# Patient Record
Sex: MALE | Race: WHITE | NOT HISPANIC OR LATINO | Employment: OTHER | URBAN - METROPOLITAN AREA
[De-identification: names, ages, dates, MRNs, and addresses within clinical notes are randomized per-mention and may not be internally consistent; named-entity substitution may affect disease eponyms.]

---

## 2017-01-03 ENCOUNTER — COMMUNICATION - HEALTHEAST (OUTPATIENT)
Dept: INTERNAL MEDICINE | Facility: CLINIC | Age: 77
End: 2017-01-03

## 2017-01-11 ENCOUNTER — COMMUNICATION - HEALTHEAST (OUTPATIENT)
Dept: NURSING | Facility: CLINIC | Age: 77
End: 2017-01-11

## 2017-01-11 ENCOUNTER — OFFICE VISIT - HEALTHEAST (OUTPATIENT)
Dept: INTERNAL MEDICINE | Facility: CLINIC | Age: 77
End: 2017-01-11

## 2017-01-11 DIAGNOSIS — N18.6 ESRD (END STAGE RENAL DISEASE) (H): ICD-10-CM

## 2017-01-11 DIAGNOSIS — E11.9 DIABETES (H): ICD-10-CM

## 2017-01-11 DIAGNOSIS — I63.9 STROKE (H): ICD-10-CM

## 2017-01-11 DIAGNOSIS — I73.9 PVD (PERIPHERAL VASCULAR DISEASE) (H): ICD-10-CM

## 2017-01-11 DIAGNOSIS — Z79.899 MEDICATION MANAGEMENT: ICD-10-CM

## 2017-01-11 ASSESSMENT — MIFFLIN-ST. JEOR: SCORE: 1725.04

## 2017-01-16 ENCOUNTER — OFFICE VISIT - HEALTHEAST (OUTPATIENT)
Dept: NURSING | Facility: CLINIC | Age: 77
End: 2017-01-16

## 2017-01-16 DIAGNOSIS — E11.9 TYPE 2 DIABETES MELLITUS (H): ICD-10-CM

## 2017-01-16 DIAGNOSIS — N18.6 ESRD (END STAGE RENAL DISEASE) (H): ICD-10-CM

## 2017-01-16 DIAGNOSIS — I10 ESSENTIAL HYPERTENSION WITH GOAL BLOOD PRESSURE LESS THAN 130/80: ICD-10-CM

## 2017-01-16 DIAGNOSIS — E11.8 TYPE 2 DIABETES MELLITUS WITH COMPLICATION, WITH LONG-TERM CURRENT USE OF INSULIN (H): ICD-10-CM

## 2017-01-16 DIAGNOSIS — I63.9 CEREBROVASCULAR ACCIDENT (CVA), UNSPECIFIED MECHANISM (H): ICD-10-CM

## 2017-01-16 DIAGNOSIS — Z79.4 TYPE 2 DIABETES MELLITUS WITH COMPLICATION, WITH LONG-TERM CURRENT USE OF INSULIN (H): ICD-10-CM

## 2017-01-16 DIAGNOSIS — R53.83 FATIGUE, UNSPECIFIED TYPE: ICD-10-CM

## 2017-01-16 DIAGNOSIS — E78.5 HYPERLIPIDEMIA, UNSPECIFIED HYPERLIPIDEMIA TYPE: ICD-10-CM

## 2017-01-16 DIAGNOSIS — Z00.00 PREVENTATIVE HEALTH CARE: ICD-10-CM

## 2017-01-16 DIAGNOSIS — I10 ESSENTIAL HYPERTENSION: ICD-10-CM

## 2017-01-23 ENCOUNTER — RECORDS - HEALTHEAST (OUTPATIENT)
Dept: ADMINISTRATIVE | Facility: OTHER | Age: 77
End: 2017-01-23

## 2017-01-27 ENCOUNTER — COMMUNICATION - HEALTHEAST (OUTPATIENT)
Dept: NURSING | Facility: CLINIC | Age: 77
End: 2017-01-27

## 2017-01-27 ENCOUNTER — HOSPITAL ENCOUNTER (OUTPATIENT)
Dept: INTERVENTIONAL RADIOLOGY/VASCULAR | Facility: HOSPITAL | Age: 77
Discharge: HOME | End: 2017-01-27
Attending: INTERNAL MEDICINE

## 2017-01-27 DIAGNOSIS — N19 RENAL FAILURE: ICD-10-CM

## 2017-01-27 DIAGNOSIS — I73.9 PVD (PERIPHERAL VASCULAR DISEASE) (H): ICD-10-CM

## 2017-01-27 DIAGNOSIS — I63.9 CEREBROVASCULAR ACCIDENT (CVA), UNSPECIFIED MECHANISM (H): ICD-10-CM

## 2017-01-27 DIAGNOSIS — R79.1 PROLONGED BLEEDING TIME: ICD-10-CM

## 2017-01-27 ASSESSMENT — MIFFLIN-ST. JEOR: SCORE: 1666.07

## 2017-01-30 ENCOUNTER — COMMUNICATION - HEALTHEAST (OUTPATIENT)
Dept: INTERVENTIONAL RADIOLOGY/VASCULAR | Facility: HOSPITAL | Age: 77
End: 2017-01-30

## 2017-01-30 ENCOUNTER — COMMUNICATION - HEALTHEAST (OUTPATIENT)
Dept: NURSING | Facility: CLINIC | Age: 77
End: 2017-01-30

## 2017-01-30 ENCOUNTER — AMBULATORY - HEALTHEAST (OUTPATIENT)
Dept: INTERNAL MEDICINE | Facility: CLINIC | Age: 77
End: 2017-01-30

## 2017-01-30 DIAGNOSIS — M79.643 HAND PAIN: ICD-10-CM

## 2017-02-03 ENCOUNTER — COMMUNICATION - HEALTHEAST (OUTPATIENT)
Dept: NURSING | Facility: CLINIC | Age: 77
End: 2017-02-03

## 2017-02-03 ENCOUNTER — AMBULATORY - HEALTHEAST (OUTPATIENT)
Dept: LAB | Facility: CLINIC | Age: 77
End: 2017-02-03

## 2017-02-03 DIAGNOSIS — I63.9 STROKE (H): ICD-10-CM

## 2017-02-03 DIAGNOSIS — I73.9 PVD (PERIPHERAL VASCULAR DISEASE) (H): ICD-10-CM

## 2017-02-03 DIAGNOSIS — I63.9 CEREBROVASCULAR ACCIDENT (CVA), UNSPECIFIED MECHANISM (H): ICD-10-CM

## 2017-02-07 ENCOUNTER — RECORDS - HEALTHEAST (OUTPATIENT)
Dept: ADMINISTRATIVE | Facility: OTHER | Age: 77
End: 2017-02-07

## 2017-02-10 ENCOUNTER — AMBULATORY - HEALTHEAST (OUTPATIENT)
Dept: LAB | Facility: CLINIC | Age: 77
End: 2017-02-10

## 2017-02-10 ENCOUNTER — COMMUNICATION - HEALTHEAST (OUTPATIENT)
Dept: NURSING | Facility: CLINIC | Age: 77
End: 2017-02-10

## 2017-02-10 DIAGNOSIS — I63.9 CEREBROVASCULAR ACCIDENT (CVA), UNSPECIFIED MECHANISM (H): ICD-10-CM

## 2017-02-10 DIAGNOSIS — I73.9 PVD (PERIPHERAL VASCULAR DISEASE) (H): ICD-10-CM

## 2017-02-10 DIAGNOSIS — I63.9 STROKE (H): ICD-10-CM

## 2017-02-14 ENCOUNTER — COMMUNICATION - HEALTHEAST (OUTPATIENT)
Dept: NURSING | Facility: CLINIC | Age: 77
End: 2017-02-14

## 2017-02-14 ENCOUNTER — AMBULATORY - HEALTHEAST (OUTPATIENT)
Dept: LAB | Facility: CLINIC | Age: 77
End: 2017-02-14

## 2017-02-14 DIAGNOSIS — I73.9 PVD (PERIPHERAL VASCULAR DISEASE) (H): ICD-10-CM

## 2017-02-14 DIAGNOSIS — I63.9 STROKE (H): ICD-10-CM

## 2017-02-14 DIAGNOSIS — I63.9 CEREBROVASCULAR ACCIDENT (CVA), UNSPECIFIED MECHANISM (H): ICD-10-CM

## 2017-02-28 ENCOUNTER — COMMUNICATION - HEALTHEAST (OUTPATIENT)
Dept: INTERNAL MEDICINE | Facility: CLINIC | Age: 77
End: 2017-02-28

## 2017-03-06 ENCOUNTER — AMBULATORY - HEALTHEAST (OUTPATIENT)
Dept: LAB | Facility: CLINIC | Age: 77
End: 2017-03-06

## 2017-03-06 ENCOUNTER — COMMUNICATION - HEALTHEAST (OUTPATIENT)
Dept: NURSING | Facility: CLINIC | Age: 77
End: 2017-03-06

## 2017-03-06 DIAGNOSIS — I63.9 CEREBROVASCULAR ACCIDENT (CVA), UNSPECIFIED MECHANISM (H): ICD-10-CM

## 2017-03-06 DIAGNOSIS — I63.9 STROKE (H): ICD-10-CM

## 2017-03-06 DIAGNOSIS — I73.9 PVD (PERIPHERAL VASCULAR DISEASE) (H): ICD-10-CM

## 2017-03-13 ENCOUNTER — COMMUNICATION - HEALTHEAST (OUTPATIENT)
Dept: SCHEDULING | Facility: CLINIC | Age: 77
End: 2017-03-13

## 2017-03-13 ENCOUNTER — OFFICE VISIT - HEALTHEAST (OUTPATIENT)
Dept: INTERNAL MEDICINE | Facility: CLINIC | Age: 77
End: 2017-03-13

## 2017-03-13 ENCOUNTER — COMMUNICATION - HEALTHEAST (OUTPATIENT)
Dept: INTERNAL MEDICINE | Facility: CLINIC | Age: 77
End: 2017-03-13

## 2017-03-13 DIAGNOSIS — M79.602 LEFT ARM PAIN: ICD-10-CM

## 2017-03-13 ASSESSMENT — MIFFLIN-ST. JEOR: SCORE: 1797.62

## 2017-03-16 LAB
ATRIAL RATE - MUSE: 66 BPM
DIASTOLIC BLOOD PRESSURE - MUSE: NORMAL MMHG
INTERPRETATION ECG - MUSE: NORMAL
P AXIS - MUSE: NORMAL DEGREES
PR INTERVAL - MUSE: NORMAL MS
QRS DURATION - MUSE: 98 MS
QT - MUSE: 430 MS
QTC - MUSE: 520 MS
R AXIS - MUSE: -26 DEGREES
SYSTOLIC BLOOD PRESSURE - MUSE: NORMAL MMHG
T AXIS - MUSE: 47 DEGREES
VENTRICULAR RATE- MUSE: 88 BPM

## 2017-03-29 ENCOUNTER — COMMUNICATION - HEALTHEAST (OUTPATIENT)
Dept: INTERNAL MEDICINE | Facility: CLINIC | Age: 77
End: 2017-03-29

## 2017-03-29 ENCOUNTER — COMMUNICATION - HEALTHEAST (OUTPATIENT)
Dept: NURSING | Facility: CLINIC | Age: 77
End: 2017-03-29

## 2017-03-29 ENCOUNTER — AMBULATORY - HEALTHEAST (OUTPATIENT)
Dept: LAB | Facility: CLINIC | Age: 77
End: 2017-03-29

## 2017-03-29 DIAGNOSIS — I73.9 PVD (PERIPHERAL VASCULAR DISEASE) (H): ICD-10-CM

## 2017-03-29 DIAGNOSIS — I63.9 STROKE (H): ICD-10-CM

## 2017-03-29 DIAGNOSIS — I63.9 CEREBROVASCULAR ACCIDENT (CVA), UNSPECIFIED MECHANISM (H): ICD-10-CM

## 2017-04-05 ENCOUNTER — COMMUNICATION - HEALTHEAST (OUTPATIENT)
Dept: NURSING | Facility: CLINIC | Age: 77
End: 2017-04-05

## 2017-04-05 DIAGNOSIS — I82.409 DVT (DEEP VENOUS THROMBOSIS) (H): ICD-10-CM

## 2017-04-05 DIAGNOSIS — I63.9 STROKE (H): ICD-10-CM

## 2017-04-19 ENCOUNTER — COMMUNICATION - HEALTHEAST (OUTPATIENT)
Dept: NURSING | Facility: CLINIC | Age: 77
End: 2017-04-19

## 2017-04-19 ENCOUNTER — AMBULATORY - HEALTHEAST (OUTPATIENT)
Dept: LAB | Facility: CLINIC | Age: 77
End: 2017-04-19

## 2017-04-19 DIAGNOSIS — I63.9 STROKE (H): ICD-10-CM

## 2017-04-19 DIAGNOSIS — I82.409 DVT (DEEP VENOUS THROMBOSIS) (H): ICD-10-CM

## 2017-04-19 DIAGNOSIS — I63.9 CEREBROVASCULAR ACCIDENT (CVA), UNSPECIFIED MECHANISM (H): ICD-10-CM

## 2017-04-19 DIAGNOSIS — I73.9 PVD (PERIPHERAL VASCULAR DISEASE) (H): ICD-10-CM

## 2017-05-10 ENCOUNTER — AMBULATORY - HEALTHEAST (OUTPATIENT)
Dept: LAB | Facility: CLINIC | Age: 77
End: 2017-05-10

## 2017-05-10 ENCOUNTER — COMMUNICATION - HEALTHEAST (OUTPATIENT)
Dept: NURSING | Facility: CLINIC | Age: 77
End: 2017-05-10

## 2017-05-10 DIAGNOSIS — I82.409 DVT (DEEP VENOUS THROMBOSIS) (H): ICD-10-CM

## 2017-05-10 DIAGNOSIS — I73.9 PVD (PERIPHERAL VASCULAR DISEASE) (H): ICD-10-CM

## 2017-05-10 DIAGNOSIS — I63.9 STROKE (H): ICD-10-CM

## 2017-05-10 DIAGNOSIS — I63.9 CEREBROVASCULAR ACCIDENT (CVA), UNSPECIFIED MECHANISM (H): ICD-10-CM

## 2017-05-31 ENCOUNTER — AMBULATORY - HEALTHEAST (OUTPATIENT)
Dept: LAB | Facility: CLINIC | Age: 77
End: 2017-05-31

## 2017-05-31 ENCOUNTER — COMMUNICATION - HEALTHEAST (OUTPATIENT)
Dept: NURSING | Facility: CLINIC | Age: 77
End: 2017-05-31

## 2017-05-31 DIAGNOSIS — I82.409 DVT (DEEP VENOUS THROMBOSIS) (H): ICD-10-CM

## 2017-05-31 DIAGNOSIS — I73.9 PVD (PERIPHERAL VASCULAR DISEASE) (H): ICD-10-CM

## 2017-05-31 DIAGNOSIS — I63.9 STROKE (H): ICD-10-CM

## 2017-05-31 DIAGNOSIS — I63.9 CEREBROVASCULAR ACCIDENT (CVA), UNSPECIFIED MECHANISM (H): ICD-10-CM

## 2017-06-12 ENCOUNTER — OFFICE VISIT - HEALTHEAST (OUTPATIENT)
Dept: INTERNAL MEDICINE | Facility: CLINIC | Age: 77
End: 2017-06-12

## 2017-06-12 DIAGNOSIS — I10 ESSENTIAL HYPERTENSION WITH GOAL BLOOD PRESSURE LESS THAN 130/85: ICD-10-CM

## 2017-06-12 DIAGNOSIS — R53.83 FATIGUE, UNSPECIFIED TYPE: ICD-10-CM

## 2017-06-12 DIAGNOSIS — E11.8 TYPE 2 DIABETES MELLITUS WITH COMPLICATION, WITH LONG-TERM CURRENT USE OF INSULIN (H): ICD-10-CM

## 2017-06-12 DIAGNOSIS — Z79.4 TYPE 2 DIABETES MELLITUS WITH COMPLICATION, WITH LONG-TERM CURRENT USE OF INSULIN (H): ICD-10-CM

## 2017-06-12 DIAGNOSIS — N18.6 ESRD (END STAGE RENAL DISEASE) (H): ICD-10-CM

## 2017-06-12 LAB — HBA1C MFR BLD: 6.5 % (ref 3.5–6)

## 2017-06-12 ASSESSMENT — MIFFLIN-ST. JEOR: SCORE: 1779.47

## 2017-06-14 ENCOUNTER — COMMUNICATION - HEALTHEAST (OUTPATIENT)
Dept: INTERNAL MEDICINE | Facility: CLINIC | Age: 77
End: 2017-06-14

## 2017-06-28 ENCOUNTER — AMBULATORY - HEALTHEAST (OUTPATIENT)
Dept: LAB | Facility: CLINIC | Age: 77
End: 2017-06-28

## 2017-06-28 ENCOUNTER — COMMUNICATION - HEALTHEAST (OUTPATIENT)
Dept: NURSING | Facility: CLINIC | Age: 77
End: 2017-06-28

## 2017-06-28 DIAGNOSIS — I63.9 STROKE (H): ICD-10-CM

## 2017-06-28 DIAGNOSIS — I63.9 CEREBROVASCULAR ACCIDENT (CVA), UNSPECIFIED MECHANISM (H): ICD-10-CM

## 2017-06-28 DIAGNOSIS — I82.409 DVT (DEEP VENOUS THROMBOSIS) (H): ICD-10-CM

## 2017-06-28 DIAGNOSIS — I73.9 PVD (PERIPHERAL VASCULAR DISEASE) (H): ICD-10-CM

## 2017-07-12 ENCOUNTER — COMMUNICATION - HEALTHEAST (OUTPATIENT)
Dept: INTERNAL MEDICINE | Facility: CLINIC | Age: 77
End: 2017-07-12

## 2017-07-19 ENCOUNTER — COMMUNICATION - HEALTHEAST (OUTPATIENT)
Dept: NURSING | Facility: CLINIC | Age: 77
End: 2017-07-19

## 2017-07-19 ENCOUNTER — AMBULATORY - HEALTHEAST (OUTPATIENT)
Dept: LAB | Facility: CLINIC | Age: 77
End: 2017-07-19

## 2017-07-19 DIAGNOSIS — I63.9 STROKE (H): ICD-10-CM

## 2017-07-19 DIAGNOSIS — I82.409 DVT (DEEP VENOUS THROMBOSIS) (H): ICD-10-CM

## 2017-07-19 DIAGNOSIS — I63.9 CEREBROVASCULAR ACCIDENT (CVA), UNSPECIFIED MECHANISM (H): ICD-10-CM

## 2017-07-19 DIAGNOSIS — I73.9 PVD (PERIPHERAL VASCULAR DISEASE) (H): ICD-10-CM

## 2017-08-09 ENCOUNTER — COMMUNICATION - HEALTHEAST (OUTPATIENT)
Dept: NURSING | Facility: CLINIC | Age: 77
End: 2017-08-09

## 2017-08-09 ENCOUNTER — AMBULATORY - HEALTHEAST (OUTPATIENT)
Dept: LAB | Facility: CLINIC | Age: 77
End: 2017-08-09

## 2017-08-09 DIAGNOSIS — I73.9 PVD (PERIPHERAL VASCULAR DISEASE) (H): ICD-10-CM

## 2017-08-09 DIAGNOSIS — I63.9 STROKE (H): ICD-10-CM

## 2017-08-09 DIAGNOSIS — I82.409 DVT (DEEP VENOUS THROMBOSIS) (H): ICD-10-CM

## 2017-08-09 DIAGNOSIS — I63.9 CEREBROVASCULAR ACCIDENT (CVA), UNSPECIFIED MECHANISM (H): ICD-10-CM

## 2017-08-17 ENCOUNTER — OFFICE VISIT - HEALTHEAST (OUTPATIENT)
Dept: INTERNAL MEDICINE | Facility: CLINIC | Age: 77
End: 2017-08-17

## 2017-08-17 DIAGNOSIS — I25.10 CAD (CORONARY ARTERY DISEASE): ICD-10-CM

## 2017-08-17 DIAGNOSIS — E78.5 HYPERLIPIDEMIA: ICD-10-CM

## 2017-08-17 DIAGNOSIS — N18.6 ESRD (END STAGE RENAL DISEASE) (H): ICD-10-CM

## 2017-08-17 DIAGNOSIS — E11.8 TYPE 2 DIABETES MELLITUS WITH COMPLICATION, WITH LONG-TERM CURRENT USE OF INSULIN (H): ICD-10-CM

## 2017-08-17 DIAGNOSIS — Z79.4 TYPE 2 DIABETES MELLITUS WITH COMPLICATION, WITH LONG-TERM CURRENT USE OF INSULIN (H): ICD-10-CM

## 2017-08-17 ASSESSMENT — MIFFLIN-ST. JEOR: SCORE: 1747.72

## 2017-08-30 ENCOUNTER — AMBULATORY - HEALTHEAST (OUTPATIENT)
Dept: LAB | Facility: CLINIC | Age: 77
End: 2017-08-30

## 2017-08-30 ENCOUNTER — COMMUNICATION - HEALTHEAST (OUTPATIENT)
Dept: NURSING | Facility: CLINIC | Age: 77
End: 2017-08-30

## 2017-08-30 DIAGNOSIS — I63.9 CEREBROVASCULAR ACCIDENT (CVA), UNSPECIFIED MECHANISM (H): ICD-10-CM

## 2017-08-30 DIAGNOSIS — I63.9 STROKE (H): ICD-10-CM

## 2017-08-30 DIAGNOSIS — I82.409 DVT (DEEP VENOUS THROMBOSIS) (H): ICD-10-CM

## 2017-08-30 DIAGNOSIS — I73.9 PVD (PERIPHERAL VASCULAR DISEASE) (H): ICD-10-CM

## 2017-09-05 ENCOUNTER — COMMUNICATION - HEALTHEAST (OUTPATIENT)
Dept: INTERNAL MEDICINE | Facility: CLINIC | Age: 77
End: 2017-09-05

## 2017-09-05 DIAGNOSIS — I10 ESSENTIAL HYPERTENSION: ICD-10-CM

## 2017-09-06 ENCOUNTER — AMBULATORY - HEALTHEAST (OUTPATIENT)
Dept: INTERNAL MEDICINE | Facility: CLINIC | Age: 77
End: 2017-09-06

## 2017-09-16 ENCOUNTER — COMMUNICATION - HEALTHEAST (OUTPATIENT)
Dept: INTERNAL MEDICINE | Facility: CLINIC | Age: 77
End: 2017-09-16

## 2017-09-16 DIAGNOSIS — Z79.4 TYPE 2 DIABETES MELLITUS WITH COMPLICATION, WITH LONG-TERM CURRENT USE OF INSULIN (H): ICD-10-CM

## 2017-09-16 DIAGNOSIS — E11.8 TYPE 2 DIABETES MELLITUS WITH COMPLICATION, WITH LONG-TERM CURRENT USE OF INSULIN (H): ICD-10-CM

## 2017-09-21 ENCOUNTER — RECORDS - HEALTHEAST (OUTPATIENT)
Dept: ADMINISTRATIVE | Facility: OTHER | Age: 77
End: 2017-09-21

## 2017-09-22 ENCOUNTER — COMMUNICATION - HEALTHEAST (OUTPATIENT)
Dept: NURSING | Facility: CLINIC | Age: 77
End: 2017-09-22

## 2017-09-22 ENCOUNTER — AMBULATORY - HEALTHEAST (OUTPATIENT)
Dept: NURSING | Facility: CLINIC | Age: 77
End: 2017-09-22

## 2017-09-22 ENCOUNTER — AMBULATORY - HEALTHEAST (OUTPATIENT)
Dept: LAB | Facility: CLINIC | Age: 77
End: 2017-09-22

## 2017-09-22 DIAGNOSIS — I82.409 DVT (DEEP VENOUS THROMBOSIS) (H): ICD-10-CM

## 2017-09-22 DIAGNOSIS — I73.9 PVD (PERIPHERAL VASCULAR DISEASE) (H): ICD-10-CM

## 2017-09-22 DIAGNOSIS — I63.9 STROKE (H): ICD-10-CM

## 2017-09-22 DIAGNOSIS — Z23 NEED FOR VACCINATION: ICD-10-CM

## 2017-09-22 DIAGNOSIS — I63.9 CEREBROVASCULAR ACCIDENT (CVA), UNSPECIFIED MECHANISM (H): ICD-10-CM

## 2017-09-25 ENCOUNTER — COMMUNICATION - HEALTHEAST (OUTPATIENT)
Dept: INTERNAL MEDICINE | Facility: CLINIC | Age: 77
End: 2017-09-25

## 2017-09-25 DIAGNOSIS — E11.9 DIABETES (H): ICD-10-CM

## 2017-10-13 ENCOUNTER — AMBULATORY - HEALTHEAST (OUTPATIENT)
Dept: LAB | Facility: CLINIC | Age: 77
End: 2017-10-13

## 2017-10-13 ENCOUNTER — COMMUNICATION - HEALTHEAST (OUTPATIENT)
Dept: NURSING | Facility: CLINIC | Age: 77
End: 2017-10-13

## 2017-10-13 DIAGNOSIS — I82.409 DVT (DEEP VENOUS THROMBOSIS) (H): ICD-10-CM

## 2017-10-13 DIAGNOSIS — I63.9 STROKE (H): ICD-10-CM

## 2017-10-13 DIAGNOSIS — I73.9 PVD (PERIPHERAL VASCULAR DISEASE) (H): ICD-10-CM

## 2017-10-13 DIAGNOSIS — I63.9 CEREBROVASCULAR ACCIDENT (CVA), UNSPECIFIED MECHANISM (H): ICD-10-CM

## 2017-10-20 ENCOUNTER — OFFICE VISIT - HEALTHEAST (OUTPATIENT)
Dept: FAMILY MEDICINE | Facility: CLINIC | Age: 77
End: 2017-10-20

## 2017-10-20 DIAGNOSIS — S00.81XA FACIAL ABRASION: ICD-10-CM

## 2017-10-23 ENCOUNTER — OFFICE VISIT - HEALTHEAST (OUTPATIENT)
Dept: INTERNAL MEDICINE | Facility: CLINIC | Age: 77
End: 2017-10-23

## 2017-10-23 DIAGNOSIS — T07.XXXA MULTIPLE ABRASIONS: ICD-10-CM

## 2017-10-23 ASSESSMENT — MIFFLIN-ST. JEOR: SCORE: 1779.47

## 2017-11-03 ENCOUNTER — AMBULATORY - HEALTHEAST (OUTPATIENT)
Dept: LAB | Facility: CLINIC | Age: 77
End: 2017-11-03

## 2017-11-03 ENCOUNTER — COMMUNICATION - HEALTHEAST (OUTPATIENT)
Dept: NURSING | Facility: CLINIC | Age: 77
End: 2017-11-03

## 2017-11-03 DIAGNOSIS — I63.9 STROKE (H): ICD-10-CM

## 2017-11-03 DIAGNOSIS — I63.9 CEREBROVASCULAR ACCIDENT (CVA), UNSPECIFIED MECHANISM (H): ICD-10-CM

## 2017-11-03 DIAGNOSIS — I82.409 DVT (DEEP VENOUS THROMBOSIS) (H): ICD-10-CM

## 2017-11-03 DIAGNOSIS — I73.9 PVD (PERIPHERAL VASCULAR DISEASE) (H): ICD-10-CM

## 2017-11-15 ENCOUNTER — COMMUNICATION - HEALTHEAST (OUTPATIENT)
Dept: NURSING | Facility: CLINIC | Age: 77
End: 2017-11-15

## 2017-11-15 DIAGNOSIS — I63.9 CEREBROVASCULAR ACCIDENT (CVA), UNSPECIFIED MECHANISM (H): ICD-10-CM

## 2017-11-15 DIAGNOSIS — I73.9 PVD (PERIPHERAL VASCULAR DISEASE) (H): ICD-10-CM

## 2017-11-17 ENCOUNTER — AMBULATORY - HEALTHEAST (OUTPATIENT)
Dept: LAB | Facility: CLINIC | Age: 77
End: 2017-11-17

## 2017-11-17 ENCOUNTER — COMMUNICATION - HEALTHEAST (OUTPATIENT)
Dept: NURSING | Facility: CLINIC | Age: 77
End: 2017-11-17

## 2017-11-17 DIAGNOSIS — I73.9 PVD (PERIPHERAL VASCULAR DISEASE) (H): ICD-10-CM

## 2017-11-17 DIAGNOSIS — I82.409 DVT (DEEP VENOUS THROMBOSIS) (H): ICD-10-CM

## 2017-11-17 DIAGNOSIS — I63.9 CEREBROVASCULAR ACCIDENT (CVA), UNSPECIFIED MECHANISM (H): ICD-10-CM

## 2017-11-17 DIAGNOSIS — I63.9 STROKE (H): ICD-10-CM

## 2017-12-08 ENCOUNTER — COMMUNICATION - HEALTHEAST (OUTPATIENT)
Dept: NURSING | Facility: CLINIC | Age: 77
End: 2017-12-08

## 2017-12-08 ENCOUNTER — AMBULATORY - HEALTHEAST (OUTPATIENT)
Dept: LAB | Facility: CLINIC | Age: 77
End: 2017-12-08

## 2017-12-08 DIAGNOSIS — I73.9 PVD (PERIPHERAL VASCULAR DISEASE) (H): ICD-10-CM

## 2017-12-08 DIAGNOSIS — I63.9 CEREBROVASCULAR ACCIDENT (CVA), UNSPECIFIED MECHANISM (H): ICD-10-CM

## 2017-12-08 DIAGNOSIS — I63.9 STROKE (H): ICD-10-CM

## 2017-12-08 DIAGNOSIS — I82.409 DVT (DEEP VENOUS THROMBOSIS) (H): ICD-10-CM

## 2017-12-29 ENCOUNTER — COMMUNICATION - HEALTHEAST (OUTPATIENT)
Dept: NURSING | Facility: CLINIC | Age: 77
End: 2017-12-29

## 2017-12-29 ENCOUNTER — AMBULATORY - HEALTHEAST (OUTPATIENT)
Dept: LAB | Facility: CLINIC | Age: 77
End: 2017-12-29

## 2017-12-29 DIAGNOSIS — I73.9 PVD (PERIPHERAL VASCULAR DISEASE) (H): ICD-10-CM

## 2017-12-29 DIAGNOSIS — I82.409 DVT (DEEP VENOUS THROMBOSIS) (H): ICD-10-CM

## 2017-12-29 DIAGNOSIS — I63.9 CEREBROVASCULAR ACCIDENT (CVA), UNSPECIFIED MECHANISM (H): ICD-10-CM

## 2017-12-29 DIAGNOSIS — I63.9 STROKE (H): ICD-10-CM

## 2018-01-05 ENCOUNTER — RECORDS - HEALTHEAST (OUTPATIENT)
Dept: ADMINISTRATIVE | Facility: OTHER | Age: 78
End: 2018-01-05

## 2018-01-15 ENCOUNTER — COMMUNICATION - HEALTHEAST (OUTPATIENT)
Dept: INTERNAL MEDICINE | Facility: CLINIC | Age: 78
End: 2018-01-15

## 2018-01-15 ENCOUNTER — AMBULATORY - HEALTHEAST (OUTPATIENT)
Dept: LAB | Facility: CLINIC | Age: 78
End: 2018-01-15

## 2018-01-15 DIAGNOSIS — I63.9 CEREBROVASCULAR ACCIDENT (CVA), UNSPECIFIED MECHANISM (H): ICD-10-CM

## 2018-01-15 DIAGNOSIS — I63.9 STROKE (H): ICD-10-CM

## 2018-01-15 DIAGNOSIS — I73.9 PVD (PERIPHERAL VASCULAR DISEASE) (H): ICD-10-CM

## 2018-01-15 DIAGNOSIS — I82.409 DVT (DEEP VENOUS THROMBOSIS) (H): ICD-10-CM

## 2018-01-15 LAB — INR PPP: 3.7 (ref 0.9–1.1)

## 2018-01-19 ENCOUNTER — HOSPITAL ENCOUNTER (OUTPATIENT)
Dept: ULTRASOUND IMAGING | Facility: HOSPITAL | Age: 78
Discharge: HOME | End: 2018-01-19
Attending: PSYCHIATRY & NEUROLOGY

## 2018-01-19 DIAGNOSIS — I63.9 ISCHEMIC STROKE (H): ICD-10-CM

## 2018-01-19 DIAGNOSIS — I73.9 PERIPHERAL VASCULAR DISEASE (H): ICD-10-CM

## 2018-01-19 DIAGNOSIS — I65.29 CAROTID STENOSIS: ICD-10-CM

## 2018-01-26 ENCOUNTER — COMMUNICATION - HEALTHEAST (OUTPATIENT)
Dept: NURSING | Facility: CLINIC | Age: 78
End: 2018-01-26

## 2018-01-26 ENCOUNTER — AMBULATORY - HEALTHEAST (OUTPATIENT)
Dept: LAB | Facility: CLINIC | Age: 78
End: 2018-01-26

## 2018-01-26 DIAGNOSIS — I73.9 PVD (PERIPHERAL VASCULAR DISEASE) (H): ICD-10-CM

## 2018-01-26 DIAGNOSIS — I63.9 STROKE (H): ICD-10-CM

## 2018-01-26 DIAGNOSIS — I63.9 CEREBROVASCULAR ACCIDENT (CVA), UNSPECIFIED MECHANISM (H): ICD-10-CM

## 2018-01-26 DIAGNOSIS — I82.409 DVT (DEEP VENOUS THROMBOSIS) (H): ICD-10-CM

## 2018-01-26 LAB — INR PPP: 2.7 (ref 0.9–1.1)

## 2018-02-07 ENCOUNTER — AMBULATORY - HEALTHEAST (OUTPATIENT)
Dept: LAB | Facility: CLINIC | Age: 78
End: 2018-02-07

## 2018-02-07 ENCOUNTER — COMMUNICATION - HEALTHEAST (OUTPATIENT)
Dept: NURSING | Facility: CLINIC | Age: 78
End: 2018-02-07

## 2018-02-07 DIAGNOSIS — I82.409 DVT (DEEP VENOUS THROMBOSIS) (H): ICD-10-CM

## 2018-02-07 DIAGNOSIS — I63.9 CEREBROVASCULAR ACCIDENT (CVA), UNSPECIFIED MECHANISM (H): ICD-10-CM

## 2018-02-07 DIAGNOSIS — I73.9 PVD (PERIPHERAL VASCULAR DISEASE) (H): ICD-10-CM

## 2018-02-07 DIAGNOSIS — I63.9 STROKE (H): ICD-10-CM

## 2018-02-07 LAB — INR PPP: 3.5 (ref 0.9–1.1)

## 2018-02-16 ENCOUNTER — COMMUNICATION - HEALTHEAST (OUTPATIENT)
Dept: NURSING | Facility: CLINIC | Age: 78
End: 2018-02-16

## 2018-02-16 ENCOUNTER — AMBULATORY - HEALTHEAST (OUTPATIENT)
Dept: LAB | Facility: CLINIC | Age: 78
End: 2018-02-16

## 2018-02-16 DIAGNOSIS — I82.409 DVT (DEEP VENOUS THROMBOSIS) (H): ICD-10-CM

## 2018-02-16 DIAGNOSIS — I63.9 STROKE (H): ICD-10-CM

## 2018-02-16 DIAGNOSIS — I63.9 CEREBROVASCULAR ACCIDENT (CVA), UNSPECIFIED MECHANISM (H): ICD-10-CM

## 2018-02-16 DIAGNOSIS — I73.9 PVD (PERIPHERAL VASCULAR DISEASE) (H): ICD-10-CM

## 2018-02-16 LAB — INR PPP: 3.5 (ref 0.9–1.1)

## 2018-02-23 ENCOUNTER — RECORDS - HEALTHEAST (OUTPATIENT)
Dept: ADMINISTRATIVE | Facility: OTHER | Age: 78
End: 2018-02-23

## 2018-02-26 ENCOUNTER — OFFICE VISIT - HEALTHEAST (OUTPATIENT)
Dept: INTERNAL MEDICINE | Facility: CLINIC | Age: 78
End: 2018-02-26

## 2018-02-26 DIAGNOSIS — E11.8 TYPE 2 DIABETES MELLITUS WITH COMPLICATION, WITH LONG-TERM CURRENT USE OF INSULIN (H): ICD-10-CM

## 2018-02-26 DIAGNOSIS — N19 RENAL FAILURE: ICD-10-CM

## 2018-02-26 DIAGNOSIS — G47.30 SLEEP APNEA: ICD-10-CM

## 2018-02-26 DIAGNOSIS — E78.5 HYPERLIPIDEMIA, UNSPECIFIED HYPERLIPIDEMIA TYPE: ICD-10-CM

## 2018-02-26 DIAGNOSIS — Z79.4 TYPE 2 DIABETES MELLITUS WITH COMPLICATION, WITH LONG-TERM CURRENT USE OF INSULIN (H): ICD-10-CM

## 2018-02-26 DIAGNOSIS — E11.9 DIABETES (H): ICD-10-CM

## 2018-02-26 LAB
CHOLEST SERPL-MCNC: 104 MG/DL
FASTING STATUS PATIENT QL REPORTED: YES
HBA1C MFR BLD: 6.2 % (ref 3.5–6)
HDLC SERPL-MCNC: 42 MG/DL
LDLC SERPL CALC-MCNC: 48 MG/DL
TRIGL SERPL-MCNC: 71 MG/DL

## 2018-02-26 ASSESSMENT — MIFFLIN-ST. JEOR: SCORE: 1779.47

## 2018-02-28 ENCOUNTER — COMMUNICATION - HEALTHEAST (OUTPATIENT)
Dept: NURSING | Facility: CLINIC | Age: 78
End: 2018-02-28

## 2018-02-28 ENCOUNTER — COMMUNICATION - HEALTHEAST (OUTPATIENT)
Dept: INTERNAL MEDICINE | Facility: CLINIC | Age: 78
End: 2018-02-28

## 2018-02-28 ENCOUNTER — AMBULATORY - HEALTHEAST (OUTPATIENT)
Dept: LAB | Facility: CLINIC | Age: 78
End: 2018-02-28

## 2018-02-28 DIAGNOSIS — I63.9 STROKE (H): ICD-10-CM

## 2018-02-28 DIAGNOSIS — I67.89 ACUTE, BUT ILL-DEFINED, CEREBROVASCULAR DISEASE: ICD-10-CM

## 2018-02-28 DIAGNOSIS — I73.9 PVD (PERIPHERAL VASCULAR DISEASE) (H): ICD-10-CM

## 2018-02-28 DIAGNOSIS — I82.409 DVT (DEEP VENOUS THROMBOSIS) (H): ICD-10-CM

## 2018-02-28 DIAGNOSIS — I63.9 CEREBROVASCULAR ACCIDENT (CVA), UNSPECIFIED MECHANISM (H): ICD-10-CM

## 2018-02-28 LAB — INR PPP: 2.8 (ref 0.9–1.1)

## 2018-03-12 ENCOUNTER — COMMUNICATION - HEALTHEAST (OUTPATIENT)
Dept: NURSING | Facility: CLINIC | Age: 78
End: 2018-03-12

## 2018-03-12 ENCOUNTER — AMBULATORY - HEALTHEAST (OUTPATIENT)
Dept: LAB | Facility: CLINIC | Age: 78
End: 2018-03-12

## 2018-03-12 DIAGNOSIS — I73.9 PVD (PERIPHERAL VASCULAR DISEASE) (H): ICD-10-CM

## 2018-03-12 DIAGNOSIS — I63.9 CEREBROVASCULAR ACCIDENT (CVA), UNSPECIFIED MECHANISM (H): ICD-10-CM

## 2018-03-12 DIAGNOSIS — I67.89 ACUTE, BUT ILL-DEFINED, CEREBROVASCULAR DISEASE: ICD-10-CM

## 2018-03-12 LAB — INR PPP: 2.4 (ref 0.9–1.1)

## 2018-03-27 ENCOUNTER — RECORDS - HEALTHEAST (OUTPATIENT)
Dept: ADMINISTRATIVE | Facility: OTHER | Age: 78
End: 2018-03-27

## 2018-04-02 ENCOUNTER — COMMUNICATION - HEALTHEAST (OUTPATIENT)
Dept: ANTICOAGULATION | Facility: CLINIC | Age: 78
End: 2018-04-02

## 2018-04-02 ENCOUNTER — AMBULATORY - HEALTHEAST (OUTPATIENT)
Dept: LAB | Facility: CLINIC | Age: 78
End: 2018-04-02

## 2018-04-02 DIAGNOSIS — I73.9 PVD (PERIPHERAL VASCULAR DISEASE) (H): ICD-10-CM

## 2018-04-02 DIAGNOSIS — I67.89 ACUTE, BUT ILL-DEFINED, CEREBROVASCULAR DISEASE: ICD-10-CM

## 2018-04-02 DIAGNOSIS — I63.9 CEREBROVASCULAR ACCIDENT (CVA), UNSPECIFIED MECHANISM (H): ICD-10-CM

## 2018-04-02 LAB — INR PPP: 2.5 (ref 0.9–1.1)

## 2018-04-09 ENCOUNTER — OFFICE VISIT - HEALTHEAST (OUTPATIENT)
Dept: SLEEP MEDICINE | Facility: CLINIC | Age: 78
End: 2018-04-09

## 2018-04-09 ENCOUNTER — AMBULATORY - HEALTHEAST (OUTPATIENT)
Dept: SLEEP MEDICINE | Facility: CLINIC | Age: 78
End: 2018-04-09

## 2018-04-09 DIAGNOSIS — G47.8 SLEEP DYSFUNCTION WITH SLEEP STAGE DISTURBANCE: ICD-10-CM

## 2018-04-09 DIAGNOSIS — G47.33 OSA ON CPAP: ICD-10-CM

## 2018-04-09 ASSESSMENT — MIFFLIN-ST. JEOR: SCORE: 1793.99

## 2018-04-16 ENCOUNTER — COMMUNICATION - HEALTHEAST (OUTPATIENT)
Dept: ANTICOAGULATION | Facility: CLINIC | Age: 78
End: 2018-04-16

## 2018-04-16 ENCOUNTER — AMBULATORY - HEALTHEAST (OUTPATIENT)
Dept: LAB | Facility: CLINIC | Age: 78
End: 2018-04-16

## 2018-04-16 DIAGNOSIS — I63.9 CEREBROVASCULAR ACCIDENT (CVA), UNSPECIFIED MECHANISM (H): ICD-10-CM

## 2018-04-16 DIAGNOSIS — I73.9 PVD (PERIPHERAL VASCULAR DISEASE) (H): ICD-10-CM

## 2018-04-16 DIAGNOSIS — I67.89 ACUTE, BUT ILL-DEFINED, CEREBROVASCULAR DISEASE: ICD-10-CM

## 2018-04-16 LAB — INR PPP: 2.4 (ref 0.9–1.1)

## 2018-04-23 ENCOUNTER — AMBULATORY - HEALTHEAST (OUTPATIENT)
Dept: SLEEP MEDICINE | Facility: CLINIC | Age: 78
End: 2018-04-23

## 2018-05-07 ENCOUNTER — COMMUNICATION - HEALTHEAST (OUTPATIENT)
Dept: ANTICOAGULATION | Facility: CLINIC | Age: 78
End: 2018-05-07

## 2018-05-07 ENCOUNTER — AMBULATORY - HEALTHEAST (OUTPATIENT)
Dept: LAB | Facility: CLINIC | Age: 78
End: 2018-05-07

## 2018-05-07 DIAGNOSIS — I63.9 CEREBROVASCULAR ACCIDENT (CVA), UNSPECIFIED MECHANISM (H): ICD-10-CM

## 2018-05-07 DIAGNOSIS — I67.89 ACUTE, BUT ILL-DEFINED, CEREBROVASCULAR DISEASE: ICD-10-CM

## 2018-05-07 DIAGNOSIS — I73.9 PVD (PERIPHERAL VASCULAR DISEASE) (H): ICD-10-CM

## 2018-05-07 LAB — INR PPP: 2.9 (ref 0.9–1.1)

## 2018-06-04 ENCOUNTER — COMMUNICATION - HEALTHEAST (OUTPATIENT)
Dept: ANTICOAGULATION | Facility: CLINIC | Age: 78
End: 2018-06-04

## 2018-06-04 ENCOUNTER — AMBULATORY - HEALTHEAST (OUTPATIENT)
Dept: LAB | Facility: CLINIC | Age: 78
End: 2018-06-04

## 2018-06-04 DIAGNOSIS — I67.89 ACUTE, BUT ILL-DEFINED, CEREBROVASCULAR DISEASE: ICD-10-CM

## 2018-06-04 DIAGNOSIS — I63.9 CEREBROVASCULAR ACCIDENT (CVA), UNSPECIFIED MECHANISM (H): ICD-10-CM

## 2018-06-04 DIAGNOSIS — I73.9 PVD (PERIPHERAL VASCULAR DISEASE) (H): ICD-10-CM

## 2018-06-04 LAB — INR PPP: 1.9 (ref 0.9–1.1)

## 2018-06-08 ENCOUNTER — COMMUNICATION - HEALTHEAST (OUTPATIENT)
Dept: SLEEP MEDICINE | Facility: CLINIC | Age: 78
End: 2018-06-08

## 2018-06-11 ENCOUNTER — OFFICE VISIT - HEALTHEAST (OUTPATIENT)
Dept: SLEEP MEDICINE | Facility: CLINIC | Age: 78
End: 2018-06-11

## 2018-06-11 DIAGNOSIS — G47.33 OSA ON CPAP: ICD-10-CM

## 2018-06-11 DIAGNOSIS — R03.0 ELEVATED BLOOD PRESSURE READING: ICD-10-CM

## 2018-06-11 DIAGNOSIS — G47.10 HYPERSOMNIA: ICD-10-CM

## 2018-06-11 ASSESSMENT — MIFFLIN-ST. JEOR: SCORE: 1779.93

## 2018-06-18 ENCOUNTER — COMMUNICATION - HEALTHEAST (OUTPATIENT)
Dept: ANTICOAGULATION | Facility: CLINIC | Age: 78
End: 2018-06-18

## 2018-06-18 ENCOUNTER — AMBULATORY - HEALTHEAST (OUTPATIENT)
Dept: LAB | Facility: CLINIC | Age: 78
End: 2018-06-18

## 2018-06-18 DIAGNOSIS — I73.9 PVD (PERIPHERAL VASCULAR DISEASE) (H): ICD-10-CM

## 2018-06-18 DIAGNOSIS — I67.89 ACUTE, BUT ILL-DEFINED, CEREBROVASCULAR DISEASE: ICD-10-CM

## 2018-06-18 DIAGNOSIS — I63.9 CEREBROVASCULAR ACCIDENT (CVA), UNSPECIFIED MECHANISM (H): ICD-10-CM

## 2018-06-18 LAB — INR PPP: 2 (ref 0.9–1.1)

## 2018-07-02 ENCOUNTER — COMMUNICATION - HEALTHEAST (OUTPATIENT)
Dept: ANTICOAGULATION | Facility: CLINIC | Age: 78
End: 2018-07-02

## 2018-07-02 ENCOUNTER — AMBULATORY - HEALTHEAST (OUTPATIENT)
Dept: LAB | Facility: CLINIC | Age: 78
End: 2018-07-02

## 2018-07-02 DIAGNOSIS — I73.9 PVD (PERIPHERAL VASCULAR DISEASE) (H): ICD-10-CM

## 2018-07-02 DIAGNOSIS — I63.9 CEREBROVASCULAR ACCIDENT (CVA), UNSPECIFIED MECHANISM (H): ICD-10-CM

## 2018-07-02 DIAGNOSIS — I67.89 ACUTE, BUT ILL-DEFINED, CEREBROVASCULAR DISEASE: ICD-10-CM

## 2018-07-02 LAB — INR PPP: 2.4 (ref 0.9–1.1)

## 2018-07-16 ENCOUNTER — OFFICE VISIT - HEALTHEAST (OUTPATIENT)
Dept: INTERNAL MEDICINE | Facility: CLINIC | Age: 78
End: 2018-07-16

## 2018-07-16 ENCOUNTER — COMMUNICATION - HEALTHEAST (OUTPATIENT)
Dept: INTERNAL MEDICINE | Facility: CLINIC | Age: 78
End: 2018-07-16

## 2018-07-16 ENCOUNTER — COMMUNICATION - HEALTHEAST (OUTPATIENT)
Dept: ANTICOAGULATION | Facility: CLINIC | Age: 78
End: 2018-07-16

## 2018-07-16 DIAGNOSIS — I63.9 CEREBROVASCULAR ACCIDENT (CVA), UNSPECIFIED MECHANISM (H): ICD-10-CM

## 2018-07-16 DIAGNOSIS — E11.8 TYPE 2 DIABETES MELLITUS WITH COMPLICATION, WITH LONG-TERM CURRENT USE OF INSULIN (H): ICD-10-CM

## 2018-07-16 DIAGNOSIS — I73.9 PVD (PERIPHERAL VASCULAR DISEASE) (H): ICD-10-CM

## 2018-07-16 DIAGNOSIS — Z79.4 TYPE 2 DIABETES MELLITUS WITH COMPLICATION, WITH LONG-TERM CURRENT USE OF INSULIN (H): ICD-10-CM

## 2018-07-16 DIAGNOSIS — I67.89 ACUTE, BUT ILL-DEFINED, CEREBROVASCULAR DISEASE: ICD-10-CM

## 2018-07-16 DIAGNOSIS — N18.6 ESRD (END STAGE RENAL DISEASE) (H): ICD-10-CM

## 2018-07-16 DIAGNOSIS — I10 ESSENTIAL HYPERTENSION WITH GOAL BLOOD PRESSURE LESS THAN 130/85: ICD-10-CM

## 2018-07-16 LAB
HBA1C MFR BLD: 6 % (ref 3.5–6)
INR PPP: 2.5 (ref 0.9–1.1)

## 2018-07-16 ASSESSMENT — MIFFLIN-ST. JEOR: SCORE: 1797.62

## 2018-07-25 ENCOUNTER — COMMUNICATION - HEALTHEAST (OUTPATIENT)
Dept: INTERNAL MEDICINE | Facility: CLINIC | Age: 78
End: 2018-07-25

## 2018-07-25 DIAGNOSIS — I10 ESSENTIAL HYPERTENSION: ICD-10-CM

## 2018-08-06 ENCOUNTER — AMBULATORY - HEALTHEAST (OUTPATIENT)
Dept: LAB | Facility: CLINIC | Age: 78
End: 2018-08-06

## 2018-08-06 ENCOUNTER — COMMUNICATION - HEALTHEAST (OUTPATIENT)
Dept: ANTICOAGULATION | Facility: CLINIC | Age: 78
End: 2018-08-06

## 2018-08-06 DIAGNOSIS — I63.9 CEREBROVASCULAR ACCIDENT (CVA), UNSPECIFIED MECHANISM (H): ICD-10-CM

## 2018-08-06 DIAGNOSIS — I67.89 ACUTE, BUT ILL-DEFINED, CEREBROVASCULAR DISEASE: ICD-10-CM

## 2018-08-06 DIAGNOSIS — I73.9 PVD (PERIPHERAL VASCULAR DISEASE) (H): ICD-10-CM

## 2018-08-06 LAB — INR PPP: 2.7 (ref 0.9–1.1)

## 2018-08-22 ENCOUNTER — COMMUNICATION - HEALTHEAST (OUTPATIENT)
Dept: INTERNAL MEDICINE | Facility: CLINIC | Age: 78
End: 2018-08-22

## 2018-08-22 DIAGNOSIS — E78.5 HYPERLIPIDEMIA: ICD-10-CM

## 2018-09-10 ENCOUNTER — AMBULATORY - HEALTHEAST (OUTPATIENT)
Dept: LAB | Facility: CLINIC | Age: 78
End: 2018-09-10

## 2018-09-10 ENCOUNTER — COMMUNICATION - HEALTHEAST (OUTPATIENT)
Dept: ANTICOAGULATION | Facility: CLINIC | Age: 78
End: 2018-09-10

## 2018-09-10 DIAGNOSIS — I63.9 CEREBROVASCULAR ACCIDENT (CVA), UNSPECIFIED MECHANISM (H): ICD-10-CM

## 2018-09-10 DIAGNOSIS — I73.9 PVD (PERIPHERAL VASCULAR DISEASE) (H): ICD-10-CM

## 2018-09-10 DIAGNOSIS — I67.89 ACUTE, BUT ILL-DEFINED, CEREBROVASCULAR DISEASE: ICD-10-CM

## 2018-09-10 LAB — INR PPP: 2.4 (ref 0.9–1.1)

## 2018-09-17 ENCOUNTER — OFFICE VISIT - HEALTHEAST (OUTPATIENT)
Dept: INTERNAL MEDICINE | Facility: CLINIC | Age: 78
End: 2018-09-17

## 2018-09-17 DIAGNOSIS — Z79.4 TYPE 2 DIABETES MELLITUS WITH COMPLICATION, WITH LONG-TERM CURRENT USE OF INSULIN (H): ICD-10-CM

## 2018-09-17 DIAGNOSIS — N18.6 ESRD (END STAGE RENAL DISEASE) (H): ICD-10-CM

## 2018-09-17 DIAGNOSIS — E78.5 HYPERLIPIDEMIA, UNSPECIFIED HYPERLIPIDEMIA TYPE: ICD-10-CM

## 2018-09-17 DIAGNOSIS — I10 ESSENTIAL HYPERTENSION WITH GOAL BLOOD PRESSURE LESS THAN 130/85: ICD-10-CM

## 2018-09-17 DIAGNOSIS — E11.8 TYPE 2 DIABETES MELLITUS WITH COMPLICATION, WITH LONG-TERM CURRENT USE OF INSULIN (H): ICD-10-CM

## 2018-09-17 ASSESSMENT — MIFFLIN-ST. JEOR: SCORE: 1793.08

## 2018-10-03 ENCOUNTER — AMBULATORY - HEALTHEAST (OUTPATIENT)
Dept: LAB | Facility: CLINIC | Age: 78
End: 2018-10-03

## 2018-10-03 ENCOUNTER — COMMUNICATION - HEALTHEAST (OUTPATIENT)
Dept: ANTICOAGULATION | Facility: CLINIC | Age: 78
End: 2018-10-03

## 2018-10-03 DIAGNOSIS — I63.9 CEREBROVASCULAR ACCIDENT (CVA), UNSPECIFIED MECHANISM (H): ICD-10-CM

## 2018-10-03 DIAGNOSIS — I73.9 PVD (PERIPHERAL VASCULAR DISEASE) (H): ICD-10-CM

## 2018-10-03 DIAGNOSIS — I67.89 ACUTE, BUT ILL-DEFINED, CEREBROVASCULAR DISEASE: ICD-10-CM

## 2018-10-03 LAB — INR PPP: 2.2 (ref 0.9–1.1)

## 2018-10-09 ENCOUNTER — COMMUNICATION - HEALTHEAST (OUTPATIENT)
Dept: INTERNAL MEDICINE | Facility: CLINIC | Age: 78
End: 2018-10-09

## 2018-10-09 DIAGNOSIS — I25.10 CAD (CORONARY ARTERY DISEASE): ICD-10-CM

## 2018-10-09 DIAGNOSIS — I10 ESSENTIAL HYPERTENSION WITH GOAL BLOOD PRESSURE LESS THAN 130/85: ICD-10-CM

## 2018-10-22 ENCOUNTER — COMMUNICATION - HEALTHEAST (OUTPATIENT)
Dept: INTERNAL MEDICINE | Facility: CLINIC | Age: 78
End: 2018-10-22

## 2018-10-22 DIAGNOSIS — Z79.4 TYPE 2 DIABETES MELLITUS WITH COMPLICATION, WITH LONG-TERM CURRENT USE OF INSULIN (H): ICD-10-CM

## 2018-10-22 DIAGNOSIS — E11.8 TYPE 2 DIABETES MELLITUS WITH COMPLICATION, WITH LONG-TERM CURRENT USE OF INSULIN (H): ICD-10-CM

## 2018-10-24 ENCOUNTER — COMMUNICATION - HEALTHEAST (OUTPATIENT)
Dept: INTERNAL MEDICINE | Facility: CLINIC | Age: 78
End: 2018-10-24

## 2018-10-24 DIAGNOSIS — Z79.4 TYPE 2 DIABETES MELLITUS WITH COMPLICATION, WITH LONG-TERM CURRENT USE OF INSULIN (H): ICD-10-CM

## 2018-10-24 DIAGNOSIS — E11.8 TYPE 2 DIABETES MELLITUS WITH COMPLICATION, WITH LONG-TERM CURRENT USE OF INSULIN (H): ICD-10-CM

## 2018-10-29 ENCOUNTER — COMMUNICATION - HEALTHEAST (OUTPATIENT)
Dept: ANTICOAGULATION | Facility: CLINIC | Age: 78
End: 2018-10-29

## 2018-10-29 ENCOUNTER — AMBULATORY - HEALTHEAST (OUTPATIENT)
Dept: LAB | Facility: CLINIC | Age: 78
End: 2018-10-29

## 2018-10-29 DIAGNOSIS — I73.9 PVD (PERIPHERAL VASCULAR DISEASE) (H): ICD-10-CM

## 2018-10-29 DIAGNOSIS — I63.9 CEREBROVASCULAR ACCIDENT (CVA), UNSPECIFIED MECHANISM (H): ICD-10-CM

## 2018-10-29 DIAGNOSIS — I67.89 ACUTE, BUT ILL-DEFINED, CEREBROVASCULAR DISEASE: ICD-10-CM

## 2018-10-29 LAB — INR PPP: 2 (ref 0.9–1.1)

## 2018-11-09 ENCOUNTER — COMMUNICATION - HEALTHEAST (OUTPATIENT)
Dept: ANTICOAGULATION | Facility: CLINIC | Age: 78
End: 2018-11-09

## 2018-11-09 DIAGNOSIS — I63.9 CEREBROVASCULAR ACCIDENT (CVA), UNSPECIFIED MECHANISM (H): ICD-10-CM

## 2018-11-09 DIAGNOSIS — I73.9 PVD (PERIPHERAL VASCULAR DISEASE) (H): ICD-10-CM

## 2018-11-19 ENCOUNTER — COMMUNICATION - HEALTHEAST (OUTPATIENT)
Dept: ANTICOAGULATION | Facility: CLINIC | Age: 78
End: 2018-11-19

## 2018-11-19 ENCOUNTER — AMBULATORY - HEALTHEAST (OUTPATIENT)
Dept: LAB | Facility: CLINIC | Age: 78
End: 2018-11-19

## 2018-11-19 DIAGNOSIS — I63.9 CEREBROVASCULAR ACCIDENT (CVA), UNSPECIFIED MECHANISM (H): ICD-10-CM

## 2018-11-19 DIAGNOSIS — I73.9 PVD (PERIPHERAL VASCULAR DISEASE) (H): ICD-10-CM

## 2018-11-19 DIAGNOSIS — I67.89 ACUTE, BUT ILL-DEFINED, CEREBROVASCULAR DISEASE: ICD-10-CM

## 2018-11-19 LAB — INR PPP: 2.5 (ref 0.9–1.1)

## 2018-11-30 ENCOUNTER — OFFICE VISIT - HEALTHEAST (OUTPATIENT)
Dept: INTERNAL MEDICINE | Facility: CLINIC | Age: 78
End: 2018-11-30

## 2018-11-30 DIAGNOSIS — Z79.4 TYPE 2 DIABETES MELLITUS WITH COMPLICATION, WITH LONG-TERM CURRENT USE OF INSULIN (H): ICD-10-CM

## 2018-11-30 DIAGNOSIS — E11.8 TYPE 2 DIABETES MELLITUS WITH COMPLICATION, WITH LONG-TERM CURRENT USE OF INSULIN (H): ICD-10-CM

## 2018-11-30 DIAGNOSIS — I10 ESSENTIAL HYPERTENSION WITH GOAL BLOOD PRESSURE LESS THAN 130/85: ICD-10-CM

## 2018-11-30 DIAGNOSIS — N18.6 ESRD (END STAGE RENAL DISEASE) (H): ICD-10-CM

## 2018-11-30 ASSESSMENT — MIFFLIN-ST. JEOR: SCORE: 1786.27

## 2018-12-10 ENCOUNTER — AMBULATORY - HEALTHEAST (OUTPATIENT)
Dept: LAB | Facility: CLINIC | Age: 78
End: 2018-12-10

## 2018-12-10 ENCOUNTER — COMMUNICATION - HEALTHEAST (OUTPATIENT)
Dept: OTHER | Facility: CLINIC | Age: 78
End: 2018-12-10

## 2018-12-10 ENCOUNTER — COMMUNICATION - HEALTHEAST (OUTPATIENT)
Dept: ANTICOAGULATION | Facility: CLINIC | Age: 78
End: 2018-12-10

## 2018-12-10 DIAGNOSIS — I63.9 CEREBROVASCULAR ACCIDENT (CVA), UNSPECIFIED MECHANISM (H): ICD-10-CM

## 2018-12-10 DIAGNOSIS — I73.9 PVD (PERIPHERAL VASCULAR DISEASE) (H): ICD-10-CM

## 2018-12-10 DIAGNOSIS — I67.89 ACUTE, BUT ILL-DEFINED, CEREBROVASCULAR DISEASE: ICD-10-CM

## 2018-12-10 LAB — INR PPP: 1.8 (ref 0.9–1.1)

## 2018-12-11 ENCOUNTER — COMMUNICATION - HEALTHEAST (OUTPATIENT)
Dept: INTERNAL MEDICINE | Facility: CLINIC | Age: 78
End: 2018-12-11

## 2018-12-18 ENCOUNTER — AMBULATORY - HEALTHEAST (OUTPATIENT)
Dept: OTHER | Facility: CLINIC | Age: 78
End: 2018-12-18

## 2018-12-19 ENCOUNTER — COMMUNICATION - HEALTHEAST (OUTPATIENT)
Dept: INTERNAL MEDICINE | Facility: CLINIC | Age: 78
End: 2018-12-19

## 2018-12-19 DIAGNOSIS — E11.9 DIABETES (H): ICD-10-CM

## 2018-12-24 ENCOUNTER — COMMUNICATION - HEALTHEAST (OUTPATIENT)
Dept: INTERNAL MEDICINE | Facility: CLINIC | Age: 78
End: 2018-12-24

## 2018-12-24 DIAGNOSIS — E11.9 DIABETES (H): ICD-10-CM

## 2018-12-26 ENCOUNTER — COMMUNICATION - HEALTHEAST (OUTPATIENT)
Dept: ANTICOAGULATION | Facility: CLINIC | Age: 78
End: 2018-12-26

## 2018-12-26 ENCOUNTER — AMBULATORY - HEALTHEAST (OUTPATIENT)
Dept: LAB | Facility: CLINIC | Age: 78
End: 2018-12-26

## 2018-12-26 DIAGNOSIS — I73.9 PVD (PERIPHERAL VASCULAR DISEASE) (H): ICD-10-CM

## 2018-12-26 DIAGNOSIS — I67.89 ACUTE, BUT ILL-DEFINED, CEREBROVASCULAR DISEASE: ICD-10-CM

## 2018-12-26 DIAGNOSIS — I63.9 CEREBROVASCULAR ACCIDENT (CVA), UNSPECIFIED MECHANISM (H): ICD-10-CM

## 2018-12-26 LAB — INR PPP: 1.8 (ref 0.9–1.1)

## 2018-12-27 ENCOUNTER — RECORDS - HEALTHEAST (OUTPATIENT)
Dept: ADMINISTRATIVE | Facility: OTHER | Age: 78
End: 2018-12-27

## 2018-12-27 ENCOUNTER — COMMUNICATION - HEALTHEAST (OUTPATIENT)
Dept: INTERNAL MEDICINE | Facility: CLINIC | Age: 78
End: 2018-12-27

## 2018-12-27 DIAGNOSIS — I73.9 PERIPHERAL VASCULAR DISEASE, UNSPECIFIED (H): ICD-10-CM

## 2019-01-09 ENCOUNTER — AMBULATORY - HEALTHEAST (OUTPATIENT)
Dept: LAB | Facility: CLINIC | Age: 79
End: 2019-01-09

## 2019-01-09 ENCOUNTER — COMMUNICATION - HEALTHEAST (OUTPATIENT)
Dept: ANTICOAGULATION | Facility: CLINIC | Age: 79
End: 2019-01-09

## 2019-01-09 DIAGNOSIS — I63.9 CEREBROVASCULAR ACCIDENT (CVA), UNSPECIFIED MECHANISM (H): ICD-10-CM

## 2019-01-09 DIAGNOSIS — I73.9 PVD (PERIPHERAL VASCULAR DISEASE) (H): ICD-10-CM

## 2019-01-09 DIAGNOSIS — I67.89 ACUTE, BUT ILL-DEFINED, CEREBROVASCULAR DISEASE: ICD-10-CM

## 2019-01-09 LAB — INR PPP: 2.1 (ref 0.9–1.1)

## 2019-01-14 ENCOUNTER — OFFICE VISIT - HEALTHEAST (OUTPATIENT)
Dept: INTERNAL MEDICINE | Facility: CLINIC | Age: 79
End: 2019-01-14

## 2019-01-14 DIAGNOSIS — I10 ESSENTIAL HYPERTENSION WITH GOAL BLOOD PRESSURE LESS THAN 130/85: ICD-10-CM

## 2019-01-14 DIAGNOSIS — E11.8 TYPE 2 DIABETES MELLITUS WITH COMPLICATION, WITH LONG-TERM CURRENT USE OF INSULIN (H): ICD-10-CM

## 2019-01-14 DIAGNOSIS — Z79.4 TYPE 2 DIABETES MELLITUS WITH COMPLICATION, WITH LONG-TERM CURRENT USE OF INSULIN (H): ICD-10-CM

## 2019-01-14 DIAGNOSIS — N18.6 ESRD (END STAGE RENAL DISEASE) (H): ICD-10-CM

## 2019-01-14 ASSESSMENT — MIFFLIN-ST. JEOR: SCORE: 1799.88

## 2019-01-30 ENCOUNTER — COMMUNICATION - HEALTHEAST (OUTPATIENT)
Dept: ANTICOAGULATION | Facility: CLINIC | Age: 79
End: 2019-01-30

## 2019-01-30 ENCOUNTER — AMBULATORY - HEALTHEAST (OUTPATIENT)
Dept: LAB | Facility: CLINIC | Age: 79
End: 2019-01-30

## 2019-01-30 DIAGNOSIS — I67.89 ACUTE, BUT ILL-DEFINED, CEREBROVASCULAR DISEASE: ICD-10-CM

## 2019-01-30 DIAGNOSIS — I73.9 PVD (PERIPHERAL VASCULAR DISEASE) (H): ICD-10-CM

## 2019-01-30 DIAGNOSIS — I63.9 CEREBROVASCULAR ACCIDENT (CVA), UNSPECIFIED MECHANISM (H): ICD-10-CM

## 2019-01-30 LAB — INR PPP: 2.4 (ref 0.9–1.1)

## 2019-01-31 ENCOUNTER — COMMUNICATION - HEALTHEAST (OUTPATIENT)
Dept: ANTICOAGULATION | Facility: CLINIC | Age: 79
End: 2019-01-31

## 2019-01-31 ENCOUNTER — COMMUNICATION - HEALTHEAST (OUTPATIENT)
Dept: SCHEDULING | Facility: CLINIC | Age: 79
End: 2019-01-31

## 2019-02-01 ENCOUNTER — RECORDS - HEALTHEAST (OUTPATIENT)
Dept: ADMINISTRATIVE | Facility: OTHER | Age: 79
End: 2019-02-01

## 2019-02-05 ENCOUNTER — COMMUNICATION - HEALTHEAST (OUTPATIENT)
Dept: ANTICOAGULATION | Facility: CLINIC | Age: 79
End: 2019-02-05

## 2019-02-05 DIAGNOSIS — I73.9 PERIPHERAL VASCULAR DISEASE, UNSPECIFIED (H): ICD-10-CM

## 2019-02-05 DIAGNOSIS — I67.89 ACUTE, BUT ILL-DEFINED, CEREBROVASCULAR DISEASE: ICD-10-CM

## 2019-02-06 ENCOUNTER — OFFICE VISIT - HEALTHEAST (OUTPATIENT)
Dept: INTERNAL MEDICINE | Facility: CLINIC | Age: 79
End: 2019-02-06

## 2019-02-06 ENCOUNTER — OFFICE VISIT - HEALTHEAST (OUTPATIENT)
Dept: SLEEP MEDICINE | Facility: CLINIC | Age: 79
End: 2019-02-06

## 2019-02-06 DIAGNOSIS — G47.33 OBSTRUCTIVE SLEEP APNEA: ICD-10-CM

## 2019-02-06 DIAGNOSIS — G47.8 SLEEP DYSFUNCTION WITH SLEEP STAGE DISTURBANCE: ICD-10-CM

## 2019-02-06 DIAGNOSIS — E11.8 TYPE 2 DIABETES MELLITUS WITH COMPLICATION, WITH LONG-TERM CURRENT USE OF INSULIN (H): ICD-10-CM

## 2019-02-06 DIAGNOSIS — N18.6 ESRD (END STAGE RENAL DISEASE) (H): ICD-10-CM

## 2019-02-06 DIAGNOSIS — Z79.4 TYPE 2 DIABETES MELLITUS WITH COMPLICATION, WITH LONG-TERM CURRENT USE OF INSULIN (H): ICD-10-CM

## 2019-02-06 DIAGNOSIS — I10 ESSENTIAL HYPERTENSION WITH GOAL BLOOD PRESSURE LESS THAN 130/85: ICD-10-CM

## 2019-02-06 ASSESSMENT — MIFFLIN-ST. JEOR
SCORE: 1770.4
SCORE: 1770.4

## 2019-02-15 ENCOUNTER — AMBULATORY - HEALTHEAST (OUTPATIENT)
Dept: LAB | Facility: CLINIC | Age: 79
End: 2019-02-15

## 2019-02-15 ENCOUNTER — COMMUNICATION - HEALTHEAST (OUTPATIENT)
Dept: ANTICOAGULATION | Facility: CLINIC | Age: 79
End: 2019-02-15

## 2019-02-15 DIAGNOSIS — I63.9 CEREBROVASCULAR ACCIDENT (CVA), UNSPECIFIED MECHANISM (H): ICD-10-CM

## 2019-02-15 DIAGNOSIS — I73.9 PVD (PERIPHERAL VASCULAR DISEASE) (H): ICD-10-CM

## 2019-02-15 DIAGNOSIS — I67.89 ACUTE, BUT ILL-DEFINED, CEREBROVASCULAR DISEASE: ICD-10-CM

## 2019-02-15 DIAGNOSIS — I73.9 PERIPHERAL VASCULAR DISEASE, UNSPECIFIED (H): ICD-10-CM

## 2019-02-15 LAB — INR PPP: 2.6 (ref 0.9–1.1)

## 2019-03-08 ENCOUNTER — AMBULATORY - HEALTHEAST (OUTPATIENT)
Dept: LAB | Facility: CLINIC | Age: 79
End: 2019-03-08

## 2019-03-08 ENCOUNTER — COMMUNICATION - HEALTHEAST (OUTPATIENT)
Dept: ANTICOAGULATION | Facility: CLINIC | Age: 79
End: 2019-03-08

## 2019-03-08 DIAGNOSIS — I63.9 CEREBROVASCULAR ACCIDENT (CVA), UNSPECIFIED MECHANISM (H): ICD-10-CM

## 2019-03-08 DIAGNOSIS — I67.89 ACUTE, BUT ILL-DEFINED, CEREBROVASCULAR DISEASE: ICD-10-CM

## 2019-03-08 DIAGNOSIS — I73.9 PVD (PERIPHERAL VASCULAR DISEASE) (H): ICD-10-CM

## 2019-03-08 DIAGNOSIS — I73.9 PERIPHERAL VASCULAR DISEASE, UNSPECIFIED (H): ICD-10-CM

## 2019-03-08 LAB — INR PPP: 3 (ref 0.9–1.1)

## 2019-03-22 ENCOUNTER — COMMUNICATION - HEALTHEAST (OUTPATIENT)
Dept: ANTICOAGULATION | Facility: CLINIC | Age: 79
End: 2019-03-22

## 2019-03-22 ENCOUNTER — AMBULATORY - HEALTHEAST (OUTPATIENT)
Dept: LAB | Facility: CLINIC | Age: 79
End: 2019-03-22

## 2019-03-22 DIAGNOSIS — I73.9 PVD (PERIPHERAL VASCULAR DISEASE) (H): ICD-10-CM

## 2019-03-22 DIAGNOSIS — I63.9 CEREBROVASCULAR ACCIDENT (CVA), UNSPECIFIED MECHANISM (H): ICD-10-CM

## 2019-03-22 DIAGNOSIS — I67.89 ACUTE, BUT ILL-DEFINED, CEREBROVASCULAR DISEASE: ICD-10-CM

## 2019-03-22 DIAGNOSIS — I73.9 PERIPHERAL VASCULAR DISEASE, UNSPECIFIED (H): ICD-10-CM

## 2019-03-22 LAB — INR PPP: 3.3 (ref 0.9–1.1)

## 2019-04-05 ENCOUNTER — COMMUNICATION - HEALTHEAST (OUTPATIENT)
Dept: ANTICOAGULATION | Facility: CLINIC | Age: 79
End: 2019-04-05

## 2019-04-05 ENCOUNTER — OFFICE VISIT - HEALTHEAST (OUTPATIENT)
Dept: INTERNAL MEDICINE | Facility: CLINIC | Age: 79
End: 2019-04-05

## 2019-04-05 DIAGNOSIS — J30.9 ALLERGIC RHINITIS, UNSPECIFIED SEASONALITY, UNSPECIFIED TRIGGER: ICD-10-CM

## 2019-04-05 DIAGNOSIS — M25.511 ACUTE PAIN OF RIGHT SHOULDER: ICD-10-CM

## 2019-04-05 DIAGNOSIS — I67.89 ACUTE, BUT ILL-DEFINED, CEREBROVASCULAR DISEASE: ICD-10-CM

## 2019-04-05 DIAGNOSIS — I63.9 STROKE (H): ICD-10-CM

## 2019-04-05 DIAGNOSIS — I73.9 PVD (PERIPHERAL VASCULAR DISEASE) (H): ICD-10-CM

## 2019-04-05 DIAGNOSIS — I73.9 PERIPHERAL VASCULAR DISEASE, UNSPECIFIED (H): ICD-10-CM

## 2019-04-05 LAB — INR PPP: 2.2 (ref 0.9–1.1)

## 2019-04-05 ASSESSMENT — MIFFLIN-ST. JEOR: SCORE: 1770.4

## 2019-04-19 ENCOUNTER — COMMUNICATION - HEALTHEAST (OUTPATIENT)
Dept: ANTICOAGULATION | Facility: CLINIC | Age: 79
End: 2019-04-19

## 2019-04-19 ENCOUNTER — AMBULATORY - HEALTHEAST (OUTPATIENT)
Dept: LAB | Facility: CLINIC | Age: 79
End: 2019-04-19

## 2019-04-19 DIAGNOSIS — I63.9 STROKE (H): ICD-10-CM

## 2019-04-19 DIAGNOSIS — I73.9 PERIPHERAL VASCULAR DISEASE, UNSPECIFIED (H): ICD-10-CM

## 2019-04-19 DIAGNOSIS — I73.9 PVD (PERIPHERAL VASCULAR DISEASE) (H): ICD-10-CM

## 2019-04-19 DIAGNOSIS — I67.89 ACUTE, BUT ILL-DEFINED, CEREBROVASCULAR DISEASE: ICD-10-CM

## 2019-04-19 LAB — INR PPP: 2.2 (ref 0.9–1.1)

## 2019-05-10 ENCOUNTER — COMMUNICATION - HEALTHEAST (OUTPATIENT)
Dept: ANTICOAGULATION | Facility: CLINIC | Age: 79
End: 2019-05-10

## 2019-05-10 ENCOUNTER — AMBULATORY - HEALTHEAST (OUTPATIENT)
Dept: LAB | Facility: CLINIC | Age: 79
End: 2019-05-10

## 2019-05-10 DIAGNOSIS — I73.9 PERIPHERAL VASCULAR DISEASE, UNSPECIFIED (H): ICD-10-CM

## 2019-05-10 DIAGNOSIS — I73.9 PVD (PERIPHERAL VASCULAR DISEASE) (H): ICD-10-CM

## 2019-05-10 DIAGNOSIS — I63.9 STROKE (H): ICD-10-CM

## 2019-05-10 DIAGNOSIS — I67.89 ACUTE, BUT ILL-DEFINED, CEREBROVASCULAR DISEASE: ICD-10-CM

## 2019-05-10 LAB — INR PPP: 2.1 (ref 0.9–1.1)

## 2019-05-15 ENCOUNTER — COMMUNICATION - HEALTHEAST (OUTPATIENT)
Dept: SLEEP MEDICINE | Facility: CLINIC | Age: 79
End: 2019-05-15

## 2019-05-31 ENCOUNTER — AMBULATORY - HEALTHEAST (OUTPATIENT)
Dept: LAB | Facility: CLINIC | Age: 79
End: 2019-05-31

## 2019-05-31 ENCOUNTER — COMMUNICATION - HEALTHEAST (OUTPATIENT)
Dept: ANTICOAGULATION | Facility: CLINIC | Age: 79
End: 2019-05-31

## 2019-05-31 DIAGNOSIS — I63.9 STROKE (H): ICD-10-CM

## 2019-05-31 DIAGNOSIS — I73.9 PERIPHERAL VASCULAR DISEASE, UNSPECIFIED (H): ICD-10-CM

## 2019-05-31 DIAGNOSIS — I67.89 ACUTE, BUT ILL-DEFINED, CEREBROVASCULAR DISEASE: ICD-10-CM

## 2019-05-31 DIAGNOSIS — I73.9 PVD (PERIPHERAL VASCULAR DISEASE) (H): ICD-10-CM

## 2019-05-31 LAB — INR PPP: 2.7 (ref 0.9–1.1)

## 2019-06-21 ENCOUNTER — COMMUNICATION - HEALTHEAST (OUTPATIENT)
Dept: ANTICOAGULATION | Facility: CLINIC | Age: 79
End: 2019-06-21

## 2019-06-21 ENCOUNTER — AMBULATORY - HEALTHEAST (OUTPATIENT)
Dept: LAB | Facility: CLINIC | Age: 79
End: 2019-06-21

## 2019-06-21 DIAGNOSIS — I67.89 ACUTE, BUT ILL-DEFINED, CEREBROVASCULAR DISEASE: ICD-10-CM

## 2019-06-21 DIAGNOSIS — I73.9 PVD (PERIPHERAL VASCULAR DISEASE) (H): ICD-10-CM

## 2019-06-21 DIAGNOSIS — I73.9 PERIPHERAL VASCULAR DISEASE, UNSPECIFIED (H): ICD-10-CM

## 2019-06-21 DIAGNOSIS — I63.9 STROKE (H): ICD-10-CM

## 2019-06-21 LAB — INR PPP: 2.2 (ref 0.9–1.1)

## 2019-07-12 ENCOUNTER — COMMUNICATION - HEALTHEAST (OUTPATIENT)
Dept: ANTICOAGULATION | Facility: CLINIC | Age: 79
End: 2019-07-12

## 2019-07-12 ENCOUNTER — AMBULATORY - HEALTHEAST (OUTPATIENT)
Dept: LAB | Facility: CLINIC | Age: 79
End: 2019-07-12

## 2019-07-12 DIAGNOSIS — I73.9 PERIPHERAL VASCULAR DISEASE, UNSPECIFIED (H): ICD-10-CM

## 2019-07-12 DIAGNOSIS — I73.9 PVD (PERIPHERAL VASCULAR DISEASE) (H): ICD-10-CM

## 2019-07-12 DIAGNOSIS — I63.9 STROKE (H): ICD-10-CM

## 2019-07-12 DIAGNOSIS — I67.89 ACUTE, BUT ILL-DEFINED, CEREBROVASCULAR DISEASE: ICD-10-CM

## 2019-07-12 LAB — INR PPP: 2.2 (ref 0.9–1.1)

## 2019-08-09 ENCOUNTER — AMBULATORY - HEALTHEAST (OUTPATIENT)
Dept: LAB | Facility: CLINIC | Age: 79
End: 2019-08-09

## 2019-08-09 ENCOUNTER — COMMUNICATION - HEALTHEAST (OUTPATIENT)
Dept: ANTICOAGULATION | Facility: CLINIC | Age: 79
End: 2019-08-09

## 2019-08-09 DIAGNOSIS — I67.89 ACUTE, BUT ILL-DEFINED, CEREBROVASCULAR DISEASE: ICD-10-CM

## 2019-08-09 DIAGNOSIS — I73.9 PERIPHERAL VASCULAR DISEASE, UNSPECIFIED (H): ICD-10-CM

## 2019-08-09 DIAGNOSIS — I63.9 STROKE (H): ICD-10-CM

## 2019-08-09 DIAGNOSIS — I73.9 PVD (PERIPHERAL VASCULAR DISEASE) (H): ICD-10-CM

## 2019-08-09 LAB — INR PPP: 2.1 (ref 0.9–1.1)

## 2019-08-20 ENCOUNTER — COMMUNICATION - HEALTHEAST (OUTPATIENT)
Dept: INTERNAL MEDICINE | Facility: CLINIC | Age: 79
End: 2019-08-20

## 2019-08-20 DIAGNOSIS — E78.5 HYPERLIPIDEMIA: ICD-10-CM

## 2019-08-30 ENCOUNTER — AMBULATORY - HEALTHEAST (OUTPATIENT)
Dept: LAB | Facility: CLINIC | Age: 79
End: 2019-08-30

## 2019-08-30 ENCOUNTER — COMMUNICATION - HEALTHEAST (OUTPATIENT)
Dept: ANTICOAGULATION | Facility: CLINIC | Age: 79
End: 2019-08-30

## 2019-08-30 DIAGNOSIS — I67.89 ACUTE, BUT ILL-DEFINED, CEREBROVASCULAR DISEASE: ICD-10-CM

## 2019-08-30 DIAGNOSIS — I63.9 STROKE (H): ICD-10-CM

## 2019-08-30 DIAGNOSIS — I73.9 PERIPHERAL VASCULAR DISEASE, UNSPECIFIED (H): ICD-10-CM

## 2019-08-30 DIAGNOSIS — I73.9 PVD (PERIPHERAL VASCULAR DISEASE) (H): ICD-10-CM

## 2019-08-30 LAB — INR PPP: 3.1 (ref 0.9–1.1)

## 2019-09-13 ENCOUNTER — COMMUNICATION - HEALTHEAST (OUTPATIENT)
Dept: ANTICOAGULATION | Facility: CLINIC | Age: 79
End: 2019-09-13

## 2019-09-13 ENCOUNTER — AMBULATORY - HEALTHEAST (OUTPATIENT)
Dept: LAB | Facility: CLINIC | Age: 79
End: 2019-09-13

## 2019-09-13 DIAGNOSIS — I73.9 PERIPHERAL VASCULAR DISEASE, UNSPECIFIED (H): ICD-10-CM

## 2019-09-13 DIAGNOSIS — I73.9 PVD (PERIPHERAL VASCULAR DISEASE) (H): ICD-10-CM

## 2019-09-13 DIAGNOSIS — I67.89 ACUTE, BUT ILL-DEFINED, CEREBROVASCULAR DISEASE: ICD-10-CM

## 2019-09-13 DIAGNOSIS — I63.9 STROKE (H): ICD-10-CM

## 2019-09-13 LAB — INR PPP: 2.6 (ref 0.9–1.1)

## 2019-09-18 ENCOUNTER — OFFICE VISIT - HEALTHEAST (OUTPATIENT)
Dept: INTERNAL MEDICINE | Facility: CLINIC | Age: 79
End: 2019-09-18

## 2019-09-18 ENCOUNTER — COMMUNICATION - HEALTHEAST (OUTPATIENT)
Dept: ANTICOAGULATION | Facility: CLINIC | Age: 79
End: 2019-09-18

## 2019-09-18 ENCOUNTER — COMMUNICATION - HEALTHEAST (OUTPATIENT)
Dept: INTERNAL MEDICINE | Facility: CLINIC | Age: 79
End: 2019-09-18

## 2019-09-18 DIAGNOSIS — I10 ESSENTIAL HYPERTENSION WITH GOAL BLOOD PRESSURE LESS THAN 130/85: ICD-10-CM

## 2019-09-18 DIAGNOSIS — E11.8 TYPE 2 DIABETES MELLITUS WITH COMPLICATION, WITH LONG-TERM CURRENT USE OF INSULIN (H): ICD-10-CM

## 2019-09-18 DIAGNOSIS — R53.83 FATIGUE, UNSPECIFIED TYPE: ICD-10-CM

## 2019-09-18 DIAGNOSIS — Z79.4 TYPE 2 DIABETES MELLITUS WITH COMPLICATION, WITH LONG-TERM CURRENT USE OF INSULIN (H): ICD-10-CM

## 2019-09-18 DIAGNOSIS — R35.0 URINARY FREQUENCY: ICD-10-CM

## 2019-09-18 DIAGNOSIS — I73.9 PVD (PERIPHERAL VASCULAR DISEASE) (H): ICD-10-CM

## 2019-09-18 DIAGNOSIS — I63.9 STROKE (H): ICD-10-CM

## 2019-09-18 DIAGNOSIS — Z23 FLU VACCINE NEED: ICD-10-CM

## 2019-09-20 ENCOUNTER — AMBULATORY - HEALTHEAST (OUTPATIENT)
Dept: INTERNAL MEDICINE | Facility: CLINIC | Age: 79
End: 2019-09-20

## 2019-09-20 ENCOUNTER — COMMUNICATION - HEALTHEAST (OUTPATIENT)
Dept: SCHEDULING | Facility: CLINIC | Age: 79
End: 2019-09-20

## 2019-09-20 DIAGNOSIS — N30.01 ACUTE CYSTITIS WITH HEMATURIA: ICD-10-CM

## 2019-09-21 ENCOUNTER — AMBULATORY - HEALTHEAST (OUTPATIENT)
Dept: LAB | Facility: HOSPITAL | Age: 79
End: 2019-09-21

## 2019-09-21 DIAGNOSIS — N30.01 ACUTE CYSTITIS WITH HEMATURIA: ICD-10-CM

## 2019-09-21 LAB
ALBUMIN UR-MCNC: ABNORMAL MG/DL
AMORPH CRY #/AREA URNS HPF: ABNORMAL /[HPF]
APPEARANCE UR: ABNORMAL
BACTERIA #/AREA URNS HPF: ABNORMAL HPF
BILIRUB UR QL STRIP: NEGATIVE
COLOR UR AUTO: YELLOW
GLUCOSE UR STRIP-MCNC: NEGATIVE MG/DL
HGB UR QL STRIP: ABNORMAL
KETONES UR STRIP-MCNC: NEGATIVE MG/DL
LEUKOCYTE ESTERASE UR QL STRIP: ABNORMAL
MUCOUS THREADS #/AREA URNS LPF: ABNORMAL LPF
NITRATE UR QL: NEGATIVE
PH UR STRIP: 7 [PH] (ref 4.5–8)
RBC #/AREA URNS AUTO: >100 HPF
SP GR UR STRIP: 1.01 (ref 1–1.03)
SQUAMOUS #/AREA URNS AUTO: ABNORMAL LPF
UROBILINOGEN UR STRIP-ACNC: ABNORMAL
WBC #/AREA URNS AUTO: ABNORMAL HPF
WBC CLUMPS #/AREA URNS HPF: PRESENT /[HPF]

## 2019-09-22 LAB — BACTERIA SPEC CULT: NO GROWTH

## 2019-09-23 ENCOUNTER — AMBULATORY - HEALTHEAST (OUTPATIENT)
Dept: LAB | Facility: CLINIC | Age: 79
End: 2019-09-23

## 2019-09-23 ENCOUNTER — COMMUNICATION - HEALTHEAST (OUTPATIENT)
Dept: ANTICOAGULATION | Facility: CLINIC | Age: 79
End: 2019-09-23

## 2019-09-23 DIAGNOSIS — I73.9 PERIPHERAL VASCULAR DISEASE, UNSPECIFIED (H): ICD-10-CM

## 2019-09-23 DIAGNOSIS — I73.9 PVD (PERIPHERAL VASCULAR DISEASE) (H): ICD-10-CM

## 2019-09-23 DIAGNOSIS — I67.89 ACUTE, BUT ILL-DEFINED, CEREBROVASCULAR DISEASE: ICD-10-CM

## 2019-09-23 DIAGNOSIS — I63.9 STROKE (H): ICD-10-CM

## 2019-09-23 LAB — INR PPP: 4.5 (ref 0.9–1.1)

## 2019-09-24 ENCOUNTER — COMMUNICATION - HEALTHEAST (OUTPATIENT)
Dept: INTERNAL MEDICINE | Facility: CLINIC | Age: 79
End: 2019-09-24

## 2019-09-24 DIAGNOSIS — I73.9 PVD (PERIPHERAL VASCULAR DISEASE) (H): ICD-10-CM

## 2019-09-24 DIAGNOSIS — I63.9 STROKE (H): ICD-10-CM

## 2019-09-28 ENCOUNTER — COMMUNICATION - HEALTHEAST (OUTPATIENT)
Dept: SCHEDULING | Facility: CLINIC | Age: 79
End: 2019-09-28

## 2019-09-30 ENCOUNTER — COMMUNICATION - HEALTHEAST (OUTPATIENT)
Dept: ANTICOAGULATION | Facility: CLINIC | Age: 79
End: 2019-09-30

## 2019-09-30 ENCOUNTER — AMBULATORY - HEALTHEAST (OUTPATIENT)
Dept: OTHER | Facility: CLINIC | Age: 79
End: 2019-09-30

## 2019-10-02 ENCOUNTER — RECORDS - HEALTHEAST (OUTPATIENT)
Dept: ADMINISTRATIVE | Facility: OTHER | Age: 79
End: 2019-10-02

## 2019-10-02 ENCOUNTER — AMBULATORY - HEALTHEAST (OUTPATIENT)
Dept: OTHER | Facility: CLINIC | Age: 79
End: 2019-10-02

## 2019-10-03 ENCOUNTER — COMMUNICATION - HEALTHEAST (OUTPATIENT)
Dept: ANTICOAGULATION | Facility: CLINIC | Age: 79
End: 2019-10-03

## 2019-10-03 ENCOUNTER — COMMUNICATION - HEALTHEAST (OUTPATIENT)
Dept: CARE COORDINATION | Facility: CLINIC | Age: 79
End: 2019-10-03

## 2019-10-03 ENCOUNTER — OFFICE VISIT - HEALTHEAST (OUTPATIENT)
Dept: GERIATRICS | Facility: CLINIC | Age: 79
End: 2019-10-03

## 2019-10-03 DIAGNOSIS — N18.6 ESRD (END STAGE RENAL DISEASE) (H): ICD-10-CM

## 2019-10-03 DIAGNOSIS — I10 ESSENTIAL HYPERTENSION WITH GOAL BLOOD PRESSURE LESS THAN 130/85: ICD-10-CM

## 2019-10-03 DIAGNOSIS — R53.1 GENERALIZED WEAKNESS: ICD-10-CM

## 2019-10-03 DIAGNOSIS — I73.9 PVD (PERIPHERAL VASCULAR DISEASE) (H): ICD-10-CM

## 2019-10-03 DIAGNOSIS — I63.9 STROKE (H): ICD-10-CM

## 2019-10-04 ENCOUNTER — RECORDS - HEALTHEAST (OUTPATIENT)
Dept: LAB | Facility: CLINIC | Age: 79
End: 2019-10-04

## 2019-10-07 ENCOUNTER — OFFICE VISIT - HEALTHEAST (OUTPATIENT)
Dept: GERIATRICS | Facility: CLINIC | Age: 79
End: 2019-10-07

## 2019-10-07 DIAGNOSIS — E11.22 TYPE 2 DIABETES MELLITUS WITH DIABETIC CHRONIC KIDNEY DISEASE, UNSPECIFIED CKD STAGE, UNSPECIFIED WHETHER LONG TERM INSULIN USE (H): ICD-10-CM

## 2019-10-07 DIAGNOSIS — J16.0 COMMUNITY ACQUIRED PNEUMONIA DUE TO CHLAMYDIA SPECIES: ICD-10-CM

## 2019-10-07 DIAGNOSIS — N18.6 ESRD (END STAGE RENAL DISEASE) (H): ICD-10-CM

## 2019-10-07 DIAGNOSIS — I10 ESSENTIAL HYPERTENSION WITH GOAL BLOOD PRESSURE LESS THAN 130/85: ICD-10-CM

## 2019-10-07 LAB
GAMMA INTERFERON BACKGROUND BLD IA-ACNC: 0.03 IU/ML
M TB IFN-G BLD-IMP: NEGATIVE
MITOGEN IGNF BCKGRD COR BLD-ACNC: 0 IU/ML
MITOGEN IGNF BCKGRD COR BLD-ACNC: 0.02 IU/ML
QTF INTERPRETATION: NORMAL
QTF MITOGEN - NIL: 0.93 IU/ML

## 2019-10-08 ENCOUNTER — OFFICE VISIT - HEALTHEAST (OUTPATIENT)
Dept: GERIATRICS | Facility: CLINIC | Age: 79
End: 2019-10-08

## 2019-10-08 DIAGNOSIS — J16.0 COMMUNITY ACQUIRED PNEUMONIA DUE TO CHLAMYDIA SPECIES: ICD-10-CM

## 2019-10-08 DIAGNOSIS — R53.1 GENERALIZED WEAKNESS: ICD-10-CM

## 2019-10-08 DIAGNOSIS — N18.6 ESRD (END STAGE RENAL DISEASE) (H): ICD-10-CM

## 2019-10-10 ENCOUNTER — COMMUNICATION - HEALTHEAST (OUTPATIENT)
Dept: GERIATRICS | Facility: CLINIC | Age: 79
End: 2019-10-10

## 2019-10-10 ENCOUNTER — AMBULATORY - HEALTHEAST (OUTPATIENT)
Dept: GERIATRICS | Facility: CLINIC | Age: 79
End: 2019-10-10

## 2019-10-14 ENCOUNTER — OFFICE VISIT - HEALTHEAST (OUTPATIENT)
Dept: INTERNAL MEDICINE | Facility: CLINIC | Age: 79
End: 2019-10-14

## 2019-10-14 ENCOUNTER — COMMUNICATION - HEALTHEAST (OUTPATIENT)
Dept: ANTICOAGULATION | Facility: CLINIC | Age: 79
End: 2019-10-14

## 2019-10-14 DIAGNOSIS — I63.9 STROKE (H): ICD-10-CM

## 2019-10-14 DIAGNOSIS — I67.89 ACUTE, BUT ILL-DEFINED, CEREBROVASCULAR DISEASE: ICD-10-CM

## 2019-10-14 DIAGNOSIS — I73.9 PVD (PERIPHERAL VASCULAR DISEASE) (H): ICD-10-CM

## 2019-10-14 DIAGNOSIS — I73.9 PERIPHERAL VASCULAR DISEASE, UNSPECIFIED (H): ICD-10-CM

## 2019-10-14 DIAGNOSIS — J18.9 PNEUMONIA DUE TO INFECTIOUS ORGANISM, UNSPECIFIED LATERALITY, UNSPECIFIED PART OF LUNG: ICD-10-CM

## 2019-10-14 LAB — INR PPP: 2.1 (ref 0.9–1.1)

## 2019-10-18 ENCOUNTER — COMMUNICATION - HEALTHEAST (OUTPATIENT)
Dept: SCHEDULING | Facility: CLINIC | Age: 79
End: 2019-10-18

## 2019-10-18 ENCOUNTER — OFFICE VISIT - HEALTHEAST (OUTPATIENT)
Dept: INTERNAL MEDICINE | Facility: CLINIC | Age: 79
End: 2019-10-18

## 2019-10-18 DIAGNOSIS — I10 ESSENTIAL HYPERTENSION WITH GOAL BLOOD PRESSURE LESS THAN 130/85: ICD-10-CM

## 2019-10-18 DIAGNOSIS — N18.6 ESRD (END STAGE RENAL DISEASE) (H): ICD-10-CM

## 2019-10-18 DIAGNOSIS — R19.7 DIARRHEA, UNSPECIFIED TYPE: ICD-10-CM

## 2019-10-21 ENCOUNTER — COMMUNICATION - HEALTHEAST (OUTPATIENT)
Dept: ANTICOAGULATION | Facility: CLINIC | Age: 79
End: 2019-10-21

## 2019-10-21 ENCOUNTER — AMBULATORY - HEALTHEAST (OUTPATIENT)
Dept: LAB | Facility: CLINIC | Age: 79
End: 2019-10-21

## 2019-10-21 DIAGNOSIS — I73.9 PERIPHERAL VASCULAR DISEASE, UNSPECIFIED (H): ICD-10-CM

## 2019-10-21 DIAGNOSIS — I63.9 STROKE (H): ICD-10-CM

## 2019-10-21 DIAGNOSIS — I73.9 PVD (PERIPHERAL VASCULAR DISEASE) (H): ICD-10-CM

## 2019-10-21 DIAGNOSIS — I67.89 ACUTE, BUT ILL-DEFINED, CEREBROVASCULAR DISEASE: ICD-10-CM

## 2019-10-21 LAB — INR PPP: 2.7 (ref 0.9–1.1)

## 2019-10-24 ENCOUNTER — COMMUNICATION - HEALTHEAST (OUTPATIENT)
Dept: INTERNAL MEDICINE | Facility: CLINIC | Age: 79
End: 2019-10-24

## 2019-10-24 DIAGNOSIS — I10 ESSENTIAL HYPERTENSION WITH GOAL BLOOD PRESSURE LESS THAN 130/85: ICD-10-CM

## 2019-10-24 DIAGNOSIS — I25.10 CAD (CORONARY ARTERY DISEASE): ICD-10-CM

## 2019-11-04 ENCOUNTER — AMBULATORY - HEALTHEAST (OUTPATIENT)
Dept: LAB | Facility: CLINIC | Age: 79
End: 2019-11-04

## 2019-11-04 ENCOUNTER — COMMUNICATION - HEALTHEAST (OUTPATIENT)
Dept: ANTICOAGULATION | Facility: CLINIC | Age: 79
End: 2019-11-04

## 2019-11-04 DIAGNOSIS — I67.89 ACUTE, BUT ILL-DEFINED, CEREBROVASCULAR DISEASE: ICD-10-CM

## 2019-11-04 DIAGNOSIS — I73.9 PVD (PERIPHERAL VASCULAR DISEASE) (H): ICD-10-CM

## 2019-11-04 DIAGNOSIS — I63.9 STROKE (H): ICD-10-CM

## 2019-11-04 DIAGNOSIS — I73.9 PERIPHERAL VASCULAR DISEASE, UNSPECIFIED (H): ICD-10-CM

## 2019-11-04 LAB — INR PPP: 2.4 (ref 0.9–1.1)

## 2019-11-15 ENCOUNTER — COMMUNICATION - HEALTHEAST (OUTPATIENT)
Dept: INTERNAL MEDICINE | Facility: CLINIC | Age: 79
End: 2019-11-15

## 2019-11-15 ENCOUNTER — OFFICE VISIT - HEALTHEAST (OUTPATIENT)
Dept: INTERNAL MEDICINE | Facility: CLINIC | Age: 79
End: 2019-11-15

## 2019-11-15 DIAGNOSIS — E11.22 TYPE 2 DIABETES MELLITUS WITH DIABETIC CHRONIC KIDNEY DISEASE, UNSPECIFIED CKD STAGE, UNSPECIFIED WHETHER LONG TERM INSULIN USE (H): ICD-10-CM

## 2019-11-15 DIAGNOSIS — Z01.818 PREOPERATIVE EXAMINATION: ICD-10-CM

## 2019-11-15 LAB — HBA1C MFR BLD: 6 % (ref 3.5–6)

## 2019-11-19 ENCOUNTER — COMMUNICATION - HEALTHEAST (OUTPATIENT)
Dept: SCHEDULING | Facility: CLINIC | Age: 79
End: 2019-11-19

## 2019-11-25 ENCOUNTER — AMBULATORY - HEALTHEAST (OUTPATIENT)
Dept: LAB | Facility: CLINIC | Age: 79
End: 2019-11-25

## 2019-11-25 ENCOUNTER — COMMUNICATION - HEALTHEAST (OUTPATIENT)
Dept: ANTICOAGULATION | Facility: CLINIC | Age: 79
End: 2019-11-25

## 2019-11-25 DIAGNOSIS — I67.89 ACUTE, BUT ILL-DEFINED, CEREBROVASCULAR DISEASE: ICD-10-CM

## 2019-11-25 DIAGNOSIS — I73.9 PERIPHERAL VASCULAR DISEASE, UNSPECIFIED (H): ICD-10-CM

## 2019-11-25 DIAGNOSIS — I63.9 STROKE (H): ICD-10-CM

## 2019-11-25 DIAGNOSIS — I73.9 PVD (PERIPHERAL VASCULAR DISEASE) (H): ICD-10-CM

## 2019-11-25 LAB — INR PPP: 1.6 (ref 0.9–1.1)

## 2019-12-02 ENCOUNTER — COMMUNICATION - HEALTHEAST (OUTPATIENT)
Dept: INTERNAL MEDICINE | Facility: CLINIC | Age: 79
End: 2019-12-02

## 2019-12-02 ENCOUNTER — COMMUNICATION - HEALTHEAST (OUTPATIENT)
Dept: ANTICOAGULATION | Facility: CLINIC | Age: 79
End: 2019-12-02

## 2019-12-02 DIAGNOSIS — I73.9 PVD (PERIPHERAL VASCULAR DISEASE) (H): ICD-10-CM

## 2019-12-02 DIAGNOSIS — I63.9 STROKE (H): ICD-10-CM

## 2019-12-03 ENCOUNTER — COMMUNICATION - HEALTHEAST (OUTPATIENT)
Dept: INTERNAL MEDICINE | Facility: CLINIC | Age: 79
End: 2019-12-03

## 2019-12-03 DIAGNOSIS — Z79.4 TYPE 2 DIABETES MELLITUS WITH COMPLICATION, WITH LONG-TERM CURRENT USE OF INSULIN (H): ICD-10-CM

## 2019-12-03 DIAGNOSIS — E11.8 TYPE 2 DIABETES MELLITUS WITH COMPLICATION, WITH LONG-TERM CURRENT USE OF INSULIN (H): ICD-10-CM

## 2019-12-09 ENCOUNTER — COMMUNICATION - HEALTHEAST (OUTPATIENT)
Dept: ANTICOAGULATION | Facility: CLINIC | Age: 79
End: 2019-12-09

## 2019-12-09 ENCOUNTER — AMBULATORY - HEALTHEAST (OUTPATIENT)
Dept: LAB | Facility: CLINIC | Age: 79
End: 2019-12-09

## 2019-12-09 DIAGNOSIS — I73.9 PERIPHERAL VASCULAR DISEASE, UNSPECIFIED (H): ICD-10-CM

## 2019-12-09 DIAGNOSIS — I63.9 STROKE (H): ICD-10-CM

## 2019-12-09 DIAGNOSIS — I73.9 PVD (PERIPHERAL VASCULAR DISEASE) (H): ICD-10-CM

## 2019-12-09 DIAGNOSIS — I67.89 ACUTE, BUT ILL-DEFINED, CEREBROVASCULAR DISEASE: ICD-10-CM

## 2019-12-09 LAB — INR PPP: 2.8 (ref 0.9–1.1)

## 2019-12-27 ENCOUNTER — AMBULATORY - HEALTHEAST (OUTPATIENT)
Dept: LAB | Facility: CLINIC | Age: 79
End: 2019-12-27

## 2019-12-27 ENCOUNTER — COMMUNICATION - HEALTHEAST (OUTPATIENT)
Dept: ANTICOAGULATION | Facility: CLINIC | Age: 79
End: 2019-12-27

## 2019-12-27 ENCOUNTER — COMMUNICATION - HEALTHEAST (OUTPATIENT)
Dept: INTERNAL MEDICINE | Facility: CLINIC | Age: 79
End: 2019-12-27

## 2019-12-27 DIAGNOSIS — I73.9 PERIPHERAL VASCULAR DISEASE, UNSPECIFIED (H): ICD-10-CM

## 2019-12-27 DIAGNOSIS — I10 ESSENTIAL HYPERTENSION: ICD-10-CM

## 2019-12-27 DIAGNOSIS — I67.89 ACUTE, BUT ILL-DEFINED, CEREBROVASCULAR DISEASE: ICD-10-CM

## 2019-12-27 DIAGNOSIS — I73.9 PVD (PERIPHERAL VASCULAR DISEASE) (H): ICD-10-CM

## 2019-12-27 DIAGNOSIS — I63.9 STROKE (H): ICD-10-CM

## 2019-12-27 LAB — INR PPP: 2.4 (ref 0.9–1.1)

## 2020-01-01 ENCOUNTER — AMBULATORY - HEALTHEAST (OUTPATIENT)
Dept: NURSING | Facility: CLINIC | Age: 80
End: 2020-01-01

## 2020-01-01 ENCOUNTER — COMMUNICATION - HEALTHEAST (OUTPATIENT)
Dept: INTERNAL MEDICINE | Facility: CLINIC | Age: 80
End: 2020-01-01

## 2020-01-01 ENCOUNTER — COMMUNICATION - HEALTHEAST (OUTPATIENT)
Dept: ANTICOAGULATION | Facility: CLINIC | Age: 80
End: 2020-01-01

## 2020-01-01 ENCOUNTER — COMMUNICATION - HEALTHEAST (OUTPATIENT)
Dept: SCHEDULING | Facility: CLINIC | Age: 80
End: 2020-01-01

## 2020-01-01 ENCOUNTER — OFFICE VISIT - HEALTHEAST (OUTPATIENT)
Dept: INTERNAL MEDICINE | Facility: CLINIC | Age: 80
End: 2020-01-01

## 2020-01-01 ENCOUNTER — AMBULATORY - HEALTHEAST (OUTPATIENT)
Dept: ANTICOAGULATION | Facility: CLINIC | Age: 80
End: 2020-01-01

## 2020-01-01 ENCOUNTER — AMBULATORY - HEALTHEAST (OUTPATIENT)
Dept: LAB | Facility: CLINIC | Age: 80
End: 2020-01-01

## 2020-01-01 DIAGNOSIS — N18.6 ESRD (END STAGE RENAL DISEASE) (H): ICD-10-CM

## 2020-01-01 DIAGNOSIS — E11.22 TYPE 2 DIABETES MELLITUS WITH DIABETIC CHRONIC KIDNEY DISEASE, UNSPECIFIED CKD STAGE, UNSPECIFIED WHETHER LONG TERM INSULIN USE (H): ICD-10-CM

## 2020-01-01 DIAGNOSIS — I48.0 PAROXYSMAL ATRIAL FIBRILLATION (H): ICD-10-CM

## 2020-01-01 DIAGNOSIS — I73.9 PVD (PERIPHERAL VASCULAR DISEASE) (H): ICD-10-CM

## 2020-01-01 DIAGNOSIS — Z23 NEED FOR PROPHYLACTIC VACCINATION AND INOCULATION AGAINST INFLUENZA: ICD-10-CM

## 2020-01-01 DIAGNOSIS — I10 ESSENTIAL HYPERTENSION: ICD-10-CM

## 2020-01-01 DIAGNOSIS — I63.9 STROKE (H): ICD-10-CM

## 2020-01-01 DIAGNOSIS — R53.83 FATIGUE, UNSPECIFIED TYPE: ICD-10-CM

## 2020-01-01 DIAGNOSIS — I16.0 HYPERTENSIVE URGENCY: ICD-10-CM

## 2020-01-01 DIAGNOSIS — I10 ESSENTIAL HYPERTENSION WITH GOAL BLOOD PRESSURE LESS THAN 130/85: ICD-10-CM

## 2020-01-01 DIAGNOSIS — Z00.00 WELLNESS EXAMINATION: ICD-10-CM

## 2020-01-01 DIAGNOSIS — E78.5 HYPERLIPIDEMIA, UNSPECIFIED HYPERLIPIDEMIA TYPE: ICD-10-CM

## 2020-01-01 DIAGNOSIS — I67.89 ACUTE, BUT ILL-DEFINED, CEREBROVASCULAR DISEASE: ICD-10-CM

## 2020-01-01 LAB
ERYTHROCYTE [DISTWIDTH] IN BLOOD BY AUTOMATED COUNT: 11.6 % (ref 11–14.5)
HCT VFR BLD AUTO: 34.5 % (ref 40–54)
HGB BLD-MCNC: 11.8 G/DL (ref 14–18)
INR PPP: 2.4 (ref 0.9–1.1)
INR PPP: 2.8 (ref 0.9–1.1)
MCH RBC QN AUTO: 34.5 PG (ref 27–34)
MCHC RBC AUTO-ENTMCNC: 34.1 G/DL (ref 32–36)
MCV RBC AUTO: 101 FL (ref 80–100)
PLATELET # BLD AUTO: 237 THOU/UL (ref 140–440)
PMV BLD AUTO: 8.2 FL (ref 7–10)
RBC # BLD AUTO: 3.42 MILL/UL (ref 4.4–6.2)
WBC: 5.2 THOU/UL (ref 4–11)

## 2020-01-01 ASSESSMENT — PATIENT HEALTH QUESTIONNAIRE - PHQ9
SUM OF ALL RESPONSES TO PHQ QUESTIONS 1-9: 0
SUM OF ALL RESPONSES TO PHQ QUESTIONS 1-9: 1

## 2020-01-01 ASSESSMENT — MIFFLIN-ST. JEOR
SCORE: 1734.12
SCORE: 1777.2
SCORE: 1790.81

## 2020-01-07 ENCOUNTER — RECORDS - HEALTHEAST (OUTPATIENT)
Dept: ADMINISTRATIVE | Facility: OTHER | Age: 80
End: 2020-01-07

## 2020-01-07 ENCOUNTER — COMMUNICATION - HEALTHEAST (OUTPATIENT)
Dept: INTERNAL MEDICINE | Facility: CLINIC | Age: 80
End: 2020-01-07

## 2020-01-07 DIAGNOSIS — R60.9 EDEMA, UNSPECIFIED TYPE: ICD-10-CM

## 2020-01-07 DIAGNOSIS — I63.9 STROKE (H): ICD-10-CM

## 2020-01-07 DIAGNOSIS — Z79.01 LONG TERM (CURRENT) USE OF ANTICOAGULANTS: ICD-10-CM

## 2020-01-07 DIAGNOSIS — I73.9 PVD (PERIPHERAL VASCULAR DISEASE) (H): ICD-10-CM

## 2020-01-14 ENCOUNTER — COMMUNICATION - HEALTHEAST (OUTPATIENT)
Dept: ANTICOAGULATION | Facility: CLINIC | Age: 80
End: 2020-01-14

## 2020-01-14 DIAGNOSIS — I63.9 STROKE (H): ICD-10-CM

## 2020-01-14 DIAGNOSIS — I73.9 PVD (PERIPHERAL VASCULAR DISEASE) (H): ICD-10-CM

## 2020-01-17 ENCOUNTER — AMBULATORY - HEALTHEAST (OUTPATIENT)
Dept: OTHER | Facility: CLINIC | Age: 80
End: 2020-01-17

## 2020-01-23 ENCOUNTER — RECORDS - HEALTHEAST (OUTPATIENT)
Dept: ADMINISTRATIVE | Facility: OTHER | Age: 80
End: 2020-01-23

## 2020-01-24 ENCOUNTER — AMBULATORY - HEALTHEAST (OUTPATIENT)
Dept: LAB | Facility: CLINIC | Age: 80
End: 2020-01-24

## 2020-01-24 ENCOUNTER — COMMUNICATION - HEALTHEAST (OUTPATIENT)
Dept: ANTICOAGULATION | Facility: CLINIC | Age: 80
End: 2020-01-24

## 2020-01-24 DIAGNOSIS — I73.9 PVD (PERIPHERAL VASCULAR DISEASE) (H): ICD-10-CM

## 2020-01-24 DIAGNOSIS — I63.9 STROKE (H): ICD-10-CM

## 2020-01-24 LAB — INR PPP: 2.2 (ref 0.9–1.1)

## 2020-02-04 ENCOUNTER — COMMUNICATION - HEALTHEAST (OUTPATIENT)
Dept: INTERNAL MEDICINE | Facility: CLINIC | Age: 80
End: 2020-02-04

## 2020-02-04 DIAGNOSIS — I10 ESSENTIAL HYPERTENSION WITH GOAL BLOOD PRESSURE LESS THAN 130/85: ICD-10-CM

## 2020-02-04 DIAGNOSIS — I25.10 CAD (CORONARY ARTERY DISEASE): ICD-10-CM

## 2020-02-07 ENCOUNTER — AMBULATORY - HEALTHEAST (OUTPATIENT)
Dept: OTHER | Facility: CLINIC | Age: 80
End: 2020-02-07

## 2020-02-21 ENCOUNTER — AMBULATORY - HEALTHEAST (OUTPATIENT)
Dept: LAB | Facility: CLINIC | Age: 80
End: 2020-02-21

## 2020-02-21 ENCOUNTER — COMMUNICATION - HEALTHEAST (OUTPATIENT)
Dept: ANTICOAGULATION | Facility: CLINIC | Age: 80
End: 2020-02-21

## 2020-02-21 DIAGNOSIS — I73.9 PVD (PERIPHERAL VASCULAR DISEASE) (H): ICD-10-CM

## 2020-02-21 DIAGNOSIS — I63.9 STROKE (H): ICD-10-CM

## 2020-02-21 LAB — INR PPP: 3.2 (ref 0.9–1.1)

## 2020-03-05 ENCOUNTER — RECORDS - HEALTHEAST (OUTPATIENT)
Dept: ADMINISTRATIVE | Facility: OTHER | Age: 80
End: 2020-03-05

## 2020-03-06 ENCOUNTER — COMMUNICATION - HEALTHEAST (OUTPATIENT)
Dept: ANTICOAGULATION | Facility: CLINIC | Age: 80
End: 2020-03-06

## 2020-03-06 ENCOUNTER — AMBULATORY - HEALTHEAST (OUTPATIENT)
Dept: LAB | Facility: CLINIC | Age: 80
End: 2020-03-06

## 2020-03-06 DIAGNOSIS — I63.9 STROKE (H): ICD-10-CM

## 2020-03-06 DIAGNOSIS — I73.9 PVD (PERIPHERAL VASCULAR DISEASE) (H): ICD-10-CM

## 2020-03-06 LAB — INR PPP: 2 (ref 0.9–1.1)

## 2020-03-13 ENCOUNTER — OFFICE VISIT - HEALTHEAST (OUTPATIENT)
Dept: VASCULAR SURGERY | Facility: CLINIC | Age: 80
End: 2020-03-13

## 2020-03-13 DIAGNOSIS — E83.19 HEMOSIDEROSIS: ICD-10-CM

## 2020-03-13 DIAGNOSIS — I83.892 VENOUS STASIS ULCER OF LEFT LOWER LEG WITH EDEMA OF LEFT LOWER LEG (H): ICD-10-CM

## 2020-03-13 DIAGNOSIS — R60.0 VENOUS STASIS ULCER OF LEFT LOWER LEG WITH EDEMA OF LEFT LOWER LEG (H): ICD-10-CM

## 2020-03-13 DIAGNOSIS — L97.929 VENOUS STASIS ULCER OF LEFT LOWER LEG WITH EDEMA OF LEFT LOWER LEG (H): ICD-10-CM

## 2020-03-13 DIAGNOSIS — I73.9 PAD (PERIPHERAL ARTERY DISEASE) (H): ICD-10-CM

## 2020-03-13 DIAGNOSIS — I87.303 VENOUS HYPERTENSION OF BOTH LOWER EXTREMITIES: ICD-10-CM

## 2020-03-13 DIAGNOSIS — I83.029 VENOUS STASIS ULCER OF LEFT LOWER LEG WITH EDEMA OF LEFT LOWER LEG (H): ICD-10-CM

## 2020-03-13 DIAGNOSIS — I87.2 VENOUS INSUFFICIENCY OF BOTH LOWER EXTREMITIES: ICD-10-CM

## 2020-03-15 LAB
BACTERIA SPEC CULT: ABNORMAL
GRAM STAIN RESULT: ABNORMAL
GRAM STAIN RESULT: ABNORMAL

## 2020-03-16 LAB — BACTERIA SPEC CULT: NORMAL

## 2020-03-20 ENCOUNTER — COMMUNICATION - HEALTHEAST (OUTPATIENT)
Dept: ANTICOAGULATION | Facility: CLINIC | Age: 80
End: 2020-03-20

## 2020-03-20 ENCOUNTER — AMBULATORY - HEALTHEAST (OUTPATIENT)
Dept: LAB | Facility: CLINIC | Age: 80
End: 2020-03-20

## 2020-03-20 DIAGNOSIS — I73.9 PVD (PERIPHERAL VASCULAR DISEASE) (H): ICD-10-CM

## 2020-03-20 DIAGNOSIS — I63.9 STROKE (H): ICD-10-CM

## 2020-03-20 LAB — INR PPP: 2.2 (ref 0.9–1.1)

## 2020-03-23 ENCOUNTER — COMMUNICATION - HEALTHEAST (OUTPATIENT)
Dept: INTERNAL MEDICINE | Facility: CLINIC | Age: 80
End: 2020-03-23

## 2020-03-23 DIAGNOSIS — E11.9 DIABETES (H): ICD-10-CM

## 2020-04-06 ENCOUNTER — OFFICE VISIT - HEALTHEAST (OUTPATIENT)
Dept: VASCULAR SURGERY | Facility: CLINIC | Age: 80
End: 2020-04-06

## 2020-04-06 ENCOUNTER — COMMUNICATION - HEALTHEAST (OUTPATIENT)
Dept: VASCULAR SURGERY | Facility: CLINIC | Age: 80
End: 2020-04-06

## 2020-04-06 DIAGNOSIS — I83.029 VENOUS STASIS ULCER OF LEFT LOWER LEG WITH EDEMA OF LEFT LOWER LEG (H): ICD-10-CM

## 2020-04-06 DIAGNOSIS — R60.0 VENOUS STASIS ULCER OF LEFT LOWER LEG WITH EDEMA OF LEFT LOWER LEG (H): ICD-10-CM

## 2020-04-06 DIAGNOSIS — I87.2 VENOUS INSUFFICIENCY OF BOTH LOWER EXTREMITIES: ICD-10-CM

## 2020-04-06 DIAGNOSIS — E83.19 HEMOSIDEROSIS: ICD-10-CM

## 2020-04-06 DIAGNOSIS — I73.9 PAD (PERIPHERAL ARTERY DISEASE) (H): ICD-10-CM

## 2020-04-06 DIAGNOSIS — I83.892 VENOUS STASIS ULCER OF LEFT LOWER LEG WITH EDEMA OF LEFT LOWER LEG (H): ICD-10-CM

## 2020-04-06 DIAGNOSIS — L97.929 VENOUS STASIS ULCER OF LEFT LOWER LEG WITH EDEMA OF LEFT LOWER LEG (H): ICD-10-CM

## 2020-04-06 DIAGNOSIS — I87.303 VENOUS HYPERTENSION OF BOTH LOWER EXTREMITIES: ICD-10-CM

## 2020-04-08 ENCOUNTER — OFFICE VISIT - HEALTHEAST (OUTPATIENT)
Dept: INTERNAL MEDICINE | Facility: CLINIC | Age: 80
End: 2020-04-08

## 2020-04-08 ENCOUNTER — COMMUNICATION - HEALTHEAST (OUTPATIENT)
Dept: SCHEDULING | Facility: CLINIC | Age: 80
End: 2020-04-08

## 2020-04-08 DIAGNOSIS — L08.9 INFECTED FINGER: ICD-10-CM

## 2020-04-13 ENCOUNTER — COMMUNICATION - HEALTHEAST (OUTPATIENT)
Dept: VASCULAR SURGERY | Facility: CLINIC | Age: 80
End: 2020-04-13

## 2020-04-17 ENCOUNTER — AMBULATORY - HEALTHEAST (OUTPATIENT)
Dept: LAB | Facility: CLINIC | Age: 80
End: 2020-04-17

## 2020-04-17 ENCOUNTER — COMMUNICATION - HEALTHEAST (OUTPATIENT)
Dept: ANTICOAGULATION | Facility: CLINIC | Age: 80
End: 2020-04-17

## 2020-04-17 DIAGNOSIS — I63.9 STROKE (H): ICD-10-CM

## 2020-04-17 DIAGNOSIS — I73.9 PVD (PERIPHERAL VASCULAR DISEASE) (H): ICD-10-CM

## 2020-04-17 LAB — INR PPP: 2.3 (ref 0.9–1.1)

## 2020-05-05 ENCOUNTER — COMMUNICATION - HEALTHEAST (OUTPATIENT)
Dept: INTERNAL MEDICINE | Facility: CLINIC | Age: 80
End: 2020-05-05

## 2020-05-05 DIAGNOSIS — I10 ESSENTIAL HYPERTENSION WITH GOAL BLOOD PRESSURE LESS THAN 130/85: ICD-10-CM

## 2020-05-12 ENCOUNTER — COMMUNICATION - HEALTHEAST (OUTPATIENT)
Dept: INTERNAL MEDICINE | Facility: CLINIC | Age: 80
End: 2020-05-12

## 2020-05-13 ENCOUNTER — AMBULATORY - HEALTHEAST (OUTPATIENT)
Dept: INTERNAL MEDICINE | Facility: CLINIC | Age: 80
End: 2020-05-13

## 2020-05-13 DIAGNOSIS — E11.9 TYPE 2 DIABETES MELLITUS (H): ICD-10-CM

## 2020-05-15 ENCOUNTER — COMMUNICATION - HEALTHEAST (OUTPATIENT)
Dept: ANTICOAGULATION | Facility: CLINIC | Age: 80
End: 2020-05-15

## 2020-05-15 ENCOUNTER — AMBULATORY - HEALTHEAST (OUTPATIENT)
Dept: LAB | Facility: CLINIC | Age: 80
End: 2020-05-15

## 2020-05-15 ENCOUNTER — COMMUNICATION - HEALTHEAST (OUTPATIENT)
Dept: INTERNAL MEDICINE | Facility: CLINIC | Age: 80
End: 2020-05-15

## 2020-05-15 DIAGNOSIS — I63.9 STROKE (H): ICD-10-CM

## 2020-05-15 DIAGNOSIS — I73.9 PVD (PERIPHERAL VASCULAR DISEASE) (H): ICD-10-CM

## 2020-05-15 DIAGNOSIS — E11.9 TYPE 2 DIABETES MELLITUS (H): ICD-10-CM

## 2020-05-15 DIAGNOSIS — E11.9 TYPE 2 DIABETES MELLITUS WITHOUT COMPLICATION, WITH LONG-TERM CURRENT USE OF INSULIN (H): ICD-10-CM

## 2020-05-15 DIAGNOSIS — Z79.4 TYPE 2 DIABETES MELLITUS WITHOUT COMPLICATION, WITH LONG-TERM CURRENT USE OF INSULIN (H): ICD-10-CM

## 2020-05-15 LAB
HBA1C MFR BLD: 5.8 % (ref 3.5–6)
INR PPP: 2.1 (ref 0.9–1.1)

## 2020-06-02 ENCOUNTER — COMMUNICATION - HEALTHEAST (OUTPATIENT)
Dept: VASCULAR SURGERY | Facility: CLINIC | Age: 80
End: 2020-06-02

## 2020-06-12 ENCOUNTER — COMMUNICATION - HEALTHEAST (OUTPATIENT)
Dept: ANTICOAGULATION | Facility: CLINIC | Age: 80
End: 2020-06-12

## 2020-06-12 ENCOUNTER — AMBULATORY - HEALTHEAST (OUTPATIENT)
Dept: LAB | Facility: CLINIC | Age: 80
End: 2020-06-12

## 2020-06-12 DIAGNOSIS — I73.9 PVD (PERIPHERAL VASCULAR DISEASE) (H): ICD-10-CM

## 2020-06-12 DIAGNOSIS — I63.9 STROKE (H): ICD-10-CM

## 2020-06-12 LAB — INR PPP: 2.5 (ref 0.9–1.1)

## 2020-06-16 ENCOUNTER — COMMUNICATION - HEALTHEAST (OUTPATIENT)
Dept: HEALTH INFORMATION MANAGEMENT | Facility: CLINIC | Age: 80
End: 2020-06-16

## 2020-06-30 ENCOUNTER — AMBULATORY - HEALTHEAST (OUTPATIENT)
Dept: ANTICOAGULATION | Facility: CLINIC | Age: 80
End: 2020-06-30

## 2020-07-01 ENCOUNTER — OFFICE VISIT - HEALTHEAST (OUTPATIENT)
Dept: INTERNAL MEDICINE | Facility: CLINIC | Age: 80
End: 2020-07-01

## 2020-07-01 DIAGNOSIS — D63.1 ANEMIA OF CHRONIC RENAL FAILURE, STAGE 4 (SEVERE) (H): ICD-10-CM

## 2020-07-01 DIAGNOSIS — N18.4 ANEMIA OF CHRONIC RENAL FAILURE, STAGE 4 (SEVERE) (H): ICD-10-CM

## 2020-07-01 DIAGNOSIS — I10 ESSENTIAL HYPERTENSION WITH GOAL BLOOD PRESSURE LESS THAN 130/85: ICD-10-CM

## 2020-07-02 ENCOUNTER — COMMUNICATION - HEALTHEAST (OUTPATIENT)
Dept: VASCULAR SURGERY | Facility: CLINIC | Age: 80
End: 2020-07-02

## 2020-07-02 ENCOUNTER — COMMUNICATION - HEALTHEAST (OUTPATIENT)
Dept: INTERNAL MEDICINE | Facility: CLINIC | Age: 80
End: 2020-07-02

## 2020-07-02 ENCOUNTER — AMBULATORY - HEALTHEAST (OUTPATIENT)
Dept: INTERNAL MEDICINE | Facility: CLINIC | Age: 80
End: 2020-07-02

## 2020-07-02 DIAGNOSIS — E11.9 TYPE 2 DIABETES MELLITUS WITHOUT COMPLICATION, WITH LONG-TERM CURRENT USE OF INSULIN (H): ICD-10-CM

## 2020-07-02 DIAGNOSIS — Z79.4 TYPE 2 DIABETES MELLITUS WITHOUT COMPLICATION, WITH LONG-TERM CURRENT USE OF INSULIN (H): ICD-10-CM

## 2020-07-10 ENCOUNTER — AMBULATORY - HEALTHEAST (OUTPATIENT)
Dept: LAB | Facility: CLINIC | Age: 80
End: 2020-07-10

## 2020-07-10 ENCOUNTER — COMMUNICATION - HEALTHEAST (OUTPATIENT)
Dept: ANTICOAGULATION | Facility: CLINIC | Age: 80
End: 2020-07-10

## 2020-07-10 DIAGNOSIS — I73.9 PVD (PERIPHERAL VASCULAR DISEASE) (H): ICD-10-CM

## 2020-07-10 DIAGNOSIS — I63.9 STROKE (H): ICD-10-CM

## 2020-07-10 LAB — INR PPP: 2.2 (ref 0.9–1.1)

## 2020-07-14 ENCOUNTER — OFFICE VISIT - HEALTHEAST (OUTPATIENT)
Dept: VASCULAR SURGERY | Facility: CLINIC | Age: 80
End: 2020-07-14

## 2020-07-14 DIAGNOSIS — T82.858S STENOSIS OF OTHER VASCULAR PROSTHETIC DEVICES, IMPLANTS AND GRAFTS, SEQUELA: ICD-10-CM

## 2020-07-14 DIAGNOSIS — I87.303 VENOUS HYPERTENSION OF BOTH LOWER EXTREMITIES: ICD-10-CM

## 2020-07-14 DIAGNOSIS — Z20.822 COVID-19 RULED OUT: ICD-10-CM

## 2020-07-20 ENCOUNTER — COMMUNICATION - HEALTHEAST (OUTPATIENT)
Dept: INTERNAL MEDICINE | Facility: CLINIC | Age: 80
End: 2020-07-20

## 2020-07-20 ENCOUNTER — RECORDS - HEALTHEAST (OUTPATIENT)
Dept: VASCULAR ULTRASOUND | Facility: CLINIC | Age: 80
End: 2020-07-20

## 2020-07-20 ENCOUNTER — OFFICE VISIT - HEALTHEAST (OUTPATIENT)
Dept: INTERNAL MEDICINE | Facility: CLINIC | Age: 80
End: 2020-07-20

## 2020-07-20 DIAGNOSIS — I10 ESSENTIAL HYPERTENSION WITH GOAL BLOOD PRESSURE LESS THAN 130/85: ICD-10-CM

## 2020-07-20 DIAGNOSIS — E83.19 OTHER DISORDERS OF IRON METABOLISM: ICD-10-CM

## 2020-07-20 DIAGNOSIS — I87.303 CHRONIC VENOUS HYPERTENSION (IDIOPATHIC) WITHOUT COMPLICATIONS OF BILATERAL LOWER EXTREMITY: ICD-10-CM

## 2020-07-20 DIAGNOSIS — R60.9 EDEMA, UNSPECIFIED: ICD-10-CM

## 2020-07-20 DIAGNOSIS — I73.9 PERIPHERAL VASCULAR DISEASE, UNSPECIFIED (H): ICD-10-CM

## 2020-07-20 DIAGNOSIS — I83.892 VARICOSE VEINS OF LEFT LOWER EXTREMITY WITH OTHER COMPLICATIONS: ICD-10-CM

## 2020-07-20 DIAGNOSIS — I83.029 VARICOSE VEINS OF LEFT LOWER EXTREMITY WITH ULCER OF UNSPECIFIED SITE (CODE) (H): ICD-10-CM

## 2020-07-20 DIAGNOSIS — Z79.4 TYPE 2 DIABETES MELLITUS WITHOUT COMPLICATION, WITH LONG-TERM CURRENT USE OF INSULIN (H): ICD-10-CM

## 2020-07-20 DIAGNOSIS — L97.929 NON-PRESSURE CHRONIC ULCER OF UNSPECIFIED PART OF LEFT LOWER LEG WITH UNSPECIFIED SEVERITY (H): ICD-10-CM

## 2020-07-20 DIAGNOSIS — N18.6 ESRD (END STAGE RENAL DISEASE) (H): ICD-10-CM

## 2020-07-20 DIAGNOSIS — E11.22 TYPE 2 DIABETES MELLITUS WITH DIABETIC CHRONIC KIDNEY DISEASE, UNSPECIFIED CKD STAGE, UNSPECIFIED WHETHER LONG TERM INSULIN USE (H): ICD-10-CM

## 2020-07-20 DIAGNOSIS — T82.858S STENOSIS OF OTHER VASCULAR PROSTHETIC DEVICES, IMPLANTS AND GRAFTS, SEQUELA: ICD-10-CM

## 2020-07-20 DIAGNOSIS — I87.2 VENOUS INSUFFICIENCY (CHRONIC) (PERIPHERAL): ICD-10-CM

## 2020-07-20 DIAGNOSIS — E11.9 TYPE 2 DIABETES MELLITUS WITHOUT COMPLICATION, WITH LONG-TERM CURRENT USE OF INSULIN (H): ICD-10-CM

## 2020-07-20 LAB
CHOLEST SERPL-MCNC: 97 MG/DL
CREAT UR-MCNC: 102.8 MG/DL
FASTING STATUS PATIENT QL REPORTED: YES
HDLC SERPL-MCNC: 40 MG/DL
LDLC SERPL CALC-MCNC: 45 MG/DL
MICROALBUMIN UR-MCNC: 23.09 MG/DL (ref 0–1.99)
MICROALBUMIN/CREAT UR: 224.6 MG/G
TRIGL SERPL-MCNC: 58 MG/DL

## 2020-07-21 ENCOUNTER — COMMUNICATION - HEALTHEAST (OUTPATIENT)
Dept: VASCULAR SURGERY | Facility: CLINIC | Age: 80
End: 2020-07-21

## 2020-07-21 ENCOUNTER — OFFICE VISIT - HEALTHEAST (OUTPATIENT)
Dept: VASCULAR SURGERY | Facility: CLINIC | Age: 80
End: 2020-07-21

## 2020-07-21 DIAGNOSIS — N18.6 ESRD (END STAGE RENAL DISEASE) (H): ICD-10-CM

## 2020-07-27 ENCOUNTER — OFFICE VISIT - HEALTHEAST (OUTPATIENT)
Dept: INTERNAL MEDICINE | Facility: CLINIC | Age: 80
End: 2020-07-27

## 2020-07-27 DIAGNOSIS — I10 ESSENTIAL HYPERTENSION WITH GOAL BLOOD PRESSURE LESS THAN 130/85: ICD-10-CM

## 2020-08-04 ENCOUNTER — COMMUNICATION - HEALTHEAST (OUTPATIENT)
Dept: INTERNAL MEDICINE | Facility: CLINIC | Age: 80
End: 2020-08-04

## 2020-08-04 DIAGNOSIS — I25.10 CAD (CORONARY ARTERY DISEASE): ICD-10-CM

## 2020-08-05 ENCOUNTER — OFFICE VISIT - HEALTHEAST (OUTPATIENT)
Dept: INTERNAL MEDICINE | Facility: CLINIC | Age: 80
End: 2020-08-05

## 2020-08-05 DIAGNOSIS — I10 ESSENTIAL HYPERTENSION WITH GOAL BLOOD PRESSURE LESS THAN 130/85: ICD-10-CM

## 2020-08-05 DIAGNOSIS — N18.6 ESRD (END STAGE RENAL DISEASE) (H): ICD-10-CM

## 2020-08-07 ENCOUNTER — COMMUNICATION - HEALTHEAST (OUTPATIENT)
Dept: ANTICOAGULATION | Facility: CLINIC | Age: 80
End: 2020-08-07

## 2020-08-07 ENCOUNTER — AMBULATORY - HEALTHEAST (OUTPATIENT)
Dept: LAB | Facility: CLINIC | Age: 80
End: 2020-08-07

## 2020-08-07 DIAGNOSIS — I63.9 STROKE (H): ICD-10-CM

## 2020-08-07 DIAGNOSIS — I73.9 PVD (PERIPHERAL VASCULAR DISEASE) (H): ICD-10-CM

## 2020-08-07 LAB — INR PPP: 2.1 (ref 0.9–1.1)

## 2020-08-12 ENCOUNTER — COMMUNICATION - HEALTHEAST (OUTPATIENT)
Dept: INTERNAL MEDICINE | Facility: CLINIC | Age: 80
End: 2020-08-12

## 2020-08-12 DIAGNOSIS — Z79.01 LONG TERM (CURRENT) USE OF ANTICOAGULANTS: ICD-10-CM

## 2020-08-12 DIAGNOSIS — I73.9 PVD (PERIPHERAL VASCULAR DISEASE) (H): ICD-10-CM

## 2020-08-12 DIAGNOSIS — I63.9 STROKE (H): ICD-10-CM

## 2021-01-01 ENCOUNTER — AMBULATORY - HEALTHEAST (OUTPATIENT)
Dept: CARDIAC REHAB | Facility: HOSPITAL | Age: 81
End: 2021-01-01

## 2021-01-01 ENCOUNTER — OFFICE VISIT - HEALTHEAST (OUTPATIENT)
Dept: GERIATRICS | Facility: CLINIC | Age: 81
End: 2021-01-01

## 2021-01-01 ENCOUNTER — RECORDS - HEALTHEAST (OUTPATIENT)
Dept: ADMINISTRATIVE | Facility: OTHER | Age: 81
End: 2021-01-01

## 2021-01-01 ENCOUNTER — COMMUNICATION - HEALTHEAST (OUTPATIENT)
Dept: SCHEDULING | Facility: CLINIC | Age: 81
End: 2021-01-01

## 2021-01-01 ENCOUNTER — COMMUNICATION - HEALTHEAST (OUTPATIENT)
Dept: ANTICOAGULATION | Facility: CLINIC | Age: 81
End: 2021-01-01

## 2021-01-01 ENCOUNTER — APPOINTMENT (OUTPATIENT)
Dept: OCCUPATIONAL THERAPY | Facility: CLINIC | Age: 81
DRG: 329 | End: 2021-01-01
Attending: SURGERY
Payer: MEDICARE

## 2021-01-01 ENCOUNTER — PREP FOR PROCEDURE (OUTPATIENT)
Dept: VASCULAR SURGERY | Facility: CLINIC | Age: 81
End: 2021-01-01

## 2021-01-01 ENCOUNTER — RECORDS - HEALTHEAST (OUTPATIENT)
Dept: ADMINISTRATIVE | Facility: CLINIC | Age: 81
End: 2021-01-01

## 2021-01-01 ENCOUNTER — AMBULATORY - HEALTHEAST (OUTPATIENT)
Dept: LAB | Facility: CLINIC | Age: 81
End: 2021-01-01

## 2021-01-01 ENCOUNTER — AMBULATORY - HEALTHEAST (OUTPATIENT)
Dept: CARDIOLOGY | Facility: CLINIC | Age: 81
End: 2021-01-01

## 2021-01-01 ENCOUNTER — AMBULATORY - HEALTHEAST (OUTPATIENT)
Dept: VASCULAR SURGERY | Facility: CLINIC | Age: 81
End: 2021-01-01

## 2021-01-01 ENCOUNTER — AMBULATORY - HEALTHEAST (OUTPATIENT)
Dept: ANTICOAGULATION | Facility: CLINIC | Age: 81
End: 2021-01-01

## 2021-01-01 ENCOUNTER — HOSPITAL ENCOUNTER (OUTPATIENT)
Dept: CARDIOLOGY | Facility: CLINIC | Age: 81
Discharge: HOME | End: 2021-02-19
Attending: INTERNAL MEDICINE

## 2021-01-01 ENCOUNTER — HOME CARE/HOSPICE - HEALTHEAST (OUTPATIENT)
Dept: HOME HEALTH SERVICES | Facility: HOME HEALTH | Age: 81
End: 2021-01-01

## 2021-01-01 ENCOUNTER — SURGERY - HEALTHEAST (OUTPATIENT)
Dept: CARDIOLOGY | Facility: HOSPITAL | Age: 81
End: 2021-01-01

## 2021-01-01 ENCOUNTER — HEALTH MAINTENANCE LETTER (OUTPATIENT)
Age: 81
End: 2021-01-01

## 2021-01-01 ENCOUNTER — TELEPHONE (OUTPATIENT)
Dept: VASCULAR SURGERY | Facility: CLINIC | Age: 81
End: 2021-01-01

## 2021-01-01 ENCOUNTER — HOSPITAL ENCOUNTER (OUTPATIENT)
Dept: INTERVENTIONAL RADIOLOGY/VASCULAR | Facility: HOSPITAL | Age: 81
Discharge: HOME | End: 2021-07-09
Attending: SURGERY
Payer: MEDICARE

## 2021-01-01 ENCOUNTER — APPOINTMENT (OUTPATIENT)
Dept: PHYSICAL THERAPY | Facility: CLINIC | Age: 81
DRG: 329 | End: 2021-01-01
Attending: SURGERY
Payer: MEDICARE

## 2021-01-01 ENCOUNTER — APPOINTMENT (OUTPATIENT)
Dept: CT IMAGING | Facility: CLINIC | Age: 81
DRG: 329 | End: 2021-01-01
Attending: SURGERY
Payer: MEDICARE

## 2021-01-01 ENCOUNTER — PATIENT OUTREACH (OUTPATIENT)
Dept: CARE COORDINATION | Facility: CLINIC | Age: 81
End: 2021-01-01

## 2021-01-01 ENCOUNTER — RECORDS - HEALTHEAST (OUTPATIENT)
Dept: LAB | Facility: CLINIC | Age: 81
End: 2021-01-01

## 2021-01-01 ENCOUNTER — APPOINTMENT (OUTPATIENT)
Dept: GENERAL RADIOLOGY | Facility: CLINIC | Age: 81
DRG: 329 | End: 2021-01-01
Attending: SURGERY
Payer: MEDICARE

## 2021-01-01 ENCOUNTER — OFFICE VISIT - HEALTHEAST (OUTPATIENT)
Dept: INTERNAL MEDICINE | Facility: CLINIC | Age: 81
End: 2021-01-01

## 2021-01-01 ENCOUNTER — HOSPITAL ENCOUNTER (OUTPATIENT)
Dept: NUCLEAR MEDICINE | Facility: HOSPITAL | Age: 81
End: 2021-07-16
Attending: SURGERY
Payer: COMMERCIAL

## 2021-01-01 ENCOUNTER — COMMUNICATION - HEALTHEAST (OUTPATIENT)
Dept: VASCULAR SURGERY | Facility: CLINIC | Age: 81
End: 2021-01-01

## 2021-01-01 ENCOUNTER — HOSPITAL ENCOUNTER (INPATIENT)
Facility: HOSPITAL | Age: 81
Setting detail: SURGERY ADMIT
End: 2021-01-01
Attending: SURGERY | Admitting: SURGERY
Payer: MEDICARE

## 2021-01-01 ENCOUNTER — COMMUNICATION - HEALTHEAST (OUTPATIENT)
Dept: HOME HEALTH SERVICES | Facility: HOME HEALTH | Age: 81
End: 2021-01-01

## 2021-01-01 ENCOUNTER — COMMUNICATION - HEALTHEAST (OUTPATIENT)
Dept: CARE COORDINATION | Facility: CLINIC | Age: 81
End: 2021-01-01

## 2021-01-01 ENCOUNTER — OFFICE VISIT (OUTPATIENT)
Dept: CARDIOLOGY | Facility: CLINIC | Age: 81
End: 2021-01-01
Payer: MEDICARE

## 2021-01-01 ENCOUNTER — TRANSITIONAL CARE UNIT VISIT (OUTPATIENT)
Dept: GERIATRICS | Facility: CLINIC | Age: 81
End: 2021-01-01
Payer: MEDICARE

## 2021-01-01 ENCOUNTER — APPOINTMENT (OUTPATIENT)
Dept: CARDIOLOGY | Facility: CLINIC | Age: 81
DRG: 329 | End: 2021-01-01
Attending: SURGERY
Payer: MEDICARE

## 2021-01-01 ENCOUNTER — SURGERY - HEALTHEAST (OUTPATIENT)
Dept: CARDIOLOGY | Facility: CLINIC | Age: 81
End: 2021-01-01

## 2021-01-01 ENCOUNTER — ANESTHESIA EVENT (OUTPATIENT)
Dept: SURGERY | Facility: CLINIC | Age: 81
DRG: 329 | End: 2021-01-01
Payer: MEDICARE

## 2021-01-01 ENCOUNTER — OFFICE VISIT (OUTPATIENT)
Dept: SURGERY | Facility: CLINIC | Age: 81
End: 2021-01-01
Payer: MEDICARE

## 2021-01-01 ENCOUNTER — COMMUNICATION - HEALTHEAST (OUTPATIENT)
Dept: PHYSICAL THERAPY | Age: 81
End: 2021-01-01

## 2021-01-01 ENCOUNTER — LAB REQUISITION (OUTPATIENT)
Dept: LAB | Facility: CLINIC | Age: 81
End: 2021-01-01
Payer: COMMERCIAL

## 2021-01-01 ENCOUNTER — COMMUNICATION - HEALTHEAST (OUTPATIENT)
Dept: CARDIOLOGY | Facility: CLINIC | Age: 81
End: 2021-01-01

## 2021-01-01 ENCOUNTER — TELEPHONE (OUTPATIENT)
Dept: GERIATRICS | Facility: CLINIC | Age: 81
End: 2021-01-01

## 2021-01-01 ENCOUNTER — TRANSFERRED RECORDS (OUTPATIENT)
Dept: HEALTH INFORMATION MANAGEMENT | Facility: CLINIC | Age: 81
End: 2021-01-01

## 2021-01-01 ENCOUNTER — COMMUNICATION - HEALTHEAST (OUTPATIENT)
Dept: NURSING | Facility: CLINIC | Age: 81
End: 2021-01-01

## 2021-01-01 ENCOUNTER — COMMUNICATION - HEALTHEAST (OUTPATIENT)
Dept: INTERNAL MEDICINE | Facility: CLINIC | Age: 81
End: 2021-01-01

## 2021-01-01 ENCOUNTER — OFFICE VISIT (OUTPATIENT)
Dept: VASCULAR SURGERY | Facility: CLINIC | Age: 81
End: 2021-01-01
Attending: PODIATRIST
Payer: MEDICARE

## 2021-01-01 ENCOUNTER — COMMUNICATION - HEALTHEAST (OUTPATIENT)
Dept: GERIATRICS | Facility: CLINIC | Age: 81
End: 2021-01-01

## 2021-01-01 ENCOUNTER — COMMUNICATION - HEALTHEAST (OUTPATIENT)
Dept: VASCULAR SURGERY | Facility: CLINIC | Age: 81
End: 2021-01-01
Payer: MEDICARE

## 2021-01-01 ENCOUNTER — TELEPHONE (OUTPATIENT)
Dept: SLEEP MEDICINE | Facility: CLINIC | Age: 81
End: 2021-01-01

## 2021-01-01 ENCOUNTER — AMBULATORY - HEALTHEAST (OUTPATIENT)
Dept: CARDIOLOGY | Facility: HOSPITAL | Age: 81
End: 2021-01-01

## 2021-01-01 ENCOUNTER — DOCUMENTATION ONLY (OUTPATIENT)
Dept: VASCULAR SURGERY | Facility: CLINIC | Age: 81
End: 2021-01-01

## 2021-01-01 ENCOUNTER — HOSPITAL ENCOUNTER (OUTPATIENT)
Dept: INTERVENTIONAL RADIOLOGY/VASCULAR | Facility: HOSPITAL | Age: 81
End: 2021-07-28
Attending: PODIATRIST
Payer: MEDICARE

## 2021-01-01 ENCOUNTER — ANESTHESIA (OUTPATIENT)
Dept: SURGERY | Facility: CLINIC | Age: 81
DRG: 329 | End: 2021-01-01
Payer: MEDICARE

## 2021-01-01 ENCOUNTER — AMBULATORY - HEALTHEAST (OUTPATIENT)
Dept: OTHER | Facility: CLINIC | Age: 81
End: 2021-01-01

## 2021-01-01 ENCOUNTER — SURGERY - HEALTHEAST (OUTPATIENT)
Dept: CARDIOLOGY | Facility: HOSPITAL | Age: 81
End: 2021-01-01
Payer: MEDICARE

## 2021-01-01 ENCOUNTER — LAB REQUISITION (OUTPATIENT)
Dept: LAB | Facility: CLINIC | Age: 81
End: 2021-01-01
Payer: MEDICARE

## 2021-01-01 ENCOUNTER — DOCUMENTATION ONLY (OUTPATIENT)
Dept: OTHER | Facility: CLINIC | Age: 81
End: 2021-01-01

## 2021-01-01 ENCOUNTER — ANCILLARY PROCEDURE (OUTPATIENT)
Dept: VASCULAR ULTRASOUND | Facility: CLINIC | Age: 81
End: 2021-01-01
Attending: SURGERY
Payer: COMMERCIAL

## 2021-01-01 ENCOUNTER — OFFICE VISIT (OUTPATIENT)
Dept: VASCULAR SURGERY | Facility: CLINIC | Age: 81
End: 2021-01-01
Payer: COMMERCIAL

## 2021-01-01 ENCOUNTER — HOSPITAL ENCOUNTER (OUTPATIENT)
Dept: INTERVENTIONAL RADIOLOGY/VASCULAR | Facility: HOSPITAL | Age: 81
Discharge: HOME | End: 2021-07-14
Attending: RADIOLOGY | Admitting: RADIOLOGY
Payer: MEDICARE

## 2021-01-01 ENCOUNTER — APPOINTMENT (OUTPATIENT)
Dept: ULTRASOUND IMAGING | Facility: CLINIC | Age: 81
DRG: 329 | End: 2021-01-01
Attending: SURGERY
Payer: MEDICARE

## 2021-01-01 ENCOUNTER — AMBULATORY - HEALTHEAST (OUTPATIENT)
Dept: CARE COORDINATION | Facility: CLINIC | Age: 81
End: 2021-01-01

## 2021-01-01 ENCOUNTER — OFFICE VISIT - HEALTHEAST (OUTPATIENT)
Dept: PHARMACY | Facility: CLINIC | Age: 81
End: 2021-01-01

## 2021-01-01 ENCOUNTER — TELEPHONE (OUTPATIENT)
Dept: SURGERY | Facility: CLINIC | Age: 81
End: 2021-01-01

## 2021-01-01 ENCOUNTER — DOCUMENTATION ONLY (OUTPATIENT)
Dept: INTERNAL MEDICINE | Facility: CLINIC | Age: 81
End: 2021-01-01

## 2021-01-01 ENCOUNTER — OFFICE VISIT - HEALTHEAST (OUTPATIENT)
Dept: CARDIOLOGY | Facility: CLINIC | Age: 81
End: 2021-01-01

## 2021-01-01 ENCOUNTER — HOSPITAL ENCOUNTER (OUTPATIENT)
Dept: CARDIOLOGY | Facility: HOSPITAL | Age: 81
End: 2021-07-16
Attending: SURGERY
Payer: COMMERCIAL

## 2021-01-01 ENCOUNTER — HOSPITAL ENCOUNTER (INPATIENT)
Facility: CLINIC | Age: 81
LOS: 27 days | Discharge: NURSING, REHAB OR LONG-TERM CARE | DRG: 329 | End: 2021-04-26
Attending: SURGERY | Admitting: SURGERY
Payer: MEDICARE

## 2021-01-01 ENCOUNTER — MEDICAL CORRESPONDENCE (OUTPATIENT)
Dept: HEALTH INFORMATION MANAGEMENT | Facility: CLINIC | Age: 81
End: 2021-01-01

## 2021-01-01 ENCOUNTER — RECORDS - HEALTHEAST (OUTPATIENT)
Dept: INTERVENTIONAL RADIOLOGY/VASCULAR | Facility: HOSPITAL | Age: 81
End: 2021-01-01

## 2021-01-01 VITALS
DIASTOLIC BLOOD PRESSURE: 66 MMHG | HEART RATE: 74 BPM | HEIGHT: 73 IN | WEIGHT: 191.4 LBS | RESPIRATION RATE: 18 BRPM | SYSTOLIC BLOOD PRESSURE: 126 MMHG | OXYGEN SATURATION: 97 % | BODY MASS INDEX: 25.37 KG/M2 | TEMPERATURE: 97.8 F

## 2021-01-01 VITALS — WEIGHT: 223 LBS | BODY MASS INDEX: 29.55 KG/M2 | HEIGHT: 73 IN

## 2021-01-01 VITALS
SYSTOLIC BLOOD PRESSURE: 143 MMHG | TEMPERATURE: 96.5 F | HEIGHT: 73 IN | DIASTOLIC BLOOD PRESSURE: 76 MMHG | OXYGEN SATURATION: 92 % | WEIGHT: 189.2 LBS | BODY MASS INDEX: 25.08 KG/M2 | HEART RATE: 61 BPM | RESPIRATION RATE: 20 BRPM

## 2021-01-01 VITALS
OXYGEN SATURATION: 92 % | WEIGHT: 189.2 LBS | SYSTOLIC BLOOD PRESSURE: 154 MMHG | BODY MASS INDEX: 25.08 KG/M2 | HEART RATE: 66 BPM | DIASTOLIC BLOOD PRESSURE: 69 MMHG | HEIGHT: 73 IN | TEMPERATURE: 96.3 F | RESPIRATION RATE: 20 BRPM

## 2021-01-01 VITALS — WEIGHT: 226 LBS | HEIGHT: 74 IN | BODY MASS INDEX: 29 KG/M2

## 2021-01-01 VITALS
OXYGEN SATURATION: 94 % | TEMPERATURE: 97.8 F | DIASTOLIC BLOOD PRESSURE: 77 MMHG | RESPIRATION RATE: 18 BRPM | HEIGHT: 73 IN | BODY MASS INDEX: 23.96 KG/M2 | HEART RATE: 77 BPM | SYSTOLIC BLOOD PRESSURE: 148 MMHG | WEIGHT: 180.8 LBS

## 2021-01-01 VITALS
DIASTOLIC BLOOD PRESSURE: 60 MMHG | OXYGEN SATURATION: 98 % | WEIGHT: 220 LBS | SYSTOLIC BLOOD PRESSURE: 110 MMHG | HEART RATE: 61 BPM | BODY MASS INDEX: 29.03 KG/M2

## 2021-01-01 VITALS
OXYGEN SATURATION: 100 % | DIASTOLIC BLOOD PRESSURE: 69 MMHG | TEMPERATURE: 97.4 F | HEART RATE: 63 BPM | BODY MASS INDEX: 28.89 KG/M2 | SYSTOLIC BLOOD PRESSURE: 134 MMHG | WEIGHT: 219 LBS

## 2021-01-01 VITALS
WEIGHT: 195 LBS | OXYGEN SATURATION: 98 % | TEMPERATURE: 97.4 F | DIASTOLIC BLOOD PRESSURE: 65 MMHG | BODY MASS INDEX: 25.84 KG/M2 | RESPIRATION RATE: 18 BRPM | HEART RATE: 62 BPM | SYSTOLIC BLOOD PRESSURE: 130 MMHG | HEIGHT: 73 IN

## 2021-01-01 VITALS
OXYGEN SATURATION: 94 % | DIASTOLIC BLOOD PRESSURE: 64 MMHG | HEART RATE: 86 BPM | WEIGHT: 222 LBS | SYSTOLIC BLOOD PRESSURE: 132 MMHG | BODY MASS INDEX: 29.29 KG/M2

## 2021-01-01 VITALS — WEIGHT: 228 LBS | HEIGHT: 73 IN | BODY MASS INDEX: 30.22 KG/M2

## 2021-01-01 VITALS — BODY MASS INDEX: 29.83 KG/M2 | WEIGHT: 225.1 LBS | HEIGHT: 73 IN

## 2021-01-01 VITALS — HEIGHT: 74 IN | BODY MASS INDEX: 26.31 KG/M2 | WEIGHT: 205 LBS

## 2021-01-01 VITALS
SYSTOLIC BLOOD PRESSURE: 148 MMHG | HEART RATE: 77 BPM | OXYGEN SATURATION: 94 % | HEIGHT: 73 IN | RESPIRATION RATE: 18 BRPM | WEIGHT: 180.8 LBS | DIASTOLIC BLOOD PRESSURE: 77 MMHG | TEMPERATURE: 98.2 F | BODY MASS INDEX: 23.96 KG/M2

## 2021-01-01 VITALS
SYSTOLIC BLOOD PRESSURE: 90 MMHG | HEIGHT: 73 IN | BODY MASS INDEX: 25.05 KG/M2 | DIASTOLIC BLOOD PRESSURE: 54 MMHG | RESPIRATION RATE: 16 BRPM | WEIGHT: 189 LBS | HEART RATE: 76 BPM

## 2021-01-01 VITALS
WEIGHT: 189.8 LBS | OXYGEN SATURATION: 96 % | DIASTOLIC BLOOD PRESSURE: 69 MMHG | TEMPERATURE: 97.6 F | RESPIRATION RATE: 18 BRPM | HEIGHT: 73 IN | BODY MASS INDEX: 25.15 KG/M2 | SYSTOLIC BLOOD PRESSURE: 148 MMHG | HEART RATE: 66 BPM

## 2021-01-01 VITALS
SYSTOLIC BLOOD PRESSURE: 111 MMHG | WEIGHT: 180.8 LBS | OXYGEN SATURATION: 96 % | HEIGHT: 73 IN | HEART RATE: 88 BPM | RESPIRATION RATE: 20 BRPM | DIASTOLIC BLOOD PRESSURE: 70 MMHG | TEMPERATURE: 96.6 F | BODY MASS INDEX: 23.96 KG/M2

## 2021-01-01 VITALS
OXYGEN SATURATION: 100 % | DIASTOLIC BLOOD PRESSURE: 76 MMHG | SYSTOLIC BLOOD PRESSURE: 157 MMHG | HEART RATE: 69 BPM | RESPIRATION RATE: 16 BRPM

## 2021-01-01 VITALS — BODY MASS INDEX: 29.55 KG/M2 | WEIGHT: 223 LBS | HEIGHT: 73 IN

## 2021-01-01 VITALS
HEART RATE: 60 BPM | WEIGHT: 223 LBS | SYSTOLIC BLOOD PRESSURE: 142 MMHG | DIASTOLIC BLOOD PRESSURE: 68 MMHG | OXYGEN SATURATION: 96 % | BODY MASS INDEX: 29.42 KG/M2

## 2021-01-01 VITALS — WEIGHT: 176.5 LBS | BODY MASS INDEX: 23.29 KG/M2 | BODY MASS INDEX: 23.29 KG/M2 | HEIGHT: 73 IN

## 2021-01-01 VITALS
OXYGEN SATURATION: 97 % | SYSTOLIC BLOOD PRESSURE: 119 MMHG | TEMPERATURE: 97.8 F | DIASTOLIC BLOOD PRESSURE: 66 MMHG | HEART RATE: 69 BPM | RESPIRATION RATE: 18 BRPM

## 2021-01-01 VITALS
TEMPERATURE: 98.6 F | BODY MASS INDEX: 24.04 KG/M2 | DIASTOLIC BLOOD PRESSURE: 88 MMHG | RESPIRATION RATE: 18 BRPM | SYSTOLIC BLOOD PRESSURE: 143 MMHG | WEIGHT: 181.4 LBS | OXYGEN SATURATION: 98 % | HEART RATE: 80 BPM | HEIGHT: 73 IN

## 2021-01-01 VITALS — HEIGHT: 73 IN | BODY MASS INDEX: 26.04 KG/M2 | WEIGHT: 197.4 LBS | BODY MASS INDEX: 25.91 KG/M2

## 2021-01-01 VITALS — BODY MASS INDEX: 30.8 KG/M2 | WEIGHT: 220 LBS | HEIGHT: 71 IN

## 2021-01-01 VITALS — HEIGHT: 74 IN | BODY MASS INDEX: 28.62 KG/M2 | WEIGHT: 223 LBS

## 2021-01-01 VITALS
DIASTOLIC BLOOD PRESSURE: 72 MMHG | TEMPERATURE: 98.2 F | SYSTOLIC BLOOD PRESSURE: 137 MMHG | RESPIRATION RATE: 18 BRPM | OXYGEN SATURATION: 100 % | HEART RATE: 66 BPM

## 2021-01-01 VITALS
TEMPERATURE: 97.7 F | SYSTOLIC BLOOD PRESSURE: 108 MMHG | WEIGHT: 192 LBS | DIASTOLIC BLOOD PRESSURE: 56 MMHG | BODY MASS INDEX: 25.33 KG/M2 | HEART RATE: 72 BPM

## 2021-01-01 VITALS
BODY MASS INDEX: 29.16 KG/M2 | SYSTOLIC BLOOD PRESSURE: 112 MMHG | WEIGHT: 221 LBS | OXYGEN SATURATION: 96 % | DIASTOLIC BLOOD PRESSURE: 60 MMHG | HEART RATE: 72 BPM

## 2021-01-01 VITALS — HEIGHT: 73 IN | BODY MASS INDEX: 28.89 KG/M2 | WEIGHT: 218 LBS

## 2021-01-01 VITALS
HEART RATE: 71 BPM | RESPIRATION RATE: 16 BRPM | OXYGEN SATURATION: 99 % | HEIGHT: 74 IN | SYSTOLIC BLOOD PRESSURE: 114 MMHG | TEMPERATURE: 97.7 F | WEIGHT: 187 LBS | DIASTOLIC BLOOD PRESSURE: 59 MMHG | BODY MASS INDEX: 24 KG/M2

## 2021-01-01 VITALS
RESPIRATION RATE: 12 BRPM | DIASTOLIC BLOOD PRESSURE: 57 MMHG | TEMPERATURE: 96.8 F | WEIGHT: 221.3 LBS | HEART RATE: 66 BPM | BODY MASS INDEX: 29.97 KG/M2 | SYSTOLIC BLOOD PRESSURE: 175 MMHG | HEIGHT: 72 IN | OXYGEN SATURATION: 98 %

## 2021-01-01 VITALS — HEART RATE: 80 BPM | SYSTOLIC BLOOD PRESSURE: 140 MMHG | DIASTOLIC BLOOD PRESSURE: 62 MMHG

## 2021-01-01 VITALS — HEIGHT: 73 IN | BODY MASS INDEX: 29.55 KG/M2 | WEIGHT: 223 LBS

## 2021-01-01 VITALS — WEIGHT: 200 LBS | HEIGHT: 73 IN | BODY MASS INDEX: 26.51 KG/M2

## 2021-01-01 VITALS — WEIGHT: 229 LBS | HEIGHT: 73 IN | BODY MASS INDEX: 30.35 KG/M2

## 2021-01-01 VITALS — BODY MASS INDEX: 29.82 KG/M2 | WEIGHT: 225 LBS | HEIGHT: 73 IN

## 2021-01-01 VITALS — HEIGHT: 73 IN | BODY MASS INDEX: 30.24 KG/M2 | WEIGHT: 228.2 LBS

## 2021-01-01 VITALS — BODY MASS INDEX: 25.28 KG/M2 | WEIGHT: 197 LBS | HEIGHT: 74 IN

## 2021-01-01 VITALS — HEIGHT: 73 IN | WEIGHT: 225 LBS | BODY MASS INDEX: 29.82 KG/M2

## 2021-01-01 DIAGNOSIS — C82.30 FOLLICULAR LYMPHOMA GRADE IIIA, UNSPECIFIED SITE (H): ICD-10-CM

## 2021-01-01 DIAGNOSIS — U07.1 COVID-19: ICD-10-CM

## 2021-01-01 DIAGNOSIS — I21.4 NSTEMI (NON-ST ELEVATED MYOCARDIAL INFARCTION) (H): ICD-10-CM

## 2021-01-01 DIAGNOSIS — I25.10 CORONARY ARTERY DISEASE INVOLVING NATIVE CORONARY ARTERY OF NATIVE HEART WITHOUT ANGINA PECTORIS: ICD-10-CM

## 2021-01-01 DIAGNOSIS — F43.21 ADJUSTMENT DISORDER WITH DEPRESSED MOOD: ICD-10-CM

## 2021-01-01 DIAGNOSIS — I73.9 PVD (PERIPHERAL VASCULAR DISEASE) (H): ICD-10-CM

## 2021-01-01 DIAGNOSIS — I25.10 CAD (CORONARY ARTERY DISEASE): ICD-10-CM

## 2021-01-01 DIAGNOSIS — Z99.2 ESRD (END STAGE RENAL DISEASE) ON DIALYSIS (H): ICD-10-CM

## 2021-01-01 DIAGNOSIS — Z11.59 ENCOUNTER FOR SCREENING FOR OTHER VIRAL DISEASES: ICD-10-CM

## 2021-01-01 DIAGNOSIS — E11.22 TYPE 2 DIABETES MELLITUS WITH DIABETIC CHRONIC KIDNEY DISEASE, UNSPECIFIED CKD STAGE, UNSPECIFIED WHETHER LONG TERM INSULIN USE (H): ICD-10-CM

## 2021-01-01 DIAGNOSIS — K55.9 COLONIC ISCHEMIA (H): ICD-10-CM

## 2021-01-01 DIAGNOSIS — I50.21 ACUTE SYSTOLIC HEART FAILURE (H): ICD-10-CM

## 2021-01-01 DIAGNOSIS — E07.0 HYPERSECRETION OF CALCITONIN: ICD-10-CM

## 2021-01-01 DIAGNOSIS — I63.9 STROKE (H): ICD-10-CM

## 2021-01-01 DIAGNOSIS — N18.6 ESRD (END STAGE RENAL DISEASE) ON DIALYSIS (H): ICD-10-CM

## 2021-01-01 DIAGNOSIS — I25.5 ISCHEMIC CARDIOMYOPATHY: ICD-10-CM

## 2021-01-01 DIAGNOSIS — L89.623 PRESSURE INJURY OF LEFT HEEL, STAGE 3 (H): ICD-10-CM

## 2021-01-01 DIAGNOSIS — I48.0 PAROXYSMAL ATRIAL FIBRILLATION (H): ICD-10-CM

## 2021-01-01 DIAGNOSIS — K81.9 CHOLECYSTITIS: ICD-10-CM

## 2021-01-01 DIAGNOSIS — I73.9 PAD (PERIPHERAL ARTERY DISEASE) (H): ICD-10-CM

## 2021-01-01 DIAGNOSIS — E78.5 HYPERLIPIDEMIA, UNSPECIFIED HYPERLIPIDEMIA TYPE: ICD-10-CM

## 2021-01-01 DIAGNOSIS — J18.9 COMMUNITY ACQUIRED PNEUMONIA OF LEFT LOWER LOBE OF LUNG: ICD-10-CM

## 2021-01-01 DIAGNOSIS — G47.33 OBSTRUCTIVE SLEEP APNEA (ADULT) (PEDIATRIC): Primary | ICD-10-CM

## 2021-01-01 DIAGNOSIS — J41.0 SIMPLE CHRONIC BRONCHITIS (H): ICD-10-CM

## 2021-01-01 DIAGNOSIS — I73.9 PAD (PERIPHERAL ARTERY DISEASE) (H): Primary | ICD-10-CM

## 2021-01-01 DIAGNOSIS — Z93.2 STATUS POST ILEOSTOMY (H): ICD-10-CM

## 2021-01-01 DIAGNOSIS — Z79.2 PROPHYLACTIC ANTIBIOTIC: Primary | ICD-10-CM

## 2021-01-01 DIAGNOSIS — E11.621 TYPE 2 DIABETES MELLITUS WITH FOOT ULCER, WITH LONG-TERM CURRENT USE OF INSULIN (H): Primary | ICD-10-CM

## 2021-01-01 DIAGNOSIS — Z79.4 TYPE 2 DIABETES MELLITUS WITH FOOT ULCER, WITH LONG-TERM CURRENT USE OF INSULIN (H): ICD-10-CM

## 2021-01-01 DIAGNOSIS — I73.9 PVD (PERIPHERAL VASCULAR DISEASE) (H): Primary | ICD-10-CM

## 2021-01-01 DIAGNOSIS — Z79.01 LONG TERM (CURRENT) USE OF ANTICOAGULANTS: ICD-10-CM

## 2021-01-01 DIAGNOSIS — I10 ESSENTIAL HYPERTENSION: ICD-10-CM

## 2021-01-01 DIAGNOSIS — N18.5 CKD (CHRONIC KIDNEY DISEASE) STAGE 5, GFR LESS THAN 15 ML/MIN (H): ICD-10-CM

## 2021-01-01 DIAGNOSIS — K83.1 BILIARY OBSTRUCTION (H): Primary | ICD-10-CM

## 2021-01-01 DIAGNOSIS — K80.51 CALCULUS OF BILE DUCT WITHOUT CHOLECYSTITIS WITH OBSTRUCTION: Primary | ICD-10-CM

## 2021-01-01 DIAGNOSIS — I50.9 CONGESTIVE HEART FAILURE, UNSPECIFIED HF CHRONICITY, UNSPECIFIED HEART FAILURE TYPE (H): ICD-10-CM

## 2021-01-01 DIAGNOSIS — Z86.79 HISTORY OF CORONARY ARTERY DISEASE: ICD-10-CM

## 2021-01-01 DIAGNOSIS — Z95.5 STENTED CORONARY ARTERY: ICD-10-CM

## 2021-01-01 DIAGNOSIS — R74.8 ELEVATED LIVER ENZYMES: ICD-10-CM

## 2021-01-01 DIAGNOSIS — L97.509 TYPE 2 DIABETES MELLITUS WITH FOOT ULCER, WITH LONG-TERM CURRENT USE OF INSULIN (H): Primary | ICD-10-CM

## 2021-01-01 DIAGNOSIS — N18.5 CKD (CHRONIC KIDNEY DISEASE) STAGE 5, GFR LESS THAN 15 ML/MIN (H): Primary | ICD-10-CM

## 2021-01-01 DIAGNOSIS — E11.9 DIABETES MELLITUS, TYPE 2 (H): ICD-10-CM

## 2021-01-01 DIAGNOSIS — E11.621 TYPE 2 DIABETES MELLITUS WITH FOOT ULCER, WITH LONG-TERM CURRENT USE OF INSULIN (H): ICD-10-CM

## 2021-01-01 DIAGNOSIS — Z86.73 HISTORY OF CVA (CEREBROVASCULAR ACCIDENT): ICD-10-CM

## 2021-01-01 DIAGNOSIS — Z79.01 LONG TERM CURRENT USE OF ANTICOAGULANT THERAPY: ICD-10-CM

## 2021-01-01 DIAGNOSIS — Z48.89 AFTERCARE FOLLOWING SURGERY: ICD-10-CM

## 2021-01-01 DIAGNOSIS — Z11.59 ENCOUNTER FOR SCREENING FOR OTHER VIRAL DISEASES: Primary | ICD-10-CM

## 2021-01-01 DIAGNOSIS — L97.509 TYPE 2 DIABETES MELLITUS WITH FOOT ULCER, WITH LONG-TERM CURRENT USE OF INSULIN (H): ICD-10-CM

## 2021-01-01 DIAGNOSIS — K21.9 GASTROESOPHAGEAL REFLUX DISEASE WITHOUT ESOPHAGITIS: ICD-10-CM

## 2021-01-01 DIAGNOSIS — N18.6 END STAGE RENAL FAILURE ON DIALYSIS (H): ICD-10-CM

## 2021-01-01 DIAGNOSIS — I25.118 CORONARY ARTERY DISEASE INVOLVING NATIVE CORONARY ARTERY OF NATIVE HEART WITH OTHER FORM OF ANGINA PECTORIS (H): ICD-10-CM

## 2021-01-01 DIAGNOSIS — I77.0 A-V FISTULA (H): ICD-10-CM

## 2021-01-01 DIAGNOSIS — R05.9 COUGH: ICD-10-CM

## 2021-01-01 DIAGNOSIS — J41.0 SIMPLE CHRONIC BRONCHITIS (H): Primary | ICD-10-CM

## 2021-01-01 DIAGNOSIS — R79.89 OTHER SPECIFIED ABNORMAL FINDINGS OF BLOOD CHEMISTRY: ICD-10-CM

## 2021-01-01 DIAGNOSIS — Z01.810 PRE-OPERATIVE CARDIOVASCULAR EXAMINATION: Primary | ICD-10-CM

## 2021-01-01 DIAGNOSIS — E78.5 DYSLIPIDEMIA, GOAL LDL BELOW 70: ICD-10-CM

## 2021-01-01 DIAGNOSIS — M86.69 OTHER CHRONIC OSTEOMYELITIS, MULTIPLE SITES (H): ICD-10-CM

## 2021-01-01 DIAGNOSIS — R53.83 FATIGUE, UNSPECIFIED TYPE: ICD-10-CM

## 2021-01-01 DIAGNOSIS — R53.81 PHYSICAL DECONDITIONING: ICD-10-CM

## 2021-01-01 DIAGNOSIS — R68.89 EXCESSIVE ORAL SECRETIONS: ICD-10-CM

## 2021-01-01 DIAGNOSIS — Z93.2 ILEOSTOMY STATUS (H): ICD-10-CM

## 2021-01-01 DIAGNOSIS — Z93.2 STATUS POST ILEOSTOMY (H): Primary | ICD-10-CM

## 2021-01-01 DIAGNOSIS — Z99.2 END STAGE RENAL FAILURE ON DIALYSIS (H): ICD-10-CM

## 2021-01-01 DIAGNOSIS — Z01.810 PRE-OPERATIVE CARDIOVASCULAR EXAMINATION: ICD-10-CM

## 2021-01-01 DIAGNOSIS — K80.51 CALCULUS OF BILE DUCT WITHOUT CHOLECYSTITIS WITH OBSTRUCTION: ICD-10-CM

## 2021-01-01 DIAGNOSIS — Z79.4 TYPE 2 DIABETES MELLITUS WITH FOOT ULCER, WITH LONG-TERM CURRENT USE OF INSULIN (H): Primary | ICD-10-CM

## 2021-01-01 DIAGNOSIS — M86.9 OSTEOMYELITIS OF ANKLE AND FOOT (H): Primary | ICD-10-CM

## 2021-01-01 DIAGNOSIS — E11.69 TYPE 2 DIABETES MELLITUS WITH OTHER SPECIFIED COMPLICATION, WITH LONG-TERM CURRENT USE OF INSULIN (H): ICD-10-CM

## 2021-01-01 DIAGNOSIS — J18.9 COMMUNITY ACQUIRED PNEUMONIA OF LEFT LOWER LOBE OF LUNG: Primary | ICD-10-CM

## 2021-01-01 DIAGNOSIS — E11.69 TYPE 2 DIABETES MELLITUS WITH OTHER SPECIFIED COMPLICATION, WITHOUT LONG-TERM CURRENT USE OF INSULIN (H): ICD-10-CM

## 2021-01-01 DIAGNOSIS — M86.9 OSTEOMYELITIS OF ANKLE AND FOOT (H): ICD-10-CM

## 2021-01-01 DIAGNOSIS — I10 ESSENTIAL HYPERTENSION WITH GOAL BLOOD PRESSURE LESS THAN 130/85: ICD-10-CM

## 2021-01-01 DIAGNOSIS — R11.0 NAUSEA: ICD-10-CM

## 2021-01-01 DIAGNOSIS — Z79.4 TYPE 2 DIABETES MELLITUS WITH OTHER SPECIFIED COMPLICATION, WITH LONG-TERM CURRENT USE OF INSULIN (H): ICD-10-CM

## 2021-01-01 DIAGNOSIS — I44.7 LBBB (LEFT BUNDLE BRANCH BLOCK): ICD-10-CM

## 2021-01-01 DIAGNOSIS — N18.6 ESRD (END STAGE RENAL DISEASE) (H): ICD-10-CM

## 2021-01-01 DIAGNOSIS — I73.9 PERIPHERAL ARTERIAL DISEASE (H): ICD-10-CM

## 2021-01-01 DIAGNOSIS — Z98.890 POST-OPERATIVE STATE: Primary | ICD-10-CM

## 2021-01-01 LAB
ABO + RH BLD: ABNORMAL
ALBUMIN SERPL-MCNC: 2 G/DL (ref 3.4–5)
ALBUMIN SERPL-MCNC: 2.1 G/DL (ref 3.4–5)
ALBUMIN SERPL-MCNC: 2.2 G/DL (ref 3.4–5)
ALBUMIN SERPL-MCNC: 2.4 G/DL (ref 3.4–5)
ALBUMIN SERPL-MCNC: 2.6 G/DL (ref 3.4–5)
ALBUMIN SERPL-MCNC: 2.7 G/DL (ref 3.4–5)
ALBUMIN SERPL-MCNC: 2.8 G/DL (ref 3.4–5)
ALBUMIN SERPL-MCNC: 2.8 G/DL (ref 3.4–5)
ALBUMIN SERPL-MCNC: 2.9 G/DL (ref 3.4–5)
ALBUMIN SERPL-MCNC: 3 G/DL (ref 3.4–5)
ALBUMIN SERPL-MCNC: 3.1 G/DL (ref 3.4–5)
ALBUMIN SERPL-MCNC: 3.5 G/DL (ref 3.5–5)
ALBUMIN SERPL-MCNC: NORMAL G/DL (ref 3.4–5)
ALP SERPL-CCNC: 37 U/L (ref 40–150)
ALP SERPL-CCNC: 41 U/L (ref 40–150)
ALP SERPL-CCNC: 42 U/L (ref 40–150)
ALP SERPL-CCNC: 43 U/L (ref 40–150)
ALP SERPL-CCNC: 45 U/L (ref 40–150)
ALP SERPL-CCNC: 46 U/L (ref 45–120)
ALP SERPL-CCNC: 56 U/L (ref 40–150)
ALP SERPL-CCNC: 56 U/L (ref 40–150)
ALP SERPL-CCNC: NORMAL U/L (ref 40–150)
ALT SERPL W P-5'-P-CCNC: 10 U/L (ref 0–70)
ALT SERPL W P-5'-P-CCNC: 11 U/L (ref 0–70)
ALT SERPL W P-5'-P-CCNC: 11 U/L (ref 0–70)
ALT SERPL W P-5'-P-CCNC: 14 U/L (ref 0–70)
ALT SERPL W P-5'-P-CCNC: 14 U/L (ref 0–70)
ALT SERPL W P-5'-P-CCNC: 18 U/L (ref 0–45)
ALT SERPL W P-5'-P-CCNC: 24 U/L (ref 0–70)
ALT SERPL W P-5'-P-CCNC: 58 U/L (ref 0–70)
ALT SERPL W P-5'-P-CCNC: 74 U/L (ref 0–45)
ALT SERPL W P-5'-P-CCNC: NORMAL U/L (ref 0–70)
ANION GAP SERPL CALCULATED.3IONS-SCNC: 10 MMOL/L (ref 3–14)
ANION GAP SERPL CALCULATED.3IONS-SCNC: 11 MMOL/L (ref 3–14)
ANION GAP SERPL CALCULATED.3IONS-SCNC: 11 MMOL/L (ref 5–18)
ANION GAP SERPL CALCULATED.3IONS-SCNC: 12 MMOL/L (ref 3–14)
ANION GAP SERPL CALCULATED.3IONS-SCNC: 13 MMOL/L (ref 3–14)
ANION GAP SERPL CALCULATED.3IONS-SCNC: 8 MMOL/L (ref 3–14)
ANION GAP SERPL CALCULATED.3IONS-SCNC: 9 MMOL/L (ref 3–14)
ANION GAP SERPL CALCULATED.3IONS-SCNC: NORMAL MMOL/L (ref 6–17)
APTT PPP: 44 SEC (ref 22–37)
AST SERPL W P-5'-P-CCNC: 10 U/L (ref 0–45)
AST SERPL W P-5'-P-CCNC: 13 U/L (ref 0–45)
AST SERPL W P-5'-P-CCNC: 14 U/L (ref 0–40)
AST SERPL W P-5'-P-CCNC: 15 U/L (ref 0–45)
AST SERPL W P-5'-P-CCNC: 15 U/L (ref 0–45)
AST SERPL W P-5'-P-CCNC: 18 U/L (ref 0–45)
AST SERPL W P-5'-P-CCNC: 20 U/L (ref 0–45)
AST SERPL W P-5'-P-CCNC: 30 U/L (ref 0–45)
AST SERPL W P-5'-P-CCNC: 57 U/L (ref 0–40)
AST SERPL W P-5'-P-CCNC: NORMAL U/L (ref 0–45)
BASOPHILS # BLD AUTO: 0 10E3/UL (ref 0–0.2)
BASOPHILS # BLD AUTO: 0 10E3/UL (ref 0–0.2)
BASOPHILS NFR BLD AUTO: 1 %
BASOPHILS NFR BLD AUTO: 1 %
BILIRUB DIRECT SERPL-MCNC: 0.2 MG/DL (ref 0–0.2)
BILIRUB SERPL-MCNC: 0.4 MG/DL (ref 0.2–1.3)
BILIRUB SERPL-MCNC: 0.4 MG/DL (ref 0.2–1.3)
BILIRUB SERPL-MCNC: 0.5 MG/DL (ref 0.2–1.3)
BILIRUB SERPL-MCNC: 0.5 MG/DL (ref 0.2–1.3)
BILIRUB SERPL-MCNC: 0.6 MG/DL (ref 0.2–1.3)
BILIRUB SERPL-MCNC: 0.6 MG/DL (ref 0–1)
BILIRUB SERPL-MCNC: 0.7 MG/DL (ref 0.2–1.3)
BILIRUB SERPL-MCNC: 0.8 MG/DL (ref 0.2–1.3)
BILIRUB SERPL-MCNC: NORMAL MG/DL (ref 0.2–1.3)
BLD GP AB INVEST PLASRBC-IMP: ABNORMAL
BLD GP AB SCN SERPL QL: ABNORMAL
BLD GP AB SCN SERPL QL: ABNORMAL
BLD PROD TYP BPU: ABNORMAL
BLD PROD TYP BPU: ABNORMAL
BLD PROD TYP BPU: NORMAL
BLD UNIT ID BPU: 0
BLOOD BANK CMNT PATIENT-IMP: ABNORMAL
BLOOD PRODUCT CODE: NORMAL
BPU ID: NORMAL
BSA FOR ECHO PROCEDURE: 2.2 M2
BUN SERPL-MCNC: 102 MG/DL (ref 7–30)
BUN SERPL-MCNC: 107 MG/DL (ref 7–30)
BUN SERPL-MCNC: 119 MG/DL (ref 7–30)
BUN SERPL-MCNC: 124 MG/DL (ref 7–30)
BUN SERPL-MCNC: 132 MG/DL (ref 7–30)
BUN SERPL-MCNC: 18 MG/DL (ref 8–28)
BUN SERPL-MCNC: 27 MG/DL (ref 7–30)
BUN SERPL-MCNC: 28 MG/DL (ref 7–30)
BUN SERPL-MCNC: 29 MG/DL (ref 7–30)
BUN SERPL-MCNC: 30 MG/DL (ref 7–30)
BUN SERPL-MCNC: 30 MG/DL (ref 7–30)
BUN SERPL-MCNC: 38 MG/DL (ref 7–30)
BUN SERPL-MCNC: 43 MG/DL (ref 7–30)
BUN SERPL-MCNC: 50 MG/DL (ref 7–30)
BUN SERPL-MCNC: 50 MG/DL (ref 7–30)
BUN SERPL-MCNC: 52 MG/DL (ref 7–30)
BUN SERPL-MCNC: 56 MG/DL (ref 7–30)
BUN SERPL-MCNC: 61 MG/DL (ref 7–30)
BUN SERPL-MCNC: 65 MG/DL (ref 7–30)
BUN SERPL-MCNC: 69 MG/DL (ref 7–30)
BUN SERPL-MCNC: 70 MG/DL (ref 7–30)
BUN SERPL-MCNC: 73 MG/DL (ref 7–30)
BUN SERPL-MCNC: 76 MG/DL (ref 7–30)
BUN SERPL-MCNC: 82 MG/DL (ref 7–30)
BUN SERPL-MCNC: 86 MG/DL (ref 7–30)
BUN SERPL-MCNC: 88 MG/DL (ref 7–30)
BUN SERPL-MCNC: 89 MG/DL (ref 7–30)
BUN SERPL-MCNC: 93 MG/DL (ref 7–30)
BUN SERPL-MCNC: 99 MG/DL (ref 7–30)
BUN SERPL-MCNC: NORMAL MG/DL (ref 7–30)
CA-I BLD-MCNC: 4.7 MG/DL (ref 4.4–5.2)
CA-I BLD-MCNC: 5.5 MG/DL (ref 4.4–5.2)
CALCIT SERPL-MCNC: 4 PG/ML
CALCIUM SERPL-MCNC: 10 MG/DL (ref 8.5–10.1)
CALCIUM SERPL-MCNC: 10 MG/DL (ref 8.5–10.1)
CALCIUM SERPL-MCNC: 10.1 MG/DL (ref 8.5–10.1)
CALCIUM SERPL-MCNC: 10.2 MG/DL (ref 8.5–10.1)
CALCIUM SERPL-MCNC: 7.5 MG/DL (ref 8.5–10.1)
CALCIUM SERPL-MCNC: 8.6 MG/DL (ref 8.5–10.1)
CALCIUM SERPL-MCNC: 8.7 MG/DL (ref 8.5–10.1)
CALCIUM SERPL-MCNC: 8.8 MG/DL (ref 8.5–10.1)
CALCIUM SERPL-MCNC: 8.8 MG/DL (ref 8.5–10.1)
CALCIUM SERPL-MCNC: 8.9 MG/DL (ref 8.5–10.1)
CALCIUM SERPL-MCNC: 8.9 MG/DL (ref 8.5–10.1)
CALCIUM SERPL-MCNC: 9 MG/DL (ref 8.5–10.1)
CALCIUM SERPL-MCNC: 9.1 MG/DL (ref 8.5–10.1)
CALCIUM SERPL-MCNC: 9.2 MG/DL (ref 8.5–10.1)
CALCIUM SERPL-MCNC: 9.3 MG/DL (ref 8.5–10.1)
CALCIUM SERPL-MCNC: 9.4 MG/DL (ref 8.5–10.1)
CALCIUM SERPL-MCNC: 9.4 MG/DL (ref 8.5–10.1)
CALCIUM SERPL-MCNC: 9.5 MG/DL (ref 8.5–10.1)
CALCIUM SERPL-MCNC: 9.5 MG/DL (ref 8.5–10.1)
CALCIUM SERPL-MCNC: 9.5 MG/DL (ref 8.5–10.5)
CALCIUM SERPL-MCNC: 9.6 MG/DL (ref 8.5–10.1)
CALCIUM SERPL-MCNC: 9.6 MG/DL (ref 8.5–10.1)
CALCIUM SERPL-MCNC: 9.7 MG/DL (ref 8.5–10.1)
CALCIUM SERPL-MCNC: 9.7 MG/DL (ref 8.5–10.1)
CALCIUM SERPL-MCNC: 9.8 MG/DL (ref 8.5–10.1)
CALCIUM SERPL-MCNC: NORMAL MG/DL (ref 8.5–10.1)
CHLORIDE BLD-SCNC: 102 MMOL/L (ref 98–107)
CHLORIDE SERPL-SCNC: 100 MMOL/L (ref 94–109)
CHLORIDE SERPL-SCNC: 101 MMOL/L (ref 94–109)
CHLORIDE SERPL-SCNC: 101 MMOL/L (ref 94–109)
CHLORIDE SERPL-SCNC: 102 MMOL/L (ref 94–109)
CHLORIDE SERPL-SCNC: 103 MMOL/L (ref 94–109)
CHLORIDE SERPL-SCNC: 109 MMOL/L (ref 94–109)
CHLORIDE SERPL-SCNC: 95 MMOL/L (ref 94–109)
CHLORIDE SERPL-SCNC: 96 MMOL/L (ref 94–109)
CHLORIDE SERPL-SCNC: 97 MMOL/L (ref 94–109)
CHLORIDE SERPL-SCNC: 98 MMOL/L (ref 94–109)
CHLORIDE SERPL-SCNC: 99 MMOL/L (ref 94–109)
CHLORIDE SERPL-SCNC: NORMAL MMOL/L (ref 94–109)
CO2 SERPL-SCNC: 20 MMOL/L (ref 20–32)
CO2 SERPL-SCNC: 21 MMOL/L (ref 20–32)
CO2 SERPL-SCNC: 21 MMOL/L (ref 20–32)
CO2 SERPL-SCNC: 22 MMOL/L (ref 20–32)
CO2 SERPL-SCNC: 22 MMOL/L (ref 20–32)
CO2 SERPL-SCNC: 23 MMOL/L (ref 20–32)
CO2 SERPL-SCNC: 24 MMOL/L (ref 20–32)
CO2 SERPL-SCNC: 25 MMOL/L (ref 20–32)
CO2 SERPL-SCNC: 26 MMOL/L (ref 20–32)
CO2 SERPL-SCNC: 27 MMOL/L (ref 20–32)
CO2 SERPL-SCNC: 28 MMOL/L (ref 22–31)
CO2 SERPL-SCNC: NORMAL MMOL/L (ref 20–32)
COPATH REPORT: NORMAL
CREAT SERPL-MCNC: 2.96 MG/DL (ref 0.66–1.25)
CREAT SERPL-MCNC: 3.46 MG/DL (ref 0.7–1.3)
CREAT SERPL-MCNC: 3.99 MG/DL (ref 0.66–1.25)
CREAT SERPL-MCNC: 4 MG/DL (ref 0.66–1.25)
CREAT SERPL-MCNC: 4.18 MG/DL (ref 0.66–1.25)
CREAT SERPL-MCNC: 4.21 MG/DL (ref 0.66–1.25)
CREAT SERPL-MCNC: 4.28 MG/DL (ref 0.66–1.25)
CREAT SERPL-MCNC: 4.29 MG/DL (ref 0.66–1.25)
CREAT SERPL-MCNC: 4.44 MG/DL (ref 0.66–1.25)
CREAT SERPL-MCNC: 4.45 MG/DL (ref 0.7–1.3)
CREAT SERPL-MCNC: 4.46 MG/DL (ref 0.66–1.25)
CREAT SERPL-MCNC: 4.49 MG/DL (ref 0.66–1.25)
CREAT SERPL-MCNC: 4.85 MG/DL (ref 0.66–1.25)
CREAT SERPL-MCNC: 4.89 MG/DL (ref 0.66–1.25)
CREAT SERPL-MCNC: 5.02 MG/DL (ref 0.66–1.25)
CREAT SERPL-MCNC: 5.06 MG/DL (ref 0.66–1.25)
CREAT SERPL-MCNC: 5.4 MG/DL (ref 0.66–1.25)
CREAT SERPL-MCNC: 5.45 MG/DL (ref 0.66–1.25)
CREAT SERPL-MCNC: 5.59 MG/DL (ref 0.66–1.25)
CREAT SERPL-MCNC: 5.82 MG/DL (ref 0.66–1.25)
CREAT SERPL-MCNC: 5.94 MG/DL (ref 0.66–1.25)
CREAT SERPL-MCNC: 6.09 MG/DL (ref 0.66–1.25)
CREAT SERPL-MCNC: 6.35 MG/DL (ref 0.66–1.25)
CREAT SERPL-MCNC: 6.36 MG/DL (ref 0.66–1.25)
CREAT SERPL-MCNC: 6.38 MG/DL (ref 0.66–1.25)
CREAT SERPL-MCNC: 6.48 MG/DL (ref 0.66–1.25)
CREAT SERPL-MCNC: 6.49 MG/DL (ref 0.66–1.25)
CREAT SERPL-MCNC: 6.59 MG/DL (ref 0.66–1.25)
CREAT SERPL-MCNC: 7.02 MG/DL (ref 0.66–1.25)
CREAT SERPL-MCNC: 7.59 MG/DL (ref 0.66–1.25)
CREAT SERPL-MCNC: NORMAL MG/DL (ref 0.66–1.25)
CV BLOOD PRESSURE: NORMAL MMHG
CV ECHO HEIGHT: 74 IN
CV ECHO WEIGHT: 205 LBS
CV STRESS CURRENT BP HE: NORMAL
CV STRESS CURRENT HR HE: 61
CV STRESS CURRENT HR HE: 72
CV STRESS CURRENT HR HE: 77
CV STRESS CURRENT HR HE: 78
CV STRESS CURRENT HR HE: 81
CV STRESS CURRENT HR HE: 81
CV STRESS CURRENT HR HE: 82
CV STRESS CURRENT HR HE: 84
CV STRESS CURRENT HR HE: 85
CV STRESS CURRENT HR HE: 86
CV STRESS CURRENT HR HE: 88
CV STRESS CURRENT HR HE: 88
CV STRESS CURRENT HR HE: 93
CV STRESS CURRENT HR HE: 96
CV STRESS DEVIATION TIME HE: NORMAL
CV STRESS ECHO PERCENT HR HE: NORMAL
CV STRESS EXERCISE STAGE HE: NORMAL
CV STRESS FINAL RESTING BP HE: NORMAL
CV STRESS FINAL RESTING HR HE: 85
CV STRESS MAX HR HE: 108
CV STRESS MAX TREADMILL GRADE HE: 0
CV STRESS MAX TREADMILL SPEED HE: 0
CV STRESS PEAK DIA BP HE: NORMAL
CV STRESS PEAK SYS BP HE: NORMAL
CV STRESS PHASE HE: NORMAL
CV STRESS PROTOCOL HE: NORMAL
CV STRESS RESTING PT POSITION HE: NORMAL
CV STRESS ST DEVIATION AMOUNT HE: NORMAL
CV STRESS ST DEVIATION ELEVATION HE: NORMAL
CV STRESS ST EVELATION AMOUNT HE: NORMAL
CV STRESS TEST TYPE HE: NORMAL
CV STRESS TOTAL STAGE TIME MIN 1 HE: NORMAL
D DIMER PPP FEU-MCNC: 2.02 UG/ML FEU (ref 0–0.5)
D DIMER PPP FEU-MCNC: 2.46 UG/ML FEU (ref 0–0.5)
EJECTION FRACTION: 44 % (ref 55–75)
EOSINOPHIL # BLD AUTO: 0.2 10E3/UL (ref 0–0.7)
EOSINOPHIL # BLD AUTO: 0.3 10E3/UL (ref 0–0.7)
EOSINOPHIL NFR BLD AUTO: 2 %
EOSINOPHIL NFR BLD AUTO: 4 %
ERYTHROCYTE [DISTWIDTH] IN BLOOD BY AUTOMATED COUNT: 10.9 % (ref 11–14.5)
ERYTHROCYTE [DISTWIDTH] IN BLOOD BY AUTOMATED COUNT: 15 % (ref 10–15)
ERYTHROCYTE [DISTWIDTH] IN BLOOD BY AUTOMATED COUNT: 15.4 % (ref 10–15)
ERYTHROCYTE [DISTWIDTH] IN BLOOD BY AUTOMATED COUNT: 15.4 % (ref 10–15)
ERYTHROCYTE [DISTWIDTH] IN BLOOD BY AUTOMATED COUNT: 15.4 % (ref 11–14.5)
ERYTHROCYTE [DISTWIDTH] IN BLOOD BY AUTOMATED COUNT: 15.6 % (ref 10–15)
ERYTHROCYTE [DISTWIDTH] IN BLOOD BY AUTOMATED COUNT: 15.6 % (ref 10–15)
ERYTHROCYTE [DISTWIDTH] IN BLOOD BY AUTOMATED COUNT: 15.8 % (ref 10–15)
ERYTHROCYTE [DISTWIDTH] IN BLOOD BY AUTOMATED COUNT: 15.9 % (ref 11–14.5)
ERYTHROCYTE [DISTWIDTH] IN BLOOD BY AUTOMATED COUNT: 16 % (ref 10–15)
ERYTHROCYTE [DISTWIDTH] IN BLOOD BY AUTOMATED COUNT: 16 % (ref 10–15)
ERYTHROCYTE [DISTWIDTH] IN BLOOD BY AUTOMATED COUNT: 16.4 % (ref 10–15)
ERYTHROCYTE [DISTWIDTH] IN BLOOD BY AUTOMATED COUNT: 16.4 % (ref 10–15)
GAMMA INTERFERON BACKGROUND BLD IA-ACNC: 0.06 IU/ML
GFR SERPL CREATININE-BSD FRML MDRD: 10 ML/MIN/{1.73_M2}
GFR SERPL CREATININE-BSD FRML MDRD: 11 ML/MIN/{1.73_M2}
GFR SERPL CREATININE-BSD FRML MDRD: 12 ML/MIN/{1.73_M2}
GFR SERPL CREATININE-BSD FRML MDRD: 13 ML/MIN/1.73M2
GFR SERPL CREATININE-BSD FRML MDRD: 13 ML/MIN/{1.73_M2}
GFR SERPL CREATININE-BSD FRML MDRD: 13 ML/MIN/{1.73_M2}
GFR SERPL CREATININE-BSD FRML MDRD: 17 ML/MIN/1.73M2
GFR SERPL CREATININE-BSD FRML MDRD: 19 ML/MIN/{1.73_M2}
GFR SERPL CREATININE-BSD FRML MDRD: 6 ML/MIN/{1.73_M2}
GFR SERPL CREATININE-BSD FRML MDRD: 7 ML/MIN/{1.73_M2}
GFR SERPL CREATININE-BSD FRML MDRD: 8 ML/MIN/{1.73_M2}
GFR SERPL CREATININE-BSD FRML MDRD: 9 ML/MIN/{1.73_M2}
GFR SERPL CREATININE-BSD FRML MDRD: NORMAL ML/MIN/{1.73_M2}
GLUCOSE BLD-MCNC: 115 MG/DL (ref 70–125)
GLUCOSE BLDC GLUCOMTR-MCNC: 101 MG/DL (ref 70–99)
GLUCOSE BLDC GLUCOMTR-MCNC: 106 MG/DL (ref 70–99)
GLUCOSE BLDC GLUCOMTR-MCNC: 107 MG/DL (ref 70–99)
GLUCOSE BLDC GLUCOMTR-MCNC: 111 MG/DL (ref 70–99)
GLUCOSE BLDC GLUCOMTR-MCNC: 111 MG/DL (ref 70–99)
GLUCOSE BLDC GLUCOMTR-MCNC: 113 MG/DL (ref 70–99)
GLUCOSE BLDC GLUCOMTR-MCNC: 114 MG/DL (ref 70–99)
GLUCOSE BLDC GLUCOMTR-MCNC: 117 MG/DL (ref 70–99)
GLUCOSE BLDC GLUCOMTR-MCNC: 118 MG/DL (ref 70–99)
GLUCOSE BLDC GLUCOMTR-MCNC: 119 MG/DL (ref 70–99)
GLUCOSE BLDC GLUCOMTR-MCNC: 122 MG/DL (ref 70–99)
GLUCOSE BLDC GLUCOMTR-MCNC: 123 MG/DL (ref 70–99)
GLUCOSE BLDC GLUCOMTR-MCNC: 124 MG/DL (ref 70–99)
GLUCOSE BLDC GLUCOMTR-MCNC: 125 MG/DL (ref 70–99)
GLUCOSE BLDC GLUCOMTR-MCNC: 126 MG/DL (ref 70–99)
GLUCOSE BLDC GLUCOMTR-MCNC: 127 MG/DL (ref 70–99)
GLUCOSE BLDC GLUCOMTR-MCNC: 129 MG/DL (ref 70–99)
GLUCOSE BLDC GLUCOMTR-MCNC: 129 MG/DL (ref 70–99)
GLUCOSE BLDC GLUCOMTR-MCNC: 132 MG/DL (ref 70–99)
GLUCOSE BLDC GLUCOMTR-MCNC: 133 MG/DL (ref 70–99)
GLUCOSE BLDC GLUCOMTR-MCNC: 135 MG/DL (ref 70–99)
GLUCOSE BLDC GLUCOMTR-MCNC: 138 MG/DL (ref 70–99)
GLUCOSE BLDC GLUCOMTR-MCNC: 139 MG/DL (ref 70–99)
GLUCOSE BLDC GLUCOMTR-MCNC: 140 MG/DL (ref 70–99)
GLUCOSE BLDC GLUCOMTR-MCNC: 140 MG/DL (ref 70–99)
GLUCOSE BLDC GLUCOMTR-MCNC: 141 MG/DL (ref 70–99)
GLUCOSE BLDC GLUCOMTR-MCNC: 142 MG/DL (ref 70–99)
GLUCOSE BLDC GLUCOMTR-MCNC: 145 MG/DL (ref 70–99)
GLUCOSE BLDC GLUCOMTR-MCNC: 145 MG/DL (ref 70–99)
GLUCOSE BLDC GLUCOMTR-MCNC: 149 MG/DL (ref 70–99)
GLUCOSE BLDC GLUCOMTR-MCNC: 150 MG/DL (ref 70–99)
GLUCOSE BLDC GLUCOMTR-MCNC: 151 MG/DL (ref 70–99)
GLUCOSE BLDC GLUCOMTR-MCNC: 152 MG/DL (ref 70–99)
GLUCOSE BLDC GLUCOMTR-MCNC: 152 MG/DL (ref 70–99)
GLUCOSE BLDC GLUCOMTR-MCNC: 153 MG/DL (ref 70–99)
GLUCOSE BLDC GLUCOMTR-MCNC: 155 MG/DL (ref 70–99)
GLUCOSE BLDC GLUCOMTR-MCNC: 156 MG/DL (ref 70–99)
GLUCOSE BLDC GLUCOMTR-MCNC: 157 MG/DL (ref 70–99)
GLUCOSE BLDC GLUCOMTR-MCNC: 158 MG/DL (ref 70–99)
GLUCOSE BLDC GLUCOMTR-MCNC: 159 MG/DL (ref 70–99)
GLUCOSE BLDC GLUCOMTR-MCNC: 159 MG/DL (ref 70–99)
GLUCOSE BLDC GLUCOMTR-MCNC: 161 MG/DL (ref 70–99)
GLUCOSE BLDC GLUCOMTR-MCNC: 161 MG/DL (ref 70–99)
GLUCOSE BLDC GLUCOMTR-MCNC: 163 MG/DL (ref 70–99)
GLUCOSE BLDC GLUCOMTR-MCNC: 163 MG/DL (ref 70–99)
GLUCOSE BLDC GLUCOMTR-MCNC: 165 MG/DL (ref 70–99)
GLUCOSE BLDC GLUCOMTR-MCNC: 165 MG/DL (ref 70–99)
GLUCOSE BLDC GLUCOMTR-MCNC: 166 MG/DL (ref 70–99)
GLUCOSE BLDC GLUCOMTR-MCNC: 167 MG/DL (ref 70–99)
GLUCOSE BLDC GLUCOMTR-MCNC: 167 MG/DL (ref 70–99)
GLUCOSE BLDC GLUCOMTR-MCNC: 168 MG/DL (ref 70–99)
GLUCOSE BLDC GLUCOMTR-MCNC: 168 MG/DL (ref 70–99)
GLUCOSE BLDC GLUCOMTR-MCNC: 169 MG/DL (ref 70–99)
GLUCOSE BLDC GLUCOMTR-MCNC: 169 MG/DL (ref 70–99)
GLUCOSE BLDC GLUCOMTR-MCNC: 170 MG/DL (ref 70–99)
GLUCOSE BLDC GLUCOMTR-MCNC: 171 MG/DL (ref 70–99)
GLUCOSE BLDC GLUCOMTR-MCNC: 172 MG/DL (ref 70–99)
GLUCOSE BLDC GLUCOMTR-MCNC: 172 MG/DL (ref 70–99)
GLUCOSE BLDC GLUCOMTR-MCNC: 174 MG/DL (ref 70–99)
GLUCOSE BLDC GLUCOMTR-MCNC: 175 MG/DL (ref 70–99)
GLUCOSE BLDC GLUCOMTR-MCNC: 176 MG/DL (ref 70–99)
GLUCOSE BLDC GLUCOMTR-MCNC: 177 MG/DL (ref 70–99)
GLUCOSE BLDC GLUCOMTR-MCNC: 178 MG/DL (ref 70–99)
GLUCOSE BLDC GLUCOMTR-MCNC: 180 MG/DL (ref 70–99)
GLUCOSE BLDC GLUCOMTR-MCNC: 182 MG/DL (ref 70–99)
GLUCOSE BLDC GLUCOMTR-MCNC: 182 MG/DL (ref 70–99)
GLUCOSE BLDC GLUCOMTR-MCNC: 183 MG/DL (ref 70–99)
GLUCOSE BLDC GLUCOMTR-MCNC: 185 MG/DL (ref 70–99)
GLUCOSE BLDC GLUCOMTR-MCNC: 185 MG/DL (ref 70–99)
GLUCOSE BLDC GLUCOMTR-MCNC: 186 MG/DL (ref 70–99)
GLUCOSE BLDC GLUCOMTR-MCNC: 187 MG/DL (ref 70–99)
GLUCOSE BLDC GLUCOMTR-MCNC: 188 MG/DL (ref 70–99)
GLUCOSE BLDC GLUCOMTR-MCNC: 189 MG/DL (ref 70–99)
GLUCOSE BLDC GLUCOMTR-MCNC: 190 MG/DL (ref 70–99)
GLUCOSE BLDC GLUCOMTR-MCNC: 191 MG/DL (ref 70–99)
GLUCOSE BLDC GLUCOMTR-MCNC: 192 MG/DL (ref 70–99)
GLUCOSE BLDC GLUCOMTR-MCNC: 194 MG/DL (ref 70–99)
GLUCOSE BLDC GLUCOMTR-MCNC: 195 MG/DL (ref 70–99)
GLUCOSE BLDC GLUCOMTR-MCNC: 196 MG/DL (ref 70–99)
GLUCOSE BLDC GLUCOMTR-MCNC: 196 MG/DL (ref 70–99)
GLUCOSE BLDC GLUCOMTR-MCNC: 197 MG/DL (ref 70–99)
GLUCOSE BLDC GLUCOMTR-MCNC: 197 MG/DL (ref 70–99)
GLUCOSE BLDC GLUCOMTR-MCNC: 198 MG/DL (ref 70–99)
GLUCOSE BLDC GLUCOMTR-MCNC: 199 MG/DL (ref 70–99)
GLUCOSE BLDC GLUCOMTR-MCNC: 199 MG/DL (ref 70–99)
GLUCOSE BLDC GLUCOMTR-MCNC: 201 MG/DL (ref 70–99)
GLUCOSE BLDC GLUCOMTR-MCNC: 202 MG/DL (ref 70–99)
GLUCOSE BLDC GLUCOMTR-MCNC: 204 MG/DL (ref 70–99)
GLUCOSE BLDC GLUCOMTR-MCNC: 205 MG/DL (ref 70–99)
GLUCOSE BLDC GLUCOMTR-MCNC: 205 MG/DL (ref 70–99)
GLUCOSE BLDC GLUCOMTR-MCNC: 207 MG/DL (ref 70–99)
GLUCOSE BLDC GLUCOMTR-MCNC: 208 MG/DL (ref 70–99)
GLUCOSE BLDC GLUCOMTR-MCNC: 209 MG/DL (ref 70–99)
GLUCOSE BLDC GLUCOMTR-MCNC: 209 MG/DL (ref 70–99)
GLUCOSE BLDC GLUCOMTR-MCNC: 212 MG/DL (ref 70–99)
GLUCOSE BLDC GLUCOMTR-MCNC: 213 MG/DL (ref 70–99)
GLUCOSE BLDC GLUCOMTR-MCNC: 213 MG/DL (ref 70–99)
GLUCOSE BLDC GLUCOMTR-MCNC: 214 MG/DL (ref 70–99)
GLUCOSE BLDC GLUCOMTR-MCNC: 215 MG/DL (ref 70–99)
GLUCOSE BLDC GLUCOMTR-MCNC: 215 MG/DL (ref 70–99)
GLUCOSE BLDC GLUCOMTR-MCNC: 216 MG/DL (ref 70–99)
GLUCOSE BLDC GLUCOMTR-MCNC: 218 MG/DL (ref 70–99)
GLUCOSE BLDC GLUCOMTR-MCNC: 218 MG/DL (ref 70–99)
GLUCOSE BLDC GLUCOMTR-MCNC: 219 MG/DL (ref 70–99)
GLUCOSE BLDC GLUCOMTR-MCNC: 220 MG/DL (ref 70–99)
GLUCOSE BLDC GLUCOMTR-MCNC: 220 MG/DL (ref 70–99)
GLUCOSE BLDC GLUCOMTR-MCNC: 221 MG/DL (ref 70–99)
GLUCOSE BLDC GLUCOMTR-MCNC: 222 MG/DL (ref 70–99)
GLUCOSE BLDC GLUCOMTR-MCNC: 222 MG/DL (ref 70–99)
GLUCOSE BLDC GLUCOMTR-MCNC: 223 MG/DL (ref 70–99)
GLUCOSE BLDC GLUCOMTR-MCNC: 225 MG/DL (ref 70–99)
GLUCOSE BLDC GLUCOMTR-MCNC: 225 MG/DL (ref 70–99)
GLUCOSE BLDC GLUCOMTR-MCNC: 229 MG/DL (ref 70–99)
GLUCOSE BLDC GLUCOMTR-MCNC: 233 MG/DL (ref 70–99)
GLUCOSE BLDC GLUCOMTR-MCNC: 235 MG/DL (ref 70–99)
GLUCOSE BLDC GLUCOMTR-MCNC: 238 MG/DL (ref 70–99)
GLUCOSE BLDC GLUCOMTR-MCNC: 240 MG/DL (ref 70–99)
GLUCOSE BLDC GLUCOMTR-MCNC: 241 MG/DL (ref 70–99)
GLUCOSE BLDC GLUCOMTR-MCNC: 249 MG/DL (ref 70–99)
GLUCOSE BLDC GLUCOMTR-MCNC: 250 MG/DL (ref 70–99)
GLUCOSE BLDC GLUCOMTR-MCNC: 307 MG/DL (ref 70–99)
GLUCOSE BLDC GLUCOMTR-MCNC: 324 MG/DL (ref 70–99)
GLUCOSE BLDC GLUCOMTR-MCNC: 354 MG/DL (ref 70–99)
GLUCOSE BLDC GLUCOMTR-MCNC: 91 MG/DL (ref 70–99)
GLUCOSE BLDC GLUCOMTR-MCNC: 97 MG/DL (ref 70–99)
GLUCOSE BLDC GLUCOMTR-MCNC: 99 MG/DL (ref 70–99)
GLUCOSE BLDC GLUCOMTR-MCNC: 99 MG/DL (ref 70–99)
GLUCOSE SERPL-MCNC: 101 MG/DL (ref 70–99)
GLUCOSE SERPL-MCNC: 117 MG/DL (ref 70–99)
GLUCOSE SERPL-MCNC: 119 MG/DL (ref 70–99)
GLUCOSE SERPL-MCNC: 130 MG/DL (ref 70–99)
GLUCOSE SERPL-MCNC: 144 MG/DL (ref 70–99)
GLUCOSE SERPL-MCNC: 144 MG/DL (ref 70–99)
GLUCOSE SERPL-MCNC: 148 MG/DL (ref 70–99)
GLUCOSE SERPL-MCNC: 148 MG/DL (ref 70–99)
GLUCOSE SERPL-MCNC: 153 MG/DL (ref 70–99)
GLUCOSE SERPL-MCNC: 153 MG/DL (ref 70–99)
GLUCOSE SERPL-MCNC: 155 MG/DL (ref 70–99)
GLUCOSE SERPL-MCNC: 157 MG/DL (ref 70–99)
GLUCOSE SERPL-MCNC: 158 MG/DL (ref 70–99)
GLUCOSE SERPL-MCNC: 167 MG/DL (ref 70–99)
GLUCOSE SERPL-MCNC: 170 MG/DL (ref 70–99)
GLUCOSE SERPL-MCNC: 172 MG/DL (ref 70–99)
GLUCOSE SERPL-MCNC: 175 MG/DL (ref 70–99)
GLUCOSE SERPL-MCNC: 175 MG/DL (ref 70–99)
GLUCOSE SERPL-MCNC: 180 MG/DL (ref 70–99)
GLUCOSE SERPL-MCNC: 188 MG/DL (ref 70–99)
GLUCOSE SERPL-MCNC: 193 MG/DL (ref 70–99)
GLUCOSE SERPL-MCNC: 198 MG/DL (ref 70–99)
GLUCOSE SERPL-MCNC: 198 MG/DL (ref 70–99)
GLUCOSE SERPL-MCNC: 209 MG/DL (ref 70–99)
GLUCOSE SERPL-MCNC: 219 MG/DL (ref 70–99)
GLUCOSE SERPL-MCNC: 220 MG/DL (ref 70–99)
GLUCOSE SERPL-MCNC: 99 MG/DL (ref 70–99)
GLUCOSE SERPL-MCNC: 99 MG/DL (ref 70–99)
GLUCOSE SERPL-MCNC: NORMAL MG/DL (ref 70–99)
HBA1C MFR BLD: 6 %
HBV SURFACE AB SERPL IA-ACNC: 99.55 M[IU]/ML
HBV SURFACE AG SERPL QL IA: NONREACTIVE
HCT VFR BLD AUTO: 25.1 % (ref 40–53)
HCT VFR BLD AUTO: 26.1 % (ref 40–53)
HCT VFR BLD AUTO: 27.6 % (ref 40–54)
HCT VFR BLD AUTO: 28.3 % (ref 40–53)
HCT VFR BLD AUTO: 28.4 % (ref 40–53)
HCT VFR BLD AUTO: 30.1 % (ref 40–53)
HCT VFR BLD AUTO: 30.4 % (ref 40–54)
HCT VFR BLD AUTO: 31.8 % (ref 40–53)
HCT VFR BLD AUTO: 32.9 % (ref 40–53)
HCT VFR BLD AUTO: 33.5 % (ref 40–54)
HCT VFR BLD AUTO: 34.7 % (ref 40–53)
HCT VFR BLD AUTO: 35 % (ref 40–53)
HCT VFR BLD AUTO: 36.3 % (ref 40–53)
HGB BLD-MCNC: 10.2 G/DL (ref 13.3–17.7)
HGB BLD-MCNC: 10.3 G/DL (ref 13.3–17.7)
HGB BLD-MCNC: 10.5 G/DL (ref 14–18)
HGB BLD-MCNC: 10.7 G/DL (ref 14–18)
HGB BLD-MCNC: 10.8 G/DL (ref 13.3–17.7)
HGB BLD-MCNC: 10.8 G/DL (ref 13.3–17.7)
HGB BLD-MCNC: 11.1 G/DL (ref 14–18)
HGB BLD-MCNC: 11.3 G/DL (ref 13.3–17.7)
HGB BLD-MCNC: 11.7 G/DL (ref 13.3–17.7)
HGB BLD-MCNC: 12.2 G/DL (ref 13.3–17.7)
HGB BLD-MCNC: 8.2 G/DL (ref 13.3–17.7)
HGB BLD-MCNC: 8.5 G/DL (ref 13.3–17.7)
HGB BLD-MCNC: 9.2 G/DL (ref 14–18)
HGB BLD-MCNC: 9.4 G/DL (ref 13.3–17.7)
HGB BLD-MCNC: 9.6 G/DL (ref 13.3–17.7)
IMM GRANULOCYTES # BLD: 0 10E3/UL
IMM GRANULOCYTES # BLD: 0 10E3/UL
IMM GRANULOCYTES NFR BLD: 0 %
IMM GRANULOCYTES NFR BLD: 1 %
INR PPP: 1.29 (ref 0.86–1.14)
INR PPP: 1.3 (ref 0.86–1.14)
INR PPP: 1.32 (ref 0.86–1.14)
INR PPP: 1.33 (ref 0.86–1.14)
INR PPP: 1.35 (ref 0.86–1.14)
INR PPP: 1.41 (ref 0.86–1.14)
INR PPP: 1.46 (ref 0.86–1.14)
INR PPP: 1.6 (ref 0.9–1.1)
INR PPP: 1.72 (ref 0.9–1.1)
INR PPP: 1.77 (ref 0.86–1.14)
INR PPP: 1.79 (ref 0.86–1.14)
INR PPP: 1.8 (ref 0.9–1.1)
INR PPP: 1.9 (ref 0.9–1.1)
INR PPP: 2.1 (ref 0.9–1.1)
INR PPP: 2.13 (ref 0.86–1.14)
INR PPP: 2.65 (ref 0.86–1.14)
INR PPP: 2.81 (ref 0.86–1.14)
INR PPP: 2.92 (ref 0.86–1.14)
INR PPP: 3.05 (ref 0.86–1.14)
INR PPP: 3.05 (ref 0.86–1.14)
INR PPP: 3.24 (ref 0.86–1.14)
INR PPP: 3.88 (ref 0.86–1.14)
INTERPRETATION ECG - MUSE: NORMAL
INTERPRETATION ECG - MUSE: NORMAL
LABORATORY COMMENT REPORT: NORMAL
LABORATORY COMMENT REPORT: NORMAL
LACTATE BLD-SCNC: 0.5 MMOL/L (ref 0.7–2)
LACTATE BLD-SCNC: 1.5 MMOL/L (ref 0.7–2)
LACTATE BLD-SCNC: 2 MMOL/L (ref 0.7–2)
LEFT VENTRICLE DIASTOLIC VOLUME INDEX: 38.1 CM3/M2 (ref 34–74)
LEFT VENTRICLE DIASTOLIC VOLUME: 83.9 CM3 (ref 62–150)
LEFT VENTRICLE HEART RATE: 66 BPM
LEFT VENTRICLE SYSTOLIC VOLUME INDEX: 21.5 CM3/M2 (ref 11–31)
LEFT VENTRICLE SYSTOLIC VOLUME: 47.4 CM3 (ref 21–61)
LYMPHOCYTES # BLD AUTO: 0.7 10E3/UL (ref 0.8–5.3)
LYMPHOCYTES # BLD AUTO: 1 10E3/UL (ref 0.8–5.3)
LYMPHOCYTES NFR BLD AUTO: 11 %
LYMPHOCYTES NFR BLD AUTO: 14 %
M TB IFN-G BLD-IMP: NEGATIVE
MAGNESIUM SERPL-MCNC: 1.7 MG/DL (ref 1.6–2.3)
MAGNESIUM SERPL-MCNC: 1.8 MG/DL (ref 1.6–2.3)
MAGNESIUM SERPL-MCNC: 1.9 MG/DL (ref 1.6–2.3)
MAGNESIUM SERPL-MCNC: 2 MG/DL (ref 1.6–2.3)
MAGNESIUM SERPL-MCNC: 2.1 MG/DL (ref 1.6–2.3)
MAGNESIUM SERPL-MCNC: 2.8 MG/DL (ref 1.6–2.3)
MAGNESIUM SERPL-MCNC: NORMAL MG/DL (ref 1.6–2.3)
MCH RBC QN AUTO: 34.3 PG (ref 27–34)
MCH RBC QN AUTO: 34.7 PG (ref 27–34)
MCH RBC QN AUTO: 34.8 PG (ref 26.5–33)
MCH RBC QN AUTO: 34.8 PG (ref 26.5–33)
MCH RBC QN AUTO: 34.9 PG (ref 26.5–33)
MCH RBC QN AUTO: 34.9 PG (ref 26.5–33)
MCH RBC QN AUTO: 35 PG (ref 26.5–33)
MCH RBC QN AUTO: 35 PG (ref 27–34)
MCH RBC QN AUTO: 35.1 PG (ref 26.5–33)
MCH RBC QN AUTO: 35.3 PG (ref 26.5–33)
MCH RBC QN AUTO: 35.7 PG (ref 26.5–33)
MCH RBC QN AUTO: 35.9 PG (ref 26.5–33)
MCH RBC QN AUTO: 36 PG (ref 26.5–33)
MCHC RBC AUTO-ENTMCNC: 31.9 G/DL (ref 32–36)
MCHC RBC AUTO-ENTMCNC: 32.6 G/DL (ref 31.5–36.5)
MCHC RBC AUTO-ENTMCNC: 32.6 G/DL (ref 31.5–36.5)
MCHC RBC AUTO-ENTMCNC: 32.7 G/DL (ref 31.5–36.5)
MCHC RBC AUTO-ENTMCNC: 32.8 G/DL (ref 31.5–36.5)
MCHC RBC AUTO-ENTMCNC: 33.1 G/DL (ref 31.5–36.5)
MCHC RBC AUTO-ENTMCNC: 33.3 G/DL (ref 32–36)
MCHC RBC AUTO-ENTMCNC: 33.4 G/DL (ref 31.5–36.5)
MCHC RBC AUTO-ENTMCNC: 33.6 G/DL (ref 31.5–36.5)
MCHC RBC AUTO-ENTMCNC: 33.9 G/DL (ref 31.5–36.5)
MCHC RBC AUTO-ENTMCNC: 33.9 G/DL (ref 31.5–36.5)
MCHC RBC AUTO-ENTMCNC: 34 G/DL (ref 31.5–36.5)
MCHC RBC AUTO-ENTMCNC: 34.5 G/DL (ref 32–36)
MCV RBC AUTO: 104 FL (ref 78–100)
MCV RBC AUTO: 105 FL (ref 78–100)
MCV RBC AUTO: 105 FL (ref 78–100)
MCV RBC AUTO: 105 FL (ref 80–100)
MCV RBC AUTO: 106 FL (ref 78–100)
MCV RBC AUTO: 107 FL (ref 78–100)
MCV RBC AUTO: 109 FL (ref 80–100)
MCV RBC AUTO: 99 FL (ref 80–100)
MITOGEN IGNF BCKGRD COR BLD-ACNC: 0.01 IU/ML
MITOGEN IGNF BCKGRD COR BLD-ACNC: 0.02 IU/ML
MONOCYTES # BLD AUTO: 0.4 10E3/UL (ref 0–1.3)
MONOCYTES # BLD AUTO: 0.5 10E3/UL (ref 0–1.3)
MONOCYTES NFR BLD AUTO: 6 %
MONOCYTES NFR BLD AUTO: 7 %
NEUTROPHILS # BLD AUTO: 4.9 10E3/UL (ref 1.6–8.3)
NEUTROPHILS # BLD AUTO: 4.9 10E3/UL (ref 1.6–8.3)
NEUTROPHILS NFR BLD AUTO: 74 %
NEUTROPHILS NFR BLD AUTO: 79 %
NRBC # BLD AUTO: 0 10E3/UL
NRBC # BLD AUTO: 0 10E3/UL
NRBC BLD AUTO-RTO: 0 /100
NRBC BLD AUTO-RTO: 0 /100
NUC REST DIASTOLIC VOLUME INDEX: 3280 LBS
NUC REST SYSTOLIC VOLUME INDEX: 74 IN
NUC STRESS EJECTION FRACTION: 65 %
NUM BPU REQUESTED: 2
NUM BPU REQUESTED: 3
PHOSPHATE SERPL-MCNC: 1.5 MG/DL (ref 2.5–4.5)
PHOSPHATE SERPL-MCNC: 2.3 MG/DL (ref 2.5–4.5)
PHOSPHATE SERPL-MCNC: 2.3 MG/DL (ref 2.5–4.5)
PHOSPHATE SERPL-MCNC: 2.4 MG/DL (ref 2.5–4.5)
PHOSPHATE SERPL-MCNC: 2.4 MG/DL (ref 2.5–4.5)
PHOSPHATE SERPL-MCNC: 2.6 MG/DL (ref 2.5–4.5)
PHOSPHATE SERPL-MCNC: 2.8 MG/DL (ref 2.5–4.5)
PHOSPHATE SERPL-MCNC: 2.9 MG/DL (ref 2.5–4.5)
PHOSPHATE SERPL-MCNC: 3 MG/DL (ref 2.5–4.5)
PHOSPHATE SERPL-MCNC: 3.1 MG/DL (ref 2.5–4.5)
PHOSPHATE SERPL-MCNC: 3.5 MG/DL (ref 2.5–4.5)
PHOSPHATE SERPL-MCNC: 3.5 MG/DL (ref 2.5–4.5)
PHOSPHATE SERPL-MCNC: 3.6 MG/DL (ref 2.5–4.5)
PHOSPHATE SERPL-MCNC: 3.6 MG/DL (ref 2.5–4.5)
PHOSPHATE SERPL-MCNC: 3.7 MG/DL (ref 2.5–4.5)
PHOSPHATE SERPL-MCNC: 3.8 MG/DL (ref 2.5–4.5)
PHOSPHATE SERPL-MCNC: 4.2 MG/DL (ref 2.5–4.5)
PHOSPHATE SERPL-MCNC: 4.5 MG/DL (ref 2.5–4.5)
PHOSPHATE SERPL-MCNC: 4.6 MG/DL (ref 2.5–4.5)
PHOSPHATE SERPL-MCNC: 5.1 MG/DL (ref 2.5–4.5)
PHOSPHATE SERPL-MCNC: 5.5 MG/DL (ref 2.5–4.5)
PHOSPHATE SERPL-MCNC: 6.1 MG/DL (ref 2.5–4.5)
PHOSPHATE SERPL-MCNC: 6.4 MG/DL (ref 2.5–4.5)
PHOSPHATE SERPL-MCNC: 6.5 MG/DL (ref 2.5–4.5)
PHOSPHATE SERPL-MCNC: 6.9 MG/DL (ref 2.5–4.5)
PHOSPHATE SERPL-MCNC: NORMAL MG/DL (ref 2.5–4.5)
PLATELET # BLD AUTO: 151 THOU/UL (ref 140–440)
PLATELET # BLD AUTO: 157 THOU/UL (ref 140–440)
PLATELET # BLD AUTO: 163 10E9/L (ref 150–450)
PLATELET # BLD AUTO: 168 10E9/L (ref 150–450)
PLATELET # BLD AUTO: 174 10E9/L (ref 150–450)
PLATELET # BLD AUTO: 175 10E9/L (ref 150–450)
PLATELET # BLD AUTO: 183 10E9/L (ref 150–450)
PLATELET # BLD AUTO: 185 10E9/L (ref 150–450)
PLATELET # BLD AUTO: 189 10E9/L (ref 150–450)
PLATELET # BLD AUTO: 191 10E9/L (ref 150–450)
PLATELET # BLD AUTO: 198 10E9/L (ref 150–450)
PLATELET # BLD AUTO: 202 THOU/UL (ref 140–440)
PLATELET # BLD AUTO: 204 10E9/L (ref 150–450)
PLATELET # BLD AUTO: 222 10E3/UL (ref 150–450)
PLATELET # BLD AUTO: 224 THOU/UL (ref 140–440)
PLATELET # BLD AUTO: 237 10E3/UL (ref 150–450)
PMV BLD AUTO: 10.9 FL (ref 8.5–12.5)
PMV BLD AUTO: 11.1 FL (ref 8.5–12.5)
PMV BLD AUTO: 7.6 FL (ref 7–10)
POC INR - HE - HISTORICAL: 2.3 (ref 0.9–1.1)
POC INR - HE - HISTORICAL: 3.2 (ref 0.9–1.1)
POTASSIUM BLD-SCNC: 3.7 MMOL/L (ref 3.5–5)
POTASSIUM BLD-SCNC: 4.5 MMOL/L (ref 3.5–5)
POTASSIUM SERPL-SCNC: 3.4 MMOL/L (ref 3.4–5.3)
POTASSIUM SERPL-SCNC: 3.5 MMOL/L (ref 3.4–5.3)
POTASSIUM SERPL-SCNC: 3.6 MMOL/L (ref 3.4–5.3)
POTASSIUM SERPL-SCNC: 3.7 MMOL/L (ref 3.4–5.3)
POTASSIUM SERPL-SCNC: 3.8 MMOL/L (ref 3.4–5.3)
POTASSIUM SERPL-SCNC: 3.8 MMOL/L (ref 3.4–5.3)
POTASSIUM SERPL-SCNC: 3.9 MMOL/L (ref 3.4–5.3)
POTASSIUM SERPL-SCNC: 3.9 MMOL/L (ref 3.4–5.3)
POTASSIUM SERPL-SCNC: 4 MMOL/L (ref 3.4–5.3)
POTASSIUM SERPL-SCNC: 4 MMOL/L (ref 3.4–5.3)
POTASSIUM SERPL-SCNC: 4.2 MMOL/L (ref 3.4–5.3)
POTASSIUM SERPL-SCNC: 4.3 MMOL/L (ref 3.4–5.3)
POTASSIUM SERPL-SCNC: 4.3 MMOL/L (ref 3.4–5.3)
POTASSIUM SERPL-SCNC: 4.5 MMOL/L (ref 3.4–5.3)
POTASSIUM SERPL-SCNC: 4.7 MMOL/L (ref 3.4–5.3)
POTASSIUM SERPL-SCNC: 5 MMOL/L (ref 3.4–5.3)
POTASSIUM SERPL-SCNC: 5.2 MMOL/L (ref 3.4–5.3)
POTASSIUM SERPL-SCNC: 5.8 MMOL/L (ref 3.4–5.3)
POTASSIUM SERPL-SCNC: NORMAL MMOL/L (ref 3.4–5.3)
PREALB SERPL IA-MCNC: 10 MG/DL (ref 15–45)
PREALB SERPL IA-MCNC: 14 MG/DL (ref 15–45)
PREALB SERPL IA-MCNC: 14 MG/DL (ref 15–45)
PREALB SERPL IA-MCNC: 9 MG/DL (ref 15–45)
PROT SERPL-MCNC: 4.9 G/DL (ref 6.8–8.8)
PROT SERPL-MCNC: 5.3 G/DL (ref 6.8–8.8)
PROT SERPL-MCNC: 5.5 G/DL (ref 6.8–8.8)
PROT SERPL-MCNC: 5.7 G/DL (ref 6.8–8.8)
PROT SERPL-MCNC: 5.9 G/DL (ref 6.8–8.8)
PROT SERPL-MCNC: 6.4 G/DL (ref 6.8–8.8)
PROT SERPL-MCNC: 6.4 G/DL (ref 6–8)
PROT SERPL-MCNC: NORMAL G/DL (ref 6.8–8.8)
QTF INTERPRETATION: NORMAL
QTF MITOGEN - NIL: 1.11 IU/ML
RATE PRESSURE PRODUCT: NORMAL
RBC # BLD AUTO: 2.34 10E12/L (ref 4.4–5.9)
RBC # BLD AUTO: 2.44 10E12/L (ref 4.4–5.9)
RBC # BLD AUTO: 2.63 MILL/UL (ref 4.4–6.2)
RBC # BLD AUTO: 2.68 10E12/L (ref 4.4–5.9)
RBC # BLD AUTO: 2.69 10E12/L (ref 4.4–5.9)
RBC # BLD AUTO: 2.89 10E6/UL (ref 4.4–5.9)
RBC # BLD AUTO: 3 10E6/UL (ref 4.4–5.9)
RBC # BLD AUTO: 3.06 MILL/UL (ref 4.4–6.2)
RBC # BLD AUTO: 3.08 MILL/UL (ref 4.4–6.2)
RBC # BLD AUTO: 3.1 10E12/L (ref 4.4–5.9)
RBC # BLD AUTO: 3.24 10E12/L (ref 4.4–5.9)
RBC # BLD AUTO: 3.35 10E12/L (ref 4.4–5.9)
RBC # BLD AUTO: 3.4 10E12/L (ref 4.4–5.9)
SARS-COV-2 PCR COMMENT: NORMAL
SARS-COV-2 RNA RESP QL NAA+PROBE: NEGATIVE
SARS-COV-2 RNA SPEC QL NAA+PROBE: NEGATIVE
SARS-COV-2 VIRUS SPECIMEN SOURCE: NORMAL
SODIUM SERPL-SCNC: 131 MMOL/L (ref 133–144)
SODIUM SERPL-SCNC: 132 MMOL/L (ref 133–144)
SODIUM SERPL-SCNC: 133 MMOL/L (ref 133–144)
SODIUM SERPL-SCNC: 134 MMOL/L (ref 133–144)
SODIUM SERPL-SCNC: 135 MMOL/L (ref 133–144)
SODIUM SERPL-SCNC: 135 MMOL/L (ref 133–144)
SODIUM SERPL-SCNC: 136 MMOL/L (ref 133–144)
SODIUM SERPL-SCNC: 137 MMOL/L (ref 133–144)
SODIUM SERPL-SCNC: 138 MMOL/L (ref 133–144)
SODIUM SERPL-SCNC: 141 MMOL/L (ref 136–145)
SODIUM SERPL-SCNC: NORMAL MMOL/L (ref 133–144)
SPECIMEN EXP DATE BLD: ABNORMAL
SPECIMEN EXP DATE BLD: ABNORMAL
SPECIMEN SOURCE: NORMAL
SPECIMEN SOURCE: NORMAL
STRESS ECHO BASELINE DIASTOLIC HE: 64
STRESS ECHO BASELINE HR: 78
STRESS ECHO BASELINE SYSTOLIC BP: 129
STRESS ECHO CALCULATED PERCENT HR: 78 %
STRESS ECHO LAST STRESS DIASTOLIC BP: 62
STRESS ECHO LAST STRESS HR: 84
STRESS ECHO LAST STRESS SYSTOLIC BP: 122
STRESS ECHO TARGET HR: 139
TRANSFUSION STATUS PATIENT QL: NORMAL
TRIGL SERPL-MCNC: 120 MG/DL
TRIGL SERPL-MCNC: 32 MG/DL
TRIGL SERPL-MCNC: 56 MG/DL
TRIGL SERPL-MCNC: 59 MG/DL
TRIGL SERPL-MCNC: NORMAL MG/DL
TROPONIN I SERPL-MCNC: 0.09 UG/L (ref 0–0.04)
TROPONIN I SERPL-MCNC: 0.09 UG/L (ref 0–0.04)
TROPONIN I SERPL-MCNC: 0.11 UG/L (ref 0–0.04)
TROPONIN I SERPL-MCNC: 0.12 UG/L (ref 0–0.04)
TSH SERPL DL<=0.005 MIU/L-ACNC: 1.68 UIU/ML (ref 0.3–5)
WBC # BLD AUTO: 12.2 10E9/L (ref 4–11)
WBC # BLD AUTO: 16.5 10E9/L (ref 4–11)
WBC # BLD AUTO: 17.9 10E9/L (ref 4–11)
WBC # BLD AUTO: 4.1 10E9/L (ref 4–11)
WBC # BLD AUTO: 4.7 10E9/L (ref 4–11)
WBC # BLD AUTO: 6 10E9/L (ref 4–11)
WBC # BLD AUTO: 6.2 10E3/UL (ref 4–11)
WBC # BLD AUTO: 6.6 10E3/UL (ref 4–11)
WBC # BLD AUTO: 7.1 10E9/L (ref 4–11)
WBC # BLD AUTO: 8.1 10E9/L (ref 4–11)
WBC: 5.1 THOU/UL (ref 4–11)
WBC: 5.5 THOU/UL (ref 4–11)
WBC: 7.6 THOU/UL (ref 4–11)

## 2021-01-01 PROCEDURE — 99607 MTMS BY PHARM ADDL 15 MIN: CPT | Performed by: PHARMACIST

## 2021-01-01 PROCEDURE — 83735 ASSAY OF MAGNESIUM: CPT | Performed by: STUDENT IN AN ORGANIZED HEALTH CARE EDUCATION/TRAINING PROGRAM

## 2021-01-01 PROCEDURE — 999N001017 HC STATISTIC GLUCOSE BY METER IP

## 2021-01-01 PROCEDURE — 93018 CV STRESS TEST I&R ONLY: CPT | Mod: MG | Performed by: INTERNAL MEDICINE

## 2021-01-01 PROCEDURE — P9041 ALBUMIN (HUMAN),5%, 50ML: HCPCS | Performed by: SURGERY

## 2021-01-01 PROCEDURE — 250N000011 HC RX IP 250 OP 636: Performed by: SURGERY

## 2021-01-01 PROCEDURE — 74018 RADEX ABDOMEN 1 VIEW: CPT

## 2021-01-01 PROCEDURE — 250N000013 HC RX MED GY IP 250 OP 250 PS 637: Performed by: INTERNAL MEDICINE

## 2021-01-01 PROCEDURE — 99309 SBSQ NF CARE MODERATE MDM 30: CPT | Performed by: NURSE PRACTITIONER

## 2021-01-01 PROCEDURE — 250N000013 HC RX MED GY IP 250 OP 250 PS 637: Performed by: STUDENT IN AN ORGANIZED HEALTH CARE EDUCATION/TRAINING PROGRAM

## 2021-01-01 PROCEDURE — 76770 US EXAM ABDO BACK WALL COMP: CPT | Mod: 26 | Performed by: RADIOLOGY

## 2021-01-01 PROCEDURE — 80069 RENAL FUNCTION PANEL: CPT | Performed by: INTERNAL MEDICINE

## 2021-01-01 PROCEDURE — 250N000025 HC SEVOFLURANE, PER MIN: Performed by: SURGERY

## 2021-01-01 PROCEDURE — 99233 SBSQ HOSP IP/OBS HIGH 50: CPT | Performed by: PHYSICIAN ASSISTANT

## 2021-01-01 PROCEDURE — 250N000013 HC RX MED GY IP 250 OP 250 PS 637

## 2021-01-01 PROCEDURE — 80053 COMPREHEN METABOLIC PANEL: CPT | Performed by: SURGERY

## 2021-01-01 PROCEDURE — 84484 ASSAY OF TROPONIN QUANT: CPT

## 2021-01-01 PROCEDURE — 258N000001 HC RX 258: Performed by: STUDENT IN AN ORGANIZED HEALTH CARE EDUCATION/TRAINING PROGRAM

## 2021-01-01 PROCEDURE — 250N000013 HC RX MED GY IP 250 OP 250 PS 637: Performed by: SURGERY

## 2021-01-01 PROCEDURE — 999N000199 HC STATISTIC WOC PT EDUCATION, 31-45 MIN

## 2021-01-01 PROCEDURE — 120N000002 HC R&B MED SURG/OB UMMC

## 2021-01-01 PROCEDURE — 250N000011 HC RX IP 250 OP 636: Performed by: STUDENT IN AN ORGANIZED HEALTH CARE EDUCATION/TRAINING PROGRAM

## 2021-01-01 PROCEDURE — 85027 COMPLETE CBC AUTOMATED: CPT | Performed by: SURGERY

## 2021-01-01 PROCEDURE — 93010 ELECTROCARDIOGRAM REPORT: CPT | Performed by: INTERNAL MEDICINE

## 2021-01-01 PROCEDURE — 97161 PT EVAL LOW COMPLEX 20 MIN: CPT | Mod: GP

## 2021-01-01 PROCEDURE — 258N000003 HC RX IP 258 OP 636

## 2021-01-01 PROCEDURE — 258N000003 HC RX IP 258 OP 636: Performed by: SURGERY

## 2021-01-01 PROCEDURE — 258N000003 HC RX IP 258 OP 636: Performed by: STUDENT IN AN ORGANIZED HEALTH CARE EDUCATION/TRAINING PROGRAM

## 2021-01-01 PROCEDURE — 250N000009 HC RX 250: Performed by: SURGERY

## 2021-01-01 PROCEDURE — 83735 ASSAY OF MAGNESIUM: CPT | Performed by: SURGERY

## 2021-01-01 PROCEDURE — 36415 COLL VENOUS BLD VENIPUNCTURE: CPT | Performed by: INTERNAL MEDICINE

## 2021-01-01 PROCEDURE — 83605 ASSAY OF LACTIC ACID: CPT | Performed by: SURGERY

## 2021-01-01 PROCEDURE — 36592 COLLECT BLOOD FROM PICC: CPT | Performed by: INTERNAL MEDICINE

## 2021-01-01 PROCEDURE — 99222 1ST HOSP IP/OBS MODERATE 55: CPT | Mod: GC | Performed by: INTERNAL MEDICINE

## 2021-01-01 PROCEDURE — 74177 CT ABD & PELVIS W/CONTRAST: CPT | Mod: 26 | Performed by: RADIOLOGY

## 2021-01-01 PROCEDURE — 84100 ASSAY OF PHOSPHORUS: CPT | Performed by: SURGERY

## 2021-01-01 PROCEDURE — 99310 SBSQ NF CARE HIGH MDM 45: CPT | Performed by: NURSE PRACTITIONER

## 2021-01-01 PROCEDURE — 84478 ASSAY OF TRIGLYCERIDES: CPT | Performed by: STUDENT IN AN ORGANIZED HEALTH CARE EDUCATION/TRAINING PROGRAM

## 2021-01-01 PROCEDURE — 0D1B0Z4 BYPASS ILEUM TO CUTANEOUS, OPEN APPROACH: ICD-10-PCS | Performed by: SURGERY

## 2021-01-01 PROCEDURE — 272N000458 ZZ HC KIT, 5 FR DL BIOFLO OPEN ENDED PICC

## 2021-01-01 PROCEDURE — C9113 INJ PANTOPRAZOLE SODIUM, VIA: HCPCS | Performed by: STUDENT IN AN ORGANIZED HEALTH CARE EDUCATION/TRAINING PROGRAM

## 2021-01-01 PROCEDURE — 93970 EXTREMITY STUDY: CPT

## 2021-01-01 PROCEDURE — 87340 HEPATITIS B SURFACE AG IA: CPT | Performed by: INTERNAL MEDICINE

## 2021-01-01 PROCEDURE — 97530 THERAPEUTIC ACTIVITIES: CPT | Mod: GO

## 2021-01-01 PROCEDURE — 90937 HEMODIALYSIS REPEATED EVAL: CPT

## 2021-01-01 PROCEDURE — 88307 TISSUE EXAM BY PATHOLOGIST: CPT | Mod: 26 | Performed by: PATHOLOGY

## 2021-01-01 PROCEDURE — 370N000017 HC ANESTHESIA TECHNICAL FEE, PER MIN: Performed by: SURGERY

## 2021-01-01 PROCEDURE — 36415 COLL VENOUS BLD VENIPUNCTURE: CPT | Performed by: STUDENT IN AN ORGANIZED HEALTH CARE EDUCATION/TRAINING PROGRAM

## 2021-01-01 PROCEDURE — 71045 X-RAY EXAM CHEST 1 VIEW: CPT | Mod: 26 | Performed by: RADIOLOGY

## 2021-01-01 PROCEDURE — 255N000002 HC RX 255 OP 636: Performed by: RADIOLOGY

## 2021-01-01 PROCEDURE — U0005 INFEC AGEN DETEC AMPLI PROBE: HCPCS | Performed by: STUDENT IN AN ORGANIZED HEALTH CARE EDUCATION/TRAINING PROGRAM

## 2021-01-01 PROCEDURE — 97110 THERAPEUTIC EXERCISES: CPT | Mod: GP

## 2021-01-01 PROCEDURE — 74019 RADEX ABDOMEN 2 VIEWS: CPT | Mod: 26 | Performed by: RADIOLOGY

## 2021-01-01 PROCEDURE — 999N000198 HC STATISTIC WOC PT EDUCATION, 16-30 MIN

## 2021-01-01 PROCEDURE — 0DTF0ZZ RESECTION OF RIGHT LARGE INTESTINE, OPEN APPROACH: ICD-10-PCS | Performed by: SURGERY

## 2021-01-01 PROCEDURE — 80069 RENAL FUNCTION PANEL: CPT | Performed by: SURGERY

## 2021-01-01 PROCEDURE — 85610 PROTHROMBIN TIME: CPT | Performed by: SURGERY

## 2021-01-01 PROCEDURE — 85025 COMPLETE CBC W/AUTO DIFF WBC: CPT | Mod: ORL

## 2021-01-01 PROCEDURE — 250N000011 HC RX IP 250 OP 636: Performed by: NURSE ANESTHETIST, CERTIFIED REGISTERED

## 2021-01-01 PROCEDURE — 74018 RADEX ABDOMEN 1 VIEW: CPT | Mod: 26 | Performed by: RADIOLOGY

## 2021-01-01 PROCEDURE — 999N000044 HC STATISTIC CVC DRESSING CHANGE

## 2021-01-01 PROCEDURE — 250N000011 HC RX IP 250 OP 636: Performed by: NURSE PRACTITIONER

## 2021-01-01 PROCEDURE — 250N000011 HC RX IP 250 OP 636: Performed by: INTERNAL MEDICINE

## 2021-01-01 PROCEDURE — 85610 PROTHROMBIN TIME: CPT | Performed by: STUDENT IN AN ORGANIZED HEALTH CARE EDUCATION/TRAINING PROGRAM

## 2021-01-01 PROCEDURE — G1004 CDSM NDSC: HCPCS

## 2021-01-01 PROCEDURE — 250N000009 HC RX 250: Performed by: STUDENT IN AN ORGANIZED HEALTH CARE EDUCATION/TRAINING PROGRAM

## 2021-01-01 PROCEDURE — 258N000003 HC RX IP 258 OP 636: Performed by: INTERNAL MEDICINE

## 2021-01-01 PROCEDURE — 84100 ASSAY OF PHOSPHORUS: CPT | Performed by: STUDENT IN AN ORGANIZED HEALTH CARE EDUCATION/TRAINING PROGRAM

## 2021-01-01 PROCEDURE — 86905 BLOOD TYPING RBC ANTIGENS: CPT | Performed by: SURGERY

## 2021-01-01 PROCEDURE — G1004 CDSM NDSC: HCPCS | Performed by: RADIOLOGY

## 2021-01-01 PROCEDURE — 85018 HEMOGLOBIN: CPT | Performed by: STUDENT IN AN ORGANIZED HEALTH CARE EDUCATION/TRAINING PROGRAM

## 2021-01-01 PROCEDURE — 258N000003 HC RX IP 258 OP 636: Performed by: NURSE ANESTHETIST, CERTIFIED REGISTERED

## 2021-01-01 PROCEDURE — P9047 ALBUMIN (HUMAN), 25%, 50ML: HCPCS | Performed by: SURGERY

## 2021-01-01 PROCEDURE — 272N000001 HC OR GENERAL SUPPLY STERILE: Performed by: SURGERY

## 2021-01-01 PROCEDURE — 99232 SBSQ HOSP IP/OBS MODERATE 35: CPT | Performed by: INTERNAL MEDICINE

## 2021-01-01 PROCEDURE — 47531 INJECTION FOR CHOLANGIOGRAM: CPT

## 2021-01-01 PROCEDURE — 83735 ASSAY OF MAGNESIUM: CPT | Performed by: INTERNAL MEDICINE

## 2021-01-01 PROCEDURE — 80069 RENAL FUNCTION PANEL: CPT | Performed by: STUDENT IN AN ORGANIZED HEALTH CARE EDUCATION/TRAINING PROGRAM

## 2021-01-01 PROCEDURE — 710N000010 HC RECOVERY PHASE 1, LEVEL 2, PER MIN: Performed by: SURGERY

## 2021-01-01 PROCEDURE — 99292 CRITICAL CARE ADDL 30 MIN: CPT | Mod: 24 | Performed by: SURGERY

## 2021-01-01 PROCEDURE — 85610 PROTHROMBIN TIME: CPT

## 2021-01-01 PROCEDURE — 74283 THER NMA RDCTJ INTUS/OBSTRCJ: CPT | Mod: 26 | Performed by: RADIOLOGY

## 2021-01-01 PROCEDURE — 360N000076 HC SURGERY LEVEL 3, PER MIN: Performed by: SURGERY

## 2021-01-01 PROCEDURE — 97535 SELF CARE MNGMENT TRAINING: CPT | Mod: GO | Performed by: OCCUPATIONAL THERAPIST

## 2021-01-01 PROCEDURE — G0463 HOSPITAL OUTPT CLINIC VISIT: HCPCS

## 2021-01-01 PROCEDURE — 999N000127 HC STATISTIC PERIPHERAL IV START W US GUIDANCE

## 2021-01-01 PROCEDURE — 99233 SBSQ HOSP IP/OBS HIGH 50: CPT | Mod: GC | Performed by: INTERNAL MEDICINE

## 2021-01-01 PROCEDURE — 86900 BLOOD TYPING SEROLOGIC ABO: CPT | Performed by: SURGERY

## 2021-01-01 PROCEDURE — 36415 COLL VENOUS BLD VENIPUNCTURE: CPT | Mod: ORL

## 2021-01-01 PROCEDURE — 84478 ASSAY OF TRIGLYCERIDES: CPT | Performed by: SURGERY

## 2021-01-01 PROCEDURE — 82248 BILIRUBIN DIRECT: CPT | Performed by: INTERNAL MEDICINE

## 2021-01-01 PROCEDURE — 97530 THERAPEUTIC ACTIVITIES: CPT | Mod: GP | Performed by: REHABILITATION PRACTITIONER

## 2021-01-01 PROCEDURE — 80048 BASIC METABOLIC PNL TOTAL CA: CPT | Performed by: SURGERY

## 2021-01-01 PROCEDURE — 250N000012 HC RX MED GY IP 250 OP 636 PS 637: Performed by: SURGERY

## 2021-01-01 PROCEDURE — 85049 AUTOMATED PLATELET COUNT: CPT | Performed by: STUDENT IN AN ORGANIZED HEALTH CARE EDUCATION/TRAINING PROGRAM

## 2021-01-01 PROCEDURE — P9604 ONE-WAY ALLOW PRORATED TRIP: HCPCS | Mod: ORL

## 2021-01-01 PROCEDURE — 36592 COLLECT BLOOD FROM PICC: CPT | Performed by: SURGERY

## 2021-01-01 PROCEDURE — 82330 ASSAY OF CALCIUM: CPT | Performed by: SURGERY

## 2021-01-01 PROCEDURE — 74019 RADEX ABDOMEN 2 VIEWS: CPT

## 2021-01-01 PROCEDURE — 82247 BILIRUBIN TOTAL: CPT | Performed by: INTERNAL MEDICINE

## 2021-01-01 PROCEDURE — 83605 ASSAY OF LACTIC ACID: CPT | Performed by: STUDENT IN AN ORGANIZED HEALTH CARE EDUCATION/TRAINING PROGRAM

## 2021-01-01 PROCEDURE — 78452 HT MUSCLE IMAGE SPECT MULT: CPT | Mod: 26 | Performed by: INTERNAL MEDICINE

## 2021-01-01 PROCEDURE — 255N000002 HC RX 255 OP 636: Performed by: INTERNAL MEDICINE

## 2021-01-01 PROCEDURE — 90935 HEMODIALYSIS ONE EVALUATION: CPT | Performed by: INTERNAL MEDICINE

## 2021-01-01 PROCEDURE — 36415 COLL VENOUS BLD VENIPUNCTURE: CPT | Performed by: SURGERY

## 2021-01-01 PROCEDURE — 80053 COMPREHEN METABOLIC PANEL: CPT | Performed by: STUDENT IN AN ORGANIZED HEALTH CARE EDUCATION/TRAINING PROGRAM

## 2021-01-01 PROCEDURE — 84484 ASSAY OF TROPONIN QUANT: CPT | Performed by: INTERNAL MEDICINE

## 2021-01-01 PROCEDURE — 250N000011 HC RX IP 250 OP 636: Performed by: RADIOLOGY

## 2021-01-01 PROCEDURE — A9500 TC99M SESTAMIBI: HCPCS | Performed by: SURGERY

## 2021-01-01 PROCEDURE — 84460 ALANINE AMINO (ALT) (SGPT): CPT | Performed by: INTERNAL MEDICINE

## 2021-01-01 PROCEDURE — 99291 CRITICAL CARE FIRST HOUR: CPT | Mod: 24 | Performed by: SURGERY

## 2021-01-01 PROCEDURE — 87635 SARS-COV-2 COVID-19 AMP PRB: CPT

## 2021-01-01 PROCEDURE — 343N000001 HC RX 343: Performed by: SURGERY

## 2021-01-01 PROCEDURE — 97530 THERAPEUTIC ACTIVITIES: CPT | Mod: GO | Performed by: OCCUPATIONAL THERAPIST

## 2021-01-01 PROCEDURE — 999N000208 ECHOCARDIOGRAM COMPLETE

## 2021-01-01 PROCEDURE — 99605 MTMS BY PHARM NP 15 MIN: CPT | Performed by: PHARMACIST

## 2021-01-01 PROCEDURE — 36592 COLLECT BLOOD FROM PICC: CPT | Performed by: STUDENT IN AN ORGANIZED HEALTH CARE EDUCATION/TRAINING PROGRAM

## 2021-01-01 PROCEDURE — 85379 FIBRIN DEGRADATION QUANT: CPT | Mod: ORL

## 2021-01-01 PROCEDURE — 97110 THERAPEUTIC EXERCISES: CPT | Mod: GP | Performed by: REHABILITATION PRACTITIONER

## 2021-01-01 PROCEDURE — 84450 TRANSFERASE (AST) (SGOT): CPT | Performed by: INTERNAL MEDICINE

## 2021-01-01 PROCEDURE — 84134 ASSAY OF PREALBUMIN: CPT | Performed by: SURGERY

## 2021-01-01 PROCEDURE — 85610 PROTHROMBIN TIME: CPT | Performed by: NURSE PRACTITIONER

## 2021-01-01 PROCEDURE — 82308 ASSAY OF CALCITONIN: CPT | Mod: ORL

## 2021-01-01 PROCEDURE — 5A1D70Z PERFORMANCE OF URINARY FILTRATION, INTERMITTENT, LESS THAN 6 HOURS PER DAY: ICD-10-PCS | Performed by: SURGERY

## 2021-01-01 PROCEDURE — 97165 OT EVAL LOW COMPLEX 30 MIN: CPT | Mod: GO | Performed by: OCCUPATIONAL THERAPIST

## 2021-01-01 PROCEDURE — 999N000065 XR CHEST PORT 1 VW

## 2021-01-01 PROCEDURE — 250N000012 HC RX MED GY IP 250 OP 636 PS 637: Performed by: STUDENT IN AN ORGANIZED HEALTH CARE EDUCATION/TRAINING PROGRAM

## 2021-01-01 PROCEDURE — 272N000473 HC KIT, VPS RHYTHM STYLET

## 2021-01-01 PROCEDURE — 999N000128 HC STATISTIC PERIPHERAL IV START W/O US GUIDANCE

## 2021-01-01 PROCEDURE — 999N000065 XR ABDOMEN PORT 1 VW

## 2021-01-01 PROCEDURE — 97535 SELF CARE MNGMENT TRAINING: CPT | Mod: GO

## 2021-01-01 PROCEDURE — 80069 RENAL FUNCTION PANEL: CPT

## 2021-01-01 PROCEDURE — 99232 SBSQ HOSP IP/OBS MODERATE 35: CPT | Mod: GC | Performed by: INTERNAL MEDICINE

## 2021-01-01 PROCEDURE — 97530 THERAPEUTIC ACTIVITIES: CPT | Mod: GP

## 2021-01-01 PROCEDURE — 85610 PROTHROMBIN TIME: CPT | Performed by: INTERNAL MEDICINE

## 2021-01-01 PROCEDURE — 634N000001 HC RX 634: Performed by: SURGERY

## 2021-01-01 PROCEDURE — 200N000002 HC R&B ICU UMMC

## 2021-01-01 PROCEDURE — 93005 ELECTROCARDIOGRAM TRACING: CPT

## 2021-01-01 PROCEDURE — 250N000009 HC RX 250: Performed by: NURSE ANESTHETIST, CERTIFIED REGISTERED

## 2021-01-01 PROCEDURE — U0003 INFECTIOUS AGENT DETECTION BY NUCLEIC ACID (DNA OR RNA); SEVERE ACUTE RESPIRATORY SYNDROME CORONAVIRUS 2 (SARS-COV-2) (CORONAVIRUS DISEASE [COVID-19]), AMPLIFIED PROBE TECHNIQUE, MAKING USE OF HIGH THROUGHPUT TECHNOLOGIES AS DESCRIBED BY CMS-2020-01-R: HCPCS | Performed by: STUDENT IN AN ORGANIZED HEALTH CARE EDUCATION/TRAINING PROGRAM

## 2021-01-01 PROCEDURE — 99215 OFFICE O/P EST HI 40 MIN: CPT | Performed by: GENERAL ACUTE CARE HOSPITAL

## 2021-01-01 PROCEDURE — 36415 COLL VENOUS BLD VENIPUNCTURE: CPT

## 2021-01-01 PROCEDURE — 250N000011 HC RX IP 250 OP 636

## 2021-01-01 PROCEDURE — 86706 HEP B SURFACE ANTIBODY: CPT | Performed by: INTERNAL MEDICINE

## 2021-01-01 PROCEDURE — 36592 COLLECT BLOOD FROM PICC: CPT

## 2021-01-01 PROCEDURE — 360N000077 HC SURGERY LEVEL 4, PER MIN: Performed by: SURGERY

## 2021-01-01 PROCEDURE — 36569 INSJ PICC 5 YR+ W/O IMAGING: CPT

## 2021-01-01 PROCEDURE — 84075 ASSAY ALKALINE PHOSPHATASE: CPT | Performed by: INTERNAL MEDICINE

## 2021-01-01 PROCEDURE — 86922 COMPATIBILITY TEST ANTIGLOB: CPT | Performed by: SURGERY

## 2021-01-01 PROCEDURE — 99232 SBSQ HOSP IP/OBS MODERATE 35: CPT | Mod: 24 | Performed by: STUDENT IN AN ORGANIZED HEALTH CARE EDUCATION/TRAINING PROGRAM

## 2021-01-01 PROCEDURE — 80048 BASIC METABOLIC PNL TOTAL CA: CPT | Performed by: STUDENT IN AN ORGANIZED HEALTH CARE EDUCATION/TRAINING PROGRAM

## 2021-01-01 PROCEDURE — 99231 SBSQ HOSP IP/OBS SF/LOW 25: CPT | Mod: 24 | Performed by: SURGERY

## 2021-01-01 PROCEDURE — 85027 COMPLETE CBC AUTOMATED: CPT | Performed by: STUDENT IN AN ORGANIZED HEALTH CARE EDUCATION/TRAINING PROGRAM

## 2021-01-01 PROCEDURE — 85730 THROMBOPLASTIN TIME PARTIAL: CPT | Performed by: SURGERY

## 2021-01-01 PROCEDURE — 250N000011 HC RX IP 250 OP 636: Performed by: ANESTHESIOLOGY

## 2021-01-01 PROCEDURE — 93970 EXTREMITY STUDY: CPT | Mod: 26 | Performed by: SURGERY

## 2021-01-01 PROCEDURE — 82248 BILIRUBIN DIRECT: CPT | Performed by: SURGERY

## 2021-01-01 PROCEDURE — 88307 TISSUE EXAM BY PATHOLOGIST: CPT | Mod: TC | Performed by: SURGERY

## 2021-01-01 PROCEDURE — G1004 CDSM NDSC: HCPCS | Performed by: INTERNAL MEDICINE

## 2021-01-01 PROCEDURE — 93017 CV STRESS TEST TRACING ONLY: CPT

## 2021-01-01 PROCEDURE — 76770 US EXAM ABDO BACK WALL COMP: CPT

## 2021-01-01 PROCEDURE — 82330 ASSAY OF CALCIUM: CPT | Performed by: PHYSICIAN ASSISTANT

## 2021-01-01 PROCEDURE — 86870 RBC ANTIBODY IDENTIFICATION: CPT | Performed by: SURGERY

## 2021-01-01 PROCEDURE — 93306 TTE W/DOPPLER COMPLETE: CPT | Mod: 26 | Performed by: INTERNAL MEDICINE

## 2021-01-01 PROCEDURE — 255N000002 HC RX 255 OP 636: Performed by: PODIATRIST

## 2021-01-01 PROCEDURE — 11042 DBRDMT SUBQ TIS 1ST 20SQCM/<: CPT | Performed by: PODIATRIST

## 2021-01-01 PROCEDURE — 86850 RBC ANTIBODY SCREEN: CPT | Performed by: SURGERY

## 2021-01-01 PROCEDURE — P9041 ALBUMIN (HUMAN),5%, 50ML: HCPCS | Performed by: STUDENT IN AN ORGANIZED HEALTH CARE EDUCATION/TRAINING PROGRAM

## 2021-01-01 PROCEDURE — 86850 RBC ANTIBODY SCREEN: CPT | Performed by: NURSE ANESTHETIST, CERTIFIED REGISTERED

## 2021-01-01 PROCEDURE — 86922 COMPATIBILITY TEST ANTIGLOB: CPT | Performed by: NURSE ANESTHETIST, CERTIFIED REGISTERED

## 2021-01-01 PROCEDURE — 86902 BLOOD TYPE ANTIGEN DONOR EA: CPT | Performed by: SURGERY

## 2021-01-01 PROCEDURE — 86901 BLOOD TYPING SEROLOGIC RH(D): CPT | Performed by: NURSE ANESTHETIST, CERTIFIED REGISTERED

## 2021-01-01 PROCEDURE — 99213 OFFICE O/P EST LOW 20 MIN: CPT | Performed by: SURGERY

## 2021-01-01 PROCEDURE — 74177 CT ABD & PELVIS W/CONTRAST: CPT | Mod: ME

## 2021-01-01 PROCEDURE — 84134 ASSAY OF PREALBUMIN: CPT | Performed by: STUDENT IN AN ORGANIZED HEALTH CARE EDUCATION/TRAINING PROGRAM

## 2021-01-01 PROCEDURE — 99024 POSTOP FOLLOW-UP VISIT: CPT | Performed by: SURGERY

## 2021-01-01 PROCEDURE — 86900 BLOOD TYPING SEROLOGIC ABO: CPT | Performed by: NURSE ANESTHETIST, CERTIFIED REGISTERED

## 2021-01-01 PROCEDURE — 74283 THER NMA RDCTJ INTUS/OBSTRCJ: CPT

## 2021-01-01 PROCEDURE — 84478 ASSAY OF TRIGLYCERIDES: CPT | Performed by: INTERNAL MEDICINE

## 2021-01-01 PROCEDURE — 999N000141 HC STATISTIC PRE-PROCEDURE NURSING ASSESSMENT: Performed by: SURGERY

## 2021-01-01 PROCEDURE — 84484 ASSAY OF TROPONIN QUANT: CPT | Performed by: STUDENT IN AN ORGANIZED HEALTH CARE EDUCATION/TRAINING PROGRAM

## 2021-01-01 PROCEDURE — 80076 HEPATIC FUNCTION PANEL: CPT | Performed by: STUDENT IN AN ORGANIZED HEALTH CARE EDUCATION/TRAINING PROGRAM

## 2021-01-01 PROCEDURE — 85027 COMPLETE CBC AUTOMATED: CPT

## 2021-01-01 PROCEDURE — 97116 GAIT TRAINING THERAPY: CPT | Mod: GP

## 2021-01-01 PROCEDURE — 86901 BLOOD TYPING SEROLOGIC RH(D): CPT | Performed by: SURGERY

## 2021-01-01 RX ORDER — ASPIRIN 81 MG/1
81 TABLET ORAL DAILY
COMMUNITY
End: 2021-01-01

## 2021-01-01 RX ORDER — ISOSORBIDE MONONITRATE 30 MG/1
30 TABLET, EXTENDED RELEASE ORAL DAILY
Status: DISCONTINUED | OUTPATIENT
Start: 2021-01-01 | End: 2021-01-01

## 2021-01-01 RX ORDER — DOXERCALCIFEROL 4 UG/2ML
3 INJECTION INTRAVENOUS
Status: COMPLETED | OUTPATIENT
Start: 2021-01-01 | End: 2021-01-01

## 2021-01-01 RX ORDER — HEPARIN SODIUM 5000 [USP'U]/.5ML
5000 INJECTION, SOLUTION INTRAVENOUS; SUBCUTANEOUS EVERY 8 HOURS
Status: DISCONTINUED | OUTPATIENT
Start: 2021-01-01 | End: 2021-01-01

## 2021-01-01 RX ORDER — DEXTROSE MONOHYDRATE 25 G/50ML
25-50 INJECTION, SOLUTION INTRAVENOUS
Status: DISCONTINUED | OUTPATIENT
Start: 2021-01-01 | End: 2021-01-01

## 2021-01-01 RX ORDER — CEFAZOLIN SODIUM 2 G/100ML
INJECTION, SOLUTION INTRAVENOUS PRN
Status: DISCONTINUED | OUTPATIENT
Start: 2021-01-01 | End: 2021-01-01

## 2021-01-01 RX ORDER — WARFARIN SODIUM 3 MG/1
3 TABLET ORAL DAILY
Status: ON HOLD | COMMUNITY
End: 2021-01-01

## 2021-01-01 RX ORDER — WARFARIN SODIUM 5 MG/1
5 TABLET ORAL
Status: COMPLETED | OUTPATIENT
Start: 2021-01-01 | End: 2021-01-01

## 2021-01-01 RX ORDER — KETAMINE HYDROCHLORIDE 10 MG/ML
INJECTION INTRAMUSCULAR; INTRAVENOUS PRN
Status: DISCONTINUED | OUTPATIENT
Start: 2021-01-01 | End: 2021-01-01

## 2021-01-01 RX ORDER — ONDANSETRON 4 MG/1
4 TABLET, ORALLY DISINTEGRATING ORAL EVERY 6 HOURS PRN
Status: DISCONTINUED | OUTPATIENT
Start: 2021-01-01 | End: 2021-01-01 | Stop reason: HOSPADM

## 2021-01-01 RX ORDER — DEXAMETHASONE SODIUM PHOSPHATE 4 MG/ML
INJECTION, SOLUTION INTRA-ARTICULAR; INTRALESIONAL; INTRAMUSCULAR; INTRAVENOUS; SOFT TISSUE PRN
Status: DISCONTINUED | OUTPATIENT
Start: 2021-01-01 | End: 2021-01-01

## 2021-01-01 RX ORDER — HEPARIN SODIUM,PORCINE 10 UNIT/ML
5-10 VIAL (ML) INTRAVENOUS
Status: DISCONTINUED | OUTPATIENT
Start: 2021-01-01 | End: 2021-01-01 | Stop reason: HOSPADM

## 2021-01-01 RX ORDER — NALOXONE HYDROCHLORIDE 0.4 MG/ML
0.4 INJECTION, SOLUTION INTRAMUSCULAR; INTRAVENOUS; SUBCUTANEOUS
Status: DISCONTINUED | OUTPATIENT
Start: 2021-01-01 | End: 2021-01-01 | Stop reason: HOSPADM

## 2021-01-01 RX ORDER — METOPROLOL TARTRATE 25 MG/1
25 TABLET, FILM COATED ORAL
Status: DISCONTINUED | OUTPATIENT
Start: 2021-01-01 | End: 2021-01-01 | Stop reason: HOSPADM

## 2021-01-01 RX ORDER — FENTANYL CITRATE 50 UG/ML
INJECTION, SOLUTION INTRAMUSCULAR; INTRAVENOUS PRN
Status: DISCONTINUED | OUTPATIENT
Start: 2021-01-01 | End: 2021-01-01

## 2021-01-01 RX ORDER — NICOTINE POLACRILEX 4 MG
15-30 LOZENGE BUCCAL
Status: DISCONTINUED | OUTPATIENT
Start: 2021-01-01 | End: 2021-01-01

## 2021-01-01 RX ORDER — FENTANYL CITRATE 50 UG/ML
25-50 INJECTION, SOLUTION INTRAMUSCULAR; INTRAVENOUS EVERY 5 MIN PRN
Status: DISCONTINUED | OUTPATIENT
Start: 2021-01-01 | End: 2021-01-01 | Stop reason: HOSPADM

## 2021-01-01 RX ORDER — LISINOPRIL 2.5 MG/1
2.5 TABLET ORAL
Status: DISCONTINUED | OUTPATIENT
Start: 2021-01-01 | End: 2021-01-01 | Stop reason: HOSPADM

## 2021-01-01 RX ORDER — ISOSORBIDE MONONITRATE 30 MG/1
30 TABLET, EXTENDED RELEASE ORAL
Status: DISCONTINUED | OUTPATIENT
Start: 2021-01-01 | End: 2021-01-01

## 2021-01-01 RX ORDER — AMINOPHYLLINE 25 MG/ML
50 INJECTION, SOLUTION INTRAVENOUS
Status: DISCONTINUED | OUTPATIENT
Start: 2021-01-01 | End: 2021-01-01 | Stop reason: HOSPADM

## 2021-01-01 RX ORDER — SODIUM CHLORIDE 9 MG/ML
INJECTION, SOLUTION INTRAVENOUS CONTINUOUS
Status: DISCONTINUED | OUTPATIENT
Start: 2021-01-01 | End: 2021-01-01

## 2021-01-01 RX ORDER — ALBUMIN (HUMAN) 12.5 G/50ML
50 SOLUTION INTRAVENOUS
Status: DISCONTINUED | OUTPATIENT
Start: 2021-01-01 | End: 2021-01-01

## 2021-01-01 RX ORDER — ONDANSETRON 2 MG/ML
4 INJECTION INTRAMUSCULAR; INTRAVENOUS EVERY 6 HOURS PRN
Status: DISCONTINUED | OUTPATIENT
Start: 2021-01-01 | End: 2021-01-01

## 2021-01-01 RX ORDER — OXYCODONE HYDROCHLORIDE 5 MG/1
5 TABLET ORAL EVERY 4 HOURS PRN
Status: DISCONTINUED | OUTPATIENT
Start: 2021-01-01 | End: 2021-01-01

## 2021-01-01 RX ORDER — ACETAMINOPHEN 325 MG/1
650 TABLET ORAL EVERY 4 HOURS PRN
Status: DISCONTINUED | OUTPATIENT
Start: 2021-01-01 | End: 2021-01-01 | Stop reason: HOSPADM

## 2021-01-01 RX ORDER — ONDANSETRON 2 MG/ML
4 INJECTION INTRAMUSCULAR; INTRAVENOUS EVERY 6 HOURS PRN
Status: DISCONTINUED | OUTPATIENT
Start: 2021-01-01 | End: 2021-01-01 | Stop reason: HOSPADM

## 2021-01-01 RX ORDER — ISOSORBIDE DINITRATE 5 MG/1
5 TABLET ORAL 3 TIMES DAILY
Status: DISCONTINUED | OUTPATIENT
Start: 2021-01-01 | End: 2021-01-01 | Stop reason: HOSPADM

## 2021-01-01 RX ORDER — GRANISETRON HYDROCHLORIDE 1 MG/ML
10 INJECTION INTRAVENOUS ONCE
Status: COMPLETED | OUTPATIENT
Start: 2021-01-01 | End: 2021-01-01

## 2021-01-01 RX ORDER — GUAIFENESIN 600 MG/1
600 TABLET, EXTENDED RELEASE ORAL 2 TIMES DAILY
Status: DISCONTINUED | OUTPATIENT
Start: 2021-01-01 | End: 2021-01-01

## 2021-01-01 RX ORDER — ACETAMINOPHEN 325 MG/1
975 TABLET ORAL ONCE
Status: COMPLETED | OUTPATIENT
Start: 2021-01-01 | End: 2021-01-01

## 2021-01-01 RX ORDER — MEPERIDINE HYDROCHLORIDE 25 MG/ML
12.5 INJECTION INTRAMUSCULAR; INTRAVENOUS; SUBCUTANEOUS
Status: DISCONTINUED | OUTPATIENT
Start: 2021-01-01 | End: 2021-01-01 | Stop reason: HOSPADM

## 2021-01-01 RX ORDER — OXYCODONE HYDROCHLORIDE 5 MG/1
5-10 TABLET ORAL EVERY 4 HOURS PRN
Status: DISCONTINUED | OUTPATIENT
Start: 2021-01-01 | End: 2021-01-01 | Stop reason: HOSPADM

## 2021-01-01 RX ORDER — SODIUM CHLORIDE, SODIUM LACTATE, POTASSIUM CHLORIDE, CALCIUM CHLORIDE 600; 310; 30; 20 MG/100ML; MG/100ML; MG/100ML; MG/100ML
INJECTION, SOLUTION INTRAVENOUS CONTINUOUS PRN
Status: DISCONTINUED | OUTPATIENT
Start: 2021-01-01 | End: 2021-01-01

## 2021-01-01 RX ORDER — WARFARIN SODIUM 5 MG/1
5 TABLET ORAL
Status: DISCONTINUED | OUTPATIENT
Start: 2021-01-01 | End: 2021-01-01 | Stop reason: HOSPADM

## 2021-01-01 RX ORDER — SIMETHICONE 80 MG
80 TABLET,CHEWABLE ORAL EVERY 6 HOURS PRN
Status: DISCONTINUED | OUTPATIENT
Start: 2021-01-01 | End: 2021-01-01 | Stop reason: HOSPADM

## 2021-01-01 RX ORDER — ONDANSETRON 4 MG/1
4 TABLET, ORALLY DISINTEGRATING ORAL EVERY 30 MIN PRN
Status: DISCONTINUED | OUTPATIENT
Start: 2021-01-01 | End: 2021-01-01 | Stop reason: HOSPADM

## 2021-01-01 RX ORDER — METHOCARBAMOL 500 MG/1
500 TABLET, FILM COATED ORAL 4 TIMES DAILY
Status: DISCONTINUED | OUTPATIENT
Start: 2021-01-01 | End: 2021-01-01

## 2021-01-01 RX ORDER — CLOPIDOGREL BISULFATE 75 MG/1
75 TABLET ORAL DAILY
COMMUNITY
End: 2021-01-01

## 2021-01-01 RX ORDER — ALBUMIN, HUMAN INJ 5% 5 %
250 SOLUTION INTRAVENOUS
Status: COMPLETED | OUTPATIENT
Start: 2021-01-01 | End: 2021-01-01

## 2021-01-01 RX ORDER — NITROGLYCERIN 0.4 MG/1
0.4 TABLET SUBLINGUAL EVERY 5 MIN PRN
COMMUNITY
End: 2021-01-01

## 2021-01-01 RX ORDER — NALOXONE HYDROCHLORIDE 0.4 MG/ML
0.2 INJECTION, SOLUTION INTRAMUSCULAR; INTRAVENOUS; SUBCUTANEOUS
Status: DISCONTINUED | OUTPATIENT
Start: 2021-01-01 | End: 2021-01-01 | Stop reason: HOSPADM

## 2021-01-01 RX ORDER — LISINOPRIL 5 MG/1
5 TABLET ORAL DAILY
Status: ON HOLD | COMMUNITY
End: 2021-01-01

## 2021-01-01 RX ORDER — ONDANSETRON 2 MG/ML
4 INJECTION INTRAMUSCULAR; INTRAVENOUS EVERY 30 MIN PRN
Status: DISCONTINUED | OUTPATIENT
Start: 2021-01-01 | End: 2021-01-01 | Stop reason: HOSPADM

## 2021-01-01 RX ORDER — LIDOCAINE 40 MG/G
CREAM TOPICAL
Status: DISCONTINUED | OUTPATIENT
Start: 2021-01-01 | End: 2021-01-01 | Stop reason: HOSPADM

## 2021-01-01 RX ORDER — SODIUM CHLORIDE, SODIUM LACTATE, POTASSIUM CHLORIDE, CALCIUM CHLORIDE 600; 310; 30; 20 MG/100ML; MG/100ML; MG/100ML; MG/100ML
INJECTION, SOLUTION INTRAVENOUS CONTINUOUS
Status: DISCONTINUED | OUTPATIENT
Start: 2021-01-01 | End: 2021-01-01 | Stop reason: HOSPADM

## 2021-01-01 RX ORDER — OXYCODONE HYDROCHLORIDE 5 MG/1
5 TABLET ORAL EVERY 6 HOURS PRN
Qty: 10 TABLET | Refills: 0 | Status: SHIPPED | OUTPATIENT
Start: 2021-01-01 | End: 2021-01-01

## 2021-01-01 RX ORDER — ATORVASTATIN CALCIUM 80 MG/1
80 TABLET, FILM COATED ORAL EVERY EVENING
COMMUNITY
End: 2021-01-01

## 2021-01-01 RX ORDER — FLUTICASONE PROPIONATE 50 MCG
2 SPRAY, SUSPENSION (ML) NASAL DAILY
COMMUNITY
End: 2021-01-01

## 2021-01-01 RX ORDER — EPHEDRINE SULFATE 50 MG/ML
INJECTION, SOLUTION INTRAMUSCULAR; INTRAVENOUS; SUBCUTANEOUS PRN
Status: DISCONTINUED | OUTPATIENT
Start: 2021-01-01 | End: 2021-01-01

## 2021-01-01 RX ORDER — KETOROLAC TROMETHAMINE 15 MG/ML
15 INJECTION, SOLUTION INTRAMUSCULAR; INTRAVENOUS ONCE
Status: CANCELLED | OUTPATIENT
Start: 2021-01-01 | End: 2021-01-01

## 2021-01-01 RX ORDER — WARFARIN SODIUM 5 MG/1
5 TABLET ORAL
Status: DISCONTINUED | OUTPATIENT
Start: 2021-01-01 | End: 2021-01-01

## 2021-01-01 RX ORDER — METOPROLOL TARTRATE 25 MG/1
25 TABLET, FILM COATED ORAL 2 TIMES DAILY
DISCHARGE
Start: 2021-01-01 | End: 2021-01-01

## 2021-01-01 RX ORDER — GUAIFENESIN 600 MG/1
600 TABLET, EXTENDED RELEASE ORAL EVERY 12 HOURS PRN
Status: DISCONTINUED | OUTPATIENT
Start: 2021-01-01 | End: 2021-01-01 | Stop reason: HOSPADM

## 2021-01-01 RX ORDER — LIDOCAINE HYDROCHLORIDE 20 MG/ML
INJECTION, SOLUTION INFILTRATION; PERINEURAL PRN
Status: DISCONTINUED | OUTPATIENT
Start: 2021-01-01 | End: 2021-01-01

## 2021-01-01 RX ORDER — HEPARIN SODIUM 5000 [USP'U]/.5ML
5000 INJECTION, SOLUTION INTRAVENOUS; SUBCUTANEOUS
Status: DISCONTINUED | OUTPATIENT
Start: 2021-01-01 | End: 2021-01-01

## 2021-01-01 RX ORDER — LIDOCAINE 40 MG/G
CREAM TOPICAL
Status: ACTIVE | OUTPATIENT
Start: 2021-01-01 | End: 2021-01-01

## 2021-01-01 RX ORDER — CEFAZOLIN SODIUM 2 G/100ML
2 INJECTION, SOLUTION INTRAVENOUS
Status: COMPLETED | OUTPATIENT
Start: 2021-01-01 | End: 2021-01-01

## 2021-01-01 RX ORDER — NALOXONE HYDROCHLORIDE 0.4 MG/ML
0.4 INJECTION, SOLUTION INTRAMUSCULAR; INTRAVENOUS; SUBCUTANEOUS
Status: DISCONTINUED | OUTPATIENT
Start: 2021-01-01 | End: 2021-01-01 | Stop reason: CLARIF

## 2021-01-01 RX ORDER — LISINOPRIL 2.5 MG/1
5 TABLET ORAL DAILY
Status: DISCONTINUED | OUTPATIENT
Start: 2021-01-01 | End: 2021-01-01

## 2021-01-01 RX ORDER — METOPROLOL TARTRATE 25 MG/1
25 TABLET, FILM COATED ORAL 2 TIMES DAILY
Status: DISCONTINUED | OUTPATIENT
Start: 2021-01-01 | End: 2021-01-01

## 2021-01-01 RX ORDER — ISOSORBIDE DINITRATE 10 MG/1
10 TABLET ORAL 3 TIMES DAILY
Status: DISCONTINUED | OUTPATIENT
Start: 2021-01-01 | End: 2021-01-01

## 2021-01-01 RX ORDER — ATORVASTATIN CALCIUM 40 MG/1
80 TABLET, FILM COATED ORAL EVERY EVENING
Status: DISCONTINUED | OUTPATIENT
Start: 2021-01-01 | End: 2021-01-01 | Stop reason: HOSPADM

## 2021-01-01 RX ORDER — SODIUM CHLORIDE, SODIUM LACTATE, POTASSIUM CHLORIDE, CALCIUM CHLORIDE 600; 310; 30; 20 MG/100ML; MG/100ML; MG/100ML; MG/100ML
INJECTION, SOLUTION INTRAVENOUS CONTINUOUS
Status: DISCONTINUED | OUTPATIENT
Start: 2021-01-01 | End: 2021-01-01

## 2021-01-01 RX ORDER — PROPOFOL 10 MG/ML
INJECTION, EMULSION INTRAVENOUS PRN
Status: DISCONTINUED | OUTPATIENT
Start: 2021-01-01 | End: 2021-01-01

## 2021-01-01 RX ORDER — ASPIRIN 81 MG/1
81 TABLET ORAL DAILY
Status: DISCONTINUED | OUTPATIENT
Start: 2021-01-01 | End: 2021-01-01 | Stop reason: HOSPADM

## 2021-01-01 RX ORDER — PANTOPRAZOLE SODIUM 40 MG/1
40 TABLET, DELAYED RELEASE ORAL
Status: DISCONTINUED | OUTPATIENT
Start: 2021-01-01 | End: 2021-01-01 | Stop reason: HOSPADM

## 2021-01-01 RX ORDER — REGADENOSON 0.08 MG/ML
0.4 INJECTION, SOLUTION INTRAVENOUS ONCE
Status: COMPLETED | OUTPATIENT
Start: 2021-01-01 | End: 2021-01-01

## 2021-01-01 RX ORDER — HEPARIN SODIUM,PORCINE 10 UNIT/ML
2-5 VIAL (ML) INTRAVENOUS
Status: ACTIVE | OUTPATIENT
Start: 2021-01-01 | End: 2021-01-01

## 2021-01-01 RX ORDER — PROCHLORPERAZINE 25 MG
12.5 SUPPOSITORY, RECTAL RECTAL EVERY 12 HOURS PRN
Status: DISCONTINUED | OUTPATIENT
Start: 2021-01-01 | End: 2021-01-01 | Stop reason: HOSPADM

## 2021-01-01 RX ORDER — HEPARIN SODIUM,PORCINE 10 UNIT/ML
5-10 VIAL (ML) INTRAVENOUS EVERY 24 HOURS
Status: DISCONTINUED | OUTPATIENT
Start: 2021-01-01 | End: 2021-01-01 | Stop reason: HOSPADM

## 2021-01-01 RX ORDER — METOPROLOL SUCCINATE 50 MG/1
50 TABLET, EXTENDED RELEASE ORAL EVERY EVENING
Status: DISCONTINUED | OUTPATIENT
Start: 2021-01-01 | End: 2021-01-01

## 2021-01-01 RX ORDER — CEFTRIAXONE 1 G/1
1 INJECTION, POWDER, FOR SOLUTION INTRAMUSCULAR; INTRAVENOUS
Status: DISCONTINUED | OUTPATIENT
Start: 2021-01-01 | End: 2021-01-01 | Stop reason: HOSPADM

## 2021-01-01 RX ORDER — ATROPINE SULFATE 10 MG/ML
SOLUTION/ DROPS OPHTHALMIC
Qty: 15 ML | Refills: 1 | Status: SHIPPED | OUTPATIENT
Start: 2021-01-01

## 2021-01-01 RX ORDER — MAGNESIUM SULFATE HEPTAHYDRATE 40 MG/ML
2 INJECTION, SOLUTION INTRAVENOUS ONCE
Status: DISCONTINUED | OUTPATIENT
Start: 2021-01-01 | End: 2021-01-01 | Stop reason: DRUGHIGH

## 2021-01-01 RX ORDER — WARFARIN SODIUM 3 MG/1
3 TABLET ORAL
Status: COMPLETED | OUTPATIENT
Start: 2021-01-01 | End: 2021-01-01

## 2021-01-01 RX ORDER — PIPERACILLIN SODIUM, TAZOBACTAM SODIUM 2; .25 G/10ML; G/10ML
2.25 INJECTION, POWDER, LYOPHILIZED, FOR SOLUTION INTRAVENOUS EVERY 6 HOURS
Status: COMPLETED | OUTPATIENT
Start: 2021-01-01 | End: 2021-01-01

## 2021-01-01 RX ORDER — PROCHLORPERAZINE MALEATE 5 MG
5 TABLET ORAL EVERY 6 HOURS PRN
Status: DISCONTINUED | OUTPATIENT
Start: 2021-01-01 | End: 2021-01-01 | Stop reason: HOSPADM

## 2021-01-01 RX ORDER — ALBUMIN, HUMAN INJ 5% 5 %
SOLUTION INTRAVENOUS
Status: DISCONTINUED
Start: 2021-01-01 | End: 2021-01-01 | Stop reason: HOSPADM

## 2021-01-01 RX ORDER — NALOXONE HYDROCHLORIDE 0.4 MG/ML
0.2 INJECTION, SOLUTION INTRAMUSCULAR; INTRAVENOUS; SUBCUTANEOUS
Status: DISCONTINUED | OUTPATIENT
Start: 2021-01-01 | End: 2021-01-01 | Stop reason: CLARIF

## 2021-01-01 RX ORDER — FLUMAZENIL 0.1 MG/ML
0.2 INJECTION, SOLUTION INTRAVENOUS
Status: DISCONTINUED | OUTPATIENT
Start: 2021-01-01 | End: 2021-01-01 | Stop reason: HOSPADM

## 2021-01-01 RX ORDER — CLINDAMYCIN PHOSPHATE 900 MG/50ML
900 INJECTION, SOLUTION INTRAVENOUS
Status: CANCELLED | OUTPATIENT
Start: 2021-01-01

## 2021-01-01 RX ORDER — IOPAMIDOL 755 MG/ML
126 INJECTION, SOLUTION INTRAVASCULAR ONCE
Status: COMPLETED | OUTPATIENT
Start: 2021-01-01 | End: 2021-01-01

## 2021-01-01 RX ORDER — DEXTROSE MONOHYDRATE 100 MG/ML
INJECTION, SOLUTION INTRAVENOUS CONTINUOUS PRN
Status: DISCONTINUED | OUTPATIENT
Start: 2021-01-01 | End: 2021-01-01 | Stop reason: HOSPADM

## 2021-01-01 RX ORDER — METOPROLOL SUCCINATE 25 MG/1
25 TABLET, EXTENDED RELEASE ORAL EVERY EVENING
Status: DISCONTINUED | OUTPATIENT
Start: 2021-01-01 | End: 2021-01-01

## 2021-01-01 RX ORDER — CLOPIDOGREL BISULFATE 75 MG/1
75 TABLET ORAL DAILY
Status: DISCONTINUED | OUTPATIENT
Start: 2021-01-01 | End: 2021-01-01 | Stop reason: HOSPADM

## 2021-01-01 RX ORDER — ONDANSETRON 2 MG/ML
INJECTION INTRAMUSCULAR; INTRAVENOUS PRN
Status: DISCONTINUED | OUTPATIENT
Start: 2021-01-01 | End: 2021-01-01

## 2021-01-01 RX ORDER — NITROGLYCERIN 0.4 MG/1
0.4 TABLET SUBLINGUAL EVERY 5 MIN PRN
Status: DISCONTINUED | OUTPATIENT
Start: 2021-01-01 | End: 2021-01-01 | Stop reason: HOSPADM

## 2021-01-01 RX ORDER — PANTOPRAZOLE SODIUM 40 MG/1
40 TABLET, DELAYED RELEASE ORAL
DISCHARGE
Start: 2021-01-01 | End: 2021-01-01

## 2021-01-01 RX ORDER — CLINDAMYCIN PHOSPHATE 900 MG/50ML
900 INJECTION, SOLUTION INTRAVENOUS SEE ADMIN INSTRUCTIONS
Status: CANCELLED | OUTPATIENT
Start: 2021-01-01

## 2021-01-01 RX ORDER — ACETAMINOPHEN 500 MG
500 TABLET ORAL EVERY 4 HOURS PRN
COMMUNITY
End: 2021-01-01

## 2021-01-01 RX ORDER — CEFAZOLIN SODIUM 2 G/100ML
2 INJECTION, SOLUTION INTRAVENOUS SEE ADMIN INSTRUCTIONS
Status: DISCONTINUED | OUTPATIENT
Start: 2021-01-01 | End: 2021-01-01

## 2021-01-01 RX ORDER — ALBUMIN (HUMAN) 12.5 G/50ML
50 SOLUTION INTRAVENOUS
Status: COMPLETED | OUTPATIENT
Start: 2021-01-01 | End: 2021-01-01

## 2021-01-01 RX ORDER — MORPHINE SULFATE 100 MG/5ML
SOLUTION ORAL
Qty: 30 ML | Refills: 0 | Status: SHIPPED | OUTPATIENT
Start: 2021-01-01

## 2021-01-01 RX ORDER — ISOSORBIDE DINITRATE 5 MG/1
5 TABLET ORAL 3 TIMES DAILY
DISCHARGE
Start: 2021-01-01 | End: 2021-01-01

## 2021-01-01 RX ORDER — METOPROLOL SUCCINATE 25 MG/1
25 TABLET, EXTENDED RELEASE ORAL EVERY EVENING
Status: ON HOLD | COMMUNITY
End: 2021-01-01

## 2021-01-01 RX ORDER — FENTANYL CITRATE 50 UG/ML
25-50 INJECTION, SOLUTION INTRAMUSCULAR; INTRAVENOUS
Status: DISCONTINUED | OUTPATIENT
Start: 2021-01-01 | End: 2021-01-01 | Stop reason: HOSPADM

## 2021-01-01 RX ORDER — MAGNESIUM SULFATE HEPTAHYDRATE 40 MG/ML
2 INJECTION, SOLUTION INTRAVENOUS ONCE
Status: COMPLETED | OUTPATIENT
Start: 2021-01-01 | End: 2021-01-01

## 2021-01-01 RX ORDER — HYDROMORPHONE HYDROCHLORIDE 1 MG/ML
.3-.5 INJECTION, SOLUTION INTRAMUSCULAR; INTRAVENOUS; SUBCUTANEOUS EVERY 5 MIN PRN
Status: DISCONTINUED | OUTPATIENT
Start: 2021-01-01 | End: 2021-01-01 | Stop reason: HOSPADM

## 2021-01-01 RX ORDER — CALCIUM CARBONATE 500 MG/1
1 TABLET, CHEWABLE ORAL 2 TIMES DAILY
COMMUNITY
End: 2021-01-01

## 2021-01-01 RX ORDER — FENTANYL CITRATE 50 UG/ML
25-50 INJECTION, SOLUTION INTRAMUSCULAR; INTRAVENOUS EVERY 5 MIN PRN
Status: DISCONTINUED | OUTPATIENT
Start: 2021-01-01 | End: 2021-01-01 | Stop reason: CLARIF

## 2021-01-01 RX ORDER — WARFARIN SODIUM 5 MG/1
2.5-5 TABLET ORAL DAILY
Refills: 0 | DISCHARGE
Start: 2021-01-01 | End: 2021-01-01

## 2021-01-01 RX ORDER — DEXTROSE MONOHYDRATE 25 G/50ML
25-50 INJECTION, SOLUTION INTRAVENOUS
Status: DISCONTINUED | OUTPATIENT
Start: 2021-01-01 | End: 2021-01-01 | Stop reason: HOSPADM

## 2021-01-01 RX ORDER — ALBUMIN, HUMAN INJ 5% 5 %
25 SOLUTION INTRAVENOUS ONCE
Status: COMPLETED | OUTPATIENT
Start: 2021-01-01 | End: 2021-01-01

## 2021-01-01 RX ORDER — ALBUMIN (HUMAN) 12.5 G/50ML
SOLUTION INTRAVENOUS
Status: DISCONTINUED
Start: 2021-01-01 | End: 2021-01-01 | Stop reason: HOSPADM

## 2021-01-01 RX ORDER — NICOTINE POLACRILEX 4 MG
15-30 LOZENGE BUCCAL
Status: DISCONTINUED | OUTPATIENT
Start: 2021-01-01 | End: 2021-01-01 | Stop reason: HOSPADM

## 2021-01-01 RX ORDER — LORAZEPAM 2 MG/ML
0.5 CONCENTRATE ORAL EVERY 4 HOURS PRN
Qty: 30 ML | Refills: 0 | Status: SHIPPED | OUTPATIENT
Start: 2021-01-01

## 2021-01-01 RX ORDER — HYDROMORPHONE HCL IN WATER/PF 6 MG/30 ML
0.2 PATIENT CONTROLLED ANALGESIA SYRINGE INTRAVENOUS
Status: DISCONTINUED | OUTPATIENT
Start: 2021-01-01 | End: 2021-01-01 | Stop reason: HOSPADM

## 2021-01-01 RX ORDER — HEPARIN SODIUM 5000 [USP'U]/.5ML
5000 INJECTION, SOLUTION INTRAVENOUS; SUBCUTANEOUS EVERY 8 HOURS
Status: DISCONTINUED | OUTPATIENT
Start: 2021-01-01 | End: 2021-01-01 | Stop reason: HOSPADM

## 2021-01-01 RX ORDER — HYDROMORPHONE HYDROCHLORIDE 1 MG/ML
.3-.5 INJECTION, SOLUTION INTRAMUSCULAR; INTRAVENOUS; SUBCUTANEOUS EVERY 10 MIN PRN
Status: DISCONTINUED | OUTPATIENT
Start: 2021-01-01 | End: 2021-01-01 | Stop reason: HOSPADM

## 2021-01-01 RX ORDER — OXYCODONE HYDROCHLORIDE 5 MG/1
5 TABLET ORAL EVERY 6 HOURS PRN
Qty: 30 TABLET | Refills: 0 | Status: SHIPPED | OUTPATIENT
Start: 2021-01-01 | End: 2021-01-01

## 2021-01-01 RX ORDER — LISINOPRIL 2.5 MG/1
2.5 TABLET ORAL DAILY
Status: DISCONTINUED | OUTPATIENT
Start: 2021-01-01 | End: 2021-01-01

## 2021-01-01 RX ADMIN — Medication: at 19:50

## 2021-01-01 RX ADMIN — ACETAMINOPHEN 650 MG: 325 TABLET, FILM COATED ORAL at 15:58

## 2021-01-01 RX ADMIN — HYDROMORPHONE HYDROCHLORIDE 0.2 MG: 0.2 INJECTION, SOLUTION INTRAMUSCULAR; INTRAVENOUS; SUBCUTANEOUS at 07:36

## 2021-01-01 RX ADMIN — OXYCODONE HYDROCHLORIDE 5 MG: 5 TABLET ORAL at 20:05

## 2021-01-01 RX ADMIN — ACETAMINOPHEN 650 MG: 325 TABLET, FILM COATED ORAL at 00:34

## 2021-01-01 RX ADMIN — HEPARIN SODIUM 5000 UNITS: 5000 INJECTION, SOLUTION INTRAVENOUS; SUBCUTANEOUS at 07:43

## 2021-01-01 RX ADMIN — METOPROLOL TARTRATE 25 MG: 25 TABLET, FILM COATED ORAL at 20:29

## 2021-01-01 RX ADMIN — Medication 5 ML: at 06:45

## 2021-01-01 RX ADMIN — PIPERACILLIN SODIUM AND TAZOBACTAM SODIUM 2.25 G: 2; .25 INJECTION, POWDER, LYOPHILIZED, FOR SOLUTION INTRAVENOUS at 06:00

## 2021-01-01 RX ADMIN — INSULIN ASPART 2 UNITS: 100 INJECTION, SOLUTION INTRAVENOUS; SUBCUTANEOUS at 00:01

## 2021-01-01 RX ADMIN — ATORVASTATIN CALCIUM 80 MG: 40 TABLET, FILM COATED ORAL at 19:28

## 2021-01-01 RX ADMIN — ACETAMINOPHEN 650 MG: 325 TABLET, FILM COATED ORAL at 18:53

## 2021-01-01 RX ADMIN — I.V. FAT EMULSION 250 ML: 20 EMULSION INTRAVENOUS at 20:02

## 2021-01-01 RX ADMIN — METOPROLOL TARTRATE 25 MG: 25 TABLET, FILM COATED ORAL at 19:42

## 2021-01-01 RX ADMIN — ISOSORBIDE MONONITRATE 30 MG: 30 TABLET, EXTENDED RELEASE ORAL at 08:03

## 2021-01-01 RX ADMIN — ISOSORBIDE DINITRATE 5 MG: 5 TABLET ORAL at 20:29

## 2021-01-01 RX ADMIN — ASPIRIN 81 MG: 81 TABLET, COATED ORAL at 07:57

## 2021-01-01 RX ADMIN — ROCURONIUM BROMIDE 20 MG: 10 INJECTION INTRAVENOUS at 09:56

## 2021-01-01 RX ADMIN — ASPIRIN 81 MG: 81 TABLET, COATED ORAL at 07:36

## 2021-01-01 RX ADMIN — ASPIRIN 81 MG: 81 TABLET, COATED ORAL at 08:45

## 2021-01-01 RX ADMIN — OXYCODONE HYDROCHLORIDE 5 MG: 5 TABLET ORAL at 12:22

## 2021-01-01 RX ADMIN — ASPIRIN 81 MG: 81 TABLET, COATED ORAL at 08:13

## 2021-01-01 RX ADMIN — DEXTROSE AND SODIUM CHLORIDE: 5; 450 INJECTION, SOLUTION INTRAVENOUS at 02:34

## 2021-01-01 RX ADMIN — INSULIN ASPART 4 UNITS: 100 INJECTION, SOLUTION INTRAVENOUS; SUBCUTANEOUS at 04:03

## 2021-01-01 RX ADMIN — ACETAMINOPHEN 650 MG: 325 TABLET, FILM COATED ORAL at 23:55

## 2021-01-01 RX ADMIN — OXYCODONE HYDROCHLORIDE 5 MG: 5 TABLET ORAL at 20:03

## 2021-01-01 RX ADMIN — ATORVASTATIN CALCIUM 80 MG: 40 TABLET, FILM COATED ORAL at 19:59

## 2021-01-01 RX ADMIN — DOXERCALCIFEROL 3 MCG: 4 INJECTION INTRAVENOUS at 12:22

## 2021-01-01 RX ADMIN — INSULIN ASPART 3 UNITS: 100 INJECTION, SOLUTION INTRAVENOUS; SUBCUTANEOUS at 07:46

## 2021-01-01 RX ADMIN — PANTOPRAZOLE SODIUM 40 MG: 40 TABLET, DELAYED RELEASE ORAL at 08:03

## 2021-01-01 RX ADMIN — INSULIN ASPART 2 UNITS: 100 INJECTION, SOLUTION INTRAVENOUS; SUBCUTANEOUS at 16:24

## 2021-01-01 RX ADMIN — HEPARIN SODIUM 5000 UNITS: 5000 INJECTION, SOLUTION INTRAVENOUS; SUBCUTANEOUS at 00:39

## 2021-01-01 RX ADMIN — OXYCODONE HYDROCHLORIDE 5 MG: 5 TABLET ORAL at 12:02

## 2021-01-01 RX ADMIN — CLOPIDOGREL BISULFATE 75 MG: 75 TABLET ORAL at 07:55

## 2021-01-01 RX ADMIN — INSULIN ASPART 1 UNITS: 100 INJECTION, SOLUTION INTRAVENOUS; SUBCUTANEOUS at 15:51

## 2021-01-01 RX ADMIN — METOPROLOL SUCCINATE 25 MG: 25 TABLET, EXTENDED RELEASE ORAL at 20:03

## 2021-01-01 RX ADMIN — INSULIN ASPART 2 UNITS: 100 INJECTION, SOLUTION INTRAVENOUS; SUBCUTANEOUS at 23:28

## 2021-01-01 RX ADMIN — Medication 2 G: at 01:35

## 2021-01-01 RX ADMIN — FENTANYL CITRATE 25 MCG: 50 INJECTION, SOLUTION INTRAMUSCULAR; INTRAVENOUS at 12:06

## 2021-01-01 RX ADMIN — ASPIRIN 81 MG: 81 TABLET, COATED ORAL at 09:43

## 2021-01-01 RX ADMIN — ACETAMINOPHEN 650 MG: 325 TABLET, FILM COATED ORAL at 04:18

## 2021-01-01 RX ADMIN — SODIUM CHLORIDE 325 ML: 9 INJECTION, SOLUTION INTRAVENOUS at 15:07

## 2021-01-01 RX ADMIN — CLOPIDOGREL BISULFATE 75 MG: 75 TABLET ORAL at 09:28

## 2021-01-01 RX ADMIN — ASPIRIN 81 MG: 81 TABLET, COATED ORAL at 07:47

## 2021-01-01 RX ADMIN — PIPERACILLIN SODIUM AND TAZOBACTAM SODIUM 2.25 G: 2; .25 INJECTION, POWDER, LYOPHILIZED, FOR SOLUTION INTRAVENOUS at 22:40

## 2021-01-01 RX ADMIN — OXYCODONE HYDROCHLORIDE 5 MG: 5 TABLET ORAL at 04:16

## 2021-01-01 RX ADMIN — METHOCARBAMOL 500 MG: 500 TABLET, FILM COATED ORAL at 22:12

## 2021-01-01 RX ADMIN — SODIUM CHLORIDE 300 ML: 9 INJECTION, SOLUTION INTRAVENOUS at 11:37

## 2021-01-01 RX ADMIN — ATORVASTATIN CALCIUM 80 MG: 40 TABLET, FILM COATED ORAL at 20:01

## 2021-01-01 RX ADMIN — CLOPIDOGREL BISULFATE 75 MG: 75 TABLET ORAL at 10:38

## 2021-01-01 RX ADMIN — I.V. FAT EMULSION 250 ML: 20 EMULSION INTRAVENOUS at 20:47

## 2021-01-01 RX ADMIN — HEPARIN SODIUM 5000 UNITS: 5000 INJECTION, SOLUTION INTRAVENOUS; SUBCUTANEOUS at 00:38

## 2021-01-01 RX ADMIN — HEPARIN SODIUM 5000 UNITS: 5000 INJECTION, SOLUTION INTRAVENOUS; SUBCUTANEOUS at 08:08

## 2021-01-01 RX ADMIN — PHENYLEPHRINE HYDROCHLORIDE 100 MCG: 10 INJECTION INTRAVENOUS at 10:03

## 2021-01-01 RX ADMIN — PANTOPRAZOLE SODIUM 40 MG: 40 TABLET, DELAYED RELEASE ORAL at 07:57

## 2021-01-01 RX ADMIN — ATORVASTATIN CALCIUM 80 MG: 40 TABLET, FILM COATED ORAL at 20:54

## 2021-01-01 RX ADMIN — CLOPIDOGREL BISULFATE 75 MG: 75 TABLET ORAL at 07:44

## 2021-01-01 RX ADMIN — OXYCODONE HYDROCHLORIDE 10 MG: 5 TABLET ORAL at 07:06

## 2021-01-01 RX ADMIN — ASPIRIN 81 MG: 81 TABLET, COATED ORAL at 07:46

## 2021-01-01 RX ADMIN — ISOSORBIDE DINITRATE 5 MG: 5 TABLET ORAL at 19:39

## 2021-01-01 RX ADMIN — ATORVASTATIN CALCIUM 80 MG: 40 TABLET, FILM COATED ORAL at 20:29

## 2021-01-01 RX ADMIN — HEPARIN SODIUM 5000 UNITS: 5000 INJECTION, SOLUTION INTRAVENOUS; SUBCUTANEOUS at 07:57

## 2021-01-01 RX ADMIN — ALBUMIN HUMAN 50 ML: 0.25 SOLUTION INTRAVENOUS at 09:28

## 2021-01-01 RX ADMIN — ISOSORBIDE DINITRATE 10 MG: 10 TABLET ORAL at 13:46

## 2021-01-01 RX ADMIN — INSULIN ASPART 7 UNITS: 100 INJECTION, SOLUTION INTRAVENOUS; SUBCUTANEOUS at 02:12

## 2021-01-01 RX ADMIN — Medication 5 MG: at 09:17

## 2021-01-01 RX ADMIN — HYDROMORPHONE HYDROCHLORIDE 0.2 MG: 0.2 INJECTION, SOLUTION INTRAMUSCULAR; INTRAVENOUS; SUBCUTANEOUS at 15:07

## 2021-01-01 RX ADMIN — PANTOPRAZOLE SODIUM 40 MG: 40 TABLET, DELAYED RELEASE ORAL at 08:01

## 2021-01-01 RX ADMIN — Medication: at 18:59

## 2021-01-01 RX ADMIN — METOPROLOL TARTRATE 25 MG: 25 TABLET, FILM COATED ORAL at 19:56

## 2021-01-01 RX ADMIN — INSULIN ASPART 2 UNITS: 100 INJECTION, SOLUTION INTRAVENOUS; SUBCUTANEOUS at 04:22

## 2021-01-01 RX ADMIN — Medication 5 MG: at 09:26

## 2021-01-01 RX ADMIN — AMINOPHYLLINE 50 MG: 25 INJECTION, SOLUTION INTRAVENOUS at 14:05

## 2021-01-01 RX ADMIN — INSULIN ASPART 3 UNITS: 100 INJECTION, SOLUTION INTRAVENOUS; SUBCUTANEOUS at 13:43

## 2021-01-01 RX ADMIN — Medication 30.5 MILLICURIE: at 13:55

## 2021-01-01 RX ADMIN — WARFARIN SODIUM 5 MG: 5 TABLET ORAL at 17:35

## 2021-01-01 RX ADMIN — INSULIN ASPART 4 UNITS: 100 INJECTION, SOLUTION INTRAVENOUS; SUBCUTANEOUS at 18:39

## 2021-01-01 RX ADMIN — Medication: at 08:52

## 2021-01-01 RX ADMIN — PANTOPRAZOLE SODIUM 40 MG: 40 INJECTION, POWDER, FOR SOLUTION INTRAVENOUS at 07:45

## 2021-01-01 RX ADMIN — ISOSORBIDE DINITRATE 5 MG: 5 TABLET ORAL at 20:24

## 2021-01-01 RX ADMIN — Medication 5 ML: at 08:25

## 2021-01-01 RX ADMIN — INSULIN ASPART 3 UNITS: 100 INJECTION, SOLUTION INTRAVENOUS; SUBCUTANEOUS at 00:20

## 2021-01-01 RX ADMIN — HYDROMORPHONE HYDROCHLORIDE 0.2 MG: 0.2 INJECTION, SOLUTION INTRAMUSCULAR; INTRAVENOUS; SUBCUTANEOUS at 23:56

## 2021-01-01 RX ADMIN — PANTOPRAZOLE SODIUM 40 MG: 40 INJECTION, POWDER, FOR SOLUTION INTRAVENOUS at 07:48

## 2021-01-01 RX ADMIN — OXYCODONE HYDROCHLORIDE 5 MG: 5 TABLET ORAL at 18:53

## 2021-01-01 RX ADMIN — HYDROMORPHONE HYDROCHLORIDE 0.2 MG: 0.2 INJECTION, SOLUTION INTRAMUSCULAR; INTRAVENOUS; SUBCUTANEOUS at 15:43

## 2021-01-01 RX ADMIN — ISOSORBIDE DINITRATE 5 MG: 5 TABLET ORAL at 13:21

## 2021-01-01 RX ADMIN — OXYCODONE HYDROCHLORIDE 10 MG: 5 TABLET ORAL at 08:00

## 2021-01-01 RX ADMIN — HEPARIN SODIUM 5000 UNITS: 5000 INJECTION, SOLUTION INTRAVENOUS; SUBCUTANEOUS at 00:06

## 2021-01-01 RX ADMIN — HEPARIN SODIUM 5000 UNITS: 5000 INJECTION, SOLUTION INTRAVENOUS; SUBCUTANEOUS at 08:12

## 2021-01-01 RX ADMIN — ISOSORBIDE MONONITRATE 30 MG: 30 TABLET, EXTENDED RELEASE ORAL at 09:43

## 2021-01-01 RX ADMIN — INSULIN ASPART 4 UNITS: 100 INJECTION, SOLUTION INTRAVENOUS; SUBCUTANEOUS at 23:27

## 2021-01-01 RX ADMIN — ISOSORBIDE MONONITRATE 30 MG: 30 TABLET, EXTENDED RELEASE ORAL at 08:11

## 2021-01-01 RX ADMIN — PIPERACILLIN SODIUM AND TAZOBACTAM SODIUM 2.25 G: 2; .25 INJECTION, POWDER, LYOPHILIZED, FOR SOLUTION INTRAVENOUS at 03:37

## 2021-01-01 RX ADMIN — INSULIN ASPART 3 UNITS: 100 INJECTION, SOLUTION INTRAVENOUS; SUBCUTANEOUS at 18:58

## 2021-01-01 RX ADMIN — INSULIN ASPART 1 UNITS: 100 INJECTION, SOLUTION INTRAVENOUS; SUBCUTANEOUS at 04:04

## 2021-01-01 RX ADMIN — ALBUMIN HUMAN 50 ML: 0.25 SOLUTION INTRAVENOUS at 12:11

## 2021-01-01 RX ADMIN — Medication 5 ML: at 20:18

## 2021-01-01 RX ADMIN — INSULIN ASPART 3 UNITS: 100 INJECTION, SOLUTION INTRAVENOUS; SUBCUTANEOUS at 17:23

## 2021-01-01 RX ADMIN — OXYCODONE HYDROCHLORIDE 10 MG: 5 TABLET ORAL at 12:23

## 2021-01-01 RX ADMIN — ATORVASTATIN CALCIUM 80 MG: 40 TABLET, FILM COATED ORAL at 20:04

## 2021-01-01 RX ADMIN — HYDROMORPHONE HYDROCHLORIDE 0.2 MG: 0.2 INJECTION, SOLUTION INTRAMUSCULAR; INTRAVENOUS; SUBCUTANEOUS at 04:27

## 2021-01-01 RX ADMIN — HEPARIN SODIUM 5000 UNITS: 5000 INJECTION, SOLUTION INTRAVENOUS; SUBCUTANEOUS at 08:39

## 2021-01-01 RX ADMIN — INSULIN ASPART 1 UNITS: 100 INJECTION, SOLUTION INTRAVENOUS; SUBCUTANEOUS at 20:25

## 2021-01-01 RX ADMIN — SODIUM CHLORIDE 150 ML: 9 INJECTION, SOLUTION INTRAVENOUS at 06:59

## 2021-01-01 RX ADMIN — INSULIN ASPART 2 UNITS: 100 INJECTION, SOLUTION INTRAVENOUS; SUBCUTANEOUS at 01:31

## 2021-01-01 RX ADMIN — ACETAMINOPHEN 650 MG: 325 TABLET, FILM COATED ORAL at 00:12

## 2021-01-01 RX ADMIN — INSULIN ASPART 4 UNITS: 100 INJECTION, SOLUTION INTRAVENOUS; SUBCUTANEOUS at 11:48

## 2021-01-01 RX ADMIN — INSULIN ASPART 1 UNITS: 100 INJECTION, SOLUTION INTRAVENOUS; SUBCUTANEOUS at 19:57

## 2021-01-01 RX ADMIN — Medication: at 20:54

## 2021-01-01 RX ADMIN — IOPAMIDOL 126 ML: 755 INJECTION, SOLUTION INTRAVENOUS at 15:39

## 2021-01-01 RX ADMIN — INSULIN ASPART 2 UNITS: 100 INJECTION, SOLUTION INTRAVENOUS; SUBCUTANEOUS at 04:17

## 2021-01-01 RX ADMIN — SODIUM CHLORIDE 250 ML: 9 INJECTION, SOLUTION INTRAVENOUS at 13:44

## 2021-01-01 RX ADMIN — METOPROLOL TARTRATE 25 MG: 25 TABLET, FILM COATED ORAL at 08:01

## 2021-01-01 RX ADMIN — Medication: at 19:40

## 2021-01-01 RX ADMIN — HEPARIN SODIUM 5000 UNITS: 5000 INJECTION, SOLUTION INTRAVENOUS; SUBCUTANEOUS at 08:05

## 2021-01-01 RX ADMIN — ATORVASTATIN CALCIUM 80 MG: 40 TABLET, FILM COATED ORAL at 20:25

## 2021-01-01 RX ADMIN — Medication 5 ML: at 07:46

## 2021-01-01 RX ADMIN — INSULIN ASPART 2 UNITS: 100 INJECTION, SOLUTION INTRAVENOUS; SUBCUTANEOUS at 17:18

## 2021-01-01 RX ADMIN — HEPARIN SODIUM 5000 UNITS: 5000 INJECTION, SOLUTION INTRAVENOUS; SUBCUTANEOUS at 23:26

## 2021-01-01 RX ADMIN — ISOSORBIDE DINITRATE 5 MG: 5 TABLET ORAL at 20:02

## 2021-01-01 RX ADMIN — PIPERACILLIN SODIUM AND TAZOBACTAM SODIUM 2.25 G: 2; .25 INJECTION, POWDER, LYOPHILIZED, FOR SOLUTION INTRAVENOUS at 10:34

## 2021-01-01 RX ADMIN — METHOCARBAMOL 500 MG: 500 TABLET, FILM COATED ORAL at 18:34

## 2021-01-01 RX ADMIN — INSULIN ASPART 5 UNITS: 100 INJECTION, SOLUTION INTRAVENOUS; SUBCUTANEOUS at 23:48

## 2021-01-01 RX ADMIN — INSULIN ASPART 2 UNITS: 100 INJECTION, SOLUTION INTRAVENOUS; SUBCUTANEOUS at 19:55

## 2021-01-01 RX ADMIN — ISOSORBIDE DINITRATE 5 MG: 5 TABLET ORAL at 14:20

## 2021-01-01 RX ADMIN — ISOSORBIDE DINITRATE 5 MG: 5 TABLET ORAL at 19:56

## 2021-01-01 RX ADMIN — DOXERCALCIFEROL 3 MCG: 4 INJECTION INTRAVENOUS at 11:53

## 2021-01-01 RX ADMIN — INSULIN ASPART 1 UNITS: 100 INJECTION, SOLUTION INTRAVENOUS; SUBCUTANEOUS at 23:24

## 2021-01-01 RX ADMIN — HEPARIN SODIUM 5000 UNITS: 5000 INJECTION, SOLUTION INTRAVENOUS; SUBCUTANEOUS at 07:41

## 2021-01-01 RX ADMIN — INSULIN ASPART 2 UNITS: 100 INJECTION, SOLUTION INTRAVENOUS; SUBCUTANEOUS at 04:38

## 2021-01-01 RX ADMIN — PHENYLEPHRINE HYDROCHLORIDE 100 MCG: 10 INJECTION INTRAVENOUS at 03:14

## 2021-01-01 RX ADMIN — ISOSORBIDE MONONITRATE 30 MG: 30 TABLET, EXTENDED RELEASE ORAL at 13:07

## 2021-01-01 RX ADMIN — HEPARIN SODIUM 5000 UNITS: 5000 INJECTION, SOLUTION INTRAVENOUS; SUBCUTANEOUS at 16:03

## 2021-01-01 RX ADMIN — ASPIRIN 81 MG: 81 TABLET, COATED ORAL at 07:45

## 2021-01-01 RX ADMIN — I.V. FAT EMULSION 250 ML: 20 EMULSION INTRAVENOUS at 19:41

## 2021-01-01 RX ADMIN — ONDANSETRON 4 MG: 2 INJECTION INTRAMUSCULAR; INTRAVENOUS at 05:49

## 2021-01-01 RX ADMIN — HEPARIN SODIUM 5000 UNITS: 5000 INJECTION, SOLUTION INTRAVENOUS; SUBCUTANEOUS at 16:09

## 2021-01-01 RX ADMIN — INSULIN ASPART 3 UNITS: 100 INJECTION, SOLUTION INTRAVENOUS; SUBCUTANEOUS at 16:37

## 2021-01-01 RX ADMIN — ACETAMINOPHEN 650 MG: 325 TABLET, FILM COATED ORAL at 15:12

## 2021-01-01 RX ADMIN — HEPARIN SODIUM 5000 UNITS: 5000 INJECTION, SOLUTION INTRAVENOUS; SUBCUTANEOUS at 16:18

## 2021-01-01 RX ADMIN — INSULIN ASPART 3 UNITS: 100 INJECTION, SOLUTION INTRAVENOUS; SUBCUTANEOUS at 12:10

## 2021-01-01 RX ADMIN — ASPIRIN 81 MG: 81 TABLET, COATED ORAL at 07:48

## 2021-01-01 RX ADMIN — GUAIFENESIN 600 MG: 600 TABLET ORAL at 14:52

## 2021-01-01 RX ADMIN — Medication: at 11:37

## 2021-01-01 RX ADMIN — HEPARIN SODIUM 5000 UNITS: 5000 INJECTION, SOLUTION INTRAVENOUS; SUBCUTANEOUS at 07:47

## 2021-01-01 RX ADMIN — Medication 5 ML: at 07:13

## 2021-01-01 RX ADMIN — INSULIN ASPART 3 UNITS: 100 INJECTION, SOLUTION INTRAVENOUS; SUBCUTANEOUS at 11:49

## 2021-01-01 RX ADMIN — Medication 5 ML: at 08:07

## 2021-01-01 RX ADMIN — ACETAMINOPHEN 650 MG: 325 TABLET, FILM COATED ORAL at 20:29

## 2021-01-01 RX ADMIN — INSULIN ASPART 1 UNITS: 100 INJECTION, SOLUTION INTRAVENOUS; SUBCUTANEOUS at 07:44

## 2021-01-01 RX ADMIN — ALBUMIN HUMAN 250 ML: 0.05 INJECTION, SOLUTION INTRAVENOUS at 10:11

## 2021-01-01 RX ADMIN — HEPARIN SODIUM 5000 UNITS: 5000 INJECTION, SOLUTION INTRAVENOUS; SUBCUTANEOUS at 15:37

## 2021-01-01 RX ADMIN — HEPARIN SODIUM 5000 UNITS: 5000 INJECTION, SOLUTION INTRAVENOUS; SUBCUTANEOUS at 16:58

## 2021-01-01 RX ADMIN — PHENYLEPHRINE HYDROCHLORIDE 100 MCG: 10 INJECTION INTRAVENOUS at 02:06

## 2021-01-01 RX ADMIN — INSULIN ASPART 3 UNITS: 100 INJECTION, SOLUTION INTRAVENOUS; SUBCUTANEOUS at 06:09

## 2021-01-01 RX ADMIN — OXYCODONE HYDROCHLORIDE 10 MG: 5 TABLET ORAL at 16:14

## 2021-01-01 RX ADMIN — CLOPIDOGREL BISULFATE 75 MG: 75 TABLET ORAL at 08:01

## 2021-01-01 RX ADMIN — Medication 5 ML: at 07:36

## 2021-01-01 RX ADMIN — ALBUMIN HUMAN 50 G: 0.25 SOLUTION INTRAVENOUS at 10:14

## 2021-01-01 RX ADMIN — ONDANSETRON 4 MG: 2 INJECTION INTRAMUSCULAR; INTRAVENOUS at 08:55

## 2021-01-01 RX ADMIN — ACETAMINOPHEN 650 MG: 325 TABLET, FILM COATED ORAL at 06:40

## 2021-01-01 RX ADMIN — METOPROLOL TARTRATE 25 MG: 25 TABLET, FILM COATED ORAL at 08:03

## 2021-01-01 RX ADMIN — ASPIRIN 81 MG: 81 TABLET, COATED ORAL at 08:08

## 2021-01-01 RX ADMIN — Medication 30 MG: at 08:55

## 2021-01-01 RX ADMIN — HEPARIN SODIUM 5000 UNITS: 5000 INJECTION, SOLUTION INTRAVENOUS; SUBCUTANEOUS at 08:11

## 2021-01-01 RX ADMIN — OXYCODONE HYDROCHLORIDE 5 MG: 5 TABLET ORAL at 04:04

## 2021-01-01 RX ADMIN — EPOETIN ALFA-EPBX 4000 UNITS: 10000 INJECTION, SOLUTION INTRAVENOUS; SUBCUTANEOUS at 10:58

## 2021-01-01 RX ADMIN — ISOSORBIDE DINITRATE 5 MG: 5 TABLET ORAL at 08:47

## 2021-01-01 RX ADMIN — I.V. FAT EMULSION 250 ML: 20 EMULSION INTRAVENOUS at 19:49

## 2021-01-01 RX ADMIN — INSULIN ASPART 2 UNITS: 100 INJECTION, SOLUTION INTRAVENOUS; SUBCUTANEOUS at 12:59

## 2021-01-01 RX ADMIN — HYDROMORPHONE HYDROCHLORIDE 0.5 MG: 1 INJECTION, SOLUTION INTRAMUSCULAR; INTRAVENOUS; SUBCUTANEOUS at 04:20

## 2021-01-01 RX ADMIN — ACETAMINOPHEN 650 MG: 325 TABLET, FILM COATED ORAL at 13:46

## 2021-01-01 RX ADMIN — SODIUM CHLORIDE 250 ML: 9 INJECTION, SOLUTION INTRAVENOUS at 11:37

## 2021-01-01 RX ADMIN — INSULIN ASPART 8 UNITS: 100 INJECTION, SOLUTION INTRAVENOUS; SUBCUTANEOUS at 17:50

## 2021-01-01 RX ADMIN — Medication: at 08:25

## 2021-01-01 RX ADMIN — ATORVASTATIN CALCIUM 80 MG: 40 TABLET, FILM COATED ORAL at 19:49

## 2021-01-01 RX ADMIN — INSULIN ASPART 4 UNITS: 100 INJECTION, SOLUTION INTRAVENOUS; SUBCUTANEOUS at 16:51

## 2021-01-01 RX ADMIN — INSULIN ASPART 2 UNITS: 100 INJECTION, SOLUTION INTRAVENOUS; SUBCUTANEOUS at 16:58

## 2021-01-01 RX ADMIN — METHOCARBAMOL 500 MG: 500 TABLET, FILM COATED ORAL at 08:03

## 2021-01-01 RX ADMIN — LIDOCAINE HYDROCHLORIDE 100 MG: 20 INJECTION, SOLUTION INFILTRATION; PERINEURAL at 01:14

## 2021-01-01 RX ADMIN — I.V. FAT EMULSION 250 ML: 20 EMULSION INTRAVENOUS at 19:43

## 2021-01-01 RX ADMIN — I.V. FAT EMULSION 250 ML: 20 EMULSION INTRAVENOUS at 20:25

## 2021-01-01 RX ADMIN — SODIUM CHLORIDE 300 ML: 9 INJECTION, SOLUTION INTRAVENOUS at 09:00

## 2021-01-01 RX ADMIN — METHOCARBAMOL 500 MG: 500 TABLET, FILM COATED ORAL at 22:40

## 2021-01-01 RX ADMIN — PANTOPRAZOLE SODIUM 40 MG: 40 INJECTION, POWDER, FOR SOLUTION INTRAVENOUS at 07:40

## 2021-01-01 RX ADMIN — ALBUMIN HUMAN 25 G: 0.25 SOLUTION INTRAVENOUS at 09:32

## 2021-01-01 RX ADMIN — SODIUM CHLORIDE 250 ML: 9 INJECTION, SOLUTION INTRAVENOUS at 09:20

## 2021-01-01 RX ADMIN — PIPERACILLIN SODIUM AND TAZOBACTAM SODIUM 2.25 G: 2; .25 INJECTION, POWDER, LYOPHILIZED, FOR SOLUTION INTRAVENOUS at 05:27

## 2021-01-01 RX ADMIN — I.V. FAT EMULSION 250 ML: 20 EMULSION INTRAVENOUS at 20:54

## 2021-01-01 RX ADMIN — OXYCODONE HYDROCHLORIDE 5 MG: 5 TABLET ORAL at 23:56

## 2021-01-01 RX ADMIN — Medication: at 09:48

## 2021-01-01 RX ADMIN — INSULIN ASPART 1 UNITS: 100 INJECTION, SOLUTION INTRAVENOUS; SUBCUTANEOUS at 16:20

## 2021-01-01 RX ADMIN — Medication 5 ML: at 18:49

## 2021-01-01 RX ADMIN — HEPARIN SODIUM 5000 UNITS: 5000 INJECTION, SOLUTION INTRAVENOUS; SUBCUTANEOUS at 23:49

## 2021-01-01 RX ADMIN — LIDOCAINE HYDROCHLORIDE 100 MG: 20 INJECTION, SOLUTION INFILTRATION; PERINEURAL at 08:54

## 2021-01-01 RX ADMIN — METOPROLOL TARTRATE 25 MG: 25 TABLET, FILM COATED ORAL at 20:23

## 2021-01-01 RX ADMIN — CLOPIDOGREL BISULFATE 75 MG: 75 TABLET ORAL at 07:46

## 2021-01-01 RX ADMIN — HEPARIN SODIUM 5000 UNITS: 5000 INJECTION, SOLUTION INTRAVENOUS; SUBCUTANEOUS at 15:46

## 2021-01-01 RX ADMIN — INSULIN ASPART 2 UNITS: 100 INJECTION, SOLUTION INTRAVENOUS; SUBCUTANEOUS at 11:46

## 2021-01-01 RX ADMIN — Medication 2 G: at 09:07

## 2021-01-01 RX ADMIN — ASPIRIN 81 MG: 81 TABLET, COATED ORAL at 08:11

## 2021-01-01 RX ADMIN — HEPARIN SODIUM 5000 UNITS: 5000 INJECTION, SOLUTION INTRAVENOUS; SUBCUTANEOUS at 00:14

## 2021-01-01 RX ADMIN — OXYCODONE HYDROCHLORIDE 5 MG: 5 TABLET ORAL at 14:44

## 2021-01-01 RX ADMIN — PHENYLEPHRINE HYDROCHLORIDE 100 MCG: 10 INJECTION INTRAVENOUS at 02:52

## 2021-01-01 RX ADMIN — SUGAMMADEX 180 MG: 100 INJECTION, SOLUTION INTRAVENOUS at 03:37

## 2021-01-01 RX ADMIN — HEPARIN SODIUM 5000 UNITS: 5000 INJECTION, SOLUTION INTRAVENOUS; SUBCUTANEOUS at 23:40

## 2021-01-01 RX ADMIN — HYDROMORPHONE HYDROCHLORIDE 0.2 MG: 0.2 INJECTION, SOLUTION INTRAMUSCULAR; INTRAVENOUS; SUBCUTANEOUS at 20:04

## 2021-01-01 RX ADMIN — ASPIRIN 81 MG: 81 TABLET, COATED ORAL at 10:33

## 2021-01-01 RX ADMIN — SODIUM CHLORIDE 100 ML: 9 INJECTION, SOLUTION INTRAVENOUS at 11:03

## 2021-01-01 RX ADMIN — INSULIN ASPART 1 UNITS: 100 INJECTION, SOLUTION INTRAVENOUS; SUBCUTANEOUS at 04:09

## 2021-01-01 RX ADMIN — HYDROMORPHONE HYDROCHLORIDE 0.2 MG: 0.2 INJECTION, SOLUTION INTRAMUSCULAR; INTRAVENOUS; SUBCUTANEOUS at 23:10

## 2021-01-01 RX ADMIN — OXYCODONE HYDROCHLORIDE 5 MG: 5 TABLET ORAL at 20:46

## 2021-01-01 RX ADMIN — OXYCODONE HYDROCHLORIDE 5 MG: 5 TABLET ORAL at 08:27

## 2021-01-01 RX ADMIN — Medication: at 20:04

## 2021-01-01 RX ADMIN — METOPROLOL TARTRATE 25 MG: 25 TABLET, FILM COATED ORAL at 08:48

## 2021-01-01 RX ADMIN — INSULIN ASPART 3 UNITS: 100 INJECTION, SOLUTION INTRAVENOUS; SUBCUTANEOUS at 13:25

## 2021-01-01 RX ADMIN — CLOPIDOGREL BISULFATE 75 MG: 75 TABLET ORAL at 07:59

## 2021-01-01 RX ADMIN — DEXAMETHASONE SODIUM PHOSPHATE 10 MG: 4 INJECTION, SOLUTION INTRA-ARTICULAR; INTRALESIONAL; INTRAMUSCULAR; INTRAVENOUS; SOFT TISSUE at 08:54

## 2021-01-01 RX ADMIN — INSULIN ASPART 4 UNITS: 100 INJECTION, SOLUTION INTRAVENOUS; SUBCUTANEOUS at 04:31

## 2021-01-01 RX ADMIN — INSULIN ASPART 1 UNITS: 100 INJECTION, SOLUTION INTRAVENOUS; SUBCUTANEOUS at 20:10

## 2021-01-01 RX ADMIN — OXYCODONE HYDROCHLORIDE 5 MG: 5 TABLET ORAL at 00:12

## 2021-01-01 RX ADMIN — PANTOPRAZOLE SODIUM 40 MG: 40 INJECTION, POWDER, FOR SOLUTION INTRAVENOUS at 07:35

## 2021-01-01 RX ADMIN — INSULIN ASPART 1 UNITS: 100 INJECTION, SOLUTION INTRAVENOUS; SUBCUTANEOUS at 20:33

## 2021-01-01 RX ADMIN — SODIUM CHLORIDE 100 ML: 9 INJECTION, SOLUTION INTRAVENOUS at 10:15

## 2021-01-01 RX ADMIN — METOPROLOL SUCCINATE 50 MG: 50 TABLET, EXTENDED RELEASE ORAL at 19:40

## 2021-01-01 RX ADMIN — I.V. FAT EMULSION 250 ML: 20 EMULSION INTRAVENOUS at 19:50

## 2021-01-01 RX ADMIN — OXYCODONE HYDROCHLORIDE 10 MG: 5 TABLET ORAL at 04:31

## 2021-01-01 RX ADMIN — Medication 8.47 MILLICURIE: at 12:22

## 2021-01-01 RX ADMIN — ATORVASTATIN CALCIUM 80 MG: 40 TABLET, FILM COATED ORAL at 19:55

## 2021-01-01 RX ADMIN — INSULIN ASPART 4 UNITS: 100 INJECTION, SOLUTION INTRAVENOUS; SUBCUTANEOUS at 06:15

## 2021-01-01 RX ADMIN — ALBUMIN HUMAN 250 ML: 0.05 INJECTION, SOLUTION INTRAVENOUS at 09:20

## 2021-01-01 RX ADMIN — ISOSORBIDE MONONITRATE 30 MG: 30 TABLET, EXTENDED RELEASE ORAL at 07:53

## 2021-01-01 RX ADMIN — ISOSORBIDE DINITRATE 5 MG: 5 TABLET ORAL at 15:16

## 2021-01-01 RX ADMIN — PANTOPRAZOLE SODIUM 40 MG: 40 TABLET, DELAYED RELEASE ORAL at 08:49

## 2021-01-01 RX ADMIN — I.V. FAT EMULSION 250 ML: 20 EMULSION INTRAVENOUS at 20:24

## 2021-01-01 RX ADMIN — I.V. FAT EMULSION 250 ML: 20 EMULSION INTRAVENOUS at 20:10

## 2021-01-01 RX ADMIN — SODIUM CHLORIDE 300 ML: 9 INJECTION, SOLUTION INTRAVENOUS at 11:20

## 2021-01-01 RX ADMIN — CLOPIDOGREL BISULFATE 75 MG: 75 TABLET ORAL at 08:17

## 2021-01-01 RX ADMIN — ASPIRIN 81 MG: 81 TABLET, COATED ORAL at 07:58

## 2021-01-01 RX ADMIN — HYDROMORPHONE HYDROCHLORIDE 0.2 MG: 0.2 INJECTION, SOLUTION INTRAMUSCULAR; INTRAVENOUS; SUBCUTANEOUS at 06:25

## 2021-01-01 RX ADMIN — HYDROMORPHONE HYDROCHLORIDE 0.2 MG: 0.2 INJECTION, SOLUTION INTRAMUSCULAR; INTRAVENOUS; SUBCUTANEOUS at 14:26

## 2021-01-01 RX ADMIN — PIPERACILLIN SODIUM AND TAZOBACTAM SODIUM 2.25 G: 2; .25 INJECTION, POWDER, LYOPHILIZED, FOR SOLUTION INTRAVENOUS at 18:53

## 2021-01-01 RX ADMIN — ISOSORBIDE MONONITRATE 30 MG: 30 TABLET, EXTENDED RELEASE ORAL at 08:00

## 2021-01-01 RX ADMIN — PHENYLEPHRINE HYDROCHLORIDE 100 MCG: 10 INJECTION INTRAVENOUS at 03:02

## 2021-01-01 RX ADMIN — INSULIN ASPART 3 UNITS: 100 INJECTION, SOLUTION INTRAVENOUS; SUBCUTANEOUS at 13:11

## 2021-01-01 RX ADMIN — ACETAMINOPHEN 650 MG: 325 TABLET, FILM COATED ORAL at 03:39

## 2021-01-01 RX ADMIN — INSULIN ASPART 2 UNITS: 100 INJECTION, SOLUTION INTRAVENOUS; SUBCUTANEOUS at 09:23

## 2021-01-01 RX ADMIN — METOPROLOL SUCCINATE 50 MG: 50 TABLET, EXTENDED RELEASE ORAL at 20:04

## 2021-01-01 RX ADMIN — SODIUM CHLORIDE 225 ML: 9 INJECTION, SOLUTION INTRAVENOUS at 06:32

## 2021-01-01 RX ADMIN — OXYCODONE HYDROCHLORIDE 5 MG: 5 TABLET ORAL at 06:40

## 2021-01-01 RX ADMIN — INSULIN ASPART 1 UNITS: 100 INJECTION, SOLUTION INTRAVENOUS; SUBCUTANEOUS at 23:39

## 2021-01-01 RX ADMIN — PANTOPRAZOLE SODIUM 40 MG: 40 INJECTION, POWDER, FOR SOLUTION INTRAVENOUS at 07:54

## 2021-01-01 RX ADMIN — ACETAMINOPHEN 650 MG: 325 TABLET, FILM COATED ORAL at 20:03

## 2021-01-01 RX ADMIN — PHENYLEPHRINE HYDROCHLORIDE 100 MCG: 10 INJECTION INTRAVENOUS at 02:49

## 2021-01-01 RX ADMIN — ROCURONIUM BROMIDE 30 MG: 10 INJECTION INTRAVENOUS at 01:14

## 2021-01-01 RX ADMIN — INSULIN ASPART 4 UNITS: 100 INJECTION, SOLUTION INTRAVENOUS; SUBCUTANEOUS at 07:47

## 2021-01-01 RX ADMIN — INSULIN ASPART 1 UNITS: 100 INJECTION, SOLUTION INTRAVENOUS; SUBCUTANEOUS at 03:57

## 2021-01-01 RX ADMIN — HYDROMORPHONE HYDROCHLORIDE 0.2 MG: 0.2 INJECTION, SOLUTION INTRAMUSCULAR; INTRAVENOUS; SUBCUTANEOUS at 23:14

## 2021-01-01 RX ADMIN — ISOSORBIDE DINITRATE 5 MG: 5 TABLET ORAL at 15:45

## 2021-01-01 RX ADMIN — INSULIN ASPART 2 UNITS: 100 INJECTION, SOLUTION INTRAVENOUS; SUBCUTANEOUS at 16:52

## 2021-01-01 RX ADMIN — METOPROLOL SUCCINATE 50 MG: 50 TABLET, EXTENDED RELEASE ORAL at 20:01

## 2021-01-01 RX ADMIN — ATORVASTATIN CALCIUM 80 MG: 40 TABLET, FILM COATED ORAL at 20:39

## 2021-01-01 RX ADMIN — INSULIN ASPART 9 UNITS: 100 INJECTION, SOLUTION INTRAVENOUS; SUBCUTANEOUS at 22:24

## 2021-01-01 RX ADMIN — METHOCARBAMOL 500 MG: 500 TABLET, FILM COATED ORAL at 13:29

## 2021-01-01 RX ADMIN — OXYCODONE HYDROCHLORIDE 10 MG: 5 TABLET ORAL at 20:10

## 2021-01-01 RX ADMIN — METOPROLOL TARTRATE 25 MG: 25 TABLET, FILM COATED ORAL at 19:55

## 2021-01-01 RX ADMIN — ROCURONIUM BROMIDE 40 MG: 10 INJECTION INTRAVENOUS at 08:56

## 2021-01-01 RX ADMIN — INSULIN ASPART 2 UNITS: 100 INJECTION, SOLUTION INTRAVENOUS; SUBCUTANEOUS at 12:01

## 2021-01-01 RX ADMIN — Medication: at 19:58

## 2021-01-01 RX ADMIN — I.V. FAT EMULSION 250 ML: 20 EMULSION INTRAVENOUS at 19:39

## 2021-01-01 RX ADMIN — INSULIN ASPART 3 UNITS: 100 INJECTION, SOLUTION INTRAVENOUS; SUBCUTANEOUS at 03:41

## 2021-01-01 RX ADMIN — PANTOPRAZOLE SODIUM 40 MG: 40 INJECTION, POWDER, FOR SOLUTION INTRAVENOUS at 08:08

## 2021-01-01 RX ADMIN — INSULIN ASPART 4 UNITS: 100 INJECTION, SOLUTION INTRAVENOUS; SUBCUTANEOUS at 00:10

## 2021-01-01 RX ADMIN — Medication: at 19:38

## 2021-01-01 RX ADMIN — ALBUMIN (HUMAN) 250 ML: 12.5 SOLUTION INTRAVENOUS at 09:20

## 2021-01-01 RX ADMIN — ACETAMINOPHEN 650 MG: 325 TABLET, FILM COATED ORAL at 14:44

## 2021-01-01 RX ADMIN — PHENYLEPHRINE HYDROCHLORIDE 100 MCG: 10 INJECTION INTRAVENOUS at 02:00

## 2021-01-01 RX ADMIN — INSULIN ASPART 3 UNITS: 100 INJECTION, SOLUTION INTRAVENOUS; SUBCUTANEOUS at 13:13

## 2021-01-01 RX ADMIN — ISOSORBIDE DINITRATE 5 MG: 5 TABLET ORAL at 20:39

## 2021-01-01 RX ADMIN — EPOETIN ALFA-EPBX 4000 UNITS: 10000 INJECTION, SOLUTION INTRAVENOUS; SUBCUTANEOUS at 12:11

## 2021-01-01 RX ADMIN — ROCURONIUM BROMIDE 10 MG: 10 INJECTION INTRAVENOUS at 02:43

## 2021-01-01 RX ADMIN — INSULIN ASPART 1 UNITS: 100 INJECTION, SOLUTION INTRAVENOUS; SUBCUTANEOUS at 04:41

## 2021-01-01 RX ADMIN — SODIUM CHLORIDE 300 ML: 9 INJECTION, SOLUTION INTRAVENOUS at 06:25

## 2021-01-01 RX ADMIN — HEPARIN SODIUM 5000 UNITS: 5000 INJECTION, SOLUTION INTRAVENOUS; SUBCUTANEOUS at 23:50

## 2021-01-01 RX ADMIN — I.V. FAT EMULSION 250 ML: 20 EMULSION INTRAVENOUS at 20:14

## 2021-01-01 RX ADMIN — ALBUMIN HUMAN 25 G: 0.05 INJECTION, SOLUTION INTRAVENOUS at 20:10

## 2021-01-01 RX ADMIN — ISOSORBIDE MONONITRATE 30 MG: 30 TABLET, EXTENDED RELEASE ORAL at 07:48

## 2021-01-01 RX ADMIN — INSULIN ASPART 3 UNITS: 100 INJECTION, SOLUTION INTRAVENOUS; SUBCUTANEOUS at 04:23

## 2021-01-01 RX ADMIN — INSULIN ASPART 2 UNITS: 100 INJECTION, SOLUTION INTRAVENOUS; SUBCUTANEOUS at 00:16

## 2021-01-01 RX ADMIN — PHENYLEPHRINE HYDROCHLORIDE 200 MCG: 10 INJECTION INTRAVENOUS at 01:33

## 2021-01-01 RX ADMIN — HEPARIN SODIUM 5000 UNITS: 5000 INJECTION, SOLUTION INTRAVENOUS; SUBCUTANEOUS at 16:50

## 2021-01-01 RX ADMIN — ALBUMIN HUMAN 50 G: 0.25 SOLUTION INTRAVENOUS at 09:23

## 2021-01-01 RX ADMIN — INSULIN ASPART 2 UNITS: 100 INJECTION, SOLUTION INTRAVENOUS; SUBCUTANEOUS at 08:27

## 2021-01-01 RX ADMIN — ASPIRIN 81 MG: 81 TABLET, COATED ORAL at 09:28

## 2021-01-01 RX ADMIN — INSULIN ASPART 2 UNITS: 100 INJECTION, SOLUTION INTRAVENOUS; SUBCUTANEOUS at 16:26

## 2021-01-01 RX ADMIN — IOHEXOL 20 ML: 350 INJECTION, SOLUTION INTRAVENOUS at 13:46

## 2021-01-01 RX ADMIN — MAGNESIUM SULFATE IN WATER 2 G: 40 INJECTION, SOLUTION INTRAVENOUS at 06:52

## 2021-01-01 RX ADMIN — Medication 5 ML: at 20:34

## 2021-01-01 RX ADMIN — Medication: at 20:25

## 2021-01-01 RX ADMIN — PANTOPRAZOLE SODIUM 40 MG: 40 TABLET, DELAYED RELEASE ORAL at 07:46

## 2021-01-01 RX ADMIN — HYDROMORPHONE HYDROCHLORIDE 0.2 MG: 0.2 INJECTION, SOLUTION INTRAMUSCULAR; INTRAVENOUS; SUBCUTANEOUS at 03:36

## 2021-01-01 RX ADMIN — CLOPIDOGREL BISULFATE 75 MG: 75 TABLET ORAL at 08:13

## 2021-01-01 RX ADMIN — HEPARIN SODIUM 5000 UNITS: 5000 INJECTION, SOLUTION INTRAVENOUS; SUBCUTANEOUS at 00:51

## 2021-01-01 RX ADMIN — PIPERACILLIN SODIUM AND TAZOBACTAM SODIUM 2.25 G: 2; .25 INJECTION, POWDER, LYOPHILIZED, FOR SOLUTION INTRAVENOUS at 18:32

## 2021-01-01 RX ADMIN — I.V. FAT EMULSION 250 ML: 20 EMULSION INTRAVENOUS at 19:56

## 2021-01-01 RX ADMIN — CLOPIDOGREL BISULFATE 75 MG: 75 TABLET ORAL at 07:53

## 2021-01-01 RX ADMIN — HYDROMORPHONE HYDROCHLORIDE 0.2 MG: 0.2 INJECTION, SOLUTION INTRAMUSCULAR; INTRAVENOUS; SUBCUTANEOUS at 00:34

## 2021-01-01 RX ADMIN — ACETAMINOPHEN 650 MG: 325 TABLET, FILM COATED ORAL at 04:13

## 2021-01-01 RX ADMIN — INSULIN ASPART 1 UNITS: 100 INJECTION, SOLUTION INTRAVENOUS; SUBCUTANEOUS at 07:55

## 2021-01-01 RX ADMIN — OXYCODONE HYDROCHLORIDE 5 MG: 5 TABLET ORAL at 20:29

## 2021-01-01 RX ADMIN — HEPARIN SODIUM 5000 UNITS: 5000 INJECTION, SOLUTION INTRAVENOUS; SUBCUTANEOUS at 23:24

## 2021-01-01 RX ADMIN — Medication 5 ML: at 19:03

## 2021-01-01 RX ADMIN — ONDANSETRON 4 MG: 2 INJECTION INTRAMUSCULAR; INTRAVENOUS at 16:09

## 2021-01-01 RX ADMIN — INSULIN ASPART 1 UNITS: 100 INJECTION, SOLUTION INTRAVENOUS; SUBCUTANEOUS at 16:39

## 2021-01-01 RX ADMIN — ISOSORBIDE DINITRATE 5 MG: 5 TABLET ORAL at 08:01

## 2021-01-01 RX ADMIN — Medication: at 09:43

## 2021-01-01 RX ADMIN — HEPARIN SODIUM 5000 UNITS: 5000 INJECTION, SOLUTION INTRAVENOUS; SUBCUTANEOUS at 00:07

## 2021-01-01 RX ADMIN — INSULIN ASPART 1 UNITS: 100 INJECTION, SOLUTION INTRAVENOUS; SUBCUTANEOUS at 19:45

## 2021-01-01 RX ADMIN — FENTANYL CITRATE 50 MCG: 50 INJECTION, SOLUTION INTRAMUSCULAR; INTRAVENOUS at 10:21

## 2021-01-01 RX ADMIN — CLOPIDOGREL BISULFATE 75 MG: 75 TABLET ORAL at 07:45

## 2021-01-01 RX ADMIN — I.V. FAT EMULSION 250 ML: 20 EMULSION INTRAVENOUS at 20:23

## 2021-01-01 RX ADMIN — SODIUM CHLORIDE 300 ML: 9 INJECTION, SOLUTION INTRAVENOUS at 09:20

## 2021-01-01 RX ADMIN — INSULIN ASPART 2 UNITS: 100 INJECTION, SOLUTION INTRAVENOUS; SUBCUTANEOUS at 23:44

## 2021-01-01 RX ADMIN — HEPARIN SODIUM 5000 UNITS: 5000 INJECTION, SOLUTION INTRAVENOUS; SUBCUTANEOUS at 23:32

## 2021-01-01 RX ADMIN — Medication: at 20:02

## 2021-01-01 RX ADMIN — SODIUM CHLORIDE 450 ML: 9 INJECTION, SOLUTION INTRAVENOUS at 22:10

## 2021-01-01 RX ADMIN — SODIUM CHLORIDE, POTASSIUM CHLORIDE, SODIUM LACTATE AND CALCIUM CHLORIDE: 600; 310; 30; 20 INJECTION, SOLUTION INTRAVENOUS at 13:38

## 2021-01-01 RX ADMIN — Medication 5 ML: at 08:04

## 2021-01-01 RX ADMIN — SODIUM CHLORIDE, POTASSIUM CHLORIDE, SODIUM LACTATE AND CALCIUM CHLORIDE 500 ML: 600; 310; 30; 20 INJECTION, SOLUTION INTRAVENOUS at 10:44

## 2021-01-01 RX ADMIN — METHOCARBAMOL 500 MG: 500 TABLET, FILM COATED ORAL at 14:51

## 2021-01-01 RX ADMIN — ALBUMIN HUMAN 50 ML: 0.25 SOLUTION INTRAVENOUS at 08:26

## 2021-01-01 RX ADMIN — INSULIN ASPART 1 UNITS: 100 INJECTION, SOLUTION INTRAVENOUS; SUBCUTANEOUS at 04:20

## 2021-01-01 RX ADMIN — ACETAMINOPHEN 650 MG: 325 TABLET, FILM COATED ORAL at 12:05

## 2021-01-01 RX ADMIN — ISOSORBIDE DINITRATE 5 MG: 5 TABLET ORAL at 20:23

## 2021-01-01 RX ADMIN — CLOPIDOGREL BISULFATE 75 MG: 75 TABLET ORAL at 07:47

## 2021-01-01 RX ADMIN — Medication: at 09:21

## 2021-01-01 RX ADMIN — SODIUM CHLORIDE 325 ML: 9 INJECTION, SOLUTION INTRAVENOUS at 06:19

## 2021-01-01 RX ADMIN — DOXERCALCIFEROL 3 MCG: 4 INJECTION INTRAVENOUS at 12:19

## 2021-01-01 RX ADMIN — ATORVASTATIN CALCIUM 80 MG: 40 TABLET, FILM COATED ORAL at 20:10

## 2021-01-01 RX ADMIN — HEPARIN SODIUM 5000 UNITS: 5000 INJECTION, SOLUTION INTRAVENOUS; SUBCUTANEOUS at 07:44

## 2021-01-01 RX ADMIN — SODIUM CHLORIDE 200 ML: 9 INJECTION, SOLUTION INTRAVENOUS at 23:24

## 2021-01-01 RX ADMIN — ISOSORBIDE MONONITRATE 30 MG: 30 TABLET, EXTENDED RELEASE ORAL at 07:46

## 2021-01-01 RX ADMIN — WARFARIN SODIUM 3 MG: 3 TABLET ORAL at 18:39

## 2021-01-01 RX ADMIN — I.V. FAT EMULSION 250 ML: 20 EMULSION INTRAVENOUS at 20:42

## 2021-01-01 RX ADMIN — HYDROMORPHONE HYDROCHLORIDE 0.2 MG: 0.2 INJECTION, SOLUTION INTRAMUSCULAR; INTRAVENOUS; SUBCUTANEOUS at 10:22

## 2021-01-01 RX ADMIN — SODIUM CHLORIDE 250 ML: 9 INJECTION, SOLUTION INTRAVENOUS at 11:20

## 2021-01-01 RX ADMIN — Medication 100 MG: at 01:14

## 2021-01-01 RX ADMIN — ISOSORBIDE MONONITRATE 30 MG: 30 TABLET, EXTENDED RELEASE ORAL at 07:55

## 2021-01-01 RX ADMIN — OXYCODONE HYDROCHLORIDE 5 MG: 5 TABLET ORAL at 00:34

## 2021-01-01 RX ADMIN — INSULIN ASPART 4 UNITS: 100 INJECTION, SOLUTION INTRAVENOUS; SUBCUTANEOUS at 12:57

## 2021-01-01 RX ADMIN — ASPIRIN 81 MG: 81 TABLET, COATED ORAL at 08:01

## 2021-01-01 RX ADMIN — INSULIN ASPART 4 UNITS: 100 INJECTION, SOLUTION INTRAVENOUS; SUBCUTANEOUS at 11:34

## 2021-01-01 RX ADMIN — ACETAMINOPHEN 650 MG: 325 TABLET, FILM COATED ORAL at 15:01

## 2021-01-01 RX ADMIN — ASPIRIN 81 MG: 81 TABLET, COATED ORAL at 07:35

## 2021-01-01 RX ADMIN — I.V. FAT EMULSION 250 ML: 20 EMULSION INTRAVENOUS at 20:16

## 2021-01-01 RX ADMIN — PHENYLEPHRINE HYDROCHLORIDE 100 MCG: 10 INJECTION INTRAVENOUS at 01:34

## 2021-01-01 RX ADMIN — SODIUM CHLORIDE 300 ML: 9 INJECTION, SOLUTION INTRAVENOUS at 09:19

## 2021-01-01 RX ADMIN — ATORVASTATIN CALCIUM 80 MG: 40 TABLET, FILM COATED ORAL at 19:42

## 2021-01-01 RX ADMIN — ASPIRIN 81 MG: 81 TABLET, COATED ORAL at 07:44

## 2021-01-01 RX ADMIN — HEPARIN SODIUM 5000 UNITS: 5000 INJECTION, SOLUTION INTRAVENOUS; SUBCUTANEOUS at 17:56

## 2021-01-01 RX ADMIN — Medication: at 20:14

## 2021-01-01 RX ADMIN — INSULIN ASPART 2 UNITS: 100 INJECTION, SOLUTION INTRAVENOUS; SUBCUTANEOUS at 07:41

## 2021-01-01 RX ADMIN — OXYCODONE HYDROCHLORIDE 5 MG: 5 TABLET ORAL at 06:45

## 2021-01-01 RX ADMIN — ATORVASTATIN CALCIUM 80 MG: 40 TABLET, FILM COATED ORAL at 20:03

## 2021-01-01 RX ADMIN — REGADENOSON 0.4 MG: 0.08 INJECTION, SOLUTION INTRAVENOUS at 13:54

## 2021-01-01 RX ADMIN — ISOSORBIDE DINITRATE 5 MG: 5 TABLET ORAL at 14:21

## 2021-01-01 RX ADMIN — HEPARIN SODIUM 5000 UNITS: 5000 INJECTION, SOLUTION INTRAVENOUS; SUBCUTANEOUS at 16:52

## 2021-01-01 RX ADMIN — ACETAMINOPHEN 650 MG: 325 TABLET, FILM COATED ORAL at 04:16

## 2021-01-01 RX ADMIN — INSULIN ASPART 2 UNITS: 100 INJECTION, SOLUTION INTRAVENOUS; SUBCUTANEOUS at 20:54

## 2021-01-01 RX ADMIN — DOCUSATE SODIUM 286 ML: 50 LIQUID ORAL at 11:22

## 2021-01-01 RX ADMIN — HYDROMORPHONE HYDROCHLORIDE 0.2 MG: 0.2 INJECTION, SOLUTION INTRAMUSCULAR; INTRAVENOUS; SUBCUTANEOUS at 05:26

## 2021-01-01 RX ADMIN — INSULIN ASPART 1 UNITS: 100 INJECTION, SOLUTION INTRAVENOUS; SUBCUTANEOUS at 23:51

## 2021-01-01 RX ADMIN — OXYCODONE HYDROCHLORIDE 10 MG: 5 TABLET ORAL at 08:12

## 2021-01-01 RX ADMIN — ONDANSETRON 4 MG: 2 INJECTION INTRAMUSCULAR; INTRAVENOUS at 23:28

## 2021-01-01 RX ADMIN — PHENYLEPHRINE HYDROCHLORIDE 100 MCG: 10 INJECTION INTRAVENOUS at 02:59

## 2021-01-01 RX ADMIN — ONDANSETRON 4 MG: 2 INJECTION INTRAMUSCULAR; INTRAVENOUS at 10:16

## 2021-01-01 RX ADMIN — INSULIN ASPART 2 UNITS: 100 INJECTION, SOLUTION INTRAVENOUS; SUBCUTANEOUS at 08:14

## 2021-01-01 RX ADMIN — CLOPIDOGREL BISULFATE 75 MG: 75 TABLET ORAL at 08:11

## 2021-01-01 RX ADMIN — INSULIN ASPART 3 UNITS: 100 INJECTION, SOLUTION INTRAVENOUS; SUBCUTANEOUS at 12:08

## 2021-01-01 RX ADMIN — Medication 5 ML: at 17:53

## 2021-01-01 RX ADMIN — PROPOFOL 100 MG: 10 INJECTION, EMULSION INTRAVENOUS at 01:14

## 2021-01-01 RX ADMIN — METOPROLOL TARTRATE 25 MG: 25 TABLET, FILM COATED ORAL at 20:54

## 2021-01-01 RX ADMIN — Medication 5 ML: at 08:06

## 2021-01-01 RX ADMIN — OXYCODONE HYDROCHLORIDE 10 MG: 5 TABLET ORAL at 13:11

## 2021-01-01 RX ADMIN — PIPERACILLIN SODIUM AND TAZOBACTAM SODIUM 2.25 G: 2; .25 INJECTION, POWDER, LYOPHILIZED, FOR SOLUTION INTRAVENOUS at 12:02

## 2021-01-01 RX ADMIN — SODIUM CHLORIDE 250 ML: 9 INJECTION, SOLUTION INTRAVENOUS at 12:21

## 2021-01-01 RX ADMIN — OXYCODONE HYDROCHLORIDE 5 MG: 5 TABLET ORAL at 21:35

## 2021-01-01 RX ADMIN — ACETAMINOPHEN 650 MG: 325 TABLET, FILM COATED ORAL at 21:30

## 2021-01-01 RX ADMIN — FENTANYL CITRATE 100 MCG: 50 INJECTION, SOLUTION INTRAMUSCULAR; INTRAVENOUS at 01:14

## 2021-01-01 RX ADMIN — SODIUM CHLORIDE 125 ML: 9 INJECTION, SOLUTION INTRAVENOUS at 15:33

## 2021-01-01 RX ADMIN — ONDANSETRON 4 MG: 2 INJECTION INTRAMUSCULAR; INTRAVENOUS at 03:04

## 2021-01-01 RX ADMIN — HEPARIN SODIUM 5000 UNITS: 5000 INJECTION, SOLUTION INTRAVENOUS; SUBCUTANEOUS at 07:35

## 2021-01-01 RX ADMIN — INSULIN ASPART 3 UNITS: 100 INJECTION, SOLUTION INTRAVENOUS; SUBCUTANEOUS at 22:35

## 2021-01-01 RX ADMIN — ATORVASTATIN CALCIUM 80 MG: 40 TABLET, FILM COATED ORAL at 20:23

## 2021-01-01 RX ADMIN — INSULIN ASPART 5 UNITS: 100 INJECTION, SOLUTION INTRAVENOUS; SUBCUTANEOUS at 23:29

## 2021-01-01 RX ADMIN — ISOSORBIDE MONONITRATE 30 MG: 30 TABLET, EXTENDED RELEASE ORAL at 07:44

## 2021-01-01 RX ADMIN — INSULIN ASPART 3 UNITS: 100 INJECTION, SOLUTION INTRAVENOUS; SUBCUTANEOUS at 00:06

## 2021-01-01 RX ADMIN — INSULIN ASPART 1 UNITS: 100 INJECTION, SOLUTION INTRAVENOUS; SUBCUTANEOUS at 20:36

## 2021-01-01 RX ADMIN — PHENYLEPHRINE HYDROCHLORIDE 100 MCG: 10 INJECTION INTRAVENOUS at 02:03

## 2021-01-01 RX ADMIN — ASPIRIN 81 MG: 81 TABLET, COATED ORAL at 08:03

## 2021-01-01 RX ADMIN — ACETAMINOPHEN 650 MG: 325 TABLET, FILM COATED ORAL at 08:12

## 2021-01-01 RX ADMIN — INSULIN ASPART 2 UNITS: 100 INJECTION, SOLUTION INTRAVENOUS; SUBCUTANEOUS at 07:36

## 2021-01-01 RX ADMIN — CLOPIDOGREL BISULFATE 75 MG: 75 TABLET ORAL at 08:03

## 2021-01-01 RX ADMIN — INSULIN ASPART 3 UNITS: 100 INJECTION, SOLUTION INTRAVENOUS; SUBCUTANEOUS at 15:51

## 2021-01-01 RX ADMIN — SODIUM CHLORIDE 40 ML: 9 INJECTION, SOLUTION INTRAVENOUS at 06:37

## 2021-01-01 RX ADMIN — OXYCODONE HYDROCHLORIDE 10 MG: 5 TABLET ORAL at 21:30

## 2021-01-01 RX ADMIN — ATORVASTATIN CALCIUM 80 MG: 40 TABLET, FILM COATED ORAL at 19:39

## 2021-01-01 RX ADMIN — OXYCODONE HYDROCHLORIDE 5 MG: 5 TABLET ORAL at 15:01

## 2021-01-01 RX ADMIN — PIPERACILLIN SODIUM AND TAZOBACTAM SODIUM 2.25 G: 2; .25 INJECTION, POWDER, LYOPHILIZED, FOR SOLUTION INTRAVENOUS at 14:01

## 2021-01-01 RX ADMIN — HUMAN ALBUMIN MICROSPHERES AND PERFLUTREN 5 ML: 10; .22 INJECTION, SOLUTION INTRAVENOUS at 10:24

## 2021-01-01 RX ADMIN — INSULIN ASPART 2 UNITS: 100 INJECTION, SOLUTION INTRAVENOUS; SUBCUTANEOUS at 20:12

## 2021-01-01 RX ADMIN — INSULIN ASPART 3 UNITS: 100 INJECTION, SOLUTION INTRAVENOUS; SUBCUTANEOUS at 12:19

## 2021-01-01 RX ADMIN — INSULIN ASPART 3 UNITS: 100 INJECTION, SOLUTION INTRAVENOUS; SUBCUTANEOUS at 09:29

## 2021-01-01 RX ADMIN — HYDROMORPHONE HYDROCHLORIDE 0.2 MG: 0.2 INJECTION, SOLUTION INTRAMUSCULAR; INTRAVENOUS; SUBCUTANEOUS at 07:47

## 2021-01-01 RX ADMIN — LISINOPRIL 2.5 MG: 2.5 TABLET ORAL at 07:43

## 2021-01-01 RX ADMIN — ISOSORBIDE DINITRATE 5 MG: 5 TABLET ORAL at 07:58

## 2021-01-01 RX ADMIN — OXYCODONE HYDROCHLORIDE 10 MG: 5 TABLET ORAL at 02:17

## 2021-01-01 RX ADMIN — ATORVASTATIN CALCIUM 80 MG: 40 TABLET, FILM COATED ORAL at 19:40

## 2021-01-01 RX ADMIN — FENTANYL CITRATE 50 MCG: 50 INJECTION, SOLUTION INTRAMUSCULAR; INTRAVENOUS at 01:47

## 2021-01-01 RX ADMIN — DEXTROSE AND SODIUM CHLORIDE: 5; 450 INJECTION, SOLUTION INTRAVENOUS at 20:19

## 2021-01-01 RX ADMIN — ASPIRIN 81 MG: 81 TABLET, COATED ORAL at 07:55

## 2021-01-01 RX ADMIN — HEPARIN SODIUM 5000 UNITS: 5000 INJECTION, SOLUTION INTRAVENOUS; SUBCUTANEOUS at 07:45

## 2021-01-01 RX ADMIN — HEPARIN SODIUM 5000 UNITS: 5000 INJECTION, SOLUTION INTRAVENOUS; SUBCUTANEOUS at 00:20

## 2021-01-01 RX ADMIN — INSULIN ASPART 1 UNITS: 100 INJECTION, SOLUTION INTRAVENOUS; SUBCUTANEOUS at 04:10

## 2021-01-01 RX ADMIN — PHENYLEPHRINE HYDROCHLORIDE 200 MCG: 10 INJECTION INTRAVENOUS at 01:31

## 2021-01-01 RX ADMIN — CLOPIDOGREL BISULFATE 75 MG: 75 TABLET ORAL at 08:28

## 2021-01-01 RX ADMIN — CLOPIDOGREL BISULFATE 75 MG: 75 TABLET ORAL at 07:58

## 2021-01-01 RX ADMIN — ACETAMINOPHEN 650 MG: 325 TABLET, FILM COATED ORAL at 08:27

## 2021-01-01 RX ADMIN — ASPIRIN 81 MG: 81 TABLET, COATED ORAL at 07:59

## 2021-01-01 RX ADMIN — HEPARIN SODIUM 5000 UNITS: 5000 INJECTION, SOLUTION INTRAVENOUS; SUBCUTANEOUS at 08:03

## 2021-01-01 RX ADMIN — CLOPIDOGREL BISULFATE 75 MG: 75 TABLET ORAL at 07:48

## 2021-01-01 RX ADMIN — INSULIN ASPART 2 UNITS: 100 INJECTION, SOLUTION INTRAVENOUS; SUBCUTANEOUS at 00:38

## 2021-01-01 RX ADMIN — HEPARIN SODIUM 5000 UNITS: 5000 INJECTION, SOLUTION INTRAVENOUS; SUBCUTANEOUS at 16:39

## 2021-01-01 RX ADMIN — SODIUM CHLORIDE 250 ML: 9 INJECTION, SOLUTION INTRAVENOUS at 08:51

## 2021-01-01 RX ADMIN — Medication: at 19:57

## 2021-01-01 RX ADMIN — ROCURONIUM BROMIDE 20 MG: 10 INJECTION INTRAVENOUS at 01:41

## 2021-01-01 RX ADMIN — CLOPIDOGREL BISULFATE 75 MG: 75 TABLET ORAL at 07:36

## 2021-01-01 RX ADMIN — Medication: at 20:48

## 2021-01-01 RX ADMIN — HEPARIN SODIUM 5000 UNITS: 5000 INJECTION, SOLUTION INTRAVENOUS; SUBCUTANEOUS at 07:59

## 2021-01-01 RX ADMIN — SODIUM CHLORIDE: 9 INJECTION, SOLUTION INTRAVENOUS at 23:03

## 2021-01-01 RX ADMIN — ACETAMINOPHEN 650 MG: 325 TABLET, FILM COATED ORAL at 21:09

## 2021-01-01 RX ADMIN — Medication: at 19:43

## 2021-01-01 RX ADMIN — INSULIN ASPART 4 UNITS: 100 INJECTION, SOLUTION INTRAVENOUS; SUBCUTANEOUS at 20:01

## 2021-01-01 RX ADMIN — Medication: at 19:48

## 2021-01-01 RX ADMIN — INSULIN ASPART 2 UNITS: 100 INJECTION, SOLUTION INTRAVENOUS; SUBCUTANEOUS at 02:06

## 2021-01-01 RX ADMIN — SIMETHICONE 80 MG: 80 TABLET, CHEWABLE ORAL at 00:13

## 2021-01-01 RX ADMIN — ALBUMIN HUMAN 100 ML: 0.25 SOLUTION INTRAVENOUS at 09:43

## 2021-01-01 RX ADMIN — INSULIN ASPART 5 UNITS: 100 INJECTION, SOLUTION INTRAVENOUS; SUBCUTANEOUS at 14:23

## 2021-01-01 RX ADMIN — INSULIN ASPART 1 UNITS: 100 INJECTION, SOLUTION INTRAVENOUS; SUBCUTANEOUS at 13:09

## 2021-01-01 RX ADMIN — PHENYLEPHRINE HYDROCHLORIDE 100 MCG: 10 INJECTION INTRAVENOUS at 03:12

## 2021-01-01 RX ADMIN — PHENYLEPHRINE HYDROCHLORIDE 100 MCG: 10 INJECTION INTRAVENOUS at 01:29

## 2021-01-01 RX ADMIN — ACETAMINOPHEN 650 MG: 325 TABLET, FILM COATED ORAL at 09:49

## 2021-01-01 RX ADMIN — METOPROLOL TARTRATE 25 MG: 25 TABLET, FILM COATED ORAL at 07:58

## 2021-01-01 RX ADMIN — SODIUM CHLORIDE 175 ML: 9 INJECTION, SOLUTION INTRAVENOUS at 14:08

## 2021-01-01 RX ADMIN — INSULIN ASPART 2 UNITS: 100 INJECTION, SOLUTION INTRAVENOUS; SUBCUTANEOUS at 00:19

## 2021-01-01 RX ADMIN — NITROGLYCERIN 0.4 MG: 0.4 TABLET SUBLINGUAL at 05:12

## 2021-01-01 RX ADMIN — INSULIN ASPART 2 UNITS: 100 INJECTION, SOLUTION INTRAVENOUS; SUBCUTANEOUS at 03:50

## 2021-01-01 RX ADMIN — SODIUM CHLORIDE 350 ML: 9 INJECTION, SOLUTION INTRAVENOUS at 15:37

## 2021-01-01 RX ADMIN — Medication 5 MG: at 09:59

## 2021-01-01 RX ADMIN — METOPROLOL SUCCINATE 50 MG: 50 TABLET, EXTENDED RELEASE ORAL at 19:27

## 2021-01-01 RX ADMIN — HEPARIN SODIUM 5000 UNITS: 5000 INJECTION, SOLUTION INTRAVENOUS; SUBCUTANEOUS at 15:51

## 2021-01-01 RX ADMIN — OXYCODONE HYDROCHLORIDE 10 MG: 5 TABLET ORAL at 15:00

## 2021-01-01 RX ADMIN — PANTOPRAZOLE SODIUM 40 MG: 40 INJECTION, POWDER, FOR SOLUTION INTRAVENOUS at 07:59

## 2021-01-01 RX ADMIN — HYDROMORPHONE HYDROCHLORIDE 0.2 MG: 0.2 INJECTION, SOLUTION INTRAMUSCULAR; INTRAVENOUS; SUBCUTANEOUS at 13:01

## 2021-01-01 RX ADMIN — Medication 5 ML: at 19:57

## 2021-01-01 RX ADMIN — OXYCODONE HYDROCHLORIDE 5 MG: 5 TABLET ORAL at 22:06

## 2021-01-01 RX ADMIN — Medication 5 MG: at 09:28

## 2021-01-01 RX ADMIN — SODIUM CHLORIDE 300 ML: 9 INJECTION, SOLUTION INTRAVENOUS at 12:20

## 2021-01-01 RX ADMIN — PHENYLEPHRINE HYDROCHLORIDE 100 MCG: 10 INJECTION INTRAVENOUS at 02:57

## 2021-01-01 RX ADMIN — ONDANSETRON 4 MG: 2 INJECTION INTRAMUSCULAR; INTRAVENOUS at 08:00

## 2021-01-01 RX ADMIN — SIMETHICONE 80 MG: 80 TABLET, CHEWABLE ORAL at 08:00

## 2021-01-01 RX ADMIN — METHOCARBAMOL 500 MG: 500 TABLET, FILM COATED ORAL at 09:43

## 2021-01-01 RX ADMIN — METOPROLOL TARTRATE 25 MG: 25 TABLET, FILM COATED ORAL at 07:44

## 2021-01-01 RX ADMIN — INSULIN ASPART 3 UNITS: 100 INJECTION, SOLUTION INTRAVENOUS; SUBCUTANEOUS at 07:35

## 2021-01-01 RX ADMIN — CLOPIDOGREL BISULFATE 75 MG: 75 TABLET ORAL at 07:35

## 2021-01-01 RX ADMIN — PIPERACILLIN SODIUM AND TAZOBACTAM SODIUM 2.25 G: 2; .25 INJECTION, POWDER, LYOPHILIZED, FOR SOLUTION INTRAVENOUS at 03:33

## 2021-01-01 RX ADMIN — OXYCODONE HYDROCHLORIDE 5 MG: 5 TABLET ORAL at 09:49

## 2021-01-01 RX ADMIN — OXYCODONE HYDROCHLORIDE 5 MG: 5 TABLET ORAL at 16:09

## 2021-01-01 RX ADMIN — Medication 5 ML: at 07:21

## 2021-01-01 RX ADMIN — PHENYLEPHRINE HYDROCHLORIDE 200 MCG: 10 INJECTION INTRAVENOUS at 02:09

## 2021-01-01 RX ADMIN — Medication: at 20:41

## 2021-01-01 RX ADMIN — INSULIN ASPART 3 UNITS: 100 INJECTION, SOLUTION INTRAVENOUS; SUBCUTANEOUS at 19:49

## 2021-01-01 RX ADMIN — Medication: at 11:29

## 2021-01-01 RX ADMIN — SODIUM CHLORIDE 100 ML: 9 INJECTION, SOLUTION INTRAVENOUS at 06:21

## 2021-01-01 RX ADMIN — INSULIN ASPART 2 UNITS: 100 INJECTION, SOLUTION INTRAVENOUS; SUBCUTANEOUS at 03:59

## 2021-01-01 RX ADMIN — SODIUM CHLORIDE 250 ML: 9 INJECTION, SOLUTION INTRAVENOUS at 09:50

## 2021-01-01 RX ADMIN — Medication 5 ML: at 07:33

## 2021-01-01 RX ADMIN — PROPOFOL 60 MG: 10 INJECTION, EMULSION INTRAVENOUS at 08:55

## 2021-01-01 RX ADMIN — ONDANSETRON 4 MG: 2 INJECTION INTRAMUSCULAR; INTRAVENOUS at 10:04

## 2021-01-01 RX ADMIN — OXYCODONE HYDROCHLORIDE 5 MG: 5 TABLET ORAL at 15:58

## 2021-01-01 RX ADMIN — SODIUM CHLORIDE 225 ML: 9 INJECTION, SOLUTION INTRAVENOUS at 22:01

## 2021-01-01 RX ADMIN — ACETAMINOPHEN 650 MG: 325 TABLET, FILM COATED ORAL at 12:22

## 2021-01-01 RX ADMIN — INSULIN ASPART 2 UNITS: 100 INJECTION, SOLUTION INTRAVENOUS; SUBCUTANEOUS at 20:05

## 2021-01-01 RX ADMIN — INSULIN ASPART 3 UNITS: 100 INJECTION, SOLUTION INTRAVENOUS; SUBCUTANEOUS at 00:05

## 2021-01-01 RX ADMIN — ALBUMIN HUMAN 250 ML: 0.05 INJECTION, SOLUTION INTRAVENOUS at 08:15

## 2021-01-01 RX ADMIN — INSULIN ASPART 3 UNITS: 100 INJECTION, SOLUTION INTRAVENOUS; SUBCUTANEOUS at 04:02

## 2021-01-01 RX ADMIN — ALBUMIN HUMAN 50 G: 0.25 SOLUTION INTRAVENOUS at 10:11

## 2021-01-01 RX ADMIN — HEPARIN SODIUM 5000 UNITS: 5000 INJECTION, SOLUTION INTRAVENOUS; SUBCUTANEOUS at 00:12

## 2021-01-01 RX ADMIN — INSULIN ASPART 3 UNITS: 100 INJECTION, SOLUTION INTRAVENOUS; SUBCUTANEOUS at 00:17

## 2021-01-01 RX ADMIN — HYDROMORPHONE HYDROCHLORIDE 0.2 MG: 0.2 INJECTION, SOLUTION INTRAMUSCULAR; INTRAVENOUS; SUBCUTANEOUS at 16:21

## 2021-01-01 RX ADMIN — METOPROLOL SUCCINATE 50 MG: 50 TABLET, EXTENDED RELEASE ORAL at 20:00

## 2021-01-01 RX ADMIN — HYDROMORPHONE HYDROCHLORIDE 0.2 MG: 0.2 INJECTION, SOLUTION INTRAMUSCULAR; INTRAVENOUS; SUBCUTANEOUS at 19:43

## 2021-01-01 RX ADMIN — OXYCODONE HYDROCHLORIDE 5 MG: 5 TABLET ORAL at 21:09

## 2021-01-01 RX ADMIN — SODIUM CHLORIDE: 9 INJECTION, SOLUTION INTRAVENOUS at 03:38

## 2021-01-01 RX ADMIN — CLOPIDOGREL BISULFATE 75 MG: 75 TABLET ORAL at 08:08

## 2021-01-01 RX ADMIN — PIPERACILLIN SODIUM AND TAZOBACTAM SODIUM 2.25 G: 2; .25 INJECTION, POWDER, LYOPHILIZED, FOR SOLUTION INTRAVENOUS at 22:12

## 2021-01-01 RX ADMIN — SODIUM CHLORIDE, POTASSIUM CHLORIDE, SODIUM LACTATE AND CALCIUM CHLORIDE: 600; 310; 30; 20 INJECTION, SOLUTION INTRAVENOUS at 08:45

## 2021-01-01 RX ADMIN — ALBUMIN HUMAN 250 ML: 0.05 INJECTION, SOLUTION INTRAVENOUS at 11:20

## 2021-01-01 RX ADMIN — Medication 10 MG: at 10:01

## 2021-01-01 RX ADMIN — ATORVASTATIN CALCIUM 80 MG: 40 TABLET, FILM COATED ORAL at 20:00

## 2021-01-01 RX ADMIN — OXYCODONE HYDROCHLORIDE 5 MG: 5 TABLET ORAL at 12:00

## 2021-01-01 RX ADMIN — OXYCODONE HYDROCHLORIDE 5 MG: 5 TABLET ORAL at 23:44

## 2021-01-01 RX ADMIN — METHOCARBAMOL 500 MG: 500 TABLET, FILM COATED ORAL at 17:35

## 2021-01-01 RX ADMIN — SODIUM CHLORIDE 250 ML: 9 INJECTION, SOLUTION INTRAVENOUS at 22:44

## 2021-01-01 RX ADMIN — HEPARIN SODIUM 5000 UNITS: 5000 INJECTION, SOLUTION INTRAVENOUS; SUBCUTANEOUS at 16:31

## 2021-01-01 RX ADMIN — HEPARIN SODIUM 5000 UNITS: 5000 INJECTION, SOLUTION INTRAVENOUS; SUBCUTANEOUS at 16:08

## 2021-01-01 RX ADMIN — SODIUM CHLORIDE 300 ML: 9 INJECTION, SOLUTION INTRAVENOUS at 08:15

## 2021-01-01 RX ADMIN — INSULIN ASPART 4 UNITS: 100 INJECTION, SOLUTION INTRAVENOUS; SUBCUTANEOUS at 04:08

## 2021-01-01 RX ADMIN — METHOCARBAMOL 500 MG: 500 TABLET, FILM COATED ORAL at 15:11

## 2021-01-01 RX ADMIN — ONDANSETRON 4 MG: 2 INJECTION INTRAMUSCULAR; INTRAVENOUS at 09:57

## 2021-01-01 RX ADMIN — INSULIN ASPART 3 UNITS: 100 INJECTION, SOLUTION INTRAVENOUS; SUBCUTANEOUS at 20:46

## 2021-01-01 RX ADMIN — ISOSORBIDE MONONITRATE 30 MG: 30 TABLET, EXTENDED RELEASE ORAL at 07:59

## 2021-01-01 RX ADMIN — HYDROMORPHONE HYDROCHLORIDE 0.2 MG: 0.2 INJECTION, SOLUTION INTRAMUSCULAR; INTRAVENOUS; SUBCUTANEOUS at 20:15

## 2021-01-01 RX ADMIN — INSULIN ASPART 4 UNITS: 100 INJECTION, SOLUTION INTRAVENOUS; SUBCUTANEOUS at 16:02

## 2021-01-01 RX ADMIN — ACETAMINOPHEN 650 MG: 325 TABLET, FILM COATED ORAL at 11:59

## 2021-01-01 RX ADMIN — PANTOPRAZOLE SODIUM 40 MG: 40 INJECTION, POWDER, FOR SOLUTION INTRAVENOUS at 07:44

## 2021-01-01 RX ADMIN — ONDANSETRON 4 MG: 2 INJECTION INTRAMUSCULAR; INTRAVENOUS at 08:51

## 2021-01-01 RX ADMIN — SODIUM CHLORIDE 250 ML: 9 INJECTION, SOLUTION INTRAVENOUS at 09:48

## 2021-01-01 RX ADMIN — ISOSORBIDE DINITRATE 5 MG: 5 TABLET ORAL at 14:33

## 2021-01-01 RX ADMIN — INSULIN ASPART 1 UNITS: 100 INJECTION, SOLUTION INTRAVENOUS; SUBCUTANEOUS at 00:39

## 2021-01-01 RX ADMIN — OXYCODONE HYDROCHLORIDE 5 MG: 5 TABLET ORAL at 08:14

## 2021-01-01 RX ADMIN — PANTOPRAZOLE SODIUM 40 MG: 40 INJECTION, POWDER, FOR SOLUTION INTRAVENOUS at 08:12

## 2021-01-01 RX ADMIN — HEPARIN SODIUM 5000 UNITS: 5000 INJECTION, SOLUTION INTRAVENOUS; SUBCUTANEOUS at 07:56

## 2021-01-01 RX ADMIN — PROPOFOL 20 MG: 10 INJECTION, EMULSION INTRAVENOUS at 10:35

## 2021-01-01 RX ADMIN — TOPICAL ANESTHETIC 0.5 ML: 200 SPRAY DENTAL; PERIODONTAL at 10:22

## 2021-01-01 RX ADMIN — METHOCARBAMOL 500 MG: 500 TABLET, FILM COATED ORAL at 14:00

## 2021-01-01 RX ADMIN — I.V. FAT EMULSION 250 ML: 20 EMULSION INTRAVENOUS at 20:04

## 2021-01-01 RX ADMIN — PIPERACILLIN SODIUM AND TAZOBACTAM SODIUM 2.25 G: 2; .25 INJECTION, POWDER, LYOPHILIZED, FOR SOLUTION INTRAVENOUS at 00:17

## 2021-01-01 RX ADMIN — SIMETHICONE 80 MG: 80 TABLET, CHEWABLE ORAL at 00:23

## 2021-01-01 RX ADMIN — HEPARIN SODIUM 5000 UNITS: 5000 INJECTION, SOLUTION INTRAVENOUS; SUBCUTANEOUS at 01:31

## 2021-01-01 RX ADMIN — IOHEXOL 50 ML: 350 INJECTION, SOLUTION INTRAVENOUS at 09:07

## 2021-01-01 RX ADMIN — OXYCODONE HYDROCHLORIDE 5 MG: 5 TABLET ORAL at 02:53

## 2021-01-01 RX ADMIN — OXYCODONE HYDROCHLORIDE 5 MG: 5 TABLET ORAL at 00:23

## 2021-01-01 RX ADMIN — INSULIN ASPART 2 UNITS: 100 INJECTION, SOLUTION INTRAVENOUS; SUBCUTANEOUS at 04:02

## 2021-01-01 RX ADMIN — INSULIN ASPART 2 UNITS: 100 INJECTION, SOLUTION INTRAVENOUS; SUBCUTANEOUS at 15:37

## 2021-01-01 RX ADMIN — INSULIN ASPART 3 UNITS: 100 INJECTION, SOLUTION INTRAVENOUS; SUBCUTANEOUS at 16:39

## 2021-01-01 RX ADMIN — INSULIN ASPART 3 UNITS: 100 INJECTION, SOLUTION INTRAVENOUS; SUBCUTANEOUS at 07:42

## 2021-01-01 RX ADMIN — Medication: at 20:23

## 2021-01-01 RX ADMIN — HEPARIN SODIUM 5000 UNITS: 5000 INJECTION, SOLUTION INTRAVENOUS; SUBCUTANEOUS at 00:17

## 2021-01-01 RX ADMIN — INSULIN ASPART 1 UNITS: 100 INJECTION, SOLUTION INTRAVENOUS; SUBCUTANEOUS at 09:43

## 2021-01-01 RX ADMIN — Medication: at 09:26

## 2021-01-01 RX ADMIN — HEPARIN SODIUM 5000 UNITS: 5000 INJECTION, SOLUTION INTRAVENOUS; SUBCUTANEOUS at 23:55

## 2021-01-01 RX ADMIN — CLOPIDOGREL BISULFATE 75 MG: 75 TABLET ORAL at 09:44

## 2021-01-01 RX ADMIN — METHOCARBAMOL 500 MG: 500 TABLET, FILM COATED ORAL at 09:28

## 2021-01-01 RX ADMIN — INSULIN ASPART 2 UNITS: 100 INJECTION, SOLUTION INTRAVENOUS; SUBCUTANEOUS at 21:08

## 2021-01-01 RX ADMIN — ATORVASTATIN CALCIUM 80 MG: 40 TABLET, FILM COATED ORAL at 19:56

## 2021-01-01 RX ADMIN — WARFARIN SODIUM 3 MG: 3 TABLET ORAL at 18:11

## 2021-01-01 RX ADMIN — DOXERCALCIFEROL 3 MCG: 4 INJECTION INTRAVENOUS at 09:54

## 2021-01-01 RX ADMIN — INSULIN ASPART 2 UNITS: 100 INJECTION, SOLUTION INTRAVENOUS; SUBCUTANEOUS at 08:11

## 2021-01-01 RX ADMIN — INSULIN ASPART 2 UNITS: 100 INJECTION, SOLUTION INTRAVENOUS; SUBCUTANEOUS at 07:46

## 2021-01-01 RX ADMIN — HYDROMORPHONE HYDROCHLORIDE 0.2 MG: 0.2 INJECTION, SOLUTION INTRAMUSCULAR; INTRAVENOUS; SUBCUTANEOUS at 11:06

## 2021-01-01 RX ADMIN — PIPERACILLIN SODIUM AND TAZOBACTAM SODIUM 2.25 G: 2; .25 INJECTION, POWDER, LYOPHILIZED, FOR SOLUTION INTRAVENOUS at 00:02

## 2021-01-01 RX ADMIN — DEXTROSE AND SODIUM CHLORIDE 1000 ML: 5; 450 INJECTION, SOLUTION INTRAVENOUS at 14:20

## 2021-01-01 RX ADMIN — HEPARIN SODIUM 5000 UNITS: 5000 INJECTION, SOLUTION INTRAVENOUS; SUBCUTANEOUS at 15:45

## 2021-01-01 RX ADMIN — SODIUM CHLORIDE 50 ML: 9 INJECTION, SOLUTION INTRAVENOUS at 07:06

## 2021-01-01 RX ADMIN — HYDROMORPHONE HYDROCHLORIDE 0.2 MG: 0.2 INJECTION, SOLUTION INTRAMUSCULAR; INTRAVENOUS; SUBCUTANEOUS at 16:10

## 2021-01-01 RX ADMIN — INSULIN ASPART 2 UNITS: 100 INJECTION, SOLUTION INTRAVENOUS; SUBCUTANEOUS at 07:59

## 2021-01-01 RX ADMIN — PIPERACILLIN SODIUM AND TAZOBACTAM SODIUM 2.25 G: 2; .25 INJECTION, POWDER, LYOPHILIZED, FOR SOLUTION INTRAVENOUS at 16:23

## 2021-01-01 RX ADMIN — HYDROMORPHONE HYDROCHLORIDE 0.2 MG: 0.2 INJECTION, SOLUTION INTRAMUSCULAR; INTRAVENOUS; SUBCUTANEOUS at 10:06

## 2021-01-01 RX ADMIN — Medication 5 ML: at 08:41

## 2021-01-01 RX ADMIN — PHENYLEPHRINE HYDROCHLORIDE 0.15 MCG/KG/MIN: 10 INJECTION INTRAVENOUS at 02:11

## 2021-01-01 RX ADMIN — ASPIRIN 81 MG: 81 TABLET, COATED ORAL at 07:43

## 2021-01-01 RX ADMIN — ATORVASTATIN CALCIUM 40 MG: 40 TABLET, FILM COATED ORAL at 20:24

## 2021-01-01 RX ADMIN — SODIUM CHLORIDE 250 ML: 9 INJECTION, SOLUTION INTRAVENOUS at 21:50

## 2021-01-01 RX ADMIN — SODIUM CHLORIDE 300 ML: 9 INJECTION, SOLUTION INTRAVENOUS at 14:53

## 2021-01-01 RX ADMIN — OXYCODONE HYDROCHLORIDE 5 MG: 5 TABLET ORAL at 22:53

## 2021-01-01 RX ADMIN — SODIUM CHLORIDE 250 ML: 9 INJECTION, SOLUTION INTRAVENOUS at 11:28

## 2021-01-01 RX ADMIN — DEXTROSE AND SODIUM CHLORIDE: 5; 450 INJECTION, SOLUTION INTRAVENOUS at 16:09

## 2021-01-01 RX ADMIN — Medication: at 09:50

## 2021-01-01 RX ADMIN — ASPIRIN 81 MG: 81 TABLET, COATED ORAL at 08:27

## 2021-01-01 RX ADMIN — HYDROMORPHONE HYDROCHLORIDE 0.2 MG: 0.2 INJECTION, SOLUTION INTRAMUSCULAR; INTRAVENOUS; SUBCUTANEOUS at 06:42

## 2021-01-01 RX ADMIN — INSULIN ASPART 3 UNITS: 100 INJECTION, SOLUTION INTRAVENOUS; SUBCUTANEOUS at 16:18

## 2021-01-01 RX ADMIN — HEPARIN SODIUM 5000 UNITS: 5000 INJECTION, SOLUTION INTRAVENOUS; SUBCUTANEOUS at 23:59

## 2021-01-01 RX ADMIN — DIATRIZOATE MEGLUMINE AND DIATRIZOATE SODIUM 100 ML: 660; 100 SOLUTION ORAL; RECTAL at 13:43

## 2021-01-01 RX ADMIN — ASPIRIN 81 MG: 81 TABLET, COATED ORAL at 07:53

## 2021-01-01 RX ADMIN — Medication 5 ML: at 17:18

## 2021-01-01 RX ADMIN — ATORVASTATIN CALCIUM 80 MG: 40 TABLET, FILM COATED ORAL at 19:43

## 2021-01-01 RX ADMIN — OXYCODONE HYDROCHLORIDE 10 MG: 5 TABLET ORAL at 01:13

## 2021-01-01 RX ADMIN — INSULIN ASPART 3 UNITS: 100 INJECTION, SOLUTION INTRAVENOUS; SUBCUTANEOUS at 13:02

## 2021-01-01 RX ADMIN — CLOPIDOGREL BISULFATE 75 MG: 75 TABLET ORAL at 08:46

## 2021-01-01 RX ADMIN — INSULIN ASPART 3 UNITS: 100 INJECTION, SOLUTION INTRAVENOUS; SUBCUTANEOUS at 07:43

## 2021-01-01 RX ADMIN — INSULIN ASPART 2 UNITS: 100 INJECTION, SOLUTION INTRAVENOUS; SUBCUTANEOUS at 14:32

## 2021-01-01 RX ADMIN — HEPARIN SODIUM 5000 UNITS: 5000 INJECTION, SOLUTION INTRAVENOUS; SUBCUTANEOUS at 07:46

## 2021-01-01 RX ADMIN — ACETAMINOPHEN 650 MG: 325 TABLET, FILM COATED ORAL at 23:44

## 2021-01-01 RX ADMIN — HEPARIN SODIUM 5000 UNITS: 5000 INJECTION, SOLUTION INTRAVENOUS; SUBCUTANEOUS at 00:01

## 2021-01-01 RX ADMIN — ONDANSETRON 4 MG: 2 INJECTION INTRAMUSCULAR; INTRAVENOUS at 12:30

## 2021-01-01 RX ADMIN — ISOSORBIDE DINITRATE 5 MG: 5 TABLET ORAL at 19:55

## 2021-01-01 RX ADMIN — SODIUM CHLORIDE, POTASSIUM CHLORIDE, SODIUM LACTATE AND CALCIUM CHLORIDE: 600; 310; 30; 20 INJECTION, SOLUTION INTRAVENOUS at 01:07

## 2021-01-01 RX ADMIN — Medication 5 ML: at 08:01

## 2021-01-01 RX ADMIN — GRANISETRON HYDROCHLORIDE 900 MCG: 1 INJECTION, SOLUTION INTRAVENOUS at 12:42

## 2021-01-01 RX ADMIN — OXYCODONE HYDROCHLORIDE 5 MG: 5 TABLET ORAL at 15:12

## 2021-01-01 RX ADMIN — ISOSORBIDE DINITRATE 5 MG: 5 TABLET ORAL at 19:42

## 2021-01-01 RX ADMIN — ISOSORBIDE DINITRATE 5 MG: 5 TABLET ORAL at 08:03

## 2021-01-01 RX ADMIN — METHOCARBAMOL 500 MG: 500 TABLET, FILM COATED ORAL at 21:35

## 2021-01-01 RX ADMIN — FENTANYL CITRATE 50 MCG: 50 INJECTION, SOLUTION INTRAMUSCULAR; INTRAVENOUS at 02:43

## 2021-01-01 RX ADMIN — SODIUM CHLORIDE 300 ML: 9 INJECTION, SOLUTION INTRAVENOUS at 11:28

## 2021-01-01 RX ADMIN — METHOCARBAMOL 500 MG: 500 TABLET, FILM COATED ORAL at 18:53

## 2021-01-01 RX ADMIN — OXYCODONE HYDROCHLORIDE 10 MG: 5 TABLET ORAL at 20:57

## 2021-01-01 RX ADMIN — Medication 5 ML: at 07:15

## 2021-01-01 RX ADMIN — HEPARIN SODIUM 5000 UNITS: 5000 INJECTION, SOLUTION INTRAVENOUS; SUBCUTANEOUS at 16:28

## 2021-01-01 RX ADMIN — ISOSORBIDE DINITRATE 5 MG: 5 TABLET ORAL at 14:23

## 2021-01-01 RX ADMIN — ACETAMINOPHEN 975 MG: 325 TABLET, FILM COATED ORAL at 07:43

## 2021-01-01 RX ADMIN — ROCURONIUM BROMIDE 10 MG: 10 INJECTION INTRAVENOUS at 09:35

## 2021-01-01 RX ADMIN — INSULIN ASPART 2 UNITS: 100 INJECTION, SOLUTION INTRAVENOUS; SUBCUTANEOUS at 07:52

## 2021-01-01 RX ADMIN — FENTANYL CITRATE 50 MCG: 50 INJECTION, SOLUTION INTRAMUSCULAR; INTRAVENOUS at 01:43

## 2021-01-01 RX ADMIN — HEPARIN SODIUM 5000 UNITS: 5000 INJECTION, SOLUTION INTRAVENOUS; SUBCUTANEOUS at 16:21

## 2021-01-01 RX ADMIN — CLOPIDOGREL BISULFATE 75 MG: 75 TABLET ORAL at 07:57

## 2021-01-01 RX ADMIN — SODIUM CHLORIDE 250 ML: 9 INJECTION, SOLUTION INTRAVENOUS at 10:12

## 2021-01-01 RX ADMIN — ASPIRIN 81 MG: 81 TABLET, COATED ORAL at 08:17

## 2021-01-01 RX ADMIN — INSULIN ASPART 4 UNITS: 100 INJECTION, SOLUTION INTRAVENOUS; SUBCUTANEOUS at 11:43

## 2021-01-01 RX ADMIN — OXYCODONE HYDROCHLORIDE 5 MG: 5 TABLET ORAL at 20:39

## 2021-01-01 RX ADMIN — WARFARIN SODIUM 5 MG: 5 TABLET ORAL at 18:37

## 2021-01-01 RX ADMIN — PHENYLEPHRINE HYDROCHLORIDE 100 MCG: 10 INJECTION INTRAVENOUS at 02:55

## 2021-01-01 RX ADMIN — OXYCODONE HYDROCHLORIDE 5 MG: 5 TABLET ORAL at 04:13

## 2021-01-01 RX ADMIN — SODIUM CHLORIDE 300 ML: 9 INJECTION, SOLUTION INTRAVENOUS at 10:12

## 2021-01-01 RX ADMIN — SODIUM CHLORIDE 150 ML: 9 INJECTION, SOLUTION INTRAVENOUS at 21:53

## 2021-01-01 RX ADMIN — I.V. FAT EMULSION 250 ML: 20 EMULSION INTRAVENOUS at 19:40

## 2021-01-01 RX ADMIN — INSULIN ASPART 1 UNITS: 100 INJECTION, SOLUTION INTRAVENOUS; SUBCUTANEOUS at 16:58

## 2021-01-01 RX ADMIN — PHENYLEPHRINE HYDROCHLORIDE 200 MCG: 10 INJECTION INTRAVENOUS at 02:12

## 2021-01-01 RX ADMIN — OXYCODONE HYDROCHLORIDE 5 MG: 5 TABLET ORAL at 08:16

## 2021-01-01 RX ADMIN — HYDROMORPHONE HYDROCHLORIDE 0.2 MG: 0.2 INJECTION, SOLUTION INTRAMUSCULAR; INTRAVENOUS; SUBCUTANEOUS at 21:13

## 2021-01-01 RX ADMIN — SODIUM CHLORIDE 300 ML: 9 INJECTION, SOLUTION INTRAVENOUS at 09:50

## 2021-01-01 RX ADMIN — SUGAMMADEX 200 MG: 100 INJECTION, SOLUTION INTRAVENOUS at 10:32

## 2021-01-01 RX ADMIN — INSULIN ASPART 1 UNITS: 100 INJECTION, SOLUTION INTRAVENOUS; SUBCUTANEOUS at 03:58

## 2021-01-01 RX ADMIN — INSULIN ASPART 1 UNITS: 100 INJECTION, SOLUTION INTRAVENOUS; SUBCUTANEOUS at 22:48

## 2021-01-01 RX ADMIN — ATORVASTATIN CALCIUM 80 MG: 40 TABLET, FILM COATED ORAL at 20:47

## 2021-01-01 RX ADMIN — METOPROLOL SUCCINATE 50 MG: 50 TABLET, EXTENDED RELEASE ORAL at 20:25

## 2021-01-01 SDOH — HEALTH STABILITY: MENTAL HEALTH: HOW MANY STANDARD DRINKS CONTAINING ALCOHOL DO YOU HAVE ON A TYPICAL DAY?: NOT ASKED

## 2021-01-01 SDOH — HEALTH STABILITY: MENTAL HEALTH: HOW OFTEN DO YOU HAVE 6 OR MORE DRINKS ON ONE OCCASION?: NOT ASKED

## 2021-01-01 SDOH — HEALTH STABILITY: MENTAL HEALTH: HOW OFTEN DO YOU HAVE A DRINK CONTAINING ALCOHOL?: NOT ASKED

## 2021-01-01 ASSESSMENT — MIFFLIN-ST. JEOR
SCORE: 1691.13
SCORE: 1755
SCORE: 1734.12
SCORE: 1666.45
SCORE: 1622.98
SCORE: 1733.65
SCORE: 1680.13
SCORE: 1578.98
SCORE: 1676.5
SCORE: 1678
SCORE: 1636.6
SCORE: 1617.09
SCORE: 1733.65
SCORE: 1718.69
SCORE: 1761.78
SCORE: 1694.64
SCORE: 1755.89
SCORE: 1616.18
SCORE: 1712.75
SCORE: 1797
SCORE: 1776.76
SCORE: 1627.06
SCORE: 1666.52
SCORE: 1668.34
SCORE: 1691.02
SCORE: 1712.75
SCORE: 1751.94
SCORE: 1617.09
SCORE: 1658.37
SCORE: 1219.29
SCORE: 1729
SCORE: 1761.78
SCORE: 1706.44
SCORE: 1654.28
SCORE: 1745.91
SCORE: 1744.1
SCORE: 1734.75
SCORE: 1781.74
SCORE: 1620.71
SCORE: 1704.62
SCORE: 1688.75
SCORE: 1643.39
SCORE: 1776
SCORE: 1682.4
SCORE: 1781.74
SCORE: 1761
SCORE: 1734.75
SCORE: 1578.98
SCORE: 1691.02
SCORE: 1695.55
SCORE: 1608.01
SCORE: 1682.4
SCORE: 1676.97
SCORE: 1649.74
SCORE: 1688.75
SCORE: 1655.19
SCORE: 1619.81
SCORE: 1733.67
SCORE: 1711.89
SCORE: 1626.16
SCORE: 1740
SCORE: 1694.64
SCORE: 1798
SCORE: 1754
SCORE: 1680.13
SCORE: 1744.1
SCORE: 1578.98
SCORE: 1581.71
SCORE: 1654.29
SCORE: 1711.12
SCORE: 1765

## 2021-01-01 ASSESSMENT — ACTIVITIES OF DAILY LIVING (ADL)
ADLS_ACUITY_SCORE: 21
ADLS_ACUITY_SCORE: 19
ADLS_ACUITY_SCORE: 22
ADLS_ACUITY_SCORE: 19
ADLS_ACUITY_SCORE: 18
ADLS_ACUITY_SCORE: 21
ADLS_ACUITY_SCORE: 19
ADLS_ACUITY_SCORE: 19
ADLS_ACUITY_SCORE: 18
ADLS_ACUITY_SCORE: 20
ADLS_ACUITY_SCORE: 18
ADLS_ACUITY_SCORE: 19
ADLS_ACUITY_SCORE: 18
ADLS_ACUITY_SCORE: 18
ADLS_ACUITY_SCORE: 19
ADLS_ACUITY_SCORE: 18
ADLS_ACUITY_SCORE: 19
ADLS_ACUITY_SCORE: 18
ADLS_ACUITY_SCORE: 19
ADLS_ACUITY_SCORE: 19
ADLS_ACUITY_SCORE: 18
ADLS_ACUITY_SCORE: 19
ADLS_ACUITY_SCORE: 18
ADLS_ACUITY_SCORE: 18
ADLS_ACUITY_SCORE: 22
ADLS_ACUITY_SCORE: 18
ADLS_ACUITY_SCORE: 19
ADLS_ACUITY_SCORE: 18
ADLS_ACUITY_SCORE: 20
ADLS_ACUITY_SCORE: 21
ADLS_ACUITY_SCORE: 19
ADLS_ACUITY_SCORE: 18
ADLS_ACUITY_SCORE: 19
ADLS_ACUITY_SCORE: 19
ADLS_ACUITY_SCORE: 18
ADLS_ACUITY_SCORE: 19
ADLS_ACUITY_SCORE: 18
ADLS_ACUITY_SCORE: 19
ADLS_ACUITY_SCORE: 21
ADLS_ACUITY_SCORE: 21
ADLS_ACUITY_SCORE: 19
ADLS_ACUITY_SCORE: 18
ADLS_ACUITY_SCORE: 19
ADLS_ACUITY_SCORE: 18
ADLS_ACUITY_SCORE: 19
ADLS_ACUITY_SCORE: 18
ADLS_ACUITY_SCORE: 19
ADLS_ACUITY_SCORE: 18
ADLS_ACUITY_SCORE: 19
ADLS_ACUITY_SCORE: 19
ADLS_ACUITY_SCORE: 18
ADLS_ACUITY_SCORE: 19
ADLS_ACUITY_SCORE: 18
ADLS_ACUITY_SCORE: 18
ADLS_ACUITY_SCORE: 19
ADLS_ACUITY_SCORE: 18
ADLS_ACUITY_SCORE: 19
ADLS_ACUITY_SCORE: 18
ADLS_ACUITY_SCORE: 19
ADLS_ACUITY_SCORE: 18
ADLS_ACUITY_SCORE: 18
ADLS_ACUITY_SCORE: 19
ADLS_ACUITY_SCORE: 22
ADLS_ACUITY_SCORE: 18
ADLS_ACUITY_SCORE: 19
ADLS_ACUITY_SCORE: 21
ADLS_ACUITY_SCORE: 19
ADLS_ACUITY_SCORE: 18
ADLS_ACUITY_SCORE: 18
ADLS_ACUITY_SCORE: 19
ADLS_ACUITY_SCORE: 18
ADLS_ACUITY_SCORE: 19
ADLS_ACUITY_SCORE: 16
ADLS_ACUITY_SCORE: 19
ADLS_ACUITY_SCORE: 18
ADLS_ACUITY_SCORE: 19
ADLS_ACUITY_SCORE: 18
ADLS_ACUITY_SCORE: 19
ADLS_ACUITY_SCORE: 19
ADLS_ACUITY_SCORE: 18
ADLS_ACUITY_SCORE: 18
ADLS_ACUITY_SCORE: 19
ADLS_ACUITY_SCORE: 18
ADLS_ACUITY_SCORE: 19
ADLS_ACUITY_SCORE: 20
ADLS_ACUITY_SCORE: 20
ADLS_ACUITY_SCORE: 19
ADLS_ACUITY_SCORE: 18
ADLS_ACUITY_SCORE: 19
ADLS_ACUITY_SCORE: 19
ADLS_ACUITY_SCORE: 21
ADLS_ACUITY_SCORE: 19
ADLS_ACUITY_SCORE: 19
ADLS_ACUITY_SCORE: 21
PREVIOUS_RESPONSIBILITIES: SHOPPING;MEDICATION MANAGEMENT;FINANCES;DRIVING
ADLS_ACUITY_SCORE: 19
ADLS_ACUITY_SCORE: 21
ADLS_ACUITY_SCORE: 19
ADLS_ACUITY_SCORE: 19
ADLS_ACUITY_SCORE: 18
ADLS_ACUITY_SCORE: 18
ADLS_ACUITY_SCORE: 19
ADLS_ACUITY_SCORE: 18
ADLS_ACUITY_SCORE: 19
ADLS_ACUITY_SCORE: 18

## 2021-01-01 ASSESSMENT — PAIN SCALES - GENERAL
PAINLEVEL: NO PAIN (0)
PAINLEVEL: NO PAIN (0)

## 2021-01-01 ASSESSMENT — LIFESTYLE VARIABLES
TOBACCO_USE: 1
TOBACCO_USE: 1

## 2021-01-01 ASSESSMENT — ENCOUNTER SYMPTOMS
DYSRHYTHMIAS: 1
DYSRHYTHMIAS: 1

## 2021-01-25 NOTE — TELEPHONE ENCOUNTER
Will direct this to the DME to see if they can give the patient an extension for CPAP compliance. Thanks.

## 2021-01-26 NOTE — TELEPHONE ENCOUNTER
This patient met compliance in 2018 and currently owns his CPAP machine. I will reach out to the patient and inform him of this.

## 2021-03-30 PROBLEM — K55.9 COLONIC ISCHEMIA (H): Status: ACTIVE | Noted: 2021-01-01

## 2021-03-31 NOTE — PROGRESS NOTES
"ICU Staff:     I examined the patient at the bedside in the [facility] ICU. I managed the patient at the bedside in the [facility] ICU for their critical illness.  I reviewed the patient's medical records, progress notes, and imaging studies.  The patient is an 80 year old male admitted to the ICU for acute ischemic or necrotic bowel. The patient developed severe right lower abdominal pain during dialysis..  The CT of the abdomen demonstrates bowel ischemia or necrosis in the distribution of the right ileocolic and right colic vessels.  The patient has extensive cardiovascular disease with recent MARIA ELENA placed within the last week. History of pneumonia in Jan 2021. The patient understands the potential for \"damage control surgery\" and return to the ICU with a temporary dressing and for the likely creation of an ileostomy rather than performing a primary anastomosis in the setting of vascular insufficiency.     MEDICATIONS:  Current Facility-Administered Medications   Medication     [Auto Hold] acetaminophen (TYLENOL) tablet 650 mg    Or     [Auto Hold] acetaminophen (TYLENOL) solution 650 mg     [Auto Hold] glucose gel 15-30 g    Or     [Auto Hold] dextrose 50 % injection 25-50 mL    Or     [Auto Hold] glucagon injection 1 mg     [Auto Hold] HYDROmorphone (DILAUDID) injection 0.2 mg     [Auto Hold] insulin aspart (NovoLOG) injection (RAPID ACTING)     [Auto Hold] naloxone (NARCAN) injection 0.2 mg    Or     [Auto Hold] naloxone (NARCAN) injection 0.4 mg    Or     [Auto Hold] naloxone (NARCAN) injection 0.2 mg    Or     [Auto Hold] naloxone (NARCAN) injection 0.4 mg     [Auto Hold] oxyCODONE (ROXICODONE) tablet 5 mg     [Auto Hold] piperacillin-tazobactam (ZOSYN) 2.25 g vial to attach to  ml bag     sodium chloride 0.9% infusion     ALLERGIES:  Ciprofloxacin - Rash.     SOCIAL HISTORY:  Social History     Socioeconomic History     Marital status:      Spouse name: Not on file     Number of children: Not on " file     Years of education: Not on file     Highest education level: Not on file   Occupational History     Not on file   Social Needs     Financial resource strain: Not on file     Food insecurity     Worry: Not on file     Inability: Not on file     Transportation needs     Medical: Not on file     Non-medical: Not on file   Tobacco Use     Smoking status: Not on file   Substance and Sexual Activity     Alcohol use: Not on file     Drug use: Not on file     Sexual activity: Not on file   Lifestyle     Physical activity     Days per week: Not on file     Minutes per session: Not on file     Stress: Not on file   Relationships     Social connections     Talks on phone: Not on file     Gets together: Not on file     Attends Yazidism service: Not on file     Active member of club or organization: Not on file     Attends meetings of clubs or organizations: Not on file     Relationship status: Not on file     Intimate partner violence     Fear of current or ex partner: Not on file     Emotionally abused: Not on file     Physically abused: Not on file     Forced sexual activity: Not on file   Other Topics Concern     Not on file   Social History Narrative     Not on file      PHYSICAL EXAM:  Vital Signs: /58   Pulse 80   Temp 98.9  F (37.2  C) (Oral)   Resp 15   Ht 1.829 m (6')   Wt 90.7 kg (200 lb)   SpO2 95%   BMI 27.12 kg/m       GEN: The patient is nauseated and dry retching associated with severe right lower abdominal pain.       HEENT: Pupils 2 mm bilaterally; oral mucosa dry.      Chest: CTA bilaterally     Cor: RRR, no murmur     ABD: localized peritoneal signs in the right lower and right mid abdomen.  No masses. No hernia.       Ext: A-V fistula for dialysis in the right arm with thrill and bruit.  Multiple ecchymoses of the right and the left forearms.  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       Neuro: grossly symmetric motor and sensory neurologic exam.      A/P: The patient is an 80  year old male admitted to the ICU for necrotic bowel. The patient developed severe right lower abdominal pain during dialysis today.  The CT demonstrates bowel ischemia or necrosis in the distribution of the right ileocolic and right colic vessels.  The patient has extensive cardiovascular disease with recent MARIA ELENA placed within the last week. History of pneumonia in Jan 2021.  The patient understands the potential for damage control surgery and for creation of an ileostomy rather than primary anastomosis in the setting of vascular insufficiency. Findings consistent with bilateral lower extremity superficial thrombophlebitis.     Neuro: No acute issues.      Cardiac: CAD with mild left main stenosis, severe serial mid LAD  calcified stenosis. The patient recently underwent rotational atherectomy and successful stenting of the LAD and dominant circumflex. The patient has congestive heart failure with an EF of 25% in association with the patient's ischemic cardiomyopathy resulting in both systolic and diastolic failure.  Recent PCI was successful.  Will need to maintain antiplatelet therapy with clopidogrel despite the need for surgical intervention.  The patient also has HTN managed with oral lisinopril  and metoprolol will continue in the perioperative and postoperative periods.       Pulm: Mild pulmonary hypertension. The patient had pneumonia in Jan 2021 and has a 4 mm subpleural right upper lobe pulmonary nodule being followed.       GI: Ischemic or necrotic bowel in the distribution of the ileocolic or right colic arteries.   1 liter NS bolus and started maintenance fluid for resuscitation. Will recheck the serum lactate. Plan for emergency open exploration tonight with likely right hemicolectomy.      :  ESRD dialysis recently performed.  Will consult Nephrology.       HEME: Type and cross are ordered.  INR is 2; will likely benefit from NO additional reversal because of the patient's risks for DVT, peripheral  arterial disease, and severe CAD that include recent coronary stent placement..     ENDO: Diabetic; will follow ICU glucose monitoring and insulin administration protocols      ID: Patient with necrotic bowel.  Zosyn started preoperatively.  To OR for emergency laparotomy and bowel resection.      CODE: Full CODE     I personally examined and evaluated the patient today in the [facility] ICU. The patient remains critically ill today.  All of the ICU patient care orders and treatments were placed at my direction. I personally managed the patient's ICU supportive measures that were required as the patient's critical illness creates a continuous threat for loss of life for the patient.  Key critical care decisions were made by me today to maintain life saving effective ICU supportive care assessment of surgical risks, evaluation of CAD and preparation for emergency surgery. I spent 30 minutes providing critical care services at the bedside, and on the critical care unit, evaluating the patient, coordinating and directing care and reviewing the patient's laboratory values and the patient's radiologic imaging reports associated with the patient's critical illness.  I have evaluated all laboratory values and imaging studies from the past 24 hours.. I actively assessed this acute critically ill patient and I personally provided supportive interventions that were required because the patient has acute and persistent imminently life threatening organ system failure. The critical care provided required high complexity decision making and clinical evaluation as the patient has acute critical illness that impairs one or more vital organs. The patient has a high probability of imminent or life threatening deterioration. I personally spent 30 minutes of Critical Care Time which was exclusive of any time required to perform any bedside procedures. The Critical Care Time was required to personally provide and direct critical  care services at the bedside and on the critical care unit for this  acutely critically ill patient.      Jevon Benitez MD, PhD

## 2021-03-31 NOTE — CONSULTS
Nephrology Initial Consult  March 31, 2021      Alexei Blake MRN:[id number] YOB: 1940  Date of Admission:3/30/2021  Primary care provider: Nolberto Sanz  Requesting physician: Adrien Medina*    ASSESSMENT AND RECOMMENDATIONS:   ESRD on HD  Runs TTS at [facility] under care of Dr Noble Monae via RUE AVF for 3.5 hrs. TW 92 kg  Last run cut short by 15-20 mins per report when abdominal pain started. No indication to run today and will plan for tomorrow per schedule  - Daily renal panel    HTN/Volume - appears euvolemic.   - Agree with resuming PTA Toprol XL  - Consider resuming PTA lisinopril once BP more stable    Electrolytes - no acute issues    Acid/base - no acute issues, bicarb usually ~20    BMD - Ca and Phos normal.   - Will continue PTA hectorol 3 mcg with HD  - Continue PTA TUMS WM    Anemia - related to surgery, does not appear to have anemia chronically or require Epo    Recommendations were communicated to primary team via note    Seen and discussed with Dr. Angus Valdez MD   [phone]    REASON FOR CONSULT: ESRD    HISTORY OF PRESENT ILLNESS:  Admitting provider and nursing notes reviewed  Alexei Blake is a 80 year old with PMH CAD s/p recent PCI, HFrEF, HTN, DM2, ESRD on HD, PAD, Afib on warfarin who presented with acute onset of abdominal pain during HD. Patient was found to have ischemic changes of right colon with portal venous gas per imaging.   Pt notes pain is much better after procedure and denies SOB, chest pain, nausea, cough. Notes he does not usually get much leg swelling when volume overloaded and does not have significant leg swelling now. Thinks his TW has been recently reduced.    PAST MEDICAL HISTORY:  See above  No past medical history on file.    No past surgical history on file.     MEDICATIONS:  PTA Meds  Prior to Admission medications    Medication Sig Last Dose Taking? Auth Provider   acetaminophen (TYLENOL) 500 MG  tablet Take 500 mg by mouth every 4 hours as needed for mild pain  Yes Unknown, Entered By History   aspirin 81 MG EC tablet Take 81 mg by mouth daily 3/30/2021 at Unknown time Yes Unknown, Entered By History   atorvastatin (LIPITOR) 80 MG tablet Take 80 mg by mouth every evening 3/29/2021 Yes Unknown, Entered By History   calcium carbonate (TUMS) 500 MG chewable tablet Take 1 chew tab by mouth 2 times daily  Yes Unknown, Entered By History   clopidogrel (PLAVIX) 75 MG tablet Take 75 mg by mouth daily 3/30/2021 at Unknown time Yes Unknown, Entered By History   fluticasone (FLONASE) 50 MCG/ACT nasal spray Spray 2 sprays into both nostrils daily 3/29/2021 Yes Unknown, Entered By History   lisinopril (ZESTRIL) 5 MG tablet Take 5 mg by mouth daily 3/30/2021 at Unknown time Yes Unknown, Entered By History   metoprolol succinate ER (TOPROL-XL) 25 MG 24 hr tablet Take 25 mg by mouth every evening 3/29/2021 Yes Unknown, Entered By History   multivitamin RENAL (NEPHROCAPS/TRIPHROCAPS) 1 MG capsule Take 1 capsule by mouth daily  Yes Unknown, Entered By History   nitroGLYcerin (NITROSTAT) 0.4 MG sublingual tablet Place 0.4 mg under the tongue every 5 minutes as needed for chest pain For chest pain place 1 tablet under the tongue every 5 minutes for 3 doses. If symptoms persist 5 minutes after 1st dose call 911.  Yes Unknown, Entered By History   warfarin ANTICOAGULANT (COUMADIN) 3 MG tablet Take 3 mg by mouth daily 3/29/2021 Yes Unknown, Entered By History      Current Meds    sodium chloride 0.9%  250 mL Intravenous Once     aspirin  81 mg Oral Daily     atorvastatin  80 mg Oral QPM     clopidogrel  75 mg Oral Daily     insulin aspart  1-12 Units Subcutaneous Q4H     [Held by provider] lisinopril  5 mg Oral Daily     metoprolol succinate ER  25 mg Oral QPM     piperacillin-tazobactam  2.25 g Intravenous Q6H     Infusion Meds    sodium chloride 10 mL/hr at 03/31/21 1200       ALLERGIES:  Allergies   Allergen Reactions      Blood Transfusion Related (Informational Only) Other (See Comments)     Patient has a history of a clinically significant antibody against RBC antigens.  A delay in compatible RBCs may occur.     Blood-Group Specific Substance      Anti-K present. Expect delays in blood for transfusion.  Draw 2 lavender top tubes for type and screen orders.      Calcitriol Rash     Ciprofloxacin Rash       REVIEW OF SYSTEMS:  A comprehensive of systems was negative except as noted above.    SOCIAL HISTORY:  Social History     Socioeconomic History     Marital status:      Spouse name: Not on file     Number of children: Not on file     Years of education: Not on file     Highest education level: Not on file   Occupational History     Not on file   Social Needs     Financial resource strain: Not on file     Food insecurity     Worry: Not on file     Inability: Not on file     Transportation needs     Medical: Not on file     Non-medical: Not on file   Tobacco Use     Smoking status: Not on file   Substance and Sexual Activity     Alcohol use: Not on file     Drug use: Not on file     Sexual activity: Not on file   Lifestyle     Physical activity     Days per week: Not on file     Minutes per session: Not on file     Stress: Not on file   Relationships     Social connections     Talks on phone: Not on file     Gets together: Not on file     Attends Sikh service: Not on file     Active member of club or organization: Not on file     Attends meetings of clubs or organizations: Not on file     Relationship status: Not on file     Intimate partner violence     Fear of current or ex partner: Not on file     Emotionally abused: Not on file     Physically abused: Not on file     Forced sexual activity: Not on file   Other Topics Concern     Not on file   Social History Narrative     Not on file     FAMILY MEDICAL HISTORY:  Family History   Problem Relation Age of Onset     Diabetes Father      Hypertension Father      Heart  Disease Father        PHYSICAL EXAM:  Temp  Av.1  F (36.7  C)  Min: 96.5  F (35.8  C)  Max: 99  F (37.2  C)  Arterial Line BP  Min: 74/27  Max: 156/43  Arterial Line MAP (mmHg)  Av.4 mmHg  Min: 37 mmHg  Max: 70 mmHg      Pulse  Av.7  Min: 62  Max: 89 Resp  Avg: 15.7  Min: 9  Max: 36  SpO2  Av.1 %  Min: 81 %  Max: 100 %       /61 (BP Location: Left arm)   Pulse 62   Temp 96.5  F (35.8  C) (Oral)   Resp 12   Ht 1.829 m (6')   Wt 90.7 kg (200 lb)   SpO2 97%   BMI 27.12 kg/m     Date 21 0700 - 21 0659   Shift 5448-9579 5488-0112 0186-2157 24 Hour Total   INTAKE   I.V. 178   178   IV Piggyback 250   250   Shift Total(mL/kg) 428(4.72)   428(4.72)   OUTPUT   Stool 200   200   Shift Total(mL/kg) 200(2.2)   200(2.2)   Weight (kg) 90.72 90.72 90.72 90.72      Admit Weight: 90.7 kg (200 lb)     GENERAL APPEARANCE: no distress, awake  EYES: no scleral icterus, pupils equal  Pulmonary: lungs clear to auscultation with equal breath sounds bilaterally  CV: regular rhythm, normal rate, no rub   - Edema none  GI: soft, nondistended  MS: no evidence of inflammation in joints  SKIN: no rash on exposed surfaces  NEURO: face symmetric, AOx3, speech normal   Access: RUE AVF    LABS:   CMP  Recent Labs   Lab 21  0502 21  2235    138   POTASSIUM 5.0 4.5   CHLORIDE 109 103   CO2 20 26   ANIONGAP 8 9   * 130*   BUN 27 28   CR 4.00* 4.18*   GFRESTIMATED 13* 12*   GFRESTBLACK 15* 14*   AVNI 7.5* 9.1   MAG 1.8 2.0   PHOS 3.6 3.0   PROTTOTAL  --  6.4*   ALBUMIN  --  3.1*   BILITOTAL  --  0.6   ALKPHOS  --  56   AST  --  30   ALT  --  58     CBC  Recent Labs   Lab 21  0553 21  2235   HGB 10.8* 12.2*   WBC 16.5* 17.9*   RBC 3.10* 3.40*   HCT 32.9* 36.3*   * 107*   MCH 34.8* 35.9*   MCHC 32.8 33.6   RDW 16.0* 15.8*    175     INR  Recent Labs   Lab 21  1022 21  2235   INR 2.65* 2.13*   PTT  --  44*     ABGNo lab results found in last 7 days.    URINE STUDIES  No lab results found.  No lab results found.  PTH  No lab results found.  IRON STUDIES  No lab results found.    IMAGING:  All imaging studies reviewed by me.     Jo Valdez MD

## 2021-03-31 NOTE — H&P
GENERAL SURGERY H&P  March 30, 2021      ASSESSMENT: Alexei Blake is a 80 year old male with CAD recent stent on plavix, ESRD on HD, on warfarin presenting with ischemic ascending colon likely due to low flow state.     PLAN:    - IV zosyn, IVF  - Type and cross  - OR emergently for a right hemicolectomy. Discussed with the patient the risks including, bleeding, infection, blood clots, MI, and death. Discussed with the patient the possibility of temporary abdominal closure and possible stoma. Discussed possibility of prolonged ICU stay. Patient understands the risks and wishes to proceed to the operating room.    Patient seen, findings and plan discussed with staff, Dr. Santos.    ------------------------------------------------------------------------------------------    HISTORY PRESENTING ILLNESS: Alexei Blake is a 80 year old male with multiple co-morbidities including CAD recent stent on plavix, ESRD on HD, PAD, on warfarin for afib who presents with acute onset of abdominal pain during HD earlier today. Patient was found to have ischemic changes of right colon with portal venous gas. He reports pain over the right abdomen. No nausea. Only abdominal surgery he had was an umbilical hernia.     REVIEW OF SYSTEMS: A 10 point ROS was negative except as noted above.     PAST MEDICAL HISTORY:   No past medical history on file.    SURGICAL HISTORY:   No past surgical history on file.    SOCIAL HISTORY:   Social History     Socioeconomic History     Marital status:      Spouse name: Not on file     Number of children: Not on file     Years of education: Not on file     Highest education level: Not on file   Occupational History     Not on file   Social Needs     Financial resource strain: Not on file     Food insecurity     Worry: Not on file     Inability: Not on file     Transportation needs     Medical: Not on file     Non-medical: Not on file   Tobacco Use     Smoking status: Not on file    Substance and Sexual Activity     Alcohol use: Not on file     Drug use: Not on file     Sexual activity: Not on file   Lifestyle     Physical activity     Days per week: Not on file     Minutes per session: Not on file     Stress: Not on file   Relationships     Social connections     Talks on phone: Not on file     Gets together: Not on file     Attends Anabaptist service: Not on file     Active member of club or organization: Not on file     Attends meetings of clubs or organizations: Not on file     Relationship status: Not on file     Intimate partner violence     Fear of current or ex partner: Not on file     Emotionally abused: Not on file     Physically abused: Not on file     Forced sexual activity: Not on file   Other Topics Concern     Not on file   Social History Narrative     Not on file       FAMILY HISTORY: Non contributory     ALLERGIES:    No Known Allergies    MEDICATIONS:  No current facility-administered medications on file prior to encounter.   No current outpatient medications on file prior to encounter.      PHYSICAL EXAMINATION:  Temp:  [98  F (36.7  C)] 98  F (36.7  C)  Pulse:  [76-89] 78  Resp:  [14] 14  BP: ()/(47-65) 109/56  SpO2:  [93 %-100 %] 100 %  General: Alert, well-appearing  in no acute distress.  Respiratory: Non-labored breathing  Gastrointestinal: Abdomen soft, non-distended, ttp over right abdomen. No peritoneal signs   Skin: As noted above. No rashes or lesions appreciated.    LABS: Reviewed.   Complete Blood Count   Recent Labs   Lab 03/30/21 2235   WBC 17.9*   HGB 12.2*        Basic Metabolic Panel  Recent Labs   Lab 03/30/21 2235      POTASSIUM 4.5   CHLORIDE 103   CO2 26   BUN 28   CR 4.18*   *     Liver Function Tests  Recent Labs   Lab 03/30/21 2235   AST 30   ALT 58   ALKPHOS 56   BILITOTAL 0.6   ALBUMIN 3.1*   INR 2.13*     Pancreatic Enzymes  No lab results found in last 7 days.  Coagulation Profile  Recent Labs   Lab 03/30/21 2235    INR 2.13*   PTT 44*         IMAGING:  No results found for this or any previous visit.      CO-MORBIDITIES:   No diagnosis found.      Shira Bertrand MD  General Surgery, PGY-5  [phone]

## 2021-03-31 NOTE — PLAN OF CARE
1900 - 0700   Neuro: A&Ox 4. PERRLA intact. +4 strength. Follows commands. 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 Uses walker at baseline.   Cardiac: Afib 70-90's. MAP >65 - cuff not correlating with art line (SICU notified and 250 ml bolus). Afebrile. Generalized edema.   Respiratory: 2L NC. LS clear.   GI: Hypoactive BS. No gas. Abd soft, tender to palpation. Ileostomy placed overnight with red miles in stoma - watery green output. R NG at 65 ml to LIS.   : Anuric at baseline. HD T/Th/S.   Diet: NPO.  Skin: midline abd incision - primapore CDI. Dalton/dusky LE. Toe amputations on both feet. R AVF.   Lines: L PIV x 2. L radial art line.   Plan: Monitor vitals.   For vital signs and complete assessments, please see documentation flowsheets.

## 2021-03-31 NOTE — BRIEF OP NOTE
[facility]    Brief Operative Note    Pre-operative diagnosis: Ischemia, bowel (H) [K55.9]  Post-operative diagnosis Ascending colon ischemia    Procedure: Procedure(s):  LAPAROTOMY, RIGHT HEMICOLECTOMY, ILEOSTOMY CREATION with mucus fistula creation  Surgeon: Surgeon(s) and Role:     * Harsh Santos MD - Primary     * Shira Bertrand MD - Resident - Assisting     * Steffanie Wiley MD - Resident - Assisting  Anesthesia: General   Estimated blood loss: 50 ml  Drains: None  Specimens:   ID Type Source Tests Collected by Time Destination   A : Right Colon Tissue Colon SURGICAL PATHOLOGY EXAM Harsh Santos MD 3/31/2021  2:13 AM      Findings:   Necrosis of ascending colon with edema without perforation. .  Complications: None.  Implants: * No implants in log *

## 2021-03-31 NOTE — PROGRESS NOTES
SURGICAL ICU PROGRESS NOTE  3/30/2021    PRIMARY TEAM: General Surgery  PRIMARY PHYSICIAN: Dr. Santos     REASON FOR CRITICAL CARE ADMISSION: Close hemodynamic monitoring   ADMITTING PHYSICIAN: Dr. Benitez     ASSESSMENT: Alexei Blake is a 80 year old male with T2D, afib on plavix, CAD recent stent on plavix, ESRD on HD, on warfarin presenting with ischemic ascending colon likely due to low flow state. Returned to SICU s/p ex lap, right hemicolectomy with ileostomy and mucous fistula overnight 3/30.    PLAN:    Changes today:  - TKO fluids  - Subcutaneous heparin to start today  - Nephrology consult for HD  - Arterial line removed  - Transfer to floor     Neuro/ pain/ sedation:  # Acute pain   # Hx of stroke, residual lft leg deficits  - Tylenol, oxy and dilaudid for pain     Pulmonary care:   - Supplemental oxygen to keep saturation above 92 %  - 2 LPM this morning  - Incentive spirometer every 15- 30 minutes when awake     Cardiovascular:    # Afib on warfarin   # Hx of bilateral LE bypass   # Hx of stents x 4, most recent w/i the last week, on aspirin/plavix  - BP 93/44, HR 70s  - Hold PTA warfarin  - Plavix, aspirin, atorvastatin, metoprolol XL resumed, lisinopril held this morning     GI/Nutrition  # S/p R casey, ileostomy, mucus fistula   - NPO  - NGT to LIS, 50 out since midnight  - Ileostomy with bowel sweat, no succus yet     Renal/Fluids/ Electrolytes  # CKD on HD   - NS TKO  - UOP 0  - ICU electrolyte replacement protocol  - Nephrology consulted for HD   - K 5.0, Cr 4.0  - LA 2.0     Endocrine:    # Diabetes  - sliding scale insulin,  - 182     ID/ Antibiotics:  - Zosyn x 4 days per surgery  - WBC 16.5 (12.2)     Heme:   # Acute surgical blood loss    - Hgb 10.8 (12.2), no signs of active bleeding  - INR 2.13 pre op     MSK:   #Hx of bilateral toe amputations    - PT and OT consulted. Appreciate recs.     Prophylaxis:    - Mechanical prophylaxis for DVT.  - Subcutaneous heparin to start today      Lines/ tubes/ drains:  - PIV  - arterial line (removed)  - Ileostomy/mucous fistula  - NGT     Disposition:  - To floor    Patient seen, findings and plan discussed with surgical ICU staff.    Uriel Luke MD, PhD, PGY-2  General Surgery    - - - - - - - - - - - - - - - - - - - - - - - - - - - - - - - - - - - - - - - - - - - - - - - - - - - - - - - - - - - - - - - - - - - - - - - -   Subjective:   No acute events. Pain well controlled, alert, extubated. No nausea.    PHYSICAL EXAMINATION:  Temp:  [98  F (36.7  C)-99  F (37.2  C)] 99  F (37.2  C)  Pulse:  [69-89] 69  Resp:  [14-18] 18  BP: ()/(44-70) 93/44  MAP:  [37 mmHg-70 mmHg] 37 mmHg  Arterial Line BP: ()/(27-43) 74/27  SpO2:  [93 %-100 %] 99 %    Gen: awake, NAD  HEENT: NCAT, EOMI  CV: RRR  Resp: nonlabored breathing on 2 LPM  Abd: soft, nondistended, appropriately tender. Ileostomy and mucous fistula pink and viable. Thin dark brown output in bag without gas. Midline incision with overlying dressing, c/d/i.   Ext: warm, well perfused. Bilateral toe amputations  Neuro: alert, oriented, moving all extremities spontaneously    LABS: Reviewed.   Complete Blood Count   Recent Labs   Lab 03/31/21  0553 03/30/21 2235   WBC 16.5* 17.9*   HGB 10.8* 12.2*    175     Basic Metabolic Panel  Recent Labs   Lab 03/31/21  0502 03/30/21 2235    138   POTASSIUM 5.0 4.5   CHLORIDE 109 103   CO2 20 26   BUN 27 28   CR 4.00* 4.18*   * 130*     Liver Function Tests  Recent Labs   Lab 03/30/21 2235   AST 30   ALT 58   ALKPHOS 56   BILITOTAL 0.6   ALBUMIN 3.1*   INR 2.13*     Coagulation Profile  Recent Labs   Lab 03/30/21 2235   INR 2.13*   PTT 44*

## 2021-03-31 NOTE — PLAN OF CARE
4A PT. Cancel. Orders received, chart reviewed, eval attempted, required increased time with OT today due to requesting OT returns in PM. Pt moving floors when re-attempted. Will reschedule for tomorrow.

## 2021-03-31 NOTE — PROGRESS NOTES
Admitted from: [facility]  Reason for admission/transfer: ischemic bowel - emergent surgical consult/OR  2 RN skin assessment: completed by Ana Rosa SAUNDERS RN and Anastacio ELY RN.  Result of skin assessment and interventions/actions: Mepilex to coccyx - blanchable redness.   Height, weight, drug calc weight: Done  Patient belongings (see Flowsheet): shoes, underwear, socks, shoes.  MDRO education added to care plan: N/A  ?

## 2021-03-31 NOTE — ANESTHESIA PROCEDURE NOTES
Airway       Patient location during procedure: OR  Staff -        Resident/Fellow: Matthieu Crawford MD       Performed By: resident  Consent for Airway        Urgency: elective  Indications and Patient Condition       Indications for airway management: lesly-procedural       Induction type:RSI       Mask difficulty assessment: 0 - not attempted    Final Airway Details       Final airway type: endotracheal airway       Successful airway: ETT - single  Endotracheal Airway Details        ETT size (mm): 7.5       Cuffed: yes       Successful intubation technique: video laryngoscopy       VL Blade Size: MAC 4       Grade View of Cords: 1 (DL grade 1 view )       Adjucts: stylet       Position: Right       Measured from: lips       Secured at (cm): 24       Bite block used: None    Post intubation assessment        Placement verified by: capnometry        Number of attempts at approach: 1       Number of other approaches attempted: 0       Secured with: pink tape       Ease of procedure: easy       Dentition: Intact    Medication(s) Administered   Medication Administration Time: 3/31/2021 1:15 AM

## 2021-03-31 NOTE — PROGRESS NOTES
"   03/31/21 1138   Quick Adds   Type of Visit Initial Occupational Therapy Evaluation       Present no   Language english   Living Environment   People in home spouse   Current Living Arrangements house   Home Accessibility stairs to enter home;stairs within home   Number of Stairs, Main Entrance 2   Stair Railings, Main Entrance none   Number of Stairs, Within Home, Primary   (flight to 2nd floor & flight to basement; pt does not use)   Stair Railings, Within Home, Primary   (yes)   Transportation Anticipated car, drives self   Living Environment Comments Pt reports that he lives in a 2 story home with his wife.  Pt bedroom and primary living spaces located on main level of home.  pt wife uses bedroom in upper level.  Pt reports \"his shower\" is in the basement (walk in with shower chair); however, recently has preferred using \"his wife's shower\" on the main level of the home (also a walk in shower with shower chair).  Pt reports he primarily does the driving   Self-Care   Usual Activity Tolerance moderate   Current Activity Tolerance fair   Regular Exercise No   Equipment Currently Used at Home shower chair;walker, standard   Activity/Exercise/Self-Care Comment Pt reports using a standard walker for mobility 2/2 residual LLE deficits from previous stroke as well as amputation of toes on B feet.  pt owns rolling walker but does not typically use.  Goes to OP dialysis 3x/week. pt and his wife grocery shop together.  pt wife does the cooking, cleaning, laundry.  Pt able to complete bathing, dressing and toileting with mod I; however, pt wife assists with \"dressing his feet\" including donning of compression socks. Pt reports his wife \"has back problems\" and will need to have surgery soon so he \"needs to get back on his feet\".  Pt reports decreased activity tolerance just prior to admission stating he would get tired \"just walking to the kitchen\"   Disability/Function   Hearing Difficulty or Deaf " "no   Wear Glasses or Blind yes   Vision Management glasses   Concentrating, Remembering or Making Decisions Difficulty yes   Concentration Management impaired memory baseline   Difficulty Communicating no   Difficulty Eating/Swallowing no   Walking or Climbing Stairs Difficulty yes   Walking or Climbing Stairs ambulation difficulty, requires equipment;stair climbing difficulty, requires equipment   Mobility Management walker   Dressing/Bathing Difficulty yes   Dressing/Bathing bathing difficulty, requires equipment;dressing difficulty, assistance 1 person   Dressing/Bathing Management shower chair for bathing; wife assists with LE dressing   Toileting issues no   Doing Errands Independently Difficulty (such as shopping) yes   Errands Management wife assist   Fall history within last six months no   Change in Functional Status Since Onset of Current Illness/Injury yes   General Information   Onset of Illness/Injury or Date of Surgery 03/31/21   Referring Physician Steffanie Wiley MD   Patient/Family Therapy Goal Statement (OT) Return to home, \"get back on my feet\"   Additional Occupational Profile Info/Pertinent History of Current Problem Alexei Kaur is an 80-year-old gentleman with numerous comorbidities including coronary artery disease with a recent stent on Plavix, end stage renal disease on hemodialysis, on warfarin who presented with abdominal pain and signs symptoms consistent with ischemia of the ascending colon. S/p ex-lap, open right hemicolectomy, end ileostomy and mucus fistula   Existing Precautions/Restrictions abdominal;fall   General Observations and Info Activity: up with assist   Cognitive Status Examination   Orientation Status orientation to person, place and time   Affect/Mental Status (Cognitive) WNL   Follows Commands WFL   Safety Deficit minimal deficit   Memory Deficit minimal deficit   Cognitive Status Comments Pt acknowledges baseline memory deficits; alert, oriented and generally " "appropriate in conversation; however, at times using humor and sarcasm making it unclear if he is masking further deficits.  Will monitor   Visual Perception   Impact of Vision Impairment on Function (Vision) Pt reports wearing glasses at baseline, denies any acute visual changes   Sensory   Sensory Comments baseline neuropathy in B feet; transient n/t in R hand 2/2 dialysis fistual   Pain Assessment   Patient Currently in Pain   (yes; abdominal)   Integumentary/Edema   Integumentary/Edema Comments Pt reports wearing compression stockings at baseline   Range of Motion Comprehensive   Comment, General Range of Motion BUE WFL   Strength Comprehensive (MMT)   Comment, General Manual Muscle Testing (MMT) Assessment not formally assessed 2/2 post surgical precautions, generalized deconditioning    Bed Mobility   Bed Mobility supine-sit   Supine-Sit [place] (Bed Mobility) supervision;verbal cues   Assistive Device (Bed Mobility) bed rails   Transfers   Transfers bed-chair transfer;sit-stand transfer   Transfer Skill: Bed to Chair/Chair to Bed   Bed-Chair [place] (Transfers) contact guard   Assistive Device (Bed-Chair Transfers) standard walker   Sit-Stand Transfer   Sit-Stand [place] (Transfers) contact guard   Assistive Device (Sit-Stand Transfers) walker, standard   Balance   Balance Comments impaired; uses walker baseline   Instrumental Activities of Daily Living (IADL)   Previous Responsibilities shopping;medication management;finances;driving   IADL Comments wife able to assist with most IADLs; however, she has \"back issues\" and can be physically limited at times per pt   Clinical Impression   Criteria for Skilled Therapeutic Interventions Met (OT) yes;meets criteria   OT Diagnosis decreased activity tolerance and independence with ADLs   OT Problem List-Impairments impacting ADL problems related to;activity tolerance impaired;balance;cognition;mobility;strength;pain;post-surgical precautions "   Assessment of Occupational Performance 5 or more Performance Deficits   Identified Performance Deficits bed mobility, transfers, toileting, dressing, bathing, mobility   Planned Therapy Interventions (OT) ADL retraining;IADL retraining;bed mobility training;cognition;strengthening;transfer training   Clinical Decision Making Complexity (OT) low complexity   Therapy Frequency (OT) Daily   Predicted Duration of Therapy 4/6/21   Risk & Benefits of therapy have been explained evaluation/treatment results reviewed;care plan/treatment goals reviewed;participants voiced agreement with care plan;participants included;patient   OT Discharge Planning    OT Discharge Recommendation (DC Rec) Home with assist   OT Rationale for DC Rec Anticipate pt will progress well with skilled therapy intervention and be able to discharge to home with wife assist once medically appropriate   OT Brief overview of current status  CGA with walker and cues for bed mobility, transfers and ambulation of household distances   Total Evaluation Time (Minutes)   Total Evaluation Time (Minutes) 5

## 2021-03-31 NOTE — PROGRESS NOTES
SURGICAL ICU PROGRESS NOTE  3/30/2021    PRIMARY TEAM: General Surgery  PRIMARY PHYSICIAN: Dr. Santos     REASON FOR CRITICAL CARE ADMISSION: Close hemodynamic monitoring   ADMITTING PHYSICIAN: Dr. Benitez     ASSESSMENT: Alexei Blake is a 80 year old male with T2D, afib on plavix, CAD recent stent on plavix, ESRD on HD, on warfarin presenting with ischemic ascending colon likely due to low flow state. Returned to SICU s/p ex lap, rt hemicolectomy with ileostomy and mucous fistula overnight 3/30    PLAN:   Neuro/ pain/ sedation:  #Acute pain   #Hx of stroke, residual lft leg deficits  -Monitor neurological status. Notify the MD for any acute changes in exam.  -Tylenol, oxy and dilaudid for pain  for pain.     Pulmonary care:   -Supplemental oxygen to keep saturation above 92 %.  -Incentive spirometer every 15- 30 minutes when awake.     Cardiovascular:    #Afib on warfarin   #Hx of bilateral LE bypass   #Hx of stents x 4, most recent w/i the last week   -Monitor hemodynamic status.   -Hold PTA warfarin.  -Plavix, aspirin, atorvastatin, metoprolol, lisinopril      GI/Nutrition   -NPO  -NGT to LIS      Renal/Fluids/ Electrolytes  #CKD on HD   -NS @ 75cc/hr  -Continue to monitor intake and output.  -ICU electrolyte replacement protocol  -Nephrology consulted for HD   -BMP, mag and phos pending      Endocrine:    #Diabetes  -High ISS     ID/ Antibiotics:  -Zosyn x 24h per general surgery   -WBC 12.2, CBC pending      Heme:     -Hgb 12.2, CBC pending      MSK:   #Hx of bilateral toe amputations    -PT and OT consulted. Appreciate recs.     Prophylaxis:    -Mechanical prophylaxis for DVT.  -INR 2.13       Lines/ tubes/ drains:  -PIVs  -arterial line   -Ileostomy/mucous fistula  -NGT     Disposition:  -SICU    Patient seen, findings and plan discussed with surgical ICU staff.    Steffanie Wiley MD  General Surgery, PGY2  [phone]    - - - - - - - - - - - - - - - - - - - - - - - - - - - - - - - - - - - - - - - - - - - - -  - - - - - - - - - - - - - - - - - - - - - - - - - - -   Subjective:    Alexei Blake is a 80 year old male with multiple co-morbidities including CAD recent stent on plavix, ESRD on HD, PAD, on warfarin for afib who presents with acute onset of abdominal pain during HD earlier today. Patient was found to have ischemic changes of right colon with portal venous gas. He reports pain over the right abdomen. No nausea. Only abdominal surgery he had was an umbilical hernia.     Now s/p ex lap, rt hemicolectomy with ileostomy and mucous fistula. Extubated post op. Well controlled pain. Ostomy functioning.     PHYSICAL EXAMINATION:  Temp:  [98  F (36.7  C)] 98  F (36.7  C)  Pulse:  [76-89] 78  Resp:  [14] 14  BP: ()/(47-65) 109/56  SpO2:  [93 %-100 %] 100 %    Gen: awake, NAD  HEENT: NCAT, EOMI. NGT in place   CV: RRR  Resp: unlabored breathing, CTAB  Abd: soft, nondistended, appropriately tender. Ileostomy and mucous fistula pink and viable. Thin green output in bag. Midline incision with overlying dressing, c/d/i.   Ext: warm, appear well perfused. Bilateral toe amputations   Neuro: alert, moving all extremities spontaneously, no focal deficits noted     LABS: Reviewed.   Complete Blood Count   Recent Labs   Lab 03/30/21 2235   WBC 17.9*   HGB 12.2*        Basic Metabolic Panel  Recent Labs   Lab 03/30/21 2235      POTASSIUM 4.5   CHLORIDE 103   CO2 26   BUN 28   CR 4.18*   *     Liver Function Tests  Recent Labs   Lab 03/30/21 2235   AST 30   ALT 58   ALKPHOS 56   BILITOTAL 0.6   ALBUMIN 3.1*   INR 2.13*     Coagulation Profile  Recent Labs   Lab 03/30/21 2235   INR 2.13*   PTT 44*

## 2021-03-31 NOTE — PROGRESS NOTES
General Surgery Progress Note    Subjective: Ex-lap for LBR and end ileostomy 2/2 necrotic colon. SALVADOR after surgery. Not on pressors, o2 requirements 2L NC. NGT with minimal output, denies nausea.    Objective:   BP (S) (!) 89/47   Pulse 68   Temp 99  F (37.2  C) (Axillary)   Resp 16   Ht 1.829 m (6')   Wt 90.7 kg (200 lb)   SpO2 97%   BMI 27.12 kg/m      PE:  Gen: Pleasant male in NAD  CV: Regular rate and non-cyanotic appearing   Resp: non-labored breathing on 2L NC  Abd: Soft, mildly distended, appropriately tender, no rigidity or guarding. Ileostomy with some serosanguinous fluid in bag, healthy pink.  Ext: warm and well perfused  Incision: c/d/i      Intake/Output Summary (Last 24 hours) at 3/31/2021 0727  Last data filed at 3/31/2021 0700  Gross per 24 hour   Intake 1712.33 ml   Output 400 ml   Net 1312.33 ml       Recent labs reviewed.    Recent imaging reviewed.    A/P: Alexei Kaur is an 80-year-old gentleman with numerous comorbidities including coronary artery disease with a recent stent on Plavix, end stage renal disease on hemodialysis, on warfarin who presented with abdominal pain and signs symptoms consistent with ischemia of the ascending colon. S/p ex-lap, open right hemicolectomy, end ileostomy and mucus fistula. Doing well    - Pain control with APAP, oxy, dilauded  - Continue ASA and plavix, hold warfarin. PTA metoprolol  - Zosyn for 4 days total  - mIVF @ 125, NPO except chips/meds. Ok to remove NG  - Start SQH ppx tonight for DVT prophylaxis  - WOC consult for ostomy cares  - HD Tu/Th/Sat  - Okay to transfer to floor if continues to be hemodynamically stable       Seen and discussed with chief resident who will discuss with staff.    Jamal Mathew MD  Surgery PGY-1    Adriana Jackson MD  Chief Resident (General Surgery)  PGY 5

## 2021-03-31 NOTE — ANESTHESIA PROCEDURE NOTES
Arterial Line Procedure Note  Pre-Procedure   Staff -        Anesthesiologist:  Donte Keane MD       Resident/Fellow: Matthieu Crawford MD       Performed By: resident       Location: OR       Pre-Anesthestic Checklist: patient identified, IV checked, risks and benefits discussed, informed consent, monitors and equipment checked, pre-op evaluation and at physician/surgeon's request  Timeout:       Correct Patient: Yes        Correct Procedure: Yes        Correct Site: Yes        Correct Position: Yes   Procedure   Procedure: arterial line and new line       Laterality: left       Insertion Site: radial.  Sterile Prep        Standard elements of sterile barrier followed       Skin prep: Chloraprep  Insertion/Injection        Technique: ultrasound guided        - Artery evaluated via U/S for patency/adequacy of cortis insertion is adequate, and using realtime U/S imaging the artery was punctured, and needle was observed entering artery on U/S       Catheter Type/Size: 20 G, 1.75 in/4.5 cm quick cath (integral wire)  Narrative        Tegaderm dressing used.       Complications: None apparent,        Arterial waveform: Yes        IBP within 10% of NIBP: Yes

## 2021-03-31 NOTE — ANESTHESIA PREPROCEDURE EVALUATION
Anesthesia Pre-Procedure Evaluation    Patient: Alexei Blake   MRN: [id number] : 1940        Preoperative Diagnosis: Ischemia, bowel (H) [K55.9]   Procedure : Procedure(s):  LAPAROTOMY, POSSIBLE BOWEL RESECTION, POSSIBLE STOMA     No past medical history on file.   No past surgical history on file.   No Known Allergies   Social History     Tobacco Use     Smoking status: Not on file   Substance Use Topics     Alcohol use: Not on file      Wt Readings from Last 1 Encounters:   21 90.7 kg (200 lb)        Anesthesia Evaluation   Pt has had prior anesthetic. Type: MAC.        ROS/MED HX  ENT/Pulmonary:     (+) sleep apnea, uses CPAP, tobacco use (quit in  ), Past use,     Neurologic:     (+) CVA, with deficits, - H/o Residual mild L side weakness.     Cardiovascular: Comment: PCI of LAD on 2021, PCI of circumflex/OM  on 3/20/21    (+) Dyslipidemia hypertension-Peripheral Vascular Disease-CAD --stent-3/19/21. 1 Drug Eluting Stent. Taking blood thinners dysrhythmias, a-fib, Previous cardiac testing   Echo: Date: 21 Results:  Left ventricle ejection fraction is moderately decreased. The calculated   left ventricular ejection fraction is 44%.    Normal right ventricular size and systolic function.    The following segments are akinetic: apical septal and apex. The   following segments are hypokinetic: mid inferoseptal.    When compared to the previous study dated 2021, there has been   improvement in left ventricular ejection fraction and improvement in   function of several wall segments.  Stress Test: Date: Results:    ECG Reviewed: Date: 3/19/21 Results:  Atrial fibrillation with premature ventricular or aberrantly conducted complexes  Left axis deviation  Left bundle branch block  Cath:  Date: 3/19/21 Results:  Staged PCI for ischemic cardiomyopathy. EF improved from 25% to 44% with   PCI of the LAD in January.    Severe ostial dominant circumflex, critical proximal OM-3 and severe    distal circumflex lesions all treated with rotational atherectomy and a   Synergy MARIA ELENA with good angiographic results. IVUS used for the ostial   circumflex lesion.    METS/Exercise Tolerance:     Hematologic:       Musculoskeletal:       GI/Hepatic:       Renal/Genitourinary: Comment: Arteriovenous fistula; Right    (+) renal disease, type: CRI, Pt requires dialysis, type: Hemodialysis,     Endo:     (+) type II DM, Diabetic complications: nephropathy cardiac problems.     Psychiatric/Substance Use:     (+) psychiatric history depression     Infectious Disease:       Malignancy:       Other:            Physical Exam    Airway  airway exam normal           Respiratory Devices and Support         Dental  no notable dental history     (+) upper dentures      Cardiovascular   cardiovascular exam normal          Pulmonary   pulmonary exam normal                OUTSIDE LABS:  CBC:   Lab Results   Component Value Date    WBC 17.9 (H) 03/30/2021    HGB 12.2 (L) 03/30/2021    HCT 36.3 (L) 03/30/2021     03/30/2021     BMP:   Lab Results   Component Value Date     03/30/2021    POTASSIUM 4.5 03/30/2021    CHLORIDE 103 03/30/2021    CO2 26 03/30/2021    BUN 28 03/30/2021    CR 4.18 (H) 03/30/2021     (H) 03/30/2021     COAGS:   Lab Results   Component Value Date    PTT 44 (H) 03/30/2021    INR 2.13 (H) 03/30/2021     POC:   Lab Results   Component Value Date     (H) 03/30/2021     HEPATIC:   Lab Results   Component Value Date    ALBUMIN 3.1 (L) 03/30/2021    PROTTOTAL 6.4 (L) 03/30/2021    ALT 58 03/30/2021    AST 30 03/30/2021    ALKPHOS 56 03/30/2021    BILITOTAL 0.6 03/30/2021     OTHER:   Lab Results   Component Value Date    LACT 2.0 03/30/2021    AVNI 9.1 03/30/2021    PHOS 3.0 03/30/2021    MAG 2.0 03/30/2021       Anesthesia Plan    ASA Status:  4, emergent    NPO Status:  ELEVATED Aspiration Risk/Unknown    Anesthesia Type: General.   Induction: RSI.   Maintenance: Balanced.   Techniques  and Equipment:     - AVOID: No BP/IV in RUE     - Airway: Video-Laryngoscope     - Lines/Monitors: 2nd IV, Arterial Line, BIS     Consents    Anesthesia Plan(s) and associated risks, benefits, and realistic alternatives discussed. Questions answered and patient/representative(s) expressed understanding.     - Discussed with:  Patient      - Extended Intubation/Ventilatory Support Discussed: No.      - Patient is DNR/DNI Status: No    Use of blood products discussed: No .     Postoperative Care    Pain management: IV analgesics.   PONV prophylaxis: Ondansetron (or other 5HT-3)     Comments:                Matthieu Crawford MD

## 2021-03-31 NOTE — PHARMACY-ADMISSION MEDICATION HISTORY
Admission medication history interview status for the 3/30/2021 admission is complete. See Epic admission navigator for allergy information, pharmacy, prior to admission medications and immunization status.     Medication history interview sources:  Medication history completed at [facility] on 3/30/21 and clinic notes    Changes made to PTA medication list (reason)  Added: All  Deleted: None  Changed: None    Additional medication history information (including reliability of information, actions taken by pharmacist): None      Prior to Admission medications    Medication Sig Last Dose Taking? Auth Provider   acetaminophen (TYLENOL) 500 MG tablet Take 500 mg by mouth every 4 hours as needed for mild pain  Yes Unknown, Entered By History   aspirin 81 MG EC tablet Take 81 mg by mouth daily 3/30/2021 at Unknown time Yes Unknown, Entered By History   atorvastatin (LIPITOR) 80 MG tablet Take 80 mg by mouth every evening 3/29/2021 Yes Unknown, Entered By History   calcium carbonate (TUMS) 500 MG chewable tablet Take 1 chew tab by mouth 2 times daily  Yes Unknown, Entered By History   clopidogrel (PLAVIX) 75 MG tablet Take 75 mg by mouth daily 3/30/2021 at Unknown time Yes Unknown, Entered By History   fluticasone (FLONASE) 50 MCG/ACT nasal spray Spray 2 sprays into both nostrils daily 3/29/2021 Yes Unknown, Entered By History   lisinopril (ZESTRIL) 5 MG tablet Take 5 mg by mouth daily 3/30/2021 at Unknown time Yes Unknown, Entered By History   metoprolol succinate ER (TOPROL-XL) 25 MG 24 hr tablet Take 25 mg by mouth every evening 3/29/2021 Yes Unknown, Entered By History   multivitamin RENAL (NEPHROCAPS/TRIPHROCAPS) 1 MG capsule Take 1 capsule by mouth daily  Yes Unknown, Entered By History   nitroGLYcerin (NITROSTAT) 0.4 MG sublingual tablet Place 0.4 mg under the tongue every 5 minutes as needed for chest pain For chest pain place 1 tablet under the tongue every 5 minutes for 3 doses. If symptoms persist 5  minutes after 1st dose call 911.  Yes Unknown, Entered By History   warfarin ANTICOAGULANT (COUMADIN) 3 MG tablet Take 3 mg by mouth daily 3/29/2021 Yes Unknown, Entered By History     Medication history completed by: Mihai Woo, PharmD, MS

## 2021-03-31 NOTE — OP NOTE
PROCEDURE DATE:  3/31/2021    PREOPERATIVE DIAGNOSIS:   Ascending colon ischemia    POSTOPERATIVE DIAGNOSIS:  Same    PROCEDURE:  Open right hemicolectomy with end ileostomy and mucus fistula    ATTENDING SURGEON:  Harsh Santos MD    ASSISTANTS:  Shira Bertrand MD PGY-5  Steffanie Wiley MD PGY-2     ESTIMATED BLOOD LOSS:  50 ml     COMPLICATIONS:  None immediate      SPECIMEN:   Right colon for permanent     OPERATIVE INDICATION:  Alexei Kaur is an 80-year-old gentleman with numerous comorbidities including coronary artery disease with a recent stent on Plavix, end stage renal disease on hemodialysis, on warfarin who presented with abdominal pain and signs symptoms consistent with ischemia of the ascending colon.  After discussing risks and benefits the patient agreed to proceed to operating room.    OPERATIVE DETAILS:  Patient was brought to the operating room and placed in supine position with arms out.  Endotracheal anesthesia was induced.  Abdomen was prepped and draped in the usual sterile technique and a timeout was performed.  We started by an upper midline incision.  This was carried down to the fascia which was incised sharply.  The intraperitoneal cavity was entered safely with no intraperitoneal adhesions.  We noted the prior mesh from his umbilical hernia repair and we went through it partially.      Upon exploring the abdomen we noted frankly necrotic ascending colon with no perforation.  There is marked edema around the colon.  The transverse colon, descending colon, and sigmoid colon were pink and healthy.  Stomach looked healthy.  We ran the small intestine from the terminal ileum to ligament of Treitz and it was normal.  There was significant calcification throughout the mesentery of the small intestine and the colon. We started by making a window within the mesentery of the terminal ileum.  75 mm blue GRECIA stapler was fired across the terminal ileum.  LigaSure impact was used to take the  mesentery.  The right colon was mobilized along the white line of Toldt's making sure to protect the duodenum.  The hepatic flexure was mobilized and taken down.  We then noted the area of demarcation between necrotic bowel and healthy bowel.  We chose the area for our colon transection at healthy pink colon.  A blue 75 mm GRECIA stapler was fired across the colon.  Specimen was passed off the field.  The abdomen was copiously irrigated with warm saline.  Hemostasis was ensured.    An NG tube was placed by anesthesia and position was confirmed in the stomach by palpation.  We then turned our attention to creating the ileostomy.  A circular incision was made in the skin on the right side of the abdomen.  This was carried down to fascia.  The fascia was incised in a cruciate incision and the rectus muscle was .  Posterior rectus sheath was incised and the opening was wide enough to allow for 2 fingers to pass through.  We then brought the ileostomy through the defect making sure to keep the mesentery straight.  The site of the colon staple line was brought through the same defect.    The abdomen was then closed.  Few interrupted 2-0 Prolene sutures were used to reapproximate the mesh.  We then used oh looped PDS x2 to close the fascia.  The skin was closed with skin staples.  We then turned our attention to maturing the stoma.  The staple line on the ileostomy was resected.  3-0 Vicryl sutures were used and the 12, 3, 6, and 9 o'clock to broke the ileostomy.  The mucous fistula was sutured to the skin as well as to the ileostomy and a half centimeter opening was made in the middle of the mucous fistula.  Ileostomy was patent however it was somewhat tight.  A 14 [place] red rubber catheter was passed through easily and ostomy bag was placed.  Sterile dressings were applied.  Counts were correct x2.  Patient tolerated the procedure well. He was extubated and transferred to the PACU in good condition. Dr. Santos was  present and scrubbed for the entire procedure.      Shira Bertrand MD  General Surgery, PGY-5

## 2021-03-31 NOTE — PROVIDER NOTIFICATION
Notified: SICU x [id number]    Pt cuff and art line are not correlating. MAP on art line is 40 while cuff is reading in the 60's. Art line wave form is a good wave form.     MD stated to go off of cuff since diastolic on art line is so low and going to give low MAP. Will also order a 250 ml bolus.

## 2021-04-01 NOTE — PLAN OF CARE
Pt AVSS. Capno WNL.Abd dist, +bs, +gas. Pt c/o abd pain rated a 2-3 described as cramping. Pain managed with sched tylenol and prn oxycodone with good relief. Abd inc dressing D/I marked old drge. Ileostomy intact with small amts watery red/brown output. Red miles intact in stoma. Pt on clears, iglesia well. PIV @tko between antibiotics. Left ac piv d/c'd. Right arm fistula with good bruit and thrill. BS checks q 4hr= 113, 99. Cont to maintain pain control. Enc good pulm hygiene, enc increased activity today.

## 2021-04-01 NOTE — PLAN OF CARE
OT/7C: Cancel. Pt at dialysis in AM and upon check-in this PM, pt pleasantly refusing therapy due to sig fatigue after dialysis. Will reschedule.

## 2021-04-01 NOTE — PLAN OF CARE
PT: Cancel/hold PT this am, pt going to dialysis and OT will work with pt first to determine if pt has acute PT needs as well.

## 2021-04-01 NOTE — ANESTHESIA POSTPROCEDURE EVALUATION
Patient: Alexei Blake    Procedure(s):  LAPAROTOMY, RIGHT HEMICOLECTOMY, ILEOSTOMY CREATION    Diagnosis:Ischemia, bowel (H) [K55.9]  Diagnosis Additional Information: No value filed.    Anesthesia Type:  General    Note:  Disposition: Inpatient   Postop Pain Control: Uneventful            Sign Out: Well controlled pain   PONV: No   Neuro/Psych: Uneventful            Sign Out: Acceptable/Baseline neuro status   Airway/Respiratory: Uneventful            Sign Out: Acceptable/Baseline resp. status   CV/Hemodynamics: Uneventful            Sign Out: Acceptable CV status   Other NRE: NONE   DID A NON-ROUTINE EVENT OCCUR? No         Last vitals:  Vitals:    03/31/21 2352 04/01/21 0334 04/01/21 0628   BP: 119/53 122/55 105/48   Pulse: 69 82 74   Resp: 14 18 20   Temp: 36.1  C (97  F) 35.9  C (96.7  F) 36.7  C (98  F)   SpO2:  98% 96%       Last vitals prior to Anesthesia Care Transfer:  CRNA VITALS  3/31/2021 0316 - 3/31/2021 0416      3/31/2021             ART BP:  146/46    Ht Rate:  72          Electronically Signed By: Donte Keane MD  April 1, 2021  7:08 AM

## 2021-04-01 NOTE — PROGRESS NOTES
HEMODIALYSIS TREATMENT NOTE    Date: 4/1/2021  Time: 11:37 AM    Data:  Pre Wt:  91 kg  Desired Wt: 91 kg   Post Wt:  91 kg  Weight change: 0 kg  Ultrafiltration - Post Run Net Total Removed (mL):  0  Vascular Access Status: Fistula (RUE) / patent / 400-450 BFR  Dialyzer Rinse:  Streaked; light  Total Blood Volume Processed: 78.16 L   Total Dialysis (Treatment) Time:  3.5 hours  Dialysate Bath: K 2, Ca 2.5  Heparin: None    Lab:   Yes - Hep B Surface Antigen & Antibody (per protocol)    Interventions:  AVF cannulated with 15 gauge needles  UF goal adjusted throughout as tolerated  Hectorol administered as ordered  1 unit of insulin coverage given per ordered parameters for     Assessment:  HD for ESRD patient in for colonic resection 2/2 ischemic bowel. Alert and oriented x 4. Soft BPs when sleeping. Patient reports this is normal for him while on dialysis. Remained asymptomatic. Denied any pain. In Afib prior to and throughout.     Plan:    Regularly dialyzes TTS schedule. Next HD Saturday or per renal team.

## 2021-04-01 NOTE — PROGRESS NOTES
Resident/Fellow Attestation   I, Ana Rosa Henley, was present with the medical student who participated in the service and in the documentation of the note.  I have verified the history and personally performed the physical exam and medical decision making.  I agree with the assessment and plan of care as documented in the note.      Assessment and Plan  Patient doing well, tolerating NGT removal. Pain as expected post-op. Heparin held for supratherapeutic INR 3/31, recheck pending. Good ostomy output and passing flatus from below.     - Dialysis today  - Heparin 5,000u TID if INR wnl  - PT/OT  - Ok for sips of CLD  - Red Ney can be removed from ostomy around POD5, patient having output without difficulty    Ana Rosa Henley MD  PGY2  Date of Service (when I saw the patient): 04/01/21        General Surgery Progress Note    Subjective: NG tube removed yesterday. Tolerating clears. Had epigastric pain yesterday that improved with passing flatus, otherwise no pain on PO oxycodone and tylenol. Ambulating. Anuric at baseline, hemodialysis planned for today per nephrology.     Objective:   /48 (BP Location: Left arm)   Pulse 74   Temp 98  F (36.7  C) (Oral)   Resp 20   Ht 1.829 m (6')   Wt 90.7 kg (200 lb)   SpO2 96%   BMI 27.12 kg/m      PE:  Gen: Alert, no acute distress  CV: Regular rate and non-cyanotic appearing   Resp: non-labored breathing on 2L on NC  Abd: Soft, mildly distended, appropriately tender, no rigidity or guarding. Ileostomy with some serosanguinous fluid in bag, healthy pink.   Ext: warm and well perfused. Multiple ecchymoses on bilateral upper and lower extremities. Left matured AV fistula.  Incision: c/d/i      Intake/Output Summary (Last 24 hours) at 4/1/2021 0636  Last data filed at 4/1/2021 0002  Gross per 24 hour   Intake 975 ml   Output 290 ml   Net 685 ml       Lab Results   Component Value Date    WBC 12.2 04/01/2021     Lab Results   Component Value Date    RBC 3.24  04/01/2021     Lab Results   Component Value Date    HGB 11.3 04/01/2021     Lab Results   Component Value Date    HCT 34.7 04/01/2021     No components found for: MCT  Lab Results   Component Value Date     04/01/2021     Lab Results   Component Value Date    MCH 34.9 04/01/2021     Lab Results   Component Value Date    MCHC 32.6 04/01/2021     Lab Results   Component Value Date    RDW 16.0 04/01/2021     Lab Results   Component Value Date     BMP pending  INR pending      A/P: Alexei Kaur is an 80-year-old male with history of CAD s/p recent stent on clopidogrel, CHF with EF 25%, history of stroke with left leg deficit, atrial fibrillation on warfarin, ESRD dependent upon dialysis, and DM type II, who is POD 1 from exploratory laparotomy with right hemicolectomy, ileostomy and mucous fistula.  Doing well.      - Pain control with Oxycodone and Tylenol  - Continue ASA and plavix, hold warfarin. PTA metoprolol  - Continue Zosyn day 2 of 4  - mIVF @ 125, Advance diet as tolerated   - Start SQH ppx today for DVT prophylaxis  - [facility] consult for ostomy cares  - HD Tu/Th/Sat  - Discharge to home in 2-3 days after zosyn complete and tolerating diet.      Esvin Church, MS3

## 2021-04-01 NOTE — CONSULTS
"  WO Nurse Inpatient [facility]   WO Nurse Inpatient Adult     Initial Assessment   Assessment of new end Ileostomy Stoma complication(s) edema   Mucocutaneous junction; intact   Peristomal complication(s) none   Pouch wear time:less than 24 hours  Following today's visit:Patient / is  able to demonstrate;       1. How to empty their pouch? Yes- demonstrated on 4/1 and return demo      2. How to change their pouch?  No- demonstrated on 4/1      3. How to read and record intake and output correctly? No- demonstrated on 4/1    Psychosocial- His wife had an ostomy previously about 5 years ago.    Objective data:  Patient history according to medical record: Alexei Kaur is an 80-year-old gentleman with numerous comorbidities including coronary artery disease with a recent stent on Plavix, end stage renal disease on hemodialysis, on warfarin who presented with abdominal pain and signs symptoms consistent with ischemia of the ascending colon. S/p ex-lap, open right hemicolectomy, end ileostomy and mucus fistula.  Current Diet/Nutrition: Orders Placed This Encounter      Clear Liquid Diet     TPN no   I/O last 3 completed shifts:  In: 1135 [P.O.:267; I.V.:618; IV Piggyback:250]  Out: 290 [Stool:290]  Labs:    Recent Labs   Lab 04/01/21  0717   ALBUMIN 2.6*   HGB 11.3*   INR 3.05*   WBC 12.2*        Physical Exam:  Current pouching system:[place] and 2pc flat CTF with 2\" barrier ring   Reason for pouch change today: ostomy education  Stoma appearance: viable, healthy, normal-appearing, pink, moist, edematous and protruberant  Stoma size; 32 mm ,  red miles  Peristomal skin: intact  Stoma output :liquid   Abdominal  Assessment  soft , NG still in place? No  Surgical Site: open to air  Pain: Dull ache  Is patient still on a PCA Yes    Interventions:  Patient's chart evaluated.  Focus of today's visit: initial fitting, pouch change demonstration , verbal instruction  and pouch emptying   Participant of " "teaching session today patient   Orders: Written  Change made with ostomy management today: Yes- changed it to 2 pc flat CTF with 2\" barrier ring. Pt wants to try one piece pouch next time.  Patient/family: lethargic and observing  Supplies:Gathered and at bedside    Plan:  Learning needs: pouch change return demonstration, verbal instruction , diet and hydration , output measurement, odor/flatus management, lifestyle adjustments and discharge instructions  Preparation for discharge: No discharge preparations started,   Needs- Supplies ordered, Completed supply list, Registered for samples from , Prescriptions or note left on chart for MD to sign/complete, Prepared for discharge to home with homecare, Discussed making a WOC Nurse outpatient appointment upon discharge, Discussed when to follow up with a WOC Nurse in the future and Discussed how/ where to order supplies  Recommend home care? yes    Discussed plan of care with Patient and Nurse  Nursing to notify the Provider(s) and re-consult the WOC Nurse if new ostomy concerns or discharge planned before next planned WOC visit.    WOC Nurse will return: Daily  Face to face time: 35 minutes    Nidia Garay RN              "

## 2021-04-01 NOTE — PROGRESS NOTES
Admitted/transferred from:   2 RN full   skin assessment completed by Maria Eugenia Hernandez, RN and Salud BELLA RN.  Skin assessment finding: issues found (Multiple bruises on upper and lower extremities. Coccyx pink, has mepelex on. Bilateral feet with toes amputation and has some reddenress. Pt has shoes on, doesn't want to remove it. Risks and beneffits explained.    Interventions/actions: skin interventions (Repositioned pt every 2 hours, education on pressure ulcer prevention done.      Will continue to monitor.

## 2021-04-01 NOTE — CONSULTS
Care Management Initial Consult    General Information  Assessment completed with: Patient(Tired after Dialyis Treatment will F/U/Chart Review.),         Primary Care Provider verified and updated as needed: Yes   Readmission within the last 30 days:        Reason for Consult: discharge planning      Communication Assessment  Patient's communication style: (English)    Hearing Difficulty or Deaf: no   Wear Glasses or Blind: yes    Cognitive  Cognitive/Neuro/Behavioral: WDL  Level of Consciousness: alert  Arousal Level: opens eyes spontaneously  Orientation: oriented x 4  Mood/Behavior: calm, cooperative  Best Language: 0 - No aphasia  Speech: clear, spontaneous, logical    Living Environment:   People in home: spouse(Ms. Isabel Blake)     Current living Arrangements: house      Able to return to prior arrangements: yes  Living Arrangement Comments: (Patient also has dialysis at  [facility].)    Family/Social Support: Supportive.    Current Resources:   Patient receiving home care services:  Not prior to admission.     Community Resources: (Community Dialysis)  Equipment currently used at home: shower chair, walker, standard    Employment/Financial:  Employment Status:          Financial Concerns:         Lifestyle & Psychosocial Needs:  Following is from a chart flow sheet:        Socioeconomic History     Marital status:      Spouse name: Not on file     Number of children: Not on file     Years of education: Not on file     Highest education level: Not on file          Functional Status:  Prior to admission patient needed assistance: Outpatient Dialysis.           Additional Information:    The inpatient Care Management Team will continue to follow to assist with outpatient plans of care.  Patient tired after dialysis treatment and declined to work with the Rehabilitation Consult Team this afternoon.  The PT and OT team will consult with patient in the early am tomorrow.  This will assist  with determining home verses a short stay at a transitional care unit.  Stable POD #1 and the [facility] RN Consult Team will also follow patient this post operative period    Viv Mancilla,  KIMBERLEY.S.N., R.N., P.H.N..  Care Coordinator     Pager   [facility]      Addendum: Outpatient Dialysis Unit-    Outpatient Dialysis Unit    [facility]  SHARE:      Also known as [facility]  [address]  [address]  [place], [place] [address]    Ph:  [phone]    Fax: [phone]

## 2021-04-01 NOTE — PLAN OF CARE
POD # 1 s/p Right Hemicolectomy with Ileostomy creation. Patient transferred from  at 1530, alert, oriented x 4, pain well controlled with Oxycodone and Tylenol. Denies nausea, tolerating clear diet. PIV x 2 patent, Ileostomy appliance with gas and watery stool in bag. Last . Pt is anuric, on hemodialysis M/W/F. Encouraged to use the IS, PCDs in place, 2 RN skin assessment done. Continuous capnography.     Update: Pt has signed and held orders for Dialysis tomorrow.

## 2021-04-01 NOTE — PLAN OF CARE
Reports good pain control with po oxycodone and tylenol times two and robaxin times one.. No complaints of nausea. Good ileostomy output today. Ileostomy intact with red carrol tube.Tolerating clear liquids. Up with assist of 2 and walker. Gone to dialysis about 8812-2499. Anuric. Dialysis is T, Th, Sa. Vitals stable. Abd incision dry, intact.

## 2021-04-01 NOTE — PROGRESS NOTES
Nephrology Progress Note  04/01/2021    Assessment & Recommendations:  Alexei Blake is a 80 year old with PMH CAD s/p recent PCI, HFrEF, HTN, DM2, ESRD on HD, PAD, Afib on warfarin who presented with acute onset of abdominal pain during HD. Patient was found to have ischemic changes of right colon with portal venous gas per imaging. S/p colonic resection and ileostomy.    ESRD on HD  Runs TTS at [facility] under care of Dr Noble Monae via RUE AVF for 3.5 hrs. TW 92 kg  Last run cut short by 15-20 mins per report when abdominal pain started.  - HD today per schedule     HTN/Volume - appears euvolemic.   - Agree with resuming PTA Toprol XL  - Consider resuming PTA lisinopril once BP more stable    Electrolytes - no acute issues, expect improvement in hyperkalemia with HD     Acid/base - no acute issues, bicarb usually ~20 as outpatient    BMD - Phos elevated but could be due to post-op status. Continue to monitor daily and will consider binder if remains elevated  - Will continue PTA hectorol 3 mcg with HD  - Continue PTA TUMS WM    Anemia - related to surgery, does not appear to have anemia chronically or require Epo     Recommendations were communicated to primary team via note     Seen and discussed with Dr. Angus Valdez MD   [phone]    Interval History:  Nursing and provider notes from last 24 hours reviewed.  In the last 24 hours Alexei Blake tolerated HD well. No new complaints today, abdominal pain better.    Review of Systems:  ROS neg as above    Physical Exam:  I/O last 3 completed shifts:  In: 707 [P.O.:267; I.V.:440]  Out: 1190 [Stool:1190]  /55   Pulse 80   Temp 97  F (36.1  C)   Resp 16   Ht 1.829 m (6')   Wt 91 kg (200 lb 11.2 oz)   SpO2 95%   BMI 27.22 kg/m      GENERAL APPEARANCE: no distress, awake  EYES: no scleral icterus, pupils equal  Pulmonary: breathing non-labored  CV: regular rhythm, normal rate   - Edema none  GI: soft, nondistended  NEURO:  face symmetric, AOx3, speech normal   Access: RUE AVF    Labs:   All labs reviewed by me  Electrolytes/Renal -   Recent Labs   Lab Test 04/01/21  0717 03/31/21  0502 03/30/21  2235    137 138   POTASSIUM 5.8* 5.0 4.5   CHLORIDE 102 109 103   CO2 22 20 26   BUN 50* 27 28   CR 6.48* 4.00* 4.18*   GLC 99 153* 130*   AVNI 9.0 7.5* 9.1   MAG 2.8* 1.8 2.0   PHOS 6.1* 3.6 3.0       CBC -   Recent Labs   Lab Test 04/01/21  0717 03/31/21  0553 03/30/21  2235   WBC 12.2* 16.5* 17.9*   HGB 11.3* 10.8* 12.2*    163 175       LFTs -   Recent Labs   Lab Test 04/01/21  0717 03/30/21  2235   ALKPHOS 42 56   BILITOTAL 0.4 0.6   ALT 24 58   AST 18 30   PROTTOTAL 5.9* 6.4*   ALBUMIN 2.6* 3.1*       Iron Panel - No lab results found.      Imaging:  All imaging studies reviewed by me.     Current Medications:    sodium chloride 0.9%  250 mL Intravenous Once     aspirin  81 mg Oral Daily     atorvastatin  80 mg Oral QPM     clopidogrel  75 mg Oral Daily     insulin aspart  1-12 Units Subcutaneous Q4H     [Held by provider] lisinopril  5 mg Oral Daily     methocarbamol  500 mg Oral 4x Daily     metoprolol succinate ER  25 mg Oral QPM     piperacillin-tazobactam  2.25 g Intravenous Q6H       sodium chloride 10 mL/hr at 04/01/21 0338     Jo Valdez MD

## 2021-04-02 NOTE — H&P (VIEW-ONLY)
Cardiology Consult                                                               2021  Alexei Blake MRN: [id number]  Age: 80 year old, : 1940      Reason for consult:      Chest pain & elevated troponin.      Assessment and Recommendation:     Mr Blake is an 80 year old with past medical history of CAD s/p PCI of LAD on 2021 f/b rotational atherectomy & MARIA ELENA insertion in LCx & OM on 3/20/2021; ICMP (LVEF~ 25% recovered to ~40-45% post PCI of LAD), paroxysmal AFib on coumadin, prior CVA, ESRD on iHD, PAD s/p toe amputations. The patient presented to the surgical service on 3/20/2021 with acute onset abdominal pain during HD earlier in the day >> noted to have ascending colon ischemia on imaging >> following which he underwent an emergent right hemicolectomy & ileostomy creation. On 2021, POD #2 the patient complained of sudden onset, substernal chest pain following which a troponin was checked (0.08 >> 0.09 >> 0.11) and cardiology was consulted for further recommendations.     # Multivessel CAD s/p recent PCI of LAD, staged MARIA ELENA/rotational atherectomy of LCx/OM  # Non-dominant RCA with moderate-severe ostial and mid vessel stenoses (non-revasc)  # Ischemic cardiomyopathy (LVEF 40-45%)  # Troponin elevation   # Chest pain    Patient's chest pain suggestive of angina, relieved by SL NTG. EKG overnight revealed known Afib with LBBB, no acute ST segment/T wave changes.    - Continue DAPT- ASA & Plavix.  - Initiate Imdur 30 mg as anti-anginal therapy.  - Re-initiation of lisinopril 5 mg when hemodynamically able.   - Please increase Toprol XL 25 mg to 50 mg daily to decrease cardiac demand.   - Can stop trending troponin.     # Paroxysmal Afib:  - Rate controlled Afib noted on telemetry.  - Agree with continuation of anti-coagulation (coumadin) as deemed to be safe from a surgical/ post-operative bleeding perspective.    Follow up: primary cardiologist for uptitration of GDMT.        Patient discussed with staff attending, Dr. Noonan and the note reflects our joint plan. Cardiology will sign off. Thank you for consulting the cardiovascular services at the [facility]. Please do not hesitate to call with questions or concerns.     Louise Ojeda MD,   Cardiovascular Disease Fellow  Pager [phone]        History of Present Illness:     Mr Blake is an 80 year old with past medical history of CAD s/p PCI of LAD on 1/21/2021 f/b rotational atherectomy & MARIA ELENA insertion in LCx & OM on 3/20/2021; ICMP (LVEF~ 25% recovered to ~40-45% post PCI of LAD), paroxysmal AFib on coumadin, prior CVA, ESRD on iHD, PAD s/p toe amputations. The patient presented to the surgical service on 3/20/2021 with acute onset abdominal pain during HD earlier in the day >> noted to have ascending colon ischemia on imaging >> following which he underwent an emergent right hemicolectomy & ileostomy creation. On 4/1/2021, POD #2 the patient complained of sudden onset, substernal chest pain following which a troponin was checked (0.08 >> 0.09 >> 0.11) and cardiology was consulted for further recommendations.     The patient presented with NSTEMI in Jan 2021; CAG revealed severe mid-LAD stenosis; dominant circumflex with severe ostial lesion and distal lesion before the inferior wall branches. Severe disease in the large OM-3 as well; Non-dominant RCA with moderate-severe ostial and mid vessel stenoses. He was referred for CABG but deemed to be a poor surgical candidate following which he underwent a planned PCI of LAD on 1/21/2021 and staged PCI/rotational atherectomy of LCx/OM on 3/19/2021.      On bedside assessment today, the patient reported sudden onset, left sided substernal chest pain that occurred at rest yday. Reported radiation to the shoulder, relieved with SL NTG. Denied such episodes of chest pain in January and since his recent PCI. Denied similarity to incisional post-op abdominal  pain. Denied SOB, PND, orthopnea, pedal edema. Laying comfortably in bed at the time of evaluation.    Vitals:    03/30/21 2215 04/01/21 0900   Weight: 90.7 kg (200 lb) 91 kg (200 lb 11.2 oz)       A 13-point ROS is negative except as mentioned above     CAG Jan 2021: Coronary Angiography #1:    Acute systolic heart failure in a patient with signficant peripheral arterial disease and ESRD on dialysis. NSTEMI this admission while in severe acute heart failure.    Severe peripheral and coronary artery calcification.    Mild left main stenosis.    Severe serial mid LAD stenoses, very calcified.    Dominant circumflex with severe ostial lesion and distal lesion before the inferior wall branches. Severe disease in the large OM-3 as well.    Non-dominant RCA with moderate-severe ostial and mid vessel stenoses.    LV EDP normal.    Blood loss < 20 cc.     CAG 3/19/2021:  Staged PCI for ischemic cardiomyopathy. EF improved from 25% to 44% with PCI of the LAD in January.  Severe ostial dominant circumflex, critical proximal OM-3 and severe distal circumflex lesions all treated with rotational atherectomy and a Synergy MARIA ELENA with good angiographic results. IVUS used for the ostial   circumflex lesion.  Blood loss < 20 cc.    TTE 2/19/2021  Left ventricle ejection fraction is moderately decreased. The calculated left ventricular ejection fraction is 44%.  Normal right ventricular size and systolic function.  The following segments are akinetic: apical septal and apex. The following segments are hypokinetic: mid inferoseptal.  When compared to the previous study dated 1/18/2021, there has been improvement in left ventricular ejection fraction and improvement in function of several wall segments.    TTE 1/18/2021:   Normal left ventricular size. The estimated left ventricular ejection fraction is 25%. The wall motion abnormality is consistent with stress cardiomyopathy.  Right ventricle is not well visualized.  No hemodynamically  significant valvular heart abnormalities.  Mild pulmonary hypertension is present.  Left atrial volume is mildly increased.  Technically difficult study with suboptimal image quality.  When compared to the previous study dated 9/28/2019, there are changes noted.No significant valvular abnormalities are noted on screening Doppler exam.    EKG overnight:        Past Medical History:     Patient Active Problem List   Diagnosis     Colonic ischemia (H)         Past Surgical History:      Past Surgical History:   Procedure Laterality Date     LAPAROTOMY EXPLORATORY N/A 3/31/2021    Procedure: LAPAROTOMY, RIGHT HEMICOLECTOMY, ILEOSTOMY CREATION;  Surgeon: Harsh Santos MD;  Location:  OR         Social History:     Social History     Socioeconomic History     Marital status:      Spouse name: Not on file     Number of children: Not on file     Years of education: Not on file     Highest education level: Not on file   Occupational History     Not on file   Social Needs     Financial resource strain: Not on file     Food insecurity     Worry: Not on file     Inability: Not on file     Transportation needs     Medical: Not on file     Non-medical: Not on file   Tobacco Use     Smoking status: Not on file   Substance and Sexual Activity     Alcohol use: Not on file     Drug use: Not on file     Sexual activity: Not on file   Lifestyle     Physical activity     Days per week: Not on file     Minutes per session: Not on file     Stress: Not on file   Relationships     Social connections     Talks on phone: Not on file     Gets together: Not on file     Attends Mosque service: Not on file     Active member of club or organization: Not on file     Attends meetings of clubs or organizations: Not on file     Relationship status: Not on file     Intimate partner violence     Fear of current or ex partner: Not on file     Emotionally abused: Not on file     Physically abused: Not on file     Forced sexual  activity: Not on file   Other Topics Concern     Not on file   Social History Narrative     Not on file         Family History:     Family History   Problem Relation Age of Onset     Diabetes Father      Hypertension Father      Heart Disease Father          Allergies:     Allergies   Allergen Reactions     Blood Transfusion Related (Informational Only) Other (See Comments)     Patient has a history of a clinically significant antibody against RBC antigens.  A delay in compatible RBCs may occur.     Blood-Group Specific Substance      Anti-K present. Expect delays in blood for transfusion.  Draw 2 lavender top tubes for type and screen orders.      Calcitriol Rash     Ciprofloxacin Rash         Medications:     No current facility-administered medications on file prior to encounter.   acetaminophen (TYLENOL) 500 MG tablet, Take 500 mg by mouth every 4 hours as needed for mild pain  aspirin 81 MG EC tablet, Take 81 mg by mouth daily  atorvastatin (LIPITOR) 80 MG tablet, Take 80 mg by mouth every evening  calcium carbonate (TUMS) 500 MG chewable tablet, Take 1 chew tab by mouth 2 times daily  clopidogrel (PLAVIX) 75 MG tablet, Take 75 mg by mouth daily  fluticasone (FLONASE) 50 MCG/ACT nasal spray, Spray 2 sprays into both nostrils daily  lisinopril (ZESTRIL) 5 MG tablet, Take 5 mg by mouth daily  metoprolol succinate ER (TOPROL-XL) 25 MG 24 hr tablet, Take 25 mg by mouth every evening  multivitamin RENAL (NEPHROCAPS/TRIPHROCAPS) 1 MG capsule, Take 1 capsule by mouth daily  nitroGLYcerin (NITROSTAT) 0.4 MG sublingual tablet, Place 0.4 mg under the tongue every 5 minutes as needed for chest pain For chest pain place 1 tablet under the tongue every 5 minutes for 3 doses. If symptoms persist 5 minutes after 1st dose call 911.  warfarin ANTICOAGULANT (COUMADIN) 3 MG tablet, Take 3 mg by mouth daily            Physical Exam:     /55   Pulse 79   Temp 98.8  F (37.1  C) (Oral)   Resp (!) 39   Ht 1.829 m (6')   Wt  91 kg (200 lb 11.2 oz)   SpO2 96%   BMI 27.22 kg/m      Wt Readings from Last 4 Encounters:   04/01/21 91 kg (200 lb 11.2 oz)         Intake/Output Summary (Last 24 hours) at 4/2/2021 0644  Last data filed at 4/2/2021 0600  Gross per 24 hour   Intake 320 ml   Output 1725 ml   Net -1405 ml       Gen: AA&Ox3, no acute distress  HEENT: EOM grossly intact, mucous membranes pink, moist without plaque or exudate  PULM/THORAX: Clear to auscultation bilaterally, no rales/rhonchi/wheezes  CV:RRR, S1 and S2 appreciated, no extra heart sounds, murmurs or rub auscultated. No JVD  ABD: soft, tender, nondistended. Mild tenderness around incision.  EXT: No edema, clubbing or cyanosis. No asymmetrical edema or tenderness to palpation in calves bilaterally.  PSYCH: appropriate affect and mentation.       Data:     Labs Reviewed on Admission    Troponin   Lab Results   Component Value Date    TROPI 0.108 (H) 04/02/2021    TROPI 0.093 (H) 04/01/2021    TROPI 0.088 (H) 04/01/2021     BMP  Recent Labs   Lab 04/02/21  0328 04/01/21  0717 03/31/21  0502 03/30/21  2235    136 137 138   POTASSIUM 4.7 5.8* 5.0 4.5   CHLORIDE 99 102 109 103   AVNI 9.1 9.0 7.5* 9.1   CO2 24 22 20 26   BUN 30 50* 27 28   CR 4.49* 6.48* 4.00* 4.18*   GLC 99 99 153* 130*     CBC  Recent Labs   Lab 04/01/21  0717 03/31/21  0553 03/30/21  2235   WBC 12.2* 16.5* 17.9*   RBC 3.24* 3.10* 3.40*   HGB 11.3* 10.8* 12.2*   HCT 34.7* 32.9* 36.3*   * 106* 107*   MCH 34.9* 34.8* 35.9*   MCHC 32.6 32.8 33.6   RDW 16.0* 16.0* 15.8*    163 175     INR  Recent Labs   Lab 04/02/21  0328 04/01/21  0717 03/31/21  1022 03/30/21  2235   INR 3.05* 3.05* 2.65* 2.13*      Hepatic Panel   Lab Results   Component Value Date    AST 18 04/01/2021     Lab Results   Component Value Date    ALT 24 04/01/2021     No results found for: BILICONJ   Lab Results   Component Value Date    BILITOTAL 0.4 04/01/2021     Lab Results   Component Value Date    ALBUMIN 2.8 04/02/2021      Lab Results   Component Value Date    PROTTOTAL 5.9 04/01/2021      Lab Results   Component Value Date    ALKPHOS 42 04/01/2021

## 2021-04-02 NOTE — PROGRESS NOTES
"  WO Nurse Inpatient [facility]   WO Nurse Inpatient Adult     Initial Assessment   Assessment of new end Ileostomy Stoma complication(s) edema   Mucocutaneous junction; intact   Peristomal complication(s) none   Pouch wear time:24-48 hours  Following today's visit:Patient / is  able to demonstrate;       1. How to empty their pouch? Yes- demonstrated on 4/1 and return demo      2. How to change their pouch?  No- demonstrated on 4/1      3. How to read and record intake and output correctly? No- demonstrated on 4/1    Psychosocial- His wife had an ostomy previously about 5 years ago.    Objective data:  Patient history according to medical record: Alexei Kaur is an 80-year-old gentleman with numerous comorbidities including coronary artery disease with a recent stent on Plavix, end stage renal disease on hemodialysis, on warfarin who presented with abdominal pain and signs symptoms consistent with ischemia of the ascending colon. S/p ex-lap, open right hemicolectomy, end ileostomy and mucus fistula.  Current Diet/Nutrition: Orders Placed This Encounter      Low Fiber Diet     TPN no   I/O last 3 completed shifts:  In: 260 [P.O.:160; I.V.:100]  Out: 1670 [Emesis/NG output:20; Stool:1650]  Labs:    Recent Labs   Lab 04/02/21  0328 04/01/21  0717   ALBUMIN 2.8* 2.6*   HGB  --  11.3*   INR 3.05* 3.05*   WBC  --  12.2*        Physical Exam:  Current pouching system:[place] and 2pc flat CTF with 2\" barrier ring   Reason for pouch change today: pouch not changed today  Stoma appearance: viable, healthy, normal-appearing, pink, moist, edematous and protruberant  Stoma size; 32 mm ,  red miles  Peristomal skin: intact  Stoma output :liquid   Abdominal  Assessment  soft , NG still in place? No  Surgical Site: open to air  Pain: Dull ache  Is patient still on a PCA Yes    Interventions:  Patient's chart evaluated.  Focus of today's visit: diet and hydration , odor/flatus management and lifestyle " "adjustments   Participant of teaching session today patient   Orders: Written  Change made with ostomy management today: No-  continue 2 pc flat CTF with 2\" barrier ring. Pt wants to try one piece pouch next time.  Patient/family: lethargic and observing  Supplies:Gathered and at bedside    Plan:  Learning needs: pouch change return demonstration, verbal instruction , output measurement and discharge instructions  Preparation for discharge: No discharge preparations started,   Needs- Supplies ordered, Completed supply list, Registered for samples from , Prescriptions or note left on chart for MD to sign/complete, Prepared for discharge to home with homecare, Discussed making a WOC Nurse outpatient appointment upon discharge, Discussed when to follow up with a WOC Nurse in the future and Discussed how/ where to order supplies  Recommend home care? yes    Discussed plan of care with Patient and Nurse  Nursing to notify the Provider(s) and re-consult the WOC Nurse if new ostomy concerns or discharge planned before next planned WOC visit.    WOC Nurse will return: Daily  Face to face time: 35 minutes                  "

## 2021-04-02 NOTE — PROGRESS NOTES
Nephrology Progress Note  04/02/2021    Assessment & Recommendations:  Alexei Blake is a 80 year old with PMH CAD s/p recent PCI, HFrEF, HTN, DM2, ESRD on HD, PAD, Afib on warfarin who presented with acute onset of abdominal pain during HD. Patient was found to have ischemic changes of right colon with portal venous gas per imaging. S/p colonic resection and ileostomy.    ESRD on HD  Runs TTS at [facility] under care of Dr Noble Monae via RUE AVF for 3.5 hrs. TW 92 kg  - HD tomorrow per schedule, given h/o CAD with recent stent and chest pain will run with lower BFR of 300 in hope that this is better tolerated by cardiac hemodynamics    HTN/Volume - appears euvolemic  - Agree with resuming PTA Toprol XL  - Consider resuming PTA lisinopril once BP more stable    Electrolytes - no acute issues    Acid/base - no acute issues, bicarb usually ~20 as outpatient    BMD - Phos minimally elevated but could be due to post-op status.  - Will continue PTA hectorol 3 mcg with HD  - Continue PTA TUMS WM    Anemia - related to surgery, does not appear to have anemia chronically or require Epo    Recommendations were communicated to primary team via note     Seen and discussed with Dr. Angus Valdez MD   [phone]    Interval History :   Nursing and provider notes from last 24 hours reviewed.  In the last 24 hours Alexei Blake developed chest pain that improved with SL nitroglycerin. Denies any symptoms currently and no new concerns noted. TTE remains without obvious WMA    Review of Systems:   ROS neg as above    Physical Exam:   I/O last 3 completed shifts:  In: 260 [P.O.:160; I.V.:100]  Out: 1670 [Emesis/NG output:20; Stool:1650]   /86 (BP Location: Left arm)   Pulse 77   Temp 98  F (36.7  C) (Oral)   Resp 13   Ht 1.829 m (6')   Wt 91 kg (200 lb 11.2 oz)   SpO2 95%   BMI 27.22 kg/m       GENERAL APPEARANCE: no distress, awake  EYES: no scleral icterus, pupils  equal  Pulmonary: breathing non-labored  CV: regular rhythm, normal rate   - Edema none  GI: soft, nondistended  NEURO: face symmetric, AOx3, speech normal   Access: RUE AVF    Labs:   All labs reviewed by me  Electrolytes/Renal -   Recent Labs   Lab Test 04/02/21 0328 04/01/21 0717 03/31/21  0502 03/30/21  2235    136 137 138   POTASSIUM 4.7 5.8* 5.0 4.5   CHLORIDE 99 102 109 103   CO2 24 22 20 26   BUN 30 50* 27 28   CR 4.49* 6.48* 4.00* 4.18*   GLC 99 99 153* 130*   AVNI 9.1 9.0 7.5* 9.1   MAG  --  2.8* 1.8 2.0   PHOS 5.1* 6.1* 3.6 3.0       CBC -   Recent Labs   Lab Test 04/01/21 0717 03/31/21  0553 03/30/21  2235   WBC 12.2* 16.5* 17.9*   HGB 11.3* 10.8* 12.2*    163 175       LFTs -   Recent Labs   Lab Test 04/02/21 0328 04/01/21  0717 03/30/21  2235   ALKPHOS  --  42 56   BILITOTAL  --  0.4 0.6   ALT  --  24 58   AST  --  18 30   PROTTOTAL  --  5.9* 6.4*   ALBUMIN 2.8* 2.6* 3.1*       Iron Panel - No lab results found.      Imaging:  All imaging studies reviewed by me.     Current Medications:    aspirin  81 mg Oral Daily     atorvastatin  80 mg Oral QPM     clopidogrel  75 mg Oral Daily     insulin aspart  1-12 Units Subcutaneous Q4H     isosorbide mononitrate  30 mg Oral Daily     [Held by provider] lisinopril  5 mg Oral Daily     methocarbamol  500 mg Oral 4x Daily     metoprolol succinate ER  50 mg Oral QPM     piperacillin-tazobactam  2.25 g Intravenous Q6H       sodium chloride 10 mL/hr at 04/01/21 0338     Jo Valdez MD

## 2021-04-02 NOTE — PROGRESS NOTES
General Surgery  Brief Progress Note    Paged by nursing that patient had increased chest pain, radiating to his back. Patient had undergone dialysis this afternoon without issue. Ileostomy with good output. Normotensive, non-diaphorectic.    - Patient assessed and EKG ordered. EKG within normal limitis  - Troponin shows elevation of 0.088. Consulted cardiology, trop elevation may be expected 2/2 recent PCI and procedures.   - Plan to transfer to  for continuous tele monitoring. Trend troponins to peak.  - Supratherapuetic INR, will continue to hold heparin and give DAPT    Ana Rosa Henley MD   General Surgery PGY2

## 2021-04-02 NOTE — PLAN OF CARE
POD#2, Alert and oriented X 4, pain controlled with prn oxycodone. Assist of 2 with ADLs using walker. Had dialysis today, next Saturday. Ileotomy good output, anuric due to hemodialysis, continue on clear liquid diet. Patient encouraged to reposition in bed side to side, PCD on, IS at bedside with encouragement. AT 1600, c/o chest pain, MD notified, order EKG and Troponin. Order to transfer to  but unable to accept due to no bed available. Patient has been doing fine since then. Reported chest pain is gone. Plan is to transfer to  once bed available.

## 2021-04-02 NOTE — DISCHARGE INSTRUCTIONS
Outpatient Dialysis Unit    [facility]  SHARE:      Also known as [facility]  [address]  [address]  [place], [place] [address]    Ph:  [phone]    Fax: [phone]          [facility]     Name: Alexei Blake : 1940  Date: 2021    To order your ostomy supplies    The ostomy Supplier needs this supply list  to process your order. You will need to fax/deliver this list, along with your Insurance information. Your home care nurse can assist with this process.                 List of Ostomy Distributors      [facility]  Ph. [phone] Fax # [phone]  [facility]   Ph. [phone]  [facility]   Ph. [phone]  [facility]   Ph. [phone]  [facility]       Ph. [phone]  [facility]    Ph.-[phone]  Or Call your insurance provider for their preferred supplier    Your Medical Supplier will need your surgeon's name, phone and fax number    Clinic:                     Phone                            Fax  General Surgery:   [phone]  Verbal Order for ostomy supplies for 1 Month per:       Sumaya Saenz, RN, CWOCN     Authorizing MD: Adrien Medina MD    Change pouch: Daily for peristomal wound care, once healed can go to three times a week   Quantity of pouches: 30 pouches/month     Request the following supplies:      Mitchel    2 piece flat wafer 57mm  # [id number]     Fecal pouch 57mm- # 50670      Accessories  2  barrier ring #[id number]                           Hydrofera blue 4x4 inch #[id number] (1/4 of a dressing per bag change)                           Comfeel plus ulcer 4x4 inch #3110 -(1/2 a dressing per bag change)    Adapt powder #7906    No sting film barrier # 3345    M-9 Spray room deodorizer #7768                                     Change your pouch twice a week, more often if leaking.    If  you are cutting a hole in the wafer of your pouch, recheck stoma size and adjust pouch opening as needed every week    . Call the Ostomy Nurse at [facility]       [address] [place] [address] [place] : [phone]   Schedule a follow-up visit in 2 to 4 weeks after your surgery, sooner if having problems Bring a complete set of pouch-changing supplies to this visit    [facility] department  [address] [place], [place] [address]   number- [phone]      Problems you should Report  - The stoma turns blue or darker in color.  - Cuts or sores around the stoma.  - Red, raw or painful skin around the stoma.  - Any bulging of the skin around the stoma.  - A pouch that leaks every day.  - Problems making the right size hole in the pouch wafer.    Please call with any questions or concerns.    Pouch Change Procedure:     Gather supplies:    New pouching system (wafer, pouch, barrier ring)    Dressing supplies (hydrofera blue dressing, saline, comfeel plus dressing)    Stoma measuring guide or pattern from prior recent pouch change     Scissors    Pen    Paper towels or gauze--some dry and some wet with WATER ONLY, use of any wipes or soaps may undermine the seal on your ostomy pouch     Towel/incontinence pads to catch any drips from stoma during pouch change      1.  front of full length mirror and place towel into waistband to protect clothing. Place additional towel on the floor to protect floor from output.   2. Remove old pouch using gentle push-pull technique, moving from top to bottom.  Look at back of pouch for clues about how the seal is holding up and what may need to be adjusted.  3. Cleanse skin around stoma with water only and dry.   4. Measure stoma using measuring guide, goal is to have pouch wafer as close to stoma as possible without touching it.   5. Trace correct pattern onto back of new pouch wafer.  6. Cut hole out of pouch wafer, ensuring to cut on the  outside of the line drawn.   7. Test fit cut hole around stoma. Make adjustments if necessary.   8. Irrigate peristomal wound with saline and ensure return of fluid, dry with gauze, then cut a strip of hydrofera blue, moisten with saline and ring out excess, then pack into wound bed ensuring to pack tunnel at 2 o clock and allow dressing to stick up out of skin a little bit to help wick fluid   9. Apply barrier ring around stoma, ensuring to place between stoma and packing to keep area sealed, then apply 1/2 a comfeel dressing over hydrofera blue and barrier ring to cover wound  10. Pull off backing from pouch wafer.   11. Apply pouch around stoma.  12. Run finger on wafer around stoma to ensure seal on is achieved.   13. Hold hand over pouch for 2 minutes to help activate the adhesive.

## 2021-04-02 NOTE — PLAN OF CARE
Patient transferred from unit 4A in stable condition. Denies pain or nausea. Ileostomy with gas and stool. Blanchable redness on buttocks/coccyx - preventative mepilex was intact and replaced. Patient agreed to reposition on side to relieve pressure. Ate small amount of low fiber diet for dinner. HD fistula right arm. PIV at tko between antibiotics.   Continue with POC.

## 2021-04-02 NOTE — PROGRESS NOTES
General Surgery Progress Note    Subjective: Tolerating liquids. Ostomy functional. Had chest pain and mild troponin elevation. Pain has resolved now     Objective:   /55   Pulse 79   Temp 98.8  F (37.1  C) (Oral)   Resp (!) 39   Ht 1.829 m (6')   Wt 91 kg (200 lb 11.2 oz)   SpO2 96%   BMI 27.22 kg/m      PE:  Gen: Alert, no acute distress  CV: Regular rate and non-cyanotic appearing   Resp: non-labored breathing onRA  Abd: Soft, mildly distended, appropriately tender, no rigidity or guarding. Ileostomy with stool , healthy pink.   Ext: warm and well perfused. Multiple ecchymoses on bilateral upper and lower extremities. Left matured AV fistula.  Incision: c/d/i    A/P: Alexei Kaur is an 80-year-old male with history of CAD s/p recent stent on clopidogrel, CHF with EF 25%, history of stroke with left leg deficit, atrial fibrillation on warfarin, ESRD dependent upon dialysis, and DM type II, who is POD 2 from exploratory laparotomy with right hemicolectomy, ileostomy and mucous fistula.  Doing well.      - Echo today   - Pain control with Oxycodone and Tylenol  - Continue ASA and plavix, hold warfarin. PTA metoprolol.   - Will hold starting subcutaneous heparin today, since INR is therapeutic at this time  - Continue Zosyn day 3 of 4  - Low residue diet, discontinue IVF  - WO consult for ostomy cares  - HD Tu/Th/Sat  - Discharge to home in 2-3 days after zosyn complete and tolerating diet.      Adriana Jackson MD  Chief Resident (General Surgery)  PGY 5

## 2021-04-02 NOTE — PLAN OF CARE
OT 4AB: Cancel - pt not medically appropriate this AM due to upward trending troponin, PT planning to complete evaluation this afternoon. Will reschedule.

## 2021-04-02 NOTE — PROGRESS NOTES
Care Management Follow Up    Length of Stay (days): 3    Expected Discharge Date: 04/05/21     Anticipated Discharge Disposition:  Final transition plan to be determined.     Anticipated Discharge Services:  Plan A, home with home care.  PT and OT consults pending.       Patient/family educated on Medicare website which has current facility and service quality ratings:  yes  Education Provided on the Discharge Plan:  yes  Patient/Family in Agreement with the Plan:  yes    Referrals Placed by CM/SW:  Care Management Team will continue to follow.    Additional Information: The following tentative home care plans of care have been arranged pending insurance authorization:    [facility]     Ph:  [phone]     Fax: [phone]     Skilled home care RN for initial home safety evaluation and 1-3 times a week to assist with management and education reinforcement with new ileostomy.     Skilled home care RN to assist with nutrition and hydration evaluation, medication management, endurance evaluation and general status evaluation after hospitalization for surgery.       Skilled home care RN to reinforce with patient to record ostomy output prior to emptying and have patient bring recorded results to follow up doctor appointment.     Skilled home care RN to re evaluate patient's understanding about how to order ileostomy supplies as instructed by the inpatient WOC (Wound Ostomy Continence) Nurse Consult Team.       Outpatient Dialysis Unit     [facility]  SHARE:      Also known as [facility]  [address]  [address]  [place], [place] [address]     Ph:  [phone]     Fax: [phone]    Care Coordinator RN will continue to follow for this plan A of transtion to discharge to home with home care services and outpatient dialysis.      Viv Manclila,  B.S.N., R.N., P.H.N..  Care Coordinator     Pager   [facility],  [place]

## 2021-04-02 NOTE — PLAN OF CARE
Major Shift Events:      Patient arrived to unit as 6B overflow from 7A at 02:45 for telemetry monitoring. Upon arrival patient was in a rate controlled A-fib and describing 4/10 left anterior chest pressure radiating to his back, unrelieved by changes in position or by oxycodone. General surgery called with orders for 12-lead EKG and troponin. Cardiology and general surgery both updated with results and about patient's continued chest pain, PRN SL nitroglycerin was ordered. At 05:10 patient was still describing 4/10 chest pain and received 1 SL nitroglycerin with relief of pain to 1/10. Ileostomy with 200mL green output, stoma appears healthy and red with red miles in place.     Plan: Plan for ECHO this AM and follow-up by cardiology.     For vital signs and complete assessments, please see documentation flowsheets.

## 2021-04-02 NOTE — PROGRESS NOTES
/67 (BP Location: Left arm)   Pulse 83   Temp 97.5  F (36.4  C) (Oral)   Resp 18   Ht 1.829 m (6')   Wt 91 kg (200 lb 11.2 oz)   SpO2 96%   BMI 27.22 kg/m  RA Pt continues to complain of chest pain 4-5/ 10 .He denies any family history of ulcer disease or gallbladder disease.  He denies any  SOB but has some intermittent nausea ,but denies now.Pt has hx of Afib and in afib per EKG done earlier.Troponin 0.088 and 0.093.and waiting for bed on 4A and report called to nurse.Pt had dialysis earlier today and is anuric.Illeostomy had 150 ml out and blood glucose was 91.Has shoes on and refused to take them offA&O x4 and medicated with oxycodone and pain had no improvement.Pt transferred in bed to  with his belongings.

## 2021-04-02 NOTE — CONSULTS
Cardiology Consult                                                               2021  Alexei Blake MRN: [id number]  Age: 80 year old, : 1940      Reason for consult:      Chest pain & elevated troponin.      Assessment and Recommendation:     Mr Blake is an 80 year old with past medical history of CAD s/p PCI of LAD on 2021 f/b rotational atherectomy & MARIA ELENA insertion in LCx & OM on 3/20/2021; ICMP (LVEF~ 25% recovered to ~40-45% post PCI of LAD), paroxysmal AFib on coumadin, prior CVA, ESRD on iHD, PAD s/p toe amputations. The patient presented to the surgical service on 3/20/2021 with acute onset abdominal pain during HD earlier in the day >> noted to have ascending colon ischemia on imaging >> following which he underwent an emergent right hemicolectomy & ileostomy creation. On 2021, POD #2 the patient complained of sudden onset, substernal chest pain following which a troponin was checked (0.08 >> 0.09 >> 0.11) and cardiology was consulted for further recommendations.     # Multivessel CAD s/p recent PCI of LAD, staged MARIA ELENA/rotational atherectomy of LCx/OM  # Non-dominant RCA with moderate-severe ostial and mid vessel stenoses (non-revasc)  # Ischemic cardiomyopathy (LVEF 40-45%)  # Troponin elevation   # Chest pain    Patient's chest pain suggestive of angina, relieved by SL NTG. EKG overnight revealed known Afib with LBBB, no acute ST segment/T wave changes.    - Continue DAPT- ASA & Plavix.  - Initiate Imdur 30 mg as anti-anginal therapy.  - Re-initiation of lisinopril 5 mg when hemodynamically able.   - Please increase Toprol XL 25 mg to 50 mg daily to decrease cardiac demand.   - Can stop trending troponin.     # Paroxysmal Afib:  - Rate controlled Afib noted on telemetry.  - Agree with continuation of anti-coagulation (coumadin) as deemed to be safe from a surgical/ post-operative bleeding perspective.    Follow up: primary cardiologist for uptitration of GDMT.        Patient discussed with staff attending, Dr. Noonan and the note reflects our joint plan. Cardiology will sign off. Thank you for consulting the cardiovascular services at the [facility]. Please do not hesitate to call with questions or concerns.     Louise Ojeda MD,   Cardiovascular Disease Fellow  Pager [phone]        History of Present Illness:     Mr Blake is an 80 year old with past medical history of CAD s/p PCI of LAD on 1/21/2021 f/b rotational atherectomy & MARIA ELENA insertion in LCx & OM on 3/20/2021; ICMP (LVEF~ 25% recovered to ~40-45% post PCI of LAD), paroxysmal AFib on coumadin, prior CVA, ESRD on iHD, PAD s/p toe amputations. The patient presented to the surgical service on 3/20/2021 with acute onset abdominal pain during HD earlier in the day >> noted to have ascending colon ischemia on imaging >> following which he underwent an emergent right hemicolectomy & ileostomy creation. On 4/1/2021, POD #2 the patient complained of sudden onset, substernal chest pain following which a troponin was checked (0.08 >> 0.09 >> 0.11) and cardiology was consulted for further recommendations.     The patient presented with NSTEMI in Jan 2021; CAG revealed severe mid-LAD stenosis; dominant circumflex with severe ostial lesion and distal lesion before the inferior wall branches. Severe disease in the large OM-3 as well; Non-dominant RCA with moderate-severe ostial and mid vessel stenoses. He was referred for CABG but deemed to be a poor surgical candidate following which he underwent a planned PCI of LAD on 1/21/2021 and staged PCI/rotational atherectomy of LCx/OM on 3/19/2021.      On bedside assessment today, the patient reported sudden onset, left sided substernal chest pain that occurred at rest yday. Reported radiation to the shoulder, relieved with SL NTG. Denied such episodes of chest pain in January and since his recent PCI. Denied similarity to incisional post-op abdominal  pain. Denied SOB, PND, orthopnea, pedal edema. Laying comfortably in bed at the time of evaluation.    Vitals:    03/30/21 2215 04/01/21 0900   Weight: 90.7 kg (200 lb) 91 kg (200 lb 11.2 oz)       A 13-point ROS is negative except as mentioned above     CAG Jan 2021: Coronary Angiography #1:    Acute systolic heart failure in a patient with signficant peripheral arterial disease and ESRD on dialysis. NSTEMI this admission while in severe acute heart failure.    Severe peripheral and coronary artery calcification.    Mild left main stenosis.    Severe serial mid LAD stenoses, very calcified.    Dominant circumflex with severe ostial lesion and distal lesion before the inferior wall branches. Severe disease in the large OM-3 as well.    Non-dominant RCA with moderate-severe ostial and mid vessel stenoses.    LV EDP normal.    Blood loss < 20 cc.     CAG 3/19/2021:  Staged PCI for ischemic cardiomyopathy. EF improved from 25% to 44% with PCI of the LAD in January.  Severe ostial dominant circumflex, critical proximal OM-3 and severe distal circumflex lesions all treated with rotational atherectomy and a Synergy MARIA ELENA with good angiographic results. IVUS used for the ostial   circumflex lesion.  Blood loss < 20 cc.    TTE 2/19/2021  Left ventricle ejection fraction is moderately decreased. The calculated left ventricular ejection fraction is 44%.  Normal right ventricular size and systolic function.  The following segments are akinetic: apical septal and apex. The following segments are hypokinetic: mid inferoseptal.  When compared to the previous study dated 1/18/2021, there has been improvement in left ventricular ejection fraction and improvement in function of several wall segments.    TTE 1/18/2021:   Normal left ventricular size. The estimated left ventricular ejection fraction is 25%. The wall motion abnormality is consistent with stress cardiomyopathy.  Right ventricle is not well visualized.  No hemodynamically  significant valvular heart abnormalities.  Mild pulmonary hypertension is present.  Left atrial volume is mildly increased.  Technically difficult study with suboptimal image quality.  When compared to the previous study dated 9/28/2019, there are changes noted.No significant valvular abnormalities are noted on screening Doppler exam.    EKG overnight:        Past Medical History:     Patient Active Problem List   Diagnosis     Colonic ischemia (H)         Past Surgical History:      Past Surgical History:   Procedure Laterality Date     LAPAROTOMY EXPLORATORY N/A 3/31/2021    Procedure: LAPAROTOMY, RIGHT HEMICOLECTOMY, ILEOSTOMY CREATION;  Surgeon: Harsh Santos MD;  Location:  OR         Social History:     Social History     Socioeconomic History     Marital status:      Spouse name: Not on file     Number of children: Not on file     Years of education: Not on file     Highest education level: Not on file   Occupational History     Not on file   Social Needs     Financial resource strain: Not on file     Food insecurity     Worry: Not on file     Inability: Not on file     Transportation needs     Medical: Not on file     Non-medical: Not on file   Tobacco Use     Smoking status: Not on file   Substance and Sexual Activity     Alcohol use: Not on file     Drug use: Not on file     Sexual activity: Not on file   Lifestyle     Physical activity     Days per week: Not on file     Minutes per session: Not on file     Stress: Not on file   Relationships     Social connections     Talks on phone: Not on file     Gets together: Not on file     Attends Rastafari service: Not on file     Active member of club or organization: Not on file     Attends meetings of clubs or organizations: Not on file     Relationship status: Not on file     Intimate partner violence     Fear of current or ex partner: Not on file     Emotionally abused: Not on file     Physically abused: Not on file     Forced sexual  activity: Not on file   Other Topics Concern     Not on file   Social History Narrative     Not on file         Family History:     Family History   Problem Relation Age of Onset     Diabetes Father      Hypertension Father      Heart Disease Father          Allergies:     Allergies   Allergen Reactions     Blood Transfusion Related (Informational Only) Other (See Comments)     Patient has a history of a clinically significant antibody against RBC antigens.  A delay in compatible RBCs may occur.     Blood-Group Specific Substance      Anti-K present. Expect delays in blood for transfusion.  Draw 2 lavender top tubes for type and screen orders.      Calcitriol Rash     Ciprofloxacin Rash         Medications:     No current facility-administered medications on file prior to encounter.   acetaminophen (TYLENOL) 500 MG tablet, Take 500 mg by mouth every 4 hours as needed for mild pain  aspirin 81 MG EC tablet, Take 81 mg by mouth daily  atorvastatin (LIPITOR) 80 MG tablet, Take 80 mg by mouth every evening  calcium carbonate (TUMS) 500 MG chewable tablet, Take 1 chew tab by mouth 2 times daily  clopidogrel (PLAVIX) 75 MG tablet, Take 75 mg by mouth daily  fluticasone (FLONASE) 50 MCG/ACT nasal spray, Spray 2 sprays into both nostrils daily  lisinopril (ZESTRIL) 5 MG tablet, Take 5 mg by mouth daily  metoprolol succinate ER (TOPROL-XL) 25 MG 24 hr tablet, Take 25 mg by mouth every evening  multivitamin RENAL (NEPHROCAPS/TRIPHROCAPS) 1 MG capsule, Take 1 capsule by mouth daily  nitroGLYcerin (NITROSTAT) 0.4 MG sublingual tablet, Place 0.4 mg under the tongue every 5 minutes as needed for chest pain For chest pain place 1 tablet under the tongue every 5 minutes for 3 doses. If symptoms persist 5 minutes after 1st dose call 911.  warfarin ANTICOAGULANT (COUMADIN) 3 MG tablet, Take 3 mg by mouth daily            Physical Exam:     /55   Pulse 79   Temp 98.8  F (37.1  C) (Oral)   Resp (!) 39   Ht 1.829 m (6')   Wt  91 kg (200 lb 11.2 oz)   SpO2 96%   BMI 27.22 kg/m      Wt Readings from Last 4 Encounters:   04/01/21 91 kg (200 lb 11.2 oz)         Intake/Output Summary (Last 24 hours) at 4/2/2021 0644  Last data filed at 4/2/2021 0600  Gross per 24 hour   Intake 320 ml   Output 1725 ml   Net -1405 ml       Gen: AA&Ox3, no acute distress  HEENT: EOM grossly intact, mucous membranes pink, moist without plaque or exudate  PULM/THORAX: Clear to auscultation bilaterally, no rales/rhonchi/wheezes  CV:RRR, S1 and S2 appreciated, no extra heart sounds, murmurs or rub auscultated. No JVD  ABD: soft, tender, nondistended. Mild tenderness around incision.  EXT: No edema, clubbing or cyanosis. No asymmetrical edema or tenderness to palpation in calves bilaterally.  PSYCH: appropriate affect and mentation.       Data:     Labs Reviewed on Admission    Troponin   Lab Results   Component Value Date    TROPI 0.108 (H) 04/02/2021    TROPI 0.093 (H) 04/01/2021    TROPI 0.088 (H) 04/01/2021     BMP  Recent Labs   Lab 04/02/21  0328 04/01/21  0717 03/31/21  0502 03/30/21  2235    136 137 138   POTASSIUM 4.7 5.8* 5.0 4.5   CHLORIDE 99 102 109 103   AVNI 9.1 9.0 7.5* 9.1   CO2 24 22 20 26   BUN 30 50* 27 28   CR 4.49* 6.48* 4.00* 4.18*   GLC 99 99 153* 130*     CBC  Recent Labs   Lab 04/01/21  0717 03/31/21  0553 03/30/21  2235   WBC 12.2* 16.5* 17.9*   RBC 3.24* 3.10* 3.40*   HGB 11.3* 10.8* 12.2*   HCT 34.7* 32.9* 36.3*   * 106* 107*   MCH 34.9* 34.8* 35.9*   MCHC 32.6 32.8 33.6   RDW 16.0* 16.0* 15.8*    163 175     INR  Recent Labs   Lab 04/02/21  0328 04/01/21  0717 03/31/21  1022 03/30/21  2235   INR 3.05* 3.05* 2.65* 2.13*      Hepatic Panel   Lab Results   Component Value Date    AST 18 04/01/2021     Lab Results   Component Value Date    ALT 24 04/01/2021     No results found for: BILICONJ   Lab Results   Component Value Date    BILITOTAL 0.4 04/01/2021     Lab Results   Component Value Date    ALBUMIN 2.8 04/02/2021      Lab Results   Component Value Date    PROTTOTAL 5.9 04/01/2021      Lab Results   Component Value Date    ALKPHOS 42 04/01/2021

## 2021-04-02 NOTE — PLAN OF CARE
Pt transferred to  after report called. Pt stable and appropriate for transfer. Transported with myself and PT with bag of clothing and another with ostomy produces. Meds and chart handed off to NST. Pt to call wife about transfer.

## 2021-04-02 NOTE — PROGRESS NOTES
Admitted/transferred from:   2 RN full   skin assessment completed by Jenna New, PARUL and Pilar Bass RN.  Skin assessment finding: issues found : incision, ileostomy, blanchable redness coccyx, missing toes bilateral feet, edwin BLE   Interventions/actions: skin interventions : repositioned side to side, preventative mepilex in place     Will continue to monitor.

## 2021-04-02 NOTE — PLAN OF CARE
Admitted/transferred from: 7A  Reason for admission/transfer: Telemetry monitoring, 6B overflow  2 RN skin assessment: completed by ROCIO Hennessy and ELDER Posey  Result of skin assessment and interventions/actions: Generalized bruising, coccyx with blanchable redness; sacral mexiplex in place, positioning offered  Height, weight, drug calc weight: Done  Patient belongings (see Flowsheet)      Significant Event:   Upon transfer to unit patient complaining of 4/10 L anterior chest pressure, radiating to back, constant but worsened by deep breathing. Noted to be in rate controlled A-fib 80bpm on bedside monitoring with murmur to auscultation, baseline per patient. General surgery paged with plans to obtain 12lead EKG and serial troponins.   ?

## 2021-04-03 NOTE — PLAN OF CARE
Assumed care from 2779-0614. VSS on RA. Up with assist x1. Aox4. Pain controlled with scheduled robaxin and PRN oxycodone. Abd incisions liquid bandaged and JUMA. Ileostomy intact with minimal output. Anuric per pt, on dialysis. AV fistula in right arm +thrill/+bruit. On LFD, denies nausea. Denies chest pain/SOB. Left PIV TKO with antibiotics. Continue with POC.

## 2021-04-03 NOTE — PLAN OF CARE
PT 7C: cancel - pt off unit at dialysis in AM, with another provider upon attempt in PM. Will reschedule.

## 2021-04-03 NOTE — PROGRESS NOTES
Nephrology Progress Note  04/03/2021    Assessment & Recommendations:  Alexei Blake is a 80 year old with PMH CAD s/p recent PCI, HFrEF, HTN, DM2, ESRD on HD, PAD, Afib on warfarin who presented with acute onset of abdominal pain during HD. Patient was found to have ischemic changes of right colon with portal venous gas per imaging. S/p colonic resection and ileostomy.    ESRD on HD  Runs TTS at [facility] under care of Dr Noble Monae via RUE AVF for 3.5 hrs. TW 92 kg  - HD today with no volume off and maintaining BP >100 systolic; next dialysis Tuesday  HTN/Volume - appears euvolemic  - Agree with resuming PTA Toprol XL    Electrolytes - K 5.2, bicarb 23    Acid/base - no acute issues, bicarb usually ~20 as outpatient    BMD - Phos minimally elevated but could be due to post-op status.  - Will continue PTA hectorol 3 mcg with HD  - Continue PTA TUMS WM    Anemia - related to surgery, does not appear to have anemia chronically or require Epo    Recommendations were communicated to primary team via note     Interval History :   Nursing and provider notes from last 24 hours reviewed.  No new overnight events. No further chest pain. Abd pain controlled. No SOB.     Review of Systems:   ROS neg as above    Physical Exam:   I/O last 3 completed shifts:  In: 440 [P.O.:320; I.V.:120]  Out: 1105 [Emesis/NG output:20; Stool:1085]   /59   Pulse 76   Temp 97.9  F (36.6  C) (Oral)   Resp 9   Ht 1.829 m (6')   Wt 88.9 kg (195 lb 14.4 oz)   SpO2 96%   BMI 26.57 kg/m       GENERAL APPEARANCE: no distress, awake  EYES: no scleral icterus, pupils equal  Pulmonary: breathing non-labored  CV: regular rhythm, normal rate   - Edema none  GI: soft, nondistended  NEURO: face symmetric, AOx3, speech normal   Access: RUE AVF    Labs:   All labs reviewed by me  Electrolytes/Renal -   Recent Labs   Lab Test 04/03/21  0717 04/02/21  0328 04/01/21  0717 03/31/21  0502 03/30/21  2235    133 136 137 138    POTASSIUM 5.2 4.7 5.8* 5.0 4.5   CHLORIDE 100 99 102 109 103   CO2 23 24 22 20 26   BUN 50* 30 50* 27 28   CR 6.49* 4.49* 6.48* 4.00* 4.18*   * 99 99 153* 130*   AVNI 8.7 9.1 9.0 7.5* 9.1   MAG  --   --  2.8* 1.8 2.0   PHOS 6.4* 5.1* 6.1* 3.6 3.0       CBC -   Recent Labs   Lab Test 04/01/21 0717 03/31/21  0553 03/30/21 2235   WBC 12.2* 16.5* 17.9*   HGB 11.3* 10.8* 12.2*    163 175       LFTs -   Recent Labs   Lab Test 04/03/21 0717 04/02/21 0328 04/01/21 0717 03/30/21  2235   ALKPHOS  --   --  42 56   BILITOTAL  --   --  0.4 0.6   ALT  --   --  24 58   AST  --   --  18 30   PROTTOTAL  --   --  5.9* 6.4*   ALBUMIN 2.4* 2.8* 2.6* 3.1*       Iron Panel - No lab results found.      Imaging:  All imaging studies reviewed by me.     Current Medications:    aspirin  81 mg Oral Daily     atorvastatin  80 mg Oral QPM     clopidogrel  75 mg Oral Daily     gelatin absorbable  1 each Topical During Hemodialysis (from stock)     insulin aspart  1-12 Units Subcutaneous Q4H     isosorbide mononitrate  30 mg Oral Daily     [Held by provider] lisinopril  5 mg Oral Daily     methocarbamol  500 mg Oral 4x Daily     metoprolol succinate ER  50 mg Oral QPM     piperacillin-tazobactam  2.25 g Intravenous Q6H       sodium chloride 10 mL/hr at 04/01/21 0338     - MEDICATION INSTRUCTIONS -       Malik Greco MD

## 2021-04-03 NOTE — PLAN OF CARE
Alert and oriented. VSS. Had dialysis this morning. HD fistula on right upper arm. Ileostomy with gas and minimal stool. Aneuric. On low fiber diet - decreased appetite but denies nausea. Increased abdominal pain after working with therapy and sitting in chair. More comfortable after returned to bed and pain meds given.   Continue with POC.

## 2021-04-03 NOTE — PROGRESS NOTES
HEMODIALYSIS TREATMENT NOTE    Date: 4/3/2021  Time: 1:02 PM    Data:  Pre Wt:   88.9 kg  Desired Wt: 88.9 kg   Post Wt:  89 kg (Estimated Wt)  Ultrafiltration - Post Run Net Total Gained (mL): 100 mL  Vascular Access Status:HANSEL AVFistula  patent  Dialyzer Rinse: Streaked, Light  Total Blood Volume Processed: 61.6 L   Total Dialysis (Treatment) Time: 3.5 hrs  Dialysate Bath: K 2, Ca 2.5, Na: 138, Bicarb:32  Heparin: None    Lab:   No    Assessment:  Positive bruit and thrill on HANSEL AVF.  Pre run K/Ca: 5.2/ 8.7, BUN/Cr: 50/ 6.49, Hgb/Hct: 11.3/34.7  HB s Ag and AB:(-)/ 99.55 at 4-1-2021  Dialysis consent signed at 3-    Interventions:  Patient dialyzed for 3 hrs 30 mins via HANSEL AVF with 15 G fistula needles. Reached BFR to 300 ml/mins per HD order. Stable but to keep SBP > 100 mmHg thus gave NS bolus 100 ml IV via HD system . Then stable and tolerable for 3.5 hrs HD run with positive 100 ml.  Gave Hectorol 3 mcg IV.  Finished HD with rinse back, Decanulated site hold for 5 mins.       Plan:    Next run per renal team.

## 2021-04-03 NOTE — PROGRESS NOTES
"   04/02/21 1500   Quick Adds   Type of Visit Initial PT Evaluation   Living Environment   People in home spouse   Current Living Arrangements house   Home Accessibility stairs to enter home;stairs within home   Number of Stairs, Main Entrance 3   Stair Railings, Main Entrance none   Number of Stairs, Within Home, Primary   (flight to 2nd floor & flight to basement, pt does not use)   Transportation Anticipated car, drives self   Living Environment Comments Pt reports that he lives in a 2 story home with his wife.  Pt bedroom and primary living spaces located on main level of home.  pt wife uses bedroom in upper level.  Pt reports \"his shower\" is in the basement (walk in with shower chair); however, recently has preferred using \"his wife's shower\" on the main level of the home (also a walk in shower with shower chair).  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  Owns 4WW but does not use. Drives self to dialysis 3x/week.    Disability/Function   Hearing Difficulty or Deaf no   Wear Glasses or Blind yes   Vision Management glasses   Concentrating, Remembering or Making Decisions Difficulty yes   Difficulty Communicating no   Difficulty Eating/Swallowing no   Walking or Climbing Stairs Difficulty yes   Walking or Climbing Stairs ambulation difficulty, requires equipment   Mobility Management FWW   Dressing/Bathing Difficulty yes   Dressing/Bathing bathing difficulty, requires equipment;dressing difficulty, assistance 1 person   Dressing/Bathing Management shower chair for bathing; wife assists with lower body dressing   Toileting issues no   Doing Errands Independently " Difficulty (such as shopping) yes   Errands Management wife assists   Fall history within last six months no   Change in Functional Status Since Onset of Current Illness/Injury yes   General Information   Onset of Illness/Injury or Date of Surgery 03/30/21   Referring Physician Steffanie Wiley MD   Patient/Family Therapy Goals Statement (PT) to return home   Pertinent History of Current Problem (include personal factors and/or comorbidities that impact the POC) Alexei Kaur is an 80-year-old male with history of CAD s/p recent stent on clopidogrel, CHF with EF 25%, history of stroke with left leg deficit, atrial fibrillation on warfarin, ESRD dependent upon dialysis, and DM type II, who is POD 2 from exploratory laparotomy with right hemicolectomy, ileostomy and mucous fistula.  Doing well.    Existing Precautions/Restrictions abdominal;fall   General Observations Activity: up with assist   Cognition   Orientation Status (Cognition) oriented x 4   Affect/Mental Status (Cognition) WFL   Follows Commands (Cognition) WFL   Pain Assessment   Patient Currently in Pain No   Posture    Posture Protracted shoulders;Forward head position   Range of Motion (ROM)   ROM Comment WFL   Strength   Strength Comments generalized weakness and deconditioning, residual LLE weakness 2/2 previous stroke   Bed Mobility   Comment (Bed Mobility) supine>sit with min A   Transfers   Transfer Safety Comments sit<>stand with CGA/min A   Gait/Stairs (Locomotion)   Comment (Gait/Stairs) Ambulates with FWW and CGA, heavy reliance on FWW, decreased jose manuel and step length, decreased foot clearance especially in LLE   Balance   Balance Comments Requires FWW for balance with standing and ambulation   Clinical Impression   Criteria for Skilled Therapeutic Intervention yes, treatment indicated   PT Diagnosis (PT) impaired functional mobility   Influenced by the following impairments strength, balance, activity tolerance, abdominal precautions    Functional limitations due to impairments bed mobility, transfers, gait, stairs, functional endurance   Clinical Presentation Stable/Uncomplicated   Clinical Presentation Rationale PMH/comorbidties, clinical judgement   Clinical Decision Making (Complexity) low complexity   Therapy Frequency (PT) Daily   Predicted Duration of Therapy Intervention (days/wks) 3-5 days   Planned Therapy Interventions (PT) balance training;bed mobility training;gait training;postural re-education;patient/family education;stair training;strengthening;transfer training   Risk & Benefits of therapy have been explained evaluation/treatment results reviewed;care plan/treatment goals reviewed;risks/benefits reviewed;participants voiced agreement with care plan;participants included;patient   PT Discharge Planning    PT Discharge Recommendation (DC Rec) home with assist;home with home care physical therapy   PT Rationale for DC Rec Patient is below baseline mobility but making progress in therapies, anticipate will be safe to discharge home with assist when medically ready for discharge. May benefit from  PT to progress strength, activity tolerance and functional independence.    Total Evaluation Time   Total Evaluation Time (Minutes) 5

## 2021-04-03 NOTE — PROGRESS NOTES
General Surgery Progress Note    Subjective: Tolerating low residue diet  No episodes of emesis overnight. Ostomy output slowing down. IRN still supratherapuetic     Objective:   /56   Pulse 76   Temp 97.9  F (36.6  C) (Oral)   Resp 15   Ht 1.829 m (6')   Wt 88.9 kg (195 lb 14.4 oz)   SpO2 97%   BMI 26.57 kg/m      PE:  Gen: AOx 3  Resp: NLB  Abd: soft, non idtended, minimally tender, ostomy sowllen but pink and patent with some gas in bag, Red rubber out      A/P: Alexei Kaur is an 80-year-old male with history of CAD s/p recent stent on clopidogrel, CHF with EF 25%, history of stroke with left leg deficit, atrial fibrillation on warfarin, ESRD dependent upon dialysis, and DM type II, who is POD 3 from exploratory laparotomy with right hemicolectomy, ileostomy and mucous fistula.  Doing well.      - Pain control with Oxycodone and Tylenol  - Continue ASA and plavix, hold warfarin. PTA metoprolol.   - Will hold starting subcutaneous heparin today, since INR is therapeutic at this time  - Continue Zosyn day 3 of 4  - Low residue diet,  - WOC consult for ostomy cares  - HD Tu/Th/Sat  - Discharge to home in 2-3 days after zosyn complete and tolerating diet.    - Keep red rubber in bag    Adriana Jackson MD  Chief Resident (General Surgery)  PGY 5

## 2021-04-04 NOTE — PLAN OF CARE
Assumed care from 0625-5027. VSS on RA. Not OOB this shift, weight shifting assistance provided and encouraged. Pain poorly controlled overnight, reports pressure and gas discomfort in lower abdomen. Scheduled robaxin and PRN tylenol, oxycodone, and simethicone given. AquaK pad provided as well. Ileostomy intact, minimal liquid output and gas. Abd midline CDI. Right arm AV fistula +thrill/+bruit, anuric per pt report. Q4hr BG monitoring, sliding scale insulin given per orders. On LFD, intermittent nausea throughout night, PRN IV zofran given. Had one episode of vomiting at 0600. Left PIV TKO. Continue with POC.

## 2021-04-04 NOTE — PLAN OF CARE
Alert and oriented. VSS. Nausea continued from night shift into the afternoon. Abdominal xray complete. Patient had large emesis after Zofran given. Surgical cross-cover notified. NG placed per order with 550ml's gastric content immediately. Patient now denies nausea and reports less  abdominal pain. Educated on NPO status. BG q4 stable. Coccyx with preventative mepilex replaced - blanchable redness. Encouraged/assisted with positioning. Up with assist of 2.   Continue with POC.

## 2021-04-04 NOTE — PROGRESS NOTES
Nephrology Progress Note  04/04/2021    Assessment & Recommendations:  Alexei Blake is a 80 year old with PMH CAD s/p recent PCI, HFrEF, HTN, DM2, ESRD on HD, PAD, Afib on warfarin who presented with acute onset of abdominal pain during HD. Patient was found to have ischemic changes of right colon with portal venous gas per imaging. S/p colonic resection and ileostomy.    ESRD on HD  Runs TTS at [facility] under care of Dr Noble Monae via RUE AVF for 3.5 hrs. TW 92 kg  - HD yesterday with no weight off - next dialysis Tuesday; euvolemic by today's exam    Electrolytes - K 3.9, bicarb 27    BMD - Phos 5.5  - Will continue PTA hectorol 3 mcg with HD  - Continue PTA TUMS WM    Anemia - related to surgery, does not appear to have anemia chronically or require Epo    Recommendations were communicated to primary team via note    Malik Greco MD  Nephrology Staff  [phone]     Interval History :   Nursing and provider notes from last 24 hours reviewed. Vomited twice yesterday without other new overnight events. No further chest pain. No nausea.  Abd pain controlled. No SOB.     Review of Systems:   ROS neg as above    Physical Exam:   I/O last 3 completed shifts:  In: 500 [P.O.:200; I.V.:200; Other:100]  Out: 225 [Emesis/NG output:150; Stool:75]   /60 (BP Location: Left arm)   Pulse 82   Temp 97.1  F (36.2  C) (Oral)   Resp 16   Ht 1.829 m (6')   Wt 88.9 kg (195 lb 14.4 oz)   SpO2 96%   BMI 26.57 kg/m       GENERAL APPEARANCE: no distress, awake  EYES: no scleral icterus, pupils equal  Pulmonary: breathing non-labored  CV: regular rhythm, normal rate   - Edema none  GI: soft, nondistended  NEURO: face symmetric, AOx3, speech normal   Access: RUE AVF    Labs:   All labs reviewed by me  Electrolytes/Renal -   Recent Labs   Lab Test 04/04/21  0704 04/03/21  0717 04/02/21  0328 04/01/21  0717 03/31/21  0502 03/30/21  2235    133 133 136 137 138   POTASSIUM 3.9 5.2 4.7 5.8* 5.0 4.5    CHLORIDE 97 100 99 102 109 103   CO2 27 23 24 22 20 26   BUN 29 50* 30 50* 27 28   CR 4.89* 6.49* 4.49* 6.48* 4.00* 4.18*   * 119* 99 99 153* 130*   AVNI 9.5 8.7 9.1 9.0 7.5* 9.1   MAG  --   --   --  2.8* 1.8 2.0   PHOS 5.5* 6.4* 5.1* 6.1* 3.6 3.0       CBC -   Recent Labs   Lab Test 04/01/21  0717 03/31/21  0553 03/30/21  2235   WBC 12.2* 16.5* 17.9*   HGB 11.3* 10.8* 12.2*    163 175       LFTs -   Recent Labs   Lab Test 04/04/21  0704 04/03/21  0717 04/02/21  0328 04/01/21 0717 03/30/21  2235   ALKPHOS  --   --   --  42 56   BILITOTAL  --   --   --  0.4 0.6   ALT  --   --   --  24 58   AST  --   --   --  18 30   PROTTOTAL  --   --   --  5.9* 6.4*   ALBUMIN 2.7* 2.4* 2.8* 2.6* 3.1*       Iron Panel - No lab results found.      Imaging:  All imaging studies reviewed by me.     Current Medications:    aspirin  81 mg Oral Daily     atorvastatin  80 mg Oral QPM     clopidogrel  75 mg Oral Daily     insulin aspart  1-12 Units Subcutaneous Q4H     isosorbide mononitrate  30 mg Oral Daily     [Held by provider] lisinopril  5 mg Oral Daily     methocarbamol  500 mg Oral 4x Daily     metoprolol succinate ER  50 mg Oral QPM       sodium chloride 10 mL/hr at 04/01/21 0338     Malik Greco MD

## 2021-04-04 NOTE — PROGRESS NOTES
General Surgery Progress Note    Subjective: Had two episodes of emesis this AM. AXR shows ileus. Feels better after episodes of emesis and feels less bloated since AM    Objective:   /60 (BP Location: Left arm)   Pulse 82   Temp 97.1  F (36.2  C) (Oral)   Resp 16   Ht 1.829 m (6')   Wt 88.9 kg (195 lb 14.4 oz)   SpO2 96%   BMI 26.57 kg/m      PE:  Gen: AOx 3  Resp: NLB  Abd: soft, non idtended, minimally tender, ostomy sowllen but pink and patent with some gas and stool in bag, Red rubber placed in ostomy and it appears to be patent       A/P: Alexei Kaur is an 80-year-old male with history of CAD s/p recent stent on clopidogrel, CHF with EF 25%, history of stroke with left leg deficit, atrial fibrillation on warfarin, ESRD dependent upon dialysis, and DM type II, who is POD 4 from exploratory laparotomy with right hemicolectomy, ileostomy and mucous fistula.  Doing well.      - Pain control with Oxycodone and Tylenol  - Continue ASA and plavix, hold warfarin. PTA metoprolol.   - Will hold starting subcutaneous heparin today, since INR is therapeutic at this time (INR 2.9)  - Continue Zosyn day 4 of 4  - Advised patient to stick to full liquids today  - WOC consult for ostomy cares  - HD Tu/Th/Sat  - Discharge to home  Once ileus resolves  - Keep red rubber in bag    Adriana Jackson MD  Chief Resident (General Surgery)  PGY 5

## 2021-04-04 NOTE — PROVIDER NOTIFICATION
Notified Karyn Mayer MD for general surgery at 0604 concerning 150cc emesis and increased, poorly managed abdominal pain. Patient reports he blames his pain and vomiting on eating too much last night.    Awaiting orders.     Will continue to monitor.

## 2021-04-05 NOTE — PROGRESS NOTES
"  WO Nurse Inpatient [facility]   WO Nurse Inpatient Adult     Initial Assessment   Assessment of new end Ileostomy Stoma complication(s) edema   Mucocutaneous junction; intact   Peristomal complication(s) none   Pouch wear time:24-48 hours  Following today's visit:Patient / is  able to demonstrate;       1. How to empty their pouch? Yes- demonstrated on 4/1 and return demo      2. How to change their pouch?  No- demonstrated on 4/1 (for patient) and 4/5 (for spouse)      3. How to read and record intake and output correctly? No- demonstrated on 4/1    Psychosocial- His wife had an ostomy previously about 8 years ago. She watched a pouch change at bedside 4/5 and stated that she was starting to remember how to do it.     Objective data:  Patient history according to medical record: Alexei Kaur is an 80-year-old gentleman with numerous comorbidities including coronary artery disease with a recent stent on Plavix, end stage renal disease on hemodialysis, on warfarin who presented with abdominal pain and signs symptoms consistent with ischemia of the ascending colon. S/p ex-lap, open right hemicolectomy, end ileostomy and mucus fistula.  Current Diet/Nutrition: Orders Placed This Encounter      NPO for Medical/Clinical Reasons Except for: Meds     TPN no   I/O last 3 completed shifts:  In: 933.33 [I.V.:933.33]  Out: 2175 [Emesis/NG output:2100; Stool:75]  Labs:    Recent Labs   Lab 04/05/21  0809 04/05/21  0731   ALBUMIN  --  2.4*   HGB 11.7*  --    INR  --  3.24*   WBC 7.1  --         Physical Exam:  Current pouching system:Mitchel and 1pcs flat CTF with 2\" barrier ring   Reason for pouch change today: ostomy education  Stoma appearance: viable, healthy, normal-appearing, pink, moist, edematous and protruberant  Stoma size; 32 mm ,  red miles had come out of the stoma and was sitting in the bottom of the pouch on assessment 4/5   Peristomal skin: intact  Stoma output :liquid   Abdominal  " "Assessment  soft , NG still in place? Yes   Surgical Site: open to air  Pain: Dull ache  Is patient still on a PCA No     Interventions:  Patient's chart evaluated.  Focus of today's visit: pouch change return demonstration and diet and hydration    Participant of teaching session today patient   Orders: Written  Change made with ostomy management today: No-  continue 2 pc flat CTF with 2\" barrier ring. Pt wants to try one piece pouch next time.  Patient/family: lethargic and observing  Supplies:Gathered and at bedside    Plan:  Learning needs: pouch change return demonstration, output measurement and discharge instructions  Preparation for discharge: Discussed how/ where to order supplies,  Prepared for discharge to home with homecare, Prescriptions or note left on chart for MD to sign/complete    Needs- Supplies ordered, Registered for samples from , Discussed making a WOC Nurse outpatient appointment upon discharge  Recommend home care? yes    Discussed plan of care with Patient and Nurse  Nursing to notify the Provider(s) and re-consult the WOC Nurse if new ostomy concerns or discharge planned before next planned WOC visit.    WOC Nurse will return: Daily  Face to face time: 35 minutes    WOC Nurse Inpatient Pressure Injury Assessment   Reason for consultation: Evaluate and treat coccyx      ASSESSMENT  Pressure Injury: on sacrum , hospital acquired ,   Pressure Injury is Stage Deep Tissue Pressure Injury (DTPI)   Contributing factor of the pressure injury: shear and immobility  Status: initial assessment       TREATMENT PLAN  Sacrum wound: Every third day cleanse with microKlenz and dry, apply Cavilon no sting barrier film to skin around wound and let dry, then place mepilex.     Orders Written  WOC Nurse follow-up plan:twice weekly  Nursing to notify the Provider(s) and re-consult the WOC Nurse if wound(s) deteriorates or new skin concern.    Patient History  According to provider note(s):  Patient " history according to medical record: Alexei Kaur is an 80-year-old gentleman with numerous comorbidities including coronary artery disease with a recent stent on Plavix, end stage renal disease on hemodialysis, on warfarin who presented with abdominal pain and signs symptoms consistent with ischemia of the ascending colon. S/p ex-lap, open right hemicolectomy, end ileostomy and mucus fistula.    Objective Data  Containment of urine/stool: Ileostomy pouch    Current Diet/ Nutrition:  Orders Placed This Encounter      NPO for Medical/Clinical Reasons Except for: Meds      Output:   I/O last 3 completed shifts:  In: 933.33 [I.V.:933.33]  Out: 2175 [Emesis/NG output:2100; Stool:75]    Risk Assessment:   Sensory Perception: 3-->slightly limited  Moisture: 4-->rarely moist  Activity: 3-->walks occasionally  Mobility: 3-->slightly limited  Nutrition: 1-->very poor  Friction and Shear: 2-->potential problem  Dheeraj Score: 16      Labs:   Recent Labs   Lab 04/05/21  0809 04/05/21  0731   ALBUMIN  --  2.4*   HGB 11.7*  --    INR  --  3.24*   WBC 7.1  --        Physical Exam  Skin inspection: focused sacrum          Wound Location:  sacrum  Date of last Photo 4/5  Wound History: noted by RN this AM 4/5, sacral mepilex was in place at time of assessment   Measurements (length x width x depth, in cm) 2 cm x 2 cm  x  0 cm   Wound Base:  100 % purple and epidermis, gray   Tunneling N/A  Undermining N/A  Palpation of the wound bed: normal   Periwound skin: erythema- blanchable  Color: red  Temperature: normal   Drainage:, none  Description of drainage: none  Odor: none  Pain: denies , none    Interventions  Current support surface: Standard  Low air loss mattress  Current off-loading measures: Foam padding and Pillows  Repositioning aid: Pillows  Visual inspection of wound(s) completed   Wound Care: was done per plan of care.  Supplies: at bedside and floor stock  Educated provided: importance of repositioning and plan of  care  Education provided to: patient , spouse and nurse  Discussed importance of:repositioning every 2 hours and off-loading pressure to wound  Discussed plan of care with Patient, Family and Nurse    Sumaya Saenz RN, CWOCN         [facility]     Name: Alexei Blake : 1940  Date: 2021    To order your ostomy supplies    The ostomy Supplier needs this supply list  to process your order. You will need to fax/deliver this list, along with your Insurance information. Your home care nurse can assist with this process.             List of Ostomy Distributors      [facility] [employer]  Ph. [phone] Fax # [phone]  [facility] [employer]   Ph. [phone]  [employer]   Ph. [phone]  [facility]   Ph. [phone] [phone]  Or Call your insurance provider for their preferred supplier    Your Medical Supplier will need your surgeon's name, phone and fax number    Clinic:                     Phone                            Fax  General Surgery:   [phone]  Verbal Order for ostomy supplies for 1 Month per:       Sumaya Saenz RN, CWOCN     Authorizing MD: Adrien Medina MD    Change pouch :  Three times a week  Quantity of pouches: 20 pouches/month     Request the following supplies:      Mitchel    1 piece flat fecal with filter #8390    Accessories  2  barrier ring #8923     Adapt powder #7925    No sting film barrier # 1272                          M-9 Spray room deodorizer #[id number]                                  Change your pouch twice a week, more often if leaking.    If you are cutting a hole in the wafer of your pouch, recheck stoma size and adjust pouch opening as needed every week    . Call the Ostomy Nurse at [facility]       [address] [place] [address] [place] : [phone]   Schedule a follow-up visit in 4 to 6 weeks after your surgery, sooner if having  problems Bring a complete set of pouch-changing supplies to this visit      Problems you should Report  - The stoma turns blue or darker in color.  - Cuts or sores around the stoma.  - Red, raw or painful skin around the stoma.  - Any bulging of the skin around the stoma.  - A pouch that leaks every day.  - Problems making the right size hole in the pouch wafer.    Please call with any questions or concerns.

## 2021-04-05 NOTE — PLAN OF CARE
Assumed care from 9600-0188. VSS on RA. Not OOB this shift, weight shift assistance provided and repositioning encouraged. Denies pain. AOx4. Abd midline stapled and intact. Ostomy with minimal liquid output.Self-discontinued NG while sleeping, replaced and verified by xray. Now to LIS, brown/green output. NPO, ice chips provided for comfort. Q4hr BG monitoring, sliding scale insulin given per orders. Blanchable redness to coccxy, sacral mepilex in place. Right arm AV fistula JUMA, +thrill/+bruit. Left arm PIV infusing MIVF. Continue with POC.

## 2021-04-05 NOTE — PLAN OF CARE
VSS. Pt up with assist of 1. Pt had low BP and became symptomatic when working with PT. MD ordered fluid bolus, BP recovered. Insulin given per sliding scale. NG tube to LIS with large amount of thin liquid output. Ostomy with small amount of liquid stool. WOC consult ordered for suspected pressure injury to coccyx. Pt placed on pulsate mattress and mepilex placed on coccyx. Cont. POC.

## 2021-04-05 NOTE — PROGRESS NOTES
General Surgery Progress Note    Subjective: NG with 1.5 L output, currently clear yellow output. Patient reports feeling much better. Ostomy with gas and stool in bag. Denies nausea    Objective:   /58 (BP Location: Left arm)   Pulse 73   Temp 95.6  F (35.3  C) (Oral)   Resp 17   Ht 1.829 m (6')   Wt 88.9 kg (195 lb 14.4 oz)   SpO2 94%   BMI 26.57 kg/m      PE:  Gen: AOx 3  Resp: NLB  Abd: soft, non distended, minimally tender, ostomy  pink and patent with some gas and stool in bag, Red rubber placed in ostomy and it appears to be patent       A/P: Alexei Kaur is an 80-year-old male with history of CAD s/p recent stent on clopidogrel, CHF with EF 25%, history of stroke with left leg deficit, atrial fibrillation on warfarin, ESRD dependent upon dialysis, and DM type II, who is POD 5 from exploratory laparotomy with right hemicolectomy, ileostomy and mucous fistula.  Doing well.      - Pain control with Oxycodone and Tylenol  - Continue ASA and plavix, hold warfarin. PTA metoprolol.   - Will hold starting subcutaneous heparin today, since INR is therapeutic at this time (INR 2.9)  - Completed  Zosyn day 4 of 4  - NPO, NG to LIS. Will plan on clamp trial in AM tomorrow   - [facility] consult for ostomy cares  - HD Tu/Th/Sat  - Discharge to home  Once ileus resolves, likely in 2-3 days   - Keep red rubber in bag    Adriana Jackson MD  Chief Resident (General Surgery)  PGY 5

## 2021-04-05 NOTE — CONSULTS
SW acknowledging consult. A CMA was completed on 4/1 by unit RNCC. Care management will continue to follow for dispo needs.    RAMO Horta, [facility]  Acute Care Float   [facility]  Phone: [phone]  Pager: [phone]

## 2021-04-06 NOTE — PROGRESS NOTES
HEMODIALYSIS TREATMENT NOTE    Date: 4/6/2021  Time: 3.30 PM    Data:  Pre Wt: 87.8kg    Desired Wt: 87.8 kg   Post Wt:  87.8kg (Estimated)  Ultrafiltration - Post Run Net Total Removed (mL): 0 mL  Vascular Access Status: Fistula  patent  Dialyzer Rinse: Streaked, Light  Total Blood Volume Processed: 96.48 L   Total Dialysis (Treatment) Time: 3.5   Dialysate Bath: K 3, Ca 2.25  Heparin: None    Lab:   No    Assessment / Interventions: 3.5 hours HD via RAVF, thrill & bruit present. 2 15g needle cannulated, no problem,  with good flow.  0 UF per order. Pt completed his treatment time, no issues, blood rinsed back , fistula hemostasis achieved in less than 10 minutes, hand off report given & pt send back in a stable condition.         Plan:    Cont. With Renal Team.

## 2021-04-06 NOTE — PLAN OF CARE
PT: started PT session this AM. But just as getting up to sitting. RN stating going down for Dialysis right now. PT session ended.   Pt not back from Dialysis at end of the day. Will cx PT session for today.

## 2021-04-06 NOTE — PROGRESS NOTES
Nephrology Progress Note  04/06/2021    Assessment & Recommendations:  Alexei Blake is a 80 year old with PMH CAD s/p recent PCI, CHF, HTN, DM2, ESRD on HD, PAD, Afib on warfarin who presented with acute onset of abdominal pain during HD. Patient was found to have ischemic changes of right colon with portal venous gas per imaging. S/p colonic resection and ileostomy 3/31.    ESRD on HD  Runs TTS at [facility] under care of Dr Noble Monae via RUE AVF for 3.5 hrs. EDW 92 kg  - Dialysis per TTS schedule      BP: normotensive    Volume: EDW 92 kg, O2 96% RA; minimal UOP  - Losing fluid via NG tube and ileostomy  - Will need to reduce EDW    BMD - Phos 6.9  - Will continue PTA hectorol 3 mcg with HD  - Continue PTA TUMS WM once eating    Anemia - related to surgery, does not appear to have anemia chronically or require Epo    Recommendations were communicated to primary team via note    Ana Paula Tolbert PA-C  [phone]    Interval History :   Seen on dialysis, stable run so far. No UF again today. Losing fluids via NG tube and ileostomy. Normotensive. Will need to reduce EDW. Denies n/v, CP, SOB, chills    Review of Systems:   ROS neg as above    Physical Exam:   I/O last 3 completed shifts:  In: 1224.17 [I.V.:1049.17; IV Piggyback:175]  Out: 1725 [Emesis/NG output:1650; Stool:75]   /58 (BP Location: Left arm)   Pulse 65   Temp 95.6  F (35.3  C) (Oral)   Resp 18   Ht 1.829 m (6')   Wt 87.8 kg (193 lb 9.6 oz)   SpO2 96%   BMI 26.26 kg/m       GENERAL APPEARANCE: no distress, awake  EYES: no scleral icterus, pupils equal  Pulmonary: CTA  CV: regular rhythm, normal rate   - Edema none  GI: ileostomy  NEURO: face symmetric, AOx3   Access: RUE AVF    Labs:   All labs reviewed by me  Electrolytes/Renal -   Recent Labs   Lab Test 04/06/21  0740 04/05/21  0731 04/04/21  0704 04/01/21  0717 04/01/21  0717 03/31/21  0502 03/30/21  7046    136 134   < > 136 137 138   POTASSIUM 4.2 4.3 3.9   < >  5.8* 5.0 4.5   CHLORIDE 97 97 97   < > 102 109 103   CO2 27 27 27   < > 22 20 26   BUN 43* 38* 29   < > 50* 27 28   CR 7.59* 6.35* 4.89*   < > 6.48* 4.00* 4.18*   * 157* 175*   < > 99 153* 130*   AVNI 8.8 9.0 9.5   < > 9.0 7.5* 9.1   MAG  --   --   --   --  2.8* 1.8 2.0   PHOS 6.9* 6.5* 5.5*   < > 6.1* 3.6 3.0    < > = values in this interval not displayed.       CBC -   Recent Labs   Lab Test 04/05/21  0809 04/01/21  0717 03/31/21  0553   WBC 7.1 12.2* 16.5*   HGB 11.7* 11.3* 10.8*    174 163       LFTs -   Recent Labs   Lab Test 04/06/21  0740 04/05/21  0731 04/04/21  0704 04/01/21  0717 04/01/21  0717 03/30/21  2235   ALKPHOS  --   --   --   --  42 56   BILITOTAL  --   --   --   --  0.4 0.6   ALT  --   --   --   --  24 58   AST  --   --   --   --  18 30   PROTTOTAL  --   --   --   --  5.9* 6.4*   ALBUMIN 2.4* 2.4* 2.7*   < > 2.6* 3.1*    < > = values in this interval not displayed.       Iron Panel - No lab results found.      Imaging:  All imaging studies reviewed by me.     Current Medications:    sodium chloride 0.9%  250 mL Intravenous Once in dialysis     sodium chloride 0.9%  300 mL Hemodialysis Machine Once     aspirin  81 mg Oral Daily     atorvastatin  80 mg Oral QPM     clopidogrel  75 mg Oral Daily     doxercalciferol  3 mcg Intravenous Once in dialysis     gelatin absorbable  1 each Topical During Hemodialysis (from stock)     insulin aspart  1-12 Units Subcutaneous Q4H     isosorbide mononitrate  30 mg Oral Daily     [Held by provider] lisinopril  5 mg Oral Daily     metoprolol succinate ER  50 mg Oral QPM     - MEDICATION INSTRUCTIONS -   Does not apply Once       dextrose 5% and 0.45% NaCl 100 mL/hr at 04/06/21 0745     - MEDICATION INSTRUCTIONS -       Ana Paula Tolbert PA

## 2021-04-06 NOTE — PROCEDURES
[facility]    Double Lumen PICC Placement    Date/Time: 4/6/2021 5:41 PM  Performed by: Trinidad Almonte RN  Authorized by: Adriana Jackson MD   Indications: vascular access    UNIVERSAL PROTOCOL   Site Marked: Yes  Prior Images Obtained and Reviewed:  Yes  Required items: Required blood products, implants, devices and special equipment available    Patient identity confirmed:  Verbally with patient and arm band  NA - No sedation, light sedation, or local anesthesia  Confirmation Checklist:  Patient's identity using two indicators, relevant allergies, procedure was appropriate and matched the consent or emergent situation and correct equipment/implants were available  Time out: Immediately prior to the procedure a time out was called    Universal Protocol: the Joint Commission Universal Protocol was followed    Preparation: Patient was prepped and draped in usual sterile fashion           ANESTHESIA    Local Anesthetic: Lidocaine 1% without epinephrine  Anesthetic Total (mL):  3.5      SEDATION    Patient Sedated: No        Preparation: skin prepped with ChloraPrep  Skin prep agent: skin prep agent completely dried prior to procedure  Sterile barriers: maximum sterile barriers were used: cap, mask, sterile gown, sterile gloves, and large sterile sheet  Hand hygiene: hand hygiene performed prior to central venous catheter insertion  Type of line used: Power PICC and PICC  Catheter type: double lumen  Catheter size: 5 Fr  Brand: Bard  Lot number: [id number]  Placement method: venipuncture, MST, ultrasound and tip confirmation system  Number of attempts: 1  Successful placement: yes  Orientation: left  Location: brachial vein (lateral) (0.53 cm vein diameter)  Site rationale: Patient has Right arm fistula  Arm circumference: adults 10 cm  Extremity circumference: 29  Visible catheter length: 1.5  Total catheter length: 47  Dressing and securement: chlorhexidine disc  applied, blood cleaned with CHG, securement device, site cleaned and statlock  Post procedure assessment: placement verified by x-ray, free fluid flow and blood return through all ports  PROCEDURE   Patient Tolerance:  Patient tolerated the procedure well with no immediate complications

## 2021-04-06 NOTE — PROGRESS NOTES
CLINICAL NUTRITION SERVICES - ASSESSMENT NOTE     Nutrition Prescription    RECOMMENDATIONS FOR MDs/PROVIDERS TO ORDER:  Adjust IV fluid rate per provider discretion when TPN starts tonight. If pt requires IV fluids on top of TPN, please use non-dextrose IV fluids to help avoid electrolyte abnormalities.      Malnutrition Status:    Severe malnutrition in the context of acute illness    Recommendations already ordered by Registered Dietitian (RD):  1. Once central line in place and if K+/Mg++ >/= nrml and phos >1.9, begin the following:  --Use dosing weight 88 kg  --Begin CPN, goal volume 1800 ml/day with initial 175 g Dex daily (GIR 1.4), 140 g AA daily, and 250 ml 20% IV lipids daily.    --Advance PN dex by 55-60 g every 1-2 days if K+/Mg++ >/= nrml and phos >1.9 and BGs stable (per Pharm D/RD discretion) to initial goal of 345 g Dex to increase provisions to 2233 kcals (25 kcal/kg/day), 1.6 g PRO/kg/day, GIR 2.7 with 22% kcals from Fat.    2. Lab add-ons: Mg++, Alk Phos, ALT, AST, Tbili, direct bilirubin, TG    Future/Additional Recommendations:  Monitor labs/weights vs need to adjust PN provisions; monitor ability/need to cycle TPN once stable at goal regimen     REASON FOR ASSESSMENT  Alexei Blake is a/an 80 year old male assessed by the dietitian for LOS and Pharmacy/Nutrition to Start and Manage PN (Central Line has been ordered, but not yet inserted)    NUTRITION HISTORY  Met with patient at bedside.  Pt reports ok/baseline appetite PTA.  Eats 3 meals per day --> breakfast is either hot or cold cereal, lunch often a sandwich, dinner varies, and pt likes eating frozen fruit for a nighttime snack (sometimes popsicle or energy bar).  Pt feels comfortable with low fiber diet he was on before made NPO again this admission, pt seems to understand certain foods he will have to start avoiding (nuts, seeds, certain fruits, etc).    Per chart, PMH includes CAD s/p recent stent, CHF with EF 25%, ESRD on dialysis  "T/Th/Sat, and T2DM.  Pt admit with bowel ischemia, is down POD7 from ex lap with right hemicolectomy, ileostomy, and mucous fistula.  Pt with post-op ileus.  Per WOC note on 4/5, pt also with DTPI on sacrum.      CURRENT NUTRITION ORDERS  Diet: NPO  Intake/Tolerance: NPO with NG to LIS since 4/4.  Initially advanced to low fiber on 4/2, but with increased nausea/pain so diet was backed down.  Pt was on clear liquids 3/31-4/2, and NPO 3/30.    LABS  Labs reviewed  - K+ WNL  - Phos 6.9 (H)    MEDICATIONS  Medications reviewed    ANTHROPOMETRICS  Height: 182.9 cm (6' 0\")  Most Recent Weight: 87.8 kg (193 lb 9.6 oz)    IBW: 80.9 kg   BMI: Overweight BMI 25-29.9  Weight History: Pt reports 1.5-2 months ago his dry weight at dialysis declined from around 100 kg to ~90 kg.  Patient says before dialysis his weight was stable.  Per Nephrology note yesterday, pt's PTA EDW/dry weight has been 92 kg.  Since admit pt down 7 lb from 4/1-4/5 (3.5% wt loss); suspect some of this fluid related but could be nutrition related as well given slow diet advancement  Wt Readings from Last 10 Encounters:   04/05/21 87.8 kg (193 lb 9.6 oz)      Dosing Weight: 88 kg (actual)    ASSESSED NUTRITION NEEDS  Estimated Energy Needs: 0386-1087 kcals/day (25 - 30 kcals/kg)  Justification: Maintenance  Estimated Protein Needs: 114-158 grams protein/day (1.3 - 1.8 grams of pro/kg)  Justification: Dialysis, Post-op and Wound healing  Estimated Fluid Needs: (1 mL/kcal)   Justification: Maintenance, or other per provider pending fluid status    PHYSICAL FINDINGS  See malnutrition section below.    MALNUTRITION  % Intake: </= 50% for >/= 5 days (severe)  % Weight Loss: > 2% in 1 week (severe)  Subcutaneous Fat Loss: Facial region, Upper arm, and Lower arm: mild  Muscle Loss: Temporal, Facial & jaw region, Upper arm (bicep, tricep), and Lower arm  (forearm): moderate  Fluid Accumulation/Edema: Trace per flowsheets, does not meet criteria  Malnutrition " Diagnosis: Severe malnutrition in the context of acute illness  *Observed upper body only as transport arrived to take pt to ?dialysis    NUTRITION DIAGNOSIS  Inadequate oral intake related to slow diet advancement 2/2 post-op ileus as evidenced by mostly NPO or clear liquids x 7 days      INTERVENTIONS  Implementation  Nutrition Education: Provided education on role of RD and nutrition POC   Collaboration with other providers - paged primary team with above IV fluid recs  Parenteral Nutrition/IV Fluids - Initiate (sent change order request)    Goals  Nutrition support within 3-4 days.     Monitoring/Evaluation  Progress toward goals will be monitored and evaluated per protocol.      Ilda Muse RD, LD  Pager: [phone]  Units covered: 7C (all beds) and 5A (beds 5201 through 5211-2)

## 2021-04-06 NOTE — PROGRESS NOTES
General Surgery Progress Note    Subjective: NG with clear yellow output. Ostomy with scant stool in bag. Denies nausea or abdominal pain     Objective:   /58 (BP Location: Left arm)   Pulse 65   Temp 95.6  F (35.3  C) (Oral)   Resp 18   Ht 1.829 m (6')   Wt 87.8 kg (193 lb 9.6 oz)   SpO2 96%   BMI 26.26 kg/m      PE:  Gen: AOx 3  Resp: NLB  Abd: soft, non distended, minimally tender, ostomy swollen but  pink and patent with scant stool in bag,        A/P: Alexei Kaur is an 80-year-old male with history of CAD s/p recent stent on clopidogrel, CHF with EF 25%, history of stroke with left leg deficit, atrial fibrillation on warfarin, ESRD dependent upon dialysis, and DM type II, who is POD 6 from exploratory laparotomy with right hemicolectomy, ileostomy and mucous fistula.  Doing well. Postop ileus      - Pain control with Oxycodone and Tylenol  - Continue ASA and plavix, hold warfarin. PTA metoprolol.   - Will hold starting subcutaneous heparin today, since INR is therapeutic at this time (INR 2.9)  - Completed  Zosyn day 4 of 4  - NPO, NG to LIS. PICC/TPN today. Ok to clamp NG for oral meds  - WOC consult for ostomy cares  - HD Tu/Th/Sat  - Discharge to home  Once ileus resolves      Adriana Jackson MD  Chief Resident (General Surgery)  PGY 5

## 2021-04-06 NOTE — PLAN OF CARE
Assumed care of Patient from [id number]. VSS. Patient denies pain or nausea. Patient having minimal to no output in his ileostomy.  Pt having anuria dt being dialysis dependent. NGT on LIS with output replaced with half mLs of NS per orders. Pt repositioned with mircoturns but mostly refused repositioning throughout the night. Continuous IV fluids maintained. Will continue plan of care.

## 2021-04-06 NOTE — PLAN OF CARE
No complaints of nausea. Reports mild abdominal discomfort at start of shift has completely resolved on its own. Tolerating ice chips. Mepilex on sacrum CDI. Replaced 50% of NG output with .9 NS. Dialysis T, Th, Sa. Anuric. Ileostomy and red carrol drain Intact with no output. Vitals stable. Up with 1-2, gait belt and walker.

## 2021-04-06 NOTE — PLAN OF CARE
/71   Pulse 72   Temp 97.6  F (36.4  C) (Axillary)   Resp 16   Ht 1.829 m (6')   Wt 87.8 kg (193 lb 9.6 oz)   SpO2 94%   BMI 26.26 kg/m    VSS on RA. A&Ox4. Denies pain & nausea, NPO, BGs checked ACHS. PIV infusing IVMF, right HD fistula WNL. NG to LIS, no output this shift. Midline incision with erythema, otherwise CDI & JUMA. Pressure injury on coccyx, mepilex CDI. Ostomy intact, scant amount of output this shift. Pt anuric, on HD. Pt up with 1-2, not out of bed this shift. Pt in dialysis for most of the afternoon, plan for PICC this evening & TPN to start. Continue POC.

## 2021-04-07 NOTE — PROGRESS NOTES
General Surgery Progress Note    Subjective: S/p dialysis yesterday. Started TPN yesterday evening. Feels well. Minimal stoma output. Denies nausea or abdominal pain     Objective:   /55 (BP Location: Right leg)   Pulse 75   Temp 96.6  F (35.9  C) (Oral)   Resp 16   Ht 1.829 m (6')   Wt 87.8 kg (193 lb 9.6 oz)   SpO2 95%   BMI 26.26 kg/m      PE:  Gen: AOx 3  Resp: NLB  Abd: soft, non distended, minimally tender, ostomy swollen but pink and patent with small amount of liquid dark stool in the bag.     A/P: Alexei Kaur is an 80-year-old male with history of CAD s/p recent stent on clopidogrel, CHF with EF 25%, history of stroke with left leg deficit, atrial fibrillation on warfarin, ESRD dependent upon dialysis, and DM type II, who is POD 7 from exploratory laparotomy with right hemicolectomy, ileostomy and mucous fistula.  Doing well. Course c/b postop ileus      - will place red rubber catheter this morning in stoma    - Pain control with Oxycodone and Tylenol  - Continue ASA and plavix, hold warfarin. PTA metoprolol.   - INR therapeutic yesterday so held heparin, labs today pending   - NPO, NG to LIS. Ok to clamp NG for oral meds  - continue TPN  - WOC consult for ostomy cares  - HD Tu/Th/Sat  - Discharge to home  Once ileus resolves      Tutu Ness MD  PGY-1

## 2021-04-07 NOTE — PLAN OF CARE
VSS. IV Dilaudid given before working with OT, Oxycodone given x1. Moderate output from NG, clamped for meds. Ostomy with no stool and some gas, red miles cath placed by MD in stoma today. Pouch changed by WOC. Midline incision w/ staples JUMA. NPO. Pt was up in chair for most of the morning. Anuric, on hemodialysis. Plan for dialysis tomorrow. PICC infusing TPN + TKO for meds.

## 2021-04-07 NOTE — PLAN OF CARE
/46 (BP Location: Right leg)   Pulse 91   Temp 96.3  F (35.7  C) (Oral)   Resp 15   Ht 1.829 m (6')   Wt 87.8 kg (193 lb 9.6 oz)   SpO2 95%   BMI 26.26 kg/m    VSS on RA. A&Ox4. Denies pain & nausea, NPO, BGs checked ACHS. Right HD fistula WNL, covered & dressing CDI. Left PIV SL. Left PICC with two lumens: one infusing TPN & lipids, the other TKO. NG to LIS, output replaced this shift. Midline incision with erythema, otherwise CDI & JUMA. Pressure injury on coccyx, mepilex CDI. Ostomy intact, good amount of output this shift. Pt anuric, on HD. Pt up with 1-2, not out of bed this shift. Continue POC.

## 2021-04-07 NOTE — PROGRESS NOTES
"  WO Nurse Inpatient [facility]   WO Nurse Inpatient Adult     Initial Assessment   Assessment of new end Ileostomy Stoma complication(s) edema   Mucocutaneous junction; intact   Peristomal complication(s) none   Pouch wear time:24-48 hours  Following today's visit:Patient / is  able to demonstrate;       1. How to empty their pouch? Yes- demonstrated on 4/1 and return demo      2. How to change their pouch?  No- demonstrated on 4/1 (for patient) and 4/5 (for spouse)      3. How to read and record intake and output correctly? No- demonstrated on 4/1    Psychosocial- His wife had an ostomy previously about 8 years ago. She watched a pouch change at bedside 4/5 and stated that she was starting to remember how to do it.     4/7: patient still with ileus and feeling nauseous at time of assessment, applied two piece pouch so that red robbinson can be more easily reinserted if gets pushed out into bag.     Objective data:  Patient history according to medical record: Alexei Kaur is an 80-year-old gentleman with numerous comorbidities including coronary artery disease with a recent stent on Plavix, end stage renal disease on hemodialysis, on warfarin who presented with abdominal pain and signs symptoms consistent with ischemia of the ascending colon. S/p ex-lap, open right hemicolectomy, end ileostomy and mucus fistula.  Current Diet/Nutrition: Orders Placed This Encounter      NPO for Medical/Clinical Reasons Except for: Meds     TPN no   I/O last 3 completed shifts:  In: 1732.29 [P.O.:100; I.V.:20]  Out: 1575 [Emesis/NG output:1300; Stool:275]  Labs:    Recent Labs   Lab 04/07/21  1535 04/07/21  0740 04/05/21  0809 04/05/21  0809   ALBUMIN  --  2.2*   < >  --    HGB 10.3*  --   --  11.7*   INR  --  1.77*   < >  --    WBC  --   --   --  7.1    < > = values in this interval not displayed.        Physical Exam:  Current pouching system:[place] and 2pcs flat CTF with 2\" barrier ring   Reason for pouch " "change today: ostomy education  Stoma appearance: viable, healthy, normal-appearing, pink, moist, edematous and protruberant  Stoma size; 32 mm ,  red miles within stoma   Peristomal skin: intact  Stoma output :liquid   Abdominal  Assessment  distended , NG still in place? Yes   Surgical Site: open to air  Pain: Dull ache  Is patient still on a PCA No     Interventions:  Patient's chart evaluated.  Focus of today's visit: pouch change demonstration    Participant of teaching session today patient   Orders: Written  Change made with ostomy management today: No-  continue 2 pc flat CTF with 2\" barrier ring due to need for red miles   Patient/family: lethargic and observing  Supplies:Gathered and at bedside    Plan:  Learning needs: pouch change return demonstration, output measurement and discharge instructions  Preparation for discharge: Discussed how/ where to order supplies,  Prepared for discharge to home with homecare, Prescriptions or note left on chart for MD to sign/complete    Needs- Supplies ordered, Registered for samples from , Discussed making a WOC Nurse outpatient appointment upon discharge  Recommend home care? yes    Discussed plan of care with Patient and Nurse  Nursing to notify the Provider(s) and re-consult the WOC Nurse if new ostomy concerns or discharge planned before next planned WOC visit.    WOC Nurse will return: M/W/F  Face to face time: 30 minutes    WOC Nurse Inpatient Pressure Injury Assessment   Reason for consultation: Evaluate and treat coccyx      ASSESSMENT  Pressure Injury: on sacrum , hospital acquired , -not assessed 4/7  Pressure Injury is Stage Deep Tissue Pressure Injury (DTPI)   Contributing factor of the pressure injury: shear and immobility  Status: initial assessment       TREATMENT PLAN  Sacrum wound: Every third day cleanse with microKlenz and dry, apply Cavilon no sting barrier film to skin around wound and let dry, then place mepilex.     Orders " Written  WO Nurse follow-up plan:twice weekly  Nursing to notify the Provider(s) and re-consult the WOC Nurse if wound(s) deteriorates or new skin concern.    Patient History  According to provider note(s):  Patient history according to medical record: Alexei Kaur is an 80-year-old gentleman with numerous comorbidities including coronary artery disease with a recent stent on Plavix, end stage renal disease on hemodialysis, on warfarin who presented with abdominal pain and signs symptoms consistent with ischemia of the ascending colon. S/p ex-lap, open right hemicolectomy, end ileostomy and mucus fistula.    Objective Data  Containment of urine/stool: Ileostomy pouch    Current Diet/ Nutrition:  Orders Placed This Encounter      NPO for Medical/Clinical Reasons Except for: Meds      Output:   I/O last 3 completed shifts:  In: 1732.29 [P.O.:100; I.V.:20]  Out: 1575 [Emesis/NG output:1300; Stool:275]    Risk Assessment:   Sensory Perception: 3-->slightly limited  Moisture: 4-->rarely moist  Activity: 2-->chairfast  Mobility: 3-->slightly limited  Nutrition: 2-->probably inadequate  Friction and Shear: 1-->problem  Dheeraj Score: 15      Labs:   Recent Labs   Lab 04/07/21  1535 04/07/21  0740 04/05/21  0809 04/05/21  0809   ALBUMIN  --  2.2*   < >  --    HGB 10.3*  --   --  11.7*   INR  --  1.77*   < >  --    WBC  --   --   --  7.1    < > = values in this interval not displayed.       Physical Exam  Skin inspection: focused sacrum          Wound Location:  sacrum  Date of last Photo 4/5  Wound History: noted by RN this AM 4/5, sacral mepilex was in place at time of assessment   Measurements (length x width x depth, in cm) 2 cm x 2 cm  x  0 cm   Wound Base:  100 % purple and epidermis, gray   Tunneling N/A  Undermining N/A  Palpation of the wound bed: normal   Periwound skin: erythema- blanchable  Color: red  Temperature: normal   Drainage:, none  Description of drainage: none  Odor: none  Pain: denies ,  none    Interventions  Current support surface: Standard  Low air loss mattress  Current off-loading measures: Foam padding and Pillows  Repositioning aid: Pillows  Visual inspection of wound(s) completed   Wound Care: was done per plan of care.  Supplies: at bedside and floor stock  Educated provided: importance of repositioning and plan of care  Education provided to: patient , spouse and nurse  Discussed importance of:repositioning every 2 hours and off-loading pressure to wound  Discussed plan of care with Patient, Family and Nurse    Sumaya Saenz RN, CWOCN         [facility]     Name: Alexei Blake : 1940  Date: 2021    To order your ostomy supplies    The ostomy Supplier needs this supply list  to process your order. You will need to fax/deliver this list, along with your Insurance information. Your home care nurse can assist with this process.             List of Ostomy Distributors      [facility]  Ph. [phone] Fax # [phone]  [facility].   Ph. [phone] [phone]  [facility]   Ph. [phone]  [facility]   Ph. [phone]  Or Call your insurance provider for their preferred supplier    Your Medical Supplier will need your surgeon's name, phone and fax number    Clinic:                     Phone                            Fax  General Surgery:   [phone]  Verbal Order for ostomy supplies for 1 Month per:       Sumaya Saenz RN, CWOCN     Authorizing MD: Adrien Medina MD    Change pouch :  Three times a week  Quantity of pouches: 20 pouches/month     Request the following supplies:      Marianna    1 piece flat fecal with filter #3431    Accessories  2  barrier ring #4900     Adapt powder #7971    No sting film barrier # 1233                          M-9 Spray room deodorizer #[id number]                                  Change your pouch twice a week,  more often if leaking.    If you are cutting a hole in the wafer of your pouch, recheck stoma size and adjust pouch opening as needed every week    . Call the Ostomy Nurse at [facility]       [address] [place] [address], [place] : [phone]   Schedule a follow-up visit in 4 to 6 weeks after your surgery, sooner if having problems Bring a complete set of pouch-changing supplies to this visit      Problems you should Report  - The stoma turns blue or darker in color.  - Cuts or sores around the stoma.  - Red, raw or painful skin around the stoma.  - Any bulging of the skin around the stoma.  - A pouch that leaks every day.  - Problems making the right size hole in the pouch wafer.    Please call with any questions or concerns.

## 2021-04-07 NOTE — PLAN OF CARE
VSS on RA. Not OOB this shift, repositioned Q2-3 hours and encouraged by patient. Pain somewhat controlled with PRN oxycodone, simethicone, and aquaK pad.  Q4hr BG monitoring, sliding scale insulin given per orders. NPO, ice chips provided for comfort. Right arm AV fistula +thrill/+bruit. NG to LIS, clamped with meds, denies nausea. Abd midline incision JUMA, stapled, CDI. Left arm PICC infusing TPN/lipids, other lumen TKO. Ostomy with minimal output. On pulsate. Continue with POC.

## 2021-04-08 NOTE — PROGRESS NOTES
HEMODIALYSIS TREATMENT NOTE    Date: 4/8/2021  Time: 12:31 PM    Data:  Pre Wt: 93 kg (205 lb 0.4 oz)   Desired Wt: 93 kg   Post Wt: 93 kg (205 lb 0.4 oz)  Weight change: 0 kg  Ultrafiltration - Post Run Net Total Removed (mL): 0 mL  Vascular Access Status: patent  Dialyzer Rinse: Streaked, Light  Total Blood Volume Processed: 79.2 L Liters  Total Dialysis (Treatment) Time: 3.5 Hours    Lab:       Interventions:  3.5 hours of HD with no fluid pulled. 100 ml s from NG at LIS. Slept through run. 400 BFR via R fistula.     Assessment:  ESRD patient in for his regular scheduled run VSS A+O       Plan:    HD saturday

## 2021-04-08 NOTE — PROGRESS NOTES
General Surgery Progress Note    Subjective: Dialysis today. Feels well. Minimal stoma output. Denies nausea or abdominal pain but has some bloating.     Objective:   /76   Pulse 70   Temp 97  F (36.1  C) (Axillary)   Resp 12   Ht 1.829 m (6')   Wt 93 kg (205 lb 0.4 oz)   SpO2 92%   BMI 27.81 kg/m      PE:  Gen: AOx 3  Resp: NLB  Abd: soft, moderately distended, minimally tender, ostomy swollen but pink and patent with small amount of liquid dark stool in the bag. Red rubber in place.    A/P: Alexei Kaur is an 80-year-old male with history of CAD s/p recent stent on clopidogrel, CHF with EF 25%, history of stroke with left leg deficit, atrial fibrillation on warfarin, ESRD dependent upon dialysis, and DM type II, who is POD 9 from exploratory laparotomy with right hemicolectomy, ileostomy and mucous fistula.  Doing well. Course c/b postop ileus      - will continue red rubber catheter   - Pain control with Oxycodone and Tylenol  - Continue ASA and plavix, hold warfarin. PTA metoprolol.   - heparin ppx   - NPO, NG to LIS. Ok to clamp NG for oral meds. Can place to gravity during dialysis.   - continue TPN  - IV and oral contrast through NG for abdominal CT today   - WO consult for ostomy cares  - HD Tu/Th/Sat  - Discharge to home once ileus resolves      Tutu Ness MD  PGY-1

## 2021-04-08 NOTE — PROGRESS NOTES
"  Nephrology Progress Note  04/08/2021    Assessment & Recommendations:  Alexei Blake is a 80 year old with PMH CAD s/p recent PCI, CHF, HTN, DM2, ESRD on HD, PAD, Afib on warfarin who presented with acute onset of abdominal pain during HD. Patient was found to have ischemic changes of right colon with portal venous gas per imaging. S/p colonic resection and ileostomy 3/31.    ESRD on HD  Runs TTS at [facility] under care of Dr Noble Monae via RUE AVF for 3.5 hrs. EDW 92 kg  - Dialysis per TTS schedule      BP: normotensive to mildly elevated intermittently; PTA imdur, metoprolol, lisinopril  - PTA meds restarted other than lisinopril    Volume: EDW 92 kg, O2 96% RA; minimal UOP  - Losing fluid via NG tube and ileostomy  - On TPN, getting significant volume  - Generally no UF, but may need gentle UF next run if still on high volume TPN    BMD - Phos 4.5  - Will continue PTA hectorol 3 mcg with HD  - Continue PTA TUMS WM once eating    Anemia: hgb 10.3 g/dL, does not appear to have anemia chronically or require Epo    Recommendations were communicated to primary team via note    Ana Paula Tolbert PA-C  [phone]    Interval History :   Seen on dialysis, stable run with no UF. On TPN, may need gentle UF next run. Endorsing a lot of post op pain today and feeling very \"burpy\". Has NG tube with ileus.    Review of Systems:   ROS neg as above    Physical Exam:   I/O last 3 completed shifts:  In: 2532.01 [P.O.:100; I.V.:20; IV Piggyback:225]  Out: 1025 [Emesis/NG output:1000; Stool:25]   BP (!) 139/102   Pulse 70   Temp (P) 98  F (36.7  C) (Oral)   Resp 12   Ht 1.829 m (6')   Wt 93 kg (205 lb 0.4 oz)   SpO2 96%   BMI 27.81 kg/m       GENERAL APPEARANCE: alert  EYES: no scleral icterus, pupils equal  Pulmonary: CTA  CV: regular rhythm, normal rate   - Edema none  GI: ileostomy, NG tube  NEURO: face symmetric, AOx3   Access: RUE AVF    Labs:   All labs reviewed by me  Electrolytes/Renal -   Recent Labs "   Lab Test 04/08/21 0731 04/07/21  0740 04/06/21  0740    136 135   POTASSIUM 3.7 3.7 4.2   CHLORIDE 100 101 97   CO2 26 26 27   BUN 65* 30 43*   CR 6.36* 5.06* 7.59*   * 180* 167*   AVNI 8.9 8.6 8.8   MAG 1.9 1.8 2.1   PHOS 4.5 3.7 6.9*       CBC -   Recent Labs   Lab Test 04/07/21  1535 04/05/21  0809 04/01/21  0717 03/31/21  0553   WBC  --  7.1 12.2* 16.5*   HGB 10.3* 11.7* 11.3* 10.8*    204 174 163       LFTs -   Recent Labs   Lab Test 04/08/21 0731 04/07/21  0740 04/06/21  0740 04/01/21  0717 04/01/21 0717 03/30/21  2235   ALKPHOS  --  37* 45  --  42 56   BILITOTAL  --  0.5 0.7  --  0.4 0.6   ALT  --  10 11  --  24 58   AST  --  15 20  --  18 30   PROTTOTAL  --  5.3*  --   --  5.9* 6.4*   ALBUMIN 2.1* 2.2* 2.4*   < > 2.6* 3.1*    < > = values in this interval not displayed.       Iron Panel - No lab results found.      Imaging:  All imaging studies reviewed by me.     Current Medications:    aspirin  81 mg Oral Daily     atorvastatin  80 mg Oral QPM     clopidogrel  75 mg Oral Daily     gelatin absorbable  1 each Topical During Hemodialysis (from stock)     heparin ANTICOAGULANT  5,000 Units Subcutaneous Q8H     heparin lock flush  5-10 mL Intracatheter Q24H     insulin aspart  1-12 Units Subcutaneous Q4H     isosorbide mononitrate  30 mg Oral Daily     lipids  250 mL Intravenous Q24H     [Held by provider] lisinopril  5 mg Oral Daily     metoprolol succinate ER  50 mg Oral QPM     - MEDICATION INSTRUCTIONS -   Does not apply Once       dextrose       parenteral nutrition - ADULT compounded formula       parenteral nutrition - ADULT compounded formula 75 mL/hr at 04/07/21 1948     - MEDICATION INSTRUCTIONS -       TARA Bain

## 2021-04-08 NOTE — PLAN OF CARE
VSS on room air. Abdominal pain managed with Oxycodone and IV Dilaudid. Pt had dialysis run today. AV Fistula w/ dressing C/D/I. NG with small amount of brownish output. Ostomy with scant liquid stool. Midline incision JUMA w/ staples. Oral contrast given for CT planned for 1530. Pt up w/ assist of 2+walker+gb. Continue plan of care.

## 2021-04-08 NOTE — PLAN OF CARE
No complaints of pain or nausea.Tolerating NPO. Mepilex on sacrum CDI. Replaced 50% of NG output with .9 NS. Dialysis T, Th, Sa. Anuric. Ileostomy and red carrol drain Intact with no output. Vitals stable. PT/OT reports diaphoretic and light headed while sitting up at bedside. Pulsate mattress and turning encouraged q 2 hours.

## 2021-04-08 NOTE — PLAN OF CARE
VSS on RA. Not OOB this shift, repositioning assistance provided and encouraged. Aox4. Lower abdominal pain somewhat controlled with PRN oxycodone. Abd midline stapled and intact. Ileostomy with minimal output, red carrol in place. NG to LIS, moderate output. NPO. Q4hr BG monitoring, sliding scale insulin given per orders. Left PICC infusing TPN/lipids, other lumen TKO. Left PIV saline locked. Right arm AV fistula +thrill/+bruit. On pulsate mattress. Continue with POC.

## 2021-04-08 NOTE — PROGRESS NOTES
Care Management Follow Up    Length of Stay (days): 9    Expected Discharge Date: 04/09/21     Concerns to be Addressed:  Accepting TCU facility (TBD) and dialysis transportation (wife or OOP company - resources provided).  Patient plan of care discussed at interdisciplinary rounds: Yes    Anticipated Discharge Disposition: Transitional Care     Anticipated Discharge Services: PT/OT  Anticipated Discharge DME: None    Patient/family educated on Medicare website which has current facility and service quality ratings: yes  Education Provided on the Discharge Plan: yes  Patient/Family in Agreement with the Plan: yes    Referrals Placed by CM/SW:    Private pay costs discussed: transportation costs    Additional Information:  SW met with patient in his room to discuss TCU recommendation. Patient in agreement. Patient states he's been to TCU before ([facility]) and wants to go there. SW asked patient to identify at least 4 other TCUs for SW to send referrals to in case [facility] does not have openings or cannot accommodate TPN. Patient understanding and gave SW permission to send referrals to TCUs listed below. Patient has dialysis T,Th,S at [facility] (chair time 5:30 AM; 3.5 hour run). Patient asked if he can go to dialysis via [employer]. SW shared that this is an option, but that it takes weeks-months for applications to be approved, so either his wife or an OOP transportation company will need to transport him to dialysis. Patient voiced understanding and said he'll discuss with his wife who typically takes him to dialysis. SW provided patient with a copy of a [employer] application and W/C transportation resources to research/call.    Dialysis Info  [facility] ([address], [address], [place] [address])  PH: [phone]  Tuesday, Thursday, Saturday  Chair time: 5:30 AM (3.5 hour run)    TCU Referrals    [facility] (REFERRAL SENT. Left VM  requesting update on referral)  PH: [phone]    [facility] (REFERRAL SENT. Left VM requesting update on referral)  PH: [phone]    [facility] (Bed available this weekend, but not tomorrow. Do not accept TPN. Will review if off TPN)  PH: [phone]    [facility] (REFERRAL SENT)  PH: [phone]    [facility] (REFERRAL SENT)  PH: [phone]     Tamra España MSW, LGSW  Flo

## 2021-04-09 NOTE — PROGRESS NOTES
Care Management Follow Up    Length of Stay (days): 10  Expected Discharge Date: 04/12/21     Concerns to be Addressed: Discharge planning    Patient plan of care discussed at interdisciplinary rounds: Yes    Anticipated Discharge Disposition: Transitional Care     Anticipated Discharge Services: Outpatient Dialysis  Rosalina Chowdary   [address], [address]  [place] [address]  P: [phone]  Tuesday, Thursday, Saturday  Chair time: 5:30 AM (3.5 hour run)    Pt was given a [employer] application and a list of transportation companies.  His spouse typically transports him to dialysis and may be able to continue to do so pending pt's ability to get in/ out of her vehicle safely.    Patient/family educated on Medicare website which has current facility and service quality ratings: yes  Education Provided on the Discharge Plan:  yes  Patient/Family in Agreement with the Plan: yes    Referrals Placed by CM/SW:  Pt identified the following preferences:    [facility]  [address] [place], [place] [id number] [phone]  -E-referral sent.  SW spoke with admissions, they have openings on Monday.  They do not take TPN, NG and would need a negative COVID test within 72 hours of admitting.  SW to notify them once plan for TPN is confirmed.    [facility]  [address] [place] [address]  P: [phone]  F: [phone]  -E-referral sent.  SW spoke with admissions, they do not take TPN.  SW to call back once plan for TPN is confirmed.    [facility]  [address] [place], [place] [id number] [phone]  -E-referral sent.  SW spoke with admissions yesterday, they have openings however, they do not take TPN.  SW to notify them once plan for TPN is confirmed.    [facility]  [address] [place], [place] [id number] [phone]  -E-referral sent.  SW called and left a vm asking for a call back.    [facility]  [place] [address] [place] [address]  [phone]  -E-referral sent.  SW called and left a  asking for a call back.    Private pay costs discussed: transportation costs    Additional Information:  SW followed-up on the above referrals.  Unknown if pt will need TPN at discharge.  SW met with pt and gave him a list of TCUs that accept TPN in the event that he continues to need this at discharge.  Pt is hopeful he will be able to wean off of TPN and go to one of the above facilities.      RAMO Stauffer, [facility]  Phone:  [phone]   Pager:  [phone]

## 2021-04-09 NOTE — PLAN OF CARE
Assumed care of Patient from 3502-5062. Hypertensive with Metoprolol administered. OVSS. Patient reports good pain control with Tylenol, PO and Oxycodone, 10 mg PO . Tolerating NPO diet. Denied nausea throughout shift. Patient denies passing flatus.AV fistula incision/dressing c/d/I. Ileostomy patent and intact with scant amount of liquid stool and red carrol drain. Midline incision with staple JUMA. Pt voiding with adequate output. NG with small amount of greenish output. Up with assist of 2 and gait belt and walker. Pulsate mattress and encouraged to turn q 2 hours. Using IS independently. SCDs on in bed. PICC infusing with continuous TPN and lipids. BS controlled with sliding scale-Aspart. Continue plan of care.

## 2021-04-09 NOTE — PROGRESS NOTES
Nephrology Progress Note  04/09/2021    Assessment & Recommendations:  Alexei Blake is a 80 year old with PMH CAD s/p recent PCI, CHF, HTN, DM2, ESRD on HD, PAD, Afib on warfarin who presented with acute onset of abdominal pain during HD. Patient was found to have ischemic changes of right colon with portal venous gas per imaging. S/p colonic resection and ileostomy 3/31.    ESRD on HD  Runs TTS at [facility] under care of Dr Noble Monae via RUE AVF for 3.5 hrs. 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     BP: normotensive to mildly elevated intermittently; PTA imdur 30 mg qday, metoprolol XL 50 at bedtime, lisinopril  - Recommend continuing lisinopril 2.5 mg qday for cardio benefits  - Recommend metoprolol tartrate 25 mg bid (instead of succinate) and hold before dialysis  - Recommend holding BP meds prior to dialysis; need to be able to pull some fluid while avoiding hypotension given ischemic bowel    Volume: EDW 92 kg, O2 96% RA; minimal UOP  - Will likely lose significant fluids via ileostomy once ileus resolves  - On TPN, getting significant volume  - NG with significant fluid out  - Per HD unit, pt's BP's dropped to 84/45 during HD on 3/30 which had not been typical; but EDW had been lowered since pt had been hospitalized in January for CHF, pulm edema/volume overload (EDW was 101 kg in January, reduced to 91.5 kg as of 3/30, increased to 92 kg in light of hypotension on 3/30.)  - Evidence of volume overload on 4/8 CT with small ascites and diffuse body wall anasarca  - Likely 93 kg will be a good EDW, the key will be avoiding hypotension while also avoiding pulm edema/CHF  exacerbation    BMD - Ca 8.8, phos 3.5  - Will continue PTA hectorol 3 mcg with HD  - Continue PTA TUMS WM once eating    Anemia: hgb 10.3 g/dL, does not appear to have anemia chronically or require Epo    Recommendations were communicated to primary team via note    Ana Paula Tolbert PA-C  [phone]    Interval History :   Seen bedside, NG with ileus. Minimal out via ileostomy. Talked with HD unit. EDW had been lowered since Jan given hospitalization with pulm edema. However BP's dropped to 80's likely causing ischemic bowel. Will need to find EDW that avoids both pulm edema/CHF exac and ischemic bowel. Discussed at length with patient and wife. Pt denies SOB, chills    Review of Systems:   ROS neg as above    Physical Exam:   I/O last 3 completed shifts:  In: 1428.79 [I.V.:20; IV Piggyback:325]  Out: 1100 [Emesis/NG output:1050; Stool:50]   /60 (BP Location: Right leg)   Pulse 84   Temp 97  F (36.1  C) (Oral)   Resp 18   Ht 1.829 m (6')   Wt 93 kg (205 lb 0.4 oz)   SpO2 96%   BMI 27.81 kg/m       GENERAL APPEARANCE: alert  EYES: no scleral icterus, pupils equal  Pulmonary: CTA  CV: regular rhythm, normal rate   - Edema peripheral none  GI: ileostomy, NG tube  NEURO: face symmetric, AOx3   Access: RUE AVF    Labs:   All labs reviewed by me  Electrolytes/Renal -   Recent Labs   Lab Test 04/09/21  0731 04/08/21  0731 04/07/21  0740   * 134 136   POTASSIUM 3.4 3.7 3.7   CHLORIDE 97 100 101   CO2 25 26 26   BUN 52* 65* 30   CR 4.44* 6.36* 5.06*   * 209* 180*   AVNI 8.8 8.9 8.6   MAG 1.7 1.9 1.8   PHOS 3.5 4.5 3.7       CBC -   Recent Labs   Lab Test 04/07/21  1535 04/05/21  0809 04/01/21  0717 03/31/21  0553   WBC  --  7.1 12.2* 16.5*   HGB 10.3* 11.7* 11.3* 10.8*    204 174 163       LFTs -   Recent Labs   Lab Test 04/09/21  0731 04/08/21  0731 04/07/21  0740 04/06/21  0740 04/01/21  0717 04/01/21  0717 03/30/21  2235   ALKPHOS  --   --  37* 45  --  42 56   BILITOTAL  --   --  0.5 0.7   --  0.4 0.6   ALT  --   --  10 11  --  24 58   AST  --   --  15 20  --  18 30   PROTTOTAL  --   --  5.3*  --   --  5.9* 6.4*   ALBUMIN 2.2* 2.1* 2.2* 2.4*   < > 2.6* 3.1*    < > = values in this interval not displayed.       Iron Panel - No lab results found.      Imaging:  All imaging studies reviewed by me.     Current Medications:    aspirin  81 mg Oral Daily     atorvastatin  80 mg Oral QPM     clopidogrel  75 mg Oral Daily     heparin ANTICOAGULANT  5,000 Units Subcutaneous Q8H     heparin lock flush  5-10 mL Intracatheter Q24H     insulin aspart  1-12 Units Subcutaneous Q4H     isosorbide mononitrate  30 mg Oral Daily     lipids  250 mL Intravenous Q24H     [Held by provider] lisinopril  5 mg Oral Daily     metoprolol succinate ER  50 mg Oral QPM     pink lady enema  286 mL Other Once       dextrose       parenteral nutrition - ADULT compounded formula 75 mL/hr at 04/1940     TARA Bain

## 2021-04-09 NOTE — PLAN OF CARE
/60 (BP Location: Right leg)   Pulse 84   Temp 97  F (36.1  C) (Oral)   Resp 18   Ht 1.829 m (6')   Wt 93 kg (205 lb 0.4 oz)   SpO2 96%   BMI 27.81 kg/m    Assumed care from 4709-6672. VSS on RA. A&Ox4. Pain managed with PRN IV dilaudid. Intermittent nausea declining antiemetics, NPO, BGs checked & corrected Q4 hours. NG to LIS, intermittently clamped after medication administration, output replaced Q8 hours per MAR. Left PIV SL. Left PICC with two lumens; one TKO, the other infusing continuous TPN. Midline incision with staples and erythema, otherwise CDI & JUMA. Pressure injury on bottom covered, dressing intact, scheduled to be changed 4/11. On pulsate mattress, declining repositioning, pt educated on importance of weight shifting. Passing gas, small amount of loose output, given pink lady enema through red miles this AM & then pulled per orders. Pt anuric, on HD, next run scheduled for tomorrow. Up with A2, gait belt, and walker with therapy, declining any other activity today. Continue POC.

## 2021-04-09 NOTE — PROGRESS NOTES
WO Nurse Inpatient [facility]   WO Nurse Inpatient Adult     Initial Assessment   Assessment of new end Ileostomy Stoma complication(s) edema   Mucocutaneous junction; intact   Peristomal complication(s) none   Pouch wear time:24-48 hours  Following today's visit:Patient / is  able to demonstrate;       1. How to empty their pouch? Yes- demonstrated on 4/1 and return demo      2. How to change their pouch?  No- demonstrated on 4/1 (for patient) and 4/5 (for spouse)      3. How to read and record intake and output correctly? No- demonstrated on 4/1    Psychosocial- His wife had an ostomy previously about 8 years ago. She watched a pouch change at bedside 4/5 and stated that she was starting to remember how to do it.     4/9: patient still with NGT and ileus with nausea, minimal output in pouch which is intact so did not complete any ostomy education today     Objective data:  Patient history according to medical record: Alexei Kaur is an 80-year-old gentleman with numerous comorbidities including coronary artery disease with a recent stent on Plavix, end stage renal disease on hemodialysis, on warfarin who presented with abdominal pain and signs symptoms consistent with ischemia of the ascending colon. S/p ex-lap, open right hemicolectomy, end ileostomy and mucus fistula.  Current Diet/Nutrition: Orders Placed This Encounter      NPO for Medical/Clinical Reasons Except for: Meds     TPN no   I/O last 3 completed shifts:  In: 1488.79 [P.O.:60; I.V.:20; IV Piggyback:325]  Out: 1400 [Emesis/NG output:1300; Stool:100]  Labs:    Recent Labs   Lab 04/09/21  1108 04/09/21  0731 04/07/21  1535 04/07/21  1535 04/05/21  0809 04/05/21  0809   ALBUMIN  --  2.2*   < >  --    < >  --    HGB  --   --   --  10.3*  --  11.7*   INR 1.33*  --   --   --    < >  --    WBC  --   --   --   --   --  7.1    < > = values in this interval not displayed.        Physical Exam:  Current pouching system:[place] and 2pcs flat  "CTF with 2\" barrier ring   Reason for pouch change today: ostomy education  Stoma appearance: viable, healthy, normal-appearing, pink, moist, edematous and protruberant  Stoma size; 32 mm ,  red miles within stoma   Peristomal skin: intact  Stoma output :liquid   Abdominal  Assessment  distended , NG still in place? Yes   Surgical Site: open to air  Pain: Dull ache  Is patient still on a PCA No     Interventions:  Patient's chart evaluated.  Focus of today's visit: pouch change demonstration    Participant of teaching session today patient   Orders: Written  Change made with ostomy management today: No-  continue 2 pc flat CTF with 2\" barrier ring due to need for red miles   Patient/family: lethargic and observing  Supplies:Gathered and at bedside    Plan:  Learning needs: pouch change return demonstration, output measurement and discharge instructions  Preparation for discharge: Discussed how/ where to order supplies,  Prepared for discharge to home with homecare, Prescriptions or note left on chart for MD to sign/complete    Needs- Supplies ordered, Registered for samples from , Discussed making a WOC Nurse outpatient appointment upon discharge  Recommend home care? yes    Discussed plan of care with Patient and Nurse  Nursing to notify the Provider(s) and re-consult the WOC Nurse if new ostomy concerns or discharge planned before next planned WOC visit.    WOC Nurse will return: M/W/F  Face to face time: 30 minutes    WOC Nurse Inpatient Pressure Injury Assessment   Reason for consultation: Evaluate and treat coccyx      ASSESSMENT  Pressure Injury: on sacrum , hospital acquired ,   Pressure Injury is Stage Deep Tissue Pressure Injury (DTPI)   Contributing factor of the pressure injury: shear and immobility  Status: stable, not evolving, potentially skin discoloration not related to pressure although patient has been refusing turns, will continue to monitor        TREATMENT PLAN  Sacrum wound: " Every third day cleanse with microKlenz and dry, apply Cavilon no sting barrier film to skin around wound and let dry, then place mepilex.     Orders Written  [facility] Nurse follow-up plan:twice weekly  Nursing to notify the Provider(s) and re-consult the [facility] Nurse if wound(s) deteriorates or new skin concern.    Patient History  According to provider note(s):  Patient history according to medical record: Alexei Kaur is an 80-year-old gentleman with numerous comorbidities including coronary artery disease with a recent stent on Plavix, end stage renal disease on hemodialysis, on warfarin who presented with abdominal pain and signs symptoms consistent with ischemia of the ascending colon. S/p ex-lap, open right hemicolectomy, end ileostomy and mucus fistula.    Objective Data  Containment of urine/stool: Ileostomy pouch    Current Diet/ Nutrition:  Orders Placed This Encounter      NPO for Medical/Clinical Reasons Except for: Meds      Output:   I/O last 3 completed shifts:  In: 1488.79 [P.O.:60; I.V.:20; IV Piggyback:325]  Out: 1400 [Emesis/NG output:1300; Stool:100]    Risk Assessment:   Sensory Perception: 3-->slightly limited  Moisture: 4-->rarely moist  Activity: 3-->walks occasionally  Mobility: 3-->slightly limited  Nutrition: 3-->adequate  Friction and Shear: 1-->problem  Dheeraj Score: 17      Labs:   Recent Labs   Lab 04/09/21  1108 04/09/21  0731 04/07/21  1535 04/07/21  1535 04/07/21  0740 04/05/21  0809 04/05/21  0809   ALBUMIN  --  2.2*   < >  --  2.2*   < >  --    PREALB  --   --   --   --  14*  --   --    HGB  --   --   --  10.3*  --   --  11.7*   INR 1.33*  --   --   --  1.77*   < >  --    WBC  --   --   --   --   --   --  7.1    < > = values in this interval not displayed.       Physical Exam  Skin inspection: focused sacrum          Wound Location:  sacrum  Date of last Photo 4/5  Wound History: noted by RN this AM 4/5, sacral mepilex was in place at time of assessment   Measurements (length x width  x depth, in cm) 2 cm x 2 cm  x  0 cm   Wound Base:  100 % purple and epidermis, gray   Tunneling N/A  Undermining N/A  Palpation of the wound bed: normal   Periwound skin: erythema- blanchable  Color: red  Temperature: normal   Drainage:, none  Description of drainage: none  Odor: none  Pain: denies , none    Interventions  Current support surface: Standard  Low air loss mattress  Current off-loading measures: Foam padding and Pillows  Repositioning aid: Pillows  Visual inspection of wound(s) completed   Wound Care: was done per plan of care.  Supplies: at bedside and floor stock  Educated provided: importance of repositioning and plan of care  Education provided to: patient , spouse and nurse  Discussed importance of:repositioning every 2 hours and off-loading pressure to wound  Discussed plan of care with Patient, Family and Nurse    Sumaya Saenz RN, CWOCN         [facility]     Name: Alexei Blake : 1940  Date: 2021    To order your ostomy supplies    The ostomy Supplier needs this supply list  to process your order. You will need to fax/deliver this list, along with your Insurance information. Your home care nurse can assist with this process.             List of Ostomy Distributors      [facility]  Ph. [phone] Fax # [phone]  [facility].   Ph. [phone]  [employer] [facility]   Ph. [phone]  [facility]   Ph. [phone]  Or Call your insurance provider for their preferred supplier    Your Medical Supplier will need your surgeon's name, phone and fax number    Clinic:                     Phone                            Fax  General Surgery:   [phone]  Verbal Order for ostomy supplies for 1 Month per:       Sumaya Saenz RN, CWOCN     Authorizing MD: Adrien Medina MD    Change pouch :  Three times a week  Quantity of pouches: 20 pouches/month      Request the following supplies:      Ozone    1 piece flat fecal with filter #8331    Accessories  2  barrier ring #2145     Adapt powder #8941    No sting film barrier # 7857                          M-9 Spray room deodorizer #1330                                  Change your pouch twice a week, more often if leaking.    If you are cutting a hole in the wafer of your pouch, recheck stoma size and adjust pouch opening as needed every week    . Call the Ostomy Nurse at [facility]       [address] [place] [address], [place] : [phone]   Schedule a follow-up visit in 4 to 6 weeks after your surgery, sooner if having problems Bring a complete set of pouch-changing supplies to this visit      Problems you should Report  - The stoma turns blue or darker in color.  - Cuts or sores around the stoma.  - Red, raw or painful skin around the stoma.  - Any bulging of the skin around the stoma.  - A pouch that leaks every day.  - Problems making the right size hole in the pouch wafer.    Please call with any questions or concerns.

## 2021-04-09 NOTE — PLAN OF CARE
5054-1521 Midline abdominal incision C/D/I. Ileostomy intact, small amount of liquid stool present in bag. Red rubber catheter remains in stoma. Abdominal discomfort managed with IV Dilaudid. Denies nausea. NG to LIS, NG output replacement currently infusing. NPO. Continuous TPN and lipids infusing. BG q4h, sliding scale insulin given per orders. Anuric, on HD. Not out of bed. Up with assist x2, gait belt and walker. Pt declined repositioning overnight. On pulsate mattress. VSS on room air.

## 2021-04-09 NOTE — PROGRESS NOTES
General Surgery Progress Note    Subjective  Seen while up to chair. Denies nausea, minimal BM and flatulance. No PT yesterday d/t fatigue from dialysis. Stoma interrogate this morning, patent and RRT passes without difficulty.      Objective:   /60 (BP Location: Right leg)   Pulse 84   Temp 97  F (36.1  C) (Oral)   Resp 18   Ht 1.829 m (6')   Wt 93 kg (205 lb 0.4 oz)   SpO2 96%   BMI 27.81 kg/m      PE:  Gen: AOx 3  Resp: NLB  Abd: soft, moderately distended, minimally tender, ostomy swollen but pink and patent with small amount of liquid dark stool in the bag. Red rubber in place.    A/P: Alexei Kaur is an 80-year-old male with history of CAD s/p recent stent on clopidogrel, CHF with EF 25%, history of stroke with left leg deficit, atrial fibrillation on warfarin, ESRD dependent upon dialysis, and DM type II, who is POD 9 from exploratory laparotomy with right hemicolectomy, ileostomy and mucous fistula.  Doing well. Course c/b postop ileus      - will continue red rubber catheter   - Continue ASA and plavix, hold warfarin. PTA metoprolol.   - heparin ppx   - NPO, NG to LIS. Ok to clamp NG for oral meds. Can place to gravity during dialysis.   - continue TPN  - Pink lady enema through red rubber catheter in stoma. Stoma patent, awaiting [facility] consult for ostomy cares  - HD Tu/Th/Sat  - Discharge to home once ileus resolves      Ana Rosa Henley MD   General Surgery PGY2

## 2021-04-10 NOTE — PROGRESS NOTES
General Surgery Progress Note    Subjective  Sleeping on exam. Small amount of liquid ostomy out, no nausea, patent NGT. No concerns from patient       Objective:   /80   Pulse 62   Temp 96.8  F (36  C) (Oral)   Resp 16   Ht 1.829 m (6')   Wt 93 kg (205 lb 0.4 oz)   SpO2 (!) 88%   BMI 27.81 kg/m      PE:  Gen: AOx 3  Resp: NLB  Abd: soft, moderately distended, minimally tender, ostomy swollen but pink and patent with small amount of liquid dark stool in the bag. Red rubber in place.    A/P: Alexei Kaur is an 80-year-old male with history of CAD s/p recent stent on clopidogrel, CHF with EF 25%, history of stroke with left leg deficit, atrial fibrillation on warfarin, ESRD dependent upon dialysis, and DM type II, who is POD 9 from exploratory laparotomy with right hemicolectomy, ileostomy and mucous fistula.  Doing well. Course c/b postop ileus      - will continue red rubber catheter   - Continue ASA and plavix, hold warfarin. PTA metoprolol.   - heparin ppx   - NPO, NG to LIS. Ok to clamp NG for oral meds. Can place to gravity during dialysis.   - continue TPN  - WOC consult for ostomy cares  - HD Tu/Th/Sat  - Discharge to home once ileus resolves    Ana Rosa Henley MD   General Surgery PGY2

## 2021-04-10 NOTE — PROGRESS NOTES
HEMODIALYSIS TREATMENT NOTE    Date: 4/10/2021  Time: 12:33 PM    Data:  Pre Wt: 93 kg (205 lb 0.4 oz)   Desired Wt: 93 kg   Post Wt: 93 kg (205 lb 0.4 oz)  Weight change: 0 kg  Ultrafiltration - Post Run Net Total Removed (mL): 0 mL  Vascular Access Status: Fistula  patent  Dialyzer Rinse: Streaked, Light  Total Blood Volume Processed: 78.92 L   Total Dialysis (Treatment) Time: 3.5   Dialysate Bath: K 4, Ca 3  Heparin: None    Lab:   No    Interventions:    NG output 400  zofran given for nausea    Assessment:  Pt nauseated through out. NG with dark brown output. Pt vitally stable on 1L 02 for comfort. Pt does not appear to have any edema. Pt denies pain/SOB. Handoff given to floor RN.     Plan:    Per renal

## 2021-04-10 NOTE — PROGRESS NOTES
Pt. Reacted well to being woken up by primary nurse and writer early in the shift, and consented to morning medications and brief heart and lung check, both performed by primary nurse. Pt was accompanied by writer to dialysis where he helped the dialysis nurse teach the writer about dialysis, and went through with the procedure with no issues through 1050, at which time writer left the unit. Pt was calm and cooperative with writer during all interactions.

## 2021-04-10 NOTE — PROGRESS NOTES
Nephrology follow up - Dialysis visit     S: Doing well on HD, has some phlegm in the throat. 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 Will leave his TW at 93 Kg. Continue antihypertensives post HD run. Next HD planned for Tuesday.     Yecneia Thomas MD   [phone]

## 2021-04-10 NOTE — PLAN OF CARE
/57 (BP Location: Right leg)   Pulse 50   Temp 95.6  F (35.3  C) (Axillary)   Resp 14   Ht 1.829 m (6')   Wt 93 kg (205 lb 0.4 oz)   SpO2 99%   BMI 27.81 kg/m    Assumed care from 0602-4071. VSS on RA. A&Ox4. Denies pain this shift. Intermittent nausea declining antiemetics, NPO, BGs checked & corrected Q4 hours. NG to LIS, intermittently clamped after medication administration, output replaced Q8 hours per MAR. Left PIV SL. Left PICC with two lumens; one TKO, the other infusing continuous TPN. Midline incision with staples and erythema, otherwise CDI & JUMA. Pressure injury on bottom covered, dressing intact, scheduled to be changed 4/11. On pulsate mattress, declining repositioning, pt educated on importance of weight shifting. Ostomy intact, small amount of output this shift. Pt anuric, on HD. Pt went to dialysis this AM, tired after run & requesting to rest. Up with A2, gait belt, and walker. Up to the recliner with therapy this afternoon, but declining any other activity today. Continue POC.

## 2021-04-10 NOTE — PLAN OF CARE
VSS on RA most of shift. Late in shift pt c/o SOB d/t feeling like he had to cough up phlegm, pt was placed on 2L nasal cannula for comfort. A&Ox4. Denies nausea this shift. Pain in abdomen controlled with IV Dilaudid x1. Pt refusing to turn and reposition, stating he can turn himself in bed. Bottom with mepilex over pressure injury. Bruising generalized throughout body. NG to LIS, output replaced by fluids Q8 per orders. Q4 blood sugars maintained, sliding scale insulin given per orders. PICC with TPN + Lipids infusing. R HD cath with dressing CDI, dialysis planned for this AM. Pt anuric. Ileostomy with small amount of watery stool, red miles cath remains in bag. Pt 2 assist with GB and WW when out of bed d/t generalized weakness and deconditioning. NPO. Cont with Plan of care.

## 2021-04-11 NOTE — PROGRESS NOTES
General Surgery Progress Note    Subjective  Feels well this morning. Denies N/V, abdominal pain. +hiccups.     Objective:   /85 (BP Location: Right leg)   Pulse 71   Temp 96  F (35.6  C) (Oral)   Resp 16   Ht 1.829 m (6')   Wt 93 kg (205 lb 0.4 oz)   SpO2 97%   BMI 27.81 kg/m      PE:  Gen: AOx 3  Resp: NLB  Abd: soft, moderately distended, minimally tender, ostomy mildly swollen but pink and patent with moderate amount of soft dark stool in the bag. Red rubber in place.  NG w/ large amount of bilious output     A/P: Alexei Kaur is an 80-year-old male with history of CAD s/p recent stent on clopidogrel, CHF with EF 25%, history of stroke with left leg deficit, atrial fibrillation on warfarin, ESRD dependent upon dialysis, and DM type II, who is POD 11 from exploratory laparotomy with right hemicolectomy, ileostomy and mucous fistula.  Doing well. Course c/b postop ileus      - will continue red rubber catheter   - Continue ASA and plavix, hold warfarin. PTA metoprolol.   - heparin ppx   - NPO, NG to LIS. Ok to clamp NG for oral meds. Can place to gravity during dialysis.   - Improved ostomy output w/ soft stool this morning in the bag but continued high NG output, if output of NG does not decrease by tomorrow, will consider CT scan   - continue TPN  - WOC consult for ostomy cares  - HD Tu/Th/Sat  - Discharge to home once ileus resolves    Tutu Ness MD  PGY-1

## 2021-04-11 NOTE — PROGRESS NOTES
Pt. A&Ox4. He had fits of coughing and pain associated with NG tube early in shift, helped controlled with HURRICAINE/TOPEX and DILAUDID PRN. Consult entered due to darkening NG tube discharge coloration. He tolerated both head to toe and repositioning for back assessment and pressure wound care well. Ileostomy has small amount of stool. Medications otherwise given and taken as scheduled.

## 2021-04-11 NOTE — PLAN OF CARE
BP 92/71 (BP Location: Right leg)   Pulse 59   Temp 96.4  F (35.8  C) (Axillary)   Resp 16   Ht 1.829 m (6')   Wt 93 kg (205 lb 0.4 oz)   SpO2 95%   BMI 27.81 kg/m    Assumed care from 5895-1095. Hypotensive at times but not within  notifying parameters, all other VSS on RA. A&Ox4. Denies pain this shift. Intermittent nausea declining antiemetics, NPO, BGs checked & corrected Q4 hours. NG to LIS, intermittently clamped after medication administration, output replaced Q8 hours per MAR. Output now dark brown and red tinged, MD notified & aware, plan for IV Protonix to start tonight. Left PIV SL. Left PICC with two lumens; one TKO, the other infusing continuous TPN. Midline incision with staples and erythema, otherwise CDI & JUMA. Pressure injury on bottom covered, dressing changed this shift, next due 4/14. On pulsate mattress, declining repositioning, pt educated on importance of weight shifting. Ostomy intact, small amount of output this shift. Pt anuric, on HD. Up with A2, gait belt, and walker. Not out of bed this shift, activity encouraged but pt declining. Continue POC.

## 2021-04-11 NOTE — PLAN OF CARE
VSS on RA. Denies SOB. A&Ox4. Denies nausea this shift. Pain in abdomen controlled with IV Dilaudid x1. Pt refusing to turn and reposition, stating he can turn himself in bed. Bottom with mepilex over pressure injury, due to be changed today. Bruising generalized throughout body. NG to LIS, 1100 mL out this shift, output replaced by fluids Q8 per orders. Q4 blood sugars maintained, sliding scale insulin given per orders. PICC with TPN + Lipids infusing. R HD cath with dressing CDI. Pt anuric. Ileostomy with small amount of watery stool, red miles cath remains in bag. Pt 2 assist with GB and WW when out of bed d/t generalized weakness and deconditioning. NPO. Cont with Plan of care.

## 2021-04-12 NOTE — PROVIDER NOTIFICATION
"Notified MD at 1354 PM regarding LLE worsening pain and new tingling/numbness, c/o \"pounding\" headache. Lethargic.     Spoke with: Lori Silvestre came to assess at bedside. Pt denied N/T with provider. Tylenol relieved headache.     Orders were not obtained.    Comments: Gave tylenol for headache and PCD boots put on. Denies chest pain or shortness of breath. Will continue to monitor.      "

## 2021-04-12 NOTE — PROGRESS NOTES
Care Management Follow Up    Length of Stay (days): 13  Expected Discharge Date: 04/16/21     Concerns to be Addressed: Discharge planning    Patient plan of care discussed at interdisciplinary rounds: Yes    Anticipated Discharge Disposition: Transitional Care     Anticipated Discharge Services: Outpatient Dialysis  [facility]   [address], [address]  [place] [address]  P: [phone]  Tuesday, Thursday, Saturday  Chair time: 5:30 AM (3.5 hour run)    Pt was given a [employer] application and a list of transportation companies.  His spouse typically transports him to dialysis and may be able to continue to do so pending pt's ability to get in/ out of her vehicle safely.    Patient/family educated on Medicare website which has current facility and service quality ratings: yes  Education Provided on the Discharge Plan:  yes  Patient/Family in Agreement with the Plan: yes    Referrals Placed by CM/SW:  Pt identified the following preferences:    [facility]  [address] [place] [address]  [phone]  -E-referral sent.  SW spoke with admissions, they have openings on Monday.  They do not take TPN, NG and would need a negative COVID test within 72 hours of admitting.  SW to notify them once pt is off TPN.    [facility]  [address] [place] [address]  P: [phone]  F: [phone]  -E-referral sent.  SW spoke with admissions, they do not take TPN.  SW to call back once off TPN.    [facility]  [address] [place], [place] [address] [phone]  -E-referral sent.  SW spoke with admissions yesterday, they have openings however, they do not take TPN.  SW to notify them once off TPN.    [facility]  [address] [place], [place] [id number] [phone]  -E-referral sent.  SW called and left a vm asking for a call back.    [facility]   [address] [place]  [address]  [phone]  -E-referral sent.  SW called and left a vm asking for a call back.    Private pay costs discussed: transportation costs    Additional Information:  SW followed-up on the above referrals.  SW spoke with the attending team, pt will not have an NG tube or TPN at discharge.  Anticipate discharge end of week at the earliest.  SW to follow-up with the above pending TCU referrals once pt is off of TPN.    RAMO Stauffer, [facility]  Phone:  [phone]   Pager:  [phone]

## 2021-04-12 NOTE — PROGRESS NOTES
Surgery Progress Note  04/12/2021       Subjective:  SALVADOR overnight. Feels well this morning. Denies any nausea/vomiting. No abdominal pain. Feels less bloated. NG output remains high. Low ileostomy output, slightly liquid.      Objective:  Temp:  [96.4  F (35.8  C)-97.7  F (36.5  C)] 96.4  F (35.8  C)  Pulse:  [55-70] 70  Resp:  [16-18] 18  BP: ()/() 130/100  SpO2:  [95 %-97 %] 96 %    I/O last 3 completed shifts:  In: 2034.35   Out: 1475 [Emesis/NG output:1450; Stool:25]      Gen: Awake, alert, NAD  NGT with mixed bilious/dark brown output  Resp: NLB on RA  Abd: soft, nondistended, nontender to light palpation, stoma pink and patent with mixed liquid/soft brown stool. Red rubber in place.   Ext: WWP, no edema     Labs:  Recent Labs   Lab 04/07/21  1535 04/05/21  0809   WBC  --  7.1   HGB 10.3* 11.7*    204       Recent Labs   Lab 04/12/21  0700 04/11/21  0819 04/10/21  0812 04/09/21  0731 04/08/21  0731    134 133 132* 134   POTASSIUM 3.4 3.5 3.4 3.4 3.7   CHLORIDE 97 98 95 97 100   CO2 26 27 26 25 26   BUN 89* 61* 82* 52* 65*   CR 5.82* 4.46* 5.94* 4.44* 6.36*   * 198* 198* 175* 209*   AVNI 9.2 9.3 9.0 8.8 8.9   MAG 1.9  --   --  1.7 1.9   PHOS 3.1 3.0 4.2 3.5 4.5        Assessment/Plan:   Alexei Kaur is an 80-year-old male with history of CAD s/p recent stent on clopidogrel, CHF with EF 25%, history of stroke with left leg deficit, atrial fibrillation on warfarin, ESRD dependent upon dialysis, and DM type II, who is POD 12 from exploratory laparotomy with right hemicolectomy, ileostomy and mucous fistula.  Doing well. Course c/b postop ileus, awaiting consistent ROBF.      - Will continue red rubber catheter   - Continue ASA and plavix, hold warfarin. PTA metoprolol.   - Heparin ppx   - NPO, NG to LIS. Ok to clamp NG for oral meds. Can place to gravity during dialysis.   - AXR to evaluate bowel distention with ongoing ileus  - Continue TPN  - PPI   - WOC following for wound  cares  - HD Tu/Th/Sat  - Discharge home pending resolution of ileus     Seen, examined, and discussed with chief resident, who will discuss with staff.    Lori Silvestre MD  PGY-1 Surgery

## 2021-04-12 NOTE — PLAN OF CARE
VSS on RA. Denies SOB. A&Ox4. Denies nausea this shift. Pain in abdomen controlled with IV Dilaudid x1. Pt refusing to turn and reposition, stating he can turn himself in bed. Bottom with mepilex over pressure injury, due to be changed next on 4/14. Bruising generalized throughout body. NG to LIS, output replaced by fluids Q8 per orders. Q4 blood sugars maintained, sliding scale insulin given per orders. PICC with TPN + Lipids infusing. R HD cath CDI. Pt anuric. Ileostomy with small amount of watery stool, red miles cath remains in bag. Pt 2 assist with GB and WW when out of bed d/t generalized weakness and deconditioning. NPO. Cont with Plan of care.

## 2021-04-12 NOTE — PROVIDER NOTIFICATION
Notified Lori Silvestre MD for general surgery at 0900 AM regarding difficulty getting blood pressure and clarification on whether to give blood pressure meds. .      Orders were not obtained. Awaiting response from MD.    Comments: Blood pressure obtained 141/31. Heart rate 64.

## 2021-04-12 NOTE — PROGRESS NOTES
"  WO Nurse Inpatient [facility]   WO Nurse Inpatient Adult     Initial Assessment   Assessment of new end Ileostomy Stoma complication(s) edema   Mucocutaneous junction; intact   Peristomal complication(s) none   Pouch wear time:24-48 hours  Following today's visit:Patient / is  able to demonstrate;       1. How to empty their pouch? Yes- demonstrated on 4/1 and return demo      2. How to change their pouch?  No- demonstrated on 4/1 (for patient) and 4/5 (for spouse)      3. How to read and record intake and output correctly? No- demonstrated on 4/1    Psychosocial- His wife had an ostomy previously about 8 years ago. She watched a pouch change at bedside 4/5 and stated that she was starting to remember how to do it.     4/12: patient still with NGT and ileus with nausea, minimal output in pouch which is intact so did not complete any ostomy education today, patient c/o pain to nose at time of assessment, NGT stanley changed and pressure injury found     Objective data:  Patient history according to medical record: Alexei Kaur is an 80-year-old gentleman with numerous comorbidities including coronary artery disease with a recent stent on Plavix, end stage renal disease on hemodialysis, on warfarin who presented with abdominal pain and signs symptoms consistent with ischemia of the ascending colon. S/p ex-lap, open right hemicolectomy, end ileostomy and mucus fistula.  Current Diet/Nutrition: Orders Placed This Encounter      NPO for Medical/Clinical Reasons Except for: Meds     TPN no   I/O last 3 completed shifts:  In: 2800.75   Out: 1675 [Emesis/NG output:1500; Stool:175]  Labs:    Recent Labs   Lab 04/12/21  0700   ALBUMIN 2.0*   HGB 9.6*   INR 1.29*   WBC 8.1        Physical Exam:  Current pouching system:[place] and 2pcs flat CTF with 2\" barrier ring   Reason for pouch change today: ostomy education  Stoma appearance: viable, healthy, normal-appearing, pink, moist, edematous and " "protruberant  Stoma size; 32 mm ,  red miles within stoma   Peristomal skin: intact  Stoma output :liquid   Abdominal  Assessment  distended , NG still in place? Yes   Surgical Site: open to air  Pain: Dull ache  Is patient still on a PCA No     Interventions:  Patient's chart evaluated.  Focus of today's visit: pouch change demonstration    Participant of teaching session today patient   Orders: Written  Change made with ostomy management today: No-  continue 2 pc flat CTF with 2\" barrier ring due to need for red miles   Patient/family: lethargic and observing  Supplies:Gathered and at bedside    Plan:  Learning needs: pouch change return demonstration, output measurement and discharge instructions  Preparation for discharge: Discussed how/ where to order supplies,  Prepared for discharge to home with homecare, Prescriptions or note left on chart for MD to sign/complete    Needs- Supplies ordered, Registered for samples from , Discussed making a WOC Nurse outpatient appointment upon discharge  Recommend home care? yes    Discussed plan of care with Patient and Nurse  Nursing to notify the Provider(s) and re-consult the WOC Nurse if new ostomy concerns or discharge planned before next planned WOC visit.    WOC Nurse will return: M/W/F  Face to face time: 30 minutes    WOC Nurse Inpatient Pressure Injury Assessment   Reason for consultation: Evaluate and treat coccyx      ASSESSMENT  Pressure Injury: on sacrum , hospital acquired , - not assessed 4/12   Pressure Injury is Stage Deep Tissue Pressure Injury (DTPI)   Contributing factor of the pressure injury: shear and immobility  Status: stable, not evolving, potentially skin discoloration not related to pressure although patient has been refusing turns, will continue to monitor     Pressure injury: on R nare, hospital acquired   Medical device related injury due to NGT   Pressure injury is stage 2  Status: initial assessment      TREATMENT " PLAN  Sacrum wound: Every third day cleanse with microKlenz and dry, apply Cavilon no sting barrier film to skin around wound and let dry, then place mepilex.     R nare: BID cleanse with saline and dry, apply thin layer of water soluble lubricant or hydrogel ([id number]) to wound bed and leave JUMA, ensure tube is secured so that is not pressing on area     Orders Written  WOC Nurse follow-up plan:twice weekly  Nursing to notify the Provider(s) and re-consult the WOC Nurse if wound(s) deteriorates or new skin concern.    Patient History  According to provider note(s):  Patient history according to medical record: Alexei Kaur is an 80-year-old gentleman with numerous comorbidities including coronary artery disease with a recent stent on Plavix, end stage renal disease on hemodialysis, on warfarin who presented with abdominal pain and signs symptoms consistent with ischemia of the ascending colon. S/p ex-lap, open right hemicolectomy, end ileostomy and mucus fistula.    Objective Data  Containment of urine/stool: Ileostomy pouch    Current Diet/ Nutrition:  Orders Placed This Encounter      NPO for Medical/Clinical Reasons Except for: Meds      Output:   I/O last 3 completed shifts:  In: 2800.75   Out: 1675 [Emesis/NG output:1500; Stool:175]    Risk Assessment:   Sensory Perception: 4-->no impairment  Moisture: 4-->rarely moist  Activity: 3-->walks occasionally  Mobility: 2-->very limited  Nutrition: 3-->adequate  Friction and Shear: 1-->problem  Dheeraj Score: 17      Labs:   Recent Labs   Lab 04/12/21  0700   ALBUMIN 2.0*   PREALB 14*   HGB 9.6*   INR 1.29*   WBC 8.1       Physical Exam  Skin inspection: focused sacrum          Wound Location:  sacrum  Date of last Photo 4/5  Wound History: noted by RN this AM 4/5, sacral mepilex was in place at time of assessment   Measurements (length x width x depth, in cm) 2 cm x 2 cm  x  0 cm   Wound Base:  100 % purple and epidermis, gray   Tunneling N/A  Undermining  N/A  Palpation of the wound bed: normal   Periwound skin: erythema- blanchable  Color: red  Temperature: normal   Drainage:, none  Description of drainage: none  Odor: none  Pain: denies , none     Wound Location: R nare  Date of last Photo -  Wound History: patient c/o pain to nose on during WOC assessment of ostomy and tube securement was changed and wound noted to tip of nare   Measurements (length x width x depth, in cm)0.3cm x 0.3cm x 0.1cm   Wound Base:  100 % dermis  Tunneling N/A  Undermining N/A  Palpation of the wound bed: normal   Periwound skin: erythema- blanchable  Color: red  Temperature: normal   Drainage:,scant  Description of drainage: bloody  Odor: none  Pain: severe     Interventions  Current support surface: Standard  Low air loss mattress  Current off-loading measures: Foam padding and Pillows  Repositioning aid: Pillows  Visual inspection of wound(s) completed   Wound Care: was done per plan of care.  Supplies: at bedside and floor stock  Educated provided: importance of repositioning and plan of care  Education provided to: patient , spouse and nurse  Discussed importance of:repositioning every 2 hours and off-loading pressure to wound  Discussed plan of care with Patient, Family and Nurse    Sumaya Saenz RN, [facility]         [facility]     Name: Alexei Blake : 1940  Date: 2021    To order your ostomy supplies    The ostomy Supplier needs this supply list  to process your order. You will need to fax/deliver this list, along with your Insurance information. Your home care nurse can assist with this process.             List of Ostomy Distributors      [facility]  Ph. [phone] Fax # [phone]  [facility]   Ph. [phone]  [employer] [facility]   Ph. [phone]  [facility]   Ph. [phone]  Or Call your insurance provider for their preferred supplier    Your Medical  Supplier will need your surgeon's name, phone and fax number    Clinic:                     Phone                            Fax  General Surgery:   [phone]  Verbal Order for ostomy supplies for 1 Month per:       Sumaya Saenz RN, CWOCN     Authorizing MD: Adrien Medina MD    Change pouch :  Three times a week  Quantity of pouches: 20 pouches/month     Request the following supplies:      [place]    1 piece flat fecal with filter #8331    Accessories  2  barrier ring #3765     Adapt powder #7906    No sting film barrier # 3345                          M-9 Spray room deodorizer #[id number]                                  Change your pouch twice a week, more often if leaking.    If you are cutting a hole in the wafer of your pouch, recheck stoma size and adjust pouch opening as needed every week    . Call the Ostomy Nurse at [facility]       [address] [place] [address], [place] : [phone]   Schedule a follow-up visit in 4 to 6 weeks after your surgery, sooner if having problems Bring a complete set of pouch-changing supplies to this visit      Problems you should Report  - The stoma turns blue or darker in color.  - Cuts or sores around the stoma.  - Red, raw or painful skin around the stoma.  - Any bulging of the skin around the stoma.  - A pouch that leaks every day.  - Problems making the right size hole in the pouch wafer.    Please call with any questions or concerns.

## 2021-04-12 NOTE — PLAN OF CARE
Difficulty getting BP on RLE, blood pressure meds not given due to inability to obtain BP, MD notified. OVSS on RA. Aox4, lethargic and flat affect. Up with therapy to chair x1, had phlegm and nausea episode while getting back into bed, relieved with IV zofran. NPO. NG to LIS, clamp with meds. Ileostomy with small amounts of brown stool, +gas. Anuric, on hemodialysis. Right arm AV fistula +thrill/+bruit. Abd midline stapled and JUMA. BLE elevated on pillows. On pulsate. Left arm PICC infusing TPN, other lumen TKO. Had abdominal xray completed. Continue with POC.

## 2021-04-12 NOTE — PLAN OF CARE
"7C OT: Cancel     Pt somnolent upon arrival, required multiple loud greetings for pt to minimally open eyes. Pt declined OT session this PM, stating, \"I just don't feel good, my head is throbbing.\" RN notified. Will cancel and reschedule per POC.   "

## 2021-04-13 NOTE — PLAN OF CARE
Hypertensive at times, OVSS on RA. Up with assist x1-2 with walker and gait belt. Up to chair x1.  Pain controlled with PRN oxycodone. Abd midline incision stapled and JUMA. Ileostomy with small amounts of stool/gas. AV fistula +thrill/+bruit. Went down to dialysis at 1050. NPO. Q4hr BG monitoring, sliding scale insulin given per orders. TPN infusing into left arm PICC, other lumen TKO. NG to LIS, clamped with meds and qshift flush completed. Nare pressure injury cares completed per orders. Sacral mepilex in place. On pulsate mattress. Continue with POC.

## 2021-04-13 NOTE — PROGRESS NOTES
Took over cares of pt @ 2300. Pt is orientated and sleepy. Pain managed with IV Dilaudid x1 per pt request he didn't want to swallow a pill and have the suction the his NG turned off. NG to LIS with 200ml output overnight. Ostomy with minimal output. 0400 BG was 191, covered with sliding scale. TPN running continuous @75ml and cyclic Lipids. Pt is able to shift his weight in the bed and does not want full repositioning. Plan is for dialysis today.

## 2021-04-13 NOTE — PROGRESS NOTES
HEMODIALYSIS TREATMENT NOTE    Date: 4/13/2021  Time: 3:15 PM    Data:  Pre Wt: 99.8 kg (205 lb 0.4 oz)   Desired Wt: 96.8 kg   Post Wt: 97.3 kg (205 lb 0.4 oz)(Estimated)  Ultrafiltration - Post Run Net Total Removed : 2500 mL  Vascular Access Status: Fistula  patent  Dialyzer Rinse: Streaked, Light  Total Blood Volume Processed: 89.85 L   Total Dialysis (Treatment) Time: 3.5   Dialysate Bath: K 4, Ca 3  Heparin: None    Lab:   No    Assessment / Interventions: 3.5 hours HD via RAVF thrill & bruit present. 2 15g  Needle cannulated  with good flow. No medication given during treatment.  during treatment & due coverage given. UF adjusted according to pt hemodynamic stable & crit-line. 2.5kg UF pulled. Pt completed his treatment time, no issues, blood rinsed back, fistula hemostasis achieved in less than 10 minutes, hand off report given & pt send back in a stable condition.         Plan:    Cont. With Renal Team.

## 2021-04-13 NOTE — PROGRESS NOTES
Surgery Progress Note  04/13/2021       Subjective:  NAEO. Decreased NGT output, 750ml/24 hr. Ileostomy 150ml/24 hr. Pain well controlled. No nausea/vomiting. Plan for dialysis today.      Objective:  Temp:  [96.4  F (35.8  C)-97.5  F (36.4  C)] 96.4  F (35.8  C)  Pulse:  [58-70] 63  Resp:  [16-18] 16  BP: (127-150)/() 150/45  SpO2:  [92 %-96 %] 92 %    I/O last 3 completed shifts:  In: 1583.88 [I.V.:20; NG/GT:25; IV Piggyback:125]  Out: 650 [Emesis/NG output:500; Stool:150]      Gen: Awake, alert, lying flat in bed with head slightly down, NAD  Resp: NLB on RA  Abd: soft, nondistended, nontender, ileostomy with liquid dark brown stool, manually digitized noting narrow fascial opening but patent  Midline incision: c/d/I with staples  Ext: WWP, no edema     Labs:  Recent Labs   Lab 04/12/21  0700 04/07/21  1535   WBC 8.1  --    HGB 9.6* 10.3*    198       Recent Labs   Lab 04/12/21  0700 04/11/21  0819 04/10/21  0812 04/09/21  0731 04/08/21  0731    134 133 132* 134   POTASSIUM 3.4 3.5 3.4 3.4 3.7   CHLORIDE 97 98 95 97 100   CO2 26 27 26 25 26   BUN 89* 61* 82* 52* 65*   CR 5.82* 4.46* 5.94* 4.44* 6.36*   * 198* 198* 175* 209*   AVNI 9.2 9.3 9.0 8.8 8.9   MAG 1.9  --   --  1.7 1.9   PHOS 3.1 3.0 4.2 3.5 4.5        Assessment/Plan:   Alexei Kaur is an 80-year-old male with history of CAD s/p recent stent on clopidogrel, CHF with EF 25%, history of stroke with left leg deficit, atrial fibrillation on warfarin, ESRD dependent upon dialysis, and DM type II, who is POD 13 from exploratory laparotomy with right hemicolectomy, ileostomy and mucous fistula.  Doing well. Course c/b postop ileus, awaiting consistent ROBF.      - GG challenge through ileostomy today  - Continue ASA and plavix, hold warfarin. PTA metoprolol.   - Heparin ppx   - NPO, NG to LIS. Ok to clamp NG for oral meds. Can place to gravity during dialysis.   - Keep HOB > 30 degrees to prevent aspiration  - Continue TPN  - PPI    - WOC following for wound cares, continue packing wet/dry kerlix with tunnel to left groin  - Irrigate groin drain as noted by IR  - Keep midline staples, evaluate in another week for removal  - HD Tu/Th/Sat  - Discharge home pending resolution of ileus     Seen, examined, and discussed with chief resident, who will discuss with staff.    Lori Silvestre MD  PGY-1 Surgery   (6AM-5PM please page primary team, nights/weekends/holidays page job code [phone])

## 2021-04-13 NOTE — PROGRESS NOTES
CLINICAL NUTRITION SERVICES - REASSESSMENT NOTE     Nutrition Prescription    RECOMMENDATIONS FOR MDs/PROVIDERS TO ORDER:  Please alert RD or pharmacist if pt requires adjustments to PN fluid volume    Malnutrition Status:    Non-severe malnutrition in the context of acute illness    Recommendations already ordered by Registered Dietitian (RD):  None at this time     Future/Additional Recommendations:  Monitor labs/weights vs need to adjust PN provisions; monitor ability/need to cycle TPN, especially if plan to discharge with TPN     EVALUATION OF THE PROGRESS TOWARD GOALS   Diet: NPO    Nutrition Support: CPN, 1800 mL/day with goal 345 g dextrose, 140 g AA, and 250 mL 20% IV lipids daily to provide 2233 kcals (25 kcal/kg/day), 1.6 g PRO/kg/day, GIR 2.7 with 22% kcals from Fat    Intake: CPN started 4/6 with initial dextrose 175 g, advanced to 230 g dex on 4/7, 285 g dex on 4/8, and reached goal dextrose on 4/9.  No documentation of any TPN/lipid interruptions per review of progress notes and MAR       NEW FINDINGS   Weight: fluctuating since admit (suspect mostly fluid related), remains above lowest weights this admit of ~88-89 kg on 4/3 and 4/5.    Labs:  - Na+ 132 (L)  - K+ and Phos WNL  - (4/12): Alk Phos, ALT, AST, Tbili, and TG WNL  -  (H), Cr 7.02 (H), GFR 7 (L) --> pt getting dialysis today   - BGs stable  Meds:  - High sliding scale insulin q4h  GI:   - NG output decreasing per provider note today, plan for GG challenge for ileostomy today  Skin:   - per WOC note on 4/12, pt with DTPI on sacrum and stage 2 pressure injury on R nare 2/2 NGT.  Pt getting 1.6 g PRO/kg/day, 25 kcal/kg (appropriate given TPN), and daily MVI and trace elements in TPN)    MALNUTRITION  % Intake: Decreased intake does not meet criteria  % Weight Loss: Difficult to assess due to suspected fluid; previously >2% in 1 week (severe)  Subcutaneous Fat Loss: Facial region, Upper arm, and Lower arm: mild per RD note on 4/6  Muscle  "Loss: Temporal, Facial & jaw region, Upper arm (bicep, tricep), and Lower arm  (forearm): moderate  per RD note on 4/6  Fluid Accumulation/Edema: None noted per chart review  Malnutrition Diagnosis: Non-severe malnutrition in the context of acute illness    Previous Goals   Nutrition support within 3-4 days  Evaluation: Met (assume this meant \"nutrition support to reach goal within 3-4 days\"    Previous Nutrition Diagnosis  Inadequate oral intake related to slow diet advancement 2/2 post-op ileus as evidenced by mostly NPO or clear liquids x 7 days    Evaluation: No change, updated    CURRENT NUTRITION DIAGNOSIS  Inadequate oral intake related to NPO 2/2 ileus as evidenced by NPO x 9 days and on TPN starting 6 days ago to meet estimated nutrition needs (goal TPN met 4 days ago)    INTERVENTIONS  Implementation  Collaboration with other providers: discussed pt in interdisciplinary team rounds, ongoing TPN while awaiting ROBF  Pt at dialysis, so obtained information from chart review only    Goals  Total avg nutritional intake to meet a minimum of 25 kcal/kg and 1.3 g PRO/kg daily (per dosing wt 88 kg).    Monitoring/Evaluation  Progress toward goals will be monitored and evaluated per protocol.     Ilda Muse, RD, LD  Pager: [phone]  Units covered: 7C (all beds) and 5A (beds 5201 through 5211-2)   "

## 2021-04-13 NOTE — PLAN OF CARE
-C/O right nares pain, given IV Dilaudid with relief  -on continuous TPN and cyclic TPN  -NPO except for meds  -WOC nurse saw patient for pressure injury on the right nose ( see order )  -ileostomy with small liquid stools  -NG with minimal output ( about 50 ml ) this shift, MD notified and ordered flush every shift.   -refuse to be repositioned every 2 hours  -anuric, on dialysis every Tues, Thurs and Saturday  - and 158    Continue with plan of care/

## 2021-04-13 NOTE — PROGRESS NOTES
"  Nephrology Progress Note  04/13/2021    Assessment & Recommendations:  Alexei Blake is a 80 year old with PMH CAD s/p recent PCI, CHF, HTN, DM2, ESRD on HD, PAD, Afib on warfarin who presented with acute onset of abdominal pain during HD. Patient was found to have ischemic changes of right colon with portal venous gas per imaging. S/p colonic resection and ileostomy 3/31.    ESRD on HD  Runs TTS [facility] under care of Dr Noble Monae via RUE AVF for 3.5 hrs. EDW 92 kg  - Dialysis per TTS schedule; may add extra UF run as needed given high volume TPN    Lesion on R kidney: per CT 4/8, renal US 4/9 \"Right renal lesion measuring up to 1.8 cm. No definite  intralesional vascularity. Favored to represent a renal cyst however examination is limited secondary to shadowing. No left renal lesion identified. 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     BP: normotensive to mildly elevated intermittently; PTA imdur 30 mg qday, metoprolol XL 50 at bedtime, lisinopril  - Continued on Imdur  - Recommend continuing lisinopril 2.5 mg qday for cardio benefits  - Recommend metoprolol tartrate 25 mg bid (instead of succinate) and hold before dialysis  - Recommend holding BP meds prior to dialysis; need to be able to pull some fluid while avoiding hypotension given ischemic bowel    Volume: EDW 92 kg, O2 92% RA; minimal UOP  - Will likely lose significant fluids via ileostomy once ileus fully resolves  - On TPN, getting significant volume  - NG with output decreasing  - Per HD unit, pt's BP's dropped to 84/45 during HD on 3/30 which had not been typical; but EDW had been lowered since pt had been " hospitalized in January for CHF, pulm edema/volume overload (EDW was 101 kg in January, reduced to 91.5 kg as of 3/30, increased to 92 kg in light of hypotension on 3/30.)  - Evidence of volume overload on 4/8 CT with small ascites and diffuse body wall anasarca  - Likely 93 kg will be a good EDW, the key will be avoiding hypotension while also avoiding pulm edema/CHF exacerbation  - Wt today 99 kg, will attempt 2-3 kg UF today while maintaining BP > 110    BMD - Ca 9.5, alb 2.0. phos 3.8  - Will continue PTA hectorol 3 mcg with HD  - Continue PTA TUMS WM once eating    Anemia: hgb 9.6 g/dL, does not appear to have anemia chronically or require Epo    Recommendations were communicated to primary team via note    Ana Paula Tolbert PA-C  [phone]    Interval History :   Seen on dialysis, will attempt 2-3 kg UF today given pt's weight is up considerably. Still on high volume TPN. Will attempt to maintain SBP > 110. May need extra UF run to keep up with TPN volume. Pt denies n/v, CP, SOB, chills    Review of Systems:   ROS neg as above    Physical Exam:   I/O last 3 completed shifts:  In: 1583.88 [I.V.:20; NG/GT:25; IV Piggyback:125]  Out: 650 [Emesis/NG output:500; Stool:150]   BP (!) 150/45 (BP Location: Left leg)   Pulse 63   Temp 96.4  F (35.8  C) (Oral)   Resp 16   Ht 1.829 m (6')   Wt 99.8 kg (220 lb)   SpO2 92%   BMI 29.84 kg/m       GENERAL APPEARANCE: alert, NAD  EYES: no scleral icterus, pupils equal  Pulmonary: CTA  CV: regular rhythm, normal rate   - Edema peripheral trace  GI: ileostomy, NG tube  NEURO: face symmetric, AOx3   Access: RUE AVF    Labs:   All labs reviewed by me  Electrolytes/Renal -   Recent Labs   Lab Test 04/13/21  0650 04/12/21  0700 04/11/21  0819 04/09/21  0731 04/09/21  0731 04/08/21  0731   * 133 134   < > 132* 134   POTASSIUM 3.4 3.4 3.5   < > 3.4 3.7   CHLORIDE 96 97 98   < > 97 100   CO2 24 26 27   < > 25 26   * 89* 61*   < > 52* 65*   CR 7.02* 5.82* 4.46*   < >  4.44* 6.36*   * 144* 198*   < > 175* 209*   AVNI 9.5 9.2 9.3   < > 8.8 8.9   MAG  --  1.9  --   --  1.7 1.9   PHOS 3.8 3.1 3.0   < > 3.5 4.5    < > = values in this interval not displayed.       CBC -   Recent Labs   Lab Test 04/12/21  0700 04/07/21  1535 04/05/21  0809 04/01/21  0717   WBC 8.1  --  7.1 12.2*   HGB 9.6* 10.3* 11.7* 11.3*    198 204 174       LFTs -   Recent Labs   Lab Test 04/13/21  0650 04/12/21  0700 04/11/21  0819 04/07/21  0740 04/07/21  0740 04/06/21  0740 04/01/21  0717 04/01/21  0717   ALKPHOS  --  41  --   --  37* 45  --  42   BILITOTAL  --  0.4  --   --  0.5 0.7  --  0.4   ALT  --  11  --   --  10 11  --  24   AST  --  13  --   --  15 20  --  18   PROTTOTAL  --  4.9*  --   --  5.3*  --   --  5.9*   ALBUMIN 2.0* 2.0* 2.2*   < > 2.2* 2.4*   < > 2.6*    < > = values in this interval not displayed.       Iron Panel - No lab results found.      Imaging:  All imaging studies reviewed by me.     Current Medications:    sodium chloride 0.9%  250 mL Intravenous Once in dialysis     sodium chloride 0.9%  300 mL Hemodialysis Machine Once     aspirin  81 mg Oral Daily     atorvastatin  80 mg Oral QPM     clopidogrel  75 mg Oral Daily     gelatin absorbable  1 each Topical During Hemodialysis (from stock)     heparin ANTICOAGULANT  5,000 Units Subcutaneous Q8H     heparin lock flush  5-10 mL Intracatheter Q24H     insulin aspart  1-12 Units Subcutaneous Q4H     isosorbide mononitrate  30 mg Oral Once per day on Sun Mon Wed Fri     lipids  250 mL Intravenous Q24H     lisinopril  2.5 mg Oral Once per day on Sun Mon Wed Fri     metoprolol tartrate  25 mg Oral 2 times per day on Sun Mon Wed Fri     - MEDICATION INSTRUCTIONS -   Does not apply Once     pantoprazole (PROTONIX) IV  40 mg Intravenous Daily with breakfast       dextrose       parenteral nutrition - ADULT compounded formula 75 mL/hr at 04/12/21 2041     - MEDICATION INSTRUCTIONS -       Ana Paula Tolbert PA-C

## 2021-04-14 NOTE — PROGRESS NOTES
CLINICAL NUTRITION SERVICES - BRIEF NOTE  *See RD note on 4/13 for last nutrition reassessment note details     Nutrition Prescription    Recommendations already ordered by Registered Dietitian (RD):  Spoke with pharmacist, max concentrate new TPN bag starting tonight.      Pharmacist to also decrease current TPN rate to what new goal rate will be tonight (~50-55 mL/hr) -- this will temporarily provide ~30% less kcals/protein until 8pm tonight, ok from RD perspective as fluid status is more pressing issue.       Nephrology requested to concentrate pt's TPN due to pt's fluid status; if able would be ideal to have TPN rate decreased before new concentrated bag starts tonight if possible.  TPN currently running @ 75 mL/hr.  Pt is high high sliding scale insulin q4h.    INTERVENTIONS  Implementation  Collaboration with other providers and Parenteral Nutrition/IV Fluids - Discussed above TPN plan with pharmacist.  Paged primary team about plan to concentrate TPN and decrease rate with current bag temporarily until new bag starts.  Discussed plan with bedside RN as well.      Monitoring/Evaluation  Progress toward goals will be monitored and evaluated per protocol.     Ilda Muse RD, LD  Pager: [phone]  Units covered: 7C (all beds) and 5A (beds 5201 through 5211-2)

## 2021-04-14 NOTE — PROGRESS NOTES
Surgery Progress Note  04/14/2021       Subjective:  NAEO. Fatigued this morning. Pain well controlled. No nausea/vomiting.    Objective:  Temp:  [95.9  F (35.5  C)-98.2  F (36.8  C)] 97.6  F (36.4  C)  Pulse:  [56-73] 58  Resp:  [15-18] 16  BP: (100-158)/() 119/44  SpO2:  [91 %-97 %] 97 %    I/O last 3 completed shifts:  In: 1676 [P.O.:200; NG/GT:20; IV Piggyback:65]  Out: 2955 [Emesis/NG output:180; Other:2500; Stool:275]      Gen: Awake, alert, lying flat in bed with head slightly down, NAD  Resp: NLB on RA  Abd: soft, nondistended, nontender, ileostomy with liquid dark brown stool, red rubber in bag  Midline incision: c/d/I with staples  Ext: WWP, no edema     Labs:  Recent Labs   Lab 04/14/21  0640 04/12/21  0700 04/07/21  1535   WBC 6.0 8.1  --    HGB 9.4* 9.6* 10.3*    189 198       Recent Labs   Lab 04/14/21  0640 04/13/21  0650 04/12/21  0700 04/09/21  0731 04/09/21  0731   * 132* 133   < > 132*   POTASSIUM 3.7 3.4 3.4   < > 3.4   CHLORIDE 98 96 97   < > 97   CO2 24 24 26   < > 25   BUN 76* 119* 89*   < > 52*   CR 5.02* 7.02* 5.82*   < > 4.44*   * 172* 144*   < > 175*   AVNI 9.4 9.5 9.2   < > 8.8   MAG 1.8  --  1.9  --  1.7   PHOS 2.9 3.8 3.1   < > 3.5    < > = values in this interval not displayed.        Assessment/Plan:   Alexei Kaur is an 80-year-old male with history of CAD s/p recent stent on clopidogrel, CHF with EF 25%, history of stroke with left leg deficit, atrial fibrillation on warfarin, ESRD dependent upon dialysis, and DM type II, who is POD 13 from exploratory laparotomy with right hemicolectomy, ileostomy and mucous fistula.  Doing well. Course c/b postop ileus, awaiting consistent ROBF.     - tight fascia around stoma, will place large catheter for decompression today, will also discuss with staff potential need for surgical correction  - abd xr demonstrates persistent bowel dilation   - GG challenge through ileostomy today  - Continue ASA and plavix, hold  warfarin. PTA metoprolol.   - Heparin ppx   - NPO, NG to LIS. Ok to clamp NG for oral meds. Can place to gravity during dialysis.   - Keep HOB > 30 degrees to prevent aspiration  - Continue TPN  - PPI   - WOC following for wound cares, continue packing wet/dry kerlix with tunnel to left groin  - Irrigate groin drain as noted by IR  - Keep midline staples, evaluate in another week for removal  - HD Tu/Th/Sat  - Discharge home pending resolution of ileus     Seen, examined, and discussed with chief resident, who will discuss with staff.    Tutu Ness MD  PGY-1

## 2021-04-14 NOTE — PLAN OF CARE
Neuro: A&Ox4. Neuro intact  Cardiac: Afebrile, VSS. Apical pulse irregular   Respiratory: RA   GI/: anuric. HD on T, R, S. Ileostomy patent with 50ml dark brown liquid stool.   Diet/appetite: NPO except meds. TPN/IL infusing at ordered rate. BG monitored q4hrs- ssi admin per ordered scale. Denies nausea   Activity: rested in bed through shift  Pain: .IV dilaudid admin x2 for pain control.  Skin: No new deficits noted. Dressings and incisions CDI  Lines: DBL lumen picc patent. TPN/IL infusing in purple lumen. red lumen hep locked; + blood return noted.  Drains: ileostomy. NG to LIS with small amt brown liquid output  Replacement: 40ml NS bolus admin for total of 80 ml ouptut for shift.    Rested btwn cares. Able to make needs known. Continue to monitor.

## 2021-04-14 NOTE — PROGRESS NOTES
"  Nephrology Progress Note  04/14/2021    Assessment & Recommendations:  Alexei Blake is a 80 year old with PMH CAD s/p recent PCI, CHF, HTN, DM2, ESRD on HD, PAD, Afib on warfarin who presented with acute onset of abdominal pain during HD. Patient was found to have ischemic changes of right colon with portal venous gas per imaging. S/p colonic resection and ileostomy 3/31.    ESRD on HD  Runs TTS at [facility] under care of Dr Noble Monae via RUE AVF for 3.5 hrs. EDW 92 kg  - Dialysis per TTS schedule  - Plan was for UF run today as pt is significantly volume up from high volume TPN; but did not dialyze today given BP's low 100's and likely would only drop lower with UF    Lesion on R kidney: per CT 4/8, renal US 4/9 \"Right renal lesion measuring up to 1.8 cm. No definite  intralesional vascularity. Favored to represent a renal cyst however examination is limited secondary to shadowing. No left renal lesion identified. 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  - ileostomy still with minimal output, gastrografin study today, no obstruction, still has ileus     BP: normotensive to mildly elevated intermittently; PTA imdur 30 mg qday, metoprolol XL 50 at bedtime, lisinopril  - Continued on Imdur, hold before dialysis  - Recommend continuing lisinopril 2.5 mg qday for cardio benefits  - Recommend metoprolol tartrate 25 mg bid (instead of succinate) and hold before dialysis  - Recommend holding BP meds prior to dialysis; need to be able to pull some fluid while avoiding hypotension given ischemic bowel    Volume: EDW 92 kg, O2 92% RA; minimal UOP  - Will likely lose significant fluids " via ileostomy once ileus fully resolves  - On TPN, getting significant volume; discussed with dietician who will assist in having TPN maximally condensed.  - NG with output decreasing  - Per HD unit, pt's BP's dropped to 84/45 during HD on 3/30 which had not been typical; but EDW had been lowered since pt had been hospitalized in January for CHF, pulm edema/volume overload (EDW was 101 kg in January, reduced to 91.5 kg as of 3/30, increased to 92 kg in light of hypotension on 3/30.)  - Evidence of volume overload on 4/8 CT with small ascites and diffuse body wall anasarca  - Likely 93 kg will be a good EDW, the key will be avoiding hypotension while also avoiding pulm edema/CHF exacerbation  - Unable to UF today given low BP's    BMD - Ca 9.4, alb 2.1. phos 2.9  - Will continue PTA hectorol 3 mcg with HD  - Hold PTA TUMS WM for now    Anemia: hgb 9.4 g/dL, does not appear to have anemia chronically or require Epo    Recommendations were communicated to primary team via note    RODY ContrerasC  P [phone]    Interval History :   Brought pt to HD unit for extra UF run today but unable to run due to BP's in low 100's before starting. Won't be able to UF today so didn't start run. Talked with RD who will assist in getting TPN maximally condensed. Pt denies SOB, chills, CP. Gastrografin study today, no obstruction but still has ileus.    Review of Systems:   ROS neg as above    Physical Exam:   I/O last 3 completed shifts:  In: 1676 [P.O.:200; NG/GT:20; IV Piggyback:65]  Out: 2955 [Emesis/NG output:180; Other:2500; Stool:275]   BP (!) 140/44 (BP Location: Right leg)   Pulse 69   Temp 95.8  F (35.4  C) (Oral)   Resp 16   Ht 1.829 m (6')   Wt 99.8 kg (220 lb)   SpO2 96%   BMI 29.84 kg/m       GENERAL APPEARANCE: alert, NAD  EYES: no scleral icterus, pupils equal  Pulmonary: CTA  CV: regular rhythm, normal rate   - Edema peripheral trace  GI: ileostomy, NG tube  NEURO: face symmetric, AOx3   Access: RUE  AVF    Labs:   All labs reviewed by me  Electrolytes/Renal -   Recent Labs   Lab Test 04/14/21  0640 04/13/21  0650 04/12/21  0700 04/09/21  0731 04/09/21  0731   * 132* 133   < > 132*   POTASSIUM 3.7 3.4 3.4   < > 3.4   CHLORIDE 98 96 97   < > 97   CO2 24 24 26   < > 25   BUN 76* 119* 89*   < > 52*   CR 5.02* 7.02* 5.82*   < > 4.44*   * 172* 144*   < > 175*   AVNI 9.4 9.5 9.2   < > 8.8   MAG 1.8  --  1.9  --  1.7   PHOS 2.9 3.8 3.1   < > 3.5    < > = values in this interval not displayed.       CBC -   Recent Labs   Lab Test 04/14/21  0640 04/12/21  0700 04/07/21  1535 04/05/21  0809   WBC 6.0 8.1  --  7.1   HGB 9.4* 9.6* 10.3* 11.7*    189 198 204       LFTs -   Recent Labs   Lab Test 04/14/21  0640 04/13/21  0650 04/12/21  0700 04/07/21  0740 04/07/21  0740 04/06/21  0740 04/01/21  0717 04/01/21  0717   ALKPHOS  --   --  41  --  37* 45  --  42   BILITOTAL  --   --  0.4  --  0.5 0.7  --  0.4   ALT  --   --  11  --  10 11  --  24   AST  --   --  13  --  15 20  --  18   PROTTOTAL  --   --  4.9*  --  5.3*  --   --  5.9*   ALBUMIN 2.1* 2.0* 2.0*   < > 2.2* 2.4*   < > 2.6*    < > = values in this interval not displayed.       Iron Panel - No lab results found.      Imaging:  All imaging studies reviewed by me.     Current Medications:    sodium chloride 0.9%  250 mL Intravenous Once in dialysis     sodium chloride 0.9%  300 mL Hemodialysis Machine Once     aspirin  81 mg Oral Daily     atorvastatin  80 mg Oral QPM     clopidogrel  75 mg Oral Daily     gelatin absorbable  1 each Topical During Hemodialysis (from stock)     heparin ANTICOAGULANT  5,000 Units Subcutaneous Q8H     heparin lock flush  5-10 mL Intracatheter Q24H     insulin aspart  1-12 Units Subcutaneous Q4H     isosorbide mononitrate  30 mg Oral Once per day on Sun Mon Wed Fri     lipids  250 mL Intravenous Q24H     lisinopril  2.5 mg Oral Once per day on Sun Mon Wed Fri     metoprolol tartrate  25 mg Oral 2 times per day on Sun Mon Wed  Fri     - MEDICATION INSTRUCTIONS -   Does not apply Once     pantoprazole (PROTONIX) IV  40 mg Intravenous Daily with breakfast       dextrose       parenteral nutrition - ADULT compounded formula       parenteral nutrition - ADULT compounded formula 75 mL/hr at 04/14/21 0038     - MEDICATION INSTRUCTIONS -       Ana Paula Tolbert PA-C

## 2021-04-14 NOTE — PLAN OF CARE
VSS on RA. Up with assist x2 with gait belt/walker. Up to chair x1. Refusing repositioning assistance. Pain controlled with Prn oxycodone and simethicone. Abd midline stapled and intact. Ileostomy intact with minimal output, catheter placed into stoma by MD. BAY to LIS, clamped with meds. NPO. Nausea managed with PRN IV zofran x1. Right arm AV fistula +thrill/+bruit, had small dialysis run today. Q4hr BG monitoring, sliding scale insulin given per orders. Left arm PICC infusing TPN, other lumen heparin locked. Sacral mepilex to coccyx, pressure injury scabbing and pink, no drainage. Continue with POC.

## 2021-04-14 NOTE — PLAN OF CARE
Neuro: A&Ox4.   Cardiac: VSS.  Hypertensive within parameters, using R leg for BPs.  Respiratory: Sating >95% on RA.  GI/: Anuric pt on HD. Dialysis done today with 2.5 L off. Ileostomy with 150ml out this shift.  Diet/appetite: NPO ex meds. NG to low intermittent suction. 50ml out this shift. 25ml NS replacement given.  Activity:  Assist of 2, up to chair with walker and gait belt  Pain: reported pain in abdomen relieved with dilaudid  Skin: See flowsheets  LDA's: PICC, AV fistula, Ileostomy, NG    Plan: Continue with POC. Notify primary team with changes.

## 2021-04-15 NOTE — PROGRESS NOTES
"  Nephrology Progress Note  04/15/2021    Assessment & Recommendations:  Alexei Blake is a 80 year old with PMH CAD s/p recent PCI, CHF, HTN, DM2, ESRD on HD, PAD, Afib on warfarin who presented with acute onset of abdominal pain during HD. Patient was found to have ischemic changes of right colon with portal venous gas per imaging. S/p colonic resection and ileostomy 3/31.    ESRD on HD  Runs TTS at [facility] under care of Dr Noble Monae via RUE AVF for 3.5 hrs. EDW 92 kg  - Plan on daily dialysis to work toward euvolemia    Lesion on R kidney: per CT 4/8, renal US 4/9 \"Right renal lesion measuring up to 1.8 cm. No definite  intralesional vascularity. Favored to represent a renal cyst however examination is limited secondary to shadowing. No left renal lesion identified. 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  - ileostomy with increasing output, though has had difficulty with constricted opening     BP: normotensive to mildly elevated intermittently; PTA imdur 30 mg qday, metoprolol XL 50 at bedtime, lisinopril  - Recommend stopping Imdur and starting isosorbide dinitrate 10 mg tid and hold daily until pt has had dialysis  - Recommend continuing lisinopril 2.5 mg qday for cardio benefits  - Recommend metoprolol tartrate 25 mg bid (instead of succinate) and hold before dialysis  - Recommend holding BP meds prior to dialysis; need to be able to pull some fluid while avoiding hypotension given ischemic bowel  - Pt hypotensive today without any UF; using albumin to attempt to maintain SBP > 100    Volume: EDW 92 kg, O2 95% RA; minimal UOP; per HD unit, pt's BP's dropped to 84/45 " during HD on 3/30 which had not been typical; but EDW had been lowered since pt had been hospitalized in January for CHF, pulm edema/volume overload (EDW was 101 kg in January, reduced to 91.5 kg as of 3/30, increased to 92 kg in light of hypotension on 3/30.)  - Current weight 105 kg, significant fluid gain from high volume TPN, now maximally condensed  - UF has been limited by hypotension and extreme caution to avoid another ischemic bowel event  - Will likely lose significant fluids via ileostomy once ileus fully resolves  - NG tube removed 4/15  - Evidence of volume overload on 4/8 CT with small ascites and diffuse body wall anasarca  - Likely 93 kg will be a good EDW, the key will be avoiding hypotension while also avoiding pulm edema/CHF exacerbation       BMD - Ca 9.4, alb 2.1. phos 2.9  - Will continue PTA hectorol 3 mcg with HD  - Hold PTA TUMS WM for now    Anemia: hgb 9.4 g/dL, does not appear to have anemia chronically or require Epo    Recommendations were communicated to primary team via note and phone    Ana Paula Tolbert PA-C  [phone]    Interval History :   Seen on dialysis, still having issues with hypotension which is limiting UF. Pt's weight is up ~ 12 kg from high volume TPN and issues with ileus thus minimal ileostomy output. Continuing to adjust BP meds to allow for UF as able. TPN now maximally condensed. NG tube removed this AM. Pt denies n/v, CP, SOB, chills currently    Review of Systems:   ROS neg as above    Physical Exam:   I/O last 3 completed shifts:  In: 1967.45 [P.O.:350; NG/GT:40]  Out: 1495 [Emesis/NG output:270; Stool:1225]   /72   Pulse 57   Temp 97.5  F (36.4  C) (Oral)   Resp 11   Ht 1.829 m (6')   Wt 105 kg (231 lb 7.7 oz)   SpO2 96%   BMI 31.39 kg/m       GENERAL APPEARANCE: alert, NAD  EYES: no scleral icterus, pupils equal  Pulmonary: CTA  CV: regular rhythm, normal rate   - Edema peripheral trace to 1+  GI: ileostomy   NEURO: face symmetric, AOx3   Access:  BABS [place]    Labs:   All labs reviewed by me  Electrolytes/Renal -   Recent Labs   Lab Test 04/15/21  0729 04/14/21  0640 04/13/21  0650 04/12/21  0700 04/09/21  0731 04/09/21  0731   * 132* 132* 133   < > 132*   POTASSIUM 3.8 3.7 3.4 3.4   < > 3.4   CHLORIDE 99 98 96 97   < > 97   CO2 22 24 24 26   < > 25   * 76* 119* 89*   < > 52*   CR 6.38* 5.02* 7.02* 5.82*   < > 4.44*   * 188* 172* 144*   < > 175*   AVNI 10.0 9.4 9.5 9.2   < > 8.8   MAG  --  1.8  --  1.9  --  1.7   PHOS 3.5 2.9 3.8 3.1   < > 3.5    < > = values in this interval not displayed.       CBC -   Recent Labs   Lab Test 04/14/21  0640 04/12/21  0700 04/07/21  1535 04/05/21  0809   WBC 6.0 8.1  --  7.1   HGB 9.4* 9.6* 10.3* 11.7*    189 198 204       LFTs -   Recent Labs   Lab Test 04/15/21  0729 04/14/21  0640 04/13/21  0650 04/12/21  0700 04/07/21  0740 04/07/21  0740 04/06/21  0740 04/01/21  0717 04/01/21  0717   ALKPHOS  --   --   --  41  --  37* 45  --  42   BILITOTAL  --   --   --  0.4  --  0.5 0.7  --  0.4   ALT  --   --   --  11  --  10 11  --  24   AST  --   --   --  13  --  15 20  --  18   PROTTOTAL  --   --   --  4.9*  --  5.3*  --   --  5.9*   ALBUMIN 2.0* 2.1* 2.0* 2.0*   < > 2.2* 2.4*   < > 2.6*    < > = values in this interval not displayed.       Iron Panel - No lab results found.      Imaging:  All imaging studies reviewed by me.     Current Medications:    albumin human  50 g Intravenous Once in dialysis     aspirin  81 mg Oral Daily     atorvastatin  80 mg Oral QPM     clopidogrel  75 mg Oral Daily     gelatin absorbable  1 each Topical During Hemodialysis (from stock)     heparin ANTICOAGULANT  5,000 Units Subcutaneous Q8H     heparin lock flush  5-10 mL Intracatheter Q24H     insulin aspart  1-12 Units Subcutaneous Q4H     isosorbide mononitrate  30 mg Oral Once per day on Sun Mon Wed Fri     lipids  250 mL Intravenous Q24H     lisinopril  2.5 mg Oral Once per day on Sun Mon Wed Fri     metoprolol tartrate   25 mg Oral 2 times per day on Sun Mon Wed Fri     pantoprazole (PROTONIX) IV  40 mg Intravenous Daily with breakfast       dextrose       parenteral nutrition - ADULT compounded formula 52 mL/hr at 04/14/21 2014     - MEDICATION INSTRUCTIONS -       Ana Paula Tolbert PA-C

## 2021-04-15 NOTE — PLAN OF CARE
7C: PT session cancelled this AM secondary to pt at dialysis, will check back as schedule allows.

## 2021-04-15 NOTE — PROGRESS NOTES
Surgery Progress Note         Subjective:  Patient seen at bedside, small amount of liquid stool and gas in bag. Patient not able to undergoes dialysis d/t soft BP, concern for fluid overload. Minimal NGT output.    Objective:  Temp:  [95.6  F (35.3  C)-97.5  F (36.4  C)] 97.5  F (36.4  C)  Pulse:  [61-70] 61  Resp:  [16-18] 18  BP: (114-140)/(44-98) 114/86  SpO2:  [95 %-97 %] 96 %  I/O last 3 completed shifts:  In: 1967.45 [P.O.:350; NG/GT:40]  Out: 1495 [Emesis/NG output:270; Stool:1225]    Gen: Awake, alert,  NAD  Resp: NLB on RA  Abd: soft, nondistended, nontender, ileostomy with liquid dark brown stool, small amount of gas  Midline incision: c/d/I with staples  Ext: WWP, no edema    A/P: Alexei Kuar is an 80-year-old male with history of CAD s/p recent stent on clopidogrel, CHF with EF 25%, history of stroke with left leg deficit, atrial fibrillation on warfarin, ESRD dependent upon dialysis, and DM type II, who is POD 13 from exploratory laparotomy with right hemicolectomy, ileostomy and mucous fistula.  Doing well. Course c/b postop ileus, awaiting consistent ROBF.     - Tight fascia around stoma, will continue to intubate daily and prn  - Continue ASA and plavix, hold warfarin.    - Cardiology consult to determine med needs for discharge given triple therapy  - Antihypertensives held on dialysis days per Nephrology.   - Heparin ppx   - NGT removed    - Continue TPN for moderate malnutrition, adjustment to volume per Nephro and Pharm  - PPI   - Keep midline staples, evaluate in another week for removal  - HD Tu/Th/Sat  - Discharge home pending resolution of ileus    Seen and discussed with chief resident Dr. Jennifer Henley MD   General Surgery PGY2    Lori Silvestre MD  PGY-1 Surgery

## 2021-04-15 NOTE — PLAN OF CARE
Inconsistent BP, OVSS on RA. Not OOB this shift, went to dialysis most of day. Aox4. NG removed early this AM by team. Denies nausea. NPO ex meds. Ileostomy intact with moderate output. Abd midline stapled and intact, mild erythema. Right arm AV fistula +thrill/+bruit, dressing CDI. Left arm PICC infusing TPN, other lumen heparin locked. Nose pressure injury cares completed. On pulsate mattress. Continue with POC.

## 2021-04-15 NOTE — PROGRESS NOTES
HEMODIALYSIS TREATMENT NOTE    Date: 4/15/2021  Time: 2:47 PM    Data:  Pre Wt: 93 kg (205 lb 0.4 oz)   Desired Wt: 101.5 kg   Ultrafiltration - Post Run Net Total Removed (mL): 1700 mL  Vascular Access Status: HANSEL AVFistula : patent  Dialyzer Rinse: Streaked, Moderate  Total Blood Volume Processed: 104 L   Total Dialysis (Treatment) Time: 4.0 hrs  Dialysate Bath: K 3, Ca 2.25, Na bath: 138, Bicarb: 32  Heparin: None    Lab:   No    Assessment:  Positive bruit and thrill on HANSEL AVF.  Pre run K/Ca: 3.7/ 9.4, BUN/Cr: 76 / 5.02, Alb: 2.1, Hgb/Hct: 9.3/ 28.4   HB S Ag and Ab:(-)/ 99.55 at 4-1-2021      Interventions:  Patient dialysis via HANSEL AVF with 15 G fistula needles. Initiated HD with 5% albumin priming. Reached BFR/ DFR to 450 / 600 ml/mins. After initiated HD. BP down to 74/57 mmHg , Notified PA and gave 25% albumin 25g/ 100 ml for pressure support. Tried to keep SBP above 105 mmHg during HD run. Rechecked /72 mmHg. But BP down again to 75/57 mmHg. MUF on and gave another 25% albumin 50 g/ 200 ml IV.  Readjusted BP cuff and decreased UF goal to 1.7 kg off. Stable V/S and tolerable for 4 hrs run with 1.7 kg off. Finished HD with rinse back, decanulated site held for 10 mins.      Plan:    Next run per renal team.

## 2021-04-15 NOTE — PROGRESS NOTES
WO Nurse Inpatient [facility]   WO Nurse Inpatient Adult     Re Assessment   Assessment of new end Ileostomy Stoma complication(s) edema   Mucocutaneous junction; intact   Peristomal complication(s) none   Pouch wear time:3-4 days,   Following today's visit:Patient / is  able to demonstrate;       1. How to empty their pouch? Yes- demonstrated on 4/1 and return demo      2. How to change their pouch?  No- demonstrated on 4/1 (for patient) and 4/5 (for spouse)      3. How to read and record intake and output correctly? No- demonstrated on 4/1    Psychosocial- His wife had an ostomy previously about 8 years ago. She watched a pouch change at bedside 4/5 and stated that she was starting to remember how to do it.     4/12: patient still with NGT and ileus with nausea, minimal output in pouch which is intact so did not complete any ostomy education today, patient c/o pain to nose at time of assessment, NGT stanley changed and pressure injury found   4/15:  Pt declined hands on participation with ostomy teaching    Objective data:  Patient history according to medical record: Alexei Kaur is an 80-year-old gentleman with numerous comorbidities including coronary artery disease with a recent stent on Plavix, end stage renal disease on hemodialysis, on warfarin who presented with abdominal pain and signs symptoms consistent with ischemia of the ascending colon. S/p ex-lap, open right hemicolectomy, end ileostomy and mucus fistula.  Current Diet/Nutrition: Orders Placed This Encounter      NPO for Medical/Clinical Reasons Except for: Meds     TPN no   I/O last 3 completed shifts:  In: 1741.9 [P.O.:350; I.V.:30]  Out: 3625 [Emesis/NG output:300; Other:1700; Stool:1625]  Labs:    Recent Labs   Lab 04/15/21  0729 04/14/21  0640 04/12/21  0700 04/12/21  0700   ALBUMIN 2.0* 2.1*   < > 2.0*   HGB  --  9.4*  --  9.6*   INR  --   --   --  1.29*   WBC  --  6.0  --  8.1    < > = values in this interval not displayed.  "       Physical Exam:  Current pouching system:Mitchel and 2pcs flat CTF with 2\" barrier ring placed 3 days ago with minimal melting  Reason for pouch change today: ostomy education and routine schedule  Stoma appearance: viable, healthy, normal-appearing, pink, moist, edematous and protruberant  Stoma size; 32 mm ,    Peristomal skin: intact  Stoma output :brown and pasty   Abdominal  Assessment  distended , NG still in place? Yes   Surgical Site: open to air  Pain: Dull ache  Is patient still on a PCA No     Interventions:  Patient's chart evaluated.  Focus of today's visit: pouch change demonstration    Participant of teaching session today patient   Orders: Written  Change made with ostomy management today: No-  continue 2 pc flat CTF with 2\" barrier ring due to need for red miles   Patient/family: lethargic and observing  Supplies:Gathered and at bedside    Plan:  Learning needs: pouch change return demonstration, output measurement and discharge instructions  Preparation for discharge: Discussed how/ where to order supplies,    Previously completed: Prepared for discharge to home with homecare, Prescriptions or note left on chart for MD to sign/complete    Needs- Supplies ordered, Registered for samples from , Discussed making a WO Nurse outpatient appointment upon discharge  Recommend home care? yes    Discussed plan of care with Patient and Nurse  Nursing to notify the Provider(s) and re-consult the WOC Nurse if new ostomy concerns or discharge planned before next planned WOC visit.    WOC Nurse will return: M/Th      WO Nurse Inpatient Pressure Injury Assessment   Reason for consultation: Evaluate and treat coccyx and nose      ASSESSMENT  Pressure Injury: on sacrum , hospital acquired ,   Pressure Injury is Stage 2   Contributing factor of the pressure injury: shear and immobility  Status: stable,  evolving,     Pressure injury: on R nare, hospital acquired   Medical device related " injury due to NGT   Pressure injury is stage 2  Status: off loaded, improving      TREATMENT PLAN  Sacrum wound: Every third day cleanse with microKlenz and dry, apply Cavilon no sting barrier film to skin around wound and let dry, then place sacral mepilex.     R nare: BID cleanse with saline and dry, apply thin layer of vaseline to wound bed and leave JUMA,      Orders Reviewed and Updated  [facility] Nurse follow-up plan:weekly  Nursing to notify the Provider(s) and re-consult the WO Nurse if wound(s) deteriorates or new skin concern.    Patient History  According to provider note(s):  Patient history according to medical record: Alexei Kaur is an 80-year-old gentleman with numerous comorbidities including coronary artery disease with a recent stent on Plavix, end stage renal disease on hemodialysis, on warfarin who presented with abdominal pain and signs symptoms consistent with ischemia of the ascending colon. S/p ex-lap, open right hemicolectomy, end ileostomy and mucus fistula.    Objective Data  Containment of urine/stool: Ileostomy pouch    Current Diet/ Nutrition:  Orders Placed This Encounter      NPO for Medical/Clinical Reasons Except for: Meds      Output:   I/O last 3 completed shifts:  In: 1741.9 [P.O.:350; I.V.:30]  Out: 3625 [Emesis/NG output:300; Other:1700; Stool:1625]    Risk Assessment:   Sensory Perception: 3-->slightly limited  Moisture: 3-->occasionally moist  Activity: 3-->walks occasionally  Mobility: 3-->slightly limited  Nutrition: 3-->adequate  Friction and Shear: 1-->problem  Dheeraj Score: 16      Labs:   Recent Labs   Lab 04/15/21  0729 04/14/21  0640 04/12/21  0700 04/12/21  0700   ALBUMIN 2.0* 2.1*   < > 2.0*   PREALB  --   --   --  14*   HGB  --  9.4*  --  9.6*   INR  --   --   --  1.29*   WBC  --  6.0  --  8.1    < > = values in this interval not displayed.       Physical Exam  Skin inspection: focused sacrum        Wound Location:  sacrum  Date of Photos: 4/5 and 4/15  Wound  History: noted by RN  AM , sacral mepilex was in place at time of assessment   Measurements (length x width x depth, in cm) 2.4 cm x 1.7 cm  x  0.2 cm   Wound Base:  Devitalized grey epithelium that is sloughing, revealing new epithelium   Tunneling N/A  Undermining N/A  Palpation of the wound bed: normal   Periwound skin: erythema- blanchable  Color: red  Temperature: normal   Drainage:, none  Description of drainage: none  Odor: none  Pain: denies , none     Wound Location: R nare      Date of last Photo -4/15/21  Wound History: patient c/o pain to nose on during WOC assessment of ostomy and tube securement was changed and wound noted to tip of nare.  NG tube removed earlier today   Measurements (length x width x depth, in cm)0.3cm x 0.3cm x 0.1cm   Wound Base:  100 % dermis  Tunneling N/A  Undermining N/A  Palpation of the wound bed: normal   Periwound skin: erythema- blanchable  Color: red  Temperature: normal   Drainage:,none  Odor: none  Pain: severe     Interventions  Current support surface: Standard  Low air loss mattress  Current off-loading measures: Foam padding and Pillows  Repositioning aid: Pillows  Visual inspection of wound(s) completed   Wound Care: was done per plan of care.  Supplies: at bedside and floor stock  Educated provided: importance of repositioning and plan of care  Education provided to: patient  and nurse  Discussed importance of:repositioning every 2 hours and off-loading pressure to wound  Discussed plan of care with Patient and Nurse        [facility]     Name: Alexei Blake : 1940  Date: 2021    To order your ostomy supplies    The ostomy Supplier needs this supply list  to process your order. You will need to fax/deliver this list, along with your Insurance information. Your home care nurse can assist with this process.             List of Ostomy Distributors      [facility]  Ph. [phone] Fax # [phone]  [facility]   Ph. [phone]  [facility]   Ph. [phone]  [facility]   Ph. [phone]  Or Call your insurance provider for their preferred supplier    Your Medical Supplier will need your surgeon's name, phone and fax number    Clinic:                     Phone                            Fax  General Surgery:   [phone]  Verbal Order for ostomy supplies for 1 Month per:       Sumaya Saenz RN, CWOCN     Authorizing MD: Adrien Medina MD    Change pouch :  Three times a week  Quantity of pouches: 20 pouches/month     Request the following supplies:      Mitchel    1 piece flat fecal with filter #8331    Accessories  2  barrier ring #7675     Adapt powder #7906    No sting film barrier # 3345                          M-9 Spray room deodorizer #7732                                  Change your pouch twice a week, more often if leaking.    If you are cutting a hole in the wafer of your pouch, recheck stoma size and adjust pouch opening as needed every week    . Call the Ostomy Nurse at [facility]       [address] [place] [address], [place] : [phone]   Schedule a follow-up visit in 4 to 6 weeks after your surgery, sooner if having problems Bring a complete set of pouch-changing supplies to this visit      Problems you should Report  - The stoma turns blue or darker in color.  - Cuts or sores around the stoma.  - Red, raw or painful skin around the stoma.  - Any bulging of the skin around the stoma.  - A pouch that leaks every day.  - Problems making the right size hole in the pouch wafer.    Please call with any questions or concerns.

## 2021-04-15 NOTE — PLAN OF CARE
POD 14. AVSS. Denied pain overnight and nausea, NPO. NG to LIS with small amt of output. BG Q4H. UP with 1-2 A. Right fistula patent, +bruit, +thrill. L PICC with TPN at 52 ml/hr and lipids at 20.8 ml/hr. Ileostomy with minimal watery dark brown output. Mepilex to coccyx, site blanchable, no drainage. Small abrasion on LUE, covered with bandage. Surgical incision on abdomen CDI with staples.  Continue POC

## 2021-04-15 NOTE — PROGRESS NOTES
VSS on RA. Pt remained in bed this shift, stated he wanted to rest after frustrating wait down in dialysis unit previous shift. Right fistula patent, +bruit, +thrill. L PICC continues to infuse TPN, rate reduced to 30 ml/hr for concerns of fluid overload by pharmacy. New bag hung at 52 ml/hr @ 2000, lipids remains at 20.8 ml/hr. Remains NPO, small sips and ice chips with oral medications. Pain managed with PO oxycodone. Pt reporting intermittent nausea, managed with zofran x1 but compazine added as backup for breakthrough nausea. One event of dry heaving but no emesis noted. NG with LIS, output 50 cc this shift. Ileostomy output 1050cc, watery and dark brown. Mepilex to coccyx changes, site remains blanchable around pressure site, no drainage. Small abrasion on LUE, covered with bandage, minor bleeding noted at beginning of shift. Small black contusion noted on L toe, pt stated this is not new. Surgical incision on abdomen CDI with staples.  and 118. Continue current POC.

## 2021-04-16 NOTE — PROGRESS NOTES
HEMODIALYSIS TREATMENT NOTE    Date: 4/16/2021  Time: 12:10 PM    Data:  Pre Wt: 98.1 kg (216 lb 4.3 oz)   Desired Wt: 96.1 kg   Post Wt: 98.1 kg (216 lb 4.3 oz)  Weight change: 0 kg  Ultrafiltration - Post Run Net Total Removed (mL): 0 mL  Vascular Access Status: Fistula - Right upper arm  Dialyzer Rinse: Clear  Total Blood Volume Processed: 0 L   Total Dialysis (Treatment) Time: 1.5 hours   Dialysate Bath: N/A  Heparin: None    Lab:   No    Interventions/Assessment:  Unsuccessful attempt for UF only run. BPs very soft taken both on the leg and the left lower arm. Started treatment with albumin 5% prime and a low UF goal. Albumin 25% 12.5g given at beginning of treatment. BPs did not improve over the first hour. A second dose of albumin 25% 12.5g given, again without improvement in BP. Treatment was ended early with net fluid removal = zero. BP after rinseback was 94/42, MAP 64. Patient stood on scale after treatment and weighed 98.1kg. Hand off report given to floor nurse.     Plan:    Next HD per renal team.

## 2021-04-16 NOTE — PROGRESS NOTES
"  Nephrology Progress Note  04/16/2021    Assessment & Recommendations:  Alexei Blake is a 80 year old with PMH CAD s/p recent PCI, CHF, HTN, DM2, ESRD on HD, PAD, Afib on warfarin who presented with acute onset of abdominal pain during HD. Patient was found to have ischemic changes of right colon with portal venous gas per imaging. S/p colonic resection and ileostomy 3/31.    ESRD on HD: Runs TTS at [facility] under care of Dr Noble Monae via RUE AVF for 3.5 hrs. EDW 92 kg  - Attempted UF only run today but didn't pull any fluid given hypotension  - Plan on regular run tomorrow and likely will continue on TTS schedule  - No heparin given recent GI surgery    Lesion on R kidney: per CT 4/8, renal US 4/9 \"Right renal lesion measuring up to 1.8 cm. No definite  intralesional vascularity. Favored to represent a renal cyst however examination is limited secondary to shadowing. No left renal lesion identified. 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  - ileostomy with increasing output, though has had difficulty with constricted opening     BP: soft; PTA imdur 30 mg qday, metoprolol XL 50 at bedtime, lisinopril  - Imdur stopped and started isosorbide dinitrate 10 mg tid    - Continue lisinopril 2.5 mg qday for cardio benefits  - Recommend metoprolol tartrate 25 mg bid (instead of succinate)   - Hold all BP meds before dialysis    Volume: EDW 92 kg, O2 95% RA; minimal UOP; per HD unit, pt's BP's dropped to 84/45 during HD on 3/30 which had not been typical; but EDW had been lowered since pt had been hospitalized in January for CHF, pulm edema/volume overload (EDW was 101 kg in January, " reduced to 91.5 kg as of 3/30, increased to 92 kg in light of hypotension on 3/30.)  - TPN has been condensed  - UF has been limited by hypotension and extreme caution to avoid another ischemic bowel event  - Will likely lose significant fluids via ileostomy once ileus fully resolves  - NG tube removed 4/15  - Evidence of volume overload on 4/8 CT with small ascites and diffuse body wall anasarca  - Likely 95-97 kg will be a good EDW, the key will be avoiding hypotension/bowel ischemia while also avoiding pulm edema/CHF exacerbation       BMD - Ca 9.1, alb 2.1. phos 2.4  - Will continue PTA hectorol 3 mcg with HD  - Hold PTA TUMS WM for now    Anemia: hgb 9.4 g/dL, does not appear to have anemia chronically or require Epo    Recommendations were communicated to primary team via note     Ana Paula Tolbert PA-C  [phone]    Interval History :   Seen on dialysis, brought patient down for UF run but unable to pull any fluid due to hypotension. Ostomy output is starting to increase (has ileus) and pt says he is feeling much better now with NG tube out, feels more euvolemic as well. Hopefully TPN can be stopped soon. Denies CP, SOB, chills, n/v    Review of Systems:   ROS neg as above    Physical Exam:   I/O last 3 completed shifts:  In: 1510.16 [I.V.:50]  Out: 2250 [Emesis/NG output:100; Other:1700; Stool:450]   BP 94/42   Pulse 62   Temp 97.7  F (36.5  C) (Oral)   Resp 20   Ht 1.829 m (6')   Wt 98.1 kg (216 lb 4.3 oz)   SpO2 94%   BMI 29.33 kg/m       GENERAL APPEARANCE: alert, NAD  EYES: no scleral icterus, pupils equal  Pulmonary: CTA  CV: regular rhythm, normal rate   - Edema peripheral trace to 1+  GI: ileostomy   NEURO: face symmetric, AOx3   Access: RUE AVF    Labs:   All labs reviewed by me  Electrolytes/Renal -   Recent Labs   Lab Test 04/16/21  0653 04/15/21  0729 04/14/21  0640 04/12/21  0700 04/12/21  0700    132* 132*   < > 133   POTASSIUM 3.6 3.8 3.7   < > 3.4   CHLORIDE 101 99 98   < > 97    CO2 26 22 24   < > 26   BUN 69* 107* 76*   < > 89*   CR 4.21* 6.38* 5.02*   < > 5.82*   * 153* 188*   < > 144*   AVNI 9.1 10.0 9.4   < > 9.2   MAG 1.8  --  1.8  --  1.9   PHOS 2.4* 3.5 2.9   < > 3.1    < > = values in this interval not displayed.       CBC -   Recent Labs   Lab Test 04/14/21  0640 04/12/21  0700 04/07/21  1535 04/05/21  0809   WBC 6.0 8.1  --  7.1   HGB 9.4* 9.6* 10.3* 11.7*    189 198 204       LFTs -   Recent Labs   Lab Test 04/16/21  0653 04/15/21  0729 04/14/21  0640 04/12/21  0700 04/12/21  0700 04/07/21  0740 04/07/21  0740 04/06/21  0740 04/01/21  0717 04/01/21 0717   ALKPHOS  --   --   --   --  41  --  37* 45  --  42   BILITOTAL  --   --   --   --  0.4  --  0.5 0.7  --  0.4   ALT  --   --   --   --  11  --  10 11  --  24   AST  --   --   --   --  13  --  15 20  --  18   PROTTOTAL  --   --   --   --  4.9*  --  5.3*  --   --  5.9*   ALBUMIN 2.6* 2.0* 2.1*   < > 2.0*   < > 2.2* 2.4*   < > 2.6*    < > = values in this interval not displayed.       Iron Panel - No lab results found.      Imaging:  All imaging studies reviewed by me.     Current Medications:    aspirin  81 mg Oral Daily     atorvastatin  80 mg Oral QPM     clopidogrel  75 mg Oral Daily     gelatin absorbable  1 each Topical During Hemodialysis (from stock)     heparin ANTICOAGULANT  5,000 Units Subcutaneous Q8H     heparin lock flush  5-10 mL Intracatheter Q24H     insulin aspart  1-12 Units Subcutaneous Q4H     isosorbide dinitrate  10 mg Oral TID     lipids  250 mL Intravenous Q24H     lisinopril  2.5 mg Oral Once per day on Sun Mon Wed Fri     metoprolol tartrate  25 mg Oral 2 times per day on Sun Mon Wed Fri     pantoprazole (PROTONIX) IV  40 mg Intravenous Daily with breakfast       dextrose       parenteral nutrition - ADULT compounded formula       parenteral nutrition - ADULT compounded formula 52 mL/hr at 04/15/21 5609     - MEDICATION INSTRUCTIONS -       Ana Paula Tolbert PA-C

## 2021-04-16 NOTE — PROGRESS NOTES
Care Management Follow Up    Length of Stay (days): 17  Expected Discharge Date: 04/19/21     Concerns to be Addressed: Discharge planning    Patient plan of care discussed at interdisciplinary rounds: Yes    Anticipated Discharge Disposition: Transitional Care     Anticipated Discharge Services: Outpatient Dialysis  [facility]osal[facility] Chowdary   [address], [address]  [place] [address]  P: [phone]  Tuesday, Thursday, Saturday  Chair time: 5:30 AM (3.5 hour run)    Pt was given a [employer] application and a list of transportation companies.  His spouse typically transports him to dialysis and may be able to continue to do so pending pt's ability to get in/ out of her vehicle safely.    Patient/family educated on Medicare website which has current facility and service quality ratings: yes  Education Provided on the Discharge Plan:  yes  Patient/Family in Agreement with the Plan: yes    Referrals Placed by CM/SW:  Pt identified the following preferences:    [facility]  [address] [place] [address]  [phone]  -E-referral  sent.  SW spoke with admissions today, they have accepted pt pending he is able to wean off TPN (they do not accept TPN) and pending their bed availability.  At this time, they anticipate having an available bed on Monday.    [facility]  [address] [place] [address]  P: [phone]  F: [phone]  -E-referral sent.  They do not take TPN, will need to send a new referral once off of TPN.    [facility]  [address] [place] [address]  [phone]  -E-referral sent.  They do not take TPN, will need to send a new referral once off of TPN.    [facility]  [address] [place], [place] [id number] [phone]  -E-referral sent.      [facility]   [address] [place], [place] [id number] [phone]  -E-referral sent.  S    Private pay costs discussed:  transportation costs    Additional Information:  Pt is still on TPN, may be ready to discharge as soon as Monday if able to wean off of this/ advance diet.  SW followed up with [facility], they likely can accept pt on Monday if he weans off of TPN.  They requested an updated on Monday morning.    RAMO Stauffer, [facility]  Phone:  [phone]   Pager:  [phone]

## 2021-04-16 NOTE — PROGRESS NOTES
VSS on RA, borderline hypotensive but asymptomatic (held isordil). Pt continues to refuse ambulation despite encouragement from nursing staff. R fistula remains patent with +bruit, + thrill, no breakthrough bleeding from dialysis. PICC continues to infuse TPN/ lipids as ordered, both caps changed. Remains NPO. Pt resting comfortably in bed, repositioning self as needed in bed. WOCN assessed sacral ulcer at beginning of shift, no change in status. WOCN changed ileostomy. Ulcer in R nares JUMA, cleansed this shift. Surgical incision on abdomen JUMA, no drainage or concern of infection.  and 123. Ileostomy exhibiting minimal pink output this shift, ~10mL which remains in bag. Continue POC.

## 2021-04-16 NOTE — PROGRESS NOTES
Surgery Progress Note         Subjective:  Patient seen at bedside, small amount of liquid stool and gas in bag. Dialysis this morning. Feels much better after having NGT removed. +gas in bag per patient.     Objective:  Temp:  [96  F (35.6  C)-99.7  F (37.6  C)] 98.3  F (36.8  C)  Pulse:  [57-74] 66  Resp:  [10-21] 16  BP: ()/() 180/51  SpO2:  [92 %-100 %] 95 %  I/O last 3 completed shifts:  In: 1510.16 [I.V.:50]  Out: 2250 [Emesis/NG output:100; Other:1700; Stool:450]    Gen: Awake, alert,  NAD  Resp: NLB on RA  Abd: soft, nondistended, nontender, ileostomy with liquid dark brown stool, small amount of gas  Midline incision: c/d/I with staples  Ext: WWP, no edema    A/P: Alexei Kaur is an 80-year-old male with history of CAD s/p recent stent on clopidogrel, CHF with EF 25%, history of stroke with left leg deficit, atrial fibrillation on warfarin, ESRD dependent upon dialysis, and DM type II, who is POD 13 from exploratory laparotomy with right hemicolectomy, ileostomy and mucous fistula.  Doing well. Course c/b postop ileus, awaiting consistent ROBF.     - advance to clears today  - Tight fascia around stoma, will continue to intubate daily and prn  - Continue ASA and plavix, hold warfarin.    - Cardiology consult to determine med needs for discharge given triple therapy  - Antihypertensives held on dialysis days per Nephrology.   - Heparin ppx     - Continue TPN for moderate malnutrition, adjustment to volume per Nephro and Pharm  - PPI   - Keep midline staples, evaluate in another week for removal  - HD Tu/Th/Sat  - Discharge home pending resolution of ileus    Seen and discussed with chief resident Dr. Jennifer Ness MD  PGY-1

## 2021-04-16 NOTE — PLAN OF CARE
PT 7C: cancel- pt reported feeling of back and neck stiffness and general fatigue. PT educated on benefits of movement to improve stiffness. Pt declined to work with PT. Will re-schedule per POC.

## 2021-04-16 NOTE — PLAN OF CARE
BP's can be inconsistent but stable this shift, other VSS. Denies pain and nausea overnight, NPO except with meds. BG q4h. Up with 2A, did not get OOB this shift. PICC with TPN/lipids and HL. Right arm AV fistula +thrill/+bruit, dressing CDI.  Ileostomy with minimal watery dark brown output. Mepilex to coccyx. Surgical incision on abdomen CDI with staples, slight erythema.

## 2021-04-17 NOTE — PLAN OF CARE
Pt afebrile. VSS. Pt sleeping between cares. TPN/IL as ordered. BS checks q 4hr= 145, 178 with s/s coverage. Pt denied any c/o's pain or nausea. Abd dist, hyper bs, incision staples reddened and healing. Ileostomy intact with scant amts liq stool. Heparin injxn site noted to be oozing blood. Pressure dressing applied with no further issue. Cont to monitor bowel status. Monitor labs closely. Monitor for s/s bleeding.

## 2021-04-17 NOTE — PLAN OF CARE
Pt intermittently hypotensive, otherwise AVSS on room air. Hemodialysis today, roughly 1.5 L taken off. 1 unit of albumin given. A+Ox4. Ambulating Ax2 w/ walker+gait belt. Pt refused side-to-side repositioning or OOB activity. Neck/lower back pain controlled with PRN oxy. L double PICC infusing continuous TPN @ 52 ml/hr, other lumen hep locked. Vascular access consulted for dressing change. ML incision intact, slightly reddened, JUMA. Coccyx pressure injury blanchable w/ mepilex dressing. R nares wound care completed w/ saline+vaseline. Ileostomy producing gas, small liquid stool. Not voiding (baseline). R hemodialysis fistula +thrill/+bruit, dressing CDI. Tolerating small amts of clears w/o nausea or vomiting. Blood sugars taken prior to meals & corrected via sliding scale. Will continue with POC.

## 2021-04-17 NOTE — PROGRESS NOTES
HEMODIALYSIS TREATMENT NOTE    Date: 4/17/2021  Time: 2:26 PM    Data:  Pre Wt: 99.6 kg (219 lb 9.3 oz)   Desired Wt: 97.61 kg   Ultrafiltration - Post Run Net Total Removed (mL): 1500 mL  Vascular Access Status: HANSEL AV Fistula  patent  Dialyzer Rinse: Streaked, Light  Total Blood Volume Processed: 90 L   Total Dialysis (Treatment) Time: 3.5 hrs  Dialysate Bath: K 3, Ca 2.25, Na bath: 138, Bicarb: 32  Heparin: None    Lab:   No    Assessment:  Positive bruit and thrill on HANSEL AVF.  Pre run K/Ca: 3.6/ 9.8, BUN/Cr: 99/ 5.8, Alb: 2.7, Hgb/Hct: 9.4/ 28.4  HB S Ag and Ab:(-)/ 99.55 at 4-1-2021  Dialysis consent signed at 3-    Interventions:  Patient dialyzed for 3 hrs 30 mins via HANSEL AVF with 15 G fistula needles. Initiated HD with 5% albumin priming. To Keep SBP above 100 mmhg during run.  Gave 25% albumin 25 g/ 100 ml for pressure support. Decreased UF goal from 2.0 kg to 1.5 kg d/t soft BP. Then stable and tolerable for rest of run. Finished HD with rinse back, decanulated site held for 10 mins.      Plan:    Next run per renal team.

## 2021-04-17 NOTE — PLAN OF CARE
Afebrile, inconsistent BP, OVSS on RA.  Alert and oriented x4. Denies pain or nausea.  Tolerating clear liquid diet, appetite poor.  Ileostomy patent with moderate dark watery output.  Midline with staples JUMA.  Right arm AV fistula +thrill/+bruit, dressing CDI. Left arm PICC infusing TPN, other lumen heparin locked.  Dialysis run today, no fluid removed due to low BP.  Continue plan of care.

## 2021-04-17 NOTE — PROGRESS NOTES
"  Nephrology Progress Note  04/17/2021         Assessment & Recommendations:   Alexei Blake is a 80 year old with PMH CAD s/p recent PCI, CHF, HTN, DM2, ESRD on HD, PAD, Afib on warfarin who presented with acute onset of abdominal pain during HD. Patient was found to have ischemic changes of right colon with portal venous gas per imaging. S/p colonic resection and ileostomy 3/31.Ileostomy with minimal output due to ileus and opening too small ( ?). Volume management is a difficult issue for this patient . Needs to challenge weight due to heart failure but with hypotension and subsequent bowel ischemia , that is a challenge .      ESRD on HD: Runs TTS at [facility] under care of Dr Noble Monae via RUE AVF for 3.5 hrs. EDW 92 kg    /45  Volume : weight 98.1 kg . EDW 92 kg  Electrolytes /acid base - no acute issues . K 3.6, CO2 23   Patient on TPN   NET + 1747 ml in 24 hrs . additionl 748 ml positive since midnight     -  HD done on Friday for clearence . Zero UF due to Hypotension   -  Volume management is a difficult issue for this patient . Needs to challenge weight due to heart failure but with hypotension and subsequent bowel ischemia , that is a challenge .   --  Plan today is to attempt 2 kg UF with strict BP parameters of 100 SBP. ALBUMIN prime ordered prn use   --  no heparin given recent GI bleed  -- continue TTS schedule   -- Likely 95-97 kg will be a good EDW, the key will be avoiding hypotension/bowel ischemia while also avoiding pulm edema/CHF exacerbation    Regarding BP management :  Patient currently on   lisinopril 2.5 mg daily  Metoprolol 25 mg twice daily on Sunday, Monday, Wednesday, Friday  Isosorbide 10 mg 3 times daily.  Instructions to hold prior to dialysis  -- would advise to reduce Imdur to 5 mg TID   -- Would advise to hold Lisinopril at least for the next few days        Lesion on R kidney: per CT 4/8, renal US 4/9 \"Right renal lesion measuring up to 1.8 cm. No definite " "intralesional vascularity. Favored to represent a renal cyst however examination is limited secondary to shadowing. No left renal lesion identified. The cystic or solid nature of this lesion could be confirmed with MRI.\"   - Per radiology, consider MRI.        Ischemic bowel s/p colonic resection and ileostomy creation 3/31 c/b ileus: Per HD unit, pt's BP's dropped to 84/45 during HD on 3/30 which had not been typical; but EDW had been lowered since pt had been hospitalized in January for pulm edema/volume overload.  - ileostomy with increasing output, though has had difficulty with constricted opening     BMD - Ca 9.1, alb 2.1. phos 2.4  - Will continue PTA hectorol 3 mcg with HD  - Hold PTA TUMS WM for now     Anemia: hgb 9.4 g/dL, does not appear to have anemia chronically or require Epo     Recommendations were communicated to primary team via note and verbal communication with on call surgery Resident (Pager [phone] )    Patient seen and staffed with Dr Maddison Cardenas MD, FACP  Nephrology Fellow   [facility]   Pager [phone]      Interval History :   Nursing and provider notes from last 24 hours reviewed.  BP low normal . Denies CP, SOB, chills, n/v. Denies any dizziness     Review of Systems:   I reviewed the following systems:  4 point ROS negative except for that mentioned in interval history   Physical Exam:   I/O last 3 completed shifts:  In: 748.87 [I.V.:10]  Out: 325 [Stool:325]   /45   Pulse 59   Temp 97  F (36.1  C) (Axillary)   Resp 9   Ht 1.829 m (6')   Wt 98.1 kg (216 lb 4.3 oz)   SpO2 96%   BMI 29.33 kg/m       GENERAL APPEARANCE: alert, NAD  EYES: no scleral icterus, pupils equal  Pulmonary: CTA  CV: regular rhythm, normal rate   - Edema peripheral trace to 1+  GI: ileostomy   NEURO: face symmetric, AOx3   Access: RUE AVF    Labs:   All labs reviewed by me  Electrolytes/Renal -   Recent Labs   Lab Test 04/17/21  0649 04/16/21  0653 04/15/21  0729 04/14/21  0640 " 04/12/21  0700 04/12/21  0700    135 132* 132*   < > 133   POTASSIUM 3.6 3.6 3.8 3.7   < > 3.4   CHLORIDE 100 101 99 98   < > 97   CO2 23 26 22 24   < > 26   BUN 99* 69* 107* 76*   < > 89*   CR 5.40* 4.21* 6.38* 5.02*   < > 5.82*   * 144* 153* 188*   < > 144*   AVNI 9.8 9.1 10.0 9.4   < > 9.2   MAG  --  1.8  --  1.8  --  1.9   PHOS 2.6 2.4* 3.5 2.9   < > 3.1    < > = values in this interval not displayed.       CBC -   Recent Labs   Lab Test 04/14/21  0640 04/12/21  0700 04/07/21  1535 04/05/21  0809   WBC 6.0 8.1  --  7.1   HGB 9.4* 9.6* 10.3* 11.7*    189 198 204       LFTs -   Recent Labs   Lab Test 04/17/21  0649 04/16/21  0653 04/15/21  0729 04/12/21  0700 04/12/21  0700 04/07/21  0740 04/07/21  0740 04/06/21  0740 04/01/21  0717 04/01/21  0717   ALKPHOS  --   --   --   --  41  --  37* 45  --  42   BILITOTAL  --   --   --   --  0.4  --  0.5 0.7  --  0.4   ALT  --   --   --   --  11  --  10 11  --  24   AST  --   --   --   --  13  --  15 20  --  18   PROTTOTAL  --   --   --   --  4.9*  --  5.3*  --   --  5.9*   ALBUMIN 2.7* 2.6* 2.0*   < > 2.0*   < > 2.2* 2.4*   < > 2.6*    < > = values in this interval not displayed.       Iron Panel - No lab results found.      Imaging:  All imaging studies reviewed by me.     Current Medications:    aspirin  81 mg Oral Daily     atorvastatin  80 mg Oral QPM     clopidogrel  75 mg Oral Daily     heparin ANTICOAGULANT  5,000 Units Subcutaneous Q8H     heparin lock flush  5-10 mL Intracatheter Q24H     insulin aspart  1-12 Units Subcutaneous Q4H     isosorbide dinitrate  10 mg Oral TID     lipids  250 mL Intravenous Q24H     lisinopril  2.5 mg Oral Once per day on Sun Mon Wed Fri     metoprolol tartrate  25 mg Oral 2 times per day on Sun Mon Wed Fri     pantoprazole (PROTONIX) IV  40 mg Intravenous Daily with breakfast       dextrose       parenteral nutrition - ADULT compounded formula 52 mL/hr at 04/16/21 1958     Ronald Lane MD

## 2021-04-17 NOTE — PLAN OF CARE
Afebrile, inconsistent BP, OVSS on RA.  Alert and oriented x4. Denies pain or nausea.  Tolerating clear liquid diet, appetite poor.  Ileostomy patent with moderate dark watery output.  Midline with staples JUMA.  Right arm AV fistula +thrill/+bruit, dressing CDI. Left arm PICC infusing TPN, other lumen heparin locked.  Dialysis run today, no fluid removed due to low BP.  .  Continue plan of care.

## 2021-04-17 NOTE — PROGRESS NOTES
Surgery Progress Note  04/17/2021       Subjective:  NAEO. Unable to remove fluid yesterday in HD due to low SBPs, but remains stable. Tolerating clears, no nausea/vomiting. Output from stoma slightly decreased.      Objective:  Temp:  [95.9  F (35.5  C)-97.9  F (36.6  C)] 97  F (36.1  C)  Pulse:  [58-68] 59  Resp:  [9-20] 9  BP: ()/() 107/45  SpO2:  [94 %-99 %] 96 %    I/O last 3 completed shifts:  In: 748.87 [I.V.:10]  Out: 325 [Stool:325]      Gen: Awake, alert, NAD  Resp: NLB on RA  Abd: soft, nondistended, nontender to palpation, stoma edematous with dark brown liquid stool in bag and moderate gas  Midline incision: c/d/I with staples  Ext: WWP, no edema     Labs:  Recent Labs   Lab 04/14/21  0640 04/12/21  0700   WBC 6.0 8.1   HGB 9.4* 9.6*    189       Recent Labs   Lab 04/17/21  0649 04/16/21  0653 04/15/21  0729 04/14/21  0640 04/12/21  0700 04/12/21  0700    135 132* 132*   < > 133   POTASSIUM 3.6 3.6 3.8 3.7   < > 3.4   CHLORIDE 100 101 99 98   < > 97   CO2 23 26 22 24   < > 26   BUN 99* 69* 107* 76*   < > 89*   CR 5.40* 4.21* 6.38* 5.02*   < > 5.82*   * 144* 153* 188*   < > 144*   AVNI 9.8 9.1 10.0 9.4   < > 9.2   MAG  --  1.8  --  1.8  --  1.9   PHOS 2.6 2.4* 3.5 2.9   < > 3.1    < > = values in this interval not displayed.        Assessment/Plan:   Alexei Kaur is an 80-year-old male with history of CAD s/p recent stent on clopidogrel, CHF with EF 25%, history of stroke with left leg deficit, atrial fibrillation on warfarin, ESRD dependent upon dialysis, and DM type II, who is POD 13 from exploratory laparotomy with right hemicolectomy, ileostomy and mucous fistula.  Doing well. Course c/b postop ileus, awaiting consistent ROBF.      - CLD today   - Tight fascia around stoma, will continue to intubate daily and prn  - Continue ASA and plavix, hold warfarin.               - Cardiology consult to determine med needs for discharge given triple therapy  - Antihypertensives  held on dialysis days per Nephrology.   - Heparin ppx     - Continue TPN for moderate malnutrition, adjustment to volume per Nephro and Pharm  - PPI   - Keep midline staples, evaluate daily   - HD Tu/Th/Sat  - Discharge home pending resolution of ileus      Seen, examined, and discussed with chief resident, who discussed with staff    Lori Silvestre MD  PGY-1 Surgery

## 2021-04-18 NOTE — PROGRESS NOTES
Surgery Progress Note  04/18/2021       Subjective:  NAEO. 1.5L taken off during HD yesterday. Slept well overnight. No complaints. No longer having discomfort at the stoma site.      Objective:  Temp:  [95.7  F (35.4  C)-97.7  F (36.5  C)] 97.5  F (36.4  C)  Pulse:  [53-69] 60  Resp:  [9-28] 18  BP: ()/() 124/27  SpO2:  [92 %-99 %] 96 %    I/O last 3 completed shifts:  In: 1703.96 [P.O.:220]  Out: 1675 [Other:1500; Stool:175]      Gen: Awake, alert, NAD  Resp: NLB on RA  Abd: soft, nondistended, nontender to palpation, stoma pink and edematous with dark brown liquid stool in bag, moderate gas  Incision: c/d/I with staples  Ext: WWP, no edema     Labs:  Recent Labs   Lab 04/14/21  0640 04/12/21  0700   WBC 6.0 8.1   HGB 9.4* 9.6*    189       Recent Labs   Lab 04/18/21  0736 04/17/21  0649 04/16/21  0653 04/14/21  0640 04/14/21  0640 04/12/21  0700 04/12/21  0700    133 135   < > 132*   < > 133   POTASSIUM 3.5 3.6 3.6   < > 3.7   < > 3.4   CHLORIDE 99 100 101   < > 98   < > 97   CO2 25 23 26   < > 24   < > 26   BUN 70* 99* 69*   < > 76*   < > 89*   CR 3.99* 5.40* 4.21*   < > 5.02*   < > 5.82*   * 148* 144*   < > 188*   < > 144*   AVNI 9.4 9.8 9.1   < > 9.4   < > 9.2   MAG  --   --  1.8  --  1.8  --  1.9   PHOS 2.4* 2.6 2.4*   < > 2.9   < > 3.1    < > = values in this interval not displayed.         Assessment/Plan:   Alexei Kaur is an 80-year-old male with history of CAD s/p recent stent on clopidogrel, CHF with EF 25%, history of stroke with left leg deficit, atrial fibrillation on warfarin, ESRD dependent upon dialysis, and DM type II, who is POD 13 from exploratory laparotomy with right hemicolectomy, ileostomy and mucous fistula.  Doing well. Course c/b postop ileus, awaiting consistent ROBF.     - Continue CLD  - Tight fascia around stoma, will continue to intubate daily and prn  - Continue ASA and plavix, hold warfarin.             - will resume coumadin at discharge with  plan for close f/u with Cardiology   - Antihypertensives held on dialysis days per Nephrology.   - Continue TPN for moderate malnutrition, adjustment to volume per Nephro and Pharm  - PPI   - Keep midline staples, evaluate daily   - HD Tu/Th/Sat  - Heparin ppx       Seen, examined, and discussed with chief resident, who will discuss with staff.    Lori Silvestre MD  PGY-1 Surgery   (nights/weekends/holidays page job code [phone])

## 2021-04-18 NOTE — PLAN OF CARE
Refer to previous BOB note regarding hypotension and inconsistent BP readings. Afibrile. Denies pain. A+Ox4, arouses to voice. Did not get OOB this shift and also refusing repositioning. Coccyx pressure wound red and blanchable, protected with mepilex, on pulsate mattress. R nares pressure wound care completed (saline wash and Vaseline). Double lumen PICC infusing continious TPN @ 52 ml/hr (purple), red lumen TKO. Ileostomy producing small amts of dark green/brown liquid output. Tolerating clear liquids w/o nausea or vomiting. Blood sugars taken prior to meals & corrected via sliding scale. Not urinating (baseline). R hemodialysis fistula +thrill/+bruit, dressing CDI. Will continue to closely monitor pt's status and possibly give albumin IV. Will continue with POC.

## 2021-04-18 NOTE — PROGRESS NOTES
4/18 1130- Provider Notification     D (data): Pt hypotensive. Denies dizziness, SOB, feeling lightheaded, generalized discomfort. Only reports generalized fatigue. Afebrile.   L leg BP 64/47  L forearm 88/13   Blood pressures very inconsistent despite varying cuff sizes, locations, and machine.   Has wounds on tips of middle finger on both right and left hand. L foot middle toe still has a small black scab. Left arm appears to be more edematous compared to this morning, along with increasing redness along lateral side.     A (action): On call provider Dr. Catherine jones and assessed patient at the bedside. Float ICU nurse also helped trouble shoot blood pressure inconsistencies. Lactic acid and CBC ordered.     R (response): 250 ml NS bolus administered over 1 hour. Please recheck blood pressure, if pt still hypotensive, page MD for possible need for albumin. Lactic acid pending. Will continue to closely monitor pt.

## 2021-04-18 NOTE — PROGRESS NOTES
Patient's VS were obtained, please see flow sheet. Patient denies dizziness, lightheadedness, discomfort and shortness of breath. Informed primary nurse - Bella.

## 2021-04-18 NOTE — PLAN OF CARE
Inconsistent BP's, other VSS. Denies pain and nausea overnight, on a clear liquid diet. PICC with TPN/lipids and HL. Up with 2A, walker and GB, did not get OOB this shift. BG q4h. Right arm AV fistula +thrill/+bruit, dressing CDI. Ileostomy with minimal loose dark output. R nare wound CDI. Midline incision JUMA, some redness noted. Pt refused to reposition this shift.   Continue POC

## 2021-04-19 NOTE — PROGRESS NOTES
Care Management Follow Up    Length of Stay (days): 20    Expected Discharge Date: 04/21/21     Concerns to be Addressed: discharge planning,     Patient plan of care discussed at interdisciplinary rounds: Yes     Anticipated Discharge Disposition: Transitional Care  Anticipated Discharge Services:   Outpatient Dialysis  [facility]   [address], [address]  [place] [address]  P: [phone]  Tuesday, Thursday, Saturday  Chair time: 5:30 AM (3.5 hour run)     Pt was given a [employer] application and a list of transportation companies.  His spouse typically transports him to dialysis and may be able to continue to do so pending pt's ability to get in/ out of her vehicle safely.    Anticipated Discharge DME: None  Patient/family educated on Medicare website which has current facility and service quality ratings: yes  Education Provided on the Discharge Plan: yes  Patient/Family in Agreement with the Plan: yes    Referrals Placed by CM/SW:  Pt identified the following preferences:    [facility]  [address] [place], [place] [id number] [phone]  -E-referral sent.  SW spoke with admissions today and they are aware pt is still on TPN. They are unable to accept pt with TPN and also unable to hold bed for pt but they will continue to keep pt in mind. They do not anticipate any problems with having a bed available for pt and asked SW to continue providing updates as pt nears medical stability and advances diet.      [facility]  [address] [place] [address]  P: [phone]  F: [phone]  -E-referral sent.  They do not take TPN, will need to send a new referral once off of TPN.     [facility]  [address] [place], [place] [id number] [phone]  -E-referral sent.  They do not take TPN, will need to send a new referral once off of TPN.     [facility]  [address] [place], [place] [address] [phone]  -E-referral sent.       [facility] [place] [address] [place] [address]  [phone]  -E-referral sent.     Private pay costs discussed: transportation costs    Additional Information:  Pt is still on TPN, may be ready to discharge in a day or so if he is able to wean off of TPN/ advance diet. May be advancing diet today.    SW to continue following for dispo needs.     RAMO Horta, [facility]  Acute Care Float   [facility]  Phone: [phone]  Pager: [phone]

## 2021-04-19 NOTE — PLAN OF CARE
POD 13. A&Ox4, VSS on RA. Decline pain medication 7975-9007. Denied nausea, on a clear liquid diet. Up with heavy 2 assist, gait belt, and walker. Q2h turns. Pressure ulcer on coccyx, mepilex changed this morning. Midline incision with staples JUMA. Illesotomy with dark brown, watery output. Second Red tubing of the day in stoma removed around 1800. R arm fistula +thrill/+bruit. PICC with continuous TPN infusing in purple lumen, red lumen HL. BS checks q4h with sliding scale insulin. Pt has dialysis T, Th, and Saturday, dialysis scheduled for 830am on 4/20. Continue to encourage patient movement and follow POC.

## 2021-04-19 NOTE — PLAN OF CARE
POD 13. A&Ox4, VSS on RA. Pain managed with prn oxycodone. Nausea during therapy session managed with Zofran. Pt became too dizzy and nauseous during the therapy session and was only able to stand by edge of bed for a few seconds. Pt moves with heavy 2 assist, gait belt, and walker. Q2h turns, pressure ulcer on coccyx, mepilex changed this morning. Midline incision with staples JUMA, illesotomy with dark brown, watery output. Red tubing in stoma removed around 1100. R arm fistula +thrill/+bruit. PICC with continuous TPN infusing in purple lumen. PT on clears, passing gas, no BM yet. BS checks q4h with s/s. Pt has dialysis T, Th, and Saturday. Continue to encourage patient movement and follow POC.

## 2021-04-19 NOTE — PROGRESS NOTES
Surgery Progress Note  04/19/2021       Subjective:  NAEO. Sleeping very well. Feels improved since fluid off in HD Sat. Tolerating clears. No complaints this morning.      Objective:  Temp:  [95.6  F (35.3  C)-97.8  F (36.6  C)] 97.4  F (36.3  C)  Pulse:  [54-70] 69  Resp:  [16-18] 16  BP: ()/(13-63) 117/46  SpO2:  [94 %-98 %] 98 %    I/O last 3 completed shifts:  In: 1722.93 [P.O.:360; I.V.:290]  Out: 205 [Stool:205]      Gen: Awake, alert, NAD  Resp: NLB on RA  Abd: soft, nondistended, nontender to palpation, stoma pink, slightly less edematous with dark brown liquid stool in bag, moderate gas  Incision: c/d/I with staples  Ext: WWP, no edema     Labs:  Recent Labs   Lab 04/18/21  1402 04/14/21  0640   WBC 4.1 6.0   HGB 8.2* 9.4*    191       Recent Labs   Lab 04/18/21  0736 04/17/21  0649 04/16/21  0653 04/14/21  0640 04/14/21  0640    133 135   < > 132*   POTASSIUM 3.5 3.6 3.6   < > 3.7   CHLORIDE 99 100 101   < > 98   CO2 25 23 26   < > 24   BUN 70* 99* 69*   < > 76*   CR 3.99* 5.40* 4.21*   < > 5.02*   * 148* 144*   < > 188*   AVNI 9.4 9.8 9.1   < > 9.4   MAG  --   --  1.8  --  1.8   PHOS 2.4* 2.6 2.4*   < > 2.9    < > = values in this interval not displayed.          Assessment/Plan:   Alexei Kaur is an 80-year-old male with history of CAD s/p recent stent on clopidogrel, CHF with EF 25%, history of stroke with left leg deficit, atrial fibrillation on warfarin, ESRD dependent upon dialysis, and DM type II, who is POD 13 from exploratory laparotomy with right hemicolectomy, ileostomy and mucous fistula.  Doing well. Course c/b postop ileus, awaiting consistent ROBF.      - Continue CLD  - Tight fascia around stoma, will continue to intubate daily and prn, plan to discuss at General Surgery indications conference tomorrow  - Continue ASA and plavix, hold warfarin.             - will resume coumadin at discharge with plan for close f/u with Cardiology   - Antihypertensives per  Nephrology, appreciate recs  - Continue TPN for moderate malnutrition, adjustment to volume per Nephro and Pharm  - PPI   - Keep midline staples, evaluate daily   - HD Tu/Th/Sat  - Heparin ppx        Seen, examined, and discussed with chief resident, who will discuss with staff.    Lori Silvestre MD  PGY-1 Surgery   (6AM-5PM please page primary team, nights/weekends/holidays page job code [phone])

## 2021-04-19 NOTE — PLAN OF CARE
Pt AVSS. Pt sleeping between cares. Pt denied c/o's pain. Abd dist, midline inc staples reddened, scabbed and healing. +gas, +bs, denied any nausea. Colostomy intact with small amts watery green output. TPN/IL infusing via picc. Pt turned minimally tonight. Back massage given with good results, stayed off his buttocks for a few hours. Skin intact, no redness noted. BS q 4hr= 189, 141 with s/s coverage. Cont to encourage turns and repositioning. Reward with back massage. Remains anuric. HD on T, Th, Sat.

## 2021-04-19 NOTE — PROGRESS NOTES
WOC Nurse Inpatient [facility]   WO Nurse Inpatient Adult     Re Assessment   Assessment of new end Ileostomy Stoma complication(s) edema   Mucocutaneous junction; intact   Peristomal complication(s) none   Pouch wear time:3-4 days,   Following today's visit:Patient / is  able to demonstrate;       1. How to empty their pouch? Yes- demonstrated on 4/1 and return demo      2. How to change their pouch?  No- demonstrated on 4/1 (for patient) and 4/5 (for spouse)      3. How to read and record intake and output correctly? No- demonstrated on 4/1    Psychosocial- His wife had an ostomy previously about 8 years ago. She watched a pouch change at bedside 4/5 and stated that she was starting to remember how to do it.     4/12: patient still with NGT and ileus with nausea, minimal output in pouch which is intact so did not complete any ostomy education today, patient c/o pain to nose at time of assessment, NGT stanley changed and pressure injury found   4/15:  Pt declined hands on participation with ostomy teaching  4/19: patient nauseous again today after sitting up at bedside with PT, pouch changed and cut hole wider to decrease any constriction to stoma lumen, red rubber catheter in place      Objective data:  Patient history according to medical record: Alexei Kaur is an 80-year-old gentleman with numerous comorbidities including coronary artery disease with a recent stent on Plavix, end stage renal disease on hemodialysis, on warfarin who presented with abdominal pain and signs symptoms consistent with ischemia of the ascending colon. S/p ex-lap, open right hemicolectomy, end ileostomy and mucus fistula.  Current Diet/Nutrition: Orders Placed This Encounter      Clear Liquid Diet     TPN no   I/O last 3 completed shifts:  In: 1722.93 [P.O.:360; I.V.:290]  Out: 205 [Stool:205]  Labs:    Recent Labs   Lab 04/19/21  0826 04/19/21  0630 04/18/21  1402   ALBUMIN 2.6* Canceled, Test credited  --    HGB  --    --  8.2*   INR  --  1.46*  --    WBC  --   --  4.1        Physical Exam:  Current pouching system:Ringling two piece flat cut to fit fecal pouch   Reason for pouch change today: ostomy education and routine schedule  Stoma appearance: viable, healthy, normal-appearing, pink, moist, edematous and protruberant  Stoma size; 35 mm ,    Peristomal skin: intact  Stoma output :brown and liquid, about 50ml in pouch when changed    Abdominal  Assessment  distended , NG still in place? No  Surgical Site: open to air and staples intact  Pain: Dull ache  Is patient still on a PCA No     Interventions:  Patient's chart evaluated.  Focus of today's visit: pouch change demonstration    Participant of teaching session today patient   Orders: Written  Change made with ostomy management today: No-  continue 2 pc flat CTF due to need for red miles   Patient/family: lethargic and observing  Supplies:Gathered and at bedside    Plan:  Learning needs: pouch change return demonstration, output measurement and discharge instructions  Preparation for discharge: Discussed how/ where to order supplies,    Previously completed: Prepared for discharge to home with homecare, Prescriptions or note left on chart for MD to sign/complete    Needs- Supplies ordered, Registered for samples from , Discussed making a [facility] Nurse outpatient appointment upon discharge  Recommend home care? yes    Discussed plan of care with Patient and Nurse  Nursing to notify the Provider(s) and re-consult the [facility] Nurse if new ostomy concerns or discharge planned before next planned WO visit.    [facility] Nurse will return: M/Th      WO Nurse Inpatient Pressure Injury Assessment   Reason for consultation: Evaluate and treat coccyx and nose      ASSESSMENT  Pressure Injury: on sacrum , hospital acquired ,   Pressure Injury is Stage 2   Contributing factor of the pressure injury: shear and immobility  Status: stable,  evolving,     Pressure injury: on R nare,  hospital acquired   Medical device related injury due to NGT   Pressure injury is stage 2  Status: off loaded, improving      TREATMENT PLAN  Sacrum wound: Every third day cleanse with microKlenz and dry, apply Cavilon no sting barrier film to skin around wound and let dry, then place sacral mepilex.     R nare: BID cleanse with saline and dry, apply thin layer of vaseline to wound bed and leave JUMA,      Orders Reviewed and Updated  [facility] Nurse follow-up plan:weekly  Nursing to notify the Provider(s) and re-consult the WO Nurse if wound(s) deteriorates or new skin concern.    Patient History  According to provider note(s):  Patient history according to medical record: Alexei Kaur is an 80-year-old gentleman with numerous comorbidities including coronary artery disease with a recent stent on Plavix, end stage renal disease on hemodialysis, on warfarin who presented with abdominal pain and signs symptoms consistent with ischemia of the ascending colon. S/p ex-lap, open right hemicolectomy, end ileostomy and mucus fistula.    Objective Data  Containment of urine/stool: Ileostomy pouch    Current Diet/ Nutrition:  Orders Placed This Encounter      Clear Liquid Diet      Output:   I/O last 3 completed shifts:  In: 1722.93 [P.O.:360; I.V.:290]  Out: 205 [Stool:205]    Risk Assessment:   Sensory Perception: 4-->no impairment  Moisture: 4-->rarely moist  Activity: 3-->walks occasionally  Mobility: 2-->very limited  Nutrition: 3-->adequate  Friction and Shear: 1-->problem  Dheeraj Score: 17      Labs:   Recent Labs   Lab 04/19/21  0826 04/19/21  0630 04/18/21  1402   ALBUMIN 2.6* Canceled, Test credited  --    PREALB  --  10*  --    HGB  --   --  8.2*   INR  --  1.46*  --    WBC  --   --  4.1       Physical Exam  Skin inspection: focused sacrum        Wound Location:  sacrum  Date of Photos: 4/5 and 4/15  Wound History: noted by RN  AM 4/5, sacral mepilex was in place at time of assessment   Measurements (length x width x  depth, in cm) 2.4 cm x 1.7 cm  x  0.2 cm   Wound Base:  Devitalized grey epithelium that is sloughing, revealing new epithelium   Tunneling N/A  Undermining N/A  Palpation of the wound bed: normal   Periwound skin: erythema- blanchable  Color: red  Temperature: normal   Drainage:, none  Description of drainage: none  Odor: none  Pain: denies , none     Wound Location: R nare      Date of last Photo -4/15/21  Wound History: patient c/o pain to nose on during WOC assessment of ostomy and tube securement was changed and wound noted to tip of nare.  NG tube removed earlier today   Measurements (length x width x depth, in cm)0.3cm x 0.3cm x 0.1cm   Wound Base:  100 % dermis  Tunneling N/A  Undermining N/A  Palpation of the wound bed: normal   Periwound skin: erythema- blanchable  Color: red  Temperature: normal   Drainage:,none  Odor: none  Pain: severe     Interventions  Current support surface: Standard  Low air loss mattress  Current off-loading measures: Foam padding and Pillows  Repositioning aid: Pillows  Visual inspection of wound(s) completed   Wound Care: was done per plan of care.  Supplies: at bedside and floor stock  Educated provided: importance of repositioning and plan of care  Education provided to: patient  and nurse  Discussed importance of:repositioning every 2 hours and off-loading pressure to wound  Discussed plan of care with Patient and Nurse        [facility]     Name: Alexei Blake : 1940  Date: 2021    To order your ostomy supplies    The ostomy Supplier needs this supply list  to process your order. You will need to fax/deliver this list, along with your Insurance information. Your home care nurse can assist with this process.             List of Ostomy Distributors      [facility]  Ph. [phone] Fax # [phone]  [facility]   Ph. [phone]  [facility]   Ph. [phone]  Kirsten  [facility]. [phone] [facility]-[address]  Or Call your insurance provider for their preferred supplier    Your Medical Supplier will need your surgeon's name, phone and fax number    Clinic:                     Phone                            Fax  General Surgery:   [phone]  Verbal Order for ostomy supplies for 1 Month per:       Sumaya Saenz RN, CWOCN     Authorizing MD: Adrien Medina MD    Change pouch :  Three times a week  Quantity of pouches: 20 pouches/month     Request the following supplies:      Mitchel    1 piece flat fecal with filter #8331    Accessories  2  barrier ring #0425     Adapt powder #7906    No sting film barrier # 3345                          M-9 Spray room deodorizer #7750                                  Change your pouch twice a week, more often if leaking.    If you are cutting a hole in the wafer of your pouch, recheck stoma size and adjust pouch opening as needed every week    . Call the Ostomy Nurse at [facility]       [address] [place] [address] : [phone]   Schedule a follow-up visit in 4 to 6 weeks after your surgery, sooner if having problems Bring a complete set of pouch-changing supplies to this visit      Problems you should Report  - The stoma turns blue or darker in color.  - Cuts or sores around the stoma.  - Red, raw or painful skin around the stoma.  - Any bulging of the skin around the stoma.  - A pouch that leaks every day.  - Problems making the right size hole in the pouch wafer.    Please call with any questions or concerns.

## 2021-04-19 NOTE — PLAN OF CARE
/46 (BP Location: Left arm)   Pulse 54   Temp 95.6  F (35.3  C) (Oral)   Resp 18   Ht 1.829 m (6')   Wt 99.6 kg (219 lb 9.6 oz)   SpO2 96%   BMI 29.78 kg/m      Time: 7641-4315  Status: POD # 18 s/p XL, with R hemicolectomy, ileostomy creation due to ascending colon ischemia. Has hx of CAD s/p recent stent, A fib, ESRD on HD (T,Th, Sa), DM-2.   Neuro:  A&Ox4, sleepy for most of the day.   Activity: stayed in bed, refused turn in bed and OOB.   Pain: denied   Cardiac: irregular apicale heart beat, denied chest pain.   Respiratory: RA, sating > 92%, LS clear all lobes. Denied SOB or coughing. Deep breathing and coughing encouraged.   GI/: no bm this shift. Active bowel sound. Anuria, pt is on HD (T, Th, Sa).   Diet: clear liquid diet. TPN 52 ml/hr continuous. Denied nausea.   Skin: scattered bruises, scabbing, dry skin, pressure injury to coccyx. Refused repositioning.   LDAs: PICC double lumen infusing TKO and TPN.   Labs/Imaging:  mg/dl at 1600. BS q 4.   New change this shift: none   Plan: continue to monitor.

## 2021-04-20 PROBLEM — Z93.2 STATUS POST ILEOSTOMY (H): Status: ACTIVE | Noted: 2021-01-01

## 2021-04-20 NOTE — PLAN OF CARE
8145-5420:   POD #13. R hemicolectomy with end ileostomy and mucus fistula. Midline incision site CDI JUMA, staple intact. Alert and oriented X4. Elevated BP, afebrile on RA, BP is difficult to obtain. Denies pain. DL PICC line with continuous TPN/lipids, unused line, heparin locked. R arm fistula + thrill/bruits. Ileostomy with dark brown small output. Dialysis pt T-Th-Sa, scheduled for tomorrow 4/20 at 0830, want weight closer to dialysis time. Assist of 2 with turns q2 hours. On clear liquids, with minimal intake, like ise chips. BG checks q 4hrs 140 covered with sliding scale insulin.   Continue with POC and update MD with changes or concerns.  BP (!) 170/63 (BP Location: Left arm)   Pulse 69   Temp 96.8  F (36  C) (Oral)   Resp 18   Ht 1.829 m (6')   Wt 99.6 kg (219 lb 9.6 oz)   SpO2 97%   BMI 29.78 kg/m

## 2021-04-20 NOTE — PROGRESS NOTES
Surgery Progress Note  04/20/2021       Subjective:  NAEO. Sleeping very well. Tolerating clears, diet was not advanced past this point. Feels well otherwise.      Objective:  Temp:  [95.7  F (35.4  C)-97.4  F (36.3  C)] 95.9  F (35.5  C)  Pulse:  [56-69] 56  Resp:  [14-18] 16  BP: ()/(42-84) 122/62  SpO2:  [95 %-98 %] 97 %    I/O last 3 completed shifts:  In: 1341.4 [P.O.:125; I.V.:10]  Out: 55 [Stool:55]      Gen: Awake, alert, NAD  Resp: NLB on RA  Abd: soft, nondistended, nontender to palpation, stoma pink, beefy red with mild edema, dark brown liquid stool in bag, moderate gas  Incision: c/d/I with staples  Ext: WWP, no edema     Labs:  Recent Labs   Lab 04/18/21  1402 04/14/21  0640   WBC 4.1 6.0   HGB 8.2* 9.4*    191       Recent Labs   Lab 04/19/21  0826 04/19/21  0630 04/18/21  0736 04/16/21  0653 04/16/21  0653   * Canceled, Test credited 133   < > 135   POTASSIUM 4.0 Canceled, Test credited 3.5   < > 3.6   CHLORIDE 98 Canceled, Test credited 99   < > 101   CO2 23 Canceled, Test credited 25   < > 26   * Canceled, Test credited 70*   < > 69*   CR 5.45* Canceled, Test credited 3.99*   < > 4.21*   * Canceled, Test credited 170*   < > 144*   AVNI 9.6 Canceled, Test credited 9.4   < > 9.1   MAG 1.9 Canceled, Test credited  --   --  1.8   PHOS 3.0 Canceled, Test credited 2.4*   < > 2.4*    < > = values in this interval not displayed.       Assessment/Plan:   Alexei Kaur is an 80-year-old male with history of CAD s/p recent stent on clopidogrel, CHF with EF 25%, history of stroke with left leg deficit, atrial fibrillation on warfarin, ESRD dependent upon dialysis, and DM type II, who is s/p exploratory laparotomy with right hemicolectomy, ileostomy and mucous fistula 03/30/2021.  Doing well. Course c/b postop ileus, awaiting consistent ROBF.     - scheduling for surgery 04/22 tentatively for stomal revision, we will continue BID stoma intubations until then, if this stoma  opens prior to Thursday the case will be canceled   - regular diet  - Plan to discuss at General Surgery indications conference today  - Continue ASA and plavix, hold warfarin.             - will resume coumadin at discharge with plan for close f/u with Cardiology   - Antihypertensives per Nephrology, appreciate recs  - Continue TPN for moderate malnutrition, adjustment to volume per Nephro and Pharm  - PPI   - Keep midline staples, evaluate daily   - HD Tu/Th/Sat  - Heparin ppx        Seen, examined, and discussed with chief resident, who will discuss with staff.    Tutu Ness MD  PGY-1

## 2021-04-20 NOTE — PLAN OF CARE
Assumed care of patient from [id number]. POD 14. AVSS on RA. Difficulty getting a BP on patient. Patient reports good back pain control with Tylenol, PO and Oxycodone, PO. Tolerating clear liquid diet. Denied nausea throughout shift. Patient reports passing flatus and BM via ileostomy. Fistula in R arm with +bruit/+thrill. Midline abdominal incision JUMA with staples intact. PICC infusing purple-TPN and lipids and red-heparin locked. Ileostomy with small dark brown, watery output. Anuria with hemodialysis T,Th, and Saturday. Dialysis scheduled at 0830, with BP encouraged to be taken closest to this time. Up in halls with assist of 2 and walker/gait belt. Patient encouraged to turn in bed every 2 hours with assistance, back massage given x2. Pressure ulcer on coccyx with mepilex on- checked and intact. Using IS with reminders and encouragement. PCDs on in bed. Blood glucose taken every 4 hours with sliding scale insulin-last . Continue plan of care. Advanced to regular diet at end of shift.

## 2021-04-20 NOTE — PROGRESS NOTES
"  Nephrology Progress Note  04/20/2021    Assessment & Recommendations:  Alexei Blake is a 80 year old with PMH CAD s/p recent PCI, CHF, HTN, DM2, ESRD on HD, PAD, Afib on warfarin who presented with acute onset of abdominal pain during HD. Patient was found to have ischemic changes of right colon with portal venous gas per imaging. S/p colonic resection and ileostomy 3/31.    ESRD on HD: Runs TTS at [facility] under care of Dr Noble Monae via RUE AVF for 3.5 hrs. EDW 92 kg  - Dialysis per TTS schedule  - No heparin given recent GI surgery    Lesion on R kidney: per CT 4/8, renal US 4/9 \"Right renal lesion measuring up to 1.8 cm. No definite  intralesional vascularity. Favored to represent a renal cyst however examination is limited secondary to shadowing. No left renal lesion identified. 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  - ileostomy with not much output, constricted opening and going back to OR Thursday     BP:  PTA imdur 30 mg qday, metoprolol XL 50 at bedtime, lisinopril  - Imdur stopped and   - started isosorbide dinitrate 10 mg tid    - Lisinopril is held  - Now on metoprolol tartrate 25 mg bid on non HD days  - Hold all BP meds before dialysis    Volume: EDW 92 kg, O2 95% RA; minimal UOP; per HD unit, pt's BP's dropped to 84/45 during HD on 3/30 which had not been typical; but EDW had been lowered since pt had been hospitalized in January for CHF, pulm edema/volume overload (EDW was 101 kg in January, reduced to 91.5 kg as of 3/30, increased to 92 kg in light of hypotension on 3/30.)  - TPN has been condensed  - UF has been limited by hypotension and extreme caution " to avoid another ischemic bowel event  - Will likely lose significant fluids via ileostomy once ileus fully resolves  - NG tube removed 4/15  - Evidence of volume overload on 4/8 CT with small ascites and diffuse body wall anasarca  - Likely 95-97 kg will be a good EDW, the key will be avoiding hypotension/bowel ischemia while also avoiding pulm edema/CHF exacerbation  - Post HD standing weight 99.2 kg today (2 kg UF)       BMD - Ca 10.0, alb 2.6. phos 3.6  - stop PTA hectorol 3 mcg with HD  - Hold PTA TUMS WM for now    Anemia: hgb 8.2 g/dL, does not appear to have anemia chronically or require Epo  - Will initiate epogen next HD run    Recommendations were communicated to primary team via note     Ana Paula Tolbert PA-C  [phone]    Interval History :   Seen on dialysis, stable run for 2 kg. Pt is feeling fairly well. May go to OR Thursday as ileostomy still has minimal output. Still on TPN and clear liquids. Denies n/v, CP, SOB, chills    Review of Systems:   ROS neg as above    Physical Exam:   I/O last 3 completed shifts:  In: 1603.48 [P.O.:125; I.V.:10]  Out: 170 [Stool:170]   /55   Pulse 53   Temp 97.4  F (36.3  C) (Oral)   Resp 10   Ht 1.829 m (6')   Wt 101.3 kg (223 lb 5.2 oz)   SpO2 94%   BMI 30.29 kg/m       GENERAL APPEARANCE: alert, NAD  EYES: no scleral icterus, pupils equal  Pulmonary: CTA  CV: regular rhythm, normal rate   - Edema peripheral trace to 1+  GI: ileostomy   NEURO: face symmetric, AOx3   Access: RUE AVF    Labs:   All labs reviewed by me  Electrolytes/Renal -   Recent Labs   Lab Test 04/20/21  0729 04/19/21  0826 04/19/21  0630 04/16/21  0653 04/16/21  0653    132* Canceled, Test credited   < > 135   POTASSIUM 3.8 4.0 Canceled, Test credited   < > 3.6   CHLORIDE 99 98 Canceled, Test credited   < > 101   CO2 21 23 Canceled, Test credited   < > 26   * 102* Canceled, Test credited   < > 69*   CR 6.59* 5.45* Canceled, Test credited   < > 4.21*   * 193*  Canceled, Test credited   < > 144*   AVNI 10.2* 9.6 Canceled, Test credited   < > 9.1   MAG  --  1.9 Canceled, Test credited  --  1.8   PHOS 3.6 3.0 Canceled, Test credited   < > 2.4*    < > = values in this interval not displayed.       CBC -   Recent Labs   Lab Test 04/18/21  1402 04/14/21  0640 04/12/21  0700   WBC 4.1 6.0 8.1   HGB 8.2* 9.4* 9.6*    191 189       LFTs -   Recent Labs   Lab Test 04/20/21  0729 04/19/21  0826 04/19/21  0630 04/12/21  0700 04/12/21  0700   ALKPHOS  --  43 Canceled, Test credited  --  41   BILITOTAL  --  0.5 Canceled, Test credited  --  0.4   ALT  --  14 Canceled, Test credited  --  11   AST  --  10 Canceled, Test credited  --  13   PROTTOTAL  --  5.5* Canceled, Test credited  --  4.9*   ALBUMIN 2.6* 2.6* Canceled, Test credited   < > 2.0*    < > = values in this interval not displayed.       Iron Panel - No lab results found.      Imaging:  All imaging studies reviewed by me.     Current Medications:    aspirin  81 mg Oral Daily     atorvastatin  80 mg Oral QPM     clopidogrel  75 mg Oral Daily     gelatin absorbable  1 each Topical During Hemodialysis (from stock)     heparin ANTICOAGULANT  5,000 Units Subcutaneous Q8H     heparin lock flush  5-10 mL Intracatheter Q24H     insulin aspart  1-12 Units Subcutaneous Q4H     isosorbide dinitrate  5 mg Oral TID     lipids  250 mL Intravenous Q24H     [Held by provider] lisinopril  2.5 mg Oral Once per day on Sun Mon Wed Fri     metoprolol tartrate  25 mg Oral 2 times per day on Sun Mon Wed Fri     - MEDICATION INSTRUCTIONS -   Does not apply Once     pantoprazole (PROTONIX) IV  40 mg Intravenous Daily with breakfast       dextrose       parenteral nutrition - ADULT compounded formula       parenteral nutrition - ADULT compounded formula 52 mL/hr at 04/20/21 0659     - MEDICATION INSTRUCTIONS -       Ana Paula Tolbert PA-C

## 2021-04-20 NOTE — ANESTHESIA PREPROCEDURE EVALUATION
"Anesthesia Pre-Procedure Evaluation    Patient: Alexei Blake   MRN: [id number] : 1940        Preoperative Diagnosis: Status post ileostomy (H) [Z93.2]   Procedure : Procedure(s):  Open ileostomy revision   HPI:  \"Alexei Kaur is an 80-year-old male with history of CAD s/p recent stent on clopidogrel, CHF with EF 25%, history of stroke with left leg deficit, atrial fibrillation on warfarin, ESRD dependent upon dialysis, and DM type II, who is s/p exploratory laparotomy with right hemicolectomy, ileostomy and mucous fistula 2021.  Doing well. Course c/b postop ileus, awaiting consistent ROBF\"    Presenting for ileostomy revision in setting of unresolved post-op ileus. Hospitalized since 21 emergency surgery.     No past medical history on file.   Past Surgical History:   Procedure Laterality Date     LAPAROTOMY EXPLORATORY N/A 3/31/2021    Procedure: LAPAROTOMY, RIGHT HEMICOLECTOMY, ILEOSTOMY CREATION;  Surgeon: Harsh Santos MD;  Location: [facility] OR      Allergies   Allergen Reactions     Blood Transfusion Related (Informational Only) Other (See Comments)     Patient has a history of a clinically significant antibody against RBC antigens.  A delay in compatible RBCs may occur.     Blood-Group Specific Substance      Anti-K present. Expect delays in blood for transfusion.  Draw 2 lavender top tubes for type and screen orders.      Calcitriol Rash     Ciprofloxacin Rash      Social History     Tobacco Use     Smoking status: Not on file   Substance Use Topics     Alcohol use: Not on file      Wt Readings from Last 1 Encounters:   21 99.2 kg (218 lb 11.1 oz)        Anesthesia Evaluation   Pt has had prior anesthetic. Type: MAC and General.    No history of anesthetic complications       ROS/MED HX  ENT/Pulmonary:     (+) sleep apnea, uses CPAP, tobacco use (quit in  ), Past use,     Neurologic:     (+) CVA, with deficits, - H/o Residual mild L side weakness.     Cardiovascular: " Comment: PCI of LAD on 1/21/2021, PCI of circumflex/OM  on 3/20/21    (+) Dyslipidemia hypertension-Peripheral Vascular Disease-CAD --stent-3/19/21. 1 Drug Eluting Stent. Taking blood thinners dysrhythmias, a-fib, Previous cardiac testing   Echo: Date: 2/19/21 Results:  Left ventricle ejection fraction is moderately decreased. The calculated   left ventricular ejection fraction is 44%.    Normal right ventricular size and systolic function.    The following segments are akinetic: apical septal and apex. The   following segments are hypokinetic: mid inferoseptal.    When compared to the previous study dated 1/18/2021, there has been   improvement in left ventricular ejection fraction and improvement in   function of several wall segments.  Stress Test: Date: Results:    ECG Reviewed: Date: 3/19/21 Results:  Atrial fibrillation with premature ventricular or aberrantly conducted complexes  Left axis deviation  Left bundle branch block  Cath:  Date: 3/19/21 Results:  Staged PCI for ischemic cardiomyopathy. EF improved from 25% to 44% with   PCI of the LAD in January.    Severe ostial dominant circumflex, critical proximal OM-3 and severe   distal circumflex lesions all treated with rotational atherectomy and a   Synergy MARIA ELENA with good angiographic results. IVUS used for the ostial   circumflex lesion.    METS/Exercise Tolerance:     Hematologic:       Musculoskeletal:       GI/Hepatic: Comment: Post-op ileus after ischemic bowel with resection and ileostomy on 03/31/21.       Renal/Genitourinary: Comment: Arteriovenous fistula; Right    (+) renal disease, type: CRI, Pt requires dialysis, type: Hemodialysis,     Endo:     (+) type II DM, Diabetic complications: nephropathy cardiac problems.     Psychiatric/Substance Use:     (+) psychiatric history depression     Infectious Disease:       Malignancy:       Other:            Physical Exam    Airway        Mallampati: III   TM distance: > 3 FB   Neck ROM: full   Mouth  opening: > 3 cm    Respiratory Devices and Support         Dental           Cardiovascular   cardiovascular exam normal       Rhythm and rate: regular and normal     Pulmonary   pulmonary exam normal        breath sounds clear to auscultation           OUTSIDE LABS:  CBC:   Lab Results   Component Value Date    WBC 4.1 04/18/2021    WBC 6.0 04/14/2021    HGB 8.2 (L) 04/18/2021    HGB 9.4 (L) 04/14/2021    HCT 25.1 (L) 04/18/2021    HCT 28.4 (L) 04/14/2021     04/18/2021     04/14/2021     BMP:   Lab Results   Component Value Date     04/20/2021     (L) 04/19/2021    POTASSIUM 3.8 04/20/2021    POTASSIUM 4.0 04/19/2021    CHLORIDE 99 04/20/2021    CHLORIDE 98 04/19/2021    CO2 21 04/20/2021    CO2 23 04/19/2021     (H) 04/20/2021     (H) 04/19/2021    CR 6.59 (H) 04/20/2021    CR 5.45 (H) 04/19/2021     (H) 04/20/2021     (H) 04/19/2021     COAGS:   Lab Results   Component Value Date    PTT 44 (H) 03/30/2021    INR 1.46 (H) 04/19/2021     POC:   Lab Results   Component Value Date     (H) 04/20/2021     HEPATIC:   Lab Results   Component Value Date    ALBUMIN 2.6 (L) 04/20/2021    PROTTOTAL 5.5 (L) 04/19/2021    ALT 14 04/19/2021    AST 10 04/19/2021    ALKPHOS 43 04/19/2021    BILITOTAL 0.5 04/19/2021     OTHER:   Lab Results   Component Value Date    LACT 0.5 (L) 04/18/2021    AVNI 10.2 (H) 04/20/2021    PHOS 3.6 04/20/2021    MAG 1.9 04/19/2021       Anesthesia Plan    ASA Status:  3      Anesthesia Type: General.     - Airway: ETT   Induction: Intravenous.   Maintenance: Inhalation.   Techniques and Equipment:     - Airway: Video-Laryngoscope     - Lines/Monitors: 2nd IV     Consents    Anesthesia Plan(s) and associated risks, benefits, and realistic alternatives discussed. Questions answered and patient/representative(s) expressed understanding.     - Discussed with:  Patient      - Extended Intubation/Ventilatory Support Discussed: No.      - Patient is  DNR/DNI Status: No    Use of blood products discussed: Yes.     - Discussed with: Patient.     - Consented: consented to blood products     Postoperative Care    Pain management: IV analgesics, Multi-modal analgesia.     - Plan for long acting post-op opioid use   PONV prophylaxis: Ondansetron (or other 5HT-3), Aprepitant     Comments:    COVID test ordered 04/20/21 for add-on surgery 4/21/21            Ty Ralph III, MD

## 2021-04-20 NOTE — PROGRESS NOTES
HEMODIALYSIS TREATMENT NOTE    Date: 4/20/2021  Time: 2:17 PM    Data:  Pre Wt: 101.3 kg (223 lb 5.2 oz)   Desired Wt: 99.3 kg   Post Wt: 99.2 kg (218 lb 11.1 oz)  Weight change: 2.1 kg  Ultrafiltration - Post Run Net Total Removed (mL): 2000 mL  Vascular Access Status: patent  Dialyzer Rinse: Clear  Total Blood Volume Processed: 76.28 L   Total Dialysis (Treatment) Time: 3.5 Hours  Dialysis bath: 3k/2.25 arya      Lab:   No     Assessment/Interventions:  Patient ran 3.5 hrs via CVC with BFR of 400ml. Pt cannulated with 15g needle. Net fluid removal 2kg. Stable HD run. Blood glucose 185 in the middle of the run. Post dialysis hand off report is given to primary RN.      Plan:    Next HD run per renal team.

## 2021-04-20 NOTE — PLAN OF CARE
POD 14. Was at Dialysis from 9am-2:15pm today, around 2L removed. A&Ox4, VSS on RA. Decline pain medication. Denied nausea, on a low fiber diet, poor appetite. Up with heavy 2 assist, gait belt, and walker. Q2h turns. Pressure ulcer on coccyx, mepilex in place. Midline incision with staples JUMA. Illesotomy with dark brown, loose output. R arm fistula +thrill/+bruit. PICC with continuous TPN infusing in purple lumen, red lumen HL. BG checks q4h with sliding scale insulin. Pt has dialysis T, Th, and Saturday. Tentative plan for surgery 4/22 for stomal revision.  Continue to encourage patient movement and follow POC

## 2021-04-20 NOTE — PROGRESS NOTES
CLINICAL NUTRITION SERVICES - REASSESSMENT NOTE     Nutrition Prescription    RECOMMENDATIONS FOR MDs/PROVIDERS TO ORDER:  Recommend patient be able to consistently consume at least 1600 kcal and 70g protein daily (~60% estimated kcal and protein needs) before stop nutrition support    Malnutrition Status:    Non-severe malnutrition in the context of acute illness    Recommendations already ordered by Registered Dietitian (RD):  Calorie Counts    Future/Additional Recommendations:  Monitor arya counts, weights, labs vs need to adjust or wean TPN     EVALUATION OF THE PROGRESS TOWARD GOALS   Diet: Low Fiber    Nutrition Support: CPN, 1248 mL/day (52 mL/hr, max concentrated) with goal 345 g dextrose, 140 g AA, and 250 mL 20% IV lipids daily to provide 2233 kcals (25 kcal/kg/day), 1.6 g PRO/kg/day, GIR 2.7 with 22% kcals from Fat.    PO Intake: Pt was NPO 4/4-4/16, clear liquids 4/16-4/20, and advanced to low fiber diet this morning.     PN Intake: TPN volume decreased 4/14 per Nephrology request (pt fluid overloaded), see below.  No other documentation of any TPN/lipids interruptions the past week per review of MAR/progress notes the past week.    4/9 - 4/14: goal 1800 ml/day with 345 g dextrose, 140 g AA, and 250 mL 20% IV lipids daily to provide 2233 kcals (25 kcal/kg/day), 1.6 g PRO/kg/day, GIR 2.7 with 22% kcals from Fat.  -   4/14 - ___: max concentrated TPN 52 mL/hr (1248 mL/day) --> Rate of previous bag decreased to 52 mL/hr from ~3pm-8pm (~30% less kcals/protein temporarily), new bag started 8pm on 4/14     NEW FINDINGS   Weight: fluctuating (fluid related with dialysis, CHF), today's weight up again.  See weight trends the past week:    04/20/21  104.9 kg (231 lb 4.2 oz)   04/17/21  99.6 kg (219 lb 9.6 oz)   04/17/21  99.6 kg (219 lb 9.6 oz)   04/16/21  98.1 kg (216 lb 4.3 oz)   04/16/21  100.6 kg (221 lb 12.5 oz)   04/15/21  105 kg (231 lb 7.7 oz)   04/14/21  102.8 kg (226 lb 10.1 oz)   04/13/21  99.8 kg (220  lb)     Labs:   -  (L), Cr 6.59 (H), GFR 7 (L)  - Na+, K+, Phos WNL  - BGs stable  - (4/19): Alk Phos, ALT, AST, Tbili, and TG WNL    Renal: on HD Tues/Th/Sat    Meds:  - High sliding scale insulin q4h    GI: NG removed 4/15    Skin: per WOC note on 4/19:   - Stage 2 pressure injury on sacrum (hospital acquired), was staged as a DTPI on 4/12  - Pressure injury on R nare due to NGT --> status: improving  *Pt getting 1.6 g PRO/kg/day, 25 kcal/kg (appropriate given TPN --> will presumably be getting closer to at least 30 kcal/kg with PO+PN now that diet advanced today), and daily MVI and trace elements in TPN)    ASSESSED NUTRITION NEEDS per dosing weight 88 kg  Estimated Energy Needs: [id number] kcals/day (25 - 30 - 35 kcals/kg)  Justification: Maintenance (25-30 kcal/kg) from sole PN; higher end for PO or PO+PN for wound healing  Estimated Protein Needs: 114-158 grams protein/day (1.3 - 1.8 grams of pro/kg)  Justification: Dialysis, Post-op and Wound healing  Estimated Fluid Needs: per provider pending fluid status    MALNUTRITION  % Intake: Decreased intake does not meet criteria  % Weight Loss: Difficult to assess due to fluid status  Subcutaneous Fat Loss: Facial region, Upper arm, and Lower arm: mild per RD note on 4/6  Muscle Loss: Temporal, Facial & jaw region, Upper arm (bicep, tricep), and Lower arm  (forearm): moderate  per RD note on 4/6  Fluid Accumulation/Edema: none noted per provider note today  Malnutrition Diagnosis: Non-severe malnutrition in the context of acute illness    Previous Goals   Total avg nutritional intake to meet a minimum of 25 kcal/kg and 1.3 g PRO/kg daily (per dosing wt 88 kg).  Evaluation: Not met    Previous Nutrition Diagnosis  Inadequate oral intake related to NPO 2/2 ileus as evidenced by NPO x 9 days and on TPN starting 6 days ago to meet estimated nutrition needs (goal TPN met 4 days ago)  Evaluation: Improving, updated    CURRENT NUTRITION DIAGNOSIS  Inadequate  oral intake related to slow diet advancement 2/2 ileus as evidenced by clear liquid diet the past 4 days (previously NPO 4/4-4/16) and on TPN to meet estimated nutrition needs      INTERVENTIONS  Implementation  Calorie counts  Chart review, pt off unit at dialysis    Goals  Total avg nutritional intake to meet a minimum of 25 kcal/kg and 1.2 g PRO/kg daily (per dosing wt 88 kg).    Monitoring/Evaluation  Progress toward goals will be monitored and evaluated per protocol.     Ilda Muse RD, LD  Pager: [phone]  Units covered: 7C (all beds) and 5A (beds 1923 through 6097-2)

## 2021-04-21 NOTE — PROGRESS NOTES
Calorie Count  Intake recorded for: 4/20  Total Kcals: 0 Total Protein: 0g  Kcals from Hospital Food: 0   Protein: 0g  Kcals from Outside Food (average):0 Protein: 0g  # Meals Ordered from Kitchen: 1  # Meals Recorded: 0  # Supplements Recorded: 0

## 2021-04-21 NOTE — PROGRESS NOTES
Care Management Follow Up    Length of Stay (days): 22    Expected Discharge Date: 04/23/21     Concerns to be Addressed: discharge planning    Patient plan of care discussed at interdisciplinary rounds: Yes    Anticipated Discharge Disposition: Transitional Care  Anticipated Discharge Services:   Outpatient Dialysis  [facility]   [address], [address]  [place] [address]  P: [phone]  Tuesday, Thursday, Saturday  Chair time: 5:30 AM (3.5 hour run)     Pt was given a [employer] application and a list of transportation companies.  His spouse typically transports him to dialysis and may be able to continue to do so pending pt's ability to get in/ out of her vehicle safely.    Anticipated Discharge DME: None    Patient/family educated on Medicare website which has current facility and service quality ratings: yes  Education Provided on the Discharge Plan: yes   Patient/Family in Agreement with the Plan: yes    Referrals Placed by CM/SW:   [facility]  [address] [place], [place] [id number] [phone]  -E-referral sent. SW spoke with admissions and they are following pt. Tentative acceptance pending bed availability. SW provided update that pt is in the OR for stomal revision and will continue to keep facility updated. They are aware team is working on advancing diet.       [facility]  [address] [place] [address]  P: [phone]  F: [phone]  -E-referral sent.  They do not take TPN, will need to send a new referral once off of TPN.     [facility]  [address] [place] [address]  [phone]  -E-referral sent.  They do not take TPN, will need to send a new referral once off of TPN.     [facility]  [address] [place], [place] [id number] [phone]  -E-referral sent.       [facility]   [address] [place], [place] [id number] [phone]  -E-referral sent.        Private pay costs discussed: Not applicable    Additional Information:  SW following for dispo needs- per chart review, is in the OR for stomal revision. SW received call from [facility] and provided update that pt is in OR.     SW to continue following for dispo.     RAMO Horta, SW  Acute Care Float   [facility]  Phone: [phone]  Pager: [phone]

## 2021-04-21 NOTE — ANESTHESIA POSTPROCEDURE EVALUATION
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    Anesthesia Type:  General    Note:  Disposition: Admission   Postop Pain Control: Uneventful            Sign Out: Well controlled pain   PONV: No   Neuro/Psych: Uneventful            Sign Out: Acceptable/Baseline neuro status   Airway/Respiratory: Uneventful            Sign Out: Acceptable/Baseline resp. status   CV/Hemodynamics: Uneventful            Sign Out: Acceptable CV status; No obvious hypovolemia; No obvious fluid overload   Other NRE: NONE   DID A NON-ROUTINE EVENT OCCUR? No           Last vitals:  Vitals:    04/21/21 0005 04/21/21 0600 04/21/21 0629   BP: (!) 154/57 (!) 180/68 (!) (P) 170/57   Pulse: 62 62 (P) 65   Resp:  16    Temp:  36.4  C (97.6  F)    SpO2:  96%        Last vitals prior to Anesthesia Care Transfer:  CRNA VITALS  4/21/2021 1019 - 4/21/2021 1107      4/21/2021             NIBP:  105/74    Pulse:  70    Temp:  36.2  C (97.2  F)    SpO2:  95 %    Resp Rate (observed):  16          Electronically Signed By: Yuly Swift MD  April 21, 2021  11:07 AM

## 2021-04-21 NOTE — PROGRESS NOTES
Pt returned from PACU 1310. BP elecated as high as 206/27, MD notified. Pt on low fiber diet. TPN running at 52ml. Denies nausea. Pain managed with oxycodone and tylenol. Stoma pink and protruding, red miles cathter in place, draining serosanguineous output. On hemodialysis not voiding. AV fistula covered with dressing. Mepilex on coccyx. Able to reposition on his own in bed. Pressure ulcer education reinforced.

## 2021-04-21 NOTE — ANESTHESIA PROCEDURE NOTES
Airway       Patient location during procedure: OR  Staff -        CRNA: Erma Lewis APRN CRNA       Performed By: CRNA  Consent for Airway        Urgency: elective  Indications and Patient Condition       Indications for airway management: lesly-procedural       Induction type:intravenous       Mask difficulty assessment: 1 - vent by mask    Final Airway Details       Final airway type: endotracheal airway       Successful airway: ETT - single and Oral  Endotracheal Airway Details        ETT size (mm): 8.0       Cuffed: yes       Successful intubation technique: video laryngoscopy       VL Blade Size: MAC 4       Grade View of Cords: 1       Adjucts: stylet       Position: Right       Measured from: gums/teeth       Secured at (cm): 23       Bite block used: None    Post intubation assessment        Placement verified by: capnometry, equal breath sounds and chest rise        Number of attempts at approach: 1       Secured with: plastic tape       Ease of procedure: easy       Dentition: Intact and Unchanged    Medication(s) Administered   Medication Administration Time: 4/21/2021 8:58 AM    Additional Comments       Ramped up for intubation on blankets

## 2021-04-21 NOTE — DISCHARGE SUMMARY
[facility]  Discharge Summary  General Surgery     Alexei Blake MRN# [id number]   YOB: 1940 Age: 80 year old     Date of Admission:  3/30/2021  Date of Discharge::  4/26/2021  Admitting Physician:  Harsh Santos MD  Discharge Physician:  Jimbo Bautista MD  Primary Care Physician:        Nolberto Sanz          Admission Diagnoses:   Colonic ischemia (H) [K55.9]  CAD s/p recent stent  ESRD on HD  Peripheral artery disease  Atrial fibrillation on anticoagulation (coumadin)          Discharge Diagnosis:   Same as above + Ileostomy status         Procedures:   4/21/21  Procedure(s):  Open ileostomy revision    3/31/21  Procedure(s):  LAPAROTOMY, RIGHT HEMICOLECTOMY, ILEOSTOMY CREATION with mucus fistula creation          Consultations:   PHYSICAL THERAPY ADULT IP CONSULT  OCCUPATIONAL THERAPY ADULT IP CONSULT  NEPHROLOGY ESRD ADULT IP CONSULT  MEDICATION HISTORY IP PHARMACY CONSULT  WOUND OSTOMY CONTINENCE NURSE  IP CONSULT  CARE MANAGEMENT / SOCIAL WORK IP CONSULT  CARDIOLOGY GENERAL ADULT IP CONSULT  VASCULAR ACCESS CARE ADULT IP CONSULT  VASCULAR ACCESS CARE ADULT IP CONSULT  CARE MANAGEMENT / SOCIAL WORK IP CONSULT  WOUND OSTOMY CONTINENCE NURSE  IP CONSULT  PHARMACY/NUTRITION TO START AND MANAGE TPN  VASCULAR ACCESS ADULT IP CONSULT  VASCULAR ACCESS FOR PICC PLACEMENT ADULT IP CONSULT  PHARMACY IP CONSULT  PHARMACY IP CONSULT  PHARMACY IP CONSULT  VASCULAR ACCESS CARE ADULT IP CONSULT          Brief History of Illness:   Alexei Blake is a 80 year old male with multiple co-morbidities including CAD recent stent on plavix, ESRD on HD, PAD, on warfarin for afib who presented to the ED with acute onset of abdominal pain during HD earlier that day. Patient was found to have ischemic changes of right colon with portal venous gas, likely secondary to low flow state. He was therefore brought to the OR emergently for right hemicolectomy.            Hospital Course:      The patient underwent open right hemicolectomy with ileostomy creation and mucous fistula on 3/31/21 (due to necrosis of the ascending colon with edema but no perforation), which he tolerated well without immediate complications. He was transferred to the PACU and then floor for routine postoperative care. He had inconsistent bowel function through the stoma, initial treated with red rubber and fenestrated catheter insertion to release gas and stool, with hopes to stretch the fascia and allow the stoma to open up. NGT was removed and patient was started on clears, however, this ultimately did not improve his stoma output, with only 100-300 out of the stoma daily, and inability to intubate with a small finger. He was therefore taken back to the operating room for ileostoma revision, including a small extension of both the fascia and skin. Following this, he had consistent ileostomy output, no longer concerning for stricture or obstruction. Diet was advanced which he tolerated well without issues.     Also during his stay, Nephrology was consulted for ongoing blood pressure and hemodialysis management.     The remainder of his hospital course was unremarkable. On the day of discharge, he was tolerating a low residue diet, his pain was well controlled with oral pain medications, and ambulating with assist. PT/OT recommendations of discharge to TCU, for which patient and his wife agreed. Patient with follow up with Dr Santos in General Surgery clinic in 2 weeks. Additional follow up with established Cardiologist for discussion of ongoing anticoagulation.            Imaging Studies:     Results for orders placed or performed during the hospital encounter of 03/30/21   XR Abdomen Port 1 View    Narrative    EXAM: XR ABDOMEN PORT 1 VW  4/2/2021 11:36 AM      HISTORY: vomiting    COMPARISON: None    FINDINGS: Portable AP supine view of the abdomen.    Catheter tip projects over the right midabdomen.    Diffuse gaseous  distention of large and small bowel loops. No definite  pneumatosis.    Extensive vascular calcifications. Lumbar spondylosis. Degenerative  changes of the hips.       Impression    IMPRESSION: Gaseous distention of large and small bowel, likely  adynamic ileus.    PEYTON RAO MD (Joe)   XR Abdomen 2 Views    Narrative    Exam: XR ABDOMEN 2 VW, 4/4/2021 8:15 AM    Indication: Abdominal distension and nausea    Comparison: Abdominal radiograph 4/2/2021    Findings: Upright and supine abdominal radiographs. Surgical drain  projecting over the right mid abdomen. Diffuse gaseous distention of  the small and large bowel with loops of small bowel measuring up to  5.4 cm. No pneumatosis. No definite free air. Extensive vascular  calcifications. Midline surgical staples. Lung bases are clear.      Impression    Impression:   1. Diffuse gaseous distention of the small and large bowel, slightly  increased since prior radiograph.  2. Surgical drain over the right lateral abdomen.    I have personally reviewed the examination and initial interpretation  and I agree with the findings.    DELPHINE QUINTANILLA MD   XR Abdomen Port 1 View    Narrative    EXAMINATION:  XR ABDOMEN PORT 1 VW 4/4/2021 3:22 PM     COMPARISON: Abdominal radiograph earlier today at 8:15 AM    HISTORY: NG tube placement.    FINDINGS: Portable supine view of the abdomen. Interval placement of  the enteric tube with tip and sidehole projecting over the stomach.  Surgical drain projecting over the right mid abdomen. No significant  change in the diffuse gaseous distention of the small and large bowel  with loops of small bowel measuring up to 5.3 cm. No definite  pneumatosis. No definite free air. Extensive vascular calcifications.  Midline surgical staples. The lung bases are unremarkable.      Impression    IMPRESSION:   1. Enteric tube with tip and sidehole projecting over the stomach.  2. Persistent diffuse gaseous distention of the small and large  bowel.    I have personally reviewed the examination and initial interpretation  and I agree with the findings.    MEIR SINGH MD   XR Abdomen Port 1 View    Narrative    XR ABDOMEN PORT 1 VW  4/5/2021 2:10 AM      HISTORY: NG placement    COMPARISON: Plain film 4/4/2021    TECHNIQUE: Supine frontal view of the abdomen    FINDINGS: NG/OG tube tip projects over the distal esophagus. Multiple  dilated loops of small bowel measuring up to 5.0 cm. Resolving  pneumoperitoneum. Vertical skin staples. Extensive vascular  calcifications. No pneumatosis or portal venous gas. Lung bases are  relatively clear.      Impression    IMPRESSION:   1. NG/OG tube with tip projecting over the distal esophagus. Consider  advancement.  2. Multiple dilated loops of small bowel, favor postoperative ileus.  Resolving pneumoperitoneum.    I have personally reviewed the examination and initial interpretation  and I agree with the findings.    SUMI LOBATO MD   XR Abdomen Port 1 View    Narrative    XR ABDOMEN PORT 1 VW  4/5/2021 2:10 AM      HISTORY: NG advanced    COMPARISON: Plain film same day, 4/2/2021    TECHNIQUE: Supine frontal view of the abdomen    FINDINGS: NG/OG tube with tip and side-port projecting over the  stomach. No significant change in multiple dilated loops of bowel  throughout the abdomen. No pneumatosis or portal venous gas. Extensive  calcification in the splenic artery and other small caliber vessels.  Midline laparotomy staples. Lung bases are relatively clear. Resolving  pneumoperitoneum.      Impression    IMPRESSION: NG/OG tube with tip and side-port projecting over the  stomach . Stable multiple dilated loops of bowel, favor postoperative  ileus.     I have personally reviewed the examination and initial interpretation  and I agree with the findings.    SUMI LOBATO MD   XR Chest Port 1 View    Narrative    Exam: XR CHEST PORT 1 VW, 4/6/2021 5:36 PM    Indication: PICC    Comparison: No prior  chest radiograph available. Abdominal radiographs  4/5/2021.    Findings: Single AP chest radiograph. Left upper extremity PICC  terminating over the mid SVC. Nasogastric tube in stable position  terminating over the stomach. Additional tube extending along the  spine, possibly external to the patient. Trachea is midline. Stable  cardiomegaly. Lung volumes. Perihilar fullness. No pneumothorax. No  large pleural effusion.    There is some lucency along the left hemidiaphragm which appears to be  separate from bowel loops in this region which possibly reflects  pneumoperitoneum present on recent abdominal studies versus  subsegmental atelectasis. Gaseous distention of bowel loops in the  upper abdomen. Vertical skin staples in the abdomen partially  visualized. Vascular calcifications.      Impression    Impression:   1. Left upper extremity PICC terminating over the mid SVC.  2. Cardiomegaly.  3. Low lung volumes with perihilar fullness, likely atelectasis.  4. Gaseous distention of bowel loops demonstrated as seen on recent  abdominal radiographs with possible small volume pneumoperitoneum  again noted.    I have personally reviewed the examination and initial interpretation  and I agree with the findings.    SUMI LOBATO MD   CT Abdomen Pelvis w Contrast    Narrative    EXAMINATION: CT ABDOMEN PELVIS W CONTRAST, 4/8/2021 3:52 PM    TECHNIQUE:  Helical CT images from the lung bases through the  symphysis pubis were obtained with IV contrast. Contrast dose:  iopamidol (ISOVUE-370) solution 126 mL    COMPARISON: Abdominal radiographs for 5 2021    HISTORY: Bowel obstruction suspected    FINDINGS:    Abdomen and pelvis: Post surgical changes of right hemicolectomy and  right lower quadrant ileostomy. Narrowing of the small bowel at the  ileostomy site, with upstream dilatation of small bowel measuring up  to 4.5 cm in diameter. The bowel appears to demonstrate normal  enhancement. No pneumatosis or portal gas.  Small volume ascites in the  abdomen and pelvis. Mildly hydropic gallbladder and cholelithiasis.  Moderately atrophic pancreas. Several cystic foci in the pancreatic  head for example measuring 2.2 x 1.1 cm (series 5, image 184), 1.8 x  1.3 cm (series 5, image 212) and 1.5 x 0.9 cm (series 5, image 221).  The main pancreatic duct is not dilated. Spleen is slightly atrophic.  Adrenal glands are unremarkable.     Both kidneys are atrophic. Indeterminate exophytic cystic lesion  arising from the lower pole of the right kidney measuring 3.1 x 2.0 cm  (series 5, image 249). 1.7 cm simple cyst from the anterior midpole  the left kidney. Bilateral perirenal cyst. No hydronephrosis or  hydroureter. Urinary bladder is unremarkable. Prostate and seminal  vesicles are unremarkable. Diffuse atherosclerotic calcifications of  the abdominal aorta axis SMA and splenic artery. Fluid amniotic  arteries, the visceral abdominal vasculature. Diffuse splenic artery  calcifications. Enteric tube within the right lower quadrant ileostomy  with the tip terminating in a loop of bone the right mid abdomen.  There are several small foci of free intraperitoneal air in the mid  and upper abdomen, presumably related to recent surgery. There are no  enlarged inguinal, pelvic abdominal lymph nodes.    Lung bases: Small-to-moderate bilateral pleural effusions with  overlying atelectasis.    Bones and soft tissues: Small sclerotic focus in the right sacrum,  likely a benign bone island. Bones appear diffusely demineralized.  Multilevel degenerative changes of the spine. No acute or suspicious  osseous lesion. Postsurgical changes of the anterior midline abdominal  wall with overlying cutaneous surgical staples. Diffuse body wall  anasarca.      Impression    IMPRESSION:   1. Postsurgical changes of right hemicolectomy and right lower  quadrant ileostomy. Upstream from the ileostomy site there is moderate  dilatation of multiple loops of small bowel,  which may represent a  low-grade partial obstruction or postoperative adynamic ileus. The  catheter ostomy tip is in dilated bowel.  2. Several small foci of free air in the abdomen, presumably related  to recent surgery.  3. Both kidneys are atrophic. Indeterminate lesion arising from the  lower pole of the right kidney measuring 3.1 cm. Consider further  characterization with ultrasound.  4. Small volume ascites in the abdomen and pelvis and diffuse body  wall anasarca.  5. Small-to-moderate bilateral pleural effusions with overlying  atelectasis.  6. Several cystic foci in the pancreatic head, the largest measuring  2.2 cm. These may represent sidebranch IPMNs. Comparison with any  available outside imaging would be helpful. Otherwise recommended  follow-up as detailed below.   7. Extensive atherosclerotic calcifications of the abdominal aorta,  iliac arteries and the visceral abdominal vasculature.    [facility] recommendations for asymptomatic pancreatic  cysts, modified from international consensus guidelines*   Size of largest cyst:   Less than 1 cm: Follow-up imaging in 6 months to 1 year, then lengthen  interval to 2-3 years if no change.  1-2 cm: Follow-up imaging at 6 months, then yearly for 2 years, then  lengthen interval if no change.   The optimal initial study or first follow-up exam is MRI/MRCP  performed with intravenous gadolinium contrast to allow full cyst  characterization. Once characterized, contrast is optional on  follow-up MRIs. If MRI is contraindicated, CT with contrast is  recommended.   GI consultation for possible endoscopic ultrasound is recommended for  cysts with the following features: Size > 2 cm, >20% growth on  followup, wall thickening or enhancement, mural nodule, duct > 5 mm,  or abrupt change in duct caliber with distal atrophy. GI or surgical  consultation is also recommended for symptomatic cysts.   *Reference: International Consensus Guidelines for  Management of IPMN  and MCN of the pancreas. Pancreatology: 12:(2012); 183-197.    I have personally reviewed the examination and initial interpretation  and I agree with the findings.    DELPHINE QUINTANILLA MD   US Renal Complete    Narrative    EXAMINATION: US RENAL COMPLETE, 4/9/2021 2:23 PM     COMPARISON: CT 4/8/2021    HISTORY: CT renal mass    TECHNIQUE: The kidneys and bladder were scanned in the standard  fashion with specialized ultrasound transducer(s) using both gray  scale and limited color/spectral Doppler techniques.    FINDINGS:  Severely limited examination of the kidneys secondary to extensive  shadowing and ascites.    Right kidney: Measures 7.1 cm in length. There is a rounded hypoechoic  focus within the upper pole of the right kidney measuring 1.8 x 1.5 x  1.3 cm, corresponding with one of the lesions noted on prior CT. No  definite intralesional vascularity. No hydronephrosis. Visualized  renal parenchyma is mildly echogenic.    Left kidney: Measures 8.2 cm in length. No focal renal mass noted  within the left kidney, however examination is limited. No definite  hydronephrosis. Renal parenchyma is mildly echogenic.     Bladder: Unremarkable.      Impression    IMPRESSION:  1.  Right renal lesion measuring up to 1.8 cm. No definite  intralesional vascularity. Favored to represent a renal cyst however  examination is limited secondary to shadowing. No left renal lesion  identified. The cystic or solid nature of this lesion could be  confirmed with MRI.   2.  Renal parenchyma is echogenic bilaterally, compatible with medical  renal disease.  3.  Ascites.    I have personally reviewed the examination and initial interpretation  and I agree with the findings.    MEIR SINGH MD   XR Abdomen Port 1 View    Narrative    XR ABDOMEN PORT 1 VW  4/12/2021 8:39 AM      HISTORY: post op ileus    COMPARISON: Abdomen and pelvis CT 4/8/2021, abdominal radiograph  4/5/2021    FINDINGS: AP supine view of the  abdomen. Continued diffuse dilation of  small bowel loops, likely not significantly changed compared to  4/8/2021 when accounting for difference in technique. No definite free  air or pneumatosis.      Impression    IMPRESSION: No significant change in ileus compared to 4/8/2021 when  accounting for difference in technique.    I have personally reviewed the examination and initial interpretation  and I agree with the findings.    REMY RODGERS MD   XR Colon Water Soluble    Narrative    EXAMINATION: XR COLON WATER SOLUBLE THERAPEUTIC  4/14/2021 2:11 PM      CLINICAL HISTORY: s/p ileostomy with postop ileus, please administer  Gastrografin through ileostomy.    COMPARISON: Abdominal radiograph 4/14/2021, 4/12/2021, abdomen and  pelvis CT 4/8/21    PROCEDURE COMMENTS:   Fluoroscopy time: .9 low-dose pulsed  Contrast: Approximately 200 cc of Gastrografin    FINDINGS:  The  radiograph shows diffuse gaseous distention of small bowel.  There is no abnormal calcification or evidence of organomegaly. Mild  sacroiliac degenerative change.     Contrast was given through the patient's right lower quadrant  ileostomy. Contrast flowed freely through multiple loops of small  bowel, which appear mildly distended. There are no areas of focal  narrowing, abrupt changes in bowel caliber, or visible mucosal  abnormality.        Impression    IMPRESSION:  No evidence of bowel obstruction. Numerous air distended loops of  bowel are consistent with ileus.    I, REMY RODGERS MD, attest that I was present in the procedure room  for the entire procedure.    I have personally reviewed the examination and initial interpretation  and I agree with the findings.    REMY RODGERS MD   XR Abdomen Port 1 View    Narrative    Exam: XR ABDOMEN PORT 1 VW, 4/14/2021 9:29 AM    Indication: s/p ileostomy with postop ileus    Comparison: 4/12/2021    Findings:   Supine frontal view of the abdomen. Right lower quadrant enteric tube  associated  with the patient's ileostomy. Gastric tube tip and sidehole  project over the stomach. Midline skin staples. Decreased gaseous  distention of the small and large bowel. No appreciable pneumatosis or  portal venous gas. Vascular calcifications.      Impression    Impression: Slightly improved findings of adynamic ileus.    ALEX BROCK,    Echo Complete    Narrative    [id number]  [id number]  [id number]  [id number]^LATOYA^MAKAYLA     [facility],[place]  Echocardiography Laboratory  [address] [place] [id number]     Name: ALEIDA HARRIS  MRN: [id number]  : 1940  Study Date: 2021 10:09 AM  Age: 80 yrs  Gender: Male  Patient Location: [facility]  Reason For Study: Atrial Fibrillation  Ordering Physician: MAKAYLA KLEIN  Referring Physician: FCO BURROUGHS  Performed By: [place] Omer RDCS     BSA: 2.1 m2  Height: 72 in  Weight: 200 lb  HR: 82  BP: 123/60 mmHg  ______________________________________________________________________________  Procedure  Complete Portable Echo Adult. Contrast Optison. Technically difficult study.  Patient was given 5 ml mixture of 3 ml Optison and 6 ml saline. 4 ml wasted.  ______________________________________________________________________________  Interpretation Summary  Technically difficult study.  The Ejection Fraction is estimated at 60-65%.  Regional wall motion is probably normal.  Global right ventricular function is normal.  Valve assessment is suboptimal (valves cannot be visualized). No Doppler sign  of significant valvular disease.  Previous study not available for comparison.  ______________________________________________________________________________  Left Ventricle  Left ventricular size is normal. Left ventricular wall thickness cannot  evaluate. Diastolic function not assessed due to atrial fibrillation. The  Ejection Fraction was visually estimated. The Ejection Fraction is estimated  at 60-65%. Regional wall motion  is probably normal.     Right Ventricle  The right ventricle is normal size. Global right ventricular function is  normal.     Atria  The right atria appears normal. Mild left atrial enlargement is present.     Mitral Valve  The mitral valve cannot be assessed.     Aortic Valve  The valve leaflets are not well visualized. On Doppler interrogation, there is  no significant stenosis or regurgitation.     Tricuspid Valve  The tricuspid valve cannot be assessed. Trace tricuspid insufficiency is  present. The right ventricular systolic pressure is approximated at 20.1 mmHg  plus the right atrial pressure.     Pulmonic Valve  The pulmonic valve cannot be assessed.     Vessels  The aorta root is normal. The thoracic aorta is normal. The inferior vena cava  cannot be assessed.     Pericardium  No pericardial effusion is present.     Compared to Previous Study  Previous study not available for comparison.  ______________________________________________________________________________  MMode/2D Measurements & Calculations  IVSd: 1.4 cm     LVIDd: 3.4 cm  LVIDs: 2.8 cm  LVPWd: 1.4 cm  FS: 19.3 %  LV mass(C)d: 163.7 grams  LV mass(C)dI: 76.8 grams/m2  Ao root diam: 3.3 cm  asc Aorta Diam: 3.4 cm  LVOT diam: 2.1 cm  LVOT area: 3.5 cm2     EF(MOD-bp): 73.5 %  LA Volume (BP): 74.4 ml  LA Volume Index (BP): 34.9 ml/m2  RWT: 0.81     Doppler Measurements & Calculations  MV E max anam: 72.3 cm/sec  MV dec slope: 411.0 cm/sec2  MV dec time: 0.18 sec  PA acc time: 0.13 sec  TR max anam: 224.0 cm/sec  TR max P.1 mmHg  E/E' av.0  Lateral E/e': 6.3  Medial E/e': 5.7     ______________________________________________________________________________  Report approved by: Brii CARLSON 2021 12:33 PM                    Medications Prior to Admission:     Medications Prior to Admission   Medication Sig Dispense Refill Last Dose     acetaminophen (TYLENOL) 500 MG tablet Take 500 mg by mouth every 4 hours as needed for mild pain         aspirin 81 MG EC tablet Take 81 mg by mouth daily   3/30/2021 at Unknown time     atorvastatin (LIPITOR) 80 MG tablet Take 80 mg by mouth every evening   3/29/2021     calcium carbonate (TUMS) 500 MG chewable tablet Take 1 chew tab by mouth 2 times daily        clopidogrel (PLAVIX) 75 MG tablet Take 75 mg by mouth daily   3/30/2021 at Unknown time     fluticasone (FLONASE) 50 MCG/ACT nasal spray Spray 2 sprays into both nostrils daily   3/29/2021     multivitamin RENAL (NEPHROCAPS/TRIPHROCAPS) 1 MG capsule Take 1 capsule by mouth daily        nitroGLYcerin (NITROSTAT) 0.4 MG sublingual tablet Place 0.4 mg under the tongue every 5 minutes as needed for chest pain For chest pain place 1 tablet under the tongue every 5 minutes for 3 doses. If symptoms persist 5 minutes after 1st dose call 911.        [DISCONTINUED] lisinopril (ZESTRIL) 5 MG tablet Take 5 mg by mouth daily   3/30/2021 at Unknown time     [DISCONTINUED] metoprolol succinate ER (TOPROL-XL) 25 MG 24 hr tablet Take 25 mg by mouth every evening   3/29/2021     [DISCONTINUED] warfarin ANTICOAGULANT (COUMADIN) 3 MG tablet Take 3 mg by mouth daily   3/29/2021              Discharge Medications:     Current Discharge Medication List      START taking these medications    Details   isosorbide dinitrate (ISORDIL) 5 MG tablet Take 1 tablet (5 mg) by mouth 3 times daily  Qty:      Associated Diagnoses: Status post ileostomy (H)      metoprolol tartrate (LOPRESSOR) 25 MG tablet Take 1 tablet (25 mg) by mouth 2 times daily    Comments: Sunday, Monday, Wednesday, Friday  Associated Diagnoses: Status post ileostomy (H)      oxyCODONE (ROXICODONE) 5 MG tablet Take 1 tablet (5 mg) by mouth every 6 hours as needed for moderate to severe pain  Qty: 10 tablet, Refills: 0    Associated Diagnoses: Status post ileostomy (H)      pantoprazole (PROTONIX) 40 MG EC tablet Take 1 tablet (40 mg) by mouth every morning (before breakfast)  Qty:      Associated Diagnoses: Status  post ileostomy (H)         CONTINUE these medications which have CHANGED    Details   warfarin ANTICOAGULANT (COUMADIN) 5 MG tablet Take 0.5-1 tablets (2.5-5 mg) by mouth daily  Refills: 0    Comments: Monday 4/26 - 5mg  Further dosing pending INR  Associated Diagnoses: Paroxysmal atrial fibrillation (H)         CONTINUE these medications which have NOT CHANGED    Details   acetaminophen (TYLENOL) 500 MG tablet Take 500 mg by mouth every 4 hours as needed for mild pain      aspirin 81 MG EC tablet Take 81 mg by mouth daily      atorvastatin (LIPITOR) 80 MG tablet Take 80 mg by mouth every evening      calcium carbonate (TUMS) 500 MG chewable tablet Take 1 chew tab by mouth 2 times daily      clopidogrel (PLAVIX) 75 MG tablet Take 75 mg by mouth daily      fluticasone (FLONASE) 50 MCG/ACT nasal spray Spray 2 sprays into both nostrils daily      multivitamin RENAL (NEPHROCAPS/TRIPHROCAPS) 1 MG capsule Take 1 capsule by mouth daily      nitroGLYcerin (NITROSTAT) 0.4 MG sublingual tablet Place 0.4 mg under the tongue every 5 minutes as needed for chest pain For chest pain place 1 tablet under the tongue every 5 minutes for 3 doses. If symptoms persist 5 minutes after 1st dose call 911.         STOP taking these medications       lisinopril (ZESTRIL) 5 MG tablet Comments:   Reason for Stopping:         metoprolol succinate ER (TOPROL-XL) 25 MG 24 hr tablet Comments:   Reason for Stopping:                       Medications Discontinued or Adjusted During This Hospitalization:   Lisinopril 5mg every day - held due to hypotension with dialysis           Antibiotics Prescribed at Discharge:   None prescribed           Day of Discharge Physical Exam:   Temp:  96.8  F (36  C)  Pulse:  66  Resp:   12  BP: 175/57  SpO2:  98 %    General: awake, alert, no acute distress, laying comfortably in bed   CV: warm, well perfused   Pulm: breathing comfortably on room air   Abdomen: soft, non-distended, non-tender, no rebound or  guarding;  Ileostomy beefy red, significant brown stool and gas in stoma bag   Extremities: no edema, moving all extremities spontaneously and without apparent deficit            Final Pathology Result:   Patient Name: ALEIDA HARRIS   MR#: [id number]   Specimen #: [id number]   Collected: 3/31/2021   Received: 3/31/2021   Reported: 4/9/2021 15:04   Ordering Phy(s): DANIELA WITT     For improved result formatting, select 'View Enhanced Report Format' under    Linked Documents section.     SPECIMEN(S):   Right colon     FINAL DIAGNOSIS:   Right colon, resection:   - Large bowel with ischemic type changes, acute inflammation, bacteria,   congestion/hemorrhage, and acute   serositis   - Proximal resection margin is viable   - Distal resection margin with focal ischemic mucosal changes   - Calcified vessel within the mesenteric fat   - Appendix with no significant histologic abnormality   - Seven benign lymph nodes with acute inflammation            Discharge Instructions and Follow-Up:     Discharge Procedure Orders   INR   Standing Status: Future Standing Exp. Date: 06/25/21     Care Coordination Referral   Referral Priority: Routine Referral Type: Care Coordination   Number of Visits Requested: 1     Home care nursing referral   Referral Priority: Routine Referral Type: Home Health Therapies & Aides   Number of Visits Requested: 1     MD face to face encounter   Order Comments: Documentation of Face to Face and Certification for Home Health Services    I certify that patient: Aleida Harris is under my care and that I, or a nurse practitioner or physician's assistant working with me, had a face-to-face encounter that meets the physician face-to-face encounter requirements with this patient on: April 2, 2021.    This encounter with the patient was in whole, or in part, for the following medical condition, which is the primary reason for home health care: A/P: Aleida Kaur is an 80-year-old male with  history of CAD s/p recent stent on clopidogrel, CHF with EF 25%, history of stroke with left leg deficit, atrial fibrillation on warfarin, ESRD dependent upon dialysis, and DM type II, who is POD 2 from exploratory laparotomy with right hemicolectomy, ileostomy and mucous fistula.  Doing well.    .    I certify that, based on my findings, the following services are medically necessary home health services:Skilled home care RN, PT and OT.    My clinical findings support the need for the above services because: MD orders and recommendations by the inpatient interdisciplinary team.    Further, I certify that my clinical findings support that this patient is homebound secondary to illness.Based on the above findings. I certify that this patient is confined to the home and needs intermittent skilled nursing care, physical therapy and/or speech therapy.  The patient is under my care, and I have initiated the establishment of the plan of care.  This patient will be followed by a physician who will periodically review the plan of care.  Physician/Provider to provide follow up care: Nolberto Sanz    Attending hospital physician (the Medicare certified [facility] provider): Dr. Adrien Medina M.D.  Physician Signature: See electronic signature associated with these discharge orders.    Date: 4/2/2021     General info for SNF   Order Comments: Length of Stay Estimate: Short Term Care: Estimated # of Days <30  Condition at Discharge: Improving  Level of care:skilled   Rehabilitation Potential: Good  Admission H&P remains valid and up-to-date: Yes  Recent Chemotherapy: N/A  Use Nursing Home Standing Orders: Yes     Reason for your hospital stay   Order Comments: Bowel ischemia status post right hemicolectomy with end ileostomy     Glucose monitor nursing POCT   Order Comments: Before meals and at bedtime     Intake and output   Order Comments: Every shift, measure ileostomy output     Daily weights   Order Comments: Call  Provider for weight gain of more than 2 pounds per day or 5 pounds per week.     Ileostomy; RLQ Care   Order Comments: ~ Encourage patient participation with ostomy care,  ~ Empty pouch when 1/3 to 1/2 full,   ~ Notify WOC for ongoing ostomy pouch leakage,  ~ Document stoma output volume, color, consistency EVERY shift     Order Specific Question Answer Comments   Type of Ostomy Ileostomy    Location RLQ    Pouch Change Frequency Monday    Pouch Change Frequency Thursday    Pouch changed by Staff RN    Pouch emptied by Nursing staff to empty    Straight Drainage No      Activity - Up with nursing assistance     Order Specific Question Answer Comments   Is discharge order? Yes      Follow Up ([facility])   Order Comments: Follow up with Dr. Santos , at (location with clinic name or city) [facility], within 2 weeks to evaluate after surgery. No follow up labs or test are needed.    Appointments on [facility] and/or [facility] (with [facility] or [facility] provider or service). Call [phone] if you haven't heard regarding these appointments within 7 days of discharge.     Additional Discharge Instructions   Order Comments: Discharge Instructions  Activity  - No strenuous exercise for 3-4 weeks  - No lifting, pushing, pulling more than 15-20 pounds for 3-4 weeks  - Do not drive until you can press the brake pedal quickly and fully without pain  - Do not operate a motor vehicle while taking narcotic pain medications    Incisions  - The type of wound closure used for your incision:  Steri-strips  - You may shower and get incisions wet  - Do not scrub incisions or submerge wounds (e.g. bath, pool, hot tub, etc.) for 2 weeks or until the glue or steri-strips have come off  - Avoid applying any lotions or ointments near the areas where the Dermabond glue was used  - Steri-strips will fall off on their own in approximately 7-10 days  - Leave incision open to air.  Cover with gauze only if needed for comfort or to  protect clothing from drainage    Medications  - Do not take any additional Tylenol (acetaminophen) while using a narcotic pain medication which includes acetaminophen  - Do not take more than 4,000mg of acetaminophen in any 24 hour period, as this can cause liver damage  - Take stool softeners such as Senna or Miralax while you are using narcotics, but stop if you develop diarrhea  - Wean yourself off of narcotic pain medications    Dialysis  - Continue with scheduled dialysis Tuesday, Thursday and Saturdays    Labs and medication changes  - Repeat INR due 4/27 (Tuesday)  - Adjust coumadin dosing as needed    Follow-Up:  - Call your primary care provider to touch base regarding your recent admission.    - Schedule an appointment with your Cardiologist throughout [facility], to discuss ongoing anticoagulation   - Call or return sooner than your regularly scheduled visit if you develop any of the following: fever >101.5, uncontrolled pain, uncontrolled nausea or vomiting, as well as increased redness, swelling, or drainage from your wound.   -  A nurse from the General Surgery Clinic will contact you 24 hours, or next business day, after your discharge from the hospital.  If you have questions or to schedule a follow up appointment please call the General Surgery Clinic at [phone].  Call [phone] and ask to speak with the Surgery resident on call if you are having troubles in the evenings, at night, or on the weekend.  -  If you are receiving home care please inform your home care nurse of our contact number.     Physical Therapy Adult Consult   Order Comments: Evaluate and treat as clinically indicated.    Reason:  Postoperative deconditioning     Occupational Therapy Adult Consult   Order Comments: Evaluate and treat as clinically indicated.    Reason:  Postoperative deconditioning     Contact Isolation   Order Comments: MRSA from 2016, use facility protocol for appropriate precautions     Advance  Diet as Tolerated   Order Comments: Follow this diet upon discharge: Low fiber/renal diet     Order Specific Question Answer Comments   Is discharge order? Yes               Home Health Care:     N/A- discharged to rehab facility           Discharge Disposition:     Discharged to TCU      Condition at discharge: Stable    Patient was seen, examined, and discussed on day of discharge with chief resident and staff surgeon    Lori Silvestre MD  PGY-1 Surgery

## 2021-04-21 NOTE — PLAN OF CARE
Paged provider:    Should I still administer patients scheduled heparin with his results of PTT 44, INR 1.46, and plts 183?    Provider called writer back stating the heparin should still be administered.

## 2021-04-21 NOTE — PROGRESS NOTES
Admitted/transferred from PACU:   2 RN full  skin assessment completed by Bnonie Vidales, RN and Nayeli Schulz RN.  Skin assessment finding: issues found Existing pressure injury on coccyx; existing pressure injury on right nare; existing left toe eschar, Patient has a bruised area on left lateral flank about 4-5 inches in diameter upon assessment. AV fistula on right arm covered with dressing, UTV.   Interventions/actions: other Mepilex on coccyx, pulsate mattress, repositioning q2 hrs, pressure-injury education reinforced     Will continue to monitor.

## 2021-04-21 NOTE — ANESTHESIA CARE TRANSFER NOTE
Patient: Alexei Blake    Procedure(s):  Open ileostomy revision    Diagnosis: Status post ileostomy (H) [Z93.2]  Diagnosis Additional Information: No value filed.    Anesthesia Type:   General     Note:    Oropharynx: oropharynx clear of all foreign objects and spontaneously breathing  Level of Consciousness: drowsy  Oxygen Supplementation: nasal cannula  Level of Supplemental Oxygen (L/min / FiO2): 4  Independent Airway: airway patency satisfactory and stable  Dentition: dentition unchanged  Vital Signs Stable: post-procedure vital signs reviewed and stable  Report to RN Given: handoff report given  Patient transferred to: PACU    Handoff Report: Identifed the Patient, Identified the Reponsible Provider, Reviewed the pertinent medical history, Discussed the surgical course, Reviewed Intra-OP anesthesia mangement and issues during anesthesia, Set expectations for post-procedure period and Allowed opportunity for questions and acknowledgement of understanding      Vitals: (Last set prior to Anesthesia Care Transfer)  CRNA VITALS  4/21/2021 1019 - 4/21/2021 1107      4/21/2021             NIBP:  105/74    Pulse:  70    Temp:  36.2  C (97.2  F)    SpO2:  95 %    Resp Rate (observed):  16        Electronically Signed By: MOISES Benson CRNA  April 21, 2021  11:07 AM

## 2021-04-21 NOTE — BRIEF OP NOTE
[facility]    Brief Operative Note    Pre-operative diagnosis: Status post ileostomy (H) [Z93.2]  Post-operative diagnosis Same as pre-operative diagnosis    Procedure: Procedure(s):  Open ileostomy revision  Surgeon: Surgeon(s) and Role:     * Ty Walker DO - Primary     * Karyn Katz MD - Assisting  Anesthesia: General   Estimated blood loss: 25cc  Drains:  red rubber catheter in stoma   Specimens: * No specimens in log *  Findings:   Tight angulation of bowel at the fascia.  Easy to intubate stoma at the end of the case.  Complications: None.  Implants: * No implants in log *      Karyn Katz MD  Surgery Resident PGY-2  Pg [phone]

## 2021-04-21 NOTE — PLAN OF CARE
Assumed care of Patient from [id number]. VSS. Patient reports good pain control with PRN tylenol and oxy. Intermittent nausea, did not want medication interventions, no emesis overnight. Pt ileostomy patent and intact, having minimal output.  Hard turns q2hrs. Pt able to do small self repositions in bed. CHG scrub and COVID swab done with negative results. Preop questions finished, anticipating surgery. Will continue plan of care.

## 2021-04-21 NOTE — DOWNTIME EVENT NOTE
The EMR was down for 2.5 hours on 4/21/2021.    Erica Peters was responsible for completing the paper charting during this time period.     The following information was re-entered into the system by Erica Peters RN: Flowsheet data    The following information will remain in the paper chart: JUAN Peters RN  4/21/2021

## 2021-04-21 NOTE — PLAN OF CARE
[id number]:    A&Ox4, O2 96% on RA. L DL PICC, R AV fistula B/T+. BP on L calf  60s/40s, BP on L forearm 140s/20s, MDs notified, no new orders, pt states has historically been difficult to obtain BPs d/t lines. Denies pain, nausea. Poor appetite, no food this shift. Up with heavy 2 assist, gait belt, and walker. Q2h turns. Pressure ulcer on coccyx, mepilex in place. Midline incision with staples JUMA. Illesotomy with dark brown, loose output. . Tentative plan for surgery 4/22 for stomal revision. Continue POC.

## 2021-04-22 NOTE — PROGRESS NOTES
"  Nephrology Progress Note  04/22/2021    Assessment & Recommendations:  Alexei Blake is a 80 year old with PMH CAD s/p recent PCI, CHF, HTN, DM2, ESRD on HD, PAD, Afib on warfarin who presented with acute onset of abdominal pain during HD. Patient was found to have ischemic changes of right colon with portal venous gas per imaging. S/p colonic resection and ileostomy 3/31.    ESRD on HD: Runs TTS at [facility] under care of Dr Noble Monae via RUE AVF for 3.5 hrs. EDW 92 kg  - Dialysis per TTS schedule  - No heparin given recent GI surgery    Lesion on R kidney: per CT 4/8, renal US 4/9 \"Right renal lesion measuring up to 1.8 cm. No definite  intralesional vascularity. Favored to represent a renal cyst however examination is limited secondary to shadowing. No left renal lesion identified. 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  - ileostomy with not much output, constricted opening, s/p OR for revision 4/21     BP:  PTA imdur 30 mg qday, metoprolol XL 50 at bedtime, lisinopril  - Imdur stopped and   - started isosorbide dinitrate 10 mg tid    - Lisinopril is held  - Now on metoprolol tartrate 25 mg bid on non HD days  - Hold all BP meds before dialysis    Volume: EDW 92 kg, O2 95% RA; minimal UOP; per HD unit, pt's BP's dropped to 84/45 during HD on 3/30 which had not been typical; but EDW had been lowered since pt had been hospitalized in January for CHF, pulm edema/volume overload (EDW was 101 kg in January, reduced to 91.5 kg as of 3/30, increased to 92 kg in light of hypotension on 3/30.)  - TPN has been condensed  - UF has been limited by hypotension and extreme caution to " avoid another ischemic bowel event  - Will likely lose significant fluids via ileostomy once ileus fully resolves  - NG tube removed 4/15  - Evidence of volume overload on 4/8 CT with small ascites and diffuse body wall anasarca  - Likely 98-99 kg will be a good EDW, the key will be avoiding hypotension/bowel ischemia while also avoiding pulm edema/CHF exacerbation      BMD - Ca 10.1, alb 2.9. phos 2.8  - stopped PTA hectorol 3 mcg with HD  - Hold PTA TUMS WM for now  - Ordered iCa    Anemia: hgb 8.5 g/dL, does not appear to have anemia chronically or require Epo  - starting epogen 4000 units per HD    Recommendations were communicated to primary team via note     Ana Paula Tolbert PA-C  [phone]    Interval History :   Seen on dialysis, will attempt gentle UF today. Had ileostomy revision yesterday 4/21, still not much output but patient hasn't eaten much either. Denies n/v, CP, SOB, chills    Review of Systems:   ROS neg as above    Physical Exam:   I/O last 3 completed shifts:  In: 600 [P.O.:50; I.V.:550]  Out: 125 [Stool:100; Blood:25]   BP 90/41 (BP Location: Left arm)   Pulse 68   Temp 96.7  F (35.9  C) (Oral)   Resp 16   Ht 1.829 m (6')   Wt 47.1 kg (103 lb 14.4 oz)   SpO2 98%   BMI 14.09 kg/m       GENERAL APPEARANCE: alert, NAD  EYES: no scleral icterus, pupils equal  Pulmonary: CTA  CV: regular rhythm, normal rate   - Edema peripheral trace to 1+  GI: ileostomy   NEURO: face symmetric, AOx3   Access: RUE AVF    Labs:   All labs reviewed by me  Electrolytes/Renal -   Recent Labs   Lab Test 04/22/21  0712 04/21/21  0653 04/20/21  0729 04/19/21  0826 04/19/21  0630   * 133 133 132* Canceled, Test credited   POTASSIUM 4.0 3.6 3.8 4.0 Canceled, Test credited   CHLORIDE 97 99 99 98 Canceled, Test credited   CO2 21 24 21 23 Canceled, Test credited   * 88* 132* 102* Canceled, Test credited   CR 6.09* 4.85* 6.59* 5.45* Canceled, Test credited   * 148* 155* 193* Canceled, Test credited    AVNI 10.1 9.6 10.2* 9.6 Canceled, Test credited   MAG  --  2.0  --  1.9 Canceled, Test credited   PHOS 2.8 2.6 3.6 3.0 Canceled, Test credited       CBC -   Recent Labs   Lab Test 04/21/21  0653 04/18/21  1402 04/14/21  0640   WBC 4.7 4.1 6.0   HGB 8.5* 8.2* 9.4*    185 191       LFTs -   Recent Labs   Lab Test 04/22/21  0712 04/21/21  0653 04/20/21  0729 04/19/21  0826 04/19/21  0630 04/12/21  0700 04/12/21  0700   ALKPHOS  --   --   --  43 Canceled, Test credited  --  41   BILITOTAL  --   --   --  0.5 Canceled, Test credited  --  0.4   ALT  --   --   --  14 Canceled, Test credited  --  11   AST  --   --   --  10 Canceled, Test credited  --  13   PROTTOTAL  --   --   --  5.5* Canceled, Test credited  --  4.9*   ALBUMIN 2.9* 3.0* 2.6* 2.6* Canceled, Test credited   < > 2.0*    < > = values in this interval not displayed.       Iron Panel - No lab results found.      Imaging:  All imaging studies reviewed by me.     Current Medications:    albumin human         sodium chloride 0.9%  250 mL Intravenous Once in dialysis     sodium chloride 0.9%  300 mL Hemodialysis Machine Once     albumin human  250 mL Other Once in dialysis     aspirin  81 mg Oral Daily     atorvastatin  80 mg Oral QPM     ceFAZolin  2 g Intravenous See Admin Instructions     clopidogrel  75 mg Oral Daily     epoetin christal-epbx (RETACRIT) inj ESRD  4,000 Units Intravenous Once in dialysis     gelatin absorbable  1 each Topical During Hemodialysis (from stock)     heparin ANTICOAGULANT  5,000 Units Subcutaneous Q8H     heparin lock flush  5-10 mL Intracatheter Q24H     insulin aspart  1-12 Units Subcutaneous Q4H     isosorbide dinitrate  5 mg Oral TID     lipids  250 mL Intravenous Q24H     [Held by provider] lisinopril  2.5 mg Oral Once per day on Sun Mon Wed Fri     metoprolol tartrate  25 mg Oral 2 times per day on Sun Mon Wed Fri     - MEDICATION INSTRUCTIONS -   Does not apply Once     pantoprazole  40 mg Oral QA AC       dextrose        parenteral nutrition - ADULT compounded formula 52 mL/hr at 04/21/21 2002     - MEDICATION INSTRUCTIONS -       Warfarin Therapy Reminder       Ana Paula Tolbert PA-C

## 2021-04-22 NOTE — PHARMACY-ANTICOAGULATION SERVICE
Clinical Pharmacy - Warfarin Dosing Consult     Pharmacy has been consulted to manage this patient s warfarin therapy.  Indication: Atrial Fibrillation  Therapy Goal: INR 2-3  Warfarin Prior to Admission: Yes  Warfarin PTA Regimen: 3mg daily  Recent documented change in oral intake/nutrition: Unknown    INR   Date Value Ref Range Status   04/22/2021 1.30 (H) 0.86 - 1.14 Final   04/19/2021 1.46 (H) 0.86 - 1.14 Final       Recommend warfarin 3 mg today.  Pharmacy will monitor Alexei Blake daily and order warfarin doses to achieve specified goal.      Please contact pharmacy as soon as possible if the warfarin needs to be held for a procedure or if the warfarin goals change.      Thank you,  Andy J. Kurtzweil, PharmD

## 2021-04-22 NOTE — PROGRESS NOTES
HEMODIALYSIS TREATMENT NOTE    Date: 4/22/2021  Time: 4:09 PM    Data:  Pre Wt: 101.7 kg (224 lb 3.3 oz)   Desired Wt: 99.2 kg   Post Wt: 100.7 kg (222 lb 0.1 oz)  Weight change: 1 kg  Ultrafiltration - Post Run Net Total Removed (mL): 1200 mL  Vascular Access Status: patent  Dialyzer Rinse: Clear  Total Blood Volume Processed: 82.75 L Liters  Total Dialysis (Treatment) Time: 3.5hrs Hours    Lab:   Yes     Assessment/Interventions:  Patient ran 3.5hrs via CVC with BFR of 400ml. Pt is cannulated with 15g needle. Net fluid removal 1.2kg. 25% albumin given due to low bp per nephrologist order.  Blood glucose was 180 at noon. Post dialysis hand off report is given to floor RN.          Plan:    Next HD run per real team.

## 2021-04-22 NOTE — PLAN OF CARE
7C PT - cancel    Pt too fatigued after dialysis run, worked with OT in AM prior. Noted pattern of pt not tolerating both disciplines and dialysis days. Discussed with OT, PT to reduce frequency to only allow therapies to double up on non-dialysis days.

## 2021-04-22 NOTE — PROGRESS NOTES
POD 2. Pain managed with PRN oxycodone.  Tolerating low fiber diet, calorie count. AC/HS glucose checks. Denies nausea. Pt passing gas and stool per ileostomy. Ileostomy, pink and protruding, red rubber tube not in stoma but in bag, serosanguinous output. Abdominal incision clean, dry and intact and JUMA. PICC line with TPN running 52ml/hr. SCDs on in bed.  Pt went to dialysis at 1000.

## 2021-04-22 NOTE — PROGRESS NOTES
Pt is AxO. Pt denies pain or need for medication. Ileostomy with 50ml output overnight. Red rubber is not in stoma, laying in bag. PICC line with TPN running @ 52ml/hr continuous and lipids. BG checked Q4 and coverage given per MAR. Pt not OOB this shift. Plan for dialysis today per routine schedule.

## 2021-04-22 NOTE — PLAN OF CARE
/75 (BP Location: Left arm)   Pulse 77   Temp 95.6  F (35.3  C) (Oral)   Resp 16   Ht 1.829 m (6')   Wt 99.2 kg (218 lb 11.1 oz)   SpO2 98%   BMI 29.66 kg/m       Assumed care of patient from [id number]. Patient was tired and took a nap majority of PM shift. PRN Dilaudid 0.2 mg administered d/t pain, tolerated well. , 8U Novolog given. Contact precautions maintained d/t MRSA. Assist of 2, pt not OOB. Ileostomy with scant output. D.L. PICC intact. R arm fistula site, limb alert. Continue POC.

## 2021-04-22 NOTE — PROGRESS NOTES
Care Management Follow Up    Length of Stay (days): 23    Expected Discharge Date: 04/21/21     Concerns to be Addressed: discharge planning    Patient plan of care discussed at interdisciplinary rounds: Yes    Anticipated Discharge Disposition: Transitional Care  Anticipated Discharge Services:   Outpatient Dialysis  [facility]   [address], [address]  [place] [address]  P: [phone]  Tuesday, Thursday, Saturday  Chair time: 5:30 AM (3.5 hour run)     Pt was given a [employer] application and a list of transportation companies.  His spouse typically transports him to dialysis and may be able to continue to do so pending pt's ability to get in/ out of her vehicle safely.  Anticipated Discharge DME: None    Patient/family educated on Medicare website which has current facility and service quality ratings: yes  Education Provided on the Discharge Plan: yes  Patient/Family in Agreement with the Plan: yes    Referrals Placed by CM/SW:   [facility]  [address] [place] [address]  [phone]  -E-referral sent. SW spoke with admissions and they are following pt. Tentative acceptance pending bed availability. SW left  to provide update that the team is working on tapering TPN and that SW will continue to keep admissions updated.      [facility]  [address] [place] [address]  P: [phone]  F: [phone]  -E-referral sent.  They do not take TPN, will need to send a new referral once off of TPN.     [facility]  [address] [place] [address]  [phone]  -E-referral sent.  They do not take TPN, will need to send a new referral once off of TPN.     [facility]  [address] [place], [place] [address] [phone]  -E-referral sent.       [facility]   [address] [place] [address]  [phone]  -E-referral sent.        Private pay costs discussed:  Not applicable    Additional Information:  SW following for dispo needs- per provider, pt may be med ready for discharge in a few days.     RAMO Horta, [facility]  Acute Care Float   [facility]  Phone: [phone]  Pager: [phone]

## 2021-04-22 NOTE — OP NOTE
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  Easy to intubate stoma at the end of the case.  Complications:            None.  Implants:         * No implants in log *    Indications: 80-year-old male who recently underwent exploratory laparotomy right hemicolectomy with end ileostomy and mucous fistula.  Postoperative hospital course was complicated by low ostomy output and obstruction.  Gastrografin contrast suggest the obstruction located at the ileostomy site itself.  Recommendation to move forward with revision of the end ileostomy site.  Risk of bleeding risk of infection and need for further surgery was all discussed with the patient.  Consent was obtained.    Procedure: Patient was brought to the operating room and placed in the supine position.  Ostomy appliance was removed with no production of gas or stool noted.  Patient was prepped and draped in the usual sterile fashion.  Timeout was performed ensuring right patient right procedure.  At this point time attention was paid to the sutures from the previous brooking of the ileostomy and these were taken down with combination of phillip and scalpel.  Dissection was then taken down to the fascial edge using combination of blunt and electrocautery.  The incision was then extended laterally 2 cm to improve visualization.  The fascia was found and a right angle clamp was placed between the  fascia and the small bowel.  This was then hooked and incised allowing for release of the fascia.  After this was done a red rubber catheter was now easily passed through the stoma directly into the bowel with return of gas.  The small bowel was then able to be intubated with a finger at this point now as well.  Ileostomy was rebrooked in the small lateral incision was closed Nexafed was placed.  The ostomy device was placed back over the ileostomy/mucous fistula.  Patient tolerated the procedure well.  Sponge instrument counts were correct at the end.

## 2021-04-22 NOTE — PROGRESS NOTES
WOC Nurse Inpatient [facility]   WO Nurse Inpatient Adult     Re Assessment   Assessment of new end Ileostomy Stoma complication(s) edema   Mucocutaneous junction; intact   Peristomal complication(s) incision at 9 o clock from stoma revision   Pouch wear time:3-4 days,   Following today's visit:Patient / is  able to demonstrate;       1. How to empty their pouch? Yes- demonstrated on 4/1 and return demo      2. How to change their pouch?  No- demonstrated on 4/1 (for patient) and 4/5 (for spouse)      3. How to read and record intake and output correctly? No- demonstrated on 4/1    Psychosocial- His wife had an ostomy previously about 8 years ago. She watched a pouch change at bedside 4/5 and stated that she was starting to remember how to do it.     4/12: patient still with NGT and ileus with nausea, minimal output in pouch which is intact so did not complete any ostomy education today, patient c/o pain to nose at time of assessment, NGT stanley changed and pressure injury found   4/15:  Pt declined hands on participation with ostomy teaching  4/19: patient nauseous again today after sitting up at bedside with PT, pouch changed and cut hole wider to decrease any constriction to stoma lumen, red rubber catheter in place    4/22: patient feeling better on assessment although still weak and tired. Had stoma revision yesterday and now stoma working better. 50ml of sludge like, odorous stool emptied from pouch prior to changing. Red rubber coiled in bottom of pouch so not replaced, however did place a two piece pouch so stoma can be intubated if needed. Plan for patient to discharge to TCU      Objective data:  Patient history according to medical record: Alexei Kaur is an 80-year-old gentleman with numerous comorbidities including coronary artery disease with a recent stent on Plavix, end stage renal disease on hemodialysis, on warfarin who presented with abdominal pain and signs symptoms consistent  with ischemia of the ascending colon. S/p ex-lap, open right hemicolectomy, end ileostomy and mucus fistula.  Current Diet/Nutrition: Orders Placed This Encounter      Combination Diet Low Fiber; Thin Liquids (water, ice chips, juice, milk, gelatin, ice cream, etc); Dialysis Diet     TPN no   I/O last 3 completed shifts:  In: 140 [P.O.:120; I.V.:20]  Out: 1250 [Other:1200; Stool:50]  Labs:    Recent Labs   Lab 04/22/21  0712 04/21/21  0653   ALBUMIN 2.9* 3.0*   HGB  --  8.5*   INR 1.30*  --    WBC  --  4.7        Physical Exam:  Current pouching system:[employer] two piece 57mm flat cut to fit fecal pouch with barrier ring    Reason for pouch change today: ostomy education and routine schedule  Stoma appearance: viable, healthy, normal-appearing, pink, moist, edematous and protruberant  Stoma size; 35 mm ,    Peristomal skin: intact  Stoma output :brown and pasty, about 50ml in pouch when changed , odorous   Abdominal  Assessment  firm , NG still in place? No  Surgical Site: dressing dry and intact  Pain: Dull ache  Is patient still on a PCA No     Interventions:  Patient's chart evaluated.  Focus of today's visit: refitting of appliance and pouch change demonstration    Participant of teaching session today patient   Orders: Written  Change made with ostomy management today: No-  continue 2 pc flat CTF with barrier ring    Patient/family: lethargic and observing  Supplies:Gathered and at bedside    Plan:  Learning needs: pouch change return demonstration, output measurement and discharge instructions  Preparation for discharge: Discussed how/ where to order supplies, Prescriptions or note left on chart for MD to sign/complete, Supplies ordered, Discussed making a WOC Nurse outpatient appointment upon discharge  Recommend home care? yes     Ileostomy care:  ~ Encourage patient participation with ostomy care,  ~ Empty pouch when 1/3 to 1/2 full,   ~ Notify WOC for ongoing ostomy pouch leakage,  ~ Document stoma output  "volume, color, consistency EVERY shift  ~ Supplies used    ~ Pouch: [employer] 57 FECAL (320894)   ~ Flange/Barrier/Wafer: [employer] 57mm FLAT (038492)   ~ Accessories: 2\" Adapt Barrier Ring ([id number])    Discussed plan of care with Patient and Nurse  Nursing to notify the Provider(s) and re-consult the WO Nurse if new ostomy concerns or discharge planned before next planned WOC visit.    WO Nurse will return:       [facility]     Name: Alexei Blake : 1940  Date: 2021    To order your ostomy supplies    The ostomy Supplier needs this supply list  to process your order. You will need to fax/deliver this list, along with your Insurance information. Your home care nurse can assist with this process.                 List of Ostomy Distributors      [facility]  Ph. [phone] Fax # [phone]  [facility]   Ph. [phone]  [employer] [facility]   Ph. [phone]  [facility]   Ph. [phone]  [facility]       Ph. [phone]  [facility]    Ph.-[phone]  Or Call your insurance provider for their preferred supplier    Your Medical Supplier will need your surgeon's name, phone and fax number    Clinic:                     Phone                            Fax  General Surgery:   [phone]  Verbal Order for ostomy supplies for 1 Month per:       Sumaya Saenz RN, CWOCN     Authorizing MD: Adrien Medina MD    Change pouch :Three times a week  Quantity of pouches: 20 pouches/month     Request the following supplies:      [employer]    2 piece flat wafer 57mm  # [id number]     Fecal pouch 57mm- # [id number]        Accessories  2  barrier ring #[id number]     Adapt powder #7906    No sting film barrier # [id number]    M-9 Spray room deodorizer #9509                                     Change your pouch twice a week, more often if leaking.    If you are cutting a hole in the wafer of your " pouch, recheck stoma size and adjust pouch opening as needed every week    . Call the Ostomy Nurse at [facility]       [address] [place] [address] [place] : [phone]   Schedule a follow-up visit in 2 to 4 weeks after your surgery, sooner if having problems Bring a complete set of pouch-changing supplies to this visit    [facility] outpatient [facility] department  [address] [place] [address]   number- [phone]      Problems you should Report  - The stoma turns blue or darker in color.  - Cuts or sores around the stoma.  - Red, raw or painful skin around the stoma.  - Any bulging of the skin around the stoma.  - A pouch that leaks every day.  - Problems making the right size hole in the pouch wafer.    Please call with any questions or concerns.    [facility] Nurse Inpatient Pressure Injury Assessment   Reason for consultation: Evaluate and treat coccyx and nose      ASSESSMENT  Pressure Injury: on sacrum , hospital acquired ,   Pressure Injury is Stage 2   Contributing factor of the pressure injury: shear and immobility  Status: stable,    Pressure injury: on R nare, hospital acquired   Medical device related injury due to NGT   Pressure injury is stage 2  Status: resurfaced 4/22     TREATMENT PLAN  Sacrum wound: Every third day cleanse with microKlenz and dry, apply Cavilon no sting barrier film to skin around wound and let dry, then place sacral mepilex.     Orders Reviewed and Updated  WO Nurse follow-up plan:weekly  Nursing to notify the Provider(s) and re-consult the WO Nurse if wound(s) deteriorates or new skin concern.    Patient History  According to provider note(s):  Patient history according to medical record: Alexei Kaur is an 80-year-old gentleman with numerous comorbidities including coronary artery disease with a recent stent on Plavix, end stage renal disease on hemodialysis, on warfarin who presented with abdominal pain and signs symptoms consistent with ischemia of  the ascending colon. S/p ex-lap, open right hemicolectomy, end ileostomy and mucus fistula.    Objective Data  Containment of urine/stool: Ileostomy pouch    Current Diet/ Nutrition:  Orders Placed This Encounter      Combination Diet Low Fiber; Thin Liquids (water, ice chips, juice, milk, gelatin, ice cream, etc); Dialysis Diet      Output:   I/O last 3 completed shifts:  In: 140 [P.O.:120; I.V.:20]  Out: 1250 [Other:1200; Stool:50]    Risk Assessment:   Sensory Perception: 3-->slightly limited  Moisture: 4-->rarely moist  Activity: 2-->chairfast  Mobility: 3-->slightly limited  Nutrition: 3-->adequate  Friction and Shear: 2-->potential problem  Dheeraj Score: 17      Labs:   Recent Labs   Lab 04/22/21  0712 04/21/21  0653 04/19/21  0630 04/19/21  0630   ALBUMIN 2.9* 3.0*   < > Canceled, Test credited   PREALB  --   --   --  10*   HGB  --  8.5*  --   --    INR 1.30*  --   --  1.46*   WBC  --  4.7  --   --     < > = values in this interval not displayed.       Physical Exam  Skin inspection: focused sacrum        Wound Location:  sacrum  Date of Photos: 4/5 and 4/15  Wound History: noted by RN  AM 4/5, sacral mepilex was in place at time of assessment   Measurements (length x width x depth, in cm) 1cm x 0.8cm x 0.1cm    Wound Base:  100% dermis  Tunneling N/A  Undermining N/A  Palpation of the wound bed: normal   Periwound skin: erythema- blanchable  Color: red  Temperature: normal   Drainage:, small  Description of drainage: serous  Odor: none  Pain: denies , none     Wound Location: R nare      Date of last Photo -4/15/21  Wound History: patient c/o pain to nose on during WOC assessment of ostomy and tube securement was changed and wound noted to tip of nare.  NG tube removed earlier today   Measurements (length x width x depth, in cm) resurfaced   Wound Base:  100 % epidermis      Interventions  Current support surface: Standard  Low air loss mattress  Current off-loading measures: Foam padding and  Pillows  Repositioning aid: Pillows  Visual inspection of wound(s) completed   Wound Care: was done per plan of care.  Supplies: at bedside and floor stock  Educated provided: importance of repositioning and plan of care  Education provided to: patient  and nurse  Discussed importance of:repositioning every 2 hours and off-loading pressure to wound  Discussed plan of care with Patient and Nurse

## 2021-04-22 NOTE — PROGRESS NOTES
GENERAL SURGERY PROGRESS NOTE    Name: Alexei Blake   : 1940  MRN: [id number]    SUBJECTIVE  - Red rubber out overnight, remains in stoma bag  - Minimal diffuse, dull abdominal pain. Improved with PRNs.  - Night sweats overnight, but otherwise denies subjective fever/chills.  - Tolerating PO intake. No N&V.  - Denies chest pain, SOB, lightheadedness, new extremity swelling.     OBJECTIVE     Temp: 95.1 F (24 hr range: 95-97.6 F)  HR: 60 bpm (24 hr range: 60-77 bpm)  RR: 16 rpm  BP: 101/28 mmHg (24 hr range: 101-209/13-75 mmHg)  SpO2: 96% on RA     Intake (24 hr): 550 mL IV/50 mL PO  Output (24 hr): 100 mL (ostomy output)     Physical Exam     General: NAD. Resting comfortably in bed.  Cardiovascular: RRR  Pulmonary: Breathing comfortably on RA.  Abdomen: Soft. R ileosotmy in place. Large amount of gas and small amount of dark brown liquid stool in bag. Minimal tenderness surrounding ileostomy site. No surrounding erythema.   Neuro: Alert and orientated.  Psych: Normal mood and affect. Normal speech.     Labs:     2021 11:50 2021 17:18 2021 22:23 2021 02:11   Glucose 249 (H) 324 (H) 354 (H) 307 (H)     ASSESSMENT/PLAN    Alexei Blake is an 81 y/o mael w/ history of CAD (s/p recent stent on clopidogrel), CHF (EF 25%), CVA (left leg deficit, anticoagulated on warfarin), ESRD (dialysis dependent), and DMII, who is s/p exploratory lapartomy w/ right hemicolectomy, ileostomy, and mucous fistula (21). Post-op course complicated by ileus, likely due to narrow stoma with tight fascia, so underwent ileostomy revision (21). Elevated BP post-op, likely related to fluid overload, improved overnight. mIVF discontinued, given resumption of regular diet.     - Low residue/renal diet  - Continue ASA, resume plavix and will start coumadin dosing today, plan for close f/u with Cardiology  - Antihypertensives per Nephrology, appreciate recs  - Continue TPN for moderate malnutrition,  adjustment to volume per Nephro and Pharm  - PPI   - HD Tu/Th/Sat  - Heparin ppx      Barrera De Dios  Medical Student on General Surgery Service    Resident/Fellow Attestation  I, Lori Silvestre, was present with the medical/NANCI student who participated in the service and in the documentation of the note, as edited above.  I have verified the history and personally performed the physical exam and medical decision making.  I agree with the assessment and plan of care as documented in the note.      Lori Silvestre MD  PGY1  Date of Service (when I saw the patient): 04/22/21

## 2021-04-22 NOTE — PROGRESS NOTES
Calorie Count  Intake recorded for: 4/21  Total Kcals: 0 Total Protein: 0g  Kcals from Hospital Food: 0   Protein: 0g  Kcals from Outside Food (average):0 Protein: 0g  # Meals Recorded: 1 small meal ordered from kitchen, no intake recorded.   # Supplements Recorded: no intake recorded.

## 2021-04-23 NOTE — PLAN OF CARE
/42   Pulse 68   Temp 97.3  F (36.3  C) (Axillary)   Resp 16   Ht 1.829 m (6')   Wt 100.7 kg (222 lb 0.1 oz)   SpO2 100%   BMI 30.11 kg/m      Time: 8818-8643  Status: POD 1 s/p open ileostomy revision, POD 22 s/p XL, with R hemicolectomy, ileostomy creation due to ascending colon ischemia. Has hx of CAD s/p recent stent, A fib, ESRD on HD (T,Th, Sa), DM-2.   Neuro:  A&Ox4   Activity: stayed in bed, refused turn in bed and OOB.   Pain: denied   Cardiac: irregular apicale heart beat, denied chest pain.   Respiratory: RA, sating > 92%, LS clear all lobes. Denied SOB or coughing. Deep breathing and coughing encouraged.   GI/: ileostomy bag empted by WOC nurse. Active bowel sound. Anuria, pt is on HD (T, Th, Sa).   Diet: low fiber, TPN 52 ml/hr continuous. Denied nausea.   Skin: scattered bruises, scabbing, dry skin, pressure injury to coccyx. Refused repositioning.   LDAs: PICC double lumen infusing, saline locked and TPN. Ileostomy   Labs/Imaging: BS q 6 hours.   New change this shift: none   Plan: continue to monitor. Contact general surgery for any concern.

## 2021-04-23 NOTE — PROGRESS NOTES
POD 2. AVSS on RA. Pain managed with tylenol and oxycodone. Tolerating low fiber/thin liquid diet, calorie count. Denies nausea. Ileostomy clean dry and intact with small amount of output. Anuric . Midline incision clean dry and intact. Fistula site was bleeding, dialysis notified had us placed dressing on it. Up to chair for meals with walker/gait belt and 2 assist. SCDs on in bed. AC/HS blood glucose checks. Phos was 2.3, MD notified, didn't want to replace because of kidney function. Phos will be redrawn to monitor. Hemodialysis T/TH/S. Will continue to monitor.

## 2021-04-23 NOTE — PROGRESS NOTES
GENERAL SURGERY PROGRESS NOTE    Name: Alexei Blake   : 1940  MRN: [id number]    SUBJECTIVE  - No abdominal pain.  - Small meal yesterday. Poor appetite, but no nausea/vomiting.  - Slightly thicker output from stoma  - No subjective fever, chills, night sweats.  - Otherwise denies chest pain, SOB, or lightheadedness.     OBJECTIVE     Temp: 97.3 F (24 hr max: 97.9 F)  HR: 68 bpm (24 hr range: 56-68 bpm)  RR: 16 rpm  BP:  137/42 mmHg (24 hr range: /28-85 mmHg)  SpO2: 100% on RA    Intake (24 hr): 120 mL (PO)/20 mL (IV)  Output (24 hr):  50 mL (stoma)/1200 mL (UF)    Physical Exam     General: NAD. Resting comfortably in bed.  Cardiovascular: RRR  Pulmonary: Breathing comfortably on RA.  Abdomen: Soft. R ileosotmy in place. Gas and soft/somewhat liquidy brown stool in ostomy bag (more formed and greater in volume compared to yesterday). No abdominal tenderness.  Neuro: Alert and orientated.  Psych: Normal mood and affect. Normal speech.     Recent Labs:     2021 07:45 2021 12:09 2021 18:31 2021 22:22 2021 01:56   Glucose 220 (H) 180 (H) 225 (H) 195 (H) 171 (H)     ASSESSMENT/PLAN    Alexei Blake is an 79 y/o male w/ history of CAD (s/p recent stent on clopidogrel), CHF (EF 25%), CVA (left leg deficit, anticoagulated on warfarin), ESRD (dialysis dependent), and DMII, who is s/p exploratory lapartomy w/ right hemicolectomy, ileostomy, and mucous fistula (21). Post-op course complicated by ileus, likely due to narrow stoma with tight fascia. Underwent ileostomy revision (21), with subsequent improvement in ileus.     - Continue low residue/renal diet  - Continue ASA, plavix, and coumadin (resumed yesterday; pharmacy managing), plan for close f/u with established Cardiology  - Antihypertensives per Nephrology, appreciate recs  - Continue TPN for moderate malnutrition, adjustment to volume per Nephro and Pharm, plan to wean off as PO intake improves, ongoing calorie  counts  - PPI    - HD Tu/Th/Sat  - Heparin ppx    - Dispo: tentatively accepted to TCU once medically appropriate for discharge    Barrera De Dios  Medical Student on General Surgery Service    Resident/Fellow Attestation  I, Lori Silvestre, was present with the medical/NANCI student who participated in the service and in the documentation of the note, as edited above.  I have verified the history and personally performed the physical exam and medical decision making.  I agree with the assessment and plan of care as documented in the note.      Lori Silvestre MD  PGY1  Date of Service (when I saw the patient): 04/23/21

## 2021-04-23 NOTE — PLAN OF CARE
POD 1. AVSS. Pain managed with Oxycodone and Tylenol. Denies nausea, on a low fiber diet. PICC with TPN/lipids and HL. Ileostomy with small amt of output, bag changed x1 overnight. Anuric. Up with 2A, GB and walker, did not get OOB this shift. Refused repositioning. Abdominal incision CDI/JUMA. BG q4h with SSI. Had dialysis yesterday, scheduled T, Th, Sat.   Continue POC

## 2021-04-23 NOTE — PROGRESS NOTES
Calorie Count    Intake recorded for: 4/22/2021  Total Kcals: 0 Total Protein: 0g    Kcals from Hospital Food: 0   Protein: 0g    Kcals from Outside Food (average):0 Protein: 0g    # Meals Ordered from Kitchen: 1 ordered     # Meals Recorded: zero food intake recorded on calorie count sheet. Meal tray ticket not saved. No intake recorded in Epic food record.    # Supplements Recorded: no intake recorded

## 2021-04-24 NOTE — PLAN OF CARE
Alert and oriented x 4. Denies pain and nausea, however took tylenol. Blood glucose checked Q 4 hours 201 and 153, covered per sliding scale. TPN infusing at 52 ml/hr.Ileostomy output was 50 ml.

## 2021-04-24 NOTE — PROGRESS NOTES
"Surgery Progress Note  POD#25      Subjective:  Patient feels well this morning. Denies abdominal pain, N/V. Did not eat dinner last night as he slept through it. Improved stoma output (400 overnight).     Vitals:  Vitals:    04/24/21 0840 04/24/21 0850 04/24/21 0900 04/24/21 0915   BP:  96/62 (!) 94/21 90/76   BP Location:       Pulse:  69 78 68   Resp:  18     Temp:       TempSrc:       SpO2:  99% 95% 99%   Weight: 96.3 kg (212 lb 4.8 oz)      Height: 1.829 m (6' 0.01\")        I/O:  I/O last 3 completed shifts:  In: 1396.45 [P.O.:270; I.V.:40]  Out: 475 [Stool:475]    Physical Exam:  General: NAD. Resting comfortably in bed.  Cardiovascular: RRR  Pulmonary: Breathing comfortably on RA.  Abdomen: Soft. R ileosotmy in place. Gas and liquid brown stool in ostomy bag. No abdominal tenderness.  Neuro: Alert and orientated.  Psych: Normal mood and affect. Normal speech.       BMP  Recent Labs   Lab 04/23/21  1417 04/23/21  0740 04/22/21  0712 04/21/21  0653 04/20/21  0729 04/19/21  0826 04/19/21  0630   NA  --  133 131* 133 133 132* Canceled, Test credited   POTASSIUM  --  3.5 4.0 3.6 3.8 4.0 Canceled, Test credited   CHLORIDE  --  98 97 99 99 98 Canceled, Test credited   CO2  --  24 21 24 21 23 Canceled, Test credited   BUN  --  86* 124* 88* 132* 102* Canceled, Test credited   CR  --  4.28* 6.09* 4.85* 6.59* 5.45* Canceled, Test credited   GLC  --  158* 219* 148* 155* 193* Canceled, Test credited   MAG  --  1.8  --  2.0  --  1.9 Canceled, Test credited   PHOS 2.3* 2.3* 2.8 2.6 3.6 3.0 Canceled, Test credited     CBC  Recent Labs   Lab 04/21/21  0653 04/18/21  1402   WBC 4.7 4.1   HGB 8.5* 8.2*   HCT 26.1* 25.1*    185         ASSESSMENT: Alexei Blake is an 81 y/o male w/ history of CAD (s/p recent stent on clopidogrel), CHF (EF 25%), CVA (left leg deficit, anticoagulated on warfarin), ESRD (dialysis dependent), and DMII, who is s/p exploratory lapartomy w/ right hemicolectomy, ileostomy, and mucous fistula " (03/30/21). Post-op course complicated by ileus, likely due to narrow stoma with tight fascia. Underwent ileostomy revision (04/21/21), with subsequent improvement in ileus. Much improved stoma output today.     - Continue low residue/renal diet  - Continue ASA, plavix, and coumadin plan for close f/u with established Cardiology  - Antihypertensives per Nephrology, appreciate recs  - Half TPN and then let run out today  - PPI    - HD Tu/Th/Sat  - Heparin ppx    - Dispo: ready for discharge to TCU, will coordinate with TCU     Tutu Ness  PGY-1  [phone]    Patient was seen and discussed with chief

## 2021-04-24 NOTE — PLAN OF CARE
POD 3, open ileostomy. AVSS. A&O x 4. DL PICC, purple infusing TPN cont and red heparin locked. Went to dialysis around 0930, arrived back to unit at 1315. Low fiber diet, calorie counts maintained, blood sugars ACHS. Anuria. Ileostomy with watery brown output + gas. Midline abdominal incision, with adhesive strips, dried drainage. Patient not OOB, per other nurse, gets up x 2 with walker and GB. Tylenol and oxycodone PRN for pain. Asymptomatic COVID swab sent to lab

## 2021-04-24 NOTE — PLAN OF CARE
POD # 23 s/p Right Hemicolectomy, Ileostomy creation. Patient is alert, oriented x 4, pain well controlled with Oxycodone. Denies nausea, tolerating low fiber diet. PICC line patent, TPN and lipids infusing, Ileostomy appliance intact. Pt on strict I/Os, on Calorie Count. Up in chair x 1. Pt on pulsate mattress, repositioned every 2-3 hours.

## 2021-04-24 NOTE — PROGRESS NOTES
Calorie Count  Intake recorded for: 4/23  Total Kcals: 304 Total Protein: 21g  Kcals from Hospital Food: 304   Protein: 21g  Kcals from Outside Food (average):0 Protein: 0g  # Meals Ordered from Kitchen: 3 meals   # Meals Recorded: 2 meals (First- 50% corn flakes w/ milk, coffee)      (Second - 100% crumb cod)  # Supplements Recorded: 0

## 2021-04-24 NOTE — PROGRESS NOTES
Care Management Follow Up    Length of Stay (days): 25    Expected Discharge Date: 04/26/21     Concerns to be Addressed: discharge planning, other (see comments)  Accepting facility and dialysis transportation (wife or [employer] company)  Patient plan of care discussed at interdisciplinary rounds: Yes    Anticipated Discharge Disposition: Transitional Care     Anticipated Discharge Services: None  Anticipated Discharge DME: None    Patient/family educated on Medicare website which has current facility and service quality ratings: yes  Education Provided on the Discharge Plan:    Patient/Family in Agreement with the Plan: yes    Referrals Placed by CM/SW:    Private pay costs discussed: Not applicable    Additional Information:  Resent referrals as they were from 4/8. TPN to be discontinued today. SW to follow for discharge.    RAMO Foreman BSW

## 2021-04-24 NOTE — PROGRESS NOTES
HEMODIALYSIS TREATMENT NOTE    Date: 4/24/2021  Time: 12:55 PM    Data:  Pre Wt: 96.3 kg (212 lb 4.9 oz)   Desired Wt: 94.2 kg   Post Wt: 94.3 kg (207 lb 14.3 oz)  Weight change: 2 kg  Ultrafiltration - Post Run Net Total Removed (mL): 2000 mL  Vascular Access Status: Fistula  patent  Dialyzer Rinse: Clear  Total Blood Volume Processed: 81.18 L   Total Dialysis (Treatment) Time: 3.5   Dialysate Bath: K 3, Ca 2.25  Heparin: None    Lab:   No    Interventions:  epo given  Albumin given    Assessment:  Soft but stable bp during tx. Albumin given to support bp. Pt tolerating run. Handoff given to floor rn     Plan:    Per renal

## 2021-04-24 NOTE — PROGRESS NOTES
Nephrology dialysis visit     Denies any new complaints. He is tolerating HD well.   This patient was seen and examined while on dialysis. Laboratory results and nurses' notes were reviewed.  No changes to management of volume, anemia, BMD, acidosis, or electrolytes.  Diagnosis - ESKD on HD   Yecenia Thomas MD  [phone]

## 2021-04-25 NOTE — PROVIDER NOTIFICATION
Paged Carmen Aviles at [phone]. Pt has not had platelet lab value drawn since 4/21. Pt on scheduled heparin. Paged to determine if it should be drawn prior to administration at 0000.     Per rebecca CONNOR to administer heparin.

## 2021-04-25 NOTE — PLAN OF CARE
"Patient is alert, oriented x4, and cooperative. Pain rated 5/10; \"manageable\". Denies nausea or vomiting. States having low appetite; ate 2 meals on low fiber diet. Up with 2 and gait belt; refused to ambulate or do personal cares. Ileostomy red, moist; output 50 mL, watery, brown.     Note written by student, instructor (author) present for care of patient throughout the day and agrees with documentation here.   "

## 2021-04-25 NOTE — PROGRESS NOTES
Calorie Count  Intake recorded for: 4/24  Total Kcals: 70 Total Protein: 1g  Kcals from Hospital Food: 70   Protein: 1g  Kcals from Outside Food (average):0 Protein: 0g  # Meals Ordered from Kitchen: 1 meal   # Meals Recorded: 100% 1 jello  # Supplements Recorded: 0

## 2021-04-25 NOTE — PLAN OF CARE
[id number] Midline abdominal incision C/D/I with steri-strips. Ileostomy intact, +flatus and small amount of watery stool. Denies nausea. Abdominal discomfort managed with Tylenol and oxycodone. PICC heparin locked. RUE AV fistula with gauze dressing C/D/I, +bruit and thrill. Anuric. Low fiber, renal diet with calorie counts. Not out of bed overnight. Pt refused repositioning. On pulsate mattress. Hypertensive, does not meet parameters to notify. OVSS on room air.

## 2021-04-25 NOTE — PLAN OF CARE
POD # 24 s/p Right Hemicolectomy, Ileostomy creation. Patient is alert, oriented x 4, pain well controlled with Oxycodone. Had some difficulties checking BP due to edema and dialysis fistula on UE. BP was fluctuating from 35/21 to 219/40, pt was asymptomatic. MD notified, order to monitor for changes. See Epic flowsheet for details. Pt denies nausea, tolerating low fiber diet. This writer was unable to chart calorie count,someone took pt's dinner tray without charting PO intake. And pt doesn't remember what he ate for dinner this evening . PICC line patent, Ileostomy appliance intact. Pt on strict I/Os, on Calorie Count. Pt had Dialysis done today, feeling very tired. Declined repositioning in bed, risks and benefits explained. Pt on pulsate mattress.

## 2021-04-25 NOTE — PROGRESS NOTES
Surgery Progress Note  POD#26      Subjective:  Feels well. Eating breakfast. Denies abdominal pain, N/V.     Vitals:  Vitals:    04/24/21 1721 04/24/21 2016 04/24/21 2041 04/24/21 2318   BP: 120/53  132/45 (!) 161/82   BP Location:   Left arm Left arm   Pulse:  64  71   Resp:  18  16   Temp:  96.9  F (36.1  C)  98.5  F (36.9  C)   TempSrc:  Axillary  Oral   SpO2:  99% 97% 98%   Weight:       Height:         I/O:  I/O last 3 completed shifts:  In: 1024.7 [P.O.:420; I.V.:20]  Out: 2075 [Other:2000; Stool:75]    Physical Exam:  General: NAD. Resting comfortably in bed.  Cardiovascular: RRR  Pulmonary: Breathing comfortably on RA.  Abdomen: Soft. R ileostomy in place. Gas and soft brown stool in ostomy bag. No abdominal tenderness.  Neuro: Alert and orientated.  Psych: Normal mood and affect. Normal speech.    BMP  Recent Labs   Lab 04/25/21  0732 04/24/21  1405 04/23/21  1417 04/23/21  0740 04/22/21  0712 04/21/21  0653 04/19/21  0826 04/19/21  0826 04/19/21  0630    134  --  133 131* 133   < > 132* Canceled, Test credited   POTASSIUM 3.9 3.4  --  3.5 4.0 3.6   < > 4.0 Canceled, Test credited   CHLORIDE 99 98  --  98 97 99   < > 98 Canceled, Test credited   CO2 25 26  --  24 21 24   < > 23 Canceled, Test credited   BUN 73* 56*  --  86* 124* 88*   < > 102* Canceled, Test credited   CR 4.29* 2.96*  --  4.28* 6.09* 4.85*   < > 5.45* Canceled, Test credited   * 220*  --  158* 219* 148*   < > 193* Canceled, Test credited   MAG  --   --   --  1.8  --  2.0  --  1.9 Canceled, Test credited   PHOS 2.6 1.5* 2.3* 2.3* 2.8 2.6   < > 3.0 Canceled, Test credited    < > = values in this interval not displayed.     CBC  Recent Labs   Lab 04/21/21  0653 04/18/21  1402   WBC 4.7 4.1   HGB 8.5* 8.2*   HCT 26.1* 25.1*    185         ASSESSMENT: Alexei Blake is an 81 y/o male w/ history of CAD (s/p recent stent on clopidogrel), CHF (EF 25%), CVA (left leg deficit, anticoagulated on warfarin), ESRD (dialysis  dependent), and DMII, who is s/p exploratory lapartomy w/ right hemicolectomy, ileostomy, and mucous fistula (03/30/21). Post-op course complicated by ileus, likely due to narrow stoma with tight fascia. Underwent ileostomy revision (04/21/21), with subsequent improvement in ileus. Much improved stoma output post-op.      - Continue low residue/renal diet  - Continue ASA, plavix, and coumadin plan for close f/u with established Cardiology  - Antihypertensives per Nephrology, appreciate recs  - off TPN  - PPI    - HD Tu/Th/Sat  - Heparin ppx    - Dispo: ready for discharge to TCU, will coordinate with TCU     Tutu Ness  PGY-1  [phone]    Patient was seen and discussed with chief

## 2021-04-26 NOTE — TELEPHONE ENCOUNTER
"In response to vm msg received from patient, sent the following message to Dr. Tom Kauffman's , asking her to follow up with patient/wife:    \"Jose Juan Kauffman,    Patient's wife Isabel called, left  msg explaining that patient is in the hospital, so needs to reschedule his appt with Dr. Santos that is supposed to be on 4/28/2021. Please call him or wife to reschedule.    Thank-you!  Bella\"  "

## 2021-04-26 NOTE — PROGRESS NOTES
WOC Nurse Inpatient [facility]   WO Nurse Inpatient Adult     Re Assessment   Assessment of new end Ileostomy Stoma complication(s) edema   Mucocutaneous junction; intact   Peristomal complication(s) incision at 9 o clock from stoma revision with purulent drainage, surgery paged and opened incision, now with wound at 9 o clock   Pouch wear time:3-4 days,   Following today's visit:Patient / is  able to demonstrate;       1. How to empty their pouch? Yes- demonstrated on 4/1 and return demo      2. How to change their pouch?  No- demonstrated on 4/1 (for patient) and 4/5 (for spouse)      3. How to read and record intake and output correctly? No- demonstrated on 4/1    Psychosocial- His wife had an ostomy previously about 8 years ago. She watched a pouch change at bedside 4/5 and stated that she was starting to remember how to do it.     4/12: patient still with NGT and ileus with nausea, minimal output in pouch which is intact so did not complete any ostomy education today, patient c/o pain to nose at time of assessment, NGT stanley changed and pressure injury found   4/15:  Pt declined hands on participation with ostomy teaching  4/19: patient nauseous again today after sitting up at bedside with PT, pouch changed and cut hole wider to decrease any constriction to stoma lumen, red rubber catheter in place    4/22: patient feeling better on assessment although still weak and tired. Had stoma revision yesterday and now stoma working better. 50ml of sludge like, odorous stool emptied from pouch prior to changing. Red rubber coiled in bottom of pouch so not replaced, however did place a two piece pouch so stoma can be intubated if needed. Plan for patient to discharge to TCU   4/26: incision noted to be draining purulent drainage upon removal of pouch so paged surgery who came to bedside and removed sutures and drained what appeared to be an infected hematoma from incision. Packed with hydrofera blue,  covered in comfeel plus, then pouch placed, will likely need daily pouch changes this week to help clean out wound, adjusted discharge orders     Objective data:  Patient history according to medical record: Alexei Kaur is an 80-year-old gentleman with numerous comorbidities including coronary artery disease with a recent stent on Plavix, end stage renal disease on hemodialysis, on warfarin who presented with abdominal pain and signs symptoms consistent with ischemia of the ascending colon. S/p ex-lap, open right hemicolectomy, end ileostomy and mucus fistula.  Current Diet/Nutrition: Orders Placed This Encounter      Combination Diet Low Fiber; Thin Liquids (water, ice chips, juice, milk, gelatin, ice cream, etc); Dialysis Diet      Advance Diet as Tolerated     TPN no   I/O last 3 completed shifts:  In: 310 [P.O.:310]  Out: 440 [Stool:440]  Labs:    Recent Labs   Lab 04/26/21  0721 04/21/21  0653 04/21/21  0653   ALBUMIN 2.8*   < > 3.0*   HGB  --   --  8.5*   INR 1.79*   < >  --    WBC  --   --  4.7    < > = values in this interval not displayed.        Physical Exam:    4/26 prior to suture removal     Current pouching system:Mitchel two piece 57mm flat cut to fit fecal pouch with barrier ring, wound at 9 o clock packed with hydrofera blue and covered with comfeel prior to pouching    Reason for pouch change today: ostomy education and routine schedule  Stoma appearance: viable, healthy, normal-appearing, pink, moist, edematous and protruberant  Stoma size; 35 mm ,    Peristomal skin: intact  Stoma output :brown and pasty  Abdominal  Assessment  firm , NG still in place? No  Surgical Site: draining purulent drainage, now sutures removed and wound measuring 0.3cm x 2cm x 2.5cm with tunnel at 2 o clock measuring 1cm, wound bed with blood clots   Pain: Dull ache  Is patient still on a PCA No     Interventions:  Patient's chart evaluated.  Focus of today's visit: refitting of appliance, evaluate leakage issue,  "pouch change demonstration  and discharge instructions   Participant of teaching session today patient   Orders: Written  Change made with ostomy management today: Yes-  continue 2 pc flat CTF with barrier ring , added hydrofera blue and comfeel to wound site prior to pouching   Patient/family: lethargic and observing  Supplies:Gathered and at bedside    Plan:  Learning needs: pouch change return demonstration  Preparation for discharge: Discussed how/ where to order supplies, Prescriptions or note left on chart for MD to sign/complete, Supplies ordered, Discussed making a WO Nurse outpatient appointment upon discharge  Recommend home care? yes     Ileostomy care:  ~ Encourage patient participation with ostomy care,  ~ Empty pouch when 1/3 to 1/2 full,   ~ Notify WOC for ongoing ostomy pouch leakage,  ~ Document stoma output volume, color, consistency EVERY shift  ~ Supplies used    ~ Pouch: [place] 57 FECAL (530230)   ~ Flange/Barrier/Wafer: Mitchel 57mm FLAT (960132)   ~ Accessories: 2\" Adapt Barrier Ring (546257)    Discussed plan of care with Patient and Nurse  Nursing to notify the Provider(s) and re-consult the WOC Nurse if new ostomy concerns or discharge planned before next planned WOC visit.    WOC Nurse will return: /        [facility]     Name: Alexei Blake : 1940  Date: 2021    To order your ostomy supplies    The ostomy Supplier needs this supply list  to process your order. You will need to fax/deliver this list, along with your Insurance information. Your home care nurse can assist with this process.                 List of Ostomy Distributors      [facility]  Ph. [phone] Fax # [phone]  [facility]   Ph. [phone]  [employer] [facility]   Ph. [phone]  [facility]   Ph. [phone]  [facility]       Ph. [phone]  [facility]    Ph.-[phone]  Or Call your " insurance provider for their preferred supplier    Your Medical Supplier will need your surgeon's name, phone and fax number    Clinic:                     Phone                            Fax  General Surgery:   [phone]  Verbal Order for ostomy supplies for 1 Month per:       Sumaya Saenz, RN, CWOCN     Authorizing MD: Adrien Medina MD    Change pouch: Daily for peristomal wound care, once healed can go to three times a week   Quantity of pouches: 30 pouches/month     Request the following supplies:      Mitchel    2 piece flat wafer 57mm  # [id number]     Fecal pouch 57mm- # [id number]      Accessories  2  barrier ring #[id number]                           Hydrofera blue 4x4 inch #[id number] (1/4 of a dressing per bag change)                           Comfeel plus ulcer 4x4 inch #3110 -(1/2 a dressing per bag change)    Adapt powder #7906    No sting film barrier # 3345    M-9 Spray room deodorizer #7732                                     Change your pouch twice a week, more often if leaking.    If you are cutting a hole in the wafer of your pouch, recheck stoma size and adjust pouch opening as needed every week    . Call the Ostomy Nurse at [facility]       [address] [place] [address] [place] : [phone]   Schedule a follow-up visit in 2 to 4 weeks after your surgery, sooner if having problems Bring a complete set of pouch-changing supplies to this visit    [facility] outpatient [facility] department  [address] [place] [address]   number- [phone]      Problems you should Report  - The stoma turns blue or darker in color.  - Cuts or sores around the stoma.  - Red, raw or painful skin around the stoma.  - Any bulging of the skin around the stoma.  - A pouch that leaks every day.  - Problems making the right size hole in the pouch wafer.    Please call with any questions or concerns.    Pouch Change Procedure:     Gather supplies:    New pouching system (wafer,  pouch, barrier ring)    Dressing supplies (hydrofera blue dressing, saline, comfeel plus dressing)    Stoma measuring guide or pattern from prior recent pouch change     Scissors    Pen    Paper towels or gauze--some dry and some wet with WATER ONLY, use of any wipes or soaps may undermine the seal on your ostomy pouch     Towel/incontinence pads to catch any drips from stoma during pouch change      1.  front of full length mirror and place towel into waistband to protect clothing. Place additional towel on the floor to protect floor from output.   2. Remove old pouch using gentle push-pull technique, moving from top to bottom.  Look at back of pouch for clues about how the seal is holding up and what may need to be adjusted.  3. Cleanse skin around stoma with water only and dry.   4. Measure stoma using measuring guide, goal is to have pouch wafer as close to stoma as possible without touching it.   5. Trace correct pattern onto back of new pouch wafer.  6. Cut hole out of pouch wafer, ensuring to cut on the outside of the line drawn.   7. Test fit cut hole around stoma. Make adjustments if necessary.   8. Irrigate peristomal wound with saline and ensure return of fluid, dry with gauze, then cut a strip of hydrofera blue, moisten with saline and ring out excess, then pack into wound bed ensuring to pack tunnel at 2 o clock and allow dressing to stick up out of skin a little bit to help wick fluid   9. Apply barrier ring around stoma, ensuring to place between stoma and packing to keep area sealed, then apply 1/2 a comfeel dressing over hydrofera blue and barrier ring to cover wound  10. Pull off backing from pouch wafer.   11. Apply pouch around stoma.  12. Run finger on wafer around stoma to ensure seal on is achieved.   13. Hold hand over pouch for 2 minutes to help activate the adhesive.      [facility] Nurse Inpatient Pressure Injury Assessment   Reason for consultation: Evaluate and treat coccyx and nose       ASSESSMENT  Pressure Injury: on sacrum , hospital acquired , -not assessed 4/26  Pressure Injury is Stage 2   Contributing factor of the pressure injury: shear and immobility  Status: stable,    Pressure injury: on R nare, hospital acquired   Medical device related injury due to NGT   Pressure injury is stage 2  Status: resurfaced 4/22     TREATMENT PLAN  Sacrum wound: Every third day cleanse with microKlenz and dry, apply Cavilon no sting barrier film to skin around wound and let dry, then place sacral mepilex.     Orders Reviewed and Updated  [facility] Nurse follow-up plan:weekly  Nursing to notify the Provider(s) and re-consult the [facility] Nurse if wound(s) deteriorates or new skin concern.    Patient History  According to provider note(s):  Patient history according to medical record: Alexei Kaur is an 80-year-old gentleman with numerous comorbidities including coronary artery disease with a recent stent on Plavix, end stage renal disease on hemodialysis, on warfarin who presented with abdominal pain and signs symptoms consistent with ischemia of the ascending colon. S/p ex-lap, open right hemicolectomy, end ileostomy and mucus fistula.    Objective Data  Containment of urine/stool: Ileostomy pouch    Current Diet/ Nutrition:  Orders Placed This Encounter      Combination Diet Low Fiber; Thin Liquids (water, ice chips, juice, milk, gelatin, ice cream, etc); Dialysis Diet      Advance Diet as Tolerated      Output:   I/O last 3 completed shifts:  In: 310 [P.O.:310]  Out: 440 [Stool:440]    Risk Assessment:   Sensory Perception: 4-->no impairment  Moisture: 4-->rarely moist  Activity: 2-->chairfast  Mobility: 2-->very limited  Nutrition: 3-->adequate  Friction and Shear: 2-->potential problem  Dheeraj Score: 17      Labs:   Recent Labs   Lab 04/26/21  0721 04/21/21  0653 04/21/21  0653   ALBUMIN 2.8*   < > 3.0*   PREALB 9*  --   --    HGB  --   --  8.5*   INR 1.79*   < >  --    WBC  --   --  4.7    < > = values in this  interval not displayed.       Physical Exam  Skin inspection: focused sacrum        Wound Location:  sacrum  Date of Photos: 4/5 and 4/15  Wound History: noted by RN  AM 4/5, sacral mepilex was in place at time of assessment   Measurements (length x width x depth, in cm) 1cm x 0.8cm x 0.1cm    Wound Base:  100% dermis  Tunneling N/A  Undermining N/A  Palpation of the wound bed: normal   Periwound skin: erythema- blanchable  Color: red  Temperature: normal   Drainage:, small  Description of drainage: serous  Odor: none  Pain: denies , none     Wound Location: R nare      Date of last Photo -4/15/21  Wound History: patient c/o pain to nose on during WOC assessment of ostomy and tube securement was changed and wound noted to tip of nare.  NG tube removed earlier today   Measurements (length x width x depth, in cm) resurfaced   Wound Base:  100 % epidermis      Interventions  Current support surface: Standard  Low air loss mattress  Current off-loading measures: Foam padding and Pillows  Repositioning aid: Pillows  Visual inspection of wound(s) completed   Wound Care: was done per plan of care.  Supplies: at bedside and floor stock  Educated provided: importance of repositioning and plan of care  Education provided to: patient  and nurse  Discussed importance of:repositioning every 2 hours and off-loading pressure to wound  Discussed plan of care with Patient and Nurse

## 2021-04-26 NOTE — PROVIDER NOTIFICATION
Paged Pilar Gaxiola at 0019. /37, OVSS on room air. Difficulty obtaining accurate BP readings due to RUE AV fistula and LUE and BLE edema. Pt asymptomatic.     MD aware. Will recheck VS at 0400.

## 2021-04-26 NOTE — PLAN OF CARE
Discharge  D: Orders for discharge and outpatient medications written.   I: Home medications and return to clinic schedule reviewed with patient. Discharge instructions and parameters for calling Health Care Provider reviewed with teach back. Patient left at 1415 with transport  A: Patient/family verbalized understanding and was ready for discharge.   P: Patient medications picked up in Pharmacy. Follow up as scheduled   Nurse to nurse report given at [facility]

## 2021-04-26 NOTE — TELEPHONE ENCOUNTER
Returned called to patient's wife Isabel in regards to wanting to re-schedule patients post op appointment on 04/28 with Dr. Santos, there was no answer.  Left message with my direct line [phone]. Daly LEA Bree-Op Coordinator for General Surgery

## 2021-04-26 NOTE — PLAN OF CARE
Occupational Therapy Discharge Summary    Reason for therapy discharge:    Discharged to transitional care facility.    Progress towards therapy goal(s). See goals on Care Plan in [facility] electronic health record for goal details.  Goals partially met.  Barriers to achieving goals:   discharge from facility.    Therapy recommendation(s):    Continued therapy is recommended.  Rationale/Recommendations:  to maximize IND in ADLs/IADLs.

## 2021-04-26 NOTE — DISCHARGE SUMMARY
Dialysis Discharge Summary Brief    [facility]  Division of Nephrology  Nephrology Discharge Dialysis Orders  Ph: [phone]  Fax: [phone]    Alexei Blake  MRN: [id number]  YOB: 1940    [facility]  Primary Nephrologist: Dr. Monae    Date of Admission: 3/30/2021  Date of Discharge: 4/26/2021    Alexei Blake is a 80 year old with PMH CAD s/p recent PCI, CHF, HTN, DM2, ESRD on HD, PAD, Afib on warfarin who presented with acute onset of abdominal pain during HD. Patient was found to have ischemic changes of right colon with portal venous gas per imaging s/p colonic resection and ileostomy 3/31 c/b ileus, s/p ileostomy revision 4/21. See adjusted anti-hypertensive regimen and adjusted EDW. Page me at  with any questions. Thanks much. Ana Paula Tolbert PA-C     ESRD on HD: Runs TTS at [facility] under care of Dr Noble Monae via RUE AVF for 3.5 hrs.      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  -  constricted opening, s/p OR for revision 4/21     BP:  PTA imdur 30 mg qday, metoprolol XL 50 at bedtime, lisinopril  - Imdur stopped   - started isosorbide dinitrate 10 mg tid    - Lisinopril is held  - Now on metoprolol tartrate 25 mg bid on non HD days (succinate stopped)  - Hold all BP meds before dialysis     Volume:    - Will likely lose significant fluids via ileostomy once ileus fully resolves  - Likely 98-99 kg will be a good EDW, the key will be avoiding hypotension/bowel ischemia while also avoiding pulm edema/CHF exacerbation        BMD - Ca 10.1, alb 2.9. phos 2.8  - stopped PTA hectorol 3 mcg with HD  - Hold PTA TUMS WM for now  - iCa 5.5 (mildly elevated)     Anemia: hgb 8.5 g/dL    - starting epogen 4000 units per HD       Discharge Diagnosis:    ICD-10-CM    1. Status post ileostomy (H)  Z93.2 Blood component     Blood component     Care Coordination Referral     Home care nursing referral     Glucose by meter     Glucose by meter     Glucose by meter     Glucose by meter     Renal panel     INR     Glucose by meter     Glucose by meter     Glucose by meter     Glucose by meter     Glucose by meter     Glucose by meter     Glucose by meter     Glucose by meter     Glucose by meter     Glucose by meter     Glucose by meter     Glucose by meter     Renal panel     INR     Glucose by meter     Glucose by meter     Glucose by meter     Glucose by meter     Glucose by meter     Glucose by meter     Glucose by meter     Glucose by meter     Glucose by meter     Glucose by meter     Renal panel     INR     Glucose by meter     Glucose by meter     CBC with platelets     Glucose by meter     Glucose by meter     Glucose by meter     Glucose by meter     Glucose by meter     Glucose by meter     Glucose by meter     Glucose by meter     Glucose by meter     Glucose by meter     Renal panel     INR     Glucose by meter     Glucose by meter     Glucose by meter     Glucose by meter     Alkaline phosphatase     Alkaline phosphatase     ALT     ALT     AST     AST     Bilirubin  total     Bilirubin  total     Bilirubin direct     Bilirubin direct     Magnesium     Magnesium     Triglycerides     Triglycerides     Glucose by meter     Glucose by meter     Glucose by meter     Glucose by meter     Glucose by meter     Glucose by meter     Comprehensive metabolic panel     Magnesium     Phosphorus     INR     Prealbumin     Bilirubin direct     Glucose by meter     Glucose by meter     Glucose by meter     Glucose by meter     Glucose by meter     Glucose by meter     Glucose by meter     Glucose by meter     Hemoglobin     Platelet count     Glucose by meter     Glucose by meter     Glucose by meter      Glucose by meter     Magnesium     Glucose by meter     Glucose by meter     Renal panel     Renal panel     Glucose by meter     Glucose by meter     Glucose by meter     Glucose by meter     Glucose by meter     Glucose by meter     Glucose by meter     Glucose by meter     Glucose by meter     Glucose by meter     Glucose by meter     Glucose by meter     Magnesium     Glucose by meter     Glucose by meter     Renal panel     Renal panel     Glucose by meter     Glucose by meter     INR     Glucose by meter     Glucose by meter     Glucose by meter     Glucose by meter     Glucose by meter     Glucose by meter     Glucose by meter     Glucose by meter     Renal panel     Glucose by meter     Glucose by meter     Glucose by meter     Glucose by meter     Glucose by meter     Glucose by meter     Glucose by meter     Glucose by meter     Glucose by meter     Glucose by meter     Glucose by meter     Glucose by meter     Glucose by meter     Glucose by meter     Renal panel     Glucose by meter     Glucose by meter     Glucose by meter     Glucose by meter     Glucose by meter     Glucose by meter     Glucose by meter     Glucose by meter     Glucose by meter     Glucose by meter     Comprehensive metabolic panel     Magnesium     Phosphorus     INR     Prealbumin     Triglycerides     Glucose by meter     Glucose by meter     Glucose by meter     Glucose by meter     Glucose by meter     Glucose by meter     Glucose by meter     Glucose by meter     CBC with platelets     CBC with platelets     Glucose by meter     Glucose by meter     CANCELED: Magnesium     CANCELED: Triglycerides     CANCELED: Alkaline phosphatase     CANCELED: ALT     CANCELED: AST     CANCELED: Bilirubin  total     CANCELED: Bilirubin direct     CANCELED: Basic metabolic panel     CANCELED: Magnesium     CANCELED: Phosphorus     CANCELED: Renal panel     CANCELED: INR     CANCELED: Renal panel     CANCELED: Basic metabolic panel     CANCELED:  Magnesium     CANCELED: Phosphorus     CANCELED: Renal panel     CANCELED: Basic metabolic panel     CANCELED: Renal panel     CANCELED: Basic metabolic panel     CANCELED: CBC with platelets     Additional orders not shown.   2. Colonic ischemia (H)  K55.9 Care Coordination Referral     Home care nursing referral   3. Status post ileostomy (H)  Z93.2 Blood component     Blood component     Care Coordination Referral     Home care nursing referral     Glucose by meter     Glucose by meter     Glucose by meter     Glucose by meter     Renal panel     INR     Glucose by meter     Glucose by meter     Glucose by meter     Glucose by meter     Glucose by meter     Glucose by meter     Glucose by meter     Glucose by meter     Glucose by meter     Glucose by meter     Glucose by meter     Glucose by meter     Renal panel     INR     Glucose by meter     Glucose by meter     Glucose by meter     Glucose by meter     Glucose by meter     Glucose by meter     Glucose by meter     Glucose by meter     Glucose by meter     Glucose by meter     Renal panel     INR     Glucose by meter     Glucose by meter     CBC with platelets     Glucose by meter     Glucose by meter     Glucose by meter     Glucose by meter     Glucose by meter     Glucose by meter     Glucose by meter     Glucose by meter     Glucose by meter     Glucose by meter     Renal panel     INR     Glucose by meter     Glucose by meter     Glucose by meter     Glucose by meter     Alkaline phosphatase     Alkaline phosphatase     ALT     ALT     AST     AST     Bilirubin  total     Bilirubin  total     Bilirubin direct     Bilirubin direct     Magnesium     Magnesium     Triglycerides     Triglycerides     Glucose by meter     Glucose by meter     Glucose by meter     Glucose by meter     Glucose by meter     Glucose by meter     Comprehensive metabolic panel     Magnesium     Phosphorus     INR     Prealbumin     Bilirubin direct     Glucose by meter     Glucose  by meter     Glucose by meter     Glucose by meter     Glucose by meter     Glucose by meter     Glucose by meter     Glucose by meter     Hemoglobin     Platelet count     Glucose by meter     Glucose by meter     Glucose by meter     Glucose by meter     Magnesium     Glucose by meter     Glucose by meter     Renal panel     Renal panel     Glucose by meter     Glucose by meter     Glucose by meter     Glucose by meter     Glucose by meter     Glucose by meter     Glucose by meter     Glucose by meter     Glucose by meter     Glucose by meter     Glucose by meter     Glucose by meter     Magnesium     Glucose by meter     Glucose by meter     Renal panel     Renal panel     Glucose by meter     Glucose by meter     INR     Glucose by meter     Glucose by meter     Glucose by meter     Glucose by meter     Glucose by meter     Glucose by meter     Glucose by meter     Glucose by meter     Renal panel     Glucose by meter     Glucose by meter     Glucose by meter     Glucose by meter     Glucose by meter     Glucose by meter     Glucose by meter     Glucose by meter     Glucose by meter     Glucose by meter     Glucose by meter     Glucose by meter     Glucose by meter     Glucose by meter     Renal panel     Glucose by meter     Glucose by meter     Glucose by meter     Glucose by meter     Glucose by meter     Glucose by meter     Glucose by meter     Glucose by meter     Glucose by meter     Glucose by meter     Comprehensive metabolic panel     Magnesium     Phosphorus     INR     Prealbumin     Triglycerides     Glucose by meter     Glucose by meter     Glucose by meter     Glucose by meter     Glucose by meter     Glucose by meter     Glucose by meter     Glucose by meter     CBC with platelets     CBC with platelets     Glucose by meter     Glucose by meter     CANCELED: Magnesium     CANCELED: Triglycerides     CANCELED: Alkaline phosphatase     CANCELED: ALT     CANCELED: AST     CANCELED: Bilirubin  total      CANCELED: Bilirubin direct     CANCELED: Basic metabolic panel     CANCELED: Magnesium     CANCELED: Phosphorus     CANCELED: Renal panel     CANCELED: INR     CANCELED: Renal panel     CANCELED: Basic metabolic panel     CANCELED: Magnesium     CANCELED: Phosphorus     CANCELED: Renal panel     CANCELED: Basic metabolic panel     CANCELED: Renal panel     CANCELED: Basic metabolic panel     CANCELED: CBC with platelets     Additional orders not shown.    Added automatically from request for surgery [id number]   4. Paroxysmal atrial fibrillation (H)  I48.0 INR     warfarin ANTICOAGULANT (COUMADIN) 5 MG tablet          [x] Resume all previous dialysis orders with exception as noted below    New Orders (if not applicable put NA):  Estimated Dry Weight EDW 98-99 kg, SBP > 100 to avoid hypotension/bowel ischemia   Dialysis Duration    Dialysis Access    Antibiotics (dose per dialysis, end date)            Labs to be drawn at dialysis    Other major changes to dialysis prescription (e.g. Dialysate bath, heparin, blood flow rate, etc)      Medication changes (also fax the unit a copy of the discharge summary)         - Stopped Imdur   - started isosorbide dinitrate 10 mg tid    - Stopped Lisinopril   - Started metoprolol tartrate 25 mg bid on non HD days  - Stopped metoprolol succinate  - Hold all BP meds before dialysis         Name of physician completing this form: TARA Bain MD

## 2021-04-26 NOTE — PROGRESS NOTES
Care Management Follow Up    Length of Stay (days): 27    Expected Discharge Date: 04/26/21     Concerns to be Addressed: discharge planning    Patient plan of care discussed at interdisciplinary rounds: Yes    Anticipated Discharge Disposition: Transitional Care  Anticipated Discharge Services:   Outpatient Dialysis  [facility]   [address]  [place] [address]  P: [phone]  Tuesday, Thursday, Saturday  Chair time: 5:30 AM (3.5 hour run)     Pt was given a [employer] application and a list of transportation companies.  His spouse typically transports him to dialysis and may be able to continue to do so pending pt's ability to get in/ out of her vehicle safely.    Anticipated Discharge DME: None    Patient/family educated on Medicare website which has current facility and service quality ratings: yes  Education Provided on the Discharge Plan: yes    Patient/Family in Agreement with the Plan: yes    Referrals Placed by CM/SW:    [facility]  [address] [place] [address]  [phone]  - SW spoke with admissions and they do have a bed available for pt today. They want to ensure pt has a ride to dialysis before transporting since pt's chair time is early in the morning.      [facility]  [address] [place] [address]  P: [phone]  F: [phone]  -E-referral sent.   [facility]  [address] [place] [address]  [phone]  -E-referral sent.  They do not take TPN, will need to send a new referral once off of TPN.     [facility]  [address] [place] [address]  [phone]  -E-referral sent.       [facility]   [address] [place], [place] [address] [phone]  -E-referral sent.        Private pay costs discussed: Not applicable    Additional Information:  SW following for dispo needs- per pt's med team, pt may is med ready for  discharge. Pt no longer on TPN and referrals resent with updated information.     SW met with pt and his spouse to discuss dispo plans. They were in agreement with discharging to [facility] and requested that SW arrange a WC ride.     SW confirmed that [facility] is able to accept pt today. SW informed providers and will fax over discharge orders when ready.     NATALIIA arranged a WC ride at 2:15pm with [facility].     [id number], IMM completed.     RAMO Horta, [facility]  Acute Care Float   [facility]  Phone: [phone]  Pager: [phone]

## 2021-04-26 NOTE — PROGRESS NOTES
Care Management Discharge Note    Discharge Date: 04/26/21    Discharge Disposition:   [facility]  [address] [place] [address]  [phone]  Admissions: [phone]  F: [phone]    Discharge Services:   Outpatient Dialysis  [facility]   [address]  [place] [address]  P: [phone]  F: [phone]  Tuesday, Thursday, Saturday  Chair time: 5:30 AM (3.5 hour run)     Pt was given a [employer] application and a list of transportation companies.  His spouse typically transports him to dialysis and may be able to continue to do so pending pt's ability to get in/ out of her vehicle safely.    Discharge DME: None  Discharge Transportation: Pick1 Ride with [employer] at 2:15pm.   Private pay costs discussed: transportation costs    PAS Confirmation Code: [id number]    Patient/family educated on Medicare website which has current facility and service quality ratings: yes    Education Provided on the Discharge Plan: yes  Persons Notified of Discharge Plans: Provider, pt, pt's spouse, facility, and bedside nurse.   Patient/Family in Agreement with the Plan: yes    Handoff Referral Completed: Yes    Additional Information:  Pt to discharge today at 2:15pm via WC ride with [employer]. NATALIIA faxed over scripts, discharge orders and COVID results to facility, completed IMM, and internal handoff.    SW discussed the visitor policy with pt and spouse and they expressed understanding. Pt's spouse will be transporting pt to dialysis and feels confident in doing so.     NATALIIA updated dialysis center and will fax over discharge summary when ready.     ADDENDUM: 1400  SW informed of new wound care orders and faxed over discharge orders again to TCU.     NATALIIA faxed over discharge summary to Dialysis Center.     RAMO Horta, LGSW  Acute Care Float   [facility]  Phone: [phone]  Pager: [phone]

## 2021-04-26 NOTE — PROVIDER NOTIFICATION
Brittney Gaxiola at 5135. BP values remain inconsistent. Two different locations attempted. /22 (left forearm), recheck 47/8 (left leg), then /75 (left forearm). Writer unable to determine accuracy of readings due to being labile. Pt remains asymptomatic.     MD aware and will discuss this morning with primary day team regarding plan. Writer will continue to monitor.

## 2021-04-26 NOTE — PLAN OF CARE
POD # 25 s/p Right Hemicolectomy, Ileostomy creation. Patient is alert, oriented x 4, pain well controlled with Oxycodone and Tylenol,  denies nausea, tolerating low fiber diet. Pt ate 100% of his dinner meal. Pt is on Calorie Count and strict I/Os. PICC line patent, Ileostomy appliance intact. Pt declined being repositioning in bed every 2 hours, was repositioned just once during the shift, risks and benefits explained.

## 2021-04-26 NOTE — PLAN OF CARE
Physical Therapy Discharge Summary    Reason for therapy discharge:    Discharged to transitional care facility.    Progress towards therapy goal(s). See goals on Care Plan in [facility] electronic health record for goal details.  Goals not met.  Barriers to achieving goals:   discharge from facility.    Therapy recommendation(s):    Continued therapy is recommended.  Rationale/Recommendations:  Continued PT at next level of care to progress strength and activity tolerance. Min to mod-A of 2 for mobility at discharge      **Pt not seen by discharging therapist on this date, note written based on previous treating therapist's notes and recommendations.

## 2021-05-17 NOTE — PATIENT INSTRUCTIONS
You met with Dr. Harsh Santos.      Today's visit instructions:    Return to the Surgery Clinic to see Dr. Santos in ~ 5 months.  Please call to schedule this appointment in August/September as his schedule is not yet available.     If you have questions please contact Ana Paula TERAN during regular clinic hours, Monday through Friday 7:30 AM - 4:00 PM, or you can contact us via [employer] at anytime.       If you have urgent needs after-hours, weekends, or holidays please call the hospital at [phone] and ask to speak with our on-call General Surgery Team.    Appointment [phone], option #1   Nurse Advice (Ana Paula): [phone]   Surgery Scheduler (Daly): [phone]  Fax: [phone]

## 2021-05-17 NOTE — PROGRESS NOTES
"I saw Alexei Blake today in clinic with his wife in follow-up of recent admission for ischemic colitis (occurred during dialysis) in the setting of recent cardiac stenting.  He underwent a right hemicolectomy with end ileostomy and mucus fistula creation.  Recovery notable for tightness around the stoma requiring revision (thought to be secondary to calcified mesenteric vessels creating pressure within the cierra of the stoma.      He is at a care facility, but doing ok.  Eating without issue.  Stoma bag is emptied several times a day (maybe 4).  No dizzyness with standing, feels like he is getting stronger.  He does not make urine.  He was told there was some infection along the inferior portion of the stoma, but that that is \"all better\" now.    PE:  /65 (BP Location: Left arm, Patient Position: Sitting, Cuff Size: Adult Regular)   Pulse 62   Temp 97.4  F (36.3  C) (Oral)   Resp 18   Ht 1.854 m (6' 1\")   Wt 88.5 kg (195 lb)   SpO2 98%   BMI 25.73 kg/m      A+Ox3, NAD, pleasant  RRR during my exam (history of paroxsymal afib)  No resp distress or wheezing  Abd is soft, nondistended, nontender. Midline incision healed. LLQ stoma site is covered with appliance.  Stoma itself is pink, nonedematous, healthy appearing.  Liquid stool in back- last emptied about 4-5 hours ago with 150ml in the device.  RUE AV fistula present  No edema      A/P:  Healing appropriately after emergent right hemicolectomy with end ileostomy creation.  Does not appear to have too high an output for this stoma (on low fiber diet).  Recent cardiac stenting, ESRD on dialysis, 81 years old.    We discussed possible reversal.  I do not know if that will be an option.  I said we should at least wait the 6 months from his recent cardiac stents- but it may be wise to leave it as is given his comorbid conditions.  They would like to think about it and I will have them come back in 5 months to evaluate.      -Follow-up in 5 months  "

## 2021-05-17 NOTE — NURSING NOTE
"Chief Complaint   Patient presents with     Post-Op - General Surgery     Pt here for post op       Vitals:    05/17/21 1258   BP: 130/65   BP Location: Left arm   Patient Position: Sitting   Cuff Size: Adult Regular   Pulse: 62   Resp: 18   Temp: 97.4  F (36.3  C)   TempSrc: Oral   SpO2: 98%   Weight: 88.5 kg (195 lb)   Height: 1.854 m (6' 1\")       Body mass index is 25.73 kg/m .      TRAY Carlos NREMT                      "

## 2021-05-17 NOTE — LETTER
"5/17/2021       RE: Alexei Blake  [address]  [place] [address]     Dear Colleague,    Thank you for referring your patient, Alexei Blake, to the [facility] GENERAL SURGERY [facility]INIC [place] [facility]. Please see a copy of my visit note below.    I saw Alexei Blake today in clinic with his wife in follow-up of recent admission for ischemic colitis (occurred during dialysis) in the setting of recent cardiac stenting.  He underwent a right hemicolectomy with end ileostomy and mucus fistula creation.  Recovery notable for tightness around the stoma requiring revision (thought to be secondary to calcified mesenteric vessels creating pressure within the cierra of the stoma.    He is at a care facility, but doing ok.  Eating without issue.  Stoma bag is emptied several times a day (maybe 4).  No dizzyness with standing, feels like he is getting stronger.  He does not make urine.  He was told there was some infection along the inferior portion of the stoma, but that that is \"all better\" now.    PE:  /65 (BP Location: Left arm, Patient Position: Sitting, Cuff Size: Adult Regular)   Pulse 62   Temp 97.4  F (36.3  C) (Oral)   Resp 18   Ht 1.854 m (6' 1\")   Wt 88.5 kg (195 lb)   SpO2 98%   BMI 25.73 kg/m      A+Ox3, NAD, pleasant  RRR during my exam (history of paroxsymal afib)  No resp distress or wheezing  Abd is soft, nondistended, nontender. Midline incision healed. LLQ stoma site is covered with appliance.  Stoma itself is pink, nonedematous, healthy appearing.  Liquid stool in back- last emptied about 4-5 hours ago with 150ml in the device.  RUE AV fistula present  No edema      A/P:  Healing appropriately after emergent right hemicolectomy with end ileostomy creation.  Does not appear to have too high an output for this stoma (on low fiber diet).  Recent cardiac stenting, ESRD on dialysis, 81 years old.    We discussed " possible reversal.  I do not know if that will be an option.  I said we should at least wait the 6 months from his recent cardiac stents- but it may be wise to leave it as is given his comorbid conditions.  They would like to think about it and I will have them come back in 5 months to evaluate.      -Follow-up in 5 months    Again, thank you for allowing me to participate in the care of your patient.      Sincerely,    Harsh Santos MD

## 2021-05-19 NOTE — TELEPHONE ENCOUNTER
"In regards to a phone call I received regarding a patient of Dr. Santos, sent the following message to his RNCC Ana Paula and Bree-op Coordinator Daly:    \"Daly Zavala Linda from [facility] called, tel # [phone]. She states that patient saw Dr. Santos on 5/17/2021, and the onsite Dietician and NP would like the Office Visit Notes faxed over if possible. They state that patient did not bring any paperwork back with him.    If this is alright, please fax over the Office Visit Notes from patient's appt with Dr. Santos on 5/17 to:    [facility]  Attn: Fernanda  Fax # [phone]    Thank-you!  Bella\"  "

## 2021-05-26 NOTE — TELEPHONE ENCOUNTER
ANTICOAGULATION  MANAGEMENT    Assessment     Today's INR result of 3.30 is Supratherapeutic (goal INR of 2.0-3.0)        Warfarin taken as previously instructed    No new diet changes affecting INR    No new medication/supplements affecting INR    Continues to tolerate warfarin with no reported s/s of bleeding or thromboembolism     Previous INR was Therapeutic at 3.00 on 3/8/19.    Yair Ivan Isabel feel both very stressed out with their Jimmie in the hospital for the last 5 weeks from transplant surgery, affecting overall well-being.    Plan:     Spoke with Yair Hall regarding INR result and instructed:    1.  Will lower warfarin dose for now and continue to monitor INR, and adjust dose based on INR results.    Warfarin Dosing Instructions:  (has 4mg tabs.)   - Change warfarin dose to 2 mg daily on Fridays; and 4 mg daily rest of week  (7.1 % change)    Instructed patient to follow up no later than:  1-2 wks.   - scheduled on 4/5/19 @ [facility].    Education provided: target INR goal and significance of current INR result    Yair Hall verbalizes understanding and agrees to warfarin dosing plan.    Instructed to call the [facility] for any changes, questions or concerns. (#[phone])   ?   Yamila Luna RN    Subjective/Objective:      Alexei Blake, a 78 y.o. male is on warfarin.     Alexei reports:     Home warfarin dose: verbally confirmed home dose with Yair Hall and updated on anticoagulation calendar     Missed doses: No     Medication changes:  No     S/S of bleeding or thromboembolism:  No     New Injury or illness:  No.  Reported feeling stress out with son (Jimmie) in the hospital at [facility] for the past 5 wks, since his transplant surgery.  Running back and forth from home to hospital frequently.     Changes in diet or alcohol consumption:  No     Upcoming surgery, procedure or cardioversion:  No.  Continue with dialysis 3x/wk on Tues/Thurs/Sat.    Anticoagulation Episode Summary     Current  INR goal:   2.0-3.0   TTR:   73.6 % (4.2 y)   Next INR check:   4/5/2019   INR from last check:   3.30! (3/22/2019)   Weekly max warfarin dose:      Target end date:      INR check location:      Preferred lab:      Send INR reminders to:   ANTICOAGULATION POOL C (DTN,VAD,CGR,GAV)    Indications    PVD (peripheral vascular disease) (H) [I73.9]  Stroke (H) [I63.9]           Comments:            Anticoagulation Care Providers     Provider Role Specialty Phone number    Jesus Medrano MD Referring Internal Medicine [phone]

## 2021-05-27 NOTE — PROGRESS NOTES
[facility] Follow Up Note    Alexei Blake   78 y.o. male    Date of Visit: 4/5/2019    Chief Complaint   Patient presents with     URI     runny nose     Subjective  This is a 78-year-old man with multiple medical issues which are currently fairly stable.  He comes in for 2 reasons today.  The first is some ongoing nasal drainage and sneezing.  This is been going on for over 1 month.  He denies any cough and has had no fever or chills.  He reports that the symptoms tend to come and go and there is no pattern to the recurrence.  He has not noticed any particular precipitating factors.    Second issue is some discomfort in the upper right shoulder radiating into the neck.  He tells me that when he turns his head to the left this will aggravate the pain on the right side.  He does not recall any unusual activity or precipitating event causing this.  It is been present for a couple of weeks and is not improving but neither is it getting any worse.    ROS A comprehensive review of systems was performed and was otherwise negative    Medications, allergies, and problem list were reviewed and updated    Exam  General Appearance:   On examination his blood pressure is 118/56.  Weight is 223 pounds and height is 73 inches.  BMI is 29.42.    Heart rhythm is stable with a rate of 56 and no ectopy.    Throat appears normal with no exudate.    Ears show open canals with normal tympanic membranes.    No facial tenderness.    No enlarged cervical lymph nodes.    Examination of the right neck and shoulder region does reveal some tightness of the muscles in the area.  Range of motion in the neck and the right shoulder are both normal.      Assessment/Plan  1. Allergic rhinitis, unspecified seasonality, unspecified trigger     2. Acute pain of right shoulder       Probable allergic rhinitis.  This does not seem to be consistent with an infection.  I suggested that he try an antihistamine such as Claritin  "or Zyrtec over-the-counter as needed and let me know if this is working.    The neck and shoulder discomfort is most likely musculoskeletal.  I advised him to try applying some heat once or twice daily over the next couple of weeks and follow-up with me if he is continuing to have issues.    Total time of this office visit was 25 minutes with greater than 50% of the time spent in care coordination of patient counseling.  The following high BMI interventions were performed this visit: weight monitoring    Jesus Medrano MD      Current Outpatient Medications on File Prior to Visit   Medication Sig     ACCU-CHEK KYLE PLUS TEST STRP strips USE  STRIP TO CHECK GLUCOSE 2 TO 3 TIMES DAILY AS DIRECTED AT  6AM  AND  4PM     acetaminophen (TYLENOL) 500 MG tablet Take 1,000 mg by mouth bedtime as needed for pain (or sleep).      BD VEO INSULIN SYRINGE UF 1/2 mL 31 gauge x 15/64\" Syrg USE ONE SYRINGE TWICE DAILY     bumetanide (BUMEX) 1 MG tablet TAKE 1 TABLET TWICE DAILY AT 9AM AND 6PM. ON DIALYSIS DAYS, ONLY DOSE IN THE EVENING.     calcium, as carbonate, (OS-AVNI) 500 mg calcium (1,250 mg) tablet Take 1 tablet by mouth daily.     clopidogrel (PLAVIX) 75 mg tablet TAKE 1 TABLET (75 MG TOTAL) BY MOUTH DAILY.     hydrALAZINE (APRESOLINE) 25 MG tablet TAKE 1 TABLET TWICE DAILY     insulin NPH (NOVOLIN N NPH U-100 INSULIN) 100 unit/mL injection Give 18 units subcutaneously every am and 6 units subcutaneously every pm E11.9     insulin NPH (NOVOLIN) 100 unit/mL injection Inject 5-6 Units under the skin 2 (two) times a day. (based on blood sugars)     lisinopril (PRINIVIL,ZESTRIL) 40 MG tablet Take 40 mg by mouth daily.     lovastatin (MEVACOR) 40 MG tablet TAKE 1 TABLET (40 MG TOTAL) BY MOUTH AT BEDTIME.     metoprolol succinate (TOPROL-XL) 25 MG TAKE 1 TABLET (25 MG TOTAL) BY MOUTH EVERY EVENING.     multivitamin therapeutic tablet Take 1 tablet by mouth daily.     NOVOLIN N NPH U-100 INSULIN 100 unit/mL injection INJECT 18 " UNITS SUBCUTANEOUSLY ONCE DAILY IN THE MORNING AND 6 IN THE EVENING     NOVOLIN R 100 unit/mL injection 1 unit for every 25>150     warfarin (COUMADIN/JANTOVEN) 4 MG tablet Take 1 tablet (4 mg total) by mouth daily. Adjust dose per INR results as instructed.     No current facility-administered medications on file prior to visit.      Allergies   Allergen Reactions     Blood-Group Specific Substance      Anti-K present. Expect delays in blood for transfusion.  Draw 2 lavender top tubes for type and screen orders.        Calcitriol      Fatigue      Ciprofloxacin Rash     Social History     Tobacco Use     Smoking status: Former Smoker     Last attempt to quit: 1995     Years since quittin.2     Smokeless tobacco: Never Used   Substance Use Topics     Alcohol use: No     Drug use: No

## 2021-05-27 NOTE — TELEPHONE ENCOUNTER
ANTICOAGULATION  MANAGEMENT    Assessment     Today's INR result of 2.2 is Therapeutic (goal INR of 2.0-3.0)        Warfarin taken as previously instructed    No new diet changes affecting INR    No new medication/supplements affecting INR    Continues to tolerate warfarin with no reported s/s of bleeding or thromboembolism     Previous INR was Supratherapeutic    Plan:     Spoke with Alexei regarding INR result and instructed:     Warfarin Dosing Instructions:  Continue current warfarin dose 2 mg daily on Fri; and 4 mg daily rest of week  (0 % change)    Instructed patient to follow up no later than: two weeks    Education provided: importance of therapeutic range    Alexei verbalizes understanding and agrees to warfarin dosing plan.    Instructed to call the [facility] for any changes, questions or concerns. (#[phone])   ?   Marcie Mckinnon RN    Subjective/Objective:      Alexei Blake, a 78 y.o. male is on warfarin.     Alexei reports:     Home warfarin dose: template incorrect; verbally confirmed home dose with Alexei and updated on anticoagulation calendar     Missed doses: No     Medication changes:  No     S/S of bleeding or thromboembolism:  No     New Injury or illness:  No     Changes in diet or alcohol consumption:  No     Upcoming surgery, procedure or cardioversion:  No    Anticoagulation Episode Summary     Current INR goal:   2.0-3.0   TTR:   73.6 % (4.2 y)   Next INR check:   4/19/2019   INR from last check:   2.20 (4/5/2019)   Weekly max warfarin dose:      Target end date:      INR check location:      Preferred lab:      Send INR reminders to:   ANTICOAGULATION POOL C (DTN,VAD,CGR,GAV)    Indications    PVD (peripheral vascular disease) (H) [I73.9]  Stroke (H) [I63.9]           Comments:            Anticoagulation Care Providers     Provider Role Specialty Phone number    Jesus Medrano MD Referring Internal Medicine [phone]

## 2021-05-27 NOTE — TELEPHONE ENCOUNTER
Who is calling:  Patient  Reason for Call:  Returning call.  Attempted transfer,not available. Patient would like to know when he needs to set up his next appointment. No direction in file yet. Please advise patient. Thanks.  Date of last appointment with primary care: [id number]  Okay to leave a detailed message: Yes

## 2021-05-28 NOTE — TELEPHONE ENCOUNTER
Left detailed message (per  note). Instructed to call us if this pressure setting does not work for him.

## 2021-05-28 NOTE — TELEPHONE ENCOUNTER
Please call the patient to inform him that I have lowered his CPAP pressure setting to 8-12.4 CWP.  Please have him test at this pressure setting to see if he likes it.  Thank you.

## 2021-05-28 NOTE — TELEPHONE ENCOUNTER
Dr. Pramod Mazariegos called and said that he thinks that his pressure is too high. Would you be able to give him a call back at [phone]? Thank you.

## 2021-05-28 NOTE — TELEPHONE ENCOUNTER
ANTICOAGULATION  MANAGEMENT    Assessment     Today's INR result of 2.20 is Therapeutic (goal INR of 2.0-3.0)        Warfarin taken as previously instructed    No new diet changes affecting INR    No new medication/supplements affecting INR    Continues to tolerate warfarin with no reported s/s of bleeding or thromboembolism     Previous INR was Therapeutic at 2.20 on 4/5/19.    Plan:     Spoke with Yair regarding INR result and instructed:     Warfarin Dosing Instructions:  (has 4mg tabs)   - Continue current warfarin dose 2 mg daily on Fridays; and 4 mg daily rest of week.    Instructed patient to follow up no later than:  3 wks.   - scheduled on 5/10/19 @ [facility].    Education provided: importance of consistent vitamin K intake and target INR goal and significance of current INR result    Yair verbalizes understanding and agrees to warfarin dosing plan.    Instructed to call the [facility] for any changes, questions or concerns. (#[phone])   ?   Yamila Luna RN    Subjective/Objective:      Alexei MIKEY Blake, a 78 y.o. male is on warfarin.     Alexei reports:     Home warfarin dose: verbally confirmed home dose with Yair and updated on anticoagulation calendar     Missed doses: No     Medication changes:  No     S/S of bleeding or thromboembolism:  No     New Injury or illness:  No     Changes in diet or alcohol consumption:  No     Upcoming surgery, procedure or cardioversion:  No    Anticoagulation Episode Summary     Current INR goal:   2.0-3.0   TTR:   73.9 % (4.3 y)   Next INR check:   5/10/2019   INR from last check:   2.20 (4/19/2019)   Weekly max warfarin dose:      Target end date:      INR check location:      Preferred lab:      Send INR reminders to:   ANTICOAGULATION POOL C (DTN,VAD,CGR,GAV)    Indications    PVD (peripheral vascular disease) (H) [I73.9]  Stroke (H) [I63.9]           Comments:            Anticoagulation Care Providers     Provider Role Specialty Phone number    Jesus Medrano  MD BATSHEVA [facility] Internal Medicine [phone]

## 2021-05-28 NOTE — TELEPHONE ENCOUNTER
ANTICOAGULATION  MANAGEMENT    Assessment     Today's INR result of 2.10 is Therapeutic (goal INR of 2.0-3.0)        Warfarin taken as previously instructed    No new diet changes affecting INR    No new medication/supplements affecting INR    Continues to tolerate warfarin with no reported s/s of bleeding or thromboembolism     Previous INR was Therapeutic at 2.20 on 4/19/19.    Plan:     Spoke with Yair regarding INR result and instructed:     Warfarin Dosing Instructions:   (has 4mg tabs)   - Continue current warfarin dose 2 mg daily on Fridays; and 4 mg daily rest of week.    Instructed patient to follow up no later than:  3 wks.   - scheduled on 5/31/19 @ [facility].    Education provided: importance of consistent vitamin K intake, target INR goal and significance of current INR result, importance of notifying clinic for changes in medications and monitoring for bleeding signs and symptoms    Yair verbalizes understanding and agrees to warfarin dosing plan.    Instructed to call the [facility] for any changes, questions or concerns. (#[phone])   ?   Yamila Luna RN    Subjective/Objective:      Alexei MIKEY Blake, a 79 y.o. male is on warfarin.     Alexei reports:     Home warfarin dose: verbally confirmed home dose with Yair and updated on anticoagulation calendar     Missed doses: No     Medication changes:  No     S/S of bleeding or thromboembolism:  No     New Injury or illness:  No     Changes in diet or alcohol consumption:  No     Upcoming surgery, procedure or cardioversion:  Yes:  Has Kidney dialysis 3x/wk on Tues/Thurs/Sat.    Anticoagulation Episode Summary     Current INR goal:   2.0-3.0   TTR:   74.2 % (4.3 y)   Next INR check:   6/7/2019   INR from last check:   2.10 (5/10/2019)   Weekly max warfarin dose:      Target end date:      INR check location:      Preferred lab:      Send INR reminders to:   ANTICOAGULATION POOL C (DTN,VAD,CGR,GAV)    Indications    PVD (peripheral vascular disease)  (H) [I73.9]  Stroke (H) [I63.9]           Comments:            Anticoagulation Care Providers     Provider Role Specialty Phone number    Jesus Mderano MD Referring Internal Medicine [phone]

## 2021-05-29 NOTE — TELEPHONE ENCOUNTER
ANTICOAGULATION  MANAGEMENT    Assessment     Today's INR result of 2.20 is Therapeutic (goal INR of 2.0-3.0)        Warfarin taken as previously instructed    No new diet changes affecting INR    No new medication/supplements affecting INR    Continues to tolerate warfarin with no reported s/s of bleeding or thromboembolism     Previous INR was Therapeutic at 2.70 on 5/31/19.    Plan:     Spoke with Yair regarding INR result and instructed:     Warfarin Dosing Instructions:   (has 4mg tabs)   - Continue current warfarin dose 2 mg daily on Fridays; and 4 mg daily rest of week.    Instructed patient to follow up no later than:  3-4 wks.   - scheduled on 7/12/19 @ [facility].    Education provided: target INR goal and significance of current INR result    WifeIsabel / Yair  verbalizes understanding and agrees to warfarin dosing plan.    Instructed to call the [facility] for any changes, questions or concerns. (#[phone])   ?   Yamila Luna RN    Subjective/Objective:      Alexei MIKEY Blake, a 79 y.o. male is on warfarin.     Alexei reports:     Home warfarin dose: verbally confirmed home dose with Yair and updated on anticoagulation calendar     Missed doses: No     Medication changes:  No     S/S of bleeding or thromboembolism:  No     New Injury or illness:  No     Changes in diet or alcohol consumption:  No     Upcoming surgery, procedure or cardioversion:  No.  Kidney Dialysis 3x/wk on Tues/Thurs/Sat. Since Dec 2015.    Anticoagulation Episode Summary     Current INR goal:   2.0-3.0   TTR:   74.9 % (4.5 y)   Next INR check:   7/19/2019   INR from last check:   2.20 (6/21/2019)   Weekly max warfarin dose:      Target end date:      INR check location:      Preferred lab:      Send INR reminders to:   [facility] [place], operated by [facility]    Indications    PVD (peripheral vascular disease) (H) [I73.9]  Stroke (H) [I63.9]           Comments:            Anticoagulation Care Providers     Provider Role Specialty Phone  number    Jesus Medrano MD [facility] Internal Medicine [phone]

## 2021-05-29 NOTE — TELEPHONE ENCOUNTER
ANTICOAGULATION  MANAGEMENT    Assessment     Today's INR result of 2.70 is Therapeutic (goal INR of 2.0-3.0)        Warfarin taken as previously instructed    No new diet changes affecting INR    No new medication/supplements affecting INR    Continues to tolerate warfarin with no reported s/s of bleeding or thromboembolism     Previous INR was Therapeutic at 2.10 on 5/10/19.    Plan:     Spoke with Yair regarding INR result and instructed:     Warfarin Dosing Instructions:  (has 4mg tabs)   - Continue current warfarin dose 2 mg daily on Fridays; and 4 mg daily rest of week.    Instructed patient to follow up no later than:  3 wks.   - scheduled on 6/21/19 @ [facility].    Education provided: importance of consistent vitamin K intake and target INR goal and significance of current INR result    Yair verbalizes understanding and agrees to warfarin dosing plan.    Instructed to call the [facility] for any changes, questions or concerns. (#[phone])   ?   Yamila Luna RN    Subjective/Objective:      Alexei Blake, a 79 y.o. male is on warfarin.     Alexei reports:     Home warfarin dose: verbally confirmed home dose with Yair and updated on anticoagulation calendar     Missed doses: No     Medication changes:  No     S/S of bleeding or thromboembolism:  No     New Injury or illness:  No.   - Hx of ESRD (end-stage renal disease).     Changes in diet or alcohol consumption:  No     Upcoming surgery, procedure or cardioversion:  Yes:  Kidney dialysis 3x/wk on Tues/Thurs/Sat.    Anticoagulation Episode Summary     Current INR goal:   2.0-3.0   TTR:   74.6 % (4.4 y)   Next INR check:   6/21/2019   INR from last check:   2.70 (5/31/2019)   Weekly max warfarin dose:      Target end date:      INR check location:      Preferred lab:      Send INR reminders to:   [facility]    Indications    PVD (peripheral vascular disease) (H) [I73.9]  Stroke (H) [I63.9]           Comments:            Anticoagulation  Care Providers     Provider Role Specialty Phone number    Jesus Medrano MD Referring Internal Medicine [phone]

## 2021-05-30 NOTE — TELEPHONE ENCOUNTER
ANTICOAGULATION  MANAGEMENT    Assessment     Today's INR result of 2.20 is Therapeutic (goal INR of 2.0-3.0)        Warfarin taken as previously instructed    No new diet changes affecting INR    No new medication/supplements affecting INR    Continues to tolerate warfarin with no reported s/s of bleeding or thromboembolism     Previous INR was Therapeutic at 2.20 on 6/21/19.    Plan:     Spoke with Yair regarding INR result and instructed:     Warfarin Dosing Instructions:  (has 4mg tabs)   - Continue current warfarin dose 2 mg daily on Friday; and 4 mg daily rest of week.    Instructed patient to follow up no later than: 3-4 wks.   - scheduled on 8/9/19 @ [facility].    Education provided: importance of consistent vitamin K intake, target INR goal and significance of current INR result and monitoring for bleeding signs and symptoms    Yair verbalizes understanding and agrees to warfarin dosing plan.    Instructed to call the [facility] for any changes, questions or concerns. (#[phone])   ?   Yamila Luna RN    Subjective/Objective:      Alexei MIKEY Blake, a 79 y.o. male is on warfarin.     Alexei reports:     Home warfarin dose: verbally confirmed home dose with Yair and updated on anticoagulation calendar     Missed doses: No     Medication changes:  No     S/S of bleeding or thromboembolism:  No     New Injury or illness:  No     Changes in diet or alcohol consumption:  No     Upcoming surgery, procedure or cardioversion:  No. Kidney Dialysis 3x/wk on Tues/Thurs/Sat. Since Dec 2015.    Anticoagulation Episode Summary     Current INR goal:   2.0-3.0   TTR:   75.2 % (4.5 y)   Next INR check:   8/9/2019   INR from last check:   2.20 (7/12/2019)   Weekly max warfarin dose:      Target end date:      INR check location:      Preferred lab:      Send INR reminders [facility]:   [facility] [place]    Indications    PVD (peripheral vascular disease) (H) [I73.9]  Stroke (H) [I63.9]           Comments:             Anticoagulation Care Providers     Provider Role Specialty Phone number    Jesus Medrano MD Referring Internal Medicine [phone]

## 2021-05-31 NOTE — TELEPHONE ENCOUNTER
ANTICOAGULATION  MANAGEMENT    Assessment     Today's INR result of 2.10 is Therapeutic (goal INR of 2.0-3.0)        Warfarin taken as previously instructed    No new diet changes affecting INR    No new medication/supplements affecting INR    Continues to tolerate warfarin with no reported s/s of bleeding or thromboembolism     Previous INR was Therapeutic at 2.20 on 7/12/19.    Plan:     Spoke with Yair regarding INR result and instructed:     Warfarin Dosing Instructions:   (has 4mg tabs)   - Continue current warfarin dose 2 mg daily on Friday; and 4 mg daily rest of week.    Instructed patient to follow up no later than:  3-4 wks.   - scheduled on 8/30/19 @ [facility]    Education provided: target INR goal and significance of current INR result    Bill verbalizes understanding and agrees to warfarin dosing plan.    Instructed to call the [facility] for any changes, questions or concerns. (#[phone])   ?   Yamila Luna RN    Subjective/Objective:      Alexei Blake, a 79 y.o. male is on warfarin.     Alexei reports:     Home warfarin dose: verbally confirmed home dose with Yair and updated on anticoagulation calendar     Missed doses: No     Medication changes:  No     S/S of bleeding or thromboembolism:  No     New Injury or illness:  No     Changes in diet or alcohol consumption:  No     Upcoming surgery, procedure or cardioversion:  No    Anticoagulation Episode Summary     Current INR goal:   2.0-3.0   TTR:   75.6 % (4.6 y)   Next INR check:   9/6/2019   INR from last check:   2.10 (8/9/2019)   Weekly max warfarin dose:      Target end date:      INR check location:      Preferred lab:      Send INR reminders to:   [facility]    Indications    PVD (peripheral vascular disease) (H) [I73.9]  Stroke (H) [I63.9]           Comments:            Anticoagulation Care Providers     Provider Role Specialty Phone number    Jesus Medrano MD Referring Internal Medicine [phone]

## 2021-05-31 NOTE — TELEPHONE ENCOUNTER
Refill Approved    Rx renewed per Medication Renewal Policy. Medication was last renewed on 8/22/18.    Skylar Castaneda, [facility] Triage/Med Refill 8/21/2019     Requested Prescriptions   Pending Prescriptions Disp Refills     lovastatin (MEVACOR) 40 MG tablet [Pharmacy Med Name: LOVASTATIN 40 MG Tablet] 90 tablet 3     Sig: TAKE 1 TABLET AT BEDTIME       Statins Refill Protocol (Hmg CoA Reductase Inhibitors) Passed - 8/20/2019 12:37 PM        Passed - PCP or prescribing provider visit in past 12 months      Last office visit with prescriber/PCP: 4/5/2019 Jesus Medrano MD OR same dept: 4/5/2019 Jesus Medrano MD OR same specialty: 4/5/2019 Jesus Medrano MD  Last physical: 10/3/2016 Last MTM visit: Visit date not found   Next visit within 3 mo: Visit date not found  Next physical within 3 mo: Visit date not found  Prescriber OR PCP: Jesus Medrano MD  Last diagnosis associated with med order: 1. Hyperlipidemia  - lovastatin (MEVACOR) 40 MG tablet [Pharmacy Med Name: LOVASTATIN 40 MG Tablet]; TAKE 1 TABLET AT BEDTIME  Dispense: 90 tablet; Refill: 3    If protocol passes may refill for 12 months if within 3 months of last provider visit (or a total of 15 months).

## 2021-05-31 NOTE — TELEPHONE ENCOUNTER
ANTICOAGULATION  MANAGEMENT    Assessment     Today's INR result of 3.10 is Supratherapeutic (goal INR of 2.0-3.0)        Warfarin taken as previously instructed    No new diet changes affecting INR    No new medication/supplements affecting INR    Continues to tolerate warfarin with no reported s/s of bleeding or thromboembolism     Previous INR was Therapeutic at 2.10 on 8/9/19.    Plan:     Spoke with Yair regarding INR result and instructed:    1.  Advised one time lower dose of warfarin on 8/31, during dialysis day.  Due to large bore needle is used during procedure.    Warfarin Dosing Instructions:   (has 4mg tabs)   - just for tomorrow, advised one time lower dose with 2mg warfarin then continue current warfarin dose 2 mg daily on Friday; and 4 mg daily rest of week.    Instructed patient to follow up no later than:  2 wks.   - schedule don 9/13/19 @ [facility].    Education provided: importance of consistent vitamin K intake and target INR goal and significance of current INR result    Yair verbalizes understanding and agrees to warfarin dosing plan.    Instructed to call the [facility] for any changes, questions or concerns. (#[phone])   ?   Yamila Luna RN    Subjective/Objective:      Alexei JENSEN Lou, a 79 y.o. male is on warfarin.     Alexei reports:     Home warfarin dose: as updated on anticoagulation calendar per template     Missed doses: No     Medication changes:  No     S/S of bleeding or thromboembolism:  No     New Injury or illness:  No     Changes in diet or alcohol consumption:  No     Upcoming surgery, procedure or cardioversion:  Yes:  Kidney Dialysis 3x/wk on Tues/Thurs/Sat.    Anticoagulation Episode Summary     Current INR goal:   2.0-3.0   TTR:   86.0 %   Next INR check:   9/13/2019   INR from last check:   3.10! (8/30/2019)   Weekly max warfarin dose:      Target end date:      INR check location:      Preferred lab:      Send INR reminders to:   [facility]     Indications    PVD (peripheral vascular disease) (H) [I73.9]  Stroke (H) [I63.9]           Comments:            Anticoagulation Care Providers     Provider Role Specialty Phone number    Jesus Medrano MD Referring Internal Medicine [phone]

## 2021-06-01 NOTE — TELEPHONE ENCOUNTER
to be admitted for eval     She is wondering if needs to bring in his usual CPap machine from home ?      A/P:   > Would check with admitting provider re this         Nakul Smith RN   Triage and Medication Refills

## 2021-06-01 NOTE — TELEPHONE ENCOUNTER
ANTICOAGULATION  MANAGEMENT: Discharge Continuity of Care Review    Emergency room visit  on  9/28/19 for generalized body aches x1 wk.   - influenza test - negative.    Discharge disposition: Admitte on 9/28/19.  Transferred to [facility]  ([facility])    INR Results:       Recent labs: (last 7 days)     09/23/19  0859 09/28/19  0607 09/29/19  0432 09/30/19  0535   INR 4.50* 3.08* 4.44* 2.35*       Warfarin inpatient management: not applicable    Warfarin discharge instructions: admitted.     Yamila Luna RN

## 2021-06-01 NOTE — TELEPHONE ENCOUNTER
Who is calling:  Patient wife  Reason for Call:  Patient was seen today and was put on an antibiotic.  Patient has already taken one of these pills today, but pharmacist advised him to call the doctor to determine if he needs another INR drawn soon  Date of last appointment with primary care: 09.18.19  Okay to leave a detailed message: Yes

## 2021-06-01 NOTE — TELEPHONE ENCOUNTER
"Wife (Isabel) reports new urinary symptoms since pt started antibiotics.  Had OV for \"urinary leaking\" 9/18/19 (48 hours ago).  Started Septra twice daily.  Has taken five doses so far.    New since starting antibiotics -> hematuria and burning pain with urinating.  Now on 48 hours of Septra.    Pt states \"bloody spot in underwear.\"  Also \"new burning-stinging pain with urination -> started yesterday.\"  Ongoing stinging pain today.  No fevers.    Pt states \"I do feel better since starting the antibiotic.\"  \"Now able to urinate at least a tiny amount -> but only in the pad.\"  \"Cannot stand up to urinate normally; nothing comes out.\"  Dialysis patient.    Pt and wife will \"try to catch a urine sample\" -> has a sterile jar at home.\"     Next dialysis is tomorrow (Saturday, Sept 21st).    What to do re new hematuria and burning-stinging pain?    Please advise; thank you.  Pharmacy is verified in chart.  Detailed voicemail message okay.     Eryn Shaw RN BSBA  [facility] RN Triage     Reason for Disposition    [1] Taking antibiotic < 72 hours (3 days) for UTI AND [2] painful urination or frequency not improved    Protocols used: URINARY TRACT INFECTION ON ANTIBIOTIC FOLLOW-UP CALL - MALE-A-AH      "

## 2021-06-01 NOTE — TELEPHONE ENCOUNTER
Called and spoke with Yair.     - UC showed no growth.     - has 2  more days left of Septra-DS and complete on 9/25.     - advised to ensure he completes the 7 day course of ABX.     - scheduled next INR on Mon. 9/30/19 @ [facility].

## 2021-06-01 NOTE — TELEPHONE ENCOUNTER
ANTICOAGULATION  MANAGEMENT    Assessment     Today's INR result of 4.50 is Supratherapeutic (goal INR of 2.0-3.0)        Warfarin taken as previously instructed    No new diet changes affecting INR    Potential interaction between SEPTRA-DS and warfarin which may affect subsequent INRs    Continues to tolerate warfarin with no reported s/s of bleeding or thromboembolism     Previous INR was Therapeutic at 2.60 on 9/13/19.    Plan:     Spoke with Isabel arrington regarding INR result and instructed:     Warfarin Dosing Instructions:    - HOLD warfarin for 2 days, 9/23-24.   - awaiting for Urine Culture - pending - [facility] will f/u on 9/24.    Instructed patient to follow up no later than:  7-10 days.    Education provided: importance of consistent vitamin K intake, impact of vitamin K foods on INR, target INR goal and significance of current INR result, importance of notifying clinic for changes in medications and when to seek medical attention/emergency care    Isabel Arrington verbalizes understanding and agrees to warfarin dosing plan.    Instructed to call the [facility] for any changes, questions or concerns. (#[phone])   ?   Yamila Luna RN    Subjective/Objective:      Alexei MIKEY Blake, a 79 y.o. male is on warfarin.     Alexei reports:     Home warfarin dose: verbally confirmed home dose with wifeIsabel and updated on anticoagulation calendar     Missed doses: No     Medication changes:  Yes:  Currently on Septra-DSfrom 9/18-25/19.     S/S of bleeding or thromboembolism:  No     New Injury or illness:  Yes: reported still not feeling better   - Dropped off Urine sample for UC on 9/21/19.     Changes in diet or alcohol consumption:  No     Upcoming surgery, procedure or cardioversion:  No    Anticoagulation Episode Summary     Current INR goal:   2.0-3.0   TTR:   83.0 %   Next INR check:   9/30/2019   INR from last check:   4.50! (9/23/2019)   Weekly max warfarin dose:      Target end date:      INR check  location:      Preferred lab:      Send INR reminders to:   [facility] [place]    Indications    PVD (peripheral vascular disease) (H) [I73.9]  Stroke (H) [I63.9]           Comments:            Anticoagulation Care Providers     Provider Role Specialty Phone number    Jesus Medrano MD Referring Internal Medicine [phone]

## 2021-06-01 NOTE — TELEPHONE ENCOUNTER
Just to send in a urine sample incase its needs a different or stronger antibiotic . Hematuria is seen with infection . I will order the UA

## 2021-06-01 NOTE — TELEPHONE ENCOUNTER
ANTICOAGULATION  MANAGEMENT    Assessment     Today's INR result of 2.60 is Therapeutic (goal INR of 2.0-3.0)        Warfarin taken as previously instructed    No new diet changes affecting INR    No new medication/supplements affecting INR    Continues to tolerate warfarin with no reported s/s of bleeding or thromboembolism     Previous INR was Supratherapeutic at 3.10 on 8/30/19.    Plan:     Spoke with Yair regarding INR result and instructed:     Warfarin Dosing Instructions:  (has 4mg tabs)   - Continue current warfarin dose 2 mg daily on Friday; and 4 mg daily rest of week.    Instructed patient to follow up no later than:  2-3 wks.    Education provided: target INR goal and significance of current INR result    Bill verbalizes understanding and agrees to warfarin dosing plan.    Instructed to call the [facility] for any changes, questions or concerns. (#[phone])   ?   Yamila Luna RN    Subjective/Objective:      Alexei Blake, a 79 y.o. male is on warfarin.     Alexei reports:     Home warfarin dose: verbally confirmed home dose with Yair and updated on anticoagulation calendar     Missed doses: No     Medication changes:  No     S/S of bleeding or thromboembolism:  No     New Injury or illness:  No     Changes in diet or alcohol consumption:  No     Upcoming surgery, procedure or cardioversion:  No.  Kidney dialysis 3x/wk on Tues/Thurs/Sat.    Anticoagulation Episode Summary     Current INR goal:   2.0-3.0   TTR:   85.2 %   Next INR check:   9/27/2019   INR from last check:   2.60 (9/13/2019)   Weekly max warfarin dose:      Target end date:      INR check location:      Preferred lab:      Send INR reminders to:   [facility]    Indications    PVD (peripheral vascular disease) (H) [I73.9]  Stroke (H) [I63.9]           Comments:            Anticoagulation Care Providers     Provider Role Specialty Phone number    Jesus Medrano MD Referring Internal Medicine [phone]

## 2021-06-01 NOTE — TELEPHONE ENCOUNTER
ANTICOAGULATION  MANAGEMENT - BPA Interacting Medication Review    Interacting medication(s): Sulfamethoxazole-trimethoprim (Septra-DS) with warfarin.   - Urinary frequency    Duration: 7 days, 9/18 to 25/19    New medication?:  Yes, interaction per Micromedex, may increase INR and risk of bleeding.    Plan:    Spoke with Bill regarding potential interaction.     Warfarin instructions: continue current warfarin dose    Follow up INR recommended in:   4 day of ABX - scheduled on 9/23/19.    Anticoagulation calendar updated    Yamila Luna RN

## 2021-06-01 NOTE — TELEPHONE ENCOUNTER
[phone] (home)      [facility] called and LMTCB.  Should Pt call please inform of results and FU plan stated.  Please update provider if pt is symptomatic. Della Wilson CMA ([facility]) 1:07 PM

## 2021-06-01 NOTE — PROGRESS NOTES
"[facility] Follow Up Note    Alexei Blake   79 y.o. male    Date of Visit: 9/18/2019    Chief Complaint   Patient presents with     not feeling well     \"feels like a slug\" not able to uriante     Subjective  This is a 79-year-old man with multiple medical issues that include hypertension, type 2 diabetes and chronic renal failure.  These have been fairly stable.  He continues on dialysis 3 times weekly.  Over the past week he has developed some problems with increasing fatigue as well as trouble urinating.  He gets the urge to urinate frequently but then is unable to produce anything more than a few drops of urine.  This is worsened over the past couple of days.  At dialysis yesterday this suggested he might have an infection should check with our office.  He is noticed no blood and there is been no dysuria.  No fever or chills.  Like the urinary symptoms the fatigue is really only been bad over the past 6 or 7 days.  He denies any other new issues.    ROS A comprehensive review of systems was performed and was otherwise negative    Medications, allergies, and problem list were reviewed and updated    Exam  General Appearance:   On examination his blood pressure is initially slightly elevated 154/60.  We will recheck it.  Weight is 223 pounds and BMI is 29.42.    Lungs are clear.    Heart rhythm is stable with rate of 60 and no ectopy.    No abdominal distention.    No new peripheral edema.    The patient is alert and oriented x3.      Assessment/Plan  1. Fatigue, unspecified type     2. Type 2 diabetes mellitus with complication, with long-term current use of insulin (H)     3. Essential hypertension with goal blood pressure less than 130/85     4. Urinary frequency  sulfamethoxazole-trimethoprim (SEPTRA DS) 800-160 mg per tablet     Urinary symptoms.  He is unable to give us a urine today but we will go ahead and try him on some Septra for a week to see if this helps with his " "combination of symptoms.  If it does not he will let me know.    Type 2 diabetes.  Stable.  Continue current medication.    Hypertension.  Blood pressure was initially a little elevated we will recheck it.    Chronic renal failure.  Continue dialysis.  We discussed fluid intake and he will check with the dialysis people tomorrow about any limits on his fluid intake.  The following high BMI interventions were performed this visit: weight monitoring    Jesus Medrano MD      Current Outpatient Medications on File Prior to Visit   Medication Sig     ACCU-CHEK KYLE PLUS TEST STRP strips USE  STRIP TO CHECK GLUCOSE 2 TO 3 TIMES DAILY AS DIRECTED AT  6AM  AND  4PM     acetaminophen (TYLENOL) 500 MG tablet Take 1,000 mg by mouth bedtime as needed for pain (or sleep).      BD VEO INSULIN SYRINGE UF 1/2 mL 31 gauge x 15/64\" Syrg USE ONE SYRINGE TWICE DAILY     bumetanide (BUMEX) 1 MG tablet TAKE 1 TABLET TWICE DAILY AT 9AM AND 6PM. ON DIALYSIS DAYS, ONLY DOSE IN THE EVENING.     calcium, as carbonate, (OS-AVNI) 500 mg calcium (1,250 mg) tablet Take 1 tablet by mouth daily.     clopidogrel (PLAVIX) 75 mg tablet TAKE 1 TABLET (75 MG TOTAL) BY MOUTH DAILY.     hydrALAZINE (APRESOLINE) 25 MG tablet TAKE 1 TABLET TWICE DAILY     insulin NPH (NOVOLIN N NPH U-100 INSULIN) 100 unit/mL injection Give 18 units subcutaneously every am and 6 units subcutaneously every pm E11.9     insulin NPH (NOVOLIN) 100 unit/mL injection Inject 5-6 Units under the skin 2 (two) times a day. (based on blood sugars)     lisinopril (PRINIVIL,ZESTRIL) 40 MG tablet Take 40 mg by mouth daily.     lovastatin (MEVACOR) 40 MG tablet TAKE 1 TABLET AT BEDTIME     metoprolol succinate (TOPROL-XL) 25 MG TAKE 1 TABLET (25 MG TOTAL) BY MOUTH EVERY EVENING.     multivitamin therapeutic tablet Take 1 tablet by mouth daily.     NOVOLIN N NPH U-100 INSULIN 100 unit/mL injection INJECT 18 UNITS SUBCUTANEOUSLY ONCE DAILY IN THE MORNING AND 6 IN THE EVENING     NOVOLIN R " 100 unit/mL injection 1 unit for every 25>150     warfarin (COUMADIN/JANTOVEN) 4 MG tablet Take 1 tablet (4 mg total) by mouth daily. Adjust dose per INR results as instructed.     No current facility-administered medications on file prior to visit.      Allergies   Allergen Reactions     Blood-Group Specific Substance      Anti-K present. Expect delays in blood for transfusion.  Draw 2 lavender top tubes for type and screen orders.        Calcitriol      Fatigue      Ciprofloxacin Rash     Social History     Tobacco Use     Smoking status: Former Smoker     Last attempt to quit: 1995     Years since quittin.7     Smokeless tobacco: Never Used   Substance Use Topics     Alcohol use: No     Drug use: No

## 2021-06-01 NOTE — TELEPHONE ENCOUNTER
Patient Returning Call  Reason for call:  Returning clinic call  Information relayed to patient:  Read to patient the note by Dr Hayes.  Patient stastes is symptom free, denies any burning/freguency, low back or abdominal pains, denies fevers, feels fine. Understand to follow-up with PCP, transferred caller to scheduling.  Patient has additional questions:  No  If YES, what are your questions/concerns:  NA  Okay to leave a detailed message?: No call back needed

## 2021-06-01 NOTE — TELEPHONE ENCOUNTER
FYI - Status Update  Who is Calling: Patient  Update: Patient is calling in because the [facility] was suppose to call the patient back today regarding their blood being too thin. Patient will be leaving their home in about an hour and wont be home to take call from [facility] later.   Okay to leave a detailed message?:  Yes

## 2021-06-01 NOTE — TELEPHONE ENCOUNTER
----- Message from Miya Hayes MD sent at 9/24/2019  1:02 PM CDT -----  The urine didn't grow anything but please check if he has symptoms . Also he should repeat urine in one to two weeks and follow up with Dr Medrano because he had a lot of red blood cells in urine and if they persist he may need that evaluated .

## 2021-06-01 NOTE — TELEPHONE ENCOUNTER
Spoke with the patient and relayed message below from Dr. Hayes.  Patient verbalized understanding and had no further questions at this time.  Dulce CARLOS, YUDELKA/CMT....................3:38 PM

## 2021-06-02 NOTE — TELEPHONE ENCOUNTER
"Caller is pt's wife (Isabel), with pt immediately present on speaker phone.    Reports pt awoke \"not feeling well.\"  Mild diarrhea today -> 3x over the past one hour.  \"Seems to be stopping now.\"    Dialysis pt.  Has been advised to \"keep dry weight down to 99 kg before each dialysis.\"  Takes bumetanide daily.    Current blood glucose 170 -> pt and wife feel alarmed by this higher-than-usual BG reading.  Blood pressure -> 104/58 -> family also feels alarmed by lower BP today as well.  Not symptomatic of either hyperglycemia or hypotension.    Advised sipping his daily water goal now -> (six cups daily).    Due to delicate fluid balance as a dialysis pt, advised family to come discuss meds with PCP.  Family agrees.  Intends to ask about:  1)  Imodium -> Is this acceptable as a prn for diarrhea episodes?  2)  Bumetanide -> How much water should pt strive for daily, if indeed also taking this diuretic?    Eryn Shaw RN BSBA  [facility] RN Triage     Reason for Disposition    MILD-MODERATE diarrhea (e.g., 1-6 times / day more than normal)    Protocols used: DIARRHEA-A-OH      "

## 2021-06-02 NOTE — PROGRESS NOTES
Clinic Care Coordination Contact    Follow Up Progress Note      Patient was discharged from [facility] and was [facility]. Patient is currently not eligible for enrollment in [facility] related to TCU placement.

## 2021-06-02 NOTE — TELEPHONE ENCOUNTER
Who is calling:  Patient  Reason for Call:  The patient is returning a call to [facility] regarding today's INR.     Okay to leave a detailed message: Yes

## 2021-06-02 NOTE — PROGRESS NOTES
[facility] Patient Outreach:     Discharge Date::  10/9/19      Reason for TCU stay (discharge diagnosis)::  Pneumonia, pleural effusion, ESRD on dialysis, hx of CVA      Are you feeling better, the same or worse since your discharge?:  Patient is feeling better          As part of your discharge plan, did they discuss home care with you?: No            Did you receive any new medications, or was there a change to your medications?: No            Do you have any follow up visits scheduled with your PCP or Specialist?:  Yes, with PCP      (RN) Is it scheduled soon enough (3-5 days)?: Yes

## 2021-06-02 NOTE — PROGRESS NOTES
[facility] Follow Up Note    Alexei Murryjohn   79 y.o. male    Date of Visit: 10/18/2019    Chief Complaint   Patient presents with     Diarrhea     Hypertension     Subjective  This is a 79-year-old man who was seen earlier this week for follow-up to hospitalization for pneumonia.  I would refer to my notes for the details.  He was doing pretty well.  His wife apparently found this morning saying he had had some diarrhea in the last day or 2.  She thought he was a little dehydrated as his blood pressure was low and then he had an episode where his sugar climbed much higher than it usually does.  He denies any nausea or vomiting and is had no abdominal pain.  Since this morning the diarrhea has subsided and he is try to increase his fluid intake.  He does have end-stage renal disease and is dialyzed 3 times weekly.  He is due for dialysis again tomorrow.  He was advised to come down to be seen today.  This afternoon he actually feels pretty good.    ROS A comprehensive review of systems was performed and was otherwise negative    Medications, allergies, and problem list were reviewed and updated    Exam  General Appearance:   On examination his blood pressure is 112/60.  Weight is 221 pounds.    Heart rhythm is stable with rate of 72 and no ectopy.    No abdominal distention or tenderness.    The patient is alert and oriented x3.      Assessment/Plan  1. Diarrhea, unspecified type     2. Essential hypertension with goal blood pressure less than 130/85     3. ESRD (end stage renal disease) (H)       Diarrhea.  This may have been due to his recent antibiotics or from a little viral gastroenteritis.  It certainly does not seem significant and is already clearing.    Drop in blood pressure.  Probably due to some dehydration as his fluid intake is not as good as it should be and he had the diarrhea.  I spoke with the patient and his wife and again encouraged him to make sure he is getting enough  "fluid in each day.  He will also follow-up on this with dialysis tomorrow.    Diabetes.  Sugar was high this morning but I suspect related to his acute symptoms.  It came down pretty quickly so he will continue his current medication.    I will follow-up as needed otherwise as scheduled.  Body Mass Index was not assessed due to Patient was in with an acute medical issue..    Jesus Medrano MD      Current Outpatient Medications on File Prior to Visit   Medication Sig     ACCU-CHEK KYLE PLUS TEST STRP strips USE  STRIP TO CHECK GLUCOSE 2 TO 3 TIMES DAILY AS DIRECTED AT  6AM  AND  4PM     acetaminophen (TYLENOL) 500 MG tablet Take 1,000 mg by mouth bedtime as needed for pain (or sleep).      BD VEO INSULIN SYRINGE UF 1/2 mL 31 gauge x 15/64\" Syrg USE ONE SYRINGE TWICE DAILY     bumetanide (BUMEX) 1 MG tablet TAKE 1 TABLET TWICE DAILY AT 9AM AND 6PM. ON DIALYSIS DAYS, ONLY DOSE IN THE EVENING.     calcium, as carbonate, (OS-AVNI) 500 mg calcium (1,250 mg) tablet Take 1 tablet by mouth daily.     clopidogrel (PLAVIX) 75 mg tablet TAKE 1 TABLET (75 MG TOTAL) BY MOUTH DAILY.     hydrALAZINE (APRESOLINE) 25 MG tablet TAKE 1 TABLET TWICE DAILY     insulin NPH (NOVOLIN) 100 unit/mL injection Inject 5 Units under the skin 2 (two) times a day.            insulin regular (NOVOLIN R REGULAR U-100 INSULN) 100 unit/mL injection Check blood glucose three times daily prior to meal. Correction with 1 unit for every 25mg/dL>150mg/dL blood glucose.     lisinopril (PRINIVIL,ZESTRIL) 40 MG tablet Take 40 mg by mouth every evening.            lovastatin (MEVACOR) 40 MG tablet Take 0.5 tablets (20 mg total) by mouth at bedtime.     metoprolol succinate (TOPROL-XL) 25 MG TAKE 1 TABLET (25 MG TOTAL) BY MOUTH EVERY EVENING.     multivitamin therapeutic tablet Take 1 tablet by mouth daily.     warfarin (COUMADIN/JANTOVEN) 4 MG tablet Take 2-4 mg by mouth See Admin Instructions. TAKE 2MG ON Friday AND 4MG REST OF THE WEK.     No current " facility-administered medications on file prior to visit.      Allergies   Allergen Reactions     Blood-Group Specific Substance      Anti-K present. Expect delays in blood for transfusion.  Draw 2 lavender top tubes for type and screen orders.        Calcitriol      Fatigue      Ciprofloxacin Rash     Social History     Tobacco Use     Smoking status: Former Smoker     Packs/day: 0.00     Last attempt to quit: 1995     Years since quittin.8     Smokeless tobacco: Never Used   Substance Use Topics     Alcohol use: No     Drug use: No

## 2021-06-02 NOTE — PROGRESS NOTES
[facility]    Facility:   [facility] [[id number]]   Code Status: POLST AVAILABLE      CHIEF COMPLAINT/REASON FOR VISIT:  Chief Complaint   Patient presents with     Review Of Multiple Medical Conditions       HISTORY:      HPI: Alexei is a 79 y.o. male with pmh of ESRD on dialysis three times weekly, CVA with residual left sided weakness, htn, who was admitted to [facility] with fever and fatigue. He was found to have pleural effusion and possible RLL pneumonia that was treated empirically and he did improve. He continues on oral abx cefdinir and doxycycline. He is in TCU for occupational and physical therapy.      Today he is wondering how his progress looks and if he can discharge soon. He reports that he lives at home with his wife and is relatively independent there. He continues to perform all ADLs on his own at baseline. In TCU he is ambulating with PT >200ft. He is using cane to ambulate to the bathroom. Lungs with diminished RLL. He is denying fever, shortness of breath, chest pain, chills. He appears well.       Past Medical History:   Diagnosis Date     Abscess of tongue      Chronic kidney disease     CKD stage 4,dialysis [facility]     Diabetes (H)     type 2     DVT (deep venous thrombosis) (H)      End stage renal disease (H)      Hernia, umbilical      History of transfusion      Hyperlipidemia      Hypertension      CATHLEEN (obstructive sleep apnea)     uses CPAP     PVD (peripheral vascular disease) (H)      Renal insufficiency      Stroke (H) 2003    minor left sided weakness             Family History   Problem Relation Age of Onset     Hypertension Mother      Hypertension Father      Diabetes Father      Heart attack Father      Social History     Socioeconomic History     Marital status:      Spouse name: Not on file     Number of children: Not on file     Years of education: Not on file     Highest education level: Not on file   Occupational History      Not on file   Social Needs     Financial resource strain: Not on file     Food insecurity:     Worry: Not on file     Inability: Not on file     Transportation needs:     Medical: Not on file     Non-medical: Not on file   Tobacco Use     Smoking status: Former Smoker     Packs/day: 0.00     Last attempt to quit: 1995     Years since quittin.7     Smokeless tobacco: Never Used   Substance and Sexual Activity     Alcohol use: No     Drug use: No     Sexual activity: Not on file   Lifestyle     Physical activity:     Days per week: Not on file     Minutes per session: Not on file     Stress: Not on file   Relationships     Social connections:     Talks on phone: Not on file     Gets together: Not on file     Attends Congregation service: Not on file     Active member of club or organization: Not on file     Attends meetings of clubs or organizations: Not on file     Relationship status: Not on file     Intimate partner violence:     Fear of current or ex partner: Not on file     Emotionally abused: Not on file     Physically abused: Not on file     Forced sexual activity: Not on file   Other Topics Concern     Not on file   Social History Narrative     Not on file         Review of Systems   Constitutional: Negative.    HENT: Negative.    Respiratory: Negative.    Cardiovascular: Negative.    Gastrointestinal: Negative.    Genitourinary: Negative.    Musculoskeletal: Negative.    Skin: Negative.    Neurological: Negative.        Vitals:    10/09/19 1409   BP: 134/69   Pulse: 63   Temp: 97.4  F (36.3  C)   SpO2: 100%   Weight: 219 lb (99.3 kg)       Physical Exam  Constitutional:       Appearance: Normal appearance.   HENT:      Head: Atraumatic.      Mouth/Throat:      Mouth: Mucous membranes are moist.   Eyes:      Extraocular Movements: Extraocular movements intact.   Cardiovascular:      Rate and Rhythm: Normal rate and regular rhythm.   Pulmonary:      Effort: Pulmonary effort is normal. No respiratory  distress.      Breath sounds: No wheezing.      Comments: Diminished RLL  Abdominal:      General: Abdomen is flat.      Palpations: Abdomen is soft.   Skin:     General: Skin is warm and dry.   Neurological:      General: No focal deficit present.   Psychiatric:         Mood and Affect: Mood normal.         Behavior: Behavior normal.         Thought Content: Thought content normal.         Judgement: Judgment normal.           LABS:   Reviewed.     ASSESSMENT:      ICD-10-CM    1. Community acquired pneumonia due to Chlamydia species J16.0    2. Essential hypertension with goal blood pressure less than 130/85 I10    3. Type 2 diabetes mellitus with diabetic chronic kidney disease, unspecified CKD stage, unspecified whether long term insulin use (H) E11.22    4. ESRD (end stage renal disease) (H) N18.6        PLAN:    Patient appears well from a medical perspective. He is no longer febrile, no fatigue. He is a/o to baseline.   Working well with PT. I encouraged him to discuss with PT and SW his discharge plans as this is his main focus. I see no current barriers to discharge. He has adequate support at home.       Electronically signed by: Candie Mcrae, MARK

## 2021-06-02 NOTE — TELEPHONE ENCOUNTER
ANTICOAGULATION  MANAGEMENT    Assessment     Today's INR result of 2.10 is Therapeutic (goal INR of 2.0-3.0)        Warfarin taken as previously instructed    No new diet changes affecting INR    Potential interaction between Lovastatin dose decreased and warfarin which may affect subsequent INRs    Continues to tolerate warfarin with no reported s/s of bleeding or thromboembolism     Previous INR was Therapeutic at 2.09 on 10/2/19.    Hospitalized from 9/28 - 10/2/19 for community acquired pneumonia dur chlamydia species.  Transferred to TCU from 10/3 - 9/19.    Plan:     Spoke with Yair and wife, Isabel regarding INR result and instructed:     Warfarin Dosing Instructions:  (has 4mg tabs)   `- Continue current warfarin dose 2 mg daily on Friday; and 4 mg daily rest of week.    Instructed patient to follow up no later than:  One wk.   - scheduled on 10/21/19 @ [facility].    Education provided: importance of consistent vitamin K intake, target INR goal and significance of current INR result, potential interaction between warfarin and lovastatin decreased and importance of notifying clinic for changes in medications    Yair and Addison arringtonice verbalizes understanding and agrees to warfarin dosing plan.    Instructed to call the [facility] for any changes, questions or concerns. (#[phone])   ?   Yamila Luna RN    Subjective/Objective:      Alexei JENSEN Jeanmariejohn, a 79 y.o. male is on warfarin.     Alexei reports:     Home warfarin dose: verbally confirmed home dose with Yair and updated on anticoagulation calendar     Missed doses: No     Medication changes:  Yes.  Reported Lovastatin decreased from 40mg to 20mg daily.     S/S of bleeding or thromboembolism:  No     New Injury or illness:  Yes.  Reported he was  Discharged on 10/9 from TCU recovering from pneumonia.  Feeling much better.     Changes in diet or alcohol consumption:  Yes.  Reported eating better.     Upcoming surgery, procedure or cardioversion:   No    Anticoagulation Episode Summary     Current INR goal:   2.0-3.0   TTR:   80.9 %   Next INR check:   10/21/2019   INR from last check:   2.10 (10/14/2019)   Weekly max warfarin dose:      Target end date:      INR check location:      Preferred lab:      Send INR reminders to:   [facility]    Indications    PVD (peripheral vascular disease) (H) [I73.9]  Stroke (H) [I63.9]           Comments:            Anticoagulation Care Providers     Provider Role Specialty Phone number    Jesus Medrano MD Referring Internal Medicine [phone]

## 2021-06-02 NOTE — PROGRESS NOTES
[facility]    Facility:   [facility] [[id number]]   Code Status: POLST AVAILABLE    79-year-old male is seen for follow up evaluation and discharge planning from TCU. Admitted to hospital with fever and weakness, CT scan evidence of questionable right lower lobe infiltrate, received IV antibiotic in hospital  with Omnicef and doxycycline on transfer to TCU. History of end-stage renal disease on dialysis, hypertension, coronary artery disease, insulin-dependent diabetes mellitus, CVA, paroxysmal atrial fibrillation, PVD. Patient is completing course in TCU. Has remained afebrile throughout stay. No cough or sputum production. Generalized weakness has continued, gradually improving. No focal neurologic deficits of new onset during TCU stay. No cardiac or pulmonary symptoms present. Has remained anticoagulated. Anticipated discharge will be in 48 hours to home setting.    Review of systems: denies fever sweats or chills. Generalized fatigue continues. No focal neurologic deficits of new onset. No G.I. or  symptomatology. No excessive bruising or bleeding. Remainder of 12 point review of systems obtained negative.    Exam: vital signs reviewed over past 48 hours including afebrile status. Alert and oriented, lying supine in bed in no apparent distress. No pharyngeal erythema. No facial asymmetry. No HJR. S1 and S2 regular with systolic murmur. Pulmonary exam without rales rhonchi or wheezes. Abdomen without masses tenderness organomegaly. Periphery with decreased sensation, trace nonpitting peripheral edema and venous stasis changes present, no skin breakdown. No calf tenderness or swelling. INR 2.1, last INR 2.6 on 2 milligram Coumadin, previously with INR 3.08 on 4 mg Coumadin, continue 2 milligram Coumadin with recheck INR 48 hours.    Impression and plan:   recent low-grade fever and generalized weakness, presumptive diagnosis of right lower lobe pneumonia community acquired,  "has completed Omnicef and doxycycline, remains afebrile, normal white count in hospital. Per hospital notes patient was to have follow-up chest x-ray, as original chest x-ray was unremarkable and findings of pneumonia were with CT scan suggest follow-up chest x-ray should patient develop recurrent fever and or cough.   Anticoagulation with history of DVT and paroxysmal atrial fibrillation, INR 2.1 on 2 milligram with recheck prior to discharge in 48 hours.   End-stage renal disease continuing hemodialysis.   Generalized fatigue multifactorial.   Hypertension with adequate control of blood pressure.   Coronary artery disease without current angina.   Insulin-dependent diabetes mellitus, sliding scale altered in TCU, will resume home sliding-scale on discharge.   Discharge medications are as follows:  Current Outpatient Medications:      ACCU-CHEK KYLE PLUS TEST STRP strips, USE  STRIP TO CHECK GLUCOSE 2 TO 3 TIMES DAILY AS DIRECTED AT  6AM  AND  4PM, Disp: 300 strip, Rfl: 3     acetaminophen (TYLENOL) 500 MG tablet, Take 1,000 mg by mouth bedtime as needed for pain (or sleep). , Disp: , Rfl:      BD VEO INSULIN SYRINGE UF 1/2 mL 31 gauge x 15/64\" Syrg, USE ONE SYRINGE TWICE DAILY, Disp: 200 Syringe, Rfl: 3     bumetanide (BUMEX) 1 MG tablet, TAKE 1 TABLET TWICE DAILY AT 9AM AND 6PM. ON DIALYSIS DAYS, ONLY DOSE IN THE EVENING., Disp: 180 tablet, Rfl: 3     calcium, as carbonate, (OS-AVNI) 500 mg calcium (1,250 mg) tablet, Take 1 tablet by mouth daily., Disp: , Rfl:      clopidogrel (PLAVIX) 75 mg tablet, TAKE 1 TABLET (75 MG TOTAL) BY MOUTH DAILY., Disp: 90 tablet, Rfl: 3     hydrALAZINE (APRESOLINE) 25 MG tablet, TAKE 1 TABLET TWICE DAILY, Disp: 180 tablet, Rfl: 3     insulin NPH (NOVOLIN) 100 unit/mL injection, Inject 5 Units under the skin 2 (two) times a day.    , Disp: , Rfl:      insulin regular (NOVOLIN R REGULAR U-100 INSULN) 100 unit/mL injection, Check blood glucose three times daily prior to meal. " Correction with 1 unit for every 25mg/dL>150mg/dL blood glucose., Disp: 10 mL, Rfl: 0     lisinopril (PRINIVIL,ZESTRIL) 40 MG tablet, Take 40 mg by mouth every evening.    , Disp: , Rfl:      lovastatin (MEVACOR) 40 MG tablet, Take 0.5 tablets (20 mg total) by mouth at bedtime., Disp: 90 tablet, Rfl: 2     metoprolol succinate (TOPROL-XL) 25 MG, TAKE 1 TABLET (25 MG TOTAL) BY MOUTH EVERY EVENING., Disp: 90 tablet, Rfl: 3     multivitamin therapeutic tablet, Take 1 tablet by mouth daily., Disp: , Rfl:      warfarin (COUMADIN/JANTOVEN) 4 MG tablet, Take 2-4 mg by mouth See Admin Instructions. TAKE 2MG ON Friday AND 4MG REST OF THE WEK., Disp: , Rfl:  change in medications Coumadin 2 mg Q day with INR pending in 48 hours. Follow up will be with regular physician, patient will not be homebound and will not require home services. Total time of discharge evaluation greater than 30 minutes, greater than 50% of time spent in coordination of care and counseling.      Electronically signed by: Mary Page MD

## 2021-06-02 NOTE — TELEPHONE ENCOUNTER
ANTICOAGULATION  MANAGEMENT    Assessment     Today's INR result of 2.70 is Therapeutic (goal INR of 2.0-3.0)        Warfarin taken as previously instructed    No new diet changes affecting INR    No new medication/supplements affecting INR    Continues to tolerate warfarin with no reported s/s of bleeding or thromboembolism     Previous INR was Therapeutic at 2.10 on 10/14/19.    Plan:     Spoke with Yair / Isabel regarding INR result and instructed:     Warfarin Dosing Instructions:  (has 4mg tabs)   - Continue current warfarin dose 2 mg daily on Friday; and 4 mg daily rest of week.    Instructed patient to follow up no later than:  2 wks.   - scheduled on 11/4/19 @ [facility].    Education provided: importance of consistent vitamin K intake, target INR goal and significance of current INR result, importance of notifying clinic for changes in medications and importance of notifying clinic for diarrhea, nausea/vomiting, reduced intake and/or illness    Yair verbalizes understanding and agrees to warfarin dosing plan.    Instructed to call the [facility] for any changes, questions or concerns. (#[phone])   ?   Yamila Luna RN    Subjective/Objective:      Alexei MIKEY Blake, a 79 y.o. male is on warfarin.     Alexei reports:     Home warfarin dose: verbally confirmed home dose with Yair and updated on anticoagulation calendar     Missed doses: No     Medication changes:  No.  Reported, did take Imodium for diarrhea, PRN.     S/S of bleeding or thromboembolism:  No     New Injury or illness:  No.   had diarrhea complaints on 10/18 for 2 days, but has resolved and feeling better.     Changes in diet or alcohol consumption:  No     Upcoming surgery, procedure or cardioversion:  No.  Continues with Kidney Dialysis 3x/wk on Tues/Thurs/Sat for ESRD.    Anticoagulation Episode Summary     Current INR goal:   2.0-3.0   TTR:   80.9 %   Next INR check:   11/4/2019   INR from last check:   2.70 (10/21/2019)   Weekly max  warfarin dose:      Target end date:      INR check location:      Preferred lab:      Send INR reminders to:   [facility]    Indications    PVD (peripheral vascular disease) (H) [I73.9]  Stroke (H) [I63.9]           Comments:            Anticoagulation Care Providers     Provider Role Specialty Phone number    Jesus Medrano MD Referring Internal Medicine [phone]

## 2021-06-02 NOTE — PROGRESS NOTES
[facility]    Facility:   [facility] [[id number]]   Code Status: POLST AVAILABLE    Admission evaluation to TCU 79-year-old male. History is taken from accompanying hospital notes and from patient who gives an adequate history. End-stage renal disease on hemodialysis, hyperlipidemia, hypertension, insulin-dependent diabetes mellitus, coronary artery disease, history of CVA, paroxysmal atrial fibrillation on Coumadin,  sick sinus syndrome, presented to hospital with weakness and fever, CT scan evidence of questionable right lower lobe infiltrate and effusion, emphysematous changes, bibasilar atelectasis, normal white count, negative blood cultures and urine culture, treated with ceftriaxone and vancomyosin, initial fever resolved, changed to oral Omnicef and doxycycline, lovastatin dose decreased in view of end-stage renal disease, stabilized and transferred to TCU for rehabilitation and management of medical problems.    Past medical history, current medical problem list, drug allergies, current medication list, social history, family history code status reviewed in epic.    Review of systems: continued generalized fatigue. Denies fever sweats or chills. Sense of dyspnea with activity, no exertional chest discomfort. Tolerating dialysis. Peripheral neuropathic symptoms mild, previous amputation of toes, gait abnormality per his report. Mild cough, no sputum production. Unaware of palpitations. No excessive bruising or bleeding. No current urinary symptoms. Remainder of 12 point review of systems obtained negative.    Exam: lying supine in bed without evidence of distress. Vital signs obtained at dialysis. Pleasant and cooperative, oriented, minimally dysarthric speech. No pharyngeal erythema, crowded oral pharynx. No cervical adenopathy or HJR are. S1 and S2 irregular with systolic murmur. Pulmonary exam with trace delay in inspiratory flow, no rhonchi, minimal dry crackles right  greater than left lung base, no rales. Abdomen with midline hernia, no hepatosplenomegaly. Periphery with venous stasis changes, decreased pulsations, knees without acute inflammatory change. Right olecranon with minimal tenderness, pressure over ulnar nerve reproduces pain described right hand. No atrophy. Absent radial pulse.    Impression and plan:   insulin-dependent diabetes mellitus, revision of sliding-scale insulin for use by nursing staff, currently written  one unit of insulin per 25 g elevation of glucose greater than 150, continues novolin five units b.i.d., Accu checks Q ID.   Community acquired pneumonia, potential infiltrate on CT scan of chest, continues Omnicef and doxycycline, completed ceftriaxone and vancomycin in hospital, remains afebrile, minimal abnormalities on pulmonary exam exam.   End-stage renal disease on hemodialysis to be continued.   Recent hypoxia resolved.   PAF on Coumadin, INR 2.9, last INR 3.08 on 4 mg coumadin, begin 2 mg Coumadin with recheck INR five days.   Hypertension with adequate control of blood pressure.   Coronary artery disease without current evidence of angina.   Previous history of CVA, no recent focal neurologic deficits.   Peripheral vascular disease, previous toe amputations, no evidence of ischemia bilateral feet.   Paresthesias right arm/hand, decreased radial pulse, monitored by vascular surgery.   Multiple complex medical issues are reviewed, review of outside medical records, review of medications and INR, differential discussion regarding olecranon and hand pain.      Electronically signed by: Mary Page MD

## 2021-06-02 NOTE — TELEPHONE ENCOUNTER
ANTICOAGULATION  MANAGEMENT: Discharge Continuity of Care Review    Hospital admission on  9/28 - 10/2/19 for Community acquired pneumonia due to chlamydia species.   -  Fever / fatigue / malaise.    Discharge disposition: TCU    INR Results:       Recent labs: (last 7 days)     09/28/19  0607 09/29/19  0432 09/30/19  0535 10/01/19  0518 10/02/19  0516   INR 3.08* 4.44* 2.35* 1.91* 2.09*       Warfarin inpatient management: Held and resumed on 9/30/19    Warfarin discharge instructions: home regimen continued     Medication Changes Affecting Anticoagulation: Yes:    - Cefdinir 300mg one daily for 3 days, (renal dosing)   - Doxycycline 100mg two times a day for 3 days, renal dosing.     - Lovastatin dose decreased to 20mg daily due to ESRD.    Additional Factors Affecting Anticoagulation: No    Plan     No adjustment to anticoagulation plan needed    ACM will resume monitoring upon discharge from TCU    Anticoagulation calendar updated    Yamila Luna RN

## 2021-06-02 NOTE — PROGRESS NOTES
[facility] Clinic Follow Up Note    Alexei Blake   79 y.o. male    Date of Visit: 10/14/2019    Chief Complaint   Patient presents with     Follow-up     TCU     Subjective  This is a 79-year-old man who is in primarily to follow-up on recent hospitalization and TCU stay.  He went into the hospital with sudden onset of extreme weakness.  I have reviewed the notes and discharge summary as well as the TCU discharge note.  They made a diagnosis of pneumonia based on a CT scan.  He completed his course of antibiotics while in the TCU and is been making gradual improvement.  Appetite is returning as is his strength.  No cough.  No other specific symptoms at this time.  He has multiple medical issues and so I would expect his recovery to be a little bit slow.  He is on chronic dialysis.  He has continued throughout all of this.  He is pretty much back on his usual medication.    ROS A comprehensive review of systems was performed and was otherwise negative    Medications, allergies, and problem list were reviewed and updated    Exam  General Appearance:   On examination his blood pressure is 110/60.  Weight is 220 pounds.    Lungs are clear.    Heart rhythm is stable with rate of 60 and no ectopy.    No new edema.    The patient is alert and oriented x3.      Assessment/Plan  1. Pneumonia due to infectious organism, unspecified laterality, unspecified part of lung       Recent pneumonia.  This seems to be resolving.  His symptoms are gone and he is completed his antibiotics.  We will watch carefully for any signs of recurrence.    Chronic renal failure.  Continue dialysis.    Hypertension.  Stable.  No change in medication.    We will plan to follow-up with him in 6 to 8 weeks or sooner if needed.  The following high BMI interventions were performed this visit: weight monitoring    Jesus Medrano MD      Current Outpatient Medications on File Prior to Visit   Medication Sig     ACCU-CHEK KYLE PLUS  "TEST STRP strips USE  STRIP TO CHECK GLUCOSE 2 TO 3 TIMES DAILY AS DIRECTED AT  6AM  AND  4PM     acetaminophen (TYLENOL) 500 MG tablet Take 1,000 mg by mouth bedtime as needed for pain (or sleep).      BD VEO INSULIN SYRINGE UF 1/2 mL 31 gauge x 15/64\" Syrg USE ONE SYRINGE TWICE DAILY     bumetanide (BUMEX) 1 MG tablet TAKE 1 TABLET TWICE DAILY AT 9AM AND 6PM. ON DIALYSIS DAYS, ONLY DOSE IN THE EVENING.     calcium, as carbonate, (OS-AVNI) 500 mg calcium (1,250 mg) tablet Take 1 tablet by mouth daily.     clopidogrel (PLAVIX) 75 mg tablet TAKE 1 TABLET (75 MG TOTAL) BY MOUTH DAILY.     hydrALAZINE (APRESOLINE) 25 MG tablet TAKE 1 TABLET TWICE DAILY     insulin NPH (NOVOLIN) 100 unit/mL injection Inject 5 Units under the skin 2 (two) times a day.            insulin regular (NOVOLIN R REGULAR U-100 INSULN) 100 unit/mL injection Check blood glucose three times daily prior to meal. Correction with 1 unit for every 25mg/dL>150mg/dL blood glucose.     lisinopril (PRINIVIL,ZESTRIL) 40 MG tablet Take 40 mg by mouth every evening.            lovastatin (MEVACOR) 40 MG tablet Take 0.5 tablets (20 mg total) by mouth at bedtime.     metoprolol succinate (TOPROL-XL) 25 MG TAKE 1 TABLET (25 MG TOTAL) BY MOUTH EVERY EVENING.     multivitamin therapeutic tablet Take 1 tablet by mouth daily.     warfarin (COUMADIN/JANTOVEN) 4 MG tablet Take 2-4 mg by mouth See Admin Instructions. TAKE 2MG ON Friday AND 4MG REST OF THE WE.     No current facility-administered medications on file prior to visit.      Allergies   Allergen Reactions     Blood-Group Specific Substance      Anti-K present. Expect delays in blood for transfusion.  Draw 2 lavender top tubes for type and screen orders.        Calcitriol      Fatigue      Ciprofloxacin Rash     Social History     Tobacco Use     Smoking status: Former Smoker     Packs/day: 0.00     Last attempt to quit: 1995     Years since quittin.8     Smokeless tobacco: Never Used   Substance Use " Topics     Alcohol use: No     Drug use: No

## 2021-06-02 NOTE — TELEPHONE ENCOUNTER
Refill Approved    Rx renewed per Medication Renewal Policy. Medication was last renewed on 10/10/18.    Skylar Castaneda, [facility] Triage/Med Refill 10/24/2019     Requested Prescriptions   Pending Prescriptions Disp Refills     clopidogrel (PLAVIX) 75 mg tablet [Pharmacy Med Name: CLOPIDOGREL 75 MG Tablet] 90 tablet 3     Sig: TAKE 1 TABLET (75 MG TOTAL) BY MOUTH DAILY.       Clopidogrel/Prasugrel/Ticagrelor Refill Protocol Passed - 10/24/2019  8:19 AM        Passed - PCP or prescribing provider visit in past 6 months       Last office visit with prescriber/PCP: 10/18/2019 OR same dept: 10/18/2019 Jesus Medrano MD OR same specialty: 10/18/2019 Jesus Medrano MD Last physical: Visit date not found Last MTM visit: Visit date not found     Next appt within 3 mo: Visit date not found  Next physical within 3 mo: Visit date not found  Prescriber OR PCP: Jesus Medrano MD  Last diagnosis associated with med order: 1. CAD (coronary artery disease)  - clopidogrel (PLAVIX) 75 mg tablet [Pharmacy Med Name: CLOPIDOGREL 75 MG Tablet]; TAKE 1 TABLET (75 MG TOTAL) BY MOUTH DAILY.  Dispense: 90 tablet; Refill: 3    2. Essential hypertension with goal blood pressure less than 130/85  - metoprolol succinate (TOPROL-XL) 25 MG [Pharmacy Med Name: METOPROLOL SUCCINATE ER 25 MG Tablet Extended Release 24 Hour]; TAKE 1 TABLET (25 MG TOTAL) BY MOUTH EVERY EVENING.  Dispense: 90 tablet; Refill: 3    If protocol passes may refill for 6 months if within 3 months of last provider visit (or a total of 9 months).              Passed - Hemoglobin in past 12 months     Hemoglobin   Date Value Ref Range Status   10/02/2019 11.5 (L) 14.0 - 18.0 g/dL Final             metoprolol succinate (TOPROL-XL) 25 MG [Pharmacy Med Name: METOPROLOL SUCCINATE ER 25 MG Tablet Extended Release 24 Hour] 90 tablet 3     Sig: TAKE 1 TABLET (25 MG TOTAL) BY MOUTH EVERY EVENING.       Beta-Blockers Refill Protocol Passed - 10/24/2019  8:19 AM         Passed - PCP or prescribing provider visit in past 12 months or next 3 months     Last office visit with prescriber/PCP: 10/18/2019 Jesus Medrano MD OR same dept: 10/18/2019 Jesus Medrano MD OR same specialty: 10/18/2019 Jesus Medrano MD  Last physical: 10/3/2016 Last MTM visit: Visit date not found   Next visit within 3 mo: Visit date not found  Next physical within 3 mo: Visit date not found  Prescriber OR PCP: Jesus Medrano MD  Last diagnosis associated with med order: 1. CAD (coronary artery disease)  - clopidogrel (PLAVIX) 75 mg tablet [Pharmacy Med Name: CLOPIDOGREL 75 MG Tablet]; TAKE 1 TABLET (75 MG TOTAL) BY MOUTH DAILY.  Dispense: 90 tablet; Refill: 3    2. Essential hypertension with goal blood pressure less than 130/85  - metoprolol succinate (TOPROL-XL) 25 MG [Pharmacy Med Name: METOPROLOL SUCCINATE ER 25 MG Tablet Extended Release 24 Hour]; TAKE 1 TABLET (25 MG TOTAL) BY MOUTH EVERY EVENING.  Dispense: 90 tablet; Refill: 3    If protocol passes may refill for 12 months if within 3 months of last provider visit (or a total of 15 months).             Passed - Blood pressure filed in past 12 months     BP Readings from Last 1 Encounters:   10/18/19 112/60

## 2021-06-03 NOTE — TELEPHONE ENCOUNTER
Who is calling:  Patient  Reason for Call:  Pre-op PE has not been received by Nicole [facility]cho[facility].  Patient is requesting the information be faxed to them ASAP  Fax # [phone]  Date of last appointment with primary care: 11/15/19  Okay to leave a detailed message: Yes

## 2021-06-03 NOTE — PROGRESS NOTES
[facility] Follow Up Note    Alexei Blake   79 y.o. male    Date of Visit: 11/15/2019    Chief Complaint   Patient presents with     Pre-op Exam     YAG, [phone], [place] Sims[place] [facility]     Subjective  This is a 79-year-old man who comes in for preoperative evaluation.  He is having a YAG procedure done and needed a preop.  He has multiple medical problems but all have been reasonably stable at this time with no new symptoms or problems.  He has chronic renal failure and is on dialysis 3 times weekly.  He has been doing well with this.  No specific complaints today.  Medications are as listed.    Past surgical history: Procedures have included umbilical hernia repair, arterial bypass surgery in the lower extremities and toe amputation.    Past medical history: Chronic renal failure, hypertension, type 2 diabetes, previous CVA, peripheral vascular disease, hyperlipidemia, and mild depression.  Allergies include calcitriol and ciprofloxacin.    Social history: The patient is .  He is not a smoker.    Family history: Noncontributory to the current problem.    ROS A comprehensive review of systems was performed and was otherwise negative    Medications, allergies, and problem list were reviewed and updated    Exam  General Appearance:   On examination his blood pressure is 132/64.  Weight is 222 pounds and BMI is 29.29.    Eyes: Pupils are equal.    Ears nose and throat: Normal.    Neck: Supple with no masses and no neck vein distention.  No thyroid enlargement.    Lungs: Clear.    Cardiovascular: Heart rhythm is stable with rate of 86 and no ectopy.  No gallops or murmurs.  Carotid pulses are full.  No peripheral edema today.    GI: Abdomen is soft and nontender with no distention.  No obvious masses or organomegaly.    Musculoskeletal: Head and neck are normal to inspection with good range of motion.  There is a small area of tenderness at the point of the right shoulder.  This is not  "new.    Neurologic: Cranial nerves are intact.  He does walk with a walker.    Psychiatric: The patient is alert and oriented x3.        Assessment/Plan  1. Preoperative examination     2. Type 2 diabetes mellitus with diabetic chronic kidney disease, unspecified CKD stage, unspecified whether long term insulin use (H)  Glycosylated Hemoglobin A1c     I do not see any contraindications to the proposed surgical procedure.    No history of bleeding disorders or anesthesia problems that I am aware of.    I do not think any lab work is necessary at this time.  I have no additional recommendations.    I will follow-up for his routine visit sometime after the procedure.  Body Mass Index was not assessed due to Patient was in for preoperative evaluation..    Jesus Medrano MD      Current Outpatient Medications on File Prior to Visit   Medication Sig     ACCU-CHEK KYLE PLUS TEST STRP strips USE  STRIP TO CHECK GLUCOSE 2 TO 3 TIMES DAILY AS DIRECTED AT  6AM  AND  4PM     acetaminophen (TYLENOL) 500 MG tablet Take 1,000 mg by mouth bedtime as needed for pain (or sleep).      BD VEO INSULIN SYRINGE UF 1/2 mL 31 gauge x 15/64\" Syrg USE ONE SYRINGE TWICE DAILY     bumetanide (BUMEX) 1 MG tablet TAKE 1 TABLET TWICE DAILY AT 9AM AND 6PM. ON DIALYSIS DAYS, ONLY DOSE IN THE EVENING.     calcium, as carbonate, (OS-AVNI) 500 mg calcium (1,250 mg) tablet Take 1 tablet by mouth daily.     clopidogrel (PLAVIX) 75 mg tablet TAKE 1 TABLET (75 MG TOTAL) BY MOUTH DAILY.     hydrALAZINE (APRESOLINE) 25 MG tablet TAKE 1 TABLET TWICE DAILY     insulin NPH (NOVOLIN) 100 unit/mL injection Inject 5 Units under the skin 2 (two) times a day.            insulin regular (NOVOLIN R REGULAR U-100 INSULN) 100 unit/mL injection Check blood glucose three times daily prior to meal. Correction with 1 unit for every 25mg/dL>150mg/dL blood glucose.     lisinopril (PRINIVIL,ZESTRIL) 40 MG tablet Take 40 mg by mouth every evening.            lovastatin " (MEVACOR) 20 MG tablet Take 20 mg by mouth at bedtime.     metoprolol succinate (TOPROL-XL) 25 MG TAKE 1 TABLET (25 MG TOTAL) BY MOUTH EVERY EVENING.     multivitamin therapeutic tablet Take 1 tablet by mouth daily.     warfarin (COUMADIN/JANTOVEN) 4 MG tablet Take 2-4 mg by mouth See Admin Instructions. TAKE 2MG ON Friday AND 4MG REST OF THE WEK.     [DISCONTINUED] lovastatin (MEVACOR) 40 MG tablet Take 0.5 tablets (20 mg total) by mouth at bedtime.     No current facility-administered medications on file prior to visit.      Allergies   Allergen Reactions     Blood-Group Specific Substance      Anti-K present. Expect delays in blood for transfusion.  Draw 2 lavender top tubes for type and screen orders.        Calcitriol      Fatigue      Ciprofloxacin Rash     Social History     Tobacco Use     Smoking status: Former Smoker     Packs/day: 0.00     Last attempt to quit: 1995     Years since quittin.8     Smokeless tobacco: Never Used   Substance Use Topics     Alcohol use: No     Drug use: No

## 2021-06-03 NOTE — TELEPHONE ENCOUNTER
ANTICOAGULATION  MANAGEMENT    Assessment     Today's INR result of 1.60 is Subtherapeutic (goal INR of 2.0-3.0)        Warfarin taken as previously instructed    No new diet changes affecting INR    No new medication/supplements affecting INR    Continues to tolerate warfarin with no reported s/s of bleeding or thromboembolism     Previous INR was Therapeutic at 2.40 on 11/4/19.    Scheduled for laser YAG RIGHT eye surgery on 11/26/19 with Dr. Sims.    Plan:     Spoke with Bill regarding INR result and instructed:     Warfarin Dosing Instructions:   (evenings. has 4mg tabs)   - today, advised one time booster with 6mg warfarin dose,   - then continue current warfarin dose  2 mg daily on Fridays; and 4 mg daily rest of week.    Instructed patient to follow up no later than:  1-2 wks.   - scheduled on 12/9/19 @ [facility]    Education provided: importance of consistent vitamin K intake, target INR goal and significance of current INR result and importance of notifying clinic for changes in medications    Bill verbalizes understanding and agrees to warfarin dosing plan.    Instructed to call the [facility] for any changes, questions or concerns. (#[phone])   ?   Yamila Luna RN    Subjective/Objective:      Alexei Blake, a 79 y.o. male is on warfarin.     Alexei reports:     Home warfarin dose: as updated on anticoagulation calendar per template     Missed doses: No     Medication changes:  No     S/S of bleeding or thromboembolism:  No     New Injury or illness:  No     Changes in diet or alcohol consumption:  No    Upcoming surgery, procedure or cardioversion:  Yes: Scheduled for laser YAG RIGHT eye surgery on 11/26/19 with Dr. iSms.   - chronic kidney dialysis 3x/wk on Tues/Thurs/Sat.      Anticoagulation Episode Summary     Current INR goal:   2.0-3.0   TTR:   77.9 % (11.6 mo)   Next INR check:   12/9/2019   INR from last check:   1.60! (11/25/2019)   Weekly max warfarin dose:      Target end date:       INR check location:      Preferred lab:      Send INR reminders to:   [facility]    Indications    PVD (peripheral vascular disease) (H) [I73.9]  Stroke (H) [I63.9]           Comments:            Anticoagulation Care Providers     Provider Role Specialty Phone number    Jesus Medrano MD Referring Internal Medicine [phone]

## 2021-06-03 NOTE — TELEPHONE ENCOUNTER
Paperwork has been faxed per patient request below.    Spoke with the patient and let him know that we have faxed the paperwork.  He verbalized understanding and had no further questions at this time.  Dulce CARLOS CMA/REAGAN....................4:32 PM

## 2021-06-03 NOTE — TELEPHONE ENCOUNTER
FYI - Status Update  Who is Calling: Spouse  Update: Patient was in the hospital after their dialysis treatment because they couldn't get bleeding to stop. Patient's INR while in the hospital was 2.21. Patient's spouse would like a call back to discuss what steps are next for the patient. Please call the patient's spouse back, thank you.  Okay to leave a detailed message?:  Yes

## 2021-06-03 NOTE — TELEPHONE ENCOUNTER
ANTICOAGULATION  MANAGEMENT: Discharge Review    Alexei Blake chart reviewed for anticoagulation continuity of care    Emergency room visit on  11/30/2019 for bleeding RIGHT fistula after dialysis.   - is on warfarin and INR therapeutic at 2.21   (INR target goal is 2.0 - 3.0)    Discharge disposition: Home    INR Results:       Recent labs: (last 7 days)     11/30/19  1320   INR 2.21*       Warfarin inpatient management: home regimen continued    Warfarin discharge instructions: home regimen continued     Medication Changes Affecting Anticoagulation: No    Additional Factors Affecting Anticoagulation: No    Plan     No adjustment to anticoagulation plan needed      Spoke with wifeIsabel - INR appt on 12/9/19 @ [facility].      Anticoagulation calendar updated    Yamila Luna RN

## 2021-06-03 NOTE — TELEPHONE ENCOUNTER
Called and spoke with Yair / Isabel.     - Nia did the right thing by calling EMS and was brought to ED.     - significant bleeding despite applying pressure after 30 min.     - just got done with Dialysis.     - next time after dialysis, apply firm pressure and raise his arm up over his head and continue to apply good pressure for 10 min.  Needle used for dialysis has larger bore.

## 2021-06-03 NOTE — TELEPHONE ENCOUNTER
ANTICOAGULATION  MANAGEMENT    Assessment     Today's INR result of 2.40 is Therapeutic (goal INR of 2.0-3.0)        Warfarin taken as previously instructed    No new diet changes affecting INR    No new medication/supplements affecting INR    Continues to tolerate warfarin with no reported s/s of bleeding or thromboembolism    Previous INR was Therapeutic at 2.70 on 10/21/19.    Scheduled for laser YAG RIGHT eye surgery on 11/26/19 with Dr. Sims.    Plan:     Spoke with Yair Hall regarding INR result and instructed:     Warfarin Dosing Instructions:  (has 4mg tabs)   - Continue current warfarin dose 2 mg daily on Friday; and 4 mg daily rest of week.    Instructed patient to follow up no later than:  3 wks.    Education provided: target INR goal and significance of current INR result and importance of notifying clinic of upcoming surgeries and procedures 2 weeks in advance    Yair Hall verbalizes understanding and agrees to warfarin dosing plan.    Instructed to call the [facility] for any changes, questions or concerns. (#[phone])   ?   Yamila Luna RN    Subjective/Objective:      Alexei MIKEY Blake, a 79 y.o. male is on warfarin.     Alexei reports:     Home warfarin dose: verbally confirmed home dose with Yair and updated on anticoagulation calendar     Missed doses: No     Medication changes:  No     S/S of bleeding or thromboembolism:  No     New Injury or illness:  No     Changes in diet or alcohol consumption:  No     Upcoming surgery, procedure or cardioversion:  Yes. Scheduled for laser YAG RIGHT eye surgery on 11/26/19 with Dr. Sims.   -  Chronic kidney dialysis on Tues/Thurs/Sat for ESRD.    Anticoagulation Episode Summary     Current INR goal:   2.0-3.0   TTR:   80.9 %   Next INR check:   11/25/2019   INR from last check:   2.40 (11/4/2019)   Weekly max warfarin dose:      Target end date:      INR check location:      Preferred lab:      Send INR reminders to:   [facility]  [facility]    Indications    PVD (peripheral vascular disease) (H) [I73.9]  Stroke (H) [I63.9]           Comments:            Anticoagulation Care Providers     Provider Role Specialty Phone number    Jesus Medrano MD Referring Internal Medicine [phone]

## 2021-06-04 NOTE — TELEPHONE ENCOUNTER
"RN cannot approve Refill Request    RN can NOT refill this medication medication not on med list. Last office visit: 10/18/2019 Jesus Medrano MD Last Physical: 11/15/2019 Last MTM visit: Visit date not found Last visit same specialty: 10/18/2019 Jesus Medrano MD.  Next visit within 3 mo: Visit date not found  Next physical within 3 mo: Visit date not found      Ruth Ann Donovan, [facility] Triage/Med Refill 12/4/2019    Requested Prescriptions   Pending Prescriptions Disp Refills     insulin syringe-needle U-100 1/2 mL 31 gauge x 15/64\" Syrg [Pharmacy Med Name: RELION INS SYR 0.5/31G/6MM MIS]  3     Sig: USE 1  SYRINGE TWICE DAILY       Diabetic Supplies Refill Protocol Passed - 12/3/2019  2:30 PM        Passed - Visit with PCP or prescribing provider visit in last 6 months     Last office visit with prescriber/PCP: 10/18/2019 Jesus Medrano MD OR same dept: 10/18/2019 Jesus Medrano MD OR same specialty: 10/18/2019 Jesus Medrano MD  Last physical: 11/15/2019 Last MTM visit: Visit date not found   Next visit within 3 mo: Visit date not found  Next physical within 3 mo: Visit date not found  Prescriber OR PCP: Jesus Medrano MD  Last diagnosis associated with med order: 1. Type 2 diabetes mellitus with complication, with long-term current use of insulin (H)  - insulin syringe-needle U-100 1/2 mL 31 gauge x 15/64\" Syrg [Pharmacy Med Name: RELION INS SYR 0.5/31G/6MM MIS]; USE 1  SYRINGE TWICE DAILY; Refill: 3    If protocol passes may refill for 12 months if within 3 months of last provider visit (or a total of 15 months).             Passed - A1C in last 6 months     Hemoglobin A1c   Date Value Ref Range Status   11/15/2019 6.0 3.5 - 6.0 % Final                  "

## 2021-06-04 NOTE — TELEPHONE ENCOUNTER
ANTICOAGULATION  MANAGEMENT    Assessment     Today's INR result of 2.40 is Therapeutic (goal INR of 2.0-3.0)        Warfarin taken as previously instructed    No new diet changes affecting INR    No new medication/supplements affecting INR    Continues to tolerate warfarin with no reported s/s of bleeding or thromboembolism     Previous INR was Therapeutic at 2.80 on 12/9/19.    Plan:     Spoke with Yair regarding INR result and instructed:     Warfarin Dosing Instructions:   (evenings. has 4mg tabs)   - Continue current warfarin dose 2 mg daily on Fridays; and 4 mg daily rest of week.    Instructed patient to follow up no later than:  3-4 wks.   - scheduled on 1/24/2020 @ [facility].    Education provided: importance of consistent vitamin K intake, target INR goal and significance of current INR result, monitoring for bleeding signs and symptoms and when to seek medical attention/emergency care    Yair / Isabel verbalizes understanding and agrees to warfarin dosing plan.    Instructed to call the [facility] for any changes, questions or concerns. (#[phone])   ?   Yamila Luna RN    Subjective/Objective:      Alexei MIKEY Blake, a 79 y.o. male is on warfarin.     Alexei reports:     Home warfarin dose: verbally confirmed home dose with Yair and updated on anticoagulation calendar     Missed doses: No     Medication changes:  No     S/S of bleeding or thromboembolism:  No     New Injury or illness:  No     Changes in diet or alcohol consumption:  No     Upcoming surgery, procedure or cardioversion:  No.    - Chronic kidney dialysis 3x/wk on Tues/Thurs/Sat r/t ESRD.    Anticoagulation Episode Summary     Current INR goal:   2.0-3.0   TTR:   83.4 % (11.6 mo)   Next INR check:   1/24/2020   INR from last check:   2.40 (12/27/2019)   Weekly max warfarin dose:      Target end date:      INR check location:      Preferred lab:      Send INR reminders to:   [facility]    Indications    PVD (peripheral  vascular disease) (H) [I73.9]  Stroke (H) [I63.9]           Comments:            Anticoagulation Care Providers     Provider Role Specialty Phone number    Jesus Medrano MD Referring Internal Medicine [phone]

## 2021-06-04 NOTE — TELEPHONE ENCOUNTER
ANTICOAGULATION  MANAGEMENT    Assessment     Today's INR result of 2.80 is Therapeutic (goal INR of 2.0-3.0)        Warfarin taken as previously instructed    No new diet changes affecting INR    No new medication/supplements affecting INR    Continues to tolerate warfarin with no reported s/s of bleeding or thromboembolism     Previous INR was Therapeutic at 2.21 on 11/30/19.    Presented in ED oln 11/30/19 for bleeding right fistula after dialysis.    Plan:     Spoke with Yair / wife, Isabel regarding INR result and instructed:     Warfarin Dosing Instructions:  (evenings. has 4mg tabs)    Continue current warfarin dose 2 mg daily on Fridays; and 4 mg daily rest of week.    Instructed patient to follow up no later than:  3 wks.    Education provided: importance of consistent vitamin K intake, impact of vitamin K foods on INR and target INR goal and significance of current INR result    Yair verbalizes understanding and agrees to warfarin dosing plan.    Instructed to call the [facility] for any changes, questions or concerns. (#[phone])   ?   Yamila Luna RN    Subjective/Objective:      Alexei MIKEY Blake, a 79 y.o. male is on warfarin.     Alexei reports:     Home warfarin dose: verbally confirmed home dose with Yair and updated on anticoagulation calendar     Missed doses: No     Medication changes:  No     S/S of bleeding or thromboembolism:  No.  Denied anymore bleeding from right fistula.     New Injury or illness:  No     Changes in diet or alcohol consumption:  No     Upcoming surgery, procedure or cardioversion:  No.  Chronic kidney dialysis 3x/wk on Tues/Thurs/Sat.    Anticoagulation Episode Summary     Current INR goal:   2.0-3.0   TTR:   78.2 % (11.6 mo)   Next INR check:   12/30/2019   INR from last check:   2.80 (12/9/2019)   Weekly max warfarin dose:      Target end date:      INR check location:      Preferred lab:      Send INR reminders to:   [facility]    Indications     PVD (peripheral vascular disease) (H) [I73.9]  Stroke (H) [I63.9]           Comments:            Anticoagulation Care Providers     Provider Role Specialty Phone number    Jesus Medrano MD Referring Internal Medicine [phone]

## 2021-06-05 NOTE — TELEPHONE ENCOUNTER
Orders being requested: DME  Shoe orthotics      Equipment:    1. Qty 2  Diabetic shoe for density insert    2. Qty 6  Multi Density insert custom mold    Reason service is needed/diagnosis: Type @ DM w/ Diabetic nephropathy  Diag: E11.21    When are orders needed by: ASAP    Where to send Orders: Fax: [phone]     Okay to leave detailed message?  Yes

## 2021-06-05 NOTE — TELEPHONE ENCOUNTER
Anticoagulation Annual Referral Renewal Review    Alexei Blake's chart reviewed for annual renewal of referral to anticoagulation monitoring.        Criteria for anticoagulation nurse and/or pharmacist renewal met   Warfarin indication: Stroke and PVD Yes, per indication   Current with INR monitoring/compliant Yes Yes   Date of last office visit 11/15/2019 Yes, had office visit within last year   Time in Therapeutic Range (TTR) 83.4 % Yes, TTR > 60%       Alexei Blake met all criteria for anticoagulation management program initiated renewal.  New INR standing orders and anticoagulation referral renewal placed.      Yamila Luna RN  3:32 PM

## 2021-06-05 NOTE — PROGRESS NOTES
"S: Patient is a 78 y/o male, 6'1\", 222 lbs seen at our [facility] for delivery of his custom diabetic foot orthotics with toe filler on left, diabetic shoes and bilateral carbon foot plates on orders from Dr. Jesus Medrano MD for the treatment of Dx: Type 2 diabetes mellitus with diabetic neuropathy [E11.21]. 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     O: Patient arrived walking with the aid of a walker and was accompanied by his wife. Patient's feet do show multiple deformities, amputation of hallux bilaterally, and his toes go off in many different directions. Patient does present with valgus deformity at his ankle as well upon standing that worsens with gait. Patient ROM and MMT scores are WNL with exception of:    Left: DF: ROM to neutral; MMT: 3/5 due to lack of ROM  Right: DF: ROM to neutral; MMT 3/5 due lack of ROM; Eversion: No ROM; MMT 3/5 due to lack of ROM; Inversion: No ROM; MMT 3/5 due to lack of ROM.     G: Patient's diabetic shoes and custom foot orthotics will provide the necessary protection and support to his diabetic feet. 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 Patient will also benefit from a carbon foot plate to restore a proper biomechanical gait of both of his feet due to the loss of his hallux bilaterally and his reported balance issues. Patient does no require a toe filler on his right foot due to other soft tissues filling that void.    A: Patient's toe filler was checked against his left foot to ensure proper fit at the beginning of our appointment and it was slightly heated and pushed out to give a better radius of fit to his amputation. I then cut and ground the patient's custom FOs to optimally fit into his new diabetic shoes, Winner X 9.5XW. Patient was happy with " the fit and function of his new shoes and FOs. I then cut and ground the patient's carbon foot plates. Patient was able to walk in our treatment room and hallway and remarked that his shoes felt comfortable and he did notice the change in his gait due to the carbon foot plate and was happy for the new stability he found with their use. Patient, his wife and I discussed care, use and break in period of his new shoes and FOs with special emphasis that he is to check the area of his toe filler at least four times per day in order to ensure there is no rubbing or repetitive contact of any kind in that area and to watch for signs of increase pressure anywhere on his feet, especially his left foot. If signs of pressure are observed, he is to discontinue use of his new orthotic devices until such time as he can be seen at our office so they can be properly modified. Patient and his wife reported that they understood all instructions and that all their questions had been answered during the course of our appointment.     P: Patient and his wife were asked to contact our office if there are any questions or issues that arise with his new orthotic items.

## 2021-06-05 NOTE — TELEPHONE ENCOUNTER
RN cannot approve Refill Request    RN can NOT refill this medication Protocol failed and NO refill given. Last office visit: 10/18/2019 Jesus Medrano MD Last Physical: 11/15/2019 Last MTM visit: Visit date not found Last visit same specialty: 10/18/2019 Jesus Medrano MD.  Next visit within 3 mo: Visit date not found  Next physical within 3 mo: Visit date not found      Tamy Sellers, [facility] Triage/Med Refill 1/8/2020    Requested Prescriptions   Pending Prescriptions Disp Refills     warfarin ANTICOAGULANT (COUMADIN/JANTOVEN) 4 MG tablet 100 tablet 0     Sig: Take 0.5-1 tablets (2-4 mg total) by mouth See Admin Instructions. TAKE 2MG ON Friday AND 4MG REST OF THE WEK.       Warfarin Refill Protocol  Failed - 1/7/2020  9:06 AM        Failed -  Route to appropriate pool/provider     Last Anticoagulation Summary:   Anticoagulation Episode Summary     Current INR goal:   2.0-3.0   TTR:   85.3 % (11.2 mo)   Next INR check:   1/24/2020   INR from last check:   2.40 (12/27/2019)   Weekly max warfarin dose:      Target end date:      INR check location:      Preferred lab:      Send INR reminders to:   [facility]    Indications    PVD (peripheral vascular disease) (H) [I73.9]  Stroke (H) [I63.9]           Comments:            Anticoagulation Care Providers     Provider Role Specialty Phone number    Jesus Medrano MD Referring Internal Medicine [phone]                Passed - Provider visit in last year     Last office visit with prescriber/PCP: 10/18/2019 Jesus Medrano MD OR same dept: 10/18/2019 Jesus Medrano MD OR same specialty: 10/18/2019 Jesus Medrano MD  Last physical: 11/15/2019 Last MTM visit: Visit date not found    Next appt within 3 mo: Visit date not found Next physical within 3 mo: Visit date not found  Prescriber OR PCP: Jesus Medrano MD  Last diagnosis associated with med order: There are no diagnoses linked to this encounter.  If protocol passes may  refill for 6 months if within 3 months of last provider visit (or a total of 9 months).          bumetanide (BUMEX) 1 MG tablet 180 tablet 3     Sig: TAKE 1 TABLET TWICE DAILY AT 9AM AND 6PM. ON DIALYSIS DAYS, ONLY DOSE IN THE EVENING.       Diuretics/Combination Diuretics Refill Protocol  Passed - 1/7/2020  9:06 AM        Passed - Visit with PCP or prescribing provider visit in past 12 months     Last office visit with prescriber/PCP: 10/18/2019 Jesus Medrano MD OR same dept: 10/18/2019 Jesus Medrano MD OR same specialty: 10/18/2019 Jesus Medrano MD  Last physical: 11/15/2019 Last MTM visit: Visit date not found   Next visit within 3 mo: Visit date not found  Next physical within 3 mo: Visit date not found  Prescriber OR PCP: Jesus Medrano MD  Last diagnosis associated with med order: There are no diagnoses linked to this encounter.  If protocol passes may refill for 12 months if within 3 months of last provider visit (or a total of 15 months).             Passed - Serum Potassium in past 12 months      Lab Results   Component Value Date    Potassium 4.8 09/28/2019             Passed - Serum Sodium in past 12 months      Lab Results   Component Value Date    Sodium 139 09/28/2019             Passed - Blood pressure on file in past 12 months     BP Readings from Last 1 Encounters:   11/30/19 163/70             Passed - Serum Creatinine in past 12 months      Creatinine   Date Value Ref Range Status   09/28/2019 6.01 (HH) 0.70 - 1.30 mg/dL Final

## 2021-06-05 NOTE — PROGRESS NOTES
"S: Patient is a 80 y/o male, 6'1\", 222 lbs seen at our [facility] for the evaluation, measurement and casting for custom diabetic foot orthotics with toe filler on left, diabetic shoes and bilateral carbon foot plates on orders from Dr. Jesus Medrano MD for the treatment of Dx: Type 2 diabetes mellitus with diabetic neuropathy [E11.21]. 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     O: Patient arrived walking under his own power and was accompanied by his wife. Patient's feet do show multiple deformities, amputation of hallux bilaterally, and his toes go off in many different directions. Patient does present with valgus deformity at his ankle as well upon standing that worsens with gait. Patient ROM and MMT scores are WNL with exception of:    Left: DF: ROM to neutral; MMT: 3/5 due to lack of ROM  Right: DF: ROM to neutral; MMT 3/5 due lack of ROM; Eversion: No ROM; MMT 3/5 due to lack of ROM; Inversion: No ROM; MMT 3/5 due to lack of ROM.     G: Patient's diabetic shoes and custom foot orthotics will provide the necessary protection and support to his diabetic feet. 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 Patient will also benefit from a carbon foot plate to restore a proper biomechanical gait of both of his feet due to the loss of his hallux bilaterally and his reported balance issues. Patient does no require a toe filler on his right foot due to other soft tissues filling that void.    A: I took bilateral biofoam impressions of the patient's feet for the fabrication of custom diabetic foot orthotics. Patient's FOs will be fabricated using a base of cork and top cover of diabetic trilam with a 1 piece plastizote toe filler. Patient will also have a carbon fiber foot plates cut to proper size at the " time of his delivery appointment. Patient was measured using a male DiObexck device and found to have size 9 W feet. Patient chose the Elysian style from Dr. Garcia in white.     P: Patient was asked to schedule a fitting appointment for two weeks time as he left our office.

## 2021-06-05 NOTE — TELEPHONE ENCOUNTER
Refill Request  Did you contact pharmacy: No  Medication name:   Requested Prescriptions     Pending Prescriptions Disp Refills     clopidogreL (PLAVIX) 75 mg tablet 90 tablet 1     Sig: Take 1 tablet (75 mg total) by mouth daily.     metoprolol succinate (TOPROL-XL) 25 MG 90 tablet 3     Who prescribed the medication: Dr Medrano    Requested Pharmacy: Changing pharmacy to [facility].    Okay to leave a detailed message: yes

## 2021-06-05 NOTE — TELEPHONE ENCOUNTER
Roger Carrizales for refill requested?  Refill has been set up for you to review.  Thank you.  Dulce CARLOS, YUDELKA/REAGAN....................1:31 PM

## 2021-06-05 NOTE — TELEPHONE ENCOUNTER
Medication Request  Medication name:   lisinopril (PRINIVIL,ZESTRIL) 40 MG tablet        Sig - Route: Take 40 mg by mouth every evening.    - Oral    Class: Historical Med        Requested Pharmacy: [facility]  Reason for request: States was given this medication while in the hospital and now needs a refill.  When did you use medication last?:  today    Okay to leave a detailed message: yes

## 2021-06-05 NOTE — TELEPHONE ENCOUNTER
Dr. Medrano,  Orders have been placed in your in box to review.  Dulce CARLOS, CMA/CMT....................9:23 AM

## 2021-06-05 NOTE — TELEPHONE ENCOUNTER
Spoke with the patient's wife and let her know that Dr. Medrano has signed the orders and they will be faxed to [facility] later this afternoon.  She verbalized understanding and had no further questions at this time.  Dulce CARLOS CMA/REAGAN....................9:55 AM

## 2021-06-05 NOTE — TELEPHONE ENCOUNTER
RN cannot approve Refill Request    RN can NOT refill this medication med is not covered by policy/route to provider. Last office visit: 10/18/2019 Jesus Medrano MD Last Physical: 11/15/2019 Last MTM visit: Visit date not found Last visit same specialty: 10/18/2019 Jesus Medrano MD.  Next visit within 3 mo: Visit date not found  Next physical within 3 mo: Visit date not found      Tamy Sellers, [facility] Triage/Med Refill 1/8/2020    Requested Prescriptions   Pending Prescriptions Disp Refills     bumetanide (BUMEX) 1 MG tablet 180 tablet 3     Sig: TAKE 1 TABLET TWICE DAILY AT 9AM AND 6PM. ON DIALYSIS DAYS, ONLY DOSE IN THE EVENING.       Diuretics/Combination Diuretics Refill Protocol  Passed - 1/8/2020  7:52 AM        Passed - Visit with PCP or prescribing provider visit in past 12 months     Last office visit with prescriber/PCP: 10/18/2019 Jesus Medrano MD OR same dept: 10/18/2019 Jesus Medrano MD OR same specialty: 10/18/2019 Jesus Medrano MD  Last physical: 11/15/2019 Last MTM visit: Visit date not found   Next visit within 3 mo: Visit date not found  Next physical within 3 mo: Visit date not found  Prescriber OR PCP: Jesus Medrano MD  Last diagnosis associated with med order: 1. PVD (peripheral vascular disease) (H)  - warfarin ANTICOAGULANT (COUMADIN/JANTOVEN) 4 MG tablet; Take 1/2 tab (2mg) on Fridays and take 1 tab (4mg) all other days by mouth daily, as directed.  Adjust dose based on INR results.  Dispense: 100 tablet; Refill: 1    2. Stroke (H)  - warfarin ANTICOAGULANT (COUMADIN/JANTOVEN) 4 MG tablet; Take 1/2 tab (2mg) on Fridays and take 1 tab (4mg) all other days by mouth daily, as directed.  Adjust dose based on INR results.  Dispense: 100 tablet; Refill: 1    3. Long term (current) use of anticoagulants  - warfarin ANTICOAGULANT (COUMADIN/JANTOVEN) 4 MG tablet; Take 1/2 tab (2mg) on Fridays and take 1 tab (4mg) all other days by mouth daily, as directed.  Adjust dose  based on INR results.  Dispense: 100 tablet; Refill: 1    If protocol passes may refill for 12 months if within 3 months of last provider visit (or a total of 15 months).             Passed - Serum Potassium in past 12 months      Lab Results   Component Value Date    Potassium 4.8 09/28/2019             Passed - Serum Sodium in past 12 months      Lab Results   Component Value Date    Sodium 139 09/28/2019             Passed - Blood pressure on file in past 12 months     BP Readings from Last 1 Encounters:   11/30/19 163/70             Passed - Serum Creatinine in past 12 months      Creatinine   Date Value Ref Range Status   09/28/2019 6.01 (HH) 0.70 - 1.30 mg/dL Final           Signed Prescriptions Disp Refills    warfarin ANTICOAGULANT (COUMADIN/JANTOVEN) 4 MG tablet 100 tablet 1     Sig: Take 1/2 tab (2mg) on Fridays and take 1 tab (4mg) all other days by mouth daily, as directed.  Adjust dose based on INR results.       Warfarin Refill Protocol  Failed - 1/8/2020  7:52 AM        Failed -  Route to appropriate pool/provider     Last Anticoagulation Summary:   Anticoagulation Episode Summary     Current INR goal:   2.0-3.0   TTR:   85.3 % (11.2 mo)   Next INR check:   1/24/2020   INR from last check:   2.40 (12/27/2019)   Weekly max warfarin dose:      Target end date:      INR check location:      Preferred lab:      Send INR reminders to:   [facility]    Indications    PVD (peripheral vascular disease) (H) [I73.9]  Stroke (H) [I63.9]           Comments:            Anticoagulation Care Providers     Provider Role Specialty Phone number    Jesus Medrano MD Referring Internal Medicine [phone]                Passed - Provider visit in last year     Last office visit with prescriber/PCP: 10/18/2019 Jesus Medrano MD OR same dept: 10/18/2019 Jesus Medrano MD OR same specialty: 10/18/2019 Jesus Medrano MD  Last physical: 11/15/2019 Last MTM visit: Visit date not found    Next appt  within 3 mo: Visit date not found Next physical within 3 mo: Visit date not found  Prescriber OR PCP: Jesus Medrano MD  Last diagnosis associated with med order: 1. PVD (peripheral vascular disease) (H)  - warfarin ANTICOAGULANT (COUMADIN/JANTOVEN) 4 MG tablet; Take 1/2 tab (2mg) on Fridays and take 1 tab (4mg) all other days by mouth daily, as directed.  Adjust dose based on INR results.  Dispense: 100 tablet; Refill: 1    2. Stroke (H)  - warfarin ANTICOAGULANT (COUMADIN/JANTOVEN) 4 MG tablet; Take 1/2 tab (2mg) on Fridays and take 1 tab (4mg) all other days by mouth daily, as directed.  Adjust dose based on INR results.  Dispense: 100 tablet; Refill: 1    3. Long term (current) use of anticoagulants  - warfarin ANTICOAGULANT (COUMADIN/JANTOVEN) 4 MG tablet; Take 1/2 tab (2mg) on Fridays and take 1 tab (4mg) all other days by mouth daily, as directed.  Adjust dose based on INR results.  Dispense: 100 tablet; Refill: 1    If protocol passes may refill for 6 months if within 3 months of last provider visit (or a total of 9 months).

## 2021-06-05 NOTE — TELEPHONE ENCOUNTER
Refill Approved    Rx renewed per Medication Renewal Policy. Medication was last renewed on 10/24/19.    Skylar Castaneda, [facility] Triage/Med Refill 2/4/2020     Requested Prescriptions   Pending Prescriptions Disp Refills     clopidogreL (PLAVIX) 75 mg tablet 90 tablet 1     Sig: Take 1 tablet (75 mg total) by mouth daily.       Clopidogrel/Prasugrel/Ticagrelor Refill Protocol Passed - 2/4/2020  1:25 PM        Passed - PCP or prescribing provider visit in past 6 months       Last office visit with prescriber/PCP: 10/18/2019 OR same dept: 10/18/2019 Jesus Medrano MD OR same specialty: 10/18/2019 Jesus Medrano MD Last physical: 11/15/2019 Last MTM visit: Visit date not found     Next appt within 3 mo: Visit date not found  Next physical within 3 mo: Visit date not found  Prescriber OR PCP: Jesus Medrano MD  Last diagnosis associated with med order: 1. CAD (coronary artery disease)  - clopidogreL (PLAVIX) 75 mg tablet; Take 1 tablet (75 mg total) by mouth daily.  Dispense: 90 tablet; Refill: 1    2. Essential hypertension with goal blood pressure less than 130/85  - metoprolol succinate (TOPROL-XL) 25 MG  Dispense: 90 tablet; Refill: 3    If protocol passes may refill for 6 months if within 3 months of last provider visit (or a total of 9 months).              Passed - Hemoglobin in past 12 months     Hemoglobin   Date Value Ref Range Status   10/02/2019 11.5 (L) 14.0 - 18.0 g/dL Final             metoprolol succinate (TOPROL-XL) 25 MG 90 tablet 3       Beta-Blockers Refill Protocol Passed - 2/4/2020  1:25 PM        Passed - PCP or prescribing provider visit in past 12 months or next 3 months     Last office visit with prescriber/PCP: 10/18/2019 Jesus Medrano MD OR same dept: 10/18/2019 Jesus Medrano MD OR same specialty: 10/18/2019 Jesus Medrano MD  Last physical: 11/15/2019 Last MTM visit: Visit date not found   Next visit within 3 mo: Visit date not found  Next physical within 3 mo:  Visit date not found  Prescriber OR PCP: Jesus Medrano MD  Last diagnosis associated with med order: 1. CAD (coronary artery disease)  - clopidogreL (PLAVIX) 75 mg tablet; Take 1 tablet (75 mg total) by mouth daily.  Dispense: 90 tablet; Refill: 1    2. Essential hypertension with goal blood pressure less than 130/85  - metoprolol succinate (TOPROL-XL) 25 MG  Dispense: 90 tablet; Refill: 3    If protocol passes may refill for 12 months if within 3 months of last provider visit (or a total of 15 months).             Passed - Blood pressure filed in past 12 months     BP Readings from Last 1 Encounters:   11/30/19 163/70

## 2021-06-05 NOTE — TELEPHONE ENCOUNTER
ANTICOAGULATION  MANAGEMENT    Assessment     Today's INR result of 2.20 is Therapeutic (goal INR of 2.0-3.0)        Warfarin taken as previously instructed    No new diet changes affecting INR    No new medication/supplements affecting INR    Continues to tolerate warfarin with no reported s/s of bleeding or thromboembolism     Previous INR was Therapeutic at 2.40 on 12/27/19.    Plan:     Spoke with Yair regarding INR result and instructed:     Warfarin Dosing Instructions:   (evenings. has 4mg tabs)   - Continue current warfarin dose 2 mg daily on Fridays; and 4 mg daily rest of week.    Instructed patient to follow up no later than:  4 wks.   - scheduled on 2/21/2020 @ [facility].    Education provided: target INR goal and significance of current INR result and monitoring for bleeding signs and symptoms    Yair verbalizes understanding and agrees to warfarin dosing plan.    Instructed to call the [facility] for any changes, questions or concerns. (#[phone])   ?   Yamila Luna RN    Subjective/Objective:      Alexei MIKEY Blake, a 79 y.o. male is on warfarin.     Alexei reports:     Home warfarin dose: verbally confirmed home dose with Yair / Isabel and updated on anticoagulation calendar     Missed doses: No     Medication changes:  No     S/S of bleeding or thromboembolism:  No     New Injury or illness:  No     Changes in diet or alcohol consumption:  No     Upcoming surgery, procedure or cardioversion:  No    Anticoagulation Episode Summary     Current INR goal:   2.0-3.0   TTR:   85.9 % (11.6 mo)   Next INR check:   2/21/2020   INR from last check:   2.20 (1/24/2020)   Weekly max warfarin dose:      Target end date:      INR check location:      Preferred lab:      Send INR reminders to:   [facility]    Indications    PVD (peripheral vascular disease) (H) [I73.9]  Stroke (H) [I63.9]           Comments:            Anticoagulation Care Providers     Provider Role Specialty Phone number     Jesus Medrano MD [facility] Internal Medicine [phone]

## 2021-06-06 NOTE — TELEPHONE ENCOUNTER
Who is calling:  Patient  Reason for Call:  Returning phone call from [facility]  Date of last appointment with primary care: NA  Okay to leave a detailed message: Yes

## 2021-06-06 NOTE — TELEPHONE ENCOUNTER
ANTICOAGULATION  MANAGEMENT    Assessment     Today's INR result of 2.00 is Therapeutic (goal INR of 2.0-3.0)        Warfarin taken as previously instructed    No new diet changes affecting INR    No new medication/supplements affecting INR    Continues to tolerate warfarin with no reported s/s of bleeding or thromboembolism     Previous INR was Supratherapeutic at 3.20 on 2/21/20.    Plan:     Spoke with Yair Hall regarding INR result and instructed:     Warfarin Dosing Instructions:  (mornings. Has 4mg tabs)   - Continue current warfarin dose 2 mg daily on Fridays; and 4 mg daily rest of week.    Instructed patient to follow up no later than:  2 wks.   - INR scheduled on 3/20/20 @ [facility].    Education provided: target INR goal and significance of current INR result    Yair Hall verbalizes understanding and agrees to warfarin dosing plan.    Instructed to call the [facility] for any changes, questions or concerns. (#[phone])   ?   Yamila Luna RN    Subjective/Objective:      Alexei MIKEY Blake, a 79 y.o. male is on warfarin.     Alexei reports:     Home warfarin dose: verbally confirmed home dose with Yair Hall and updated on anticoagulation calendar     Missed doses: No     Medication changes:  No     S/S of bleeding or thromboembolism:  No     New Injury or illness:  No     Changes in diet or alcohol consumption:  No     Upcoming surgery, procedure or cardioversion:  No.  Chronic Kidney Dialysis on Tues/Thur/Sat for ESRD.    Anticoagulation Episode Summary     Current INR goal:   2.0-3.0   TTR:   83.6 % (11.6 mo)   Next INR check:   3/20/2020   INR from last check:   2.00 (3/6/2020)   Weekly max warfarin dose:      Target end date:      INR check location:      Preferred lab:      Send INR reminders to:   [facility]    Indications    PVD (peripheral vascular disease) (H) [I73.9]  Stroke (H) [I63.9]           Comments:            Anticoagulation Care Providers     Provider Role  Specialty Phone number    Jesus Medrano MD Referring Internal Medicine [phone]

## 2021-06-06 NOTE — TELEPHONE ENCOUNTER
ANTICOAGULATION  MANAGEMENT    Assessment     Today's INR result of 3.20 is Supratherapeutic (goal INR of 2.0-3.0)        Warfarin taken as previously instructed    Decreased greens/vitamin K intake may be affecting INR    - he thought he was eating too much, so he cut down to every other day, instead of everyday.  (he loves to eat vegetables).    No new medication/supplements affecting INR    Continues to tolerate warfarin with no reported s/s of bleeding or thromboembolism     Previous INR was Therapeutic at 2.20 on 1/24/2020.    Plan:     Spoke with Yair Hall regarding INR result and instructed:     Warfarin Dosing Instructions:  (mornings. Has 4mg tabs)   - just for today, advised to skip warfarin dose altogether.    - then continue current warfarin dose 2 mg daily on Fridays; and 4 mg daily rest of week.    Instructed patient to follow up no later than:  2 wks.   - INR scheduled on 3/6/2020 @ [facility].    Education provided: importance of consistent vitamin K intake, impact of vitamin K foods on INR, target INR goal and significance of current INR result and monitoring for bleeding signs and symptoms    Yair Hall verbalizes understanding and agrees to warfarin dosing plan.    Instructed to call the [facility] for any changes, questions or concerns. (#[phone])   ?   Yamila Luna RN    Subjective/Objective:      Alexei MIKEY Blake, a 79 y.o. male is on warfarin.     Alexei reports:     Home warfarin dose: verbally confirmed home dose with Yair and updated on anticoagulation calendar     Missed doses: No     Medication changes:  No     S/S of bleeding or thromboembolism:  No     New Injury or illness:  No     Changes in diet or alcohol consumption:  Yes:  Reported cutting down on eating his usual vegetables.     Upcoming surgery, procedure or cardioversion:  Yes: on chronic kidney dialysis 3x/wk on Tu/Th/Sa.    Anticoagulation Episode Summary     Current INR goal:   2.0-3.0   TTR:   84.2 % (11.6 mo)    Next INR check:   3/6/2020   INR from last check:   3.20! (2/21/2020)   Weekly max warfarin dose:      Target end date:      INR check location:      Preferred lab:      Send INR reminders to:   [facility]    Indications    PVD (peripheral vascular disease) (H) [I73.9]  Stroke (H) [I63.9]           Comments:            Anticoagulation Care Providers     Provider Role Specialty Phone number    Jesus Medrano MD Referring Internal Medicine [phone]

## 2021-06-06 NOTE — PROGRESS NOTES
Patient is in clinic today to see NP Ai Armstrong.      Patient is here for a consult to discuss ulcers    The patient does not smoke.    Pt is currently taking Plavix, Coumadin and statin.    The patient states he/she are NOT wearing compression.    Swelling is observed in bilateral legs.  Pt rates pain 0/10 located at leg.    Patients condition is pt is here for a consult.    The provider has been notified that the patient has new shin ulcer.     At the end of the visit the patient was provided with education both verbally and on their AVS.

## 2021-06-07 NOTE — TELEPHONE ENCOUNTER
"Triage Call:  Injury to Right middle finger  Happened a week+ ago  Unable to recall how injury happened  \"Center finger on right hand\"  Started with \"blood blister\" under the tip of the nail  Finger red around the nail to first nuckle  Fingernail and nail bed intact  Drilled hole in nail to drain and \"got blood out\"  No breaks in the skin, just \"tight, that's why I drilled the hole was to relieve the pressure\" reports relief of pressure  Pain has returned  \"my wife is concerned there is an infection  Pain when tip of finger is touch 2-3/10  Warm water soaks, practicing good hand hygiene  Denies any bleeding or drainage  Dialysis patient, fistula on right arm    Disposition:  Go to office now. Schedule eVisit per triage workflow. Educated per Care Advice. Patient verbalized understanding. eVisit scheduled for today 1400 with Dr Medrano    Reason for Disposition    Looks infected (e.g., spreading redness, pus, red streak)    Protocols used: FINGER INJURY-A-[place]    COVID 19 Nurse Triage Plan/Patient Instructions    Please be aware that novel coronavirus (COVID-19) may be circulating in the community. If you develop symptoms such as fever, cough, or SOB or if you have concerns about the presence of another infection including coronavirus (COVID-19), please contact your health care provider or visit [email or web address].     Disposition/Instructions    Patient to have scheduled Telephone Visit with a provider. Follow System Ambulatory Workflow for COVID 19.     The clinic staff will assist you to schedule an appointment to complete the Telephone Visit with a provider during normal clinic hours.       Call Back If: Your symptoms worsen before you are able to complete your Telephone Visit with a provider.        Thank you for limiting contact with others, wearing a simple mask to cover your cough, practice good hand hygiene habits and accessing our virtual services where possible to limit the spread of this virus.    For more " information about COVID19 and options for caring for yourself at home, please visit the CDC website at https://www.cdc.gov/coronavirus/2019-ncov/about/steps-when-sick.html  For more options for care at [facility], please visit our website at [email or web address]    For more information, please use the [place] Department of Health ([facility]) COVID-19 Hotlines (Interpreters available):     Health questions: Phone Number: [phone] or [phone] and Hours: 7 a.m. to 7 p.m.    Schools and  questions: Phone Number: [phone] or [phone] and Hours 7 a.m. to 7 p.m.    Bella Sánchez RN [facility] 4/8/2020 12:01 PM

## 2021-06-07 NOTE — TELEPHONE ENCOUNTER
ANTICOAGULATION  MANAGEMENT    Assessment     Today's INR result of 2.30 is Therapeutic (goal INR of 2.0-3.0)        Warfarin taken as previously instructed    No new diet changes affecting INR    No new medication/supplements affecting INR    Continues to tolerate warfarin with no reported s/s of bleeding or thromboembolism     Previous INR was Therapeutic at 2.20 on 3/20/20.    Plan:     Spoke with Yair regarding INR result and instructed:     Warfarin Dosing Instructions:   (has 4mg tabs)   - Continue current warfarin dose 2 mg daily on Friday; and 4 mg daily rest of week.    Instructed patient to follow up no later than:  4 wks.   - INR scheduled on 5/15/20 @ [facility].    Education provided: target INR goal and significance of current INR result and monitoring for bleeding signs and symptoms    Yair / Isabel verbalizes understanding and agrees to warfarin dosing plan.    Instructed to call the [facility] for any changes, questions or concerns. (#[phone])   ?   Yamila Luna RN    Subjective/Objective:      Alexei MIKEY Blake, a 79 y.o. male is on warfarin.     Alexei reports:     Home warfarin dose: verbally confirmed home dose with Yair and updated on anticoagulation calendar     Missed doses: No     Medication changes:  No     S/S of bleeding or thromboembolism:  No     New Injury or illness:  No     Changes in diet or alcohol consumption:  No     Upcoming surgery, procedure or cardioversion:  No.  Chronic kidney dialysis 3x/wk on Tues/Thurs/Sat.    Anticoagulation Episode Summary     Current INR goal:   2.0-3.0   TTR:   88.7 % (11.6 mo)   Next INR check:   5/15/2020   INR from last check:   2.30 (4/17/2020)   Weekly max warfarin dose:      Target end date:      INR check location:      Preferred lab:      Send INR reminders to:   [facility] [place]    Indications    PVD (peripheral vascular disease) (H) [I73.9]  Stroke (H) [I63.9]           Comments:            Anticoagulation Care Providers      Provider Role Specialty Phone number    Jesus Medrano MD Referring Internal Medicine [phone]

## 2021-06-07 NOTE — PROGRESS NOTES
"Alexei Blake is a 79 y.o. male who is being evaluated via a billable telephone visit.      The patient has been notified of following:     \"This telephone visit will be conducted via a call between you and your physician/provider. We have found that certain health care needs can be provided without the need for a physical exam.  This service lets us provide the care you need with a short phone conversation.  If a prescription is necessary we can send it directly to your pharmacy.  If lab work is needed we can place an order for that and you can then stop by our lab to have the test done at a later time.    Telephone visits are billed at different rates depending on your insurance coverage. During this emergency period, for some insurers they may be billed the same as an in-person visit.  Please reach out to your insurance provider with any questions.    If during the course of the call the physician/provider feels a telephone visit is not appropriate, you will not be charged for this service.\"    Patient has given verbal consent to a Telephone visit? Yes    Alexei Blake complains of    Chief Complaint   Patient presents with     Hand Pain     right middle finger       I have reviewed and updated the patient's Past Medical History, Social History, Family History and Medication List.    ALLERGIES  Blood-group specific substance; Calcitriol; and Ciprofloxacin    Additional provider notes:  this is a 79-year-old man with multiple medical problems.  He calls in for a visit today because of a possible infection around the nail of the middle finger of his right hand.  He first noticed this about 1 week ago.  He noticed the swelling beneath the nail.  He does not recall any trauma to that area nor does he recall any open cuts or lesions.  Over the past week the pressure increased and he actually said he drilled a small hole in the nail which did release some liquid.  However, the pressure is building again and both " he and his wife have noticed redness and swelling around the nail and up to the first knuckle on that finger.  No fever or chills.  They did start soaking it in warm water and Epsom salts today.  They are concerned about infection.    Assessment/Plan:    Probable infection of the finger.  After talking with the patient and his wife and listening to their description it certainly sounds like a paronychial infection my suggestion was that he continue to soak it in warm soapy water.  We will also get him on some Keflex 500 mg 4 times daily.  I advised him to call me if this is not improving.    Phone call duration:  8 minutes    Claire Macdonald, YUDELKA

## 2021-06-07 NOTE — TELEPHONE ENCOUNTER
Who is calling:  Patient wife   Reason for Call: Caller is calling for follow up on refill for test strips patient is out of test strips requesting to process as soon as possible.   Date of last appointment with primary care: unknown   Okay to leave a detailed message: No

## 2021-06-07 NOTE — TELEPHONE ENCOUNTER
Yesterday morning after Yair took his shower, I noticed that there was some wiping come out of this wound.  So we went back do doing what we had before.

## 2021-06-08 NOTE — TELEPHONE ENCOUNTER
ANTICOAGULATION  MANAGEMENT    Assessment     Today's INR result of 2.50 is Therapeutic (goal INR of 2.0-3.0)        Warfarin taken as previously instructed    No new diet changes affecting INR    No new medication/supplements affecting INR    Continues to tolerate warfarin with no reported s/s of bleeding or thromboembolism     Previous INR was Therapeutic at 2.10 on 5/15/20.    Plan:     Spoke with Yair regarding INR result and instructed:     Warfarin Dosing Instructions:  (has 4mg tabs)   - Continue current warfarin dose 2 mg daily on Fridays; and 4 mg daily rest of week.    Instructed patient to follow up no later than:  4-6 wks.   - INR scheduled on 7/10/20 @ [facility].    Education provided: importance of consistent vitamin K intake and target INR goal and significance of current INR result    Yair verbalizes understanding and agrees to warfarin dosing plan.    Instructed to call the [facility] for any changes, questions or concerns. (#[phone])   ?   Yamila Luna RN    Subjective/Objective:      Alexei MIKEY Blake, a 80 y.o. male is on warfarin.     Alexei reports:     Home warfarin dose: verbally confirmed home dose with Yair and updated on anticoagulation calendar     Missed doses: No     Medication changes:  No     S/S of bleeding or thromboembolism:  No     New Injury or illness:  No     Changes in diet or alcohol consumption:  No     Upcoming surgery, procedure or cardioversion:  No    Anticoagulation Episode Summary     Current INR goal:   2.0-3.0   TTR:   88.7 % (11.6 mo)   Next INR check:   7/10/2020   INR from last check:   2.50 (6/12/2020)   Weekly max warfarin dose:      Target end date:      INR check location:      Preferred lab:      Send INR reminders to:   [facility] [place]    Indications    PVD (peripheral vascular disease) (H) [I73.9]  Stroke (H) [I63.9]           Comments:            Anticoagulation Care Providers     Provider Role Specialty Phone number    Jesus Medrano,  MD [facility] Internal Medicine [phone]

## 2021-06-08 NOTE — H&P
"[facility] Radiology History and Physical Note  Date/Time: 1/27/2017/10:37 AM    Procedure Requested: Fistulogram  Requesting Provider: Brady York MD    HPI: Alexei Blake is a 76 y.o. male with history of HTN, DM, CVA (on Warfarin) and ESRD-HD dependent via RUE fistula (which was placed 6/1/16 by Dr. Sanz) He dialyzes T/Th/Sa; had normal run yesterday. Patient presents today for RUE fistulogram for reported \"too high access flow\" and as patient reports prolonged bleeding after dialysis runs.     NPO Status: [place]  Anticoagulation/Antiplatelets/Bleeding tendencies: Plavix, Warfarin  Antibiotics: None    REVIEW OF SYMPTOMS: Denies recent fevers, chills, night sweats, abdominal pain, N/V/D.    PAST MEDICAL HISTORY:   Past Medical History   Diagnosis Date     Abscess of tongue      Chronic kidney disease      CKD stage 4,dialysis Tu-Th-Sa     Diabetes      type 2     DVT (deep venous thrombosis)      Hernia, umbilical      History of transfusion      Hyperlipidemia      Hypertension      CATHLEEN (obstructive sleep apnea)      uses CPAP     PVD (peripheral vascular disease)      Renal insufficiency      Stroke 2003     minor left sided weakness       PAST SURGICAL HISTORY:   Past Surgical History   Procedure Laterality Date     Arterial bypass surgery Bilateral      lower extremities, Dr. Cottrell     Toe amputation Bilateral      multiple     Umbilical hernia repair N/A 4/29/2016     Procedure: OPEN UMBILICAL REPAIR WITH MESH;  Surgeon: Jevon Rivas MD;  Location: [facility];  Service:        ALLERGIES:  Calcitriol and Ciprofloxacin    MEDICATIONS:  Current Outpatient Prescriptions   Medication Sig Dispense Refill     warfarin (COUMADIN) 4 MG tablet Take by mouth See Admin Instructions. 6 mg MWF  4mg all other days       acetaminophen (TYLENOL) 500 MG tablet Take 1,000 mg by mouth bedtime as needed for pain (or sleep).        BD INSULIN SYRINGE ULTRA-FINE 0.5 mL 31 gauge x 5/16 Syrg ONE  TWICE DAILY 200 each " 3     blood glucose test (GLUCOSE BLOOD) strips Use 1 each As Directed 2 times a day at 6:00 am and 4:00 pm. Test two to three times daily as directed 300 each 3     bumetanide (BUMEX) 1 MG tablet Take 1 tablet (1 mg total) by mouth 2 (two) times a day at 9am and 6pm. On dialysis days only dose in the evening 180 tablet 3     calcium, as carbonate, (OS-AVNI) 500 mg calcium (1,250 mg) tablet Take 1 tablet by mouth daily.       clopidogrel (PLAVIX) 75 mg tablet Take 1 tablet (75 mg total) by mouth daily. 90 tablet 3     FOLIC ACID/VIT BCOMP,C (DIALYVITE 800 ORAL) Take 1 capsule by mouth daily.       hydrALAZINE (APRESOLINE) 25 MG tablet Take 1 tablet (25 mg total) by mouth 2 (two) times a day. On dialysis days just dose once after dialysis 180 tablet 3     insulin NPH (NOVOLIN) 100 unit/mL injection Inject 5-6 Units under the skin 2 (two) times a day. (based on blood sugars)       lisinopril (PRINIVIL,ZESTRIL) 30 MG tablet Take 1 tablet (30 mg total) by mouth every evening. 90 tablet 3     lovastatin (MEVACOR) 40 MG tablet Take 1 tablet (40 mg total) by mouth bedtime. 90 tablet 3     metoprolol succinate (TOPROL-XL) 25 MG Take 1 tablet (25 mg total) by mouth every evening. 90 tablet 3     NOVOLIN R 100 unit/mL injection 1 unit for every 25>150 10 mL PRN     vitamin E 400 UNIT capsule Take 400 Units by mouth daily.       Current Facility-Administered Medications   Medication Dose Route Frequency Provider Last Rate Last Dose     lidocaine 10 mg/mL (1 %) injection 0.1-0.3 mL  0.1-0.3 mL Subcutaneous PRN Bettina Pozo CNP         sodium chloride 0.9 % flush 3 mL (NS)  3 mL Intravenous Line Care Bettina Pozo CNP           LABS:  INR (no units)   Date Value   01/27/2017 1.42 (H)     HEMOGLOBIN (g/dL)   Date Value   01/27/2017 13.3 (L)     PLATELETS (thou/uL)   Date Value   01/27/2017 221     Lab Results   Component Value Date    K 4.3 01/27/2017       EXAM:  Visit Vitals     /74     Pulse 75     Temp 97.3  F (36.3  C)  "(Oral)     Resp 16     Ht 6' 1\" (1.854 m)     Wt 200 lb (90.7 kg)     SpO2 99%     BMI 26.39 kg/m2     General: Stable. In no acute distress.  Neuro: A&O x 3.   Resp: Lungs clear to auscultation bilaterally.  Cardio: S1S2 and reg, without murmur, clicks or rubs  Vascular: RUE +bruit and thrill, ecchymotic over access site     Pre-Sedation Assessment:  Mallampati Airway Classification: Class 2: upper half of tonsil fossa visible  Previous reaction to anesthesia/sedation: no  Sedation plan based on assessment: Moderate  Sleep Apnea: yes-cpap  Dentures: yes-removed  COPD: no  ASA Classification: ASA 3 - Patient with moderate systemic disease with functional limitations      ASSESSMENT: 76 year old male with ESRD-HD dependent via RUE brachiocephalic fistula with reported high pressures and prolonged bleeding with dialysis runs.     PLAN: Right upper extremity fistulogram with possible stent, angioplasty, lysis with sedation.     The procedure, risks and moderate sedation were discussed with the patient, all questions answered and patient agrees to proceed with the procedure. Written consent obtained.    Bettina Pozo    [facility]: Interventional Radiology   [phone]    "

## 2021-06-08 NOTE — TELEPHONE ENCOUNTER
Per message below from Dr. Medrano, order for A1C has been set up for him to review.  Attempted to call the patient to let him know that this was added to his upcoming lab appointment, but there was no answer and no machine to leave a message.  Dulce CARLOS CMA/REAGAN....................9:24 AM

## 2021-06-08 NOTE — TELEPHONE ENCOUNTER
ANTICOAGULATION  MANAGEMENT    Assessment     Today's INR result of 2.10 is Therapeutic (goal INR of 2.0-3.0)        Warfarin taken as previously instructed    No new diet changes affecting INR    No new medication/supplements affecting INR    Continues to tolerate warfarin with no reported s/s of bleeding or thromboembolism     Previous INR was Therapeutic at 2.30 on 4/17/20.    Plan:     Spoke with wifeIsabel regarding INR result and instructed:     Warfarin Dosing Instructions:   (has 4mg tabs)   - Continue current warfarin dose 2 mg daily on Fridays; and 4 mg daily rest of week.    Instructed patient to follow up no later than:  4 wks.   - INR scheduled on 6/12/20 @ [facility].     Education provided: target INR goal and significance of current INR result and monitoring for bleeding signs and symptoms    WifeIsabel verbalizes understanding and agrees to warfarin dosing plan.    Instructed to call the [facility] for any changes, questions or concerns. (#[phone])   ?   Yamila Luna RN    Subjective/Objective:      Alexei MIKEY Blake, a 80 y.o. male is on warfarin.     Alexei reports:     Home warfarin dose: verbally confirmed home dose with Isabel and updated on anticoagulation calendar     Missed doses: No     Medication changes:  No     S/S of bleeding or thromboembolism:  No     New Injury or illness:  No     Changes in diet or alcohol consumption:  No     Upcoming surgery, procedure or cardioversion:  No.   - chronic kidney dialysis on Tues/Thurs/Sat.    Anticoagulation Episode Summary     Current INR goal:   2.0-3.0   TTR:   88.7 % (11.6 mo)   Next INR check:   6/12/2020   INR from last check:   2.10 (5/15/2020)   Weekly max warfarin dose:      Target end date:      INR check location:      Preferred lab:      [facility]    Indications    PVD (peripheral vascular disease) (H) [I73.9]  Stroke (H) [I63.9]           Comments:            Anticoagulation Care  Providers     Provider Role Specialty Phone number    Jesus Medrano MD Referring Internal Medicine [phone]

## 2021-06-08 NOTE — TELEPHONE ENCOUNTER
Patient's wife called concerned for a new wound on patient's left leg after he dropped something on it. She has taken photos and will be sending them in. Please forward to Ai Armstrong when received. Wife wondering if she needs to make an appointment.

## 2021-06-08 NOTE — PROGRESS NOTES
Medication Therapy Management Initial Visit     ASSESSMENT AND PLAN  1. ESRD (end stage renal disease)  Stable on dialysis, followed by nephrology.  I believe that he may have been started on an SNRI due to starting dialysis and the commonly accompanying depression may be associated with this stage of life.  He believes that he is in good spirits and tolerating the change in his lifestyle well, therefore continue to taper off of venlafaxine, he will be done on Friday.  -Discontinue venlafaxine    2. Type 2 diabetes mellitus  At goal A1c less than 8% per ADA guidelines.  Insulin is the safest regimen while on dialysis.  Discussed lack of symptoms of peripheral neuropathy and to stay off of gabapentin for this reason.  Recommend continue current regimen.  -Discontinue gabapentin    3. Cerebrovascular accident (CVA), unspecified mechanism  Per neurology stable on aspirin and warfarin, and INRs have been therapeutic.  Recommend to continue on current regimen as he is tolerating them well.      4. Essential hypertension  At goal blood pressure less than 130/80 per JNC 8.  Recommend continue current regimen at this time.  Due to complexities of changing dosing depending on dialysis days are not, recommended decreased dose of hydralazine on dialysis days to 1 tablet after dialysis to simplify regimen and prevent risk of hypotension.  Blood pressures are very tightly controlled, if at any time he starts to become hypotensive, recommended discontinue hydralazine.  - hydrALAZINE (APRESOLINE) 25 MG tablet; Take 1 tablet (25 mg total) by mouth 2 (two) times a day. On dialysis days just dose once after dialysis  Dispense: 180 tablet; Refill: 3    5. Hyperlipidemia, unspecified hyperlipidemia type  At goal of moderate intensity statin per ACC/AHA lipid guidelines, however unclear if this is contributing to cramping.  Consider decreasing his dose to lovastatin 20 mg daily and assess if this improves muscle  weakness.  May also consider trialing off of his statin for a few weeks, we'll discuss with his primary care provider to assess if the benefits outweigh the risks of these changes.  -Consider decreasing lovastatin to 20 mg daily, or trialing off for a few weeks    6. Fatigue, unspecified type  Unclear etiology of fatigue, however this is very common with end-stage renal disease.  It seems as though his poor sleep quality due to pain and cramping where he has structural abnormalities, recommended to discuss with vascular at follow-up this week.    7. Preventative health care  Currently taking calcium supplement, he is having routine labs while at dialysis therefore they will be watching for abnormalities in his electrolytes.  Inherently end-stage renal disease and dialysis may increase risk of osteoporosis, therefore it would be appropriate to order a DEXA scan to evaluate bone health.  -Consider DEXA scan    FOLLOW-UP PLAN  Alexei was advised to follow up by phone in one week.    The following instructions were given:   Patient Instructions   Joseline will talk to Dr. Medrano about holding lovastatin to see if this affects muscle weakness, weighing in risks from stroke. Also consider bone scan and if need to continue calcium supplement.     Hydralazine will only dose once in the evening on dialysis days     Joseline will call you on Monday to talk about vascular appt     Call with questions or concerns:   Tex Pimentel, PharmD, [facility]  Medication Management (MTM) Pharmacist  [facility]  [phone]           SUBJECTIVE AND OBJECTIVE  Alexei Blake is a 76 y.o. male who was referred by Jesus Medrano MD for MTM services.  Alexei's chief concern today is medication management. He is hoping to simplify his medication regimen.  He presents with his wife and all his pill bottles.  Had a nurse come into dialysis and made some medication suggestions that they would also like to talk about. Uses a pillbox,  fills once weekly.     ESRD on dialysis: Dr. Paredes is his nephrologist. He has been getting cramps, and his nephrologist recommended vitamin E, has been taking for about one month and has not noticed a difference. Seeing vascular on Friday to double check his fistula. No issues with blood sugars at dialysis. Bumetanide BID, tolerating well, still producing urine. Dialysis days only dosed once after dialysis. Not getting up at night to use the restroom. After Friday will stop venlafaxine after every other day dosing for one week.     Diabetes: Blood sugars 's fasting. They are getting confused regarding differences between diabetes diet and dialysis diet. He has been eating the same foods for breakfast and lunch daily. Has not had any episodes of hypoglycemia for months. Has been about a month since needing regular insulin. No neuropathy at this time, has stopped gabapentin.   Lab Results   Component Value Date    HGBA1C 6.6 (H) 09/12/2016     Hx Stroke: stable INR, bruising resolving. 6/2016 saw neurology, appropriate to be on plavix and warfarin due to stroke and carotid stenosis.     Hypertension: denies s/sx hypotension, at times at dialysis, they address this prior to leaving.     Hyperlipidemia: continues on statin, unclear if this is contributing to cramps or not.  Cramping is specifically in his right arm where his fistula is placed, and his left leg where he had some revascularization done.  Therefore it is unlikely that his cramping is due to his statin, however discussed potential options of decreasing the dose due to very well controlled lipids or trialing off the statin for a short period of time.    Insomnia: difficulty sleeping due to hand pain.  We'll discuss with vascular related this week.    Bone Health: Taking daily calcium. Has never had a DXA scan.     Adherence: Alexei misses 0 doses of medications per week. He does use a pill box at home.    This note has been dictated using voice  recognition software. Any grammatical or context distortions are unintentional and inherent to the software.    Alexei was provided with follow up instructions, and this care plan was communicated via EMR with his primary care provider, Jesus Medrano MD, and is the authorizing prescriber for this visit.  Direct supervision was available by either the patient's PCP or another available physician when needed.    Time spent: 60 minutes  Level of service: 5    Tex Pimentel PharmD, BCACP  Medication Management (MTM) Pharmacist  [facility]    We reviewed Alexei's medication list with them, discussing reason for use, directions for use, and potential side effects of each medication.  Indication, safety, efficacy, and convenience was assessed for all reviewed medications.  No environmental factors were noted currently affecting patient.    Current Outpatient Prescriptions   Medication Sig Dispense Refill     BD INSULIN SYRINGE ULTRA-FINE 0.5 mL 31 gauge x 5/16 Syrg ONE  TWICE DAILY 200 each 3     blood glucose test (GLUCOSE BLOOD) strips Use 1 each As Directed 2 times a day at 6:00 am and 4:00 pm. Test two to three times daily as directed 300 each 3     bumetanide (BUMEX) 1 MG tablet Take 1 tablet (1 mg total) by mouth 2 (two) times a day at 9am and 6pm. On dialysis days only dose in the evening 180 tablet 3     calcium, as carbonate, (OS-AVNI) 500 mg calcium (1,250 mg) tablet Take 1 tablet by mouth daily.       clopidogrel (PLAVIX) 75 mg tablet Take 1 tablet (75 mg total) by mouth daily. 90 tablet 3     FOLIC ACID/VIT BCOMP,C (DIALYVITE 800 ORAL) Take 1 capsule by mouth daily.       hydrALAZINE (APRESOLINE) 25 MG tablet Take 1 tablet (25 mg total) by mouth 2 (two) times a day. On dialysis days just dose once after dialysis 180 tablet 3     insulin NPH (NOVOLIN) 100 unit/mL injection Inject 5-6 Units under the skin 2 (two) times a day. (based on blood sugars)       lisinopril (PRINIVIL,ZESTRIL) 30 MG  tablet Take 1 tablet (30 mg total) by mouth every evening. 90 tablet 3     lovastatin (MEVACOR) 40 MG tablet Take 1 tablet (40 mg total) by mouth bedtime. 90 tablet 3     metoprolol succinate (TOPROL-XL) 25 MG Take 1 tablet (25 mg total) by mouth every evening. 90 tablet 3     NOVOLIN R 100 unit/mL injection 1 unit for every 25>150 10 mL PRN     vitamin E 400 UNIT capsule Take 400 Units by mouth daily.       warfarin (COUMADIN) 4 MG tablet Take by mouth See Admin Instructions. 6 mg MWF  4mg all other days       acetaminophen (TYLENOL) 500 MG tablet Take 1,000 mg by mouth bedtime as needed for pain (or sleep).        No current facility-administered medications for this visit.

## 2021-06-08 NOTE — PROGRESS NOTES
[facility] Follow Up Note    Alexei Blake   76 y.o. male    Date of Visit: 1/11/2017    Chief Complaint   Patient presents with     Medication Problem     want to go over meds, should he take them all     Subjective  This is a 76-year-old man with multiple medical issues that include diabetes, previous stroke, end-stage renal disease and peripheral vascular disease.  He isn't diabetes has been under good control with well-managed blood sugars.  He continues to undergo dialysis.  Apparently he has been doing well with dialysis although he is having some shunt issues at this point and nephrology is evaluating the situation.  He has some minimal fatigue but otherwise is active and busy.  One of his concerns is the large number of medications he is on and he wonders about coming off of a couple of this medication.  He is not having any particular side effects relative to the medication.  He would just simply prefer not to take something that he doesn't need to take.  No other changes reported in his healthcare status since I last saw him.    ROS A comprehensive review of systems was performed and was otherwise negative    Medications, allergies, and problem list were reviewed and updated    Exam  General Appearance:   On examination his blood pressure is 124/60.  Weight is 220 pounds and height is 71 inches.  BMI is 30.68.  Heart is in a sinus rhythm with a rate of 70 and no ectopy.  The patient is alert and oriented ×3.      Assessment/Plan  1. Medication management  Ambulatory referral to Medication Management   2. PVD (peripheral vascular disease)  INR   3. Stroke  INR   4. Diabetes     5. ESRD (end stage renal disease)       Relative to the renal issues she will continue to see the nephrologist and continue to undergo dialysis.    Diabetes is fairly stable and for now we will continue his current medication.    We discussed medications and that it we think we could discontinue her the  venlafaxine and the gabapentin neither of which she seems to require at this time.  We should also note that he is off his amlodipine.  So we will work him off of these 2 medications and then we discussed a visit with the pharmacist to discuss medications in more detail.  We will set that appointment up for them.    Total time of this office visit was 25 minutes with greater than 50% of the time spent in care coordination and patient counseling.  The following high BMI interventions were performed this visit: weight monitoring    Jesus Medrano MD      Current Outpatient Prescriptions on File Prior to Visit   Medication Sig     acetaminophen (TYLENOL) 500 MG tablet Take 1,000 mg by mouth bedtime as needed for pain (or sleep).      amLODIPine (NORVASC) 10 MG tablet Take 1 tablet (10 mg total) by mouth every evening.     ascorbic acid (VITAMIN C) 500 MG tablet Take 500 mg by mouth daily.     BD INSULIN SYRINGE ULTRA-FINE 0.5 mL 31 gauge x 5/16 Syrg ONE  TWICE DAILY     blood glucose test (GLUCOSE BLOOD) strips Use 1 each As Directed 2 times a day at 6:00 am and 4:00 pm. Test two to three times daily as directed     bumetanide (BUMEX) 1 MG tablet Take 1 tablet (1 mg total) by mouth 2 (two) times a day at 9am and 6pm.     calcium, as carbonate, (OS-AVNI) 500 mg calcium (1,250 mg) tablet Take 1 tablet by mouth daily.     clopidogrel (PLAVIX) 75 mg tablet Take 1 tablet (75 mg total) by mouth daily.     FOLIC ACID/VIT BCOMP,C (DIALYVITE 800 ORAL) Take 1 capsule by mouth daily.     gabapentin (NEURONTIN) 100 MG capsule Take 100 mg by mouth 3 (three) times a day.     hydrALAZINE (APRESOLINE) 25 MG tablet Take 1 tablet (25 mg total) by mouth 2 (two) times a day.     insulin NPH (NOVOLIN) 100 unit/mL injection Inject 5 Units under the skin 2 (two) times a day before meals.     lisinopril (PRINIVIL,ZESTRIL) 30 MG tablet Take 1 tablet (30 mg total) by mouth every evening.     lovastatin (MEVACOR) 40 MG tablet Take 1 tablet (40  mg total) by mouth bedtime.     metoprolol succinate (TOPROL-XL) 25 MG Take 1 tablet (25 mg total) by mouth every evening.     NOVOLIN R 100 unit/mL injection 1 unit for every 25>150     venlafaxine (EFFEXOR XR) 75 MG 24 hr capsule Take 1 capsule (75 mg total) by mouth daily.     warfarin (COUMADIN) 4 MG tablet Take by mouth See Admin Instructions. 6 mg MWF  4mg all other days     No current facility-administered medications on file prior to visit.      Allergies   Allergen Reactions     Calcitriol      Fatigue      Ciprofloxacin Rash     Social History   Substance Use Topics     Smoking status: Former Smoker     Quit date: 1/1/1995     Smokeless tobacco: Never Used     Alcohol use No

## 2021-06-08 NOTE — TELEPHONE ENCOUNTER
Refill Approved    Rx renewed per Medication Renewal Policy. Medication was last renewed on 2/4/20.    Skylar Castaneda, [facility] [place] Connection Triage/Med Refill 5/7/2020     Requested Prescriptions   Pending Prescriptions Disp Refills     lisinopriL (PRINIVIL,ZESTRIL) 40 MG tablet [Pharmacy Med Name: Lisinopril 40 MG Oral Tablet] 90 tablet 0     Sig: TAKE 1 TABLET BY MOUTH ONCE DAILY IN THE EVENING       Ace Inhibitors Refill Protocol Passed - 5/5/2020 12:17 PM        Passed - PCP or prescribing provider visit in past 12 months       Last office visit with prescriber/PCP: Visit date not found OR same dept: 10/18/2019 Jesus Medrano MD OR same specialty: 10/18/2019 Jesus Medrano MD  Last physical: Visit date not found Last MTM visit: Visit date not found   Next visit within 3 mo: Visit date not found  Next physical within 3 mo: Visit date not found  Prescriber OR PCP: Sae Blankenship MD  Last diagnosis associated with med order: 1. Essential hypertension with goal blood pressure less than 130/85  - lisinopriL (PRINIVIL,ZESTRIL) 40 MG tablet [Pharmacy Med Name: Lisinopril 40 MG Oral Tablet]; TAKE 1 TABLET BY MOUTH ONCE DAILY IN THE EVENING  Dispense: 90 tablet; Refill: 0    If protocol passes may refill for 12 months if within 3 months of last provider visit (or a total of 15 months).             Passed - Serum Potassium in past 12 months     Lab Results   Component Value Date    Potassium 4.8 09/28/2019             Passed - Blood pressure filed in past 12 months     BP Readings from Last 1 Encounters:   03/13/20 140/76             Passed - Serum Creatinine in past 12 months     Creatinine   Date Value Ref Range Status   09/28/2019 6.01 (HH) 0.70 - 1.30 mg/dL Final

## 2021-06-08 NOTE — TELEPHONE ENCOUNTER
RN cannot approve Refill Request    RN can NOT refill this medication Protocol failed and NO refill given. Last office visit: 10/18/2019 Jesus Medrano MD Last Physical: 11/15/2019 Last MTM visit: Visit date not found Last visit same specialty: 10/18/2019 Jesus Medrano MD.  Next visit within 3 mo: Visit date not found  Next physical within 3 mo: Visit date not found      Noemy Muhammad, [facility] Triage/Med Refill 5/17/2020    Requested Prescriptions   Pending Prescriptions Disp Refills     NOVOLIN N NPH U-100 INSULIN 100 unit/mL injection [Pharmacy Med Name: NovoLIN N ReliOn 100 UNIT/ML Subcutaneous Suspension] 20 mL 0     Sig: INJECT 18 UNITS SUBCUTANEOUSLY ONCE DAILY IN THE MORNING AND 6 IN THE EVENING       Insulin/GLP-1 Refill Protocol Failed - 5/15/2020  8:07 AM        Failed - Visit with PCP or prescribing provider visit in last 6 months     Last office visit with prescriber/PCP: Visit date not found OR same dept: 10/18/2019 Jesus Medrano MD OR same specialty: 10/18/2019 Jesus Medrano MD Last physical: Visit date not found Last MTM visit: Visit date not found     Next appt within 3 mo: Visit date not found  Next physical within 3 mo: Visit date not found  Prescriber OR PCP: Jesus Medrano MD  Last diagnosis associated with med order: There are no diagnoses linked to this encounter.  If protocol passes may refill for 6 months if within 3 months of last provider visit (or a total of 9 months).              Failed - A1C in last 6 months     Hemoglobin A1c   Date Value Ref Range Status   05/15/2020 5.8 3.5 - 6.0 % Final               Failed - Microalbumin in last year     Microalbumin, Random Urine   Date Value Ref Range Status   09/17/2014 57.12 (H) 0.00 - 1.99 mg/dL Final                  Passed - Blood pressure in last year     BP Readings from Last 1 Encounters:   03/13/20 140/76             Passed - Creatinine done in last year     Creatinine   Date Value Ref Range Status   09/28/2019  6.01 () 0.70 - 1.30 mg/dL Final

## 2021-06-08 NOTE — TELEPHONE ENCOUNTER
Isabel was updated per Ai- needs to closely watch the area; I am hoping it will resolve on its own; needs to keep it dry; no cover dressing; if opens call back and make appt asap.     She agreed with this plan. She stated that bruise is located on his L shin.

## 2021-06-08 NOTE — TELEPHONE ENCOUNTER
Upcoming Appointment Question  When is the appointment: 5/15/20  What is your appointment for?: INR   Who is your appointment scheduled with?: Lab  What is your question/concern?: Patient notice on My Chart they are due for A1c. Can this be added to labs on 5/15/20 or is this to be a visit with PCP.   Okay to leave a detailed message?: Yes

## 2021-06-09 NOTE — PATIENT INSTRUCTIONS - HE
[facility] Virtual Check-In Number    [phone]    When you arrive for your IN OFFICE visit. Please call this number from the parking lot.      The staff will then virtually check you in and meet you at the door to escort you to a room.    If you arrive early and a room is not yet ready for you staff may have you wait can call you back when they are ready.     Please remember to wear a mask into your appointment     No Visitors Allowed    If you are having any symptoms- cough, fever, rash, loss of taste and smell or shortness of breath we ask that you please call and reschedule your appointment.     We will discuss the ultrasound results on 7/21

## 2021-06-09 NOTE — PROGRESS NOTES
Office Visit - Follow Up   Alexei Blake   76 y.o. male    Date of Visit: 3/13/2017    Chief Complaint   Patient presents with     Arm Pain     left arm by elbow        Assessment and Plan   1. Left arm pain  His exam and history are not concerning for angina.  EKG is without significant change.  I suspect this is musculoskeletal and probably related to the activities that he performed earlier in the day.  Less likely representing cervical radiculopathy.  I suggested he use Tylenol 1000 mg twice a day, to avoid any overuse and allow this to heal.  - Electrocardiogram Perform and Read    No Follow-up on file.     History of Present Illness   This 76 y.o. old gentleman with end-stage renal disease on hemodialysis and peripheral arterial disease who is here with left arm pain.  He describes a mild ache in his arm that has persisted throughout the day.  He was blowing snow this morning with a leaf blower alternating between both arm.  He also used his arm awkwardly to pull his trashcan up the driveway while reaching out from the car window.  Denies any chest pain.  The pain is more of an ache and is described as a 1 out of 10 in severity.  There is no associated dyspnea, diaphoresis, or nausea.  No numbness or tingling in the arm.  It is persisted throughout the day and he can't say definitely that it is worse with use.  No previous history of coronary artery disease but multiple risk factors are present.    Review of Systems: No skin rashes.  No shoulder pain.  No neck pain or headache.       Medications, Allergies and Problem List   Patient Active Problem List   Diagnosis     Contact Dermatitis     Xerosis Cutis     Ganglion     Hyperlipidemia     Type 2 diabetes mellitus     Stroke Syndrome     Essential hypertension with goal blood pressure less than 130/85     Peripheral Vascular Disease     Renal Insufficiency     Cellulitis     PVD (peripheral vascular disease)     Stroke     Uncontrolled hypertension      Mood disorder due to a general medical condition     Major depressive disorder, single episode with atypical features     Adjustment disorder with depressed mood     Palliative care encounter     Fatigue     Generalized weakness     Depression     Umbilical hernia without obstruction and without gangrene     Varicose vein of leg, left     Abscess of tongue     ESRD (end stage renal disease)     Abscess, tongue     Left arm pain       He has a past surgical history that includes Arterial bypass surgry (Bilateral); Toe amputation (Bilateral); and Umbilical hernia repair (N/A, 4/29/2016).    Allergies   Allergen Reactions     Calcitriol      Fatigue      Ciprofloxacin Rash       Current Outpatient Prescriptions   Medication Sig Dispense Refill     acetaminophen (TYLENOL) 500 MG tablet Take 1,000 mg by mouth bedtime as needed for pain (or sleep).        BD INSULIN SYRINGE ULTRA-FINE 0.5 mL 31 gauge x 5/16 Syrg ONE  TWICE DAILY 200 each 3     blood glucose test (GLUCOSE BLOOD) strips Use 1 each As Directed 2 times a day at 6:00 am and 4:00 pm. Test two to three times daily as directed 300 each 3     calcium, as carbonate, (OS-AVNI) 500 mg calcium (1,250 mg) tablet Take 1 tablet by mouth daily.       clopidogrel (PLAVIX) 75 mg tablet Take 1 tablet (75 mg total) by mouth daily. 90 tablet 3     FOLIC ACID/VIT BCOMP,C (DIALYVITE 800 ORAL) Take 1 capsule by mouth daily.       hydrALAZINE (APRESOLINE) 25 MG tablet Take 1 tablet (25 mg total) by mouth 2 (two) times a day. On dialysis days just dose once after dialysis 180 tablet 3     insulin NPH (NOVOLIN) 100 unit/mL injection Inject 5-6 Units under the skin 2 (two) times a day. (based on blood sugars)       lisinopril (PRINIVIL,ZESTRIL) 30 MG tablet Take 1 tablet (30 mg total) by mouth every evening. 90 tablet 3     lovastatin (MEVACOR) 40 MG tablet Take 1 tablet (40 mg total) by mouth bedtime. 90 tablet 3     metoprolol succinate (TOPROL-XL) 25 MG Take 1 tablet (25 mg total)  "by mouth every evening. 90 tablet 3     NOVOLIN R 100 unit/mL injection 1 unit for every 25>150 10 mL PRN     vitamin E 400 UNIT capsule Take 400 Units by mouth daily.       warfarin (COUMADIN) 4 MG tablet Take by mouth See Admin Instructions. 6 mg MWF  4mg all other days       bumetanide (BUMEX) 1 MG tablet Take 1 tablet (1 mg total) by mouth 2 (two) times a day at 9am and 6pm. On dialysis days only dose in the evening 180 tablet 3     No current facility-administered medications for this visit.         Physical Exam   General Appearance:   Well-appearing elderly male appears very comfortable    Visit Vitals     /64 (Patient Site: Left Arm, Patient Position: Sitting, Cuff Size: Adult Large)     Pulse 70     Ht 6' 1\" (1.854 m)     Wt (!) 229 lb (103.9 kg)     SpO2 98%     BMI 30.21 kg/m2       HEENT: Normal  Respiratory: Normal respiratory effort.  Lungs are clear with no rales or wheezes.  Heart: Regular rate and rhythm .  Right carotid bruit  Normal range of motion left shoulder and elbow.  No reproducible tenderness in biceps or forearm.  Skin without rashes or lesions   Good radial pulse.  No edema       EKG with sinus bradycardia and first-degree AV block with left bundle branch block, no significant change from previous EKG      Additional Information   Social History   Substance Use Topics     Smoking status: Former Smoker     Quit date: 1/1/1995     Smokeless tobacco: Never Used     Alcohol use No              Cruz Boyle MD  "

## 2021-06-09 NOTE — TELEPHONE ENCOUNTER
Pt and wife were updated on the results below. All questions were answered. Told to call back in the future if needed.

## 2021-06-09 NOTE — PROGRESS NOTES
"Alexei Blake is a 80 y.o. male who is being evaluated via a billable telephone visit.      The patient has been notified of following:     \"This telephone visit will be conducted via a call between you and your physician/provider. We have found that certain health care needs can be provided without the need for a physical exam.  This service lets us provide the care you need with a short phone conversation.  If a prescription is necessary we can send it directly to your pharmacy.  If lab work is needed we can place an order for that and you can then stop by our lab to have the test done at a later time.    Telephone visits are billed at different rates depending on your insurance coverage. During this emergency period, for some insurers they may be billed the same as an in-person visit.  Please reach out to your insurance provider with any questions.    If during the course of the call the physician/provider feels a telephone visit is not appropriate, you will not be charged for this service.\"    Patient has given verbal consent to a Telephone visit? Yes    Patient would like to receive their AVS by AVS Preference: Mail a copy.    Discuss US from 7/20. Fistula has been having bleeding post dialysis. 2 Goes to [facility]. On warfarin. No vitals taken today.    Beverley Jones RN    "

## 2021-06-09 NOTE — PROGRESS NOTES
"Alexei Blake is a 80 y.o. male who is being evaluated via a billable telephone visit.      The patient has been notified of following:     \"This telephone visit will be conducted via a call between you and your physician/provider. We have found that certain health care needs can be provided without the need for a physical exam.  This service lets us provide the care you need with a short phone conversation.  If a prescription is necessary we can send it directly to your pharmacy.  If lab work is needed we can place an order for that and you can then stop by our lab to have the test done at a later time.    Telephone visits are billed at different rates depending on your insurance coverage. During this emergency period, for some insurers they may be billed the same as an in-person visit.  Please reach out to your insurance provider with any questions.    If during the course of the call the physician/provider feels a telephone visit is not appropriate, you will not be charged for this service.\"    Patient has given verbal consent to a Telephone visit? Yes    What phone number would you like to be contacted at? [phone]    Patient would like to receive their AVS by AVS Preference: Mail a copy.    Additional provider notes:      This is an 80 year old man who calls in for a visit today to follow-up on an emergency room visit yesterday.  He has multiple medical problems that include diabetes, hypertension and peripheral vascular disease.  He also has chronic renal failure.  He is on dialysis and has been for some time.  Yesterday following dialysis liver unable to stop the bleeding from his shunt.  He was sent to the emergency room and the bleeding did subside spontaneously while he was there.  I did review the emergency room notes.  The dialysis people have apparently discussed with him possible referral back to the vascular surgeon for reassessment of this fistula.  He is doing fine today.  The other issue " noted in the emergency room was a significant elevation of his blood pressure.  No new headaches or dizziness no chest pain or shortness of breath.  Blood pressures at home have shown systolic numbers in the 160 range with good diastolic numbers since his emergency room visit.  No other new issues today.  I did review his blood pressure medications with him and with his wife.    Assessment/Plan:    Hypertension.  Blood pressures are running a little high and we discussed the situation.  After reviewing his medication he suggested we increase his metoprolol to 25 mg twice daily.  I would like to see him in the office in about 2 to 3 weeks to follow-up on this.  He will contact me sooner if the blood pressures continue to run high.    Chronic renal failure.  Continue follow-up with dialysis and possible referral to the vascular surgeons for evaluation of his fistula.    Phone call duration:  10 minutes    Claire Macdonald CMA

## 2021-06-09 NOTE — PROGRESS NOTES
"Alexei Blake is a 80 y.o. male who is being evaluated via a billable telephone visit.      The patient has been notified of following:     \"This telephone visit will be conducted via a call between you and your physician/provider. We have found that certain health care needs can be provided without the need for a physical exam.  This service lets us provide the care you need with a short phone conversation.  If a prescription is necessary we can send it directly to your pharmacy.  If lab work is needed we can place an order for that and you can then stop by our lab to have the test done at a later time.    Telephone visits are billed at different rates depending on your insurance coverage. During this emergency period, for some insurers they may be billed the same as an in-person visit.  Please reach out to your insurance provider with any questions.    If during the course of the call the physician/provider feels a telephone visit is not appropriate, you will not be charged for this service.\"    Patient has given verbal consent to a Telephone visit? Yes    Patient would like to receive their AVS by AVS Preference: Mail a copy.    Fistula has been having bleeding post dialysis. 2 occasions had to go to ER. Needs fistulogram. Goes to [facility] and Sat. On warfarin.    Beverley Jones RN    "

## 2021-06-09 NOTE — PROGRESS NOTES
VASCULAR OUTPATIENT VIRTUAL CONSULT OR VISIT  PHYSICIAN: Kristine Arellano MD    LOCATION: [facility]   Virtual visit done using telephone communication    Alexei Blake   Medical Record #:  [id number]  YOB: 1940  Age:  80 y.o.     Date of Service: 7/14/2020    PRIMARY CARE PROVIDER: Jesus Medrano MD    Reason for consultation: Bleeding right upper extremity fistula    IMPRESSION:    1.  Unclear etiology of bleeding from his right upper extremity brachiocephalic fistula.  The patient reports no further episodes since his emergency room visit.  The patient reports adequate runs during hemodialysis.  The patient's last fistulogram was in January of 2017.       RECOMMENDATION:  Optimally, the patient will be evaluated with a fistulogram as intervention could be performed during this time.  However, the patient does not wish to undergo COVID testing as a positive result would change his hemodialysis location.  As such, we will proceed with a right upper extremity hemodialysis ultrasound to evaluate any potential stenosis.  I will discuss the results with the patient and we will proceed from there.    HPI:  Alexei Blake is a 80 y.o. male with history of end-stage renal disease on hemodialysis via a right upper extremity brachiocephalic fistula.  The patient reports an episode of significant bleeding after hemodialysis earlier this month requiring evaluation and management in the emergency room.  The patient reports his bleeding ceased upon arriving from his dialysis center to the emergency room.  He denies any further episodes of significant bleeding from his access.  He reports adequate clearance and dialysis runs.  He denies any right upper pain/extremity swelling or tenderness.  The patient denies chest pain, shortness of breath or lower extremity pain/swelling.  Of note, the patient has also been seen in the vascular clinic for a left lower extremity wound/venous ulcer.  The patient  reports his venous ulcer has healed.  He is also receiving Coumadin therapy.    PHH:    Past Medical History:   Diagnosis Date     Abscess of tongue      Chronic kidney disease     CKD stage 4,dialysis [facility]     Diabetes (H)     type 2     DVT (deep venous thrombosis) (H)      End stage renal disease (H)      Hernia, umbilical      History of transfusion      Hyperlipidemia      Hypertension      CATHLEEN (obstructive sleep apnea)     uses CPAP     PVD (peripheral vascular disease) (H)      Renal insufficiency      Stroke (H) 2003    minor left sided weakness        Past Surgical History:   Procedure Laterality Date     ARTERIAL BYPASS SURGERY Bilateral     lower extremities, Dr. Cottrell     TOE AMPUTATION Bilateral     multiple     UMBILICAL HERNIA REPAIR N/A 4/29/2016    Procedure: OPEN UMBILICAL REPAIR WITH MESH;  Surgeon: Jevon Rivas MD;  [place] [facility] [place]:        ALLERGIES:  Blood-group specific substance; Calcitriol; and Ciprofloxacin    MEDS:    Current Outpatient Medications:      ACCU-CHEK KYLE PLUS TEST STRP strips, USE  STRIP TO CHECK GLUCOSE 2 TO 3 TIMES DAILY AS DIRECTED AT  6AM  AMD  4PM, Disp: 300 strip, Rfl: 1     acetaminophen (TYLENOL) 500 MG tablet, Take 1,000 mg by mouth bedtime as needed for pain (or sleep). , Disp: , Rfl:      bumetanide (BUMEX) 1 MG tablet, TAKE 1 TABLET TWICE DAILY AT 9AM AND 6PM. ON DIALYSIS DAYS, ONLY DOSE IN THE EVENING., Disp: 180 tablet, Rfl: 3     calcium, as carbonate, (OS-AVNI) 500 mg calcium (1,250 mg) tablet, Take 1 tablet by mouth daily., Disp: , Rfl:      clopidogreL (PLAVIX) 75 mg tablet, Take 1 tablet (75 mg total) by mouth daily., Disp: 90 tablet, Rfl: 1     hydrALAZINE (APRESOLINE) 25 MG tablet, Take 1 tablet (25 mg total) by mouth 2 (two) times a day., Disp: 180 tablet, Rfl: 3     insulin regular (NOVOLIN R REGULAR U-100 INSULN) 100 unit/mL injection, Check blood glucose three times daily prior to meal. Correction with 1 unit for every  "25mg/dL>150mg/dL blood glucose., Disp: 10 mL, Rfl: 0     insulin syringe-needle U-100 1/2 mL 31 gauge x 15/64\" Syrg, USE 1  SYRINGE TWICE DAILY, Disp: 180 Syringe, Rfl: 3     lisinopriL (PRINIVIL,ZESTRIL) 40 MG tablet, TAKE 1 TABLET BY MOUTH ONCE DAILY IN THE EVENING, Disp: 90 tablet, Rfl: 1     lovastatin (MEVACOR) 20 MG tablet, Take 20 mg by mouth at bedtime., Disp: , Rfl: 2     metoprolol succinate (TOPROL-XL) 25 MG, TAKE 1 TABLET (25 MG TOTAL) BY MOUTH EVERY EVENING., Disp: 90 tablet, Rfl: 2     multivitamin therapeutic tablet, Take 1 tablet by mouth daily., Disp: , Rfl:      NOVOLIN N NPH U-100 INSULIN 100 unit/mL injection, INJECT 18 UNITS SUBCUTANEOUSLY ONCE DAILY IN THE MORNING AND 6 IN THE EVENING, Disp: 20 mL, Rfl: 0     warfarin ANTICOAGULANT (COUMADIN/JANTOVEN) 4 MG tablet, Take 1/2 tab (2mg) on  and take 1 tab (4mg) all other days by mouth daily, as directed.  Adjust dose based on INR results., Disp: 100 tablet, Rfl: 1    SOCIAL HABITS:    Social History     Tobacco Use   Smoking Status Former Smoker     Packs/day: 0.00     Last attempt to quit: 1995     Years since quittin.5   Smokeless Tobacco Never Used       Social History     Substance and Sexual Activity   Alcohol Use No       Social History     Substance and Sexual Activity   Drug Use No       FAMILY HISTORY:    Family History   Problem Relation Age of Onset     Hypertension Mother      Hypertension Father      Diabetes Father      Heart attack Father        REVIEW OF SYSTEMS:    A 12 point ROS was reviewed and except for what is listed in the HPI above, all others are negative    PE:  There were no vitals taken for this visit.  Wt Readings from Last 1 Encounters:   20 220 lb (99.8 kg)     There is no height or weight on file to calculate BMI.    EXAM:  EXAM LIMITED AS THIS IS A TELEPHONE VISIT      DIAGNOSTIC STUDIES: Right upper extremity fistulogram study dated 2017.      Images:  No results found.    I personally " reviewed the images and my interpretation is the patient's right upper extremity fistula is patent.  There is no peripheral or central stenosis.    LABS:      Sodium   Date Value Ref Range Status   09/28/2019 139 136 - 145 mmol/L Final   01/31/2019 140 136 - 145 mmol/L Final   11/08/2018 141 136 - 145 mmol/L Final     Potassium   Date Value Ref Range Status   09/28/2019 4.8 3.5 - 5.0 mmol/L Final   01/31/2019 3.5 3.5 - 5.0 mmol/L Final   11/08/2018 3.4 (L) 3.5 - 5.0 mmol/L Final     Chloride   Date Value Ref Range Status   09/28/2019 102 98 - 107 mmol/L Final   01/31/2019 103 98 - 107 mmol/L Final   11/08/2018 105 98 - 107 mmol/L Final     BUN   Date Value Ref Range Status   09/28/2019 37 (H) 8 - 28 mg/dL Final   01/31/2019 15 8 - 28 mg/dL Final   11/08/2018 15 8 - 28 mg/dL Final     Creatinine   Date Value Ref Range Status   09/28/2019 6.01 (HH) 0.70 - 1.30 mg/dL Final   01/31/2019 2.64 (H) 0.70 - 1.30 mg/dL Final   11/08/2018 2.92 (H) 0.70 - 1.30 mg/dL Final     Hemoglobin   Date Value Ref Range Status   10/02/2019 11.5 (L) 14.0 - 18.0 g/dL Final   09/29/2019 11.4 (L) 14.0 - 18.0 g/dL Final   09/28/2019 11.7 (L) 14.0 - 18.0 g/dL Final     Platelets   Date Value Ref Range Status   10/02/2019 248 140 - 440 thou/uL Final   09/29/2019 175 140 - 440 thou/uL Final   09/28/2019 192 140 - 440 thou/uL Final     BNP   Date Value Ref Range Status   09/28/2019 1,345 (H) 0 - 84 pg/mL Final   12/27/2015 377 (H) 0 - 76 pg/mL Final   12/25/2015 550 (H) 0 - 76 pg/mL Final     INR   Date Value Ref Range Status   07/10/2020 2.20 (H) 0.90 - 1.10 Final   06/30/2020 2.59 (H) 0.90 - 1.10 Final   06/12/2020 2.50 (H) 0.90 - 1.10 Final       This was a telephone visit with start time of 11:00 AM and end time of 11:22 AM    Kristine Arellano MD, VI  VASCULAR AND INTERVENTIONAL PHYSICIAN  VASCULAR MEDICINE  INTERNAL MEDICINE  PAGER: [phone]  CALL SERVICE: [phone]      CPT code for physician reference only:  84854

## 2021-06-09 NOTE — TELEPHONE ENCOUNTER
Spoke to dialysis center and they said that patient has had 3 or more occasions of bleeding after dialysis. They think he may need fistulogram. Called and scheduled pt for virtual visit with Dr. Arellano. Pt states he's had to go to ER 2 times due to bleeding. Other times, bleeding had stopped on its own.

## 2021-06-09 NOTE — TELEPHONE ENCOUNTER
Referral Request  Type of referral: Lab Orders - My chart notification that pt needs urine protein and melissa check - Pt has INR lab on 7/10 at 8:15 would like orders so that this can be done at the 7/10 8:15 lab appt.  Who s requesting: Spouse  Why the request: Pt has lab appt on 7/10 at 8:15  Have you been seen for this request: Yes  Phone Visit 7/1/2020  Does patient have a preference on a group/provider? N/A  Okay to leave a detailed message?  Yes

## 2021-06-09 NOTE — TELEPHONE ENCOUNTER
ANTICOAGULATION  MANAGEMENT    Assessment     Today's INR result of 2.20 is Therapeutic (goal INR of 2.0-3.0)        Warfarin taken differently than instructed, but no impact to total weekly dose    No new diet changes affecting INR    No new medication/supplements affecting INR    Continues to tolerate warfarin with no reported s/s of bleeding or thromboembolism     Previous INR was Therapeutic at 2.59 on 6/30/20.    Plan:     Spoke with wife, Isabel and Yair regarding INR result and instructed:     Warfarin Dosing Instructions:  (has 4mg tabs)   - Continue current warfarin dose 2 mg daily on Fridays; and 4 mg daily rest of week.     Instructed patient to follow up no later than:  4 wks.   - INR scheduled on 8/7/20 @ [facility].    Education provided: target INR goal and significance of current INR result, importance of notifying clinic for changes in medications and when to seek medical attention/emergency care    Bill verbalizes understanding and agrees to warfarin dosing plan.    Instructed to call the [facility] for any changes, questions or concerns. (#[phone])   ?   Yamila Luna RN    Subjective/Objective:      Alexei MIKEY Blake, a 80 y.o. male is on warfarin.     Alexei reports:     Home warfarin dose: as updated on anticoagulation calendar per template     Missed doses: No     Medication changes:  No     S/S of bleeding or thromboembolism:  No     New Injury or illness:  No     Changes in diet or alcohol consumption:  No     Upcoming surgery, procedure or cardioversion:  Yes:  Chronic kidney dialysis 3x/wk on Tues/Thurs/Sat.    Anticoagulation Episode Summary     Current INR goal:   2.0-3.0   TTR:   88.7 % (11.6 mo)   Next INR check:   8/7/2020   INR from last check:   2.20 (7/10/2020)   Weekly max warfarin dose:      Target end date:      INR check location:      Preferred lab:      Send INR reminders to:   [facility]    Indications    PVD (peripheral vascular disease) (H)  [I73.9]  Stroke (H) [I63.9]           Comments:            Anticoagulation Care Providers     Provider Role Specialty Phone number    Jesus Medrano MD Referring Internal Medicine [phone]

## 2021-06-09 NOTE — PROGRESS NOTES
VASCULAR OUTPATIENT PHONE VISIT  ESTABLISHED PATIENT  PHYSICIAN: Kristine Arellano MD    LOCATION:  Virtual visit done from the [facility] using telephone.    Alexei Blake   Medical Record #:  [id number]  YOB: 1940  Age:  80 y.o.     Date of Service: 7/21/2020    PRIMARY CARE PROVIDER: Jesus Medrano MD    HPI/Reason for visit: Follow-up for bleeding right upper extremity hemodialysis access.  The patient was initially seen on 7/14/2020 after being sent to the emergency room following bleeding from his hemodialysis access.  The patient reports no further episodes of bleeding since his last visit.  He has been completing his dialysis runs with adequate clearance.  No prolonged bleeding or swelling after removal of cannulation.  He has undergone a right upper extremity dialysis ultrasound.  The patient also underwent a right lower extremity venous incompetency ultrasound and bilateral lower extremity arterial ultrasound.  He reports complete healing of his venous stasis ulcer.  He denies any claudication, pain or limitations while walking.  He denies any new lower extremity wounds or ulcerations.  He has no complaints at this time.      RECOMMENDATION:   1. End-stage renal disease with bleeding right upper extremity brachiocephalic fistula.    -His right upper extremity hemodialysis access ultrasound was personally reviewed.  Imaging demonstrates patent access with adequate mean flow volumes.  There are some elevation in peak systolic velocities at the arteriovenous anastomosis, however, if the patient is not experience any symptoms and treatments are complete then intervention is not indicated.  -Return to clinic as needed.      PHH:    Past Medical History:   Diagnosis Date     Abscess of tongue      Chronic kidney disease     CKD stage 4,dialysis [facility]     Diabetes (H)     type 2     DVT (deep venous thrombosis) (H)      End stage renal disease (H)      Hernia, umbilical       "History of transfusion      Hyperlipidemia      Hypertension      CATHLEEN (obstructive sleep apnea)     uses CPAP     PVD (peripheral vascular disease) (H)      Renal insufficiency      Stroke (H) 2003    minor left sided weakness        Past Surgical History:   Procedure Laterality Date     ARTERIAL BYPASS SURGERY Bilateral     lower extremities, Dr. Cottrell     TOE AMPUTATION Bilateral     multiple     UMBILICAL HERNIA REPAIR N/A 4/29/2016    Procedure: OPEN UMBILICAL REPAIR WITH MESH;  Surgeon: Jevon Rivas MD;  Location: [facility];  Service:        ALLERGIES:  Blood-group specific substance; Calcitriol; and Ciprofloxacin    MEDS:    Current Outpatient Medications:      ACCU-CHEK KYLE PLUS TEST STRP strips, USE  STRIP TO CHECK GLUCOSE 2 TO 3 TIMES DAILY AS DIRECTED AT  6AM  AMD  4PM, Disp: 300 strip, Rfl: 1     acetaminophen (TYLENOL) 500 MG tablet, Take 1,000 mg by mouth bedtime as needed for pain (or sleep). , Disp: , Rfl:      bumetanide (BUMEX) 1 MG tablet, TAKE 1 TABLET TWICE DAILY AT 9AM AND 6PM. ON DIALYSIS DAYS, ONLY DOSE IN THE EVENING., Disp: 180 tablet, Rfl: 3     calcium, as carbonate, (OS-AVNI) 500 mg calcium (1,250 mg) tablet, Take 1 tablet by mouth daily., Disp: , Rfl:      clopidogreL (PLAVIX) 75 mg tablet, Take 1 tablet (75 mg total) by mouth daily., Disp: 90 tablet, Rfl: 1     hydrALAZINE (APRESOLINE) 25 MG tablet, Take 1 tablet (25 mg total) by mouth 2 (two) times a day., Disp: 180 tablet, Rfl: 3     insulin regular (NOVOLIN R REGULAR U-100 INSULN) 100 unit/mL injection, Check blood glucose three times daily prior to meal. Correction with 1 unit for every 25mg/dL>150mg/dL blood glucose., Disp: 10 mL, Rfl: 0     insulin syringe-needle U-100 1/2 mL 31 gauge x 15/64\" Syrg, USE 1  SYRINGE TWICE DAILY, Disp: 180 Syringe, Rfl: 3     lisinopriL (PRINIVIL,ZESTRIL) 40 MG tablet, TAKE 1 TABLET BY MOUTH ONCE DAILY IN THE EVENING, Disp: 90 tablet, Rfl: 1     lovastatin (MEVACOR) 20 MG tablet, Take " 20 mg by mouth at bedtime., Disp: , Rfl: 2     metoprolol succinate (TOPROL-XL) 25 MG, Take 25 mg by mouth 2 (two) times a day., Disp: , Rfl:      multivitamin therapeutic tablet, Take 1 tablet by mouth daily., Disp: , Rfl:      NOVOLIN N NPH U-100 INSULIN 100 unit/mL injection, INJECT 18 UNITS SUBCUTANEOUSLY ONCE DAILY IN THE MORNING AND 6 IN THE EVENING, Disp: 20 mL, Rfl: 0     warfarin ANTICOAGULANT (COUMADIN/JANTOVEN) 4 MG tablet, Take 1/2 tab (2mg) on  and take 1 tab (4mg) all other days by mouth daily, as directed.  Adjust dose based on INR results., Disp: 100 tablet, Rfl: 1    SOCIAL HABITS:    Social History     Tobacco Use   Smoking Status Former Smoker     Packs/day: 0.00     Last attempt to quit: 1995     Years since quittin.5   Smokeless Tobacco Never Used       Social History     Substance and Sexual Activity   Alcohol Use No       Social History     Substance and Sexual Activity   Drug Use No       FAMILY HISTORY:    Family History   Problem Relation Age of Onset     Hypertension Mother      Hypertension Father      Diabetes Father      Heart attack Father        REVIEW OF SYSTEMS:    A 12 point ROS was reviewed and except for what is listed in the HPI above, all others are negative    PE:  There were no vitals taken for this visit.  Wt Readings from Last 1 Encounters:   20 220 lb (99.8 kg)     There is no height or weight on file to calculate BMI.      DIAGNOSTIC STUDIES:     Images:  Us Ankle Brachial Indices    Result Date: 2020      RESTING ANKLE-BRACHIAL INDICES (ZHAO'S)  Indication: SURVEILLANCE ZHAO'S: PAD (HX: RIGHT BRACHIO-CEPHALIC FISTULA (2017), BILATERAL 1ST,2ND TOES AMPUTATIONS. Previous: NONE History: Previous Smoker, Hypertension, Diabetic, Hyperlipidemia and PAD         Right: mmHg Index   Brachial: NA  Ankle-(PT): NPD NPD Ankle-(DP): >254 NC          Digit: NA NA              Left: mmHg Index    Brachial: 185  Ankle-(PT): >254 NC Ankle-(DP): >254 NC          Digit:  NA NA Resting ankle-brachial index of is non-compressible  on the right.  Resting ankle-brachial index is non-compressible on the left.  WAVEFORMS: The right dorsalis pedis and posterior tibial arteries show monophasic waveforms. The left dorsalis pedis and posterior tibial arteries show monophasic waveforms.  Impression:  Right ZHAO is  Uninterpretable with an ZHAO of noncompressible and unable to obtain toe pressures. Left ZHAO is Uninterpretable with an ZHAO of noncompressible and unable to obtain toe pressures.     Us Arterial Legs Bilateral Complete    Result Date: 7/21/2020  Arterial Duplex Ultrasound Lower Extremity Artery Evaluation Indication: SURVEILLANCE ZHAO'S: PAD (HX: RIGHT BRACHIO-CEPHALIC FISTULA (1/27/2017), BILATERAL 1ST,2ND TOES AMPUTATIONS.  Previous: NONE History: Previous Smoker, Hypertension, Diabetic, Hyperlipidemia and PAD Technique: Duplex imaging is performed utilizing gray-scale, two-dimensional images, and color-flow imaging. Doppler waveform analysis and spectral Doppler imaging is also performed. LOWER EXTREMITY ARTERIAL DUPLEX EXAM WITH WAVEFORMS Right Leg:(cm/s) Location: Velocities Waveforms EIA:   131  B CFA:   116  B PFA:   112  B SFA Proximal:   89  B SFA Mid:   110  B SFA Distal:   75  B Popliteal Artery:   86  B PTA:   OCC   OCC FENG:   OCC  OCC DPA:(Reverse)   90  B Waveforms: T=Triphasic, M=Monophasic, B=Biphasic Left Leg:(cm/s) Location: Velocities Waveforms EIA:   86  B CFA:   106  B PFA:   97  B SFA Proximal:   95  B SFA Mid:   76  B SFA Distal:   72  B Popliteal Artery:   51  B PTA:   49   M FENG:   59  B DPA:   75  M Waveforms: T=Triphasic, M=Monophasic, B=Biphasic Impression: Right Lower Extremity: Patent external iliac, common femoral, profunda femoral, superficial femoral and popliteal arteries with biphasic flow.  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  Patent dorsalis pedis artery with retrograde flow.  No hemodynamically  significant stenosis. Left Lower Extremity: Patent lower extremity vasculature.  No significant stenosis. Reference: Category Normal 1-19% 20-49% 50-99% Occluded PSV <160 cm/sec without spectral broadening <160 cm/sec with spectral broadening Increased Increased Absent Flow Ratio N/A N/A < 2.0 >2.0 N/A Post-Stenotic Turbulence No No No Yes N/A     Us Venous Insufficency Leg Left    Result Date: 7/21/2020  LEFT Venous Insufficiency Ultrasound LEFT Lower Extremity Indication:  SURVEILLANCE LEFT LEG PAIN, SWELLING. Previous: NONE Patient History: Swelling Presenting Symptoms:  Swelling and Pain Technique: Supine and Upright Ultrasound of the Deep and Superficial Veins with Valsalva and Compression Augmentation Maneuvers. Duplex Imaging is performed utilizing gray-scale, Two-dimensional images, color-flow imaging, Doppler waveform analysis, and Spectral doppler imaging done with provacative maneuvers. Incompetency Criteria: Deep vein reflux reported when greater than 1000 msec flow reversal. Superficial vein reflux reported when equal to or greater than 600 msec flow reversal.  vein reflux reported as greater than 350 msec flow reversal. LEFT Leg Deep System Location CFV SFJ PFV PROX SFV PROX SFV MID SFV DIST POP V. PERON. V. PTV'S Compressibility (FC,PC,NC) FC FC FC FC FC FC FC FC FC Spontaneous +  + + + + +  + Phasic  +  + + + + +  + Augmentation +  + + + + +  + Reflux +  - + + + +  - LEFT  Leg Superficial System  Location SFJ PROX THIGH KNEE MID CALF SPJ PROX CALF MID CALF GSV (mm) 10.1 6.4 2.6 3.3    Reflux + + - -    SSV (mm)     3.9 5.2 4.2 Reflux     - - - Compression Study:  Normal Impression: LEFT Deep Vein Findings: No deep vein thrombosis.  Incompetent femoral/popliteal veins. LEFT Superficial Vein Findings: Greater saphenous vein: Incompetent at the saphenofemoral junction and proximal thigh.  The vein is competent at the knee and mid calf. Small Saphenous Vein: Patent and competent. Perforating  and Accessory Veins: N/A Reference: Compressibility: FC= Fully compressible, PC= Partially compressible, NC= Non-compressible, NV= Not Visualized Venous Doppler: (+) = Present  (0) = Absent  (-) = Decreased/Unable to Evaluate, (NV) = Not Visualized Reflux: (+) Incompetent  (-) Competent, (NV) = Not Visualized    Us Hemodialysis Access Fistula Or Graft    Result Date: 7/21/2020  RIGHT ARM HEMODIALYSIS FISTULA ULTRASOUND Indication:  SURVEILLANCE RIGHT BRACHIO-CEPHALIC FISTULA (1/27/2017), bleeding from right arm after hemodialysis   Access Description:  Right arm Previous: NONE  Technique: Color Doppler and spectral waveform analysis performed at the inflow native artery and outflow native vein. Location Description Dimensions AP X Width (cm) Blood Flow Volume (cc/min) Velocities (cm/sec) Waveform Pattern Inflow Native Artery Distal Brachial    266   Monophasic Arterial/ Venous Anastamosis     939 Monophasic Outflow Native Vein Distal Cephalic 1.34 X 1.74  5295   Monophasic Outflow Native Vein Mid Cephalic 2.35 X 3.5  55560  Monophasic Outflow Native Vein Proximal Cephalic 2.45 X 3.14  3013  Monophasic Subclavian Vein     Monophasic  Comments: Patent radial and ulnar arteries at the wrist. Impression: Patent right upper extremity brachial artery to cephalic vein fistula with adequate mean flow velocities.  Elevated peak systolic velocities at the arteriovenous anastomosis (939 cm/s), reflecting significant stenosis.  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      LABS:      Sodium   Date Value Ref Range Status   09/28/2019 139 136 - 145 mmol/L Final   01/31/2019 140 136 - 145 mmol/L Final   11/08/2018 141 136 - 145 mmol/L Final     Potassium   Date Value Ref Range Status   09/28/2019 4.8 3.5 - 5.0 mmol/L Final   01/31/2019 3.5 3.5 - 5.0 mmol/L Final   11/08/2018 3.4 (L) 3.5 - 5.0 mmol/L Final     Chloride   Date Value Ref Range Status   09/28/2019 102 98 - 107 mmol/L Final    01/31/2019 103 98 - 107 mmol/L Final   11/08/2018 105 98 - 107 mmol/L Final     BUN   Date Value Ref Range Status   09/28/2019 37 (H) 8 - 28 mg/dL Final   01/31/2019 15 8 - 28 mg/dL Final   11/08/2018 15 8 - 28 mg/dL Final     Creatinine   Date Value Ref Range Status   09/28/2019 6.01 (HH) 0.70 - 1.30 mg/dL Final   01/31/2019 2.64 (H) 0.70 - 1.30 mg/dL Final   11/08/2018 2.92 (H) 0.70 - 1.30 mg/dL Final     Hemoglobin   Date Value Ref Range Status   10/02/2019 11.5 (L) 14.0 - 18.0 g/dL Final   09/29/2019 11.4 (L) 14.0 - 18.0 g/dL Final   09/28/2019 11.7 (L) 14.0 - 18.0 g/dL Final     Platelets   Date Value Ref Range Status   10/02/2019 248 140 - 440 thou/uL Final   09/29/2019 175 140 - 440 thou/uL Final   09/28/2019 192 140 - 440 thou/uL Final     BNP   Date Value Ref Range Status   09/28/2019 1,345 (H) 0 - 84 pg/mL Final   12/27/2015 377 (H) 0 - 76 pg/mL Final   12/25/2015 550 (H) 0 - 76 pg/mL Final     INR   Date Value Ref Range Status   07/10/2020 2.20 (H) 0.90 - 1.10 Final   06/30/2020 2.59 (H) 0.90 - 1.10 Final   06/12/2020 2.50 (H) 0.90 - 1.10 Final       This was a telephone based visit with start time of 11:05 AM and end time of 11:28 AM.    Kristine Arellano MD, [facility]  VASCULAR AND INTERVENTIONAL PHYSICIAN  VASCULAR MEDICINE  INTERNAL MEDICINE  PAGER: [phone]  CALL SERVICE: [phone]      CPT codes for physician reference only:  14540

## 2021-06-09 NOTE — PROGRESS NOTES
ANTICOAGULATION  MANAGEMENT: Discharge Review    Alexei Blake chart reviewed for anticoagulation continuity of care    Emergency room visit on  6/30/2020 for - vascular access problem due to  Hge / bleeding of AV fistual   - Kidney Dialysis on T/Thurs/Sat    Discharge disposition: Home    INR Results:       Recent labs: (last 7 days)     06/30/20  1005   INR 2.59*       Warfarin inpatient management: home regimen continued    Warfarin discharge instructions: home regimen continued     Medication Changes Affecting Anticoagulation: No    Additional Factors Affecting Anticoagulation: No    Plan     No adjustment to anticoagulation plan needed      Patient not contacted - next INR scheduled on 7/10/20 @ [facility].    Anticoagulation calendar updated    Yamila Luna RN

## 2021-06-09 NOTE — TELEPHONE ENCOUNTER
Received a fax from [employer] [facility] to review over patient for excessive bleeding from dialysis access. [facility] to see if patient needed to be consulted here. Based on ER and pcp note, fistula stopped bleeding on its own. Patient is on warfarin. Does not seem like pt is having problems with access. Pt may not be appropriate to be seen by vascular surgery. Will await their callback.

## 2021-06-09 NOTE — TELEPHONE ENCOUNTER
----- Message from Ai Armstrong NP sent at 7/21/2020  1:23 PM CDT -----  Can you call the patient and let him know that his venous study did show that the valves were not working correctly in the veins; if he is having issues with pain, heaviness, achiness or cramping in the legs he may benefit from seeing Dr. Trinidad to discuss RFA.     For his ZHAO the blood flow in the left is normal; the right leg there is diminished flow; he needs to be protective with the right leg as a future wound may not heal and he may need to have angiogram and intervention

## 2021-06-09 NOTE — PROGRESS NOTES
Assessment/Plan:        See note    Hypertension.  Blood pressure has improved since we increased his dose of metoprolol.  Continue same dose.  See me for recheck in 1 month.    Type 2 diabetes.  Continue current medication.    End-stage renal disease.  Continue dialysis.  He is being seen this afternoon for some work on his fistula.    He does have standing lab orders and blood will be done today.    Subjective:    Patient ID: Alexei Blake is a 80 y.o. male.  This is an 80-year-old man with multiple medical issues that include hypertension, type 2 diabetes and end-stage renal disease.  He comes in today primarily to follow-up on his blood pressure.  It had been running high and we increased his dose of metoprolol to 25 mg twice daily.  He has no headaches or dizziness and no chest pain or shortness of breath.  I had asked him to come in so that we can recheck it.  Pressures at home have been improved.  He continues on dialysis and to follow-up with renal.  He reports no changes in his diabetes.  Hypertension             Review of Systems      Negative except as noted above.    Objective:    Physical Exam  On examination today his blood pressure is 148/62.    Heart rhythm is stable with no ectopy.    He does have slight increase in edema in the lower extremities but does have chronic peripheral vascular and venous stasis issues.    The patient is alert and oriented x3.

## 2021-06-10 NOTE — PROGRESS NOTES
"Alexei Blake is a 80 y.o. male who is being evaluated via a billable telephone visit.      The patient has been notified of following:     \"This telephone visit will be conducted via a call between you and your physician/provider. We have found that certain health care needs can be provided without the need for a physical exam.  This service lets us provide the care you need with a short phone conversation.  If a prescription is necessary we can send it directly to your pharmacy.  If lab work is needed we can place an order for that and you can then stop by our lab to have the test done at a later time.    Telephone visits are billed at different rates depending on your insurance coverage. During this emergency period, for some insurers they may be billed the same as an in-person visit.  Please reach out to your insurance provider with any questions.    If during the course of the call the physician/provider feels a telephone visit is not appropriate, you will not be charged for this service.\"    Patient has given verbal consent to a Telephone visit? Yes    What phone number would you like to be contacted at? [phone]    Patient would like to receive their AVS by AVS Preference: Mail a copy.    Additional provider notes: See note   This is an 80 year old man who calls in today primarily to follow-up on his blood pressure.  I had seen him in the office last week.  I would refer to my note for the details of the visit.  Subsequently at dialysis they started him on a new medication which he is not tolerating.  He said he is extremely fatigued and weak with muscle aches and pains and has trouble getting out of bed since starting the medication.  He did not take it over the weekend.  His next dialysis is tomorrow.  Blood pressures at dialysis were again running high although now they are coming down at home.  No new headaches or dizziness or chest pain.    Assessment/Plan:    Hyper tension.  I discussed this with " the patient.  He will continue his current medication.  He will stay off of the new medication and discuss this with him at dialysis tomorrow.  I will see him in the office next week for a face-to-face visit to recheck his blood pressure.    Phone call duration:  8 minutes    Claire Macdonald CMA

## 2021-06-10 NOTE — TELEPHONE ENCOUNTER
Refill Approved    Rx renewed per Medication Renewal Policy. Medication was last renewed on 2/4/20, last OV 7/27/20.    Pebbles Isaacs, [facility] Triage/Med Refill 8/5/2020     Requested Prescriptions   Pending Prescriptions Disp Refills     clopidogreL (PLAVIX) 75 mg tablet [Pharmacy Med Name: Clopidogrel Bisulfate 75 MG Oral Tablet] 90 tablet 0     Sig: Take 1 tablet by mouth once daily       Clopidogrel/Prasugrel/Ticagrelor Refill Protocol Passed - 8/4/2020 12:29 PM        Passed - PCP or prescribing provider visit in past 6 months       Last office visit with prescriber/PCP: 7/20/2020 OR same dept: 7/20/2020 Jesus Medrano MD OR same specialty: 7/20/2020 Jesus Medrano MD Last physical: Visit date not found Last MTM visit: Visit date not found     Next appt within 3 mo: Visit date not found  Next physical within 3 mo: Visit date not found  Prescriber OR PCP: Jesus Medrano MD  Last diagnosis associated with med order: 1. CAD (coronary artery disease)  - clopidogreL (PLAVIX) 75 mg tablet [Pharmacy Med Name: Clopidogrel Bisulfate 75 MG Oral Tablet]; Take 1 tablet by mouth once daily  Dispense: 90 tablet; Refill: 0    If protocol passes may refill for 6 months if within 3 months of last provider visit (or a total of 9 months).              Passed - Hemoglobin in past 12 months     Hemoglobin   Date Value Ref Range Status   10/02/2019 11.5 (L) 14.0 - 18.0 g/dL Final

## 2021-06-10 NOTE — PROGRESS NOTES
Assessment/Plan:        See note   HTN.  Blood pressure is more stable at this point.  He will continue his current combination of medication.    Subjective:    Patient ID: Alexei Blake is a 80 y.o. male.    HPI  This is an 80-year-old man with multiple medical issues who comes in today to follow-up primarily on his blood pressure.  I would refer to my notes from his most recent visit for the details of the blood pressure issue.  We did make some adjustment and he has been doing much better since then.  Blood pressures have been stable with no precipitous drops and no significant highs.  He tells me that he is feeling better and his wife confirms this.  No headaches or dizziness and no chest pain or shortness of breath.  No other new concerns.      Review of Systems      Negative except as noted above.    Objective:    Physical Exam      On examination his blood pressure is 148/60.  Weight is 227 pounds.    Lungs are clear.    Heart rhythm is stable with a rate of 52 and no ectopy.    The patient is alert and oriented x3.

## 2021-06-10 NOTE — PROGRESS NOTES
Assessment/Plan:        See note    Hypertension.  Blood pressures continue to run a little high.  The patient and his wife and I discussed his current medication.  He is going to take to hydralazine and to Bumex daily on dialysis days as he had only been taking 1 daily.  He will take his first dose of these when he gets home from dialysis and the other dose in the evening as usual.  He will check back with me later this month to follow-up.    Chronic renal failure.  Continue dialysis.    Subjective:    Patient ID: Alexei Blake is a 80 y.o. male.    HPI  This is an 80-year-old man with known hypertension and chronic renal failure.  He is on regular dialysis.  He comes in today to follow-up on his blood pressure which has been running high.  He brought his list of medications.  Dialysis and put him on a phosphorus binding medication a few weeks ago and he did not tolerate this and he stopped it.  They now have him taking Tums which is more tolerable.  The symptoms he was having are disappearing but he continues to run some elevation of his blood pressure.  He came in with his wife today primarily to discuss his current medications and whether or not we could make any adjustments.      Review of Systems      Negative in all other areas.    Objective:    Physical Exam  On examination his blood pressure is 172/60.  Weight is 224 pounds.  O2 saturation is 98%.    Heart rhythm is stable with rate of 54 and no ectopy.    No new edema.    Patient is alert and oriented x3.

## 2021-06-10 NOTE — TELEPHONE ENCOUNTER
ANTICOAGULATION  MANAGEMENT    Assessment     Today's INR result of 2.10 is Therapeutic (goal INR of 2.0-3.0)        Warfarin taken as previously instructed    No new diet changes affecting INR    No new medication/supplements affecting INR    Continues to tolerate warfarin with no reported s/s of bleeding or thromboembolism     Previous INR was Therapeutic at 2.20 on 7/10/20.    Plan:     Spoke on phone with Yair  regarding INR result and instructed:     Warfarin Dosing Instructions:  (has 4mg tabs)   - Continue current warfarin dose 2 mg daily on Fridays; and 4 mg daily rest of week.    Instructed patient to follow up no later than:  4 wks.   - INR scheduled on 9/4/20 @ [facility].    Education provided: target INR goal and significance of current INR result    Bill verbalizes understanding and agrees to warfarin dosing plan.    Instructed to call the [facility] for any changes, questions or concerns. (#[phone])   ?   Yamila Luna RN    Subjective/Objective:      Alexei MIKEY Blake, a 80 y.o. male is on warfarin.     Alexei reports:     Home warfarin dose: verbally confirmed home dose with Yair and updated on anticoagulation calendar     Missed doses: No     Medication changes:  No     S/S of bleeding or thromboembolism:  No     New Injury or illness:  No     Changes in diet or alcohol consumption:  No     Upcoming surgery, procedure or cardioversion:  No.  Chronic kidney dialysis 3x/wk on Tues/Thurs/Sat    Anticoagulation Episode Summary     Current INR goal:   2.0-3.0   TTR:   88.7 % (11.6 mo)   Next INR check:   9/4/2020   INR from last check:   2.10 (8/7/2020)   Weekly max warfarin dose:      Target end date:      INR check location:      Preferred lab:      Send INR reminders to:   [facility]    Indications    PVD (peripheral vascular disease) (H) [I73.9]  Stroke (H) [I63.9]           Comments:            Anticoagulation Care Providers     Provider Role Specialty Phone number    Marcela  Jesus HERMAN MD [facility] Internal Medicine [phone]

## 2021-06-10 NOTE — TELEPHONE ENCOUNTER
Who is calling:  Patient   Reason for Call:  Returning ACN call  Date of last appointment with primary care: na  Okay to leave a detailed message: Yes

## 2021-06-11 NOTE — TELEPHONE ENCOUNTER
RN cannot approve Refill Request    RN can NOT refill this medication historical medication requested. Last office visit: 8/24/2020 Jesus Medrano MD Last Physical: 11/15/2019 Last MTM visit: Visit date not found Last visit same specialty: 8/24/2020 Jesus Medrano MD.  Next visit within 3 mo: Visit date not found  Next physical within 3 mo: Visit date not found      Skylar Castaneda, [facility] Triage/Med Refill 9/9/2020    Requested Prescriptions   Pending Prescriptions Disp Refills     metoprolol succinate (TOPROL-XL) 25 MG [Pharmacy Med Name: Metoprolol Succinate ER 25 MG Oral Tablet Extended Release 24 Hour] 90 tablet 0     Sig: TAKE 1 TABLET BY MOUTH ONCE DAILY IN THE EVENING       Beta-Blockers Refill Protocol Passed - 9/8/2020 12:21 PM        Passed - PCP or prescribing provider visit in past 12 months or next 3 months     Last office visit with prescriber/PCP: 8/24/2020 Jesus Medrano MD OR same dept: 8/24/2020 Jesus Medrano MD OR same specialty: 8/24/2020 Jesus Medrano MD  Last physical: 11/15/2019 Last MTM visit: Visit date not found   Next visit within 3 mo: Visit date not found  Next physical within 3 mo: Visit date not found  Prescriber OR PCP: Jesus Medrano MD  Last diagnosis associated with med order: There are no diagnoses linked to this encounter.  If protocol passes may refill for 12 months if within 3 months of last provider visit (or a total of 15 months).             Passed - Blood pressure filed in past 12 months     BP Readings from Last 1 Encounters:   08/24/20 148/60

## 2021-06-11 NOTE — PROGRESS NOTES
[facility] Follow Up Note    Alexei MIKEY Blake   77 y.o. male    Date of Visit: 6/12/2017    Chief Complaint   Patient presents with     Follow-up     dialysis wants to know his vitamin b-12 level     Diabetes     Subjective  This is a 77-year-old man with hypertension, diabetes and end-stage renal disease.  He goes to dialysis 3 days per week.  He has actually been doing reasonably well although he is not found going to dialysis.  He has had some fatigue but tries to stay active and busy.  He is due for an A1c test.  He denies any headaches or dizziness and is no significant shortness of breath no chest pain.  The dialysis people ask that he have his B12 level drawn while he is here.  He offers no other new complaints and reports no other changes in his medical status.    ROS A comprehensive review of systems was performed and was otherwise negative    Medications, allergies, and problem list were reviewed and updated    Exam  General Appearance:   On examination his blood pressure was a little elevated today at 154/72.  Weight is 225 pounds and height is 73 inches.  BMI is 29.69.    Heart rhythm is stable with rate of 60 and no ectopy.    Skin color and temperature appear normal.    No peripheral edema.  Gait is normal.    The patient is alert and oriented ×3.      Assessment/Plan  1. Type 2 diabetes mellitus with complication, with long-term current use of insulin  Glycosylated Hemoglobin A1c   2. Essential hypertension with goal blood pressure less than 130/85     3. Fatigue, unspecified type  Vitamin B12   4. ESRD (end stage renal disease)       Hypertension.  Slightly elevated today.  We will watch for now he will continue his current medication.    Diabetes.  We will check an A1c today.    End-stage renal disease.  Because of this and the fatigue we will check his vitamin B12 level.  I will follow-up with him regarding the results.  The following high BMI interventions were performed  this visit: weight monitoring    Jesus Medrano MD      Current Outpatient Prescriptions on File Prior to Visit   Medication Sig     acetaminophen (TYLENOL) 500 MG tablet Take 1,000 mg by mouth bedtime as needed for pain (or sleep).      BD INSULIN SYRINGE ULTRA-FINE 0.5 mL 31 gauge x 5/16 Syrg ONE  TWICE DAILY     blood glucose test (GLUCOSE BLOOD) strips Use 1 each As Directed 2 times a day at 6:00 am and 4:00 pm. Test two to three times daily as directed     bumetanide (BUMEX) 1 MG tablet Take 1 tablet (1 mg total) by mouth 2 (two) times a day at 9am and 6pm. On dialysis days only dose in the evening     calcium, as carbonate, (OS-AVNI) 500 mg calcium (1,250 mg) tablet Take 1 tablet by mouth daily.     clopidogrel (PLAVIX) 75 mg tablet Take 1 tablet (75 mg total) by mouth daily.     FOLIC ACID/VIT BCOMP,C (DIALYVITE 800 ORAL) Take 1 capsule by mouth daily.     hydrALAZINE (APRESOLINE) 25 MG tablet Take 1 tablet (25 mg total) by mouth 2 (two) times a day. On dialysis days just dose once after dialysis     insulin NPH (NOVOLIN) 100 unit/mL injection Inject 5-6 Units under the skin 2 (two) times a day. (based on blood sugars)     lisinopril (PRINIVIL,ZESTRIL) 30 MG tablet Take 1 tablet (30 mg total) by mouth every evening.     lovastatin (MEVACOR) 40 MG tablet Take 1 tablet (40 mg total) by mouth bedtime.     metoprolol succinate (TOPROL-XL) 25 MG Take 1 tablet (25 mg total) by mouth every evening.     NOVOLIN R 100 unit/mL injection 1 unit for every 25>150     vitamin E 400 UNIT capsule Take 400 Units by mouth daily.     warfarin (COUMADIN) 4 MG tablet Take by mouth See Admin Instructions. 6 mg MWF  4mg all other days     No current facility-administered medications on file prior to visit.      Allergies   Allergen Reactions     Calcitriol      Fatigue      Ciprofloxacin Rash     Social History   Substance Use Topics     Smoking status: Former Smoker     Quit date: 1/1/1995     Smokeless tobacco: Never Used      Alcohol use No

## 2021-06-12 NOTE — PROGRESS NOTES
This is an addendum to the progress note dated August 17, 2017.  I neglected to mention in my original note the patient's issues with his feet relative to his diabetes.  He has had long-standing peripheral neuropathy and has demonstrated poor circulation in the feet.  Pulses are diminished.  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  For these reasons he is in need of replacement of his diabetic shoes.

## 2021-06-12 NOTE — TELEPHONE ENCOUNTER
ANTICOAGULATION  MANAGEMENT    Assessment     Today's INR result of 2.4 is Therapeutic (goal INR of 2.0-3.0)        Warfarin taken as previously instructed    No new diet changes affecting INR    No new medication/supplements affecting INR    Continues to tolerate warfarin with no reported s/s of bleeding or thromboembolism     Previous INR was Therapeutic    Plan:     Spoke on phone with Alexei regarding INR result and instructed:     Warfarin Dosing Instructions:  Continue current warfarin dose 2 mg daily on Fridays; and 4 mg daily rest of week  (0 % change)    Instructed patient to follow up no later than: 4 weeks.    Education provided: importance of therapeutic range, importance of following up for INR monitoring at instructed interval and importance of taking warfarin as instructed    Yair verbalizes understanding and agrees to warfarin dosing plan.    Instructed to call the [facility] for any changes, questions or concerns. (#[phone])   ?   Della Ellis RN    Subjective/Objective:      Alexei MIKEY Jeanmariejohn, a 80 y.o. male is on warfarin.     Alexei reports:     Home warfarin dose: verbally confirmed home dose with Yair and updated on anticoagulation calendar     Missed doses: No     Medication changes:  No     S/S of bleeding or thromboembolism:  No     New Injury or illness:  No     Changes in diet or alcohol consumption:  No     Upcoming surgery, procedure or cardioversion:  No    Anticoagulation Episode Summary     Current INR goal:  2.0-3.0   TTR:  94.0 % (1 y)   Next INR check:  11/18/2020   INR from last check:  2.40 (10/21/2020)   Weekly max warfarin dose:     Target end date:     INR check location:     Preferred lab:     Send INR reminders to:  [facility]    Indications    PVD (peripheral vascular disease) (H) [I73.9]  Stroke (H) [I63.9]           Comments:           Anticoagulation Care Providers     Provider Role Specialty Phone number    Jesus Medrano MD Referring  Internal Medicine [phone]

## 2021-06-12 NOTE — TELEPHONE ENCOUNTER
ANTICOAGULATION  MANAGEMENT    Assessment     Today's INR result of 2.8 is Therapeutic (goal INR of 2.0-3.0)        Warfarin taken as previously instructed    No new diet changes affecting INR    No new medication/supplements affecting INR    Continues to tolerate warfarin with no reported s/s of bleeding or thromboembolism     Previous INR was Therapeutic    Plan:     Spoke on phone with Alexei regarding INR result and instructed:     Warfarin Dosing Instructions:  Continue current warfarin dose 2 mg daily on fri; and 4 mg daily rest of week  (0 % change)    Instructed patient to follow up no later than: 10/21 with wife's appt    Bill verbalizes understanding and agrees to warfarin dosing plan.    Instructed to call the [facility] for any changes, questions or concerns. (#[phone])   ?   Lula Aaron RN    Subjective/Objective:      Alexei Blake, a 80 y.o. male is on warfarin.     Alexei reports:     Home warfarin dose: as updated on anticoagulation calendar per template     Missed doses: No     Medication changes:  No     S/S of bleeding or thromboembolism:  No     New Injury or illness:  No     Changes in diet or alcohol consumption:  No     Upcoming surgery, procedure or cardioversion:  No    Anticoagulation Episode Summary     Current INR goal:  2.0-3.0   TTR:  94.0 % (1 y)   Next INR check:  10/2/2020   INR from last check:  2.80 (10/16/2020)   Weekly max warfarin dose:     Target end date:     INR check location:     Preferred lab:     Send INR reminders to:  [facility]    Indications    PVD (peripheral vascular disease) (H) [I73.9]  Stroke (H) [I63.9]           Comments:           Anticoagulation Care Providers     Provider Role Specialty Phone number    Jesus Medrano MD Referring Internal Medicine [phone]

## 2021-06-12 NOTE — TELEPHONE ENCOUNTER
Refill Request  Did you contact pharmacy: Yes  Medication name:   Requested Prescriptions     Pending Prescriptions Disp Refills     amLODIPine (NORVASC) 10 MG tablet 30 tablet 0     Sig: Take 1 tablet (10 mg total) by mouth daily.     Who prescribed the medication: Emily Islas  Requested Pharmacy: [facility]  Is patient out of medication: N/A  Patient notified refills processed in 3 business days:  N/A  Okay to leave a detailed message: yes    Pt asking for 90 day supply. Please advise.

## 2021-06-12 NOTE — PROGRESS NOTES
ANTICOAGULATION  MANAGEMENT: Discharge Review    Alexei Blake chart reviewed for anticoagulation continuity of care    Hospital admission on  10/11 to 10/13 for headache, hypertensive urgency.    Discharge disposition: Home    INR Results:       Recent labs: (last 7 days)     10/11/20  0905 10/12/20  0558 10/13/20  0622   INR 2.07* 2.47* 2.54*       Warfarin inpatient management: home regimen continued    Warfarin discharge instructions: home regimen continued     Medication Changes Affecting Anticoagulation: No    Additional Factors Affecting Anticoagulation: No    Plan     No adjustment to anticoagulation plan needed      Recommended follow up is scheduled  (OV tomorrow)       Ginger Ellis RN

## 2021-06-12 NOTE — PROGRESS NOTES
[facility] Follow Up Note    Alexei Blake   77 y.o. male    Date of Visit: 8/17/2017    Chief Complaint   Patient presents with     Follow-up     Diabetes     face to face diabectic shoes     Subjective  This is a 77-year-old man with multiple medical issues that include diabetes, coronary artery disease, hyperlipidemia and end-stage renal disease.  He is on dialysis and seems to be tolerating it reasonably well.  His diabetes is under good control with sugars that are almost always less than 130.  His last A1c was less than 7.  He is having no diabetic symptoms at this time.  Because of vascular insufficiency in the lower extremities he has worn diabetic shoes for a number of years.  He is currently in need of a new prescription for these.  He does wear them on a daily basis.  He has noticed no changes in the feet since last year and his skin is intact.  No skin lesions on the feet.  It is certainly to his advantage to continue to wear the diabetic shoes.  He offers no other complaints at this time and is been on the same medication.    ROS A comprehensive review of systems was performed and was otherwise negative    Medications, allergies, and problem list were reviewed and updated    Exam  General Appearance:   On examination his blood pressure was 128/64.  Weight is 218 pounds and height is 73 inches.  BMI is 28.76.    Heart is in a sinus rhythm with a rate of 78 and no ectopy.    Lungs are clear.    No peripheral edema.    Skin is intact.    The patient is alert and oriented ×3.      Assessment/Plan  1. Type 2 diabetes mellitus with complication, with long-term current use of insulin     2. CAD (coronary artery disease)  clopidogrel (PLAVIX) 75 mg tablet   3. Hyperlipidemia  lovastatin (MEVACOR) 40 MG tablet   4. ESRD (end stage renal disease)       Diabetes.  Well-controlled at this time.  I would strongly recommend continuing the use diabetic shoes.  I will await a form from his  shoes supplier.  This is an addendum to the progress note dated August 17, 2017.  I neglected to mention in my original note the patient's issues with his feet relative to his diabetes.  He has had long-standing peripheral neuropathy and has demonstrated poor circulation in the feet.  Pulses are diminished.  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  For these reasons he is in need of replacement of his diabetic shoes.    Coronary artery disease.  Stable.  No new medication.    End-stage renal disease.  Continue dialysis.    I will see him back for routine follow-up in a few months.  Total time of this office visit was 25 minutes with greater than 50% of the time spent in care coordination and patient counseling.  The following high BMI interventions were performed this visit: weight monitoring    Jesus Medrano MD        Current Outpatient Prescriptions on File Prior to Visit   Medication Sig     acetaminophen (TYLENOL) 500 MG tablet Take 1,000 mg by mouth bedtime as needed for pain (or sleep).      BD INSULIN SYRINGE ULTRA-FINE 0.5 mL 31 gauge x 5/16 Syrg ONE  TWICE DAILY     blood glucose test (GLUCOSE BLOOD) strips Use 1 each As Directed 2 times a day at 6:00 am and 4:00 pm. Test two to three times daily as directed     calcium, as carbonate, (OS-AVNI) 500 mg calcium (1,250 mg) tablet Take 1 tablet by mouth daily.     hydrALAZINE (APRESOLINE) 25 MG tablet Take 1 tablet (25 mg total) by mouth 2 (two) times a day. On dialysis days just dose once after dialysis     insulin NPH (NOVOLIN) 100 unit/mL injection Inject 5-6 Units under the skin 2 (two) times a day. (based on blood sugars)     NOVOLIN R 100 unit/mL injection 1 unit for every 25>150     [DISCONTINUED] bumetanide (BUMEX) 1 MG tablet Take 1 tablet (1 mg total) by mouth 2 (two) times a day at 9am and 6pm. On dialysis days only dose in the evening     [DISCONTINUED] clopidogrel (PLAVIX) 75 mg tablet Take 1 tablet (75 mg total) by mouth daily.      [DISCONTINUED] lisinopril (PRINIVIL,ZESTRIL) 30 MG tablet Take 1 tablet (30 mg total) by mouth every evening.     [DISCONTINUED] lovastatin (MEVACOR) 40 MG tablet Take 1 tablet (40 mg total) by mouth bedtime.     [DISCONTINUED] metoprolol succinate (TOPROL-XL) 25 MG Take 1 tablet (25 mg total) by mouth every evening.     [DISCONTINUED] warfarin (COUMADIN) 4 MG tablet TAKE 1 TABLET (4 MG) ON TUESDAY AND SATURDAY AND TAKE 1 AND 1/2 TABLETS (6 MG) ALL OTHER DAYS     [DISCONTINUED] FOLIC ACID/VIT BCOMP,C (DIALYVITE 800 ORAL) Take 1 capsule by mouth daily.     [DISCONTINUED] vitamin E 400 UNIT capsule Take 400 Units by mouth daily.     No current facility-administered medications on file prior to visit.      Allergies   Allergen Reactions     Calcitriol      Fatigue      Ciprofloxacin Rash     Social History   Substance Use Topics     Smoking status: Former Smoker     Quit date: 1/1/1995     Smokeless tobacco: Never Used     Alcohol use No

## 2021-06-12 NOTE — PROGRESS NOTES
Office Visit   Alexei Blake   80 y.o. male    Date of Visit: 10/9/2020    Chief Complaint   Patient presents with     Hypertension        Assessment and Plan   1. Essential hypertension  His blood pressure has been running higher.  I think at this point it would be beneficial to increase his metoprolol to 50 mg twice daily.  He is scheduled for an establish care visit with me in a week and will reassess at that time.  Fortunately the headache and extremely high blood pressure that he had earlier today has resolved.   - metoprolol succinate (TOPROL-XL) 50 MG 24 hr tablet; Take 1 tablet (50 mg total) by mouth 2 (two) times a day.  Dispense: 180 tablet; Refill: 3    2. Type 2 diabetes mellitus with diabetic chronic kidney disease, unspecified CKD stage, unspecified whether long term insulin use (H)  He has been taking 5 units of NPH insulin twice daily.  His sugars sometimes run in the 70-80 range.  His last A1c was 5.8.  At this point I think that his sugars are too low and I am going to have him stop his insulin.  He is going to keep a close eye on his blood sugars over the next week and will determine next steps from there.    3. ESRD (end stage renal disease) (H)  He is chronically on dialysis.  He has dialysis tomorrow.         No follow-ups on file.     History of Present Illness   This 80 y.o. old male comes in due to hypertension.  He has a history of diabetes and chronic kidney disease.  He is on dialysis.  He called in earlier this morning due to a headache and his blood pressure being high.  Now it is improved but still higher than recommended.  He has dialysis scheduled for tomorrow.  He is no longer having a headache and has no chest pain or other new concerns.  We did discuss trying to increase his metoprolol to see if that is helpful.  We also discussed his diabetes and the fact that he is on insulin.  His sugars run between 70-90.  His A1c was 5.8.  At this point I do not think he should be on  insulin in the longer with such low sugars.  He has no other acute concerns today.    Review of Systems: As above, systems otherwise reviewed and negative.     Medications, Allergies and Problem List   Patient Active Problem List   Diagnosis     Contact Dermatitis     Xerosis Cutis     Ganglion     Hyperlipidemia     Type 2 diabetes mellitus (H)     Stroke Syndrome     Essential hypertension with goal blood pressure less than 130/85     Peripheral Vascular Disease     Renal Insufficiency     Cellulitis     PVD (peripheral vascular disease) (H)     Stroke (H)     Uncontrolled hypertension     Mood disorder due to a general medical condition     Major depressive disorder, single episode with atypical features     Adjustment disorder with depressed mood     Palliative care encounter     Fatigue     Generalized weakness     Depression     Umbilical hernia without obstruction and without gangrene     Varicose vein of leg, left     Abscess of tongue     ESRD (end stage renal disease) (H)     Abscess, tongue     Left arm pain     Pneumonia     Community acquired pneumonia due to Chlamydia species     Senile nuclear cataract     Current Outpatient Medications   Medication Sig Dispense Refill     ACCU-CHEK KYLE PLUS TEST STRP strips USE  STRIP TO CHECK GLUCOSE 2 TO 3 TIMES DAILY AS DIRECTED AT  6AM  AMD  4PM 300 strip 1     acetaminophen (TYLENOL) 500 MG tablet Take 1,000 mg by mouth bedtime as needed for pain (or sleep).        bumetanide (BUMEX) 1 MG tablet TAKE 1 TABLET TWICE DAILY AT 9AM AND 6PM. ON DIALYSIS DAYS, ONLY DOSE IN THE EVENING. 180 tablet 3     calcium, as carbonate, (OS-AVNI) 500 mg calcium (1,250 mg) tablet Take 1 tablet by mouth daily.       clopidogreL (PLAVIX) 75 mg tablet Take 1 tablet by mouth once daily 90 tablet 2     hydrALAZINE (APRESOLINE) 25 MG tablet Take 1 tablet (25 mg total) by mouth 2 (two) times a day. 180 tablet 3     insulin regular (NOVOLIN R REGULAR U-100 INSULN) 100 unit/mL injection  "Check blood glucose three times daily prior to meal. Correction with 1 unit for every 25mg/dL>150mg/dL blood glucose. 10 mL 0     insulin syringe-needle U-100 1/2 mL 31 gauge x 15/64\" Syrg USE 1  SYRINGE TWICE DAILY 180 Syringe 3     lisinopriL (PRINIVIL,ZESTRIL) 40 MG tablet TAKE 1 TABLET BY MOUTH ONCE DAILY IN THE EVENING 90 tablet 1     lovastatin (MEVACOR) 20 MG tablet Take 20 mg by mouth at bedtime.  2     metoprolol succinate (TOPROL-XL) 50 MG 24 hr tablet Take 1 tablet (50 mg total) by mouth 2 (two) times a day. 180 tablet 3     multivitamin therapeutic tablet Take 1 tablet by mouth daily.       NOVOLIN N NPH U-100 INSULIN 100 unit/mL injection INJECT 18 UNITS SUBCUTANEOUSLY ONCE DAILY IN THE MORNING AND 6 IN THE EVENING 20 mL 0     warfarin ANTICOAGULANT (COUMADIN/JANTOVEN) 4 MG tablet Take 1/2 tab (2mg) on  and take 1 tab (4mg) all other days by mouth daily, as directed.  Adjust dose based on INR results. 100 tablet 1     No current facility-administered medications for this visit.      Allergies   Allergen Reactions     Blood-Group Specific Substance      Anti-K present. Expect delays in blood for transfusion.  Draw 2 lavender top tubes for type and screen orders.        Calcitriol      Fatigue      Ciprofloxacin Rash          Physical Exam     /64 (Patient Site: Right Arm, Patient Position: Sitting)   Pulse (!) 54   Ht 6' 2\" (1.88 m)   Wt (!) 224 lb (101.6 kg)   SpO2 97%   BMI 28.76 kg/m      General: This is an alert male, sitting comfortably, no apparent distress.     Additional Information   Social History     Tobacco Use     Smoking status: Former Smoker     Packs/day: 0.00     Quit date: 1995     Years since quittin.7     Smokeless tobacco: Never Used   Substance Use Topics     Alcohol use: No     Drug use: No            Ana Arango MD    "

## 2021-06-12 NOTE — TELEPHONE ENCOUNTER
Refill Approved    Rx renewed per Medication Renewal Policy. Medication was last renewed on 5/7/20.    Skylar Castaneda, [facility] Triage/Med Refill 11/6/2020     Requested Prescriptions   Pending Prescriptions Disp Refills     lisinopriL (PRINIVIL,ZESTRIL) 40 MG tablet 90 tablet 1     Sig: Take 1 tablet (40 mg total) by mouth every evening.       Ace Inhibitors Refill Protocol Passed - 11/4/2020  8:44 AM        Passed - PCP or prescribing provider visit in past 12 months       Last office visit with prescriber/PCP: 10/16/2020 Ana Arango MD OR same dept: 10/16/2020 Ana Arango MD OR same specialty: 10/16/2020 Ana Arango MD  Last physical: Visit date not found Last MTM visit: Visit date not found   Next visit within 3 mo: Visit date not found  Next physical within 3 mo: Visit date not found  Prescriber OR PCP: Ana Arango MD  Last diagnosis associated with med order: 1. Essential hypertension with goal blood pressure less than 130/85  - lisinopriL (PRINIVIL,ZESTRIL) 40 MG tablet; Take 1 tablet (40 mg total) by mouth every evening.  Dispense: 90 tablet; Refill: 1    If protocol passes may refill for 12 months if within 3 months of last provider visit (or a total of 15 months).             Passed - Serum Potassium in past 12 months     Lab Results   Component Value Date    Potassium 5.1 (H) 10/12/2020             Passed - Blood pressure filed in past 12 months     BP Readings from Last 1 Encounters:   10/16/20 132/58             Passed - Serum Creatinine in past 12 months     Creatinine   Date Value Ref Range Status   10/12/2020 5.94 (H) 0.70 - 1.30 mg/dL Final

## 2021-06-12 NOTE — TELEPHONE ENCOUNTER
FYI - Status Update  Who is Calling: Patient  Update: Please change quantity to be a 30 day supply.  #360 tablets are requested.    Okay to leave a detailed message?:  No return call needed     Patient has tablets left but will need this order in place for his next refill.  Original prescription was for only 15 days, filled on 10/13/20.

## 2021-06-12 NOTE — TELEPHONE ENCOUNTER
RN Triage:   Wife calling in with headache behind his eyes which is gone now. He has blurred vision that is not new. He stated his glasses are 2 years old, he has an eye appointment in December.   207/88  199/88  96.5 temp Blood sugar is 80-90's. Yesterday his blood sugar was 110  Dialysis patient, he is due tomorrow.   Had a beef stew last night and wife thinks may have been salty.   Took medication after dialysis.   Patient was taking   metoprolol succinate (TOPROL-XL) 25  tablet 3 9/11/2020     Sig - Route: Take 2 tablets (50 mg total) by mouth every evening. - Oral      He was taking one in the am and one in the pm.     Patient advised to have an appointment today in the clinic. Transferred to scheduling.   Olya Coronado RN, BSN [facility] Triage Nurse    Reason for Disposition    Systolic BP >= 180 OR Diastolic >= 110    Additional Information    Negative: Sounds like a life-threatening emergency to the triager    Negative: Pregnant > 20 weeks or postpartum (< 6 weeks after delivery) and new hand or face swelling    Negative: Pregnant > 20 weeks and BP > 140/90    Negative: Systolic BP >= 160 OR Diastolic >= 100, and any cardiac or neurologic symptoms (e.g., chest pain, difficulty breathing, unsteady gait, blurred vision)    Negative: Patient sounds very sick or weak to the triager    Negative: BP Systolic BP >= 140 OR Diastolic >= 90 and postpartum (from 0 to 6 weeks after delivery)    Negative: Systolic BP >= 180 OR Diastolic >= 110, and missed most recent dose of blood pressure medication    Protocols used: HIGH BLOOD PRESSURE-A-OH

## 2021-06-12 NOTE — TELEPHONE ENCOUNTER
Medication Request  Medication name:   lovastatin (MEVACOR) 20 MG tablet  2 11/8/2019     Sig - Route: Take 20 mg by mouth at bedtime. - Oral    Class: Historical Med        Requested Pharmacy: [facility]  Reason for request: Is almost out of med.  When did you use medication last?:  today    Okay to leave a detailed message: yes

## 2021-06-12 NOTE — TELEPHONE ENCOUNTER
Pt calls together with wife (Isabel).  Discharged from hospital Oct 13th.  Issues included headache, fatigue, hypertension.    New meds added to regimen:  - Amlodipine 10 mg  - hydrALAZINE 25 MG tablet  Also known as: APRESOLINE  INSTRUCTIONS: Take 4 tablets (100 mg total) by  mouth 3 (three) times a day.8 am 2 pm 9 pm   LAST TAKEN: 100 mg on October 13, 2020 5:01 PM    Pt has taken only one tablet hydralazine so far today (instead of 4 at a time, per discharge instructions above).    Current BP -> 164/68.  BP upon waking was 181/79 before today's AM meds.    Discussed taking the remaining 3 tablets hydralazine now, as per hospital discharge instructions.  HOWEVER, due to pt's reports of extreme fatigue and chronic history of higher-than-ideal BP, decision is to wait for provider advice on precisely how much hydralazine to take now, and again two more times today.    REASON -> Patient has an ideal in-person clinic appt already scheduled to establish care this afternoon at [facility].  Therefore decision is to seek a telephone provider visit immediately, to resolve hydralazine dosing question, as well as to assess whether pt appears stable enough to come to clinic in person today.    (Wife also has back-to-back appt with Dr Arango to establish care today as well, which means cancellation would be unfortunate for both patients.)    Other Notes:  Pt's Blood Glucose upon waking -> 95.  Breakfast included:  - Half English muffin with butter  - Cereal with almond milk and blueberries  - Cranberry juice  - Half an egg    Current temp -> 96.9 (via forehead scanner).    Warm transferred to a  to seek telephone provider visit immediately to resolve hydralazine dosing question, as well as to assess whether pt appears stable enough to come to clinic in person today.  Telephone appt found with Dr Ulloa.    Eryn Shaw RN  [facility] Triage     Reason for Disposition    Taking BP medications and feels is having  side effects (e.g., impotence, cough, dizziness)     Fatigue    Additional Information    Caller has medication question about med not prescribed by PCP and triager unable to answer question (e.g., compatibility with other med, storage)    Negative: Sounds like a life-threatening emergency to the triager    Negative: Pregnant > 20 weeks or postpartum (< 6 weeks after delivery) and new hand or face swelling    Negative: Pregnant > 20 weeks and BP > 140/90    Negative: Systolic BP >= 160 OR Diastolic >= 100, and any cardiac or neurologic symptoms (e.g., chest pain, difficulty breathing, unsteady gait, blurred vision)    Negative: Patient sounds very sick or weak to the triager    Negative: BP Systolic BP >= 140 OR Diastolic >= 90 and postpartum (from 0 to 6 weeks after delivery)    Negative: Systolic BP >= 180 OR Diastolic >= 110, and missed most recent dose of blood pressure medication    Negative: Systolic BP >= 180 OR Diastolic >= 110    Negative: Patient wants to be seen    Negative: Ran out of BP medications    Protocols used: HIGH BLOOD PRESSURE-A-OH, MEDICATION QUESTION CALL-A-OH    _____________________    COVID 19 Nurse Triage Plan/Patient Instructions    Please be aware that novel coronavirus (COVID-19) may be circulating in the community. If you develop symptoms such as fever, cough, or SOB or if you have concerns about the presence of another infection including coronavirus (COVID-19), please contact your health care provider or visit [email or web address].     Disposition/Instructions    Additional COVID19 information to add for patients.   How can I protect others?  If you have symptoms (fever, cough, body aches or trouble breathing): Stay home and away from others (self-isolate) until:    At least 10 days have passed since your symptoms started, And     You ve had no fever--and no medicine that reduces fever--for 1 full day (24 hours), And      Your other symptoms have resolved (gotten better).     If you  "don t have symptoms, but a test showed that you have COVID-19 (you tested positive):    Stay home and away from others (self-isolate). Follow the tips under \"How do I self-isolate?\" below for 10 days (20 days if you have a weak immune system).    You don't need to be retested for COVID-19 before going back to school or work. As long as you're fever-free and feeling better, you can go back to school, work and other activities after waiting the 10 or 20 days.     How do I self-isolate?    Stay in your own room, even for meals. Use your own bathroom if you can.     Stay away from others in your home. No hugging, kissing or shaking hands. No visitors.    Don t go to work, school or anywhere else.     Clean  high touch  surfaces often (doorknobs, counters, handles, etc.). Use a household cleaning spray or wipes. You ll find a full list on the EPA website:  www.epa.gov/pesticide-registration/list-n-disinfectants-use-against-sars-cov-2.    Cover your mouth and nose with a mask, tissue or washcloth to avoid spreading germs.    Wash your hands and face often. Use soap and water.    Caregivers in these groups are at risk for severe illness due to COVID-19:  o People 65 years and older  o People who live in a nursing home or long-term care facility  o People with chronic disease (lung, heart, cancer, diabetes, kidney, liver, immunologic)  o People who have a weakened immune system, including those who:  - Are in cancer treatment  - Take medicine that weakens the immune system, such as corticosteroids  - Had a bone marrow or organ transplant  - Have an immune deficiency  - Have poorly controlled HIV or AIDS  - Are obese (body mass index of 40 or higher)  - Smoke regularly    Caregivers should wear gloves while washing dishes, handling laundry and cleaning bedrooms and bathrooms.    Use caution when washing and drying laundry: Don t shake dirty laundry, and use the warmest water setting that you can.    For more tips, go to " www.cdc.gov/coronavirus/2019-ncov/downloads/10Things.pdf.    How can I take care of myself?  1. Get lots of rest. Drink extra fluids (unless a doctor has told you not to).     2. Take Tylenol (acetaminophen) for fever or pain. If you have liver or kidney problems, ask your family doctor if it s okay to take Tylenol.     Adults can take either:     650 mg (two 325 mg pills) every 4 to 6 hours, or     1,000 mg (two 500 mg pills) every 8 hours as needed.     Note: Don t take more than 3,000 mg in one day.   Acetaminophen is found in many medicines (both prescribed and over-the-counter medicines). Read all labels to be sure you don t take too much.     For children, check the Tylenol bottle for the right dose. The dose is based on the child s age or weight.    3. If you have other health problems (like cancer, heart failure, an organ transplant or severe kidney disease): Call your specialty clinic if you don t feel better in the next 2 days.    4. Know when to call 911: Emergency warning signs include:    Trouble breathing or shortness of breath    Pain or pressure in the chest that doesn t go away    Feeling confused like you haven t felt before, or not being able to wake up    Bluish-colored lips or face    What are the symptoms of COVID-19?     The most common symptoms are cough, fever and trouble breathing.     Less common symptoms include body aches, chills, diarrhea (loose, watery poops), fatigue (feeling very tired), headache, runny nose, sore throat and loss of smell.    COVID-19 can cause severe coughing (bronchitis) and lung infection (pneumonia).    How does it spread?     The virus may spread when a person coughs or sneezes into the air. The virus can travel about 6 feet this way, and it can live on surfaces.      Common  (household disinfectants) will kill the virus.    Who is at risk?  Anyone can catch COVID-19 if they re around someone who has the virus.    How can others protect themselves?      Stay away from people who have COVID-19 (or symptoms of COVID-19).    Wash hands often with soap and water. Or, use hand  with at least 60% alcohol.    Avoid touching the eyes, nose or mouth.     Wear a face mask when you go out in public, when sick or when caring for a sick person.    Where can I get more information?    [facility]: About COVID-19: [email or web address]    CDC: What to Do If You re Sick: www.cdc.gov/coronavirus/2019-ncov/about/steps-when-sick.html    CDC: Ending Home Isolation: www.cdc.gov/coronavirus/2019-ncov/hcp/disposition-in-home-patients.html     CDC: Caring for Someone: www.cdc.gov/coronavirus/2019-ncov/if-you-are-sick/care-for-someone.html     [facility]: Interim Guidance for Hospital Discharge to Home: www.health.[facility].mn./diseases/coronavirus/hcp/hospdischarge.pdf    [facility] clinical trials (COVID-19 research studies): clinicalaffairs.[facility]-clinical-trials     Below are the COVID-19 hotlines at the [place] Department of Health ([facility]). Interpreters are available.   o For health questions: Call 712-303-7641 or 1-113.103.9072 (7 a.m. to 7 p.m.)  o For questions about schools and childcare: Call 964-762-4497 or 1-337.484.6485 (7 a.m. to 7 p.m.)              Thank you for taking steps to prevent the spread of this virus.  o Limit your contact with others.  o Wear a simple mask to cover your cough.  o Wash your hands well and often.    Resources    [facility]: About COVID-19: [email or web address]    CDC: What to Do If You're Sick: www.cdc.gov/coronavirus/2019-ncov/about/steps-when-sick.html    CDC: Ending Home Isolation: www.cdc.gov/coronavirus/2019-ncov/hcp/disposition-in-home-patients.html     CDC: Caring for Someone: www.cdc.gov/coronavirus/2019-ncov/if-you-are-sick/care-for-someone.html     [facility]: Interim Guidance for Hospital Discharge to Home: www.[facility].mn.us/diseases/coronavirus/hcp/hospdischarge.pdf    [place]  [facility] clinical trials (COVID-19 research studies): clinicalaffairs.[facility]/n-clinical-trials     Below are the COVID-19 hotlines at the [facility] of Health ([facility]). Interpreters are available.   o For health questions: Call 170-400-8275 or 1-882.266.6671 (7 a.m. to 7 p.m.)  o For questions about schools and childcare: Call 607-268-8953 or 1-251.725.8925 (7 a.m. to 7 p.m.)

## 2021-06-12 NOTE — PROGRESS NOTES
Office Visit   Alexei Blake   80 y.o. male    Date of Visit: 10/16/2020    Chief Complaint   Patient presents with     [facility] follow up        Assessment and Plan   1. ESRD (end stage renal disease) (H)  He gets dialysis 3 days a week.  He has been having a lot of problems with fatigue as well as blood pressure problems.  It was recently in the hospital.  He supposed to be following up with his nephrologist and I did strongly encourage him to do so.      2. Essential hypertension with goal blood pressure less than 130/85  Blood pressure is improved today.  I did review his medications.    3. Type 2 diabetes mellitus with diabetic chronic kidney disease, unspecified CKD stage, unspecified whether long term insulin use (H)  I am quite concerned about his low blood sugars.  He had an A1c recently that was 5.9.  He has been using insulin.  We had a long discussion about this today.  He is on very low doses of insulin and I am going to have him stop his insulin and start monitoring his blood sugars just twice a day over the next few weeks.  We will plan to recheck in about a month.  I discussed that his goal A1c should be more around 7.5-8.  The goal is that he not have any significant low blood sugars.    4. Paroxysmal atrial fibrillation (H)  He is on Coumadin and will get an INR today.    5. Fatigue, unspecified type  Could certainly be due to his anemia as well as the fact that he is on dialysis.  Also his very low blood sugars could make him tired.  We will recheck his CBC today.  - HM2(CBC w/o Differential)    6. Stroke Syndrome  - INR         Return in about 5 weeks (around 11/20/2020) for Routine preventive.     History of Present Illness   This 80 y.o. old male comes in to [facility].  He has a history of type 2 diabetes mellitus and is on insulin.  He has end-stage renal disease and is on dialysis.  He has been having a lot of trouble with his blood pressure and was recently  hospitalized.  He is now on hydralazine and amlodipine.  His blood pressure today is improved.  He is supposed to be following up with nephrology and is planning to set up that appointment.  He does have some questions about fatigue as well as his dry weight.  I encouraged him to discuss that with his nephrologist.  His most recent A1c was 5.9.  He does have blood sugars as low as 60.  We had a long discussion about the importance of not having low blood sugars.  I think that he should stop his insulin so that we can get better assessment of what he should be on.  He is in agreement with this plan.    Review of Systems: As above, systems otherwise reviewed and negative.     Medications, Allergies and Problem List   Patient Active Problem List   Diagnosis     Hyperlipidemia     Type 2 diabetes mellitus (H)     Essential hypertension with goal blood pressure less than 130/85     PVD (peripheral vascular disease) (H)     Stroke (H)     Adjustment disorder with depressed mood     ESRD (end stage renal disease) (H)     Paroxysmal atrial fibrillation (H)     Current Outpatient Medications   Medication Sig Dispense Refill     ACCU-CHEK KYLE PLUS TEST STRP strips USE  STRIP TO CHECK GLUCOSE 2 TO 3 TIMES DAILY AS DIRECTED AT  6AM  AMD  4PM 300 strip 1     acetaminophen (TYLENOL) 500 MG tablet Take 1,000 mg by mouth bedtime as needed for pain (or sleep).        amLODIPine (NORVASC) 10 MG tablet Take 1 tablet (10 mg total) by mouth daily. 30 tablet 0     bumetanide (BUMEX) 1 MG tablet TAKE 1 TABLET TWICE DAILY AT 9AM AND 6PM. ON DIALYSIS DAYS, ONLY DOSE IN THE EVENING. 180 tablet 3     calcium, as carbonate, (OS-AVNI) 500 mg calcium (1,250 mg) tablet Take 1 tablet by mouth daily.       clopidogreL (PLAVIX) 75 mg tablet Take 1 tablet by mouth once daily 90 tablet 2     hydrALAZINE (APRESOLINE) 25 MG tablet Take 4 tablets (100 mg total) by mouth 3 (three) times a day. 180 tablet 3     lisinopriL (PRINIVIL,ZESTRIL) 40 MG tablet  "TAKE 1 TABLET BY MOUTH ONCE DAILY IN THE EVENING 90 tablet 1     lovastatin (MEVACOR) 20 MG tablet Take 20 mg by mouth at bedtime.  2     metoprolol succinate (TOPROL-XL) 25 MG Take 50 mg by mouth 2 (two) times a day.       multivitamin therapeutic tablet Take 1 tablet by mouth daily.       warfarin ANTICOAGULANT (COUMADIN/JANTOVEN) 4 MG tablet Take 1/2 tab (2mg) on  and take 1 tab (4mg) all other days by mouth daily, as directed.  Adjust dose based on INR results. 100 tablet 1     No current facility-administered medications for this visit.      Allergies   Allergen Reactions     Blood-Group Specific Substance      Anti-K present. Expect delays in blood for transfusion.  Draw 2 lavender top tubes for type and screen orders.        Calcitriol      Fatigue      Ciprofloxacin Rash          Physical Exam     /58 (Patient Site: Left Arm)   Pulse (!) 53   Temp 97.3  F (36.3  C)   Ht 6' 2\" (1.88 m)   Wt 221 lb (100.2 kg)   SpO2 99%   BMI 28.37 kg/m      General: This is an alert male in no apparent distress.     Additional Information   Social History     Tobacco Use     Smoking status: Former Smoker     Packs/day: 0.00     Quit date: 1995     Years since quittin.8     Smokeless tobacco: Never Used   Substance Use Topics     Alcohol use: No     Drug use: No              Ana Arango MD    "
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
## 2021-06-13 NOTE — PROGRESS NOTES
[facility] Follow Up Note    Alexei Murryjohn   77 y.o. male    Date of Visit: 10/23/2017    Chief Complaint   Patient presents with     Follow-up     branch got him when moving lawn     Subjective  This is a 77-year-old man who is in follow-up on a visit made to urgent care because of some abrasions to the left side of the face.  Last Friday he was mowing his lawn on a riding more and was struck by a tree branch on the left side of his face suffering abrasions above the left eye around the lateral portion of the eye and down onto the cheek.  He missed the eye and has had no issues with that.  He went to urgent care and I have had an opportunity to review those notes.  Nothing required suturing.  He was advised to apply antibiotic ointment and keep it covered and to follow-up with me today.  There is been no problem over the weekend.  No drainage and no worsening of the lesions.  Minimal facial pain and no visual complaints.    ROS A comprehensive review of systems was performed and was otherwise negative    Medications, allergies, and problem list were reviewed and updated    Exam  General Appearance:   On examination his blood pressure is 138/60.  Weight is 225 pounds and height is 73 inches.  BMI is 29.69.    Heart rhythm is stable with a rate of 62 and no ectopy.    There are multiple abrasions of the face above the left eye curving towards the eyebrow and then around the lateral aspect of that high down onto the cheek.  The surrounding skin appears normal with normal color and skin temperature.  There is no drainage.  There is no tenderness.  These appear to be simple abrasions that are noninfected.    The patient is alert and oriented ×3.      Assessment/Plan  1. Multiple abrasions       Multiple abrasions to the face.  The wound looks clean and not infected.  I advised him that he could leave the bandages off during the day but covered at night so he did not accidentally scratch it or  "irritated with his pillow.  If there are any problems or changes he will come and see me again.  Otherwise it should not need any follow-up.  Body Mass Index was not assessed due to The patient was in with an acute medical issue..    Jesus Medrano MD      Current Outpatient Prescriptions on File Prior to Visit   Medication Sig     ACCU-CHEK KYLE PLUS TEST STRP strips USE ONE STRIP TO CHECK GLUCOSE TWICE DAILY AT  6AM  AND  4PM  (TEST  TWO  TO  THREE  TIMES  DAILY  AS  DIRECTED)     acetaminophen (TYLENOL) 500 MG tablet Take 1,000 mg by mouth bedtime as needed for pain (or sleep).      BD INSULIN SYRINGE ULTRA-FINE 1/2 mL 31 gauge x 15/64\" Syrg USE ONE SYRINGE TWICE DAILY     bumetanide (BUMEX) 1 MG tablet Take 1 tablet (1 mg total) by mouth 2 (two) times a day at 9am and 6pm. On dialysis days only dose in the evening     calcium, as carbonate, (OS-AVNI) 500 mg calcium (1,250 mg) tablet Take 1 tablet by mouth daily.     clopidogrel (PLAVIX) 75 mg tablet Take 1 tablet (75 mg total) by mouth daily.     hydrALAZINE (APRESOLINE) 25 MG tablet TAKE 1 TABLET TWICE DAILY     insulin NPH (NOVOLIN) 100 unit/mL injection Inject 5-6 Units under the skin 2 (two) times a day. (based on blood sugars)     lisinopril (PRINIVIL,ZESTRIL) 30 MG tablet Take 1 tablet (30 mg total) by mouth every evening.     lovastatin (MEVACOR) 40 MG tablet Take 1 tablet (40 mg total) by mouth at bedtime.     metoprolol succinate (TOPROL-XL) 25 MG Take 1 tablet (25 mg total) by mouth every evening.     NOVOLIN N 100 unit/mL injection INJECT 18 UNITS SUBCUTANEOUSLY ONCE DAILY IN THE MORNING AND 6 IN THE EVENING     NOVOLIN R 100 unit/mL injection 1 unit for every 25>150     warfarin (COUMADIN) 4 MG tablet TAKE 1 TABLET (4 MG) ON TUESDAY AND SATURDAY AND TAKE 1 AND 1/2 TABLETS (6 MG) ALL OTHER DAYS     No current facility-administered medications on file prior to visit.      Allergies   Allergen Reactions     Calcitriol      Fatigue      Ciprofloxacin " Afia     Social History   Substance Use Topics     Smoking status: Former Smoker     Quit date: 1/1/1995     Smokeless tobacco: Never Used     Alcohol use No

## 2021-06-13 NOTE — PROGRESS NOTES
Assessment and Plan:     1. Wellness examination  His examination is satisfactory.  We reviewed the documentation for his wellness visit.  He has not had any falls and has no problems with memory.  He has an advanced directive.  He denies significant depression.  He is up-to-date on age-appropriate health maintenance.    2. Type 2 diabetes mellitus with diabetic chronic kidney disease, unspecified CKD stage, unspecified whether long term insulin use (H)  He has been checking his sugars after stopping the insulin.  They are running in the low 100 range.  Recommend that he continue to stay off of insulin and will plan to recheck his A1c in about 3 months.    3. ESRD (end stage renal disease) (H)  He is on dialysis.  He is quite fatigued with this.  Recommend that he continue with 3 times a week dialysis.    4. Essential hypertension with goal blood pressure less than 130/85  His blood pressure does run quite high.  He is already on four high-dose medications.  His dialysis kidney specialist has felt that his blood pressure is well enough controlled.  I am not sure what else to do about his blood pressure.  I did offered to try to set him up with another nephrologist or I have asked him to discuss this specifically with his kidney specialist.  He is going to do that.    5. Stroke (H)  - INR    6. PVD (peripheral vascular disease) (H)  He has decreased blood flow to his feet.  Has not had any recent new calf pain.  - INR    The patient's current medical problems were reviewed.      The following health maintenance schedule was reviewed with the patient and provided in printed form in the after visit summary:   Health Maintenance   Topic Date Due     DEPRESSION ACTION PLAN  1940     DIABETIC EYE EXAM  1940     ZOSTER VACCINES (1 of 2) 05/09/1990     A1C  04/12/2021     LIPID  07/20/2021     BMP  10/12/2021     MEDICARE ANNUAL WELLNESS VISIT  12/03/2021     DIABETIC FOOT EXAM  12/03/2021     FALL RISK  ASSESSMENT  12/03/2021     ADVANCE CARE PLANNING  10/02/2024     TD 18+ HE  07/09/2026     Pneumococcal Vaccine: Pediatrics (0 to 5 Years) and At-Risk Patients (6 to 64 Years)  Completed     Pneumococcal Vaccine: 65+ Years  Completed     INFLUENZA VACCINE RULE BASED  Completed     MICROALBUMIN  Discontinued        Subjective:   Chief Complaint: Alexei Blake is an 80 y.o. male here for an Annual Wellness visit.   HPI: He comes in for an annual exam.  We reviewed the documentation for his wellness visit.  He has no problems with activities of daily living and has not had any recent falls.  Memory screening is normal.  He has an advanced directive.  He is up-to-date on age-appropriate health maintenance.  His only main concern is that he is fatigued.  He has dialysis 3 times a week.  He is also on significant blood pressure medicines.  Unfortunately his blood pressure continues to run quite high.  When I last saw him we did stop his insulin because his blood sugars were quite low.  He has been watching his blood sugars and they have been running in the low 100s.  He has not had any significant symptoms of chest pain or shortness of breath.  He has not had any fevers or chills and no problem with his bowels.  He has no other acute concerns today.    Review of Systems:  Please see above.  The rest of the review of systems are negative for all systems.    Patient Care Team:  Ana Arango MD as PCP - General (Internal Medicine)  Ai Armstrong NP as Assigned Surgical Provider  Ana Arango MD as Assigned PCP     Patient Active Problem List   Diagnosis     Hyperlipidemia     Type 2 diabetes mellitus (H)     Essential hypertension with goal blood pressure less than 130/85     PVD (peripheral vascular disease) (H)     Stroke (H)     Adjustment disorder with depressed mood     ESRD (end stage renal disease) (H)     Paroxysmal atrial fibrillation (H)     Past Medical History:   Diagnosis Date     Abscess of  tongue      Chronic kidney disease     CKD stage 4,dialysis      Diabetes (H)     type 2     DVT (deep venous thrombosis) (H)      End stage renal disease (H)      Hernia, umbilical      History of transfusion      Hyperlipidemia      Hypertension      CATHLEEN (obstructive sleep apnea)     uses CPAP     PVD (peripheral vascular disease) (H)      Renal insufficiency      Stroke (H)     minor left sided weakness      Past Surgical History:   Procedure Laterality Date     ARTERIAL BYPASS SURGERY Bilateral     lower extremities, Dr. Cottrell     TOE AMPUTATION Bilateral     multiple     UMBILICAL HERNIA REPAIR N/A 2016    Procedure: OPEN UMBILICAL REPAIR WITH MESH;  Surgeon: Jevon Rivas MD;  Location: [facility] [place];  Service:       Family History   Problem Relation Age of Onset     Hypertension Mother      Hypertension Father      Diabetes Father      Heart attack Father       Social History     Socioeconomic History     Marital status:      Spouse name: Not on file     Number of children: 3     Years of education: Not on file     Highest education level: Not on file   Occupational History     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 [employer]   Social Needs     Financial resource strain: Not on file     Food insecurity     Worry: Not on file     Inability: Not on file     Transportation needs     Medical: Not on file     Non-medical: Not on file   Tobacco Use     Smoking status: Former Smoker     Packs/day: 0.00     Quit date: 1995     Years since quittin.9     Smokeless tobacco: Never Used   Substance and Sexual Activity     Alcohol use: No     Drug use: No     Sexual activity: Not on file   Lifestyle     Physical activity     Days per week: Not on file     Minutes per session: Not on file     Stress: Not on file   Relationships     Social connections     Talks on phone: Not on file     Gets together: Not on file     Attends L[employer] service: Not on file     Active member of club or  organization: Not on file     Attends meetings of clubs or organizations: Not on file     Relationship status: Not on file     Intimate partner violence     Fear of current or ex partner: Not on file     Emotionally abused: Not on file     Physically abused: Not on file     Forced sexual activity: Not on file   Other Topics Concern     Not on file   Social History Narrative     Not on file      Current Outpatient Medications   Medication Sig Dispense Refill     ACCU-CHEK KYLE PLUS TEST STRP strips USE  STRIP TO CHECK GLUCOSE 2 TO 3 TIMES DAILY AS DIRECTED AT  6AM  AMD  4PM 300 strip 1     acetaminophen (TYLENOL) 500 MG tablet Take 1,000 mg by mouth bedtime as needed for pain (or sleep).        amLODIPine (NORVASC) 10 MG tablet Take 1 tablet (10 mg total) by mouth daily. 90 tablet 0     bumetanide (BUMEX) 1 MG tablet TAKE 1 TABLET TWICE DAILY AT 9AM AND 6PM. ON DIALYSIS DAYS, ONLY DOSE IN THE EVENING. 180 tablet 3     clopidogreL (PLAVIX) 75 mg tablet Take 1 tablet by mouth once daily 90 tablet 2     hydrALAZINE (APRESOLINE) 25 MG tablet Take 4 tablets (100 mg total) by mouth 3 (three) times a day. 360 tablet 0     lisinopriL (PRINIVIL,ZESTRIL) 40 MG tablet Take 1 tablet (40 mg total) by mouth every evening. 90 tablet 3     lovastatin (MEVACOR) 20 MG tablet Take 1 tablet (20 mg total) by mouth at bedtime. 90 tablet 2     metoprolol succinate (TOPROL-XL) 25 MG Take 50 mg by mouth 2 (two) times a day.       multivitamin therapeutic tablet Take 1 tablet by mouth daily.       warfarin ANTICOAGULANT (COUMADIN/JANTOVEN) 4 MG tablet Take 1/2 tab (2mg) on Fridays and take 1 tab (4mg) all other days by mouth daily, as directed.  Adjust dose based on INR results. 100 tablet 1     calcium, as carbonate, (OS-AVNI) 500 mg calcium (1,250 mg) tablet Take 1 tablet by mouth daily.       No current facility-administered medications for this visit.       Objective:   Vital Signs:   Visit Vitals  /60 (Patient Site: Left Arm,  "Patient Position: Sitting)   Pulse (!) 59   Temp 97.1  F (36.2  C)   Ht 5' 11\" (1.803 m)   Wt 222 lb (100.7 kg)   SpO2 100%   BMI 30.96 kg/m           VisionScreening:  No exam data present     PHYSICAL EXAM  General:  Patient is alert and in no apparent distress.  Cardiovascular:  Regular rate and rhythm, normal S1/S2, no murmurs, rubs, or gallop.  Pulmonary:  Lungs are clear to auscultation bilaterally with normal respiratory effort.  Gastrointestinal:  Abdomen is soft, non-tender, non-distended, with no organomegaly, rebound or guarding.  Extremities: He has trace bilateral lower extremity edema.  Dorsalis pedis pulses are absent bilaterally.  He is status post multiple toe amputations on both feet.  No sores or ulcerations are noted.  Neurologic Cranial nerves are intact.  No focal deficits.  Psychiatric:  Pleasant, no confusion or agitation         Assessment Results 12/3/2020   Activities of Daily Living No help needed   Instrumental Activities of Daily Living No help needed   Mini Cog Total Score 5   Some recent data might be hidden     A Mini-Cog score of 0-2 suggests the possibility of dementia, score of 3-5 suggests no dementia    Identified Health Risks:     The patient was provided with suggestions to help him develop a healthy physical lifestyle.   He is at risk for lack of exercise and has been provided with information to increase physical activity for the benefit of his well-being.  Information on urinary incontinence and treatment options given to patient.  Patient's advanced directive was discussed and I am comfortable with the patient's wishes.        "

## 2021-06-13 NOTE — TELEPHONE ENCOUNTER
ANTICOAGULATION  MANAGEMENT    Assessment     Today's INR result of 2.40 is Therapeutic (goal INR of 2.0-3.0)        Warfarin taken as previously instructed    No new diet changes affecting INR    No new medication/supplements affecting INR    Continues to tolerate warfarin with no reported s/s of bleeding or thromboembolism     Previous INR was Therapeutic at 2.40 on 10/21/20.    (last 15 INR's therapeutic).    Plan:     Spoke on phone with  regarding INR result and instructed:   - has Kidney dialysis on Tu/Th/Sa.    Warfarin Dosing Instructions:  (has 4mg tabs)   - Continue current warfarin dose 2 mg daily on Fridays; and 4 mg daily rest of week.    Instructed patient to follow up no later than:  4-6 wks.   - INR scheduled on 1/8/21 @ [facility].    Education provided: importance of consistent vitamin K intake, target INR goal and significance of current INR result and importance of notifying clinic for changes in medications    Arianna verbalizes understanding and agrees to warfarin dosing plan.    Instructed to call the [facility] for any changes, questions or concerns. (#[phone])   ?   Yamila Luna RN    Subjective/Objective:      Alexei MIKEY Blake, a 80 y.o. male is on warfarin.     Alexei reports:     Home warfarin dose: verbally confirmed home dose with Isabel and updated on anticoagulation calendar     Missed doses: No     Medication changes:  No     S/S of bleeding or thromboembolism:  No     New Injury or illness:  No     Changes in diet or alcohol consumption:  No     Upcoming surgery, procedure or cardioversion:  No    Anticoagulation Episode Summary     Current INR goal:  2.0-3.0   TTR:  97.8 % (1 y)   Next INR check:  1/14/2021   INR from last check:  2.40 (12/3/2020)   Weekly max warfarin dose:     Target end date:     INR check location:     Preferred lab:     Send INR reminders to:  [facility]    Indications    PVD (peripheral vascular disease) (H) [I73.9]  Stroke (H)  [I63.9]           Comments:           Anticoagulation Care Providers     Provider Role Specialty Phone number    Jesus Medrano MD Referring Internal Medicine [phone]

## 2021-06-14 NOTE — PROGRESS NOTES
ANTICOAGULATION  MANAGEMENT: Discharge Review    Alexei Blake chart reviewed for anticoagulation continuity of care    Hospital admission on  01/18 to 23/2021 for chest pains.   - NSTEMI   - 1/21/21 - s/p PCI  (rotational atherectomy left coronary) / rotablator atherectomy coronary.   - 1/20/21 - s/p Cor. Angio with left heart cath w/p ventriculogram..    Discharge disposition: Home    INR Results:       Recent labs: (last 7 days)     01/19/21  0537 01/19/21  1456 01/19/21  1923 01/20/21  0436 01/21/21  0628 01/21/21  1848 01/22/21  0957 01/23/21  0536   INR 4.58* 4.71* 2.80* 1.72* 1.35* 1.32* 1.27* 1.36*       Warfarin inpatient management: not applicable    Warfarin discharge instructions: n/a     Medication Changes Affecting Anticoagulation: Yes:    - Started Aspirin 81mg daily,    - Atovastatin 8mg daily   - Lisinopril 2.5mg daily,    - Metoprolol Succinate 25mg daily    - and NTG 0.4 sl PRN for chest pains.    Additional Factors Affecting Anticoagulation: No    Plan     No adjustment to anticoagulation plan needed      Patient not contacted    Anticoagulation calendar updated    Yamila Luna RN

## 2021-06-14 NOTE — TELEPHONE ENCOUNTER
Dr. Arango,  Patient is reporting stuffy nose and he is sneezing a lot. He reported that he used to take fluticasone propionate 50 mcg spray 2 in each nostril at bedtime, but it was discontinued at the hospital. Patient wants to resume taking it. OK for patient to take the nasal spray?

## 2021-06-14 NOTE — TELEPHONE ENCOUNTER

## 2021-06-14 NOTE — TELEPHONE ENCOUNTER
Reason for Call:  Medication or medication refill:    Do you use a [facility]?  Name of the pharmacy and phone number for the current request: [facility]    Name of the medication requested: APRESOLINE 25MG 4 TABS 3 TIMES DAILY    Other request: N/A    Can we leave a detailed message on this number? Yes    Phone number patient can be reached at: Home number on file [phone] (home)    Best Time: ANYTIME    Call taken on 1/8/2021 at 8:22 AM by Magaly Sun

## 2021-06-14 NOTE — TELEPHONE ENCOUNTER
Pt calling that he gets short of breath hen he walks from the bedroom to the kitchen, that he is tired.  Pt states this is not new, pt had dialysis run yesterday.  Pt states he was seen in clinic on 1/12/21, however would like to have a telephone visit with Dr. Arango tomorrow 1/18/21 as pt does not want to feel short of breath, pt states he did not sleep well last night, was able to sleep laying down, but overall did not sleep well.  Pt states no shortness of breath at rest, no chest pain.  Pt warm transferred to  to make appt.  Pt will follow Care Advice.  Pebbles Isaacs RN, MA  [facility]    Triage Nurse Advisor    Reason for Disposition    [1] MODERATE longstanding difficulty breathing (e.g., speaks in phrases, SOB even at rest, pulse 100-120) AND [2] SAME as normal    Additional Information    Negative: [1] Breathing stopped AND [2] hasn't returned    Negative: Choking on something    Negative: Severe difficulty breathing (e.g., struggling for each breath, speaks in single words)    Negative: Bluish (or gray) lips or face now    Negative: Difficult to awaken or acting confused (e.g., disoriented, slurred speech)    Negative: Passed out (i.e., lost consciousness, collapsed and was not responding)    Negative: Wheezing started suddenly after medicine, an allergic food or bee sting    Negative: Stridor    Negative: Slow, shallow and weak breathing    Negative: Sounds like a life-threatening emergency to the triager    Negative: Chest pain    Negative: [1] Wheezing (high pitched whistling sound) AND [2] previous asthma attacks or use of asthma medicines    Negative: [1] Difficulty breathing AND [2] only present when coughing    Negative: [1] Difficulty breathing AND [2] only from stuffy or runny nose    Negative: [1] MODERATE difficulty breathing (e.g., speaks in phrases, SOB even at rest, pulse 100-120) AND [2] NEW-onset or WORSE than normal    Negative: Wheezing can be heard across the  "room    Negative: Drooling or spitting out saliva (because can't swallow)    Negative: History of prior \"blood clot\" in leg or lungs (i.e., deep vein thrombosis, pulmonary embolism)    Negative: History of inherited increased risk of blood clots (e.g., Factor 5 Leiden, Anti-thrombin 3, Protein C or Protein S deficiency, Prothrombin mutation)    Negative: Recent illness requiring prolonged bedrest (i.e., immobilization)    Negative: Hip or leg fracture in past 2 months (e.g., had cast on leg or ankle)    Negative: Major surgery in the past month    Negative: Recent long-distance travel with prolonged time in car, bus, plane, or train (i.e., within past 2 weeks; 6 or  more hours duration)    Negative: Extra heart beats OR irregular heart beating   (i.e., \"palpitations\")    Protocols used: BREATHING DIFFICULTY-A-AH      "

## 2021-06-14 NOTE — TELEPHONE ENCOUNTER
PT HE is asking for verbal ok for PT: 2w2 to work on strengthening, gait/transfer training, and balance training.  You can respond to this inbasket with the ok or call and leave a message at [phone].  Thank you

## 2021-06-14 NOTE — PROGRESS NOTES
Medication Therapy Management (MTM) Encounter    Assessment:                                                      1. NSTEMI (non-ST elevated myocardial infarction) (H)  Slow symptomatic improvement.  Already followed up with PCP post discharge, started cardiac rehab, and follow-up scheduled with cardiology nurse practitioner next week.  Planned staged PCI per cardiology.  Tolerating aspirin, Plavix, warfarin without excessive bruising or bleeding.  Tolerating high intensity statin, atorvastatin 80 mg every evening.  On ACE inhibitor and beta-blocker.  Diuretics held due to hypotension.  They are trying to monitor his weights which have been stable, and will reassess at follow-up with cardiology next week.  Noted difficulties with bowel and bladder, will notify PCP today in case further discussion is needed.     2. Paroxysmal atrial fibrillation (H)  Stable per hospital discharge, continues on rate control with metoprolol and chronic anticoagulation with warfarin.  INRs managed by anticoagulation team.  Next INR scheduled for February 2.    3. ESRD (end stage renal disease) on dialysis (H)  Stable, tolerating dialysis post discharge.    4. Type 2 diabetes mellitus with diabetic chronic kidney disease, unspecified CKD stage, unspecified whether long term insulin use (H)  At goal A1c less than 8% per ADA guidelines, off of all medications as directed per PCP.  Stable on aspirin, statin, ACE inhibitor.    Plan:                                                     No new medication recommendations    Follow Up  Return in about 1 week (around 2/4/2021) for Cardiology nurse practitioner.      Subjective & Objective                                                       Alexei Blake is a 80 y.o. male called for a transitions of care visit. he was discharged from [facility] on January 23, 2021 for NSTEMI.  He was present during the visit however the discussion was completed with his wife who helps with his  medications.  He is able to get up and walk around with his walker, however he is very weak.  He was resting during this visit as he just got done with dialysis this morning.       Patient consented to a telehealth visit: Yes    Chief Complaint: Hospital follow-up    Medication Adherence/Access: Stable, no issues identified.    NSTEMI: Status post angiogram, notes that his groin site looks good at this was evaluated by home care RN.  His wife mentions that his symptoms really had started around early January and progressed.  His balance is very poor at this time and he is weak.  She does note that he is symptomatically getting better.  They are having a hard time getting a weight, having him stand.  She did get a new scale and found his weight today to be 199.6 pounds.  He has started cardiac rehab.  Denies chest pain or shortness of breath.  He is taking aspirin and Plavix as directed.  Of note he does not seem to be able to hold his bowel or bladder.  They needed to purchase depends since getting home from the hospital.  They are washing him up, but his wife does not think that he actually can feel the sensation that he has to go to the bathroom.  He has had a few bowel movements but they are very loose.  He is taking 2.5 mg lisinopril and 25 mg of metoprolol in the evenings.  He will be following up with cardiology nurse practitioner next Thursday.    Atrial fibrillation history of CVA: He continues on metoprolol and Coumadin was resumed postoperatively.  Denies signs or symptoms of new or worsening bruising or bleeding.  Lab Results   Component Value Date    INR 1.90 (H) 01/26/2021    INR 1.36 (H) 01/23/2021    INR 1.27 (H) 01/22/2021     ESRD: Continues to tolerate dialysis, this has not been stopped for any reason.     Diabetes: After recent hospital follow-up visit with PCP all diabetes medications were stopped.  They continue to monitor his blood sugars.  Fasting sugars in the 150s, and blood sugar in the  evenings around 120.  Lab Results   Component Value Date    HGBA1C 6.0 (H) 2021    HGBA1C 5.9 (H) 10/12/2020    HGBA1C 5.8 05/15/2020     Lab Results   Component Value Date    MICROALBUR 23.09 (H) 2020    LDLCALC 45 2020    CREATININE 4.76 (H) 2021       PMH: reviewed in EPIC   Allergies/ADRs: reviewed in EPIC   Alcohol: None  Tobacco:   Social History     Tobacco Use   Smoking Status Former Smoker     Packs/day: 0.00     Quit date: 1995     Years since quittin.0   Smokeless Tobacco Never Used     Recent Vitals:   BP Readings from Last 3 Encounters:   21 104/62   21 100/60   21 100/54      Wt Readings from Last 3 Encounters:   21 202 lb 12.8 oz (92 kg)   21 222 lb (100.7 kg)   20 222 lb (100.7 kg)     ----------------  Post Discharge Medication Reconciliation Status: discharge medications reconciled, continue medications without change    The patient declined an after visit summary    I spent 20 minutes with this patient today;  . All changes were made via collaborative practice agreement with Ana Arango MD. A copy of the visit note was provided to the patient's provider.     Tex Pimentel, PharmD, BCACP  Medication Management (MTM) Pharmacist  [facility]    Telemedicine Visit Details    Type of service:  Telephone     Start Time: 1:30PM  End Time (time video/phone call stopped): 1:50PM    Originating Location (pt. Location): Home    Distant Location (provider location):  [place] MEDICATION THERAPY MANAGEMENT [facility]    Mode of Communication:   Telephone     Current Outpatient Medications   Medication Sig Dispense Refill     ACCU-CHEK KYLE PLUS TEST STRP strips USE  STRIP TO CHECK GLUCOSE 2 TO 3 TIMES DAILY AS DIRECTED AT  6AM  AMD  4PM 300 strip 1     acetaminophen (TYLENOL) 500 MG tablet Take 1,000 mg by mouth at bedtime.       aspirin 81 MG EC tablet Take 1 tablet (81 mg total) by mouth daily.  0      atorvastatin (LIPITOR) 80 MG tablet Take 1 tablet (80 mg total) by mouth daily. 30 tablet 3     calcium, as carbonate, (TUMS) 200 mg calcium (500 mg) chewable tablet Chew 1 tablet 2 (two) times a day.       clopidogreL (PLAVIX) 75 mg tablet Take 1 tablet by mouth once daily 90 tablet 2     folic acid/vit B complex and C (DIALYVITE ORAL) Take 1 tablet by mouth daily.       lisinopriL (PRINIVIL,ZESTRIL) 2.5 MG tablet Take 1 tablet (2.5 mg total) by mouth every evening. 30 tablet 3     methoxy peg-epoetin beta (MIRCERA INJ) 50 mcg by intravenous push route.       metoprolol succinate (TOPROL-XL) 25 MG Take 1 tablet (25 mg total) by mouth daily. 30 tablet 3     nitroglycerin (NITROSTAT) 0.4 MG SL tablet Place 1 tablet (0.4 mg total) under the tongue every 5 (five) minutes as needed for chest pain. 1 Bottle 0     warfarin ANTICOAGULANT (COUMADIN/JANTOVEN) 3 MG tablet Take 1 tablet (3 mg total) by mouth daily. 30 tablet 1     No current facility-administered medications for this visit.         Medication Therapy Recommendations  No medication therapy recommendations to display

## 2021-06-14 NOTE — PROGRESS NOTES
Office Visit   Alexei Blake   80 y.o. male    Date of Visit: 1/12/2021    Chief Complaint   Patient presents with     Fatigue     Bilateral ankles for 1 day, shortness of breath     Pain     Right arm pain        Assessment and Plan   1. Fatigue, unspecified type  He has ongoing severe fatigue.  He is on dialysis and feels it could be related to his hydralazine.  He has ongoing lower extremity edema.  I have recommended that he discuss his concerns and symptoms with his nephrologist.  Where he is getting his dialysis he states that he is not able to see a nephrologist.  Therefore I am going to recheck some blood work today and have him get another opinion with the nephrologist to determine best management for him so that he is not so severely fatigued.  He is in agreement with this plan.  - HM2(CBC w/o Differential)  - Thyroid [place]  - Comprehensive Metabolic Panel    2. ESRD (end stage renal disease) (H)  As noted above, he has not been able to set up an appointment to discuss his concerns with his nephrologist.  Will refer him to a new nephrologist for a second opinion.  I will check his blood work today.  He is having swelling in his ankles even with dialysis.  He is severely fatigued.  He is in agreement with this plan.  - Ambulatory referral to Nephrology - [facility])  - Thyroid [place]  - Comprehensive Metabolic Panel    3. Type 2 diabetes mellitus with diabetic chronic kidney disease, unspecified CKD stage, unspecified whether long term insulin use (H)  We will recheck his A1c today.  He is no longer on medication.  - Thyroid [place]  - Comprehensive Metabolic Panel  - Glycosylated Hemoglobin A1c    4. Cough  He is having some ongoing symptoms of postnasal drainage and congestion.  He has a mild cough.  We are going to get a chest x-ray today.  I will also have him use Flonase to see if that is helpful.  - XR Chest 2 Views  - fluticasone propionate (FLONASE) 50  mcg/actuation nasal spray; 2 sprays into each nostril daily.  Dispense: 10 g; Refill: 11    5. Essential hypertension  As noted above we will have him try reducing the hydralazine and see if his blood pressure remains satisfactory.  - hydrALAZINE (APRESOLINE) 25 MG tablet; Take 2-3 tablets (50-75 mg total) by mouth 3 (three) times a day. Reduce to 2 tablets, 3 times a day.  If BP is high, then go to 3 tablets, 3 times a day.  Dispense: 360 tablet; Refill: 0       No follow-ups on file.     History of Present Illness   This 80 y.o. old male comes in due to ongoing severe fatigue.  He has a history of kidney failure.  He is on dialysis.  He has had severe fatigue for several months now.  He thinks it could be related to his hydralazine however his blood pressure was quite high before going to his current dose.  He notes worsening swelling in his ankles as well as a tickle in his throat and postnasal drainage.  No fevers or chills or other symptoms of infection.  I have recommended that he discusses concerns with his nephrologist but unfortunately with his dialysis location he has had difficulty being able to set up an appointment with his nephrologist.  Therefore we discussed this today and I will reassess some labs and a chest x-ray but also get him set up with a second opinion nephrologist.    Review of Systems: As above, systems otherwise reviewed and negative.     Medications, Allergies and Problem List   Patient Active Problem List   Diagnosis     Hyperlipidemia     Type 2 diabetes mellitus (H)     Essential hypertension with goal blood pressure less than 130/85     PVD (peripheral vascular disease) (H)     Stroke (H)     Adjustment disorder with depressed mood     ESRD (end stage renal disease) (H)     Paroxysmal atrial fibrillation (H)     Current Outpatient Medications   Medication Sig Dispense Refill     ACCU-CHEK KYLE PLUS TEST STRP strips USE  STRIP TO CHECK GLUCOSE 2 TO 3 TIMES DAILY AS DIRECTED AT  6AM   AMD  4PM 300 strip 1     acetaminophen (TYLENOL) 500 MG tablet Take 1,000 mg by mouth bedtime as needed for pain (or sleep).        amLODIPine (NORVASC) 10 MG tablet Take 1 tablet (10 mg total) by mouth daily. 90 tablet 0     bumetanide (BUMEX) 1 MG tablet TAKE 1 TABLET TWICE DAILY AT 9AM AND 6PM. ON DIALYSIS DAYS, ONLY DOSE IN THE EVENING. 180 tablet 3     calcium, as carbonate, (TUMS) 200 mg calcium (500 mg) chewable tablet Chew 1 tablet 2 (two) times a day.       clopidogreL (PLAVIX) 75 mg tablet Take 1 tablet by mouth once daily 90 tablet 2     hydrALAZINE (APRESOLINE) 25 MG tablet Take 2-3 tablets (50-75 mg total) by mouth 3 (three) times a day. Reduce to 2 tablets, 3 times a day.  If BP is high, then go to 3 tablets, 3 times a day. 360 tablet 0     lisinopriL (PRINIVIL,ZESTRIL) 40 MG tablet Take 1 tablet (40 mg total) by mouth every evening. 90 tablet 3     lovastatin (MEVACOR) 20 MG tablet Take 1 tablet (20 mg total) by mouth at bedtime. 90 tablet 2     methoxy peg-epoetin beta (MIRCERA INJ) 50 mcg by intravenous push route.       metoprolol succinate (TOPROL-XL) 25 MG Take 50 mg by mouth 2 (two) times a day.       multivitamin therapeutic tablet Take 1 tablet by mouth daily.       warfarin ANTICOAGULANT (COUMADIN/JANTOVEN) 4 MG tablet Take 1/2 tab (2mg) on Fridays and take 1 tab (4mg) all other days by mouth daily, as directed.  Adjust dose based on INR results. 100 tablet 1     fluticasone propionate (FLONASE) 50 mcg/actuation nasal spray 2 sprays into each nostril daily. 10 g 11     No current facility-administered medications for this visit.      Allergies   Allergen Reactions     Blood-Group Specific Substance      Anti-K present. Expect delays in blood for transfusion.  Draw 2 lavender top tubes for type and screen orders.        Calcitriol      Fatigue      Ciprofloxacin Rash          Physical Exam     /62 (Patient Site: Left Arm, Patient Position: Sitting)   Pulse (!) 47   Temp 97  F (36.1  C)  "  Ht 5' 11\" (1.803 m)   Wt 222 lb (100.7 kg)   SpO2 96%   BMI 30.96 kg/m      General: This is an alert male, sitting comfortably, appears fatigued.     Additional Information   Social History     Tobacco Use     Smoking status: Former Smoker     Packs/day: 0.00     Quit date: 1995     Years since quittin.0     Smokeless tobacco: Never Used   Substance Use Topics     Alcohol use: No     Drug use: No          Ana Arango MD  "

## 2021-06-14 NOTE — PROGRESS NOTES
Office Visit   Alexei Blake   80 y.o. male    Date of Visit: 1/26/2021    Chief Complaint   Patient presents with     Follow-up     Hospital follow up        Assessment and Plan   1. NSTEMI (non-ST elevated myocardial infarction) (H)  He has follow-up with cardiology tomorrow.  He is now on Plavix.  The plan is for him to have another angiogram with stent placement.  He is not having any chest pain.    2. Ischemic cardiomyopathy  His ejection fraction was decreased.  He is on appropriate medication.    3. Type 2 diabetes mellitus with diabetic chronic kidney disease, unspecified CKD stage, unspecified whether long term insulin use (H)  Recent A1c was 6.0.  We will hold off on any medication.    4. ESRD (end stage renal disease) on dialysis (H)  He will continue with dialysis.         No follow-ups on file.     History of Present Illness   This 80 y.o. old male comes into follow-up.  He was recently hospitalized and found to have an myocardial infarction.  He underwent angio plasty with stent placement.  He has other vessels that needs stent placement and the plan is to do that in the future.  He is now on Plavix and aspirin.  He was also found to have heart failure.  He is chronically on dialysis and had an additional dialysis run.  Today he is feeling fatigued but has no other acute concerns.  He has no questions on his medications.  He sees cardiology tomorrow.  He is quite tired.  No fevers or chills or chest pain.    Review of Systems: As above, systems otherwise reviewed and negative.     Medications, Allergies and Problem List   Patient Active Problem List   Diagnosis     Hyperlipidemia     Type 2 diabetes mellitus (H)     Essential hypertension with goal blood pressure less than 130/85     PVD (peripheral vascular disease) (H)     Stroke (H)     Adjustment disorder with depressed mood     ESRD (end stage renal disease) on dialysis (H)     Paroxysmal atrial fibrillation (H)     Pneumonia     NSTEMI  (non-ST elevated myocardial infarction) (H)     Ischemic cardiomyopathy     Congestive heart failure, unspecified HF chronicity, unspecified heart failure type (H)     Current Outpatient Medications   Medication Sig Dispense Refill     ACCU-CHEK KYLE PLUS TEST STRP strips USE  STRIP TO CHECK GLUCOSE 2 TO 3 TIMES DAILY AS DIRECTED AT  6AM  AMD  4PM 300 strip 1     acetaminophen (TYLENOL) 500 MG tablet Take 1,000 mg by mouth at bedtime.        aspirin 81 MG EC tablet Take 1 tablet (81 mg total) by mouth daily.  0     atorvastatin (LIPITOR) 80 MG tablet Take 1 tablet (80 mg total) by mouth daily. 30 tablet 3     calcium, as carbonate, (TUMS) 200 mg calcium (500 mg) chewable tablet Chew 1 tablet 2 (two) times a day.       clopidogreL (PLAVIX) 75 mg tablet Take 1 tablet by mouth once daily 90 tablet 2     folic acid/vit B complex and C (DIALYVITE ORAL) Take 1 tablet by mouth daily.       lisinopriL (PRINIVIL,ZESTRIL) 2.5 MG tablet Take 1 tablet (2.5 mg total) by mouth every evening. 30 tablet 3     methoxy peg-epoetin beta (MIRCERA INJ) 50 mcg by intravenous push route.       metoprolol succinate (TOPROL-XL) 25 MG Take 1 tablet (25 mg total) by mouth daily. 30 tablet 3     nitroglycerin (NITROSTAT) 0.4 MG SL tablet Place 1 tablet (0.4 mg total) under the tongue every 5 (five) minutes as needed for chest pain. 1 Bottle 0     warfarin ANTICOAGULANT (COUMADIN/JANTOVEN) 3 MG tablet Take 1 tablet (3 mg total) by mouth daily. 30 tablet 1     No current facility-administered medications for this visit.      Allergies   Allergen Reactions     Blood-Group Specific Substance      Anti-K present. Expect delays in blood for transfusion.  Draw 2 lavender top tubes for type and screen orders.        Calcitriol      Fatigue      Ciprofloxacin Rash          Physical Exam     /60 (Patient Site: Left Arm, Patient Position: Sitting)   Pulse 66     General: This is an alert male, appears to feel fatigued, sitting in a wheelchair.      Additional Information   Social History     Tobacco Use     Smoking status: Former Smoker     Packs/day: 0.00     Quit date: 1995     Years since quittin.0     Smokeless tobacco: Never Used   Substance Use Topics     Alcohol use: No     Drug use: No          Ana Arango MD

## 2021-06-14 NOTE — TELEPHONE ENCOUNTER
Dr. Arango,  I started home care service today. I'm requesting SN orders 2 times a week for 2 weeks and 1x/week for 2 weeks. PT eval and treatment (patient declined OT eval).  OK for above orders?

## 2021-06-14 NOTE — TELEPHONE ENCOUNTER
ANTICOAGULATION  MANAGEMENT    Assessment     Today's INR result of 2.10 is Therapeutic (goal INR of 2.0-3.0)        Warfarin taken as previously instructed    No new diet changes affecting INR    No new medication/supplements affecting INR    Continues to tolerate warfarin with no reported s/s of bleeding or thromboembolism     Previous INR was Therapeutic at 2.40 on 12/3/20.   (last 16 INR's therapeutic).    Reported doing very well.    Plan:     Spoke on phone with Bill regarding INR result and instructed:     Warfarin Dosing Instructions:  (mornings. 4mg tabs)   - Continue current warfarin dose 2 mg daily on Fridays; and 4 mg daily rest of week.    Instructed patient to follow up no later than: 6-8 wks.   - INR scheduled on 3/5/21 during OV with Dr. Arango @ [facility].    Education provided: importance of consistent vitamin K intake, target INR goal and significance of current INR result, monitoring for bleeding signs and symptoms and when to seek medical attention/emergency care    Bill verbalizes understanding and agrees to warfarin dosing plan.    Instructed to call the [facility] for any changes, questions or concerns. (#[phone])   ?   Yamila Luna RN    Subjective/Objective:      Alexei MIKEY Blake, a 80 y.o. male is on warfarin.     Alexei reports:     Home warfarin dose: verbally confirmed home dose with Yair and updated on anticoagulation calendar     Missed doses: No     Medication changes:  No     S/S of bleeding or thromboembolism:  No     New Injury or illness:  No     Changes in diet or alcohol consumption:  No     Upcoming surgery, procedure or cardioversion:  No.  Continues with kidney dialysis 3x/wk on Tu/Thur/Sat.    Anticoagulation Episode Summary     Current INR goal:  2.0-3.0   TTR:  97.8 % (1 y)   Next INR check:  3/5/2021   INR from last check:  2.10 (1/8/2021)   Weekly max warfarin dose:     Target end date:     INR check location:     Preferred lab:     Send INR reminders to:   [facility]    Indications    PVD (peripheral vascular disease) (H) [I73.9]  Stroke (H) [I63.9]           Comments:           Anticoagulation Care Providers     Provider Role Specialty Phone number    Jesus Medrano MD Referring Internal Medicine [phone]

## 2021-06-15 NOTE — PROGRESS NOTES
ITP ASSESSMENT   Assessment Day: Initial    Session Number: 1/2  Precautions: Falls Precautions;  Low EF= 25 %; On Dialysis    Diagnosis: Stent;MI;CHF    Risk Stratification: High    Referring Provider: Ana Arango MD  EXERCISE  Exercise Assessment: Initial        tolerated 20 minutes of low level ex on nustep at 2.2 mets                         Exercise Plan  Goals Next 30 days   ST Goal #1:Pt will tolerate 40 mins of ex at 2.2 -2.8 Mets without CV symptoms  by 3/10/2021     ST Goal #2:Pt will walk,indoors with walker;  2-3 x daily ; 5-10 mins; without symptoms  by 3/10/2021     LTG:Pt will tolerate 3.5-4.5met level activity which will allow pt to resume wood working projects;  assist wife with light homemaking tasks and plow driveway with a truck  by 5/10/2021         Education Goals: All goals in this section met    Education Goals Met: Patient can state cardiac s/s and appropriate emergency response.;Has system for taking medication.;Medication review.      Exercise Prescription  Exercise Mode: Bike;Nustep;Arm Erg.;Hallway Walking    Frequency: 2-3 x week    Duration: 40 mins    Intensity / THR: 20-30 beats above resting heart rate    RPE 11-14  Progression / Met level: 2.2-2.8    Resistive Training?: Yes      Current Exercise (mins/week): 5      Interventions  Home Exercise:  Mode: Walk with walker    Frequency: 2-3 x daily    Duration: 5-10 mins      Education Material : Educational videos;Provide written material;Individual education and counseling      Education Completed  Exercise Education Completed: Signs and Symptoms;Medication review;RPE;Emergency Plan;Home Exercise;Warm up/cool down;FITT Principles;Stretching;Benefits of Exercise;End point of exercise              Exercise Follow-up/Discharge  Follow up/Discharge: Skilled Therapy required to monitor CV response to increasing met levels ;  to provide eucation and support with risk factor management    NUTRITION  Nutrition Assessment:  "Initial      Nutrition Risk Factors:  Nutrition Risk Factors: Dyslipidemia;Diabetes  HbA1c: 6  Monitors blood sugar at home: Yes  Frequency: 1-2 x daiy  Cholesterol: 97  LDL: 45  HDL: 40  Triglycerides: 58      Nutrition Plan  Interventions  Other Nutrition Intervention: Therapist/Pt Discussion;Educational Videos;Provide with Written Material        Education Completed  Nutrition Education Completed: Risk factor overview      Goals  Nutrition Goals (Next 30 days): Review Dietitian schedule;Patient will follow a low saturated fat diet;Provide Rate your Plate Survey;Patient will follow a low sodium diet      Goals Met  Nutrition Goals Met: Patient can identify their risk factors for CAD;Patient will maintain current weight or gradual weight gain;Patient follows a low sodium diet;Reviewed Dietitian schedule      Height, Weight, and  BMI  Weight: 205 lb (93 kg)  Height: 6' 2\" (1.88 m)  BMI: 26.31      Nutrition Follow-up  Follow-up/Discharge: Will schedule a time for pt to meet with the dietician         Other Risk Factors  Other Risk Factor Assessment: Initial      HTN Risk Factor: Hypertension      Pre Exercise BP: 128/64  Post Exercise BP: 118/64      Hypertension Plan  Goals  HTN Goals: Take medication as prescribed;Exercises regularly;Follow low sodium diet      Goals Met  HTN Goals Met: Take medication as prescribed      HTN Interventions  HTN Interventions: Diet consult;Therapist/patient discussion;Provide written material;Offer educational videos      HTN Education Completed  HTN Education Completed: Medication review;Risk factor overview      Tobacco Risk Factor: NA        Risk Factor Follow-up   Follow-up/Discharge: will provide risk factor and CHF education as needed     PSYCHOSOCIAL  Psychosocial Assessment: Initial       [facility] Q of L Summary Score: 23      PHQ-9 Total Score: 1      Psychosocial Risk Factor: Stress      Psychosocial Plan  Interventions    Interventions: Offer educational videos and " classes;Provide written material;Individual education and counseling       Education Completed  Education Completed: Effects of stress on body      Goals  Goals (Next 30 days): Practicing stress management skills      Goals Met  Goals Met: Identified Support system;Oriented to stress management classes;Identify stressors      Psychosocial Follow-up  Follow-up/Discharge: Reports his amily i s a good support system             Patient involved in Goal setting?: Yes      Signature: _____________________________________________________________    Date: __________________    Time: ________________

## 2021-06-15 NOTE — TELEPHONE ENCOUNTER
"PC to [facility], main number to obtain direct location/site phone number to call [phone]. Spoke with nurse Nicolas at the HD location. Given Nephrologist pager to receive call back Dr. Monae [phone]. Paged provider and returned call. Provider asked which day of the week for proposed Cor with planned PCI in terms of HD days. Provider requested that a Wed or Friday would be best. He reports that the patient is prone to \"fluid overload\" to know as part of his care. Finally, when asked if NS x2 hours pre-procedure or hold lisinopril prior procedure per RENAL protocol. Provider deemed not necessary. Will clarify with Cardiologist. Will place case request and have schedule. MOERN   "

## 2021-06-15 NOTE — TELEPHONE ENCOUNTER
ANTICOAG Team per [facility] HOLD Warfarin x4 days without bridging. Prep letter for procedure sent to patient, to HOLD 3/15, 3/16, 3/17, 3/18, and 3/19 until procedure done and will be instructed when to resume. Please contact patient if any change to this plan of care prior to 3/15. Thank you CMM,Rn

## 2021-06-15 NOTE — TELEPHONE ENCOUNTER
----- Message from Tuyet Arreguin MD sent at 2/19/2021  4:00 PM CST -----  His EF is looking better. We can get him set up for staged PCI of the circumflex with rotational atherectomy with me in IR lab room, not the mobile unit. No velazquez.    Thanks, EG

## 2021-06-15 NOTE — TELEPHONE ENCOUNTER
Dr. Arreguin Nephrologist stated that IV of NS x2 hours prior to procedure or holding the Lisinopril is not deemed necessary. Are you agreeable? Also, Warfarin hold for 4 days prior any need for bridging? I will then route to his ANTICOAG team to follow through for procedure. Thank you, please advise. Perla ROSA

## 2021-06-15 NOTE — TELEPHONE ENCOUNTER
===View-only below this line===  ----- Message -----  From: Celina Lim  Sent: 3/1/2021   2:32 PM CST  To: Clem Wong RN  Subject: RE: [facility] pt                                       STAGED PCI WITH [facility] IN Ir2    3/19/21 7:00 AM ADMIT     RENAL PROTOCOL    H&P: 2/24 [facility]     COVID: 3/17 2:30 [facility] (right before cardiac rehab)    Will pt be staying overnight? He is under the impression he is, and is asking if he can get dialysis while at the hospital on Saturday. Told him I would check with you as I know a majority of pts go home same day.

## 2021-06-15 NOTE — TELEPHONE ENCOUNTER
PC with patient. Review of plan of care. Pt does HD QTu/Th/Sat at [facility] in [place] [phone]. Will call to see if able to schedule/facilitate and obtain recommendation from Nephrologist on timing. Will then call patient to arrange. Pt has OV with [facility] tomorrow and would suffice as H&P for upcoming procedure if schedule within 30 days. Pt aware, and agrees to move forward. Will send staff message to Dr Chatman to inform of plan in place and working on recommendations. MOE,Rn

## 2021-06-15 NOTE — TELEPHONE ENCOUNTER
----- Message from Cherri Restrepo NP sent at 2/4/2021  3:35 PM CST -----  Sounds good.  Thank you!    Pema,  Could you please f/u on pt with rec from Dr. Arreguin.  Thank you! CY  ----- Message -----  From: Tuyet Arreguin MD  Sent: 2/4/2021   3:30 PM CST  To: Cherri Restrepo NP    Can you get a limited echo to reassess his EF and then we will decide on timing?    Thanks, EG  ----- Message -----  From: Cherri Restrepo NP  Sent: 2/4/2021   3:22 PM CST  To: MD Dr. Kallie Espana,  Saw Mr. Blake for post PCI f/u. He denies CP or shortness of breath.  He was recommended staged PCI to Circ and OM 3 in 2-4 weeks from 1/21/21. This is not scheduled yet. He is scheduled to see Dr. Chatman on 2/24/21.  Would you recommend scheduling staged PCI in the next couple week or see Dr. Chatman first?  He is on ASA 81, Plavix 75 mg and Warfarin. No bleeding complications. How long would you like him to be on triple therapy?    Please address.  Thank you!  MATT

## 2021-06-15 NOTE — TELEPHONE ENCOUNTER
Ana,     - please review if patient needs bridging on 319/21 scheduled for repeatCor. Angio and PCI stent placement,      - he did have cor. Angio on 1/20/21 during his hospitalization from 1/18 -23 and held warfarin doses.

## 2021-06-15 NOTE — TELEPHONE ENCOUNTER
ANTICOAGULATION  MANAGEMENT    Assessment     Today's INR result of 2.30 is Therapeutic (goal INR of 2.0-3.0)        Warfarin taken as previously instructed    No new diet changes affecting INR    No new medication/supplements affecting INR    Continues to tolerate warfarin with no reported s/s of bleeding or thromboembolism     Previous INR was Supratherapeutic at 3.20 on 2/4/21.    Plan:     Spoke on phone with Yair regarding INR result and instructed:      Warfarin Dosing Instructions:   (has 3mg tabs)   - Continue current warfarin dose 1.5 mg daily on Thursdays; and 3 mg daily rest of week.    Instructed patient to follow up no later than:  2 wks.   - INR scheduled on 3/5/21 during OV with Dr. Arango.    Education provided: importance of consistent vitamin K intake, target INR goal and significance of current INR result and importance of notifying clinic for changes in medications    Yair verbalizes understanding and agrees to warfarin dosing plan.    Instructed to call the [facility] for any changes, questions or concerns. (#[phone])   ?   Yamila Luna RN    Subjective/Objective:      Alexei MIKEY Blake, a 80 y.o. male is on warfarin. Yair Mazariegos reports:     Home warfarin dose: verbally confirmed home dose with Yair and updated on anticoagulation calendar     Missed doses: No     Medication changes:  No     S/S of bleeding or thromboembolism:  No     New Injury or illness:  No     Changes in diet or alcohol consumption:  No     Upcoming surgery, procedure or cardioversion:  No    Anticoagulation Episode Summary     Current INR goal:  2.0-3.0   TTR:  97.8 % (11.7 mo)   Next INR check:  3/10/2021   INR from last check:  2.30 (2/24/2021)   Weekly max warfarin dose:     Target end date:     INR check location:     Preferred lab:     Send INR reminders to:  [facility]    Indications    PVD (peripheral vascular disease) (H) [I73.9]  Stroke (H) [I63.9]           Comments:           Anticoagulation  Care Providers     Provider Role Specialty Phone number    Jesus Medrano MD Referring Internal Medicine [phone]

## 2021-06-15 NOTE — PROGRESS NOTES
Cardiac Rehab  Phase II Assessment    Assessment Date: 02/10/2021    Diagnosis: NSTEMI; ICM  Date of Onset: 1/21/21  Procedure: PCI/Stent LAD   Date of Onset: 1/21/21  ICD/Pacemaker: No   Post-op Complications:   ECG History: A-Fib/BBB  EF%:25%  Past Medical History:   Patient Active Problem List   Diagnosis     Dyslipidemia, goal LDL below 70     Type 2 diabetes mellitus (H)     Essential hypertension with goal blood pressure less than 130/85     PVD (peripheral vascular disease) (H)     Stroke (H)     Adjustment disorder with depressed mood     ESRD (end stage renal disease) on dialysis (H)     Paroxysmal atrial fibrillation (H)     Pneumonia     NSTEMI (non-ST elevated myocardial infarction) (H)     Ischemic cardiomyopathy     LBBB (left bundle branch block)     CAD (coronary artery disease)     Past Medical History:   Diagnosis Date     Abscess of tongue      Chronic kidney disease     CKD stage 4,dialysis Tu-Th-Sa     Diabetes (H)     type 2     DVT (deep venous thrombosis) (H)      End stage renal disease (H)      Hernia, umbilical      History of transfusion      Hyperlipidemia      Hypertension      CATHLEEN (obstructive sleep apnea)     uses CPAP     PVD (peripheral vascular disease) (H)      Renal insufficiency      Stroke (H) 2003    minor left sided weakness         Physical Assessment  Precautions/ Physical Limitations: Falls Precautions/ Decreased balance;  Hx of amputated toes; On Dialysis  Oxygen: No  O2 Sats: Unable to get a reading Lung Sounds:  Edema:   Incisions:   Sleeping Pattern: fair   Appetite: fair   Nutrition Risk Screen: Weight loss    Pain  Location:   Characteristics:  Intensity: (0-10 scale) 0  Current Pain Management:   Intervention:   Response:     Psychosocial/ Emotional Health  1. In the past 12 months, have you been in a relationship where you have been abused physically, emotionally, sexually or financially? No  notified: NA  2. Who do you turn to for emotional  "support?: No-One  3. Do you have cultural or spiritual needs? No  4. Have there been any major life changes in the past 12 months? Yes ; COVID    Referral Information  Primary Physician: Ana Arango MD  Cardiologist: Dr. Chatman  Surgeon:     Home exercise/Equipment: Stationary bike in the basement    Patient's long-term goal(s): resume woodworking; Plowing etc    1. Living Accommodations: Home Steps: Yes      Support people at home: WIFE   2. Marital Status:   3. Family is able to assist with cares      Evangelical/Community involvement: Yes  4. Recreation/Hobbies: Woodworking; \"Puttering\" in the garage        See Doc Flowsheet  "

## 2021-06-15 NOTE — TELEPHONE ENCOUNTER

## 2021-06-15 NOTE — TELEPHONE ENCOUNTER
"----- Message from Paula Chatman MD sent at 2/5/2021  9:10 AM CST -----  Regarding: RE: staged PCI  Looks like Dr Arreguin wanted his PCI done 2-4 weeks post his intervention.   I will place case request.  Warfarin may be held 4 days prior to procedure. My follow up with him can be pushed out.      ----- Message -----  From: Olya Clayton RN  Sent: 2/5/2021   7:50 AM CST  To: Paula Chatman MD  Subject: staged PCI                                       Per 1-23-21 [facility] orders - staged PCI to circ OM recommended in 2-4wks - per Chimdavid's 2-4-21 post PCI visit note \"Continue aspirin 81 mg daily and clopidogrel 75 mg daily. Given patient is on warfarin therapy, will discuss with Dr. Arreguin  if patient needs to stay on triple therapy until staged PCI although this is not scheduled yet.\"     Can procedure be sched after he sees you on 2-24-21?  If no, please place case request and address Warfarin hold orders (usually 4 days).  Thanks  mg      "

## 2021-06-15 NOTE — TELEPHONE ENCOUNTER
"Dr. Arreguin, do you agree with Nephrologist? Nephrologist states that the patient is \"prone to fluid overload\" and deems Renal Protocol as unnecessary. Please advise. Agree with no NS for 2 hours pre-procedure and ok to take Lisinopril morning of also? Please advise. Thank you Perla ROSA   "

## 2021-06-15 NOTE — TELEPHONE ENCOUNTER
Results and recommendations reviewed with patient. No new issues or concerns.  Call the clinic back if any new issues or concerns arise. Pt verbalized understanding. Agreeable to testing. Echo ordered and staff message sent to schedulers to arrange. PARUL ROSA

## 2021-06-15 NOTE — TELEPHONE ENCOUNTER
Reason for Call:  Other call back      Detailed comments: pt was at [facility] about 2-3 weeks ago (1/18/21) and  He was taken off his stool softner and he is wondering if it was ok to go back on it?  Pt is having stoool issued and wanted to make sure it was ok to go back on them.   Pt has over the counter at home and just wanted.    Phone Number Patient can be reached at: Home number on file [phone] (home)    Best Time: any    Can we leave a detailed message on this number?: Yes    Call taken on 2/8/2021 at 9:49 AM by Pamela Behr

## 2021-06-15 NOTE — TELEPHONE ENCOUNTER
===View-only below this line===  ----- Message -----  From: Tuyet Arreguin MD  Sent: 3/5/2021   9:20 AM CST  To: Clem Wong RN    Okay to hold warfarin per protocol without bridging therapy.    Thanks, EG

## 2021-06-15 NOTE — TELEPHONE ENCOUNTER
Called and spoke with wife, Isabel     - she reported talking with the Nurse and received all instructions prior to procedure on 3/19/21.

## 2021-06-15 NOTE — PROGRESS NOTES
Office Visit   Alexei Blake   80 y.o. male    Date of Visit: 3/5/2021    Chief Complaint   Patient presents with     Follow-up     3 month follow up        Assessment and Plan   1. Coronary artery disease involving native coronary artery of native heart with other form of angina pectoris (H)  He has been doing better.  He is scheduled for another angiogram with stent placement on the 19th.  He is not having any chest pain.  He has nitroglycerin in case he develops chest pain.  He seems to be on a better combination of blood pressure medicine.  He has no acute concerns in this regard today.    2. Ischemic cardiomyopathy  His dry weight is down and he is tolerating the.  His most recent echocardiogram had improved to 44% EF from 20%.  We will continue with his current medications.    3. ESRD (end stage renal disease) on dialysis (H)  He will continue with dialysis.    4. Long term (current) use of anticoagulants  He is due for an INR today.  - INR         No follow-ups on file.     History of Present Illness   This 80 y.o. old male comes in to follow-up.  He has a history of end-stage renal disease and is on dialysis.  He was found to have worsening coronary artery disease in January and had PTCA stent with stent placement.  He is scheduled for another angiogram in a couple of weeks as there was an artery they were not able to open up.  He was also found to have heart failure with an EF of 20%.  His medications were adjusted and his dry weight was adjusted and a recent follow-up echocardiogram showed improvement in his EF to 44%.  He has been doing cardiac rehab recently.  He seems to be feeling better.  He has not had any recent chest pain or worsening shortness of breath.  He is chronically fatigued but feels that it is better than it was.  He has no acute concerns today.    Review of Systems: As above, systems otherwise reviewed and negative.     Medications, Allergies and Problem List   Patient Active  Problem List   Diagnosis     Dyslipidemia, goal LDL below 70     Type 2 diabetes mellitus (H)     Essential hypertension with goal blood pressure less than 130/85     PVD (peripheral vascular disease) (H)     Stroke (H)     Adjustment disorder with depressed mood     ESRD (end stage renal disease) on dialysis (H)     Paroxysmal atrial fibrillation (H)     Pneumonia     NSTEMI (non-ST elevated myocardial infarction) (H)     Ischemic cardiomyopathy     LBBB (left bundle branch block)     CAD (coronary artery disease)     Current Outpatient Medications   Medication Sig Dispense Refill     ACCU-CHEK KYLE PLUS TEST STRP strips USE  STRIP TO CHECK GLUCOSE 2 TO 3 TIMES DAILY AS DIRECTED AT  6AM  AMD  4PM 300 strip 1     acetaminophen (TYLENOL) 500 MG tablet Take 1,000 mg by mouth at bedtime.       aspirin 81 MG EC tablet Take 1 tablet (81 mg total) by mouth daily.  0     atorvastatin (LIPITOR) 80 MG tablet Take 1 tablet (80 mg total) by mouth at bedtime. 90 tablet 3     calcium, as carbonate, (TUMS) 200 mg calcium (500 mg) chewable tablet Chew 1 tablet 2 (two) times a day.       clopidogreL (PLAVIX) 75 mg tablet Take 1 tablet by mouth once daily 90 tablet 2     fluticasone propionate (FLONASE) 50 mcg/actuation nasal spray 2 sprays into each nostril at bedtime. Indications: inflammation of the nose due to an allergy       folic acid/vit B complex and C (DIALYVITE ORAL) Take 1 tablet by mouth daily.       lisinopriL (PRINIVIL,ZESTRIL) 2.5 MG tablet Take 2 tablets (5 mg total) by mouth every morning. 60 tablet 3     methoxy peg-epoetin beta (MIRCERA INJ) 50 mcg by intravenous push route.       metoprolol succinate (TOPROL-XL) 25 MG Take 1 tablet (25 mg total) by mouth at bedtime. 30 tablet 3     nitroglycerin (NITROSTAT) 0.4 MG SL tablet Place 1 tablet (0.4 mg total) under the tongue every 5 (five) minutes as needed for chest pain. 1 Bottle 0     warfarin ANTICOAGULANT (COUMADIN/JANTOVEN) 3 MG tablet Take 1 tablet (3 mg  "total) by mouth daily. 30 tablet 1     No current facility-administered medications for this visit.      Allergies   Allergen Reactions     Blood-Group Specific Substance      Anti-K present. Expect delays in blood for transfusion.  Draw 2 lavender top tubes for type and screen orders.        Calcitriol      Fatigue      Ciprofloxacin Rash          Physical Exam     /60 (Patient Site: Left Arm, Patient Position: Sitting)   Pulse 70   Temp 98  F (36.7  C)   Ht 6' 2\" (1.88 m)   Wt 203 lb (92.1 kg)   BMI 26.06 kg/m      General: This is an alert male, sitting comfortably, no apparent distress.     Additional Information   Social History     Tobacco Use     Smoking status: Former Smoker     Packs/day: 0.00     Quit date: 1995     Years since quittin.1     Smokeless tobacco: Never Used   Substance Use Topics     Alcohol use: No     Drug use: No            Ana Arango MD  " <<----- Click to add NO significant Past Surgical History

## 2021-06-15 NOTE — TELEPHONE ENCOUNTER
Anticoagulation    Received notice that patient has procedure plan in place for upcoming angiogram on 3/19:    (orignial encounter 2/23/2021)    ANTICOAG Team per [facility] HOLD Warfarin x4 days without bridging. Prep letter for procedure sent to patient, to HOLD 3/15, 3/16, 3/17, 3/18, and 3/19 until procedure done and will be instructed when to resume. Please contact patient if any change to this plan of care prior to 3/15. Thank you MOE,Rn       Anticoagulation calendar updated    Ana West, PharmD

## 2021-06-16 NOTE — TELEPHONE ENCOUNTER
Reason for Disposition    SEVERE abdominal pain (e.g., excruciating)    Additional Information    Negative: Passed out (i.e., fainted, collapsed and was not responding)    Negative: Shock suspected (e.g., cold/pale/clammy skin, too weak to stand, low BP, rapid pulse)    Negative: Sounds like a life-threatening emergency to the triager    Negative: Chest pain    Negative: Pain is mainly in upper abdomen (if needed ask: 'is it mainly above the belly button?')    Protocols used: ABDOMINAL PAIN - MALE-A-OH

## 2021-06-16 NOTE — TELEPHONE ENCOUNTER
Telephone Encounter by Fiorella Troy RN at 3/20/2020 10:34 AM     Author: Fiorella Troy RN Service: -- Author Type: Registered Nurse    Filed: 3/20/2020 10:52 AM Encounter Date: 3/20/2020 Status: Attested    : Fiorella Troy RN (Registered Nurse) Cosigner: Jesus Medrano MD at 3/20/2020 10:56 AM    Attestation signed by Jesus Medrano MD at 3/20/2020 10:56 AM    Continue anticoagulation                ANTICOAGULATION  MANAGEMENT    Assessment     Today's INR result of 2.2 is Therapeutic (goal INR of 2.0-3.0)        Warfarin taken as previously instructed    No new diet changes affecting INR    No new medication/supplements affecting INR    Continues to tolerate warfarin with no reported s/s of bleeding or thromboembolism     Previous INR was Therapeutic    Plan:     Spoke with Alexei regarding INR result and instructed:     Warfarin Dosing Instructions:  Continue current warfarin dose    2 mg every Fri; 4 mg all other days      (0 % change)    Instructed patient to follow up no later than: 4 weeks, appointment made.    Education provided: importance of therapeutic range    Alexei verbalizes understanding and agrees to warfarin dosing plan.    Instructed to call the [facility] for any changes, questions or concerns. (#[phone])   ?   Fiorella Troy RN    Subjective/Objective:      Alexei MIKEY Blake, a 79 y.o. male is on warfarin.     Alexei reports:     Home warfarin dose: verbally confirmed home dose with Alexei and updated on anticoagulation calendar     Missed doses: No     Medication changes:  No     S/S of bleeding or thromboembolism:  No     New Injury or illness:  No     Changes in diet or alcohol consumption:  No     Upcoming surgery, procedure or cardioversion:  No    Anticoagulation Episode Summary     Current INR goal:   2.0-3.0   TTR:   87.2 % (11.6 mo)   Next INR check:   4/17/2020   INR from last check:   2.20 (3/20/2020)   Weekly max warfarin dose:      Target end  date:      INR check location:      Preferred lab:      Send INR reminders to:   [facility]    Indications    PVD (peripheral vascular disease) (H) [I73.9]  Stroke (H) [I63.9]           Comments:            Anticoagulation Care Providers     Provider Role Specialty Phone number    Jesus Medrano MD Referring Internal Medicine [phone]

## 2021-06-16 NOTE — TELEPHONE ENCOUNTER
Anticoagulation    Spoke to Bill and confirmed he started warfarin hold 3/15 until after 3/19 procedure.  He will ask for instructions upon discharge.  Post procedure INR appointment scheduled for 3/26.     Anticoagulation calendar updated    Ana West, PharmD

## 2021-06-16 NOTE — TELEPHONE ENCOUNTER
Return call to patient. Advised that driving restrictions are only in-place for 24 hours after procedure d/t mind-altering substances patient received for sedation. After 24 hours has passed, patient should be able to safely drive. Patient and wife verbalized understanding and agreement with plan and voiced no other questions or concerns. -ejb    ----- Message from Marianna Pagan sent at 3/23/2021  8:19 AM CDT -----  Regarding: [facility] PT - DRIVING AFTER STENTS PLACED  General phone call:    Caller: Pt's wife, Isabel     Primary cardiologist: Malachi     Detailed reason for call: Isabel states that pt just had stents placed on 3/19/21 and was discharged on 3/20/21. They want to know how long they should wait until pt can drive again. Please return call.     Best phone number: [phone]    Best time to contact: Anytime    Ok to leave a detailed message? Yes    Device? No     Additional Info:

## 2021-06-16 NOTE — TELEPHONE ENCOUNTER
Dr. Arreguin, review of previous hospital course for NSTEMI. Pt arrived to hospital on higher dose Lisinopril of 40 mg daily. After CORS with Rotablator 1/21/21, patient was resumed on Lisinopril at 2.5 mg daily. On 2/4 Lisinopril was increased to 5 mg daily. Per last procedure patient was inpatient and unable to determine if Lisinopril was given pre-procedure or not. Please advise if hold am of procedure? Thank you MOE,Rn

## 2021-06-16 NOTE — TELEPHONE ENCOUNTER
ANTICOAGULATION  MANAGEMENT PROGRAM    Alexei Blake is overdue for INR check.     In hospital       Lula Aaron RN

## 2021-06-16 NOTE — TELEPHONE ENCOUNTER
Telephone Encounter by Yamila Luna RN at 9/18/2019 12:35 PM     Author: Yamila Luna RN Service: -- Author Type: Registered Nurse    Filed: 9/18/2019 12:53 PM Encounter Date: 9/18/2019 Status: Signed    : Yamila Luna RN (Registered Nurse)       Isabel Blake [phone]  Ana Paula Hawley 5 minutes ago (12:30 PM)      Ana Paula Hawley 5 minutes ago (12:30 PM)         Who is calling:  Patient wife  Reason for Call:  Patient was seen today and was put on an antibiotic.  Patient has already taken one of these pills today, but pharmacist advised him to call the doctor to determine if he needs another INR drawn soon  Date of last appointment with primary care: 09.18.19  Okay to leave a detailed message: Yes

## 2021-06-16 NOTE — TELEPHONE ENCOUNTER
Severe abd pain in belly after dialysis.  Bp 90/65rib area hurts. Stents also put in last week.    8/10 pain, going to [place].    Laquita Alvarez, RN [facility] Triage Nurse Advisor

## 2021-06-16 NOTE — PROGRESS NOTES
[facility] Follow Up Note    Alexei Blake   77 y.o. male    Date of Visit: 2/26/2018    Chief Complaint   Patient presents with     Diabetes     needs A1c     Follow-up     still having issue with CPAP     Subjective  This is a 77-year-old man with multiple medical issues include diabetes, hypertension, hyperlipidemia, chronic renal failure and sleep apnea.  He is diabetes has been under reasonably good control and he never runs a sugar above 130.  Most of his morning sugars are below 110.  He has no new symptoms relative to the diabetes but he is due for an A1c check.  Blood pressures have been stable and he has had no headaches or dizziness and no chest pain or shortness of breath.  There have been no recent changes in medication.  Relative to his hyperlipidemia he is due for a lipid check.  He continues on dialysis 3 times weekly and is seen regularly by nephrology.  He brought in his most recent lab work from them and had an opportunity to review it.  He has been tolerating the dialysis and has not having any issues with it.  He has some concerns over his sleep apnea issues.  He is not tolerating his CPAP machine.  This is an old machine as he has had the diagnosis for some time and is not followed up with the sleep people for many years.  His wife reports that even with this machine he does occasionally have apneic episodes.  He reports no other changes and is had no changes in his medication.    ROS A comprehensive review of systems was performed and was otherwise negative    Medications, allergies, and problem list were reviewed and updated    Exam  General Appearance:   On examination his blood pressure is 124/60.  Weight is 225 pounds and height is 73 inches.  BMI is 29.69.    Lungs are clear.    Heart is in sinus rhythm with a rate of 58 and no ectopy.    No new edema.    The patient is alert and oriented ×3.      Assessment/Plan  1. Diabetes  insulin NPH (NOVOLIN N NPH U-100  "INSULIN) 100 unit/mL injection    Glycosylated Hemoglobin A1c   2. Hyperlipidemia, unspecified hyperlipidemia type  Lipid Cascade   3. Type 2 diabetes mellitus with complication, with long-term current use of insulin     4. Renal failure     5. Sleep apnea  Ambulatory referral to Sleep Medicine     Diabetes.  Sugars have been stable.  We will check an A1c.    Hypertension.  Stable.  Continue current medication.    Hyperlipidemia.  We will check lipids today.    Chronic renal failure.  He will continue with dialysis and follow-up with nephrology.    Obstructive sleep apnea.  I suggested it was time to get back to see the sleep physicians for reassessment and possible change in his CPAP machine.  We will set up that appointment.    Total time of this office visit was 25 minutes with greater than 50% of the time spent in care coordination and patient counseling.  The following high BMI interventions were performed this visit: weight monitoring    Jesus Medrano MD      Current Outpatient Prescriptions on File Prior to Visit   Medication Sig     ACCU-CHEK KYLE PLUS TEST STRP strips USE ONE STRIP TO CHECK GLUCOSE TWICE DAILY AT  6AM  AND  4PM  (TEST  TWO  TO  THREE  TIMES  DAILY  AS  DIRECTED)     acetaminophen (TYLENOL) 500 MG tablet Take 1,000 mg by mouth bedtime as needed for pain (or sleep).      BD INSULIN SYRINGE ULTRA-FINE 1/2 mL 31 gauge x 15/64\" Syrg USE ONE SYRINGE TWICE DAILY     bumetanide (BUMEX) 1 MG tablet Take 1 tablet (1 mg total) by mouth 2 (two) times a day at 9am and 6pm. On dialysis days only dose in the evening     calcium, as carbonate, (OS-AVNI) 500 mg calcium (1,250 mg) tablet Take 1 tablet by mouth daily.     clopidogrel (PLAVIX) 75 mg tablet Take 1 tablet (75 mg total) by mouth daily.     hydrALAZINE (APRESOLINE) 25 MG tablet TAKE 1 TABLET TWICE DAILY     insulin NPH (NOVOLIN) 100 unit/mL injection Inject 5-6 Units under the skin 2 (two) times a day. (based on blood sugars)     lisinopril " (PRINIVIL,ZESTRIL) 30 MG tablet Take 1 tablet (30 mg total) by mouth every evening.     lovastatin (MEVACOR) 40 MG tablet Take 1 tablet (40 mg total) by mouth at bedtime.     metoprolol succinate (TOPROL-XL) 25 MG Take 1 tablet (25 mg total) by mouth every evening.     mirtazapine (REMERON) 7.5 MG tablet Take 1 tablet (7.5 mg total) by mouth at bedtime.     NOVOLIN R 100 unit/mL injection 1 unit for every 25>150     warfarin (COUMADIN) 4 MG tablet TAKE 1 TABLET (4 MG) ON TUESDAY AND SATURDAY AND TAKE 1 AND 1/2 TABLETS (6 MG) ALL OTHER DAYS     [DISCONTINUED] NOVOLIN N 100 unit/mL injection INJECT 18 UNITS SUBCUTANEOUSLY ONCE DAILY IN THE MORNING AND 6 IN THE EVENING     No current facility-administered medications on file prior to visit.      Allergies   Allergen Reactions     Calcitriol      Fatigue      Ciprofloxacin Rash     Social History   Substance Use Topics     Smoking status: Former Smoker     Quit date: 1/1/1995     Smokeless tobacco: Never Used     Alcohol use No

## 2021-06-16 NOTE — PROGRESS NOTES
ANTICOAGULATION  MANAGEMENT: Discharge Review    Alexei Blake chart reviewed for anticoagulation continuity of care    Hospital admission on  3/19 to 2-/2021 for coronary artery disease.   - s/p PCI,  Rotablator, and intravascular ultrasond on 3/19/21.  (PCI of circumflex/OM doing well post procedure)   - Continue dual antiplatelet therapy with aspirin and Plavix    Discharge disposition: Home    INR Results:       Recent labs: (last 7 days)     03/19/21  0647   INR 1.30*       Warfarin inpatient management: held warfarin on 3/15/21 and resumed on 3/19/21.    Warfarin discharge instructions: home regimen continued     Medication Changes Affecting Anticoagulation: No    Additional Factors Affecting Anticoagulation: No    Plan     No adjustment to anticoagulation plan needed      Patient not contacted - INR appt on 3/26/21.    Anticoagulation calendar updated    Yamila Luna RN

## 2021-06-16 NOTE — PROGRESS NOTES
Alexei Blake  [address] [place] [address]  [phone] (home)     Procedure cardiologist:  Dr. Arreguin  PCP:  Ana Arango MD  H&P completed by:  Dr. Chatman 2/24/21  Admit date 03/19/21  Arrival time:  0700  Anticoagulation: Coumadin. HOLD x 4 days. Last dose taken 3/14/21.  CPAP: No  Previous PCI: Yes. 1/20/21 and 1/21/21  Bypass Grafts: No  Renal Issues: Yes. Hemodialysis Tu/Th/Sat. Hemodialysis session 3/18/21.   Diabetic?: No. Previously, resolved.  Device?: No    Covid?: 3/18/21 Negative results in Epic.  Mobility?: Independent.  Covid vaccine #2 completed 3/11/21    Angiogram Teaching  Reason for Visit:  Telephone call to discuss pre-procedure education in preparation for: Coronary Angiogram, with Rotational Atherectomy Rotablator with Possible Percutaneous Coronary Intervention    Procedure Prep:  Primary Cardiologist note dated: 2/24/21  EKG results obtained, dated: Morning of procedure  Hemogram results obtained: Morning of procedure  Basic Metabolic Panel results obtained: Morning of procedure  Lipid Profile results obtained: Morning of procedure  INR results obtained: Morning of procedure     Pre-procedure instructions  Patient instructed to be NPO after midnight.  Patient instructed to shower the evening before or the morning of the procedure.  Patient instructed to arrange for transportation home following procedure from a responsible family member of friend. No driving for at least 24 hours.  Patient instructed to have a responsible adult with them for 24 hours post-procedure.  Post-procedure follow up process.  Conscious sedation discussed.    Pre-procedure medication instructions  Patient instructed on antiplatelet medication.  Continue medications as scheduled, with a small amount of water on the day of the procedure unless indicated.  Patient instructed to take 325 mg of Aspirin am of procedure: Yes  Other medication: Morning of procedure please HOLD all vitamins,  minerals, and supplements. Please TAKE (4) baby aspirin or (1) full size 325 mg aspirin morning of procedure. HOLD Warfarin x 4 days, no bridging. Last dose 3/14/21. Instructed to take Plavix, Aspirin and Lisinopril morning of procedure.       *PATIENTS RECORDS AVAILABLE IN [facility] UNLESS OTHERWISE INDICATED*      Patient Active Problem List   Diagnosis     Dyslipidemia, goal LDL below 70     Type 2 diabetes mellitus (H)     Essential hypertension with goal blood pressure less than 130/85     PVD (peripheral vascular disease) (H)     Stroke (H)     Adjustment disorder with depressed mood     ESRD (end stage renal disease) on dialysis (H)     Paroxysmal atrial fibrillation (H)     Pneumonia     NSTEMI (non-ST elevated myocardial infarction) (H)     Ischemic cardiomyopathy     LBBB (left bundle branch block)     CAD (coronary artery disease)       Current Outpatient Medications   Medication Sig Dispense Refill     ACCU-CHEK KYLE PLUS TEST STRP strips USE  STRIP TO CHECK GLUCOSE 2 TO 3 TIMES DAILY AS DIRECTED AT  6AM  AMD  4PM 300 strip 1     acetaminophen (TYLENOL) 500 MG tablet Take 1,000 mg by mouth at bedtime.       aspirin 81 MG EC tablet Take 1 tablet (81 mg total) by mouth daily.  0     atorvastatin (LIPITOR) 80 MG tablet Take 1 tablet (80 mg total) by mouth at bedtime. 90 tablet 3     calcium, as carbonate, (TUMS) 200 mg calcium (500 mg) chewable tablet Chew 1 tablet 2 (two) times a day.       clopidogreL (PLAVIX) 75 mg tablet Take 1 tablet by mouth once daily 90 tablet 2     fluticasone propionate (FLONASE) 50 mcg/actuation nasal spray 2 sprays into each nostril at bedtime. Indications: inflammation of the nose due to an allergy       folic acid/vit B complex and C (DIALYVITE ORAL) Take 1 tablet by mouth daily.       lisinopriL (PRINIVIL,ZESTRIL) 2.5 MG tablet Take 2 tablets (5 mg total) by mouth every morning. 60 tablet 3     methoxy peg-epoetin beta (MIRCERA INJ) 50 mcg by intravenous push route.        metoprolol succinate (TOPROL-XL) 25 MG Take 1 tablet (25 mg total) by mouth at bedtime. 30 tablet 3     nitroglycerin (NITROSTAT) 0.4 MG SL tablet Place 1 tablet (0.4 mg total) under the tongue every 5 (five) minutes as needed for chest pain. 1 Bottle 0     warfarin ANTICOAGULANT (COUMADIN/JANTOVEN) 3 MG tablet Take 1 tablet (3 mg total) by mouth daily. 30 tablet 1     No current facility-administered medications for this visit.        Allergies   Allergen Reactions     Blood-Group Specific Substance      Anti-K present. Expect delays in blood for transfusion.  Draw 2 lavender top tubes for type and screen orders.        Calcitriol      Fatigue      Ciprofloxacin Rash     Plan: Education completed via phone with patient and wife Isabel. Opportunity to ask questions and have them answered. None further at this time. Isabel will be  and responsible adult home with patient post-procedure at time of discharge. Pt ready for procedure.     PARUL Wong

## 2021-06-16 NOTE — TELEPHONE ENCOUNTER
===View-only below this line===  ----- Message -----  From: Tuyet Arreguin MD  Sent: 3/16/2021   3:42 PM CDT  To: Clem Wong RN    This question has only been an issue lately, did the order set change? Go ahead and give the lisinopril.     EG

## 2021-06-16 NOTE — TELEPHONE ENCOUNTER
PC with patient and wife Isabel. Education completed. Pt confirms holding warfarin. Opportunity to ask questions and have them answered. Pt with none further at this time. Covid swab later today, will monitor for results. Encouraged to call if any further questions or concerns. Orders for procedure entered, and documentation. MOE,RN

## 2021-06-17 NOTE — PROGRESS NOTES
[facility]    Facility:   [facility] [[id number]]   Code Status: POLST AVAILABLE      CHIEF COMPLAINT/REASON FOR VISIT:  Chief Complaint   Patient presents with     Problem Visit     INR, poor PO intake, diet changes       HISTORY:      HPI: Alexei is a 81 y.o. male with pmh of ESRD on dialysis three times weekly, CVA with residual left sided weakness, htn, who presented to the ED with acute onset of abdominal pain during dialysis.  He had ischemic changes of the right colon and was brought to the OR for colonic resection and ileostomy on 331.  He also had an ileostomy revision on 4/21.  He is in the TCU for medical management, wound management for the abdominal wound secondary to repair, and therapy for strengthening.  His blood pressure meds were titrated and he is now on isosorbide 10 mg 3 times daily, metoprolol twice daily, with all meds to be held prior to dialysis due to blood pressure drops during his runs.  He has dialysis on Tuesday, Thursday and Saturdays.  For his anemia, will be starting Epogen at hemodialysis.  He is seen today lying in bed after therapy, he has been having persistent nausea since arriving at the TCU.  He does have a small amount of dark, formed stool in the ileostomy bag.  His wound is unable to be viewed as it is underneath the tape to the ileostomy bag.  Nursing has already changed it today.  His pain is well controlled.    Today: Discussed elevated INR, possibly secondary to poor PO intake. INR 4.1 today and coumadin has been on hold x 5 days. No bleeding, hematochezia, hematuria. Denies pain today. Wound monitored by nursing. Has had poor PO intake and dialysis did suggest liberalizing diet to general diet from renal. Also, discontinue fluid restriction and monitor weights. He reports zofran as very helpful to his nausea.     Past Medical History:   Diagnosis Date     Abscess of tongue      Chronic kidney disease     CKD stage 4,dialysis       Diabetes (H)     type 2     DVT (deep venous thrombosis) (H)      End stage renal disease (H)      Hernia, umbilical      History of transfusion      Hyperlipidemia      Hypertension      CATHLEEN (obstructive sleep apnea)     uses CPAP     PVD (peripheral vascular disease) (H)      Renal insufficiency      Stroke (H) 2003    minor left sided weakness             Family History   Problem Relation Age of Onset     Hypertension Mother      Hypertension Father      Diabetes Father      Heart attack Father      Social History     Socioeconomic History     Marital status:      Spouse name: Not on file     Number of children: 3     Years of education: Not on file     Highest education level: Not on file   Occupational History     Occupation: Retired - [employer]   Social Needs     Financial resource strain: Not on file     Food insecurity     Worry: Not on file     Inability: Not on file     Transportation needs     Medical: Not on file     Non-medical: Not on file   Tobacco Use     Smoking status: Former Smoker     Packs/day: 0.00     Quit date: 1995     Years since quittin.3     Smokeless tobacco: Never Used   Substance and Sexual Activity     Alcohol use: No     Drug use: No     Sexual activity: Not on file   Lifestyle     Physical activity     Days per week: Not on file     Minutes per session: Not on file     Stress: Not on file   Relationships     Social connections     Talks on phone: Not on file     Gets together: Not on file     Attends Rastafari service: Not on file     Active member of club or organization: Not on file     Attends meetings of clubs or organizations: Not on file     Relationship status: Not on file     Intimate partner violence     Fear of current or ex partner: Not on file     Emotionally abused: Not on file     Physically abused: Not on file     Forced sexual activity: Not on file   Other Topics Concern     Not on file   Social History Narrative    3/20/21:  "\"Bill\" is here in the ED with his wife today.         Review of Systems   Constitutional: Negative.    HENT: Negative.    Respiratory: Negative.    Cardiovascular: Negative.    Gastrointestinal: Positive for nausea. Negative for abdominal distention and abdominal pain.   Genitourinary: Negative.    Musculoskeletal: Negative.    Skin: Positive for wound.   Neurological: Positive for weakness.       Vitals:    05/03/21 1307   BP: 165/79   Pulse: 75   Resp: 18   Temp: 97.8  F (36.6  C)   SpO2: 97%   Weight: 210 lb 1.6 oz (95.3 kg)   Height: 6' 1\" (1.854 m)       Physical Exam  Constitutional:       Appearance: Normal appearance. He is normal weight. He is ill-appearing.   HENT:      Head: Atraumatic.      Mouth/Throat:      Mouth: Mucous membranes are moist.   Eyes:      General: No scleral icterus.     Extraocular Movements: Extraocular movements intact.   Cardiovascular:      Rate and Rhythm: Normal rate and regular rhythm.   Pulmonary:      Effort: Pulmonary effort is normal. No respiratory distress.      Breath sounds: No wheezing.      Comments: Diminished bilaterally   Abdominal:      General: Abdomen is flat. There is no distension.      Palpations: Abdomen is soft.      Tenderness: There is no abdominal tenderness.      Comments: Iliostomy    Musculoskeletal: Normal range of motion.      Right lower leg: No edema.      Left lower leg: No edema.      Comments: AV fistula to RUE.      Skin:     General: Skin is warm and dry.   Neurological:      General: No focal deficit present.   Psychiatric:         Mood and Affect: Mood normal.         Behavior: Behavior normal.           LABS:   Reviewed.     ASSESSMENT:      ICD-10-CM    1. ESRD (end stage renal disease) on dialysis (H)  N18.6     Z99.2    2. Status post ileostomy (H)  Z93.2    3. Adjustment disorder with depressed mood  F43.21    4. Paroxysmal atrial fibrillation (H)  I48.0        PLAN:      Nausea: post op. VS stable, afebrile. Good output in illeostomy " bag. Nausea greatly improved with zofran.   -Continue zofran 4mg po three times a day with meals.     Status post ileostomy: dark stool in bag.   -Pantoprazole 40 mg daily.  -Reduce oxycodone to every 6 hours as needed.  -Tylenol 500 mg every 4 hours as needed.    End-stage renal disease  Anemia of chronic disease  Poor PO intake.  -Discontinue fluid restriction.   -On Epogen at dialysis.  -Check CBC on Friday.  -Has dialysis Tuesday, Thursday, Saturday.  -Liberalize diet to general diet due to poor po intake.   -Renal caps 1 mg daily.    CAD  A. Fib  History of CVA  -On warfarin, currently 4.10, recheck Wednesday.   -Atorvastatin 80 mg at bedtime.  -Aspirin 81 daily.  -Clopidogrel 75 daily.  -Isosorbide 10 mg 3 times a day.    Allergic rhinitis:  -Flonase 2 sprays daily.    Disposition: Likely going to return to his home where he lives with his wife.      Electronically signed by: Candie Mcrae, CNP

## 2021-06-17 NOTE — TELEPHONE ENCOUNTER
Telephone Encounter by Lula Aaron RN at 3/26/2021  3:58 PM     Author: Lula Aaron RN Service: -- Author Type: Registered Nurse    Filed: 3/26/2021  4:33 PM Encounter Date: 3/26/2021 Status: Signed    : Lula Aaron RN (Registered Nurse)       ANTICOAGULATION  MANAGEMENT    Assessment     Today's INR result of 1.8 is Subtherapeutic (goal INR of 2.0-3.0)        Warfarin taken as previously instructed    No new diet changes affecting INR    No new medication/supplements affecting INR    Continues to tolerate warfarin with no reported s/s of bleeding or thromboembolism     Previous INR was Subtherapeutic after hold    Plan:     Spoke on phone with Yair regarding INR result and instructed:      Warfarin Dosing Instructions:  Continue current warfarin dose 3 mg daily    Instructed patient to follow up no later than: 4/7 with ov    bill verbalizes understanding and agrees to warfarin dosing plan.    Instructed to call the [facility] for any changes, questions or concerns. (#[phone])   ?   Lula Aaron RN    Subjective/Objective:      Alexei Blake, a 80 y.o. male is on warfarin. Yair Mazariegos reports:     Home warfarin dose: verbally confirmed home dose with Yair and updated on anticoagulation calendar     Missed doses: No     Medication changes:  No     S/S of bleeding or thromboembolism:  No     New Injury or illness:  No     Changes in diet or alcohol consumption:  No     Upcoming surgery, procedure or cardioversion:  No    Anticoagulation Episode Summary     Current INR goal:  2.0-3.0   TTR:  90.4 % (11.7 mo)   Next INR check:  4/7/2021   INR from last check:  1.80 (3/26/2021)   Weekly max warfarin dose:     Target end date:     INR check location:     Preferred lab:     Send INR reminders to:  [facility]    Indications    PVD (peripheral vascular disease) (H) [I73.9]  Stroke (H) [I63.9]           Comments:           Anticoagulation Care Providers     Provider Role Specialty Phone  number    Jesus Medrano MD [facility] Internal Medicine [phone]

## 2021-06-17 NOTE — PROGRESS NOTES
Patient was offered choice of vendor and chose [facility].  Patient Alexei Blake was set up at [facility] on 4/23/18. Patient received a Resmed [id number] Auto. Pressures were set at 10-14.   Patient s ramp is 7 cm H2O for Auto and FLEX/EPR is EPR.  Patient received a Resmed Mask name: Airfit F20  FFM Size large, heated tubing and heated humidifier.    Pacee Her

## 2021-06-17 NOTE — TELEPHONE ENCOUNTER
Telephone Encounter by Vivien Arroyo LPN at 4/13/2020  3:07 PM     Author: Vivien Arroyo LPN Service: -- Author Type: Licensed Nurse    Filed: 4/13/2020  3:07 PM Encounter Date: 4/13/2020 Status: Signed    : Vivien Arroyo LPN (Licensed Nurse)

## 2021-06-17 NOTE — TELEPHONE ENCOUNTER
Telephone Encounter by Ele Recinos Aide at 6/2/2020  8:21 AM     Author: Ele Recinos Aide Service: -- Author Type: Aide    Filed: 6/2/2020  8:22 AM Encounter Date: 6/2/2020 Status: Signed    : Ele Recinos Aide ([place])

## 2021-06-17 NOTE — TELEPHONE ENCOUNTER
Telephone Encounter by Kyleigh Graves RN at 2/12/2021  2:52 PM     Author: Kyleigh Graves RN Service: -- Author Type: Registered Nurse    Filed: 2/12/2021  2:53 PM Encounter Date: 2/12/2021 Status: Signed    : Kyleigh Graves RN (Registered Nurse)       RN cannot approve Refill Request    RN can NOT refill this medication med is not covered by policy/route to provider and Protocol failed and NO refill given. Last office visit: 1/26/2021 Ana Arango MD Last Physical: 12/3/2020 Last MTM visit: Visit date not found Last visit same specialty: 1/26/2021 Ana Arango MD.  Next visit within 3 mo: Visit date not found  Next physical within 3 mo: Visit date not found      Caden Stein Connection Triage/Med Refill 2/12/2021    Requested Prescriptions   Pending Prescriptions Disp Refills   ? warfarin ANTICOAGULANT (COUMADIN/JANTOVEN) 3 MG tablet 30 tablet 1     Sig: Take 1 tablet (3 mg total) by mouth daily.       Warfarin Refill Protocol  Failed - 2/12/2021  8:57 AM        Failed -  Route to appropriate pool/provider     Last Anticoagulation Summary:   Anticoagulation Episode Summary     Current INR goal:  2.0-3.0   TTR:  96.8 % (11.5 mo)   Next INR check:  2/18/2021   INR from last check:  3.20 (2/4/2021)   Weekly max warfarin dose:     Target end date:     INR check location:     Preferred lab:     Send INR reminders to:  [facility]    Indications    PVD (peripheral vascular disease) (H) [I73.9]  Stroke (H) [I63.9]           Comments:           Anticoagulation Care Providers     Provider Role Specialty Phone number    Jesus Medrano MD Referring Internal Medicine [phone]                Passed - Provider visit in last year     Last office visit with prescriber/PCP: 1/26/2021 Ana Arango MD OR same dept: 1/26/2021 Ana Arango MD OR same specialty: 1/26/2021 Ana Arango MD  Last physical: 12/3/2020 Last MTM visit: Visit date not found    Next appt  within 3 mo: Visit date not found Next physical within 3 mo: Visit date not found  Prescriber OR PCP: Ana Arango MD  Last diagnosis associated with med order: 1. PVD (peripheral vascular disease) (H)  - warfarin ANTICOAGULANT (COUMADIN/JANTOVEN) 3 MG tablet; Take 1 tablet (3 mg total) by mouth daily.  Dispense: 30 tablet; Refill: 1    2. Stroke (H)  - warfarin ANTICOAGULANT (COUMADIN/JANTOVEN) 3 MG tablet; Take 1 tablet (3 mg total) by mouth daily.  Dispense: 30 tablet; Refill: 1    3. Long term (current) use of anticoagulants  - warfarin ANTICOAGULANT (COUMADIN/JANTOVEN) 3 MG tablet; Take 1 tablet (3 mg total) by mouth daily.  Dispense: 30 tablet; Refill: 1    If protocol passes may refill for 6 months if within 3 months of last provider visit (or a total of 9 months).

## 2021-06-17 NOTE — PROGRESS NOTES
Order for Durable Medical Equipment was processed and equipment ordered.   DME provider: DANIA to [place] from [employer]  Date Faxed: 04/09/2018  Ordering Provider: Dr. Benavidez  Equipment ordered: DANIA

## 2021-06-17 NOTE — PROGRESS NOTES
Clinic Care Coordination Contact  Care Coordination Transition Communication         Clinical Data: Patient was hospitalized at [facility]. Date of Admission: 3/30/2021  Date of Discharge: 4/26/2021    Alexei Blake is a 80 year old with PMH CAD s/p recent PCI, CHF, HTN, DM2, ESRD on HD, PAD, Afib on warfarin who presented with acute onset of abdominal pain during HD. Patient was found to have ischemic changes of right colon with portal venous gas per imaging s/p colonic resection and ileostomy 3/31 c/b ileus, s/p ileostomy revision 4/21. See adjusted anti-hypertensive regimen and adjusted EDW.       Transition to Facility:              Facility Name: [facility]               Contact name and phone number/fax: Olya, [phone]  Olya talked with therapies yesterday and he will require a lot more strength before he can discharge safely.     Plan: RN/SW Care Coordinator will await notification from facility staff informing RN/SW Care Coordinator of patient's discharge plans/needs. RN/SW Care Coordinator will review chart and outreach to facility staff every 4 weeks and as needed.     Allison Rebollar, [facility] [place]  Clinic Care Coordinator  [phone]

## 2021-06-17 NOTE — PROGRESS NOTES
"[facility]  Initial MD Visit  05/14/21        Facility:   [facility] [[id number]]     Code Status: FULL CODE    Chief Complaint   Patient presents with     Hospital Visit Follow Up       HPI:   Alexei Blake is a 81 y.o. male who was seen at [facility] on 05/14/21 for an initial visit. Medical history is notable for ESRD on HD, CAD, atrial fibrillation, CHF, PAD and DM II. He was hospitalized at [facility] from 3/30/21 to 4/26/21 where he presented with abdominal pain during HD and was found to have ischemic bowel s/p colonic resection and ileostomy (3/31/21). Post-op course notable for ileus and for ileostomy revision (4/21/21). Multiple medication changes: Imdur discontinued, isosorbide started, lisinopril held, new metoprolol and no BP meds on HD days. He was admitted to this facility for medical management and rehab.     Today, Mr. Blake is seen in his room sitting up in bed. He had some questions about the \"why\" this happened and tried to explain the fluid shifts and BP shifts during dialysis which along with his PAD resulted in frankly bad luck. Reviewed his dry weights and the change since TCU admission. He is worried the change will result in low BPs and encouraged him to bring this up at dialysis. Bean is to return home with his spouse. She has been driving him to/from HD. He was able to sit up in a chair today, but remains far from his baseline. No chest pain or dyspnea. No abdominal pain. No concerns per discussion with nursing. They note there is a skin wound right under the osteomy bag so I all has to be changed daily. He is working with therapies and is an assist of one with transfers and with some ADLs.    ALLERGIES:  Allergies   Allergen Reactions     Blood-Group Specific Substance      Anti-K present. Expect delays in blood for transfusion.  Draw 2 lavender top tubes for type and screen orders.        Calcitriol      Fatigue      Ciprofloxacin Rash "       PAST MEDICAL HISTORY:  Past Medical History:   Diagnosis Date     Abscess of tongue      Chronic kidney disease     CKD stage 4,dialysis Tu-Th-Sa     Diabetes (H)     type 2     DVT (deep venous thrombosis) (H)      End stage renal disease (H)      Hernia, umbilical      History of transfusion      Hyperlipidemia      Hypertension      CATHLEEN (obstructive sleep apnea)     uses CPAP     PVD (peripheral vascular disease) (H)      Renal insufficiency      Stroke (H) 2003    minor left sided weakness               PAST SURGICAL HISTORY:  Past Surgical History:   Procedure Laterality Date     ARTERIAL BYPASS SURGERY Bilateral     lower extremities, Dr. Cottrell     CV CORONARY ANGIOGRAM N/A 1/20/2021    Procedure: Coronary Angiogram;  Surgeon: Tuyet Arreguin MD;  Location: [facility] Cardiac Cath Lab;  Service: Cardiology     CV LEFT HEART CATHETERIZATION WO LEFT VETRICULOGRAM Left 1/20/2021    Procedure: Left Heart Catheterization Without Left Ventriculogram;  Surgeon: Tuyet Arreguin MD;  Location: [facility] Cardiac Cath Lab;  Service: Cardiology     TOE AMPUTATION Bilateral     multiple     UMBILICAL HERNIA REPAIR N/A 4/29/2016    Procedure: OPEN UMBILICAL REPAIR WITH MESH;  Surgeon: Jevon Rivas MD;  Location: [facility] OR;  Service:        FAMILY HISTORY:  Family History   Problem Relation Age of Onset     Hypertension Mother      Hypertension Father      Diabetes Father      Heart attack Father      SOCIAL HISTORY:  Lives with spouse     MEDICATIONS:  Post Discharge Medication Reconciliation Status: discharge medications reconciled and changed, per note/orders    Current Outpatient Medications   Medication Sig     acetaminophen (TYLENOL) 500 MG tablet Take 500 mg by mouth every 4 (four) hours as needed (or sleep).      aspirin 81 MG EC tablet Take 1 tablet (81 mg total) by mouth daily.     atorvastatin (LIPITOR) 80 MG tablet Take 1 tablet (80 mg total) by mouth at bedtime.     B complex with C 20-folic  "acid (RENAL CAPS) 1 mg cap capsule Take 1 capsule by mouth daily.     calcium, as carbonate, (TUMS) 200 mg calcium (500 mg) chewable tablet Chew 500 mg 2 (two) times a day.     clopidogreL (PLAVIX) 75 mg tablet Take 75 mg by mouth daily.     fluticasone propionate (FLONASE ALLERGY RELIEF) 50 mcg/actuation nasal spray 2 sprays into each nostril daily.     isosorbide dinitrate (ISORDIL) 5 MG tablet Take 5 mg by mouth 3 (three) times a day.     metoprolol tartrate (LOPRESSOR) 25 MG tablet Take 25 mg by mouth 2 (two) times a day. Give 25 mg by mouth two times a day every Mon, Wed, Fri, Sun for A fib/CAD     nitroglycerin (NITROSTAT) 0.4 MG SL tablet Place 1 tablet (0.4 mg total) under the tongue every 5 (five) minutes as needed for chest pain. May repeat every 5 minutes for a maximum of 3 doses in 15 minutes.     ondansetron (ZOFRAN) 4 MG tablet Take 4 mg by mouth 3 (three) times a day before meals.     oxyCODONE (ROXICODONE) 5 MG immediate release tablet Take 5 mg by mouth every 6 (six) hours as needed.     pantoprazole (PROTONIX) 40 MG tablet Take 40 mg by mouth daily.     warfarin ANTICOAGULANT (COUMADIN/JANTOVEN) 1 MG tablet Take by mouth See Admin Instructions. 5/11/21 INR 2.5.  2 mg po daily.  Next INR 5/17/21.     ACCU-CHEK KYLE PLUS TEST STRP strips USE  STRIP TO CHECK GLUCOSE 2 TO 3 TIMES DAILY AS DIRECTED AT  6AM  AMD  4PM        ROS:  10 point ROS neg other than the symptoms noted above in the HPI.    PHYSICAL EXAM:  /72   Pulse 66   Temp 97.7  F (36.5  C)   Resp 18   Ht 6' 1\" (1.854 m)   Wt 196 lb 6.4 oz (89.1 kg)   SpO2 98%   BMI 25.91 kg/m     Gen: sitting up n bed, alert, cooperative and in no acute distress  HEENT: normocephalic; oropharynx clear  Card: RRR, S1, S2, no murmurs  Resp: lungs clear to auscultation bilaterally, no crackles or wheezes  GI: abdomen soft, stoma pink, ostomy bag with loose brown stool   MSK: decreased muscle tone, no LE edema  Neuro: CX II-XII grossly in tact; ROM " in all four extremities grossly in tact  Psych: alert and oriented x3; normal affect  Skin: midline abdominal incision well healed     LABORATORY/IMAGING DATA:  Reviewed as per Epic    ASSESSMENT/PLAN:    Colonic Ischemia s/p Resection and Ileostomy (3/31/21) and Ileostomy Revision (4/21/21)   Stoma pink. Stool brown. Midline incision well healed.   -- routine ostomy cares  -- analgesia with APAP 500 mg q4h PRN and oxycodone 5 mg q6h  PRN   -- follow up with colorectal as scheduled    ESRD on HD  Dialyzes via LUE fistula on TuThSat. Weight down 211 --> 196 lbs over 2 weekd.   -- nephrocaps daily, calcium carbonate 1000 mg two times a day  -- ongoing management per nephrology    CAD, Ischemic Cardiomyopathy (EF 44%), HLD  No longer on Imdur or lisinopril and new on metoprolol.   -- ASA 81 mg daily, atorvastatin 80 mg daily, clopidogrel 75 mg daily, isosorbide dinitrate 5 mg three times a day, metoprolol 25 mg two times a day  -- no BP meds on HD days  -- follow BPs    Chronic Atrial Fibrillation  HR 60s-80s. RRR for me today  -- metoprolol 25 mg two times a day  -- warfarin, goal INR 2-3    DM, Type II  Hgb A1c 6 in January  Sugars 120s-170s (am), 120s-140s (noon), 220s-280 (ifrah) and 180s-210s (hs). Not on any insulin or oral glycemic control.   -- follow sugars PRN given trend has been established     PAD  -- ASA 81 mg daily, atorvastatin 80 mg daily    GERD  -- pantoprazole 40 mg daily    Physical Deconditioning  In setting of hospitalization and underlying medical conditions  -- ongoing PT/OT  Dm 2      Electronically signed by: Opal Tejada MD

## 2021-06-17 NOTE — TELEPHONE ENCOUNTER
Telephone Encounter by Yamila Luna RN at 9/4/2020 11:55 AM     Author: Yamila Luna RN Service: -- Author Type: Registered Nurse    Filed: 9/4/2020 12:20 PM Encounter Date: 9/4/2020 Status: Attested    : Yamila Luna RN (Registered Nurse) Cosigner: Ana Arango MD at 9/7/2020  1:41 PM    Attestation signed by Ana Arango MD at 9/7/2020  1:41 PM    As above                  ANTICOAGULATION  MANAGEMENT    Assessment     Today's INR result of 2.40 is Therapeutic (goal INR of 2.0-3.0)        Warfarin taken as previously instructed    No new diet changes affecting INR    No new medication/supplements affecting INR    Continues to tolerate warfarin with no reported s/s of bleeding or thromboembolism     Previous INR was Therapeutic at 2.10 on 8/7/20.  (last 9 INR's therapeutic).    Plan:     Spoke on phone with Yair and wife, Isabel regarding INR result and instructed:     Warfarin Dosing Instructions:  (has 4mg tabs)   - Continue current warfarin dose 2 mg daily on Fridays; and 4 mg daily rest of week.    Instructed patient to follow up no later than:  4 wks.   - INR scheduled on 10/16/20 during appt to establish care with Dr. Arango.    Education provided: importance of consistent vitamin K intake and target INR goal and significance of current INR result    Yair verbalizes understanding and agrees to warfarin dosing plan.    Instructed to call the [facility] for any changes, questions or concerns. (#[phone])   ?   Yamila Luna RN    Subjective/Objective:      Alexei MIKEY Blake, a 80 y.o. male is on warfarin.     Alexei reports:     Home warfarin dose: verbally confirmed home dose with Yair  and updated on anticoagulation calendar     Missed doses: No     Medication changes:  No     S/S of bleeding or thromboembolism:  No     New Injury or illness:  No     Changes in diet or alcohol consumption:  No     Upcoming surgery, procedure or cardioversion:  No.  Chronic  kidney dialysis 3x/wk on Tues/Thurs/Sat.    Anticoagulation Episode Summary     Current INR goal:   2.0-3.0   TTR:   90.1 % (11.6 mo)   Next INR check:   10/2/2020   INR from last check:   2.40 (9/4/2020)   Weekly max warfarin dose:      Target end date:      INR check location:      Preferred lab:      Send INR reminders to:   [facility]    Indications    PVD (peripheral vascular disease) (H) [I73.9]  Stroke (H) [I63.9]           Comments:            Anticoagulation Care Providers     Provider Role Specialty Phone number    Jesus Medrano MD Referring Internal Medicine [phone]              Dr Izaguirre

## 2021-06-17 NOTE — TELEPHONE ENCOUNTER
Telephone Encounter by Olya Clayton RN at 2/5/2021 10:16 AM     Author: Olya Clayton RN Service: -- Author Type: Registered Nurse    Filed: 2/5/2021 10:16 AM Encounter Date: 2/5/2021 Status: Signed    : Olya Clayton RN (Registered Nurse)       Msg rec'd 2-5-21 @ 1007:  Cherri Restrepo NP Lange, Christine Laura, MD   Cc:  Clem Wong RN         Dr. Chatman,   [facility]:   I requested Pema to follow up with patient to arrange limited ECHO.   CY     Previous Messages    ----- Message -----   From: Tuyet Arreguin MD   Sent: 2/4/2021   3:30 PM CST   To: Cherri Restrepo NP     Can you get a limited echo to reassess his EF and then we will decide on timing?     Thanks, EG   ----- Message -----   From: Cherri Restrepo NP   Sent: 2/4/2021   3:22 PM CST   To: MD Dr. Kallie Espana,   Saw Mr. Blake for post PCI f/u. He denies CP or shortness of breath.   He was recommended staged PCI to Circ and OM 3 in 2-4 weeks from 1/21/21. This is not scheduled yet. He is scheduled to see Dr. Chatman on 2/24/21.   Would you recommend scheduling staged PCI in the next couple week or see Dr. Chatman first?   He is on ASA 81, Plavix 75 mg and Warfarin. No bleeding complications. How long would you like him to be on triple therapy?     Please address.   Thank you!   MATT

## 2021-06-17 NOTE — TELEPHONE ENCOUNTER
Telephone Encounter by Yamila Luna RN at 1/26/2021  3:27 PM     Author: Yamila Luna RN Service: -- Author Type: Registered Nurse    Filed: 1/26/2021  4:28 PM Encounter Date: 1/26/2021 Status: Signed    : Yamila Luna RN (Registered Nurse)       ANTICOAGULATION  MANAGEMENT    Assessment     Today's INR result of 1.90 is Subtherapeutic (goal INR of 2.0-3.0)        Warfarin taken as previously instructed    - discharged and switched warfarin 4mg tabs to Jantoven 3mg.    No new diet changes affecting INR    No interaction expected between Atorvastatin, Lisinopril, Metoprolol and NTG and warfarin.   - new RX. - increased Atovastatin 80mg daily, decreased Lisinopril 2.5mg daily, decreased  Metoprolol Succinate 25mg daily and NTG 0.4 sl PRN for chest pains.    Potential interaction between Aspirin and warfarin which may affect subsequent INRs   - received 1st Covid Vaccination today - [facility] @ Dialysis.   - new RX Aspirin 81mg daily and Plavix 75mg daily.   - received 4mg of Vitamin-K on 1/19/21 to reverse INR of 4.58.    Continues to tolerate warfarin with no reported s/s of bleeding or thromboembolism     Previous INR was Subtherapeutic at 1.36 on 1/23/21.    Recent hospitalization for NSTEMI and s/p coronary angio on 1/20/21 and s/p Angoplasty with PCI  Stent placement on 1/21/21.    Continues with kidney dialysis 3x per wk on Tues/Thurs/Sat.    Plan:     Spoke on phone with Bill and wife, Isabel regarding INR result and instructed:     Warfarin Dosing Instructions: (mornings. Now has Jantoven 3mg tabs).   - Continue current warfarin dose 3 mg daily.    Instructed patient to follow up no later than: one wk.   - INR scheduled on 2/4/21 during hospital f/u @ [facility].    Education provided: target INR goal and significance of current INR result, potential interaction between warfarin and  Aspirin, no interaction anticipated between warfarin and Atorvastatin, Lisinopril, Metoprolol,  and importance of notifying clinic for changes in medications    Isabel verbalizes understanding and agrees to warfarin dosing plan.    Instructed to call the [facility] for any changes, questions or concerns. (#[phone])   ?   Yamila Luna RN    Subjective/Objective:      Alexei Blake, a 80 y.o. male is on warfarin.     Alexei reports:     Home warfarin dose: verbally confirmed home dose with Isabel and updated on anticoagulation calendar     Missed doses: Yes:  warfarin held for 3 days.     Medication changes:  Yes:  As mentioned above.     S/S of bleeding or thromboembolism:  No     New Injury or illness:  Yes: recent hospitalization for MI on 1/18/21.  Bill reports very fatigued.     Changes in diet or alcohol consumption:  No     Upcoming surgery, procedure or cardioversion:  Yes:  has other vessels that needs stent placement and the plan is to do that in the future    Anticoagulation Episode Summary     Current INR goal:  2.0-3.0   TTR:  97.4 % (11.7 mo)   Next INR check:  2/2/2021   INR from last check:  1.90 (1/26/2021)   Weekly max warfarin dose:     Target end date:     INR check location:     Preferred lab:     Send INR reminders to:  [facility]    Indications    PVD (peripheral vascular disease) (H) [I73.9]  Stroke (H) [I63.9]           Comments:           Anticoagulation Care Providers     Provider Role Specialty Phone number    Jesus Medrano MD Referring Internal Medicine [phone]

## 2021-06-17 NOTE — TELEPHONE ENCOUNTER
Telephone Encounter by Vivien Arroyo LPN at 4/6/2020  8:39 AM     Author: Vivien Arroyo LPN Service: -- Author Type: Licensed Nurse    Filed: 4/6/2020  8:40 AM Encounter Date: 4/6/2020 Status: Signed    : Vivien Arroyo LPN (Licensed Nurse)

## 2021-06-17 NOTE — TELEPHONE ENCOUNTER
Telephone Encounter by Ana Rosa Lopez at 4/13/2020  9:27 AM     Author: Ana Rosa Lopez Service: -- Author Type: Patient Access    Filed: 4/13/2020  9:27 AM Encounter Date: 4/6/2020 Status: Signed    : Ana Rosa Lopez (Patient Access)

## 2021-06-17 NOTE — PROGRESS NOTES
[facility]    Facility:   [facility] [[id number]]   Code Status: POLST AVAILABLE      CHIEF COMPLAINT/REASON FOR VISIT:  Chief Complaint   Patient presents with     Hospital Visit Follow Up     ESRD, hemicolectomy       HISTORY:      HPI: Alexei is a 80 y.o. male with pmh of ESRD on dialysis three times weekly, CVA with residual left sided weakness, htn, who presented to the ED with acute onset of abdominal pain during dialysis.  He had ischemic changes of the right colon and was brought to the OR for colonic resection and ileostomy on 331.  He also had an ileostomy revision on 4/21.  He is in the TCU for medical management, wound management for the abdominal wound secondary to repair, and therapy for strengthening.  His blood pressure meds were titrated and he is now on isosorbide 10 mg 3 times daily, metoprolol twice daily, with all meds to be held prior to dialysis due to blood pressure drops during his runs.  He has dialysis on Tuesday, Thursday and Saturdays.  For his anemia, will be starting Epogen at hemodialysis.  He is seen today lying in bed after therapy, he has been having persistent nausea since arriving at the TCU.  He does have a small amount of dark, formed stool in the ileostomy bag.  His wound is unable to be viewed as it is underneath the tape to the ileostomy bag.  Nursing has already changed it today.  His pain is well controlled.  He is frustrated with the whole procedure and is appropriately agitated.  Nursing concern for his ability to ambulate in and out of a vehicle when going to dialysis.  Prior to hospitalization, he was driving himself to dialysis.  Therapy will be working on him getting in and out of a car that he is able to attend dialysis tomorrow.  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  He also likes to talk about his house  and where he grew up.    Past Medical History:   Diagnosis Date     Abscess of tongue      Chronic kidney disease     CKD stage 4,dialysis      Diabetes (H)     type 2     DVT (deep venous thrombosis) (H)      End stage renal disease (H)      Hernia, umbilical      History of transfusion      Hyperlipidemia      Hypertension      CATHLEEN (obstructive sleep apnea)     uses CPAP     PVD (peripheral vascular disease) (H)      Renal insufficiency      Stroke (H)     minor left sided weakness             Family History   Problem Relation Age of Onset     Hypertension Mother      Hypertension Father      Diabetes Father      Heart attack Father      Social History     Socioeconomic History     Marital status:      Spouse name: Not on file     Number of children: 3     Years of education: Not on file     Highest education level: Not on file   Occupational History     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 "[employer]   Social Needs     Financial resource strain: Not on file     Food insecurity     Worry: Not on file     Inability: Not on file     Transportation needs     Medical: Not on file     Non-medical: Not on file   Tobacco Use     Smoking status: Former Smoker     Packs/day: 0.00     Quit date: 1995     Years since quittin.3     Smokeless tobacco: Never Used   Substance and Sexual Activity     Alcohol use: No     Drug use: No     Sexual activity: Not on file   Lifestyle     Physical activity     Days per week: Not on file     Minutes per session: Not on file     Stress: Not on file   Relationships     Social connections     Talks on phone: Not on file     Gets together: Not on file     Attends Zoroastrian service: Not on file     Active member of club or organization: Not on file     Attends meetings of clubs or organizations: Not on file     Relationship status: Not on file     Intimate partner violence     Fear of current or ex partner: Not on file     Emotionally abused: Not on file     Physically  "abused: Not on file     Forced sexual activity: Not on file   Other Topics Concern     Not on file   Social History Narrative    3/20/21: \"Bill\" is here in the ED with his wife today.         Review of Systems   Constitutional: Negative.    HENT: Negative.    Respiratory: Negative.    Cardiovascular: Negative.    Gastrointestinal: Negative.  Negative for abdominal distention and abdominal pain.   Genitourinary: Negative.    Musculoskeletal: Negative.    Skin: Positive for wound.   Neurological: Positive for weakness.       Vitals:    04/28/21 1332   BP: (!) 181/77   Pulse: 75   Resp: 20   Temp: 98.4  F (36.9  C)   SpO2: 96%   Weight: 219 lb 12.8 oz (99.7 kg)   Height: 6' 1\" (1.854 m)       Physical Exam  Constitutional:       Appearance: Normal appearance. He is normal weight. He is ill-appearing.   HENT:      Head: Atraumatic.      Mouth/Throat:      Mouth: Mucous membranes are moist.   Eyes:      General: No scleral icterus.     Extraocular Movements: Extraocular movements intact.   Cardiovascular:      Rate and Rhythm: Normal rate and regular rhythm.   Pulmonary:      Effort: Pulmonary effort is normal. No respiratory distress.      Breath sounds: No wheezing.      Comments: Diminished bilaterally   Abdominal:      General: Abdomen is flat. There is no distension.      Palpations: Abdomen is soft.      Tenderness: There is no abdominal tenderness.      Comments: Iliostomy    Musculoskeletal: Normal range of motion.      Right lower leg: No edema.      Left lower leg: No edema.      Comments: AV fistula to RUE.      Skin:     General: Skin is warm and dry.   Neurological:      General: No focal deficit present.   Psychiatric:         Mood and Affect: Mood normal.         Behavior: Behavior normal.           LABS:   Reviewed.     ASSESSMENT:      ICD-10-CM    1. ESRD (end stage renal disease) on dialysis (H)  N18.6     Z99.2    2. Adjustment disorder with depressed mood  F43.21    3. Status post ileostomy (H)  " Z93.2        PLAN:      Status post ileostomy  -Pantoprazole 40 mg daily.  -Reduce oxycodone to every 6 hours as needed.  -Tylenol 500 mg every 4 hours as needed.    End-stage renal disease  Anemia of chronic disease  -On Epogen at dialysis.  -Check CBC on Friday.  -Has dialysis Tuesday, Thursday, Saturday.  -Renal diet.  -Renal caps 1 mg daily.    CAD  A. Fib  History of CVA  -On warfarin, currently supratherapeutic, recheck Friday.  -Atorvastatin 80 mg at bedtime.  -Aspirin 81 daily.  -Clopidogrel 75 daily.  -Isosorbide 10 mg 3 times a day.    Allergic rhinitis:  -Flonase 2 sprays daily.    Disposition: Likely going to return to his home where he lives with his wife.    Greater than 35 minutes with greater than 50% spent face-to-face with patient and nursing discussing new ileostomy, wound care, disposition and TCU plan.    Electronically signed by: Candie Mcrae, CNP

## 2021-06-17 NOTE — PATIENT INSTRUCTIONS - HE
"Patient Instructions by John Benavidez DO at 2/6/2019  9:00 AM     Author: John Benavidez DO Service: -- Author Type: Physician    Filed: 2/6/2019  9:14 AM Encounter Date: 2/6/2019 Status: Addendum    : John Benavidez DO (Physician)    Related Notes: Original Note by John Benavidez DO (Physician) filed at 2/6/2019  9:13 AM         SLEEP HYGIENE    Sleep only as much as you need to feel rested and then get out of bed   Keep a regular sleep schedule   Avoid forcing sleep   Exercise regularly for at least 20 minutes, preferably 4 to 5 hours before bedtime   Avoid caffeinated beverages after lunch   Avoid alcohol near bedtime: no \"night cap\"   Avoid smoking, especially in the evening   Do not go to bed hungry   Adjust bedroom environment   Avoid prolonged use of light-emitting screens before bedtime    Deal with your worries before bedtime     Please bring your machine, mask, hose and power cord on the next clinical visit with me. Thank you.         "

## 2021-06-17 NOTE — TELEPHONE ENCOUNTER
Telephone Encounter by Yamila Luna RN at 3/5/2021 11:37 AM     Author: Yamila Luna RN Service: -- Author Type: Registered Nurse    Filed: 3/5/2021  3:19 PM Encounter Date: 3/5/2021 Status: Signed    : Yamila Luna RN (Registered Nurse)       ANTICOAGULATION  MANAGEMENT    Assessment     Today's INR result of 1.60 is Subtherapeutic (goal INR of 2.0-3.0)        Warfarin taken as previously instructed    - has pill box.    Increased greens/vitamin K intake may be affecting INR    - now eating more salads.   - Novasure Renal protein shake is given after dialysis.    No new medication/supplements affecting INR    Continues to tolerate warfarin with no reported s/s of bleeding or thromboembolism     Previous INR was Therapeutic at 2.30 on 2/24/21.    On 3/19/21 scheduled Coronary angiogram, PCI stent placement, and rotablator.    Sent information to Pharmacist, Ana West, PharmD to review and evaluate if bridging will be required while off warfarin.    Plan:     Spoke on phone with wife, Isabel  regarding INR result and instructed:   - Yair is at cardiac rehab.    Warfarin Dosing Instructions:  (evenings. 3mg tabs)   - tonight, one time booster dose with 4.5mg warfarin,   - then change warfarin dose to 3 mg daily.   - (7.7 % change)    Instructed patient to follow up no later than: 1-2 wks.   - INR scheduled on 3/15/21 @ [facility]    Education provided: importance of consistent vitamin K intake, impact of vitamin K foods on INR and target INR goal and significance of current INR result    Isabel verbalizes understanding and agrees to warfarin dosing plan.    Instructed to call the [facility] for any changes, questions or concerns. (#[phone])   ?   Yamila Luna RN    Subjective/Objective:      Alexei Blake, a 80 y.o. male is on warfarin. Yair Mazariegos reports:     Home warfarin dose: verbally confirmed home dose with Isabel and updated on anticoagulation calendar     Missed  doses: No     Medication changes:  No     S/S of bleeding or thromboembolism:  No     New Injury or illness:  Yes:  Dialysis 3x per wk on Tues/Thurs/Sat.     Changes in diet or alcohol consumption:  Yes:  Eating more salads.     Upcoming surgery, procedure or cardioversion:  Yes: scheduled for another angiogram  On 3/19/21, as there was an artery they were not able to open up    Anticoagulation Episode Summary     Current INR goal:  2.0-3.0   TTR:  96.4 % (11.7 mo)   Next INR check:  3/16/2021   INR from last check:  1.60 (3/5/2021)   Weekly max warfarin dose:     Target end date:     INR check location:     Preferred lab:     Send INR reminders to:  [facility]    Indications    PVD (peripheral vascular disease) (H) [I73.9]  Stroke (H) [I63.9]           Comments:           Anticoagulation Care Providers     Provider Role Specialty Phone number    Jesus Medrano MD Referring Internal Medicine [phone]

## 2021-06-17 NOTE — TELEPHONE ENCOUNTER
Telephone Encounter by Yamila Luna RN at 2/4/2021  2:10 PM     Author: Yamila Luna RN Service: -- Author Type: Registered Nurse    Filed: 2/4/2021  4:16 PM Encounter Date: 2/4/2021 Status: Signed    : Yamila Luna RN (Registered Nurse)       ANTICOAGULATION  MANAGEMENT    Assessment     Today's INR result of 3.20 is Supratherapeutic (goal INR of 2.0-3.0)        Warfarin taken as previously instructed    No new diet changes affecting INR    Potential interaction between Aspirin and warfarin which may affect subsequent INRs    - now on Aspirin 81mg and has been on long term Plavix 75mg daily.    Continues to tolerate warfarin with no reported s/s of bleeding or thromboembolism     Previous INR was Subtherapeutic at 1.90 on 1/26/21.    s/p Angoplasty with PCI  Stent placement on 1/21/21.    Reported feeling good.     Plan:     Spoke on phone with Yair regarding INR result and instructed:      Warfarin Dosing Instructions:   (evenigs. Now has Jantoven 3mg tabs)   - Change warfarin dose to 1.5 mg daily on Thursdays. and 3 mg daily rest of week.   - (5 % change)    Instructed patient to follow up no later than: 2 wks.   - INR scheduled on 2/24/21 during f/u visit [facility].   - they will be talking about triple therapy (ASA, Plavix,Warfarin).    Education provided: importance of consistent vitamin K intake, target INR goal and significance of current INR result, potential interaction between warfarin and Aspirin and importance of notifying clinic for changes in medications    Yair verbalizes understanding and agrees to warfarin dosing plan.    Instructed to call the [facility] for any changes, questions or concerns. (#[phone])   ?   Yamila Luna RN    Subjective/Objective:      Alexei JENSEN Lou, a 80 y.o. male is on warfarin.       Yair reports:     Home warfarin dose: verbally confirmed home dose with Yair / Isabel and updated on anticoagulation calendar     Missed doses: No      Medication changes:  Yes:  Now on Aspirin, Plavix, and warfarin (triple therapy)     S/S of bleeding or thromboembolism:  No     New Injury or illness:  Yes: recent MI.     Changes in diet or alcohol consumption:  No     Upcoming surgery, procedure or cardioversion:  No.  Chronic kidney dialysis 3xwk on T/TH/Sa.    Anticoagulation Episode Summary     Current INR goal:  2.0-3.0   TTR:  96.8 % (11.7 mo)   Next INR check:  2/18/2021   INR from last check:  3.20 (2/4/2021)   Weekly max warfarin dose:     Target end date:     INR check location:     Preferred lab:     Send INR reminders to:  [facility]    Indications    PVD (peripheral vascular disease) (H) [I73.9]  Stroke (H) [I63.9]           Comments:           Anticoagulation Care Providers     Provider Role Specialty Phone number    Jesus Medrano MD Referring Internal Medicine [phone]

## 2021-06-17 NOTE — TELEPHONE ENCOUNTER
Telephone Encounter by Yamila Luna RN at 1/11/2021  3:59 PM     Author: Yamila Luna RN Service: -- Author Type: Registered Nurse    Filed: 1/11/2021  4:03 PM Encounter Date: 1/11/2021 Status: Attested    : Yamila Luna RN (Registered Nurse) Cosigner: Ana Arango MD at 1/11/2021  4:06 PM    Attestation signed by Ana Arango MD at 1/11/2021  4:06 PM    Agree with above plan.                Anticoagulation Annual Referral Renewal Review    Alexei Blake's chart reviewed for annual renewal of referral to anticoagulation monitoring.        Criteria for anticoagulation nurse and/or pharmacist renewal met   Warfarin indication: Stroke and PVD Yes, per indication   Current with INR monitoring/compliant Yes Yes   Date of last office visit 12/3/2020 Yes, had office visit within last year   Time in Therapeutic Range (TTR) 97.8 % Yes, TTR > 60%       Alexei Blake met all criteria for anticoagulation management program initiated renewal.  New INR standing orders and anticoagulation referral renewal placed.      Yamila Luna RN  3:59 PM

## 2021-06-17 NOTE — PROGRESS NOTES
Dear Dr. Jesus Medrano Md  [address] [place] [address]    Thank you for the opportunity to participate in the care of Mr. Alexei Blake.    He is a 77 y.o. male who comes to the clinic for the transfer of care of his obstructive sleep apnea.  The patient was actually diagnosed with moderate obstructive sleep apnea from [facility] on 01/01/07 with an apnea hypopnea index of 20.6 events per hour with the lowest O2 sat of 83%.  The patient's sleep study will be scanned into his chart in the media section.  The patient and his wife would like to establish care to see he can get a new CPAP machine.  The patient states that the machine would occasionally shut off in the middle the night on its own.  Furthermore she complains that he's continues to feel tired despite adequate hours of usage and questions whether or not the pressure settings are correct.  The patient would also like to switch over durable medical equipment care to [facility] so that they can get a new mask.  The patient's review of systems significant for end-stage renal disease and is on dialysis.     Ideal Sleep-Wake Cycle(devoid of societal pressure):    Patient would try to initiate sleep at around 9 PM with a sleep latency of 20-30 minutes. The patient would have 2-3 awakening. Final wake up time is around 7 AM.    Compliance Download data for 30 Days:  Pressure setting:10  Residual AHI:3.6 events per hour  Leak:Minimal  Compliance:100%  Mask Tolerance:Poor  Skin irritation:None      Past Medical History  Past Medical History:   Diagnosis Date     Abscess of tongue      Chronic kidney disease     CKD stage 4,dialysis Tu-Th-Sa     Diabetes     type 2     DVT (deep venous thrombosis)      Hernia, umbilical      History of transfusion      Hyperlipidemia      Hypertension      CATHLEEN (obstructive sleep apnea)     uses CPAP     PVD (peripheral vascular disease)      Renal insufficiency      Stroke 2003    minor left sided weakness        Past  "Surgical History  Past Surgical History:   Procedure Laterality Date     ARTERIAL BYPASS SURGERY Bilateral     lower extremities, Dr. Cottrell     TOE AMPUTATION Bilateral     multiple     UMBILICAL HERNIA REPAIR N/A 4/29/2016    Procedure: OPEN UMBILICAL REPAIR WITH MESH;  Surgeon: Jevon Rivas MD;  Location: [facility];  Service:         Meds  Current Outpatient Prescriptions   Medication Sig Dispense Refill     ACCU-CHEK KYLE PLUS TEST STRP strips USE ONE STRIP TO CHECK GLUCOSE TWICE DAILY AT  6AM  AND  4PM  (TEST  TWO  TO  THREE  TIMES  DAILY  AS  DIRECTED) 300 strip 3     acetaminophen (TYLENOL) 500 MG tablet Take 1,000 mg by mouth bedtime as needed for pain (or sleep).        BD INSULIN SYRINGE ULTRA-FINE 1/2 mL 31 gauge x 15/64\" Syrg USE ONE SYRINGE TWICE DAILY 200 Syringe 3     bumetanide (BUMEX) 1 MG tablet Take 1 tablet (1 mg total) by mouth 2 (two) times a day at 9am and 6pm. On dialysis days only dose in the evening 180 tablet 3     calcium, as carbonate, (OS-AVNI) 500 mg calcium (1,250 mg) tablet Take 1 tablet by mouth daily.       clopidogrel (PLAVIX) 75 mg tablet Take 1 tablet (75 mg total) by mouth daily. 90 tablet 3     hydrALAZINE (APRESOLINE) 25 MG tablet TAKE 1 TABLET TWICE DAILY 180 tablet 2     insulin NPH (NOVOLIN N NPH U-100 INSULIN) 100 unit/mL injection Give 18 units subcutaneously every am and 6 units subcutaneously every pm E11.9 10 mL 3     insulin NPH (NOVOLIN) 100 unit/mL injection Inject 5-6 Units under the skin 2 (two) times a day. (based on blood sugars)       lisinopril (PRINIVIL,ZESTRIL) 30 MG tablet Take 1 tablet (30 mg total) by mouth every evening. 90 tablet 3     lovastatin (MEVACOR) 40 MG tablet Take 1 tablet (40 mg total) by mouth at bedtime. 90 tablet 3     metoprolol succinate (TOPROL-XL) 25 MG Take 1 tablet (25 mg total) by mouth every evening. 90 tablet 3     multivitamin therapeutic tablet Take 1 tablet by mouth daily.       NOVOLIN R 100 unit/mL injection 1 " unit for every 25>150 10 mL PRN     warfarin (COUMADIN) 4 MG tablet TAKE 1 TABLET (4 MG) ON TUESDAY AND SATURDAY AND TAKE 1 AND 1/2 TABLETS (6 MG) ALL OTHER DAYS 124 tablet 3     mirtazapine (REMERON) 7.5 MG tablet Take 1 tablet (7.5 mg total) by mouth at bedtime. 60 tablet 0     No current facility-administered medications for this visit.         Allergies  Calcitriol and Ciprofloxacin     Social History  Social History     Social History     Marital status:      Spouse name: N/A     Number of children: N/A     Years of education: N/A     Occupational History     Not on file.     Social History Main Topics     Smoking status: Former Smoker     Quit date: 1/1/1995     Smokeless tobacco: Never Used     Alcohol use No     Drug use: No     Sexual activity: Not on file     Other Topics Concern     Not on file     Social History Narrative        Family History  Family History   Problem Relation Age of Onset     Hypertension Mother      Hypertension Father      Diabetes Father      Heart attack Father         Review of Systems:  Constitutional: Negative except as noted in HPI.   Eyes: Negative except as noted in HPI.   ENT: Negative except as noted in HPI.   Cardiovascular: Negative except as noted in HPI.   Respiratory: Negative except as noted in HPI.   Gastrointestinal: Negative except as noted in HPI.   Genitourinary: Negative except as noted in HPI.   Musculoskeletal: Negative except as noted in HPI.   Integumentary: Negative except as noted in HPI.   Neurological: Negative except as noted in HPI.   Psychiatric: Negative except as noted in HPI.   Endocrine: Negative except as noted in HPI.   Hematologic/Lymphatic: Negative except as noted in HPI.      STOP BANG 4/9/2018   Do you snore loudly (louder than talking or loud enough to be heard through closed doors)? 1   Do you often feel tired, fatigued, or sleepy during daytime? 1   Has anyone observed you stop breathing in your sleep? 0   Do you have or are you  "being treated for high blood pressure? 1   BMI more than 35 kg/m2 0   Age over 50 years old? 1   Neck circumference greater than 16 inches? 0   Gender male? 1   Total Score 5   Epworths Sleepiness Scale 4/9/2018   Sitting and reading 3   Watching TV 2   Sitting, inactive in a public place (e.g. a theatre or a meeting) 0   As a passenger in a car for an hour without a break 0   Lying down to rest in the afternoon when circumstances permit 0   Sitting and talking to someone 0   Sitting quietly after a lunch without alcohol 0   In a car, while stopped for a few minutes in traffic 0   Total score 5   Rooming 4/9/2018   Usual bedtime 9   Sleep Latency 20-30 mn   Awakenings 2-3   Wake Up Time 6-8   Energy Drinks 0   Coffee 1   Cola 0   Difficulty falling asleep No   Difficulty staying asleep Yes   Excessive daytime tiredness (No Data)   Excessive daytime sleepiness (No Data)   Dozing off while driving No   Shift Worker No   Sleep Walking? No   Kicking or punching? No   Restless legs symptoms Yes       Physical Exam:  /64  Pulse (!) 55  Ht 6' 1\" (1.854 m)  Wt (!) 228 lb 3.2 oz (103.5 kg)  SpO2 97%  BMI 30.11 kg/m2  BMI:Body mass index is 30.11 kg/(m^2).   GEN: NAD, obese  Head: Normocephalic.  EYES: PERRLA, EOMI  ENT: Oropharynx is clear, mallampatti class 4+ airway.   Nasal mucosa is moist without erythema  Neck : Thyroid is within normal limits. Neck circ 15.5\"  CV: Regular rate and rhythm, S1 & S2 positive.  LUNGS: Bilateral breathsounds heard.   ABDOMEN: Positive bowel sounds in all quadrants, soft, no rebound or guarding  MUSCULOSKELETAL: Bilateral trace leg swelling  SKIN: warm, dry, no rashes  Neurological: Alert, oriented to time, place, and person.  Psych: normal mood, normal affect     Labs/Studies:     Lab Results   Component Value Date    WBC 6.5 09/09/2016    HGB 13.3 (L) 01/27/2017    HCT 35.5 (L) 09/09/2016     09/09/2016     01/27/2017         Chemistry        Component Value " Date/Time     09/09/2016 1457    K 4.3 01/27/2017 1020     09/09/2016 1457    CO2 27 09/09/2016 1457    BUN 41 (H) 09/09/2016 1457    CREATININE 4.52 (H) 09/09/2016 1457     (H) 09/09/2016 1457        Component Value Date/Time    CALCIUM 9.2 09/09/2016 1457    ALKPHOS 88 09/09/2016 1457    AST 36 09/09/2016 1457    ALT 41 09/09/2016 1457    BILITOT 0.4 09/09/2016 1457            Lab Results   Component Value Date    FERRITIN 113 12/24/2015     Lab Results   Component Value Date    TSH 2.58 09/09/2016         Assessment and Plan:  In summary Alexei Blake is a 77 y.o. year old male here for transfer of care.  1.  Obstructive sleep apnea on CPAP  I had the patient sign a transfer of the durable medical equipment care over to us.  I will also give him the phone number to contact the durable medical equipment person once the transfer has been completed.  I informed the patient that it is up to the durable medical equipment person to make determination as to whether or not he qualifies for a new CPAP machine.  However I will put in the prescription today.  2.  Other sleep disturbance    Patient verbalized understanding of these issues, agrees with the plan and all questions were answered today. Patient was given an opportuntity to voice any other symptoms or concerns not listed above. Patient did not have any other symptoms or concerns.      Patient told to return in one week after the sleep study is interpreted.      John Benavidez DO  Board Certified in Internal Medicine and Sleep Medicine  [facility].    (Note created with Dragon voice recognition and unintended spelling errors and word substitutions may occur)

## 2021-06-17 NOTE — TELEPHONE ENCOUNTER
Telephone Encounter by Clem Wong, RN at 3/16/2021  2:40 PM     Author: Clem Wong RN Service: -- Author Type: Registered Nurse    Filed: 3/16/2021  2:40 PM Encounter Date: 2/23/2021 Status: Signed    : Clem Wong, RN (Registered Nurse)       Tuyet Arreguin MD  You 8 days ago     Just fluids at 50 ml/hr prior to cath as he will be NPO and we want to avoid hypotension.  What did we do with his lisinopril last time?     EG

## 2021-06-17 NOTE — TELEPHONE ENCOUNTER
[facility] Nurse Triage Anticoagulation Note      Provider: SETH Carty  Facility: [facility]    Facility Type: TCU    Caller: Patricia  Call Back Number:  [phone]    Reason for call: INR    Today s INR: 2.5  Previous INR: 2.5 (5/7), 2.5 mg daily    Diagnosis/Goal: AFIB  Heparin/Lovenox: No   Currently on ABX: No  Other interacting Medications: None  Missed or refused doses: No    Verbal Order/Direction given by Provider: Start Coumadin 2 mg po daily and recheck INR on 5/17.    Provider giving order: SETH Soto    Verbal order given to: Enrrique Rodas RN

## 2021-06-17 NOTE — PROGRESS NOTES
[facility]    Facility:   [facility] [[id number]]   Code Status: POLST AVAILABLE      CHIEF COMPLAINT/REASON FOR VISIT:  Chief Complaint   Patient presents with     Problem Visit     zofran, hold coumadin, cbc       HISTORY:      HPI: Alexei is a 81 y.o. male with pmh of ESRD on dialysis three times weekly, CVA with residual left sided weakness, htn, who presented to the ED with acute onset of abdominal pain during dialysis.  He had ischemic changes of the right colon and was brought to the OR for colonic resection and ileostomy on 331.  He also had an ileostomy revision on 4/21.  He is in the TCU for medical management, wound management for the abdominal wound secondary to repair, and therapy for strengthening.  His blood pressure meds were titrated and he is now on isosorbide 10 mg 3 times daily, metoprolol twice daily, with all meds to be held prior to dialysis due to blood pressure drops during his runs.  He has dialysis on Tuesday, Thursday and Saturdays.  For his anemia, will be starting Epogen at hemodialysis.  He is seen today lying in bed after therapy, he has been having persistent nausea since arriving at the TCU.  He does have a small amount of dark, formed stool in the ileostomy bag.  His wound is unable to be viewed as it is underneath the tape to the ileostomy bag.  Nursing has already changed it today.  His pain is well controlled.    Today: Continues to have supratherapuetic INR; continuing to hold coumadin. Recheck due Friday. Nausea greatly improved. Eating and drinking better with improved stool output into ostomy bag. Wound followed by nursing closely. Dialysis on T.TH,Sat. CBC due tomorrow. He is doing well and more conversational today. Reports feeling better. Wife is visiting a few hours a day each day.     Past Medical History:   Diagnosis Date     Abscess of tongue      Chronic kidney disease     CKD stage 4,dialysis Tu-Th-Sa     Diabetes (H)     type 2  "    DVT (deep venous thrombosis) (H)      End stage renal disease (H)      Hernia, umbilical      History of transfusion      Hyperlipidemia      Hypertension      CATHLEEN (obstructive sleep apnea)     uses CPAP     PVD (peripheral vascular disease) (H)      Renal insufficiency      Stroke (H)     minor left sided weakness             Family History   Problem Relation Age of Onset     Hypertension Mother      Hypertension Father      Diabetes Father      Heart attack Father      Social History     Socioeconomic History     Marital status:      Spouse name: Not on file     Number of children: 3     Years of education: Not on file     Highest education level: Not on file   Occupational History     Occupation: Retired - [employer]   Social Needs     Financial resource strain: Not on file     Food insecurity     Worry: Not on file     Inability: Not on file     Transportation needs     Medical: Not on file     Non-medical: Not on file   Tobacco Use     Smoking status: Former Smoker     Packs/day: 0.00     Quit date: 1995     Years since quittin.3     Smokeless tobacco: Never Used   Substance and Sexual Activity     Alcohol use: No     Drug use: No     Sexual activity: Not on file   Lifestyle     Physical activity     Days per week: Not on file     Minutes per session: Not on file     Stress: Not on file   Relationships     Social connections     Talks on phone: Not on file     Gets together: Not on file     Attends [employer] service: Not on file     Active member of club or organization: Not on file     Attends meetings of clubs or organizations: Not on file     Relationship status: Not on file     Intimate partner violence     Fear of current or ex partner: Not on file     Emotionally abused: Not on file     Physically abused: Not on file     Forced sexual activity: Not on file   Other Topics Concern     Not on file   Social History Narrative    3/20/21: \"Bill\" is here in the ED with his wife " "today.         Review of Systems   Constitutional: Negative.    HENT: Negative.    Respiratory: Negative.    Cardiovascular: Negative.    Gastrointestinal: Positive for nausea. Negative for abdominal distention and abdominal pain.   Genitourinary: Negative.    Musculoskeletal: Negative.    Skin: Positive for wound.   Neurological: Positive for weakness.       Vitals:    05/05/21 1412   BP: 165/79   Pulse: 75   Resp: 18   Temp: 97.8  F (36.6  C)   SpO2: 97%   Weight: 208 lb 14.4 oz (94.8 kg)   Height: 6' 1\" (1.854 m)       Physical Exam  Constitutional:       Appearance: Normal appearance. He is normal weight. He is ill-appearing.   HENT:      Head: Atraumatic.      Mouth/Throat:      Mouth: Mucous membranes are moist.   Eyes:      General: No scleral icterus.     Extraocular Movements: Extraocular movements intact.   Cardiovascular:      Rate and Rhythm: Normal rate and regular rhythm.   Pulmonary:      Effort: Pulmonary effort is normal. No respiratory distress.      Breath sounds: No wheezing.      Comments: Diminished bilaterally   Abdominal:      General: Abdomen is flat. There is no distension.      Palpations: Abdomen is soft.      Tenderness: There is no abdominal tenderness.      Comments: Iliostomy    Musculoskeletal: Normal range of motion.      Right lower leg: No edema.      Left lower leg: No edema.      Comments: AV fistula to RUE.      Skin:     General: Skin is warm and dry.   Neurological:      General: No focal deficit present.   Psychiatric:         Mood and Affect: Mood normal.         Behavior: Behavior normal.           LABS:   Reviewed.     ASSESSMENT:      ICD-10-CM    1. Status post ileostomy (H)  Z93.2    2. ESRD (end stage renal disease) on dialysis (H)  N18.6     Z99.2    3. Paroxysmal atrial fibrillation (H)  I48.0        PLAN:      Nausea: post op. VS stable, afebrile. Good output in illeostomy bag. Nausea greatly improved with zofran.   -Continue zofran 4mg po three times a day with " meals.     Status post ileostomy: dark stool in bag.   -Pantoprazole 40 mg daily.  -Reduce oxycodone to every 6 hours as needed.  -Tylenol 500 mg every 4 hours as needed.    End-stage renal disease  Anemia of chronic disease  Poor PO intake.  -On Epogen at dialysis.  -Check CBC on Friday.  -Has dialysis Tuesday, Thursday, Saturday.  -Liberalize diet to general diet due to poor po intake.   -Renal caps 1 mg daily.    CAD  A. Fib  History of CVA  -On warfarin, currently 3.7, Hold dose and recheck Friday.  -Atorvastatin 80 mg at bedtime.  -Aspirin 81 daily.  -Clopidogrel 75 daily.  -Isosorbide 10 mg 3 times a day.    Allergic rhinitis:  -Flonase 2 sprays daily.    Disposition: Likely going to return to his home where he lives with his wife.      Electronically signed by: Candie Mcrae, CNP

## 2021-06-17 NOTE — PROGRESS NOTES
[facility]    Facility:   [facility] [[id number]]   Code Status: POLST AVAILABLE      CHIEF COMPLAINT/REASON FOR VISIT:  Chief Complaint   Patient presents with     Problem Visit     Nausea, warfarin monitoring.       HISTORY:      HPI: Alexei is a 81 y.o. male with pmh of ESRD on dialysis three times weekly, CVA with residual left sided weakness, htn, who presented to the ED with acute onset of abdominal pain during dialysis.  He had ischemic changes of the right colon and was brought to the OR for colonic resection and ileostomy on 331.  He also had an ileostomy revision on 4/21.  He is in the TCU for medical management, wound management for the abdominal wound secondary to repair, and therapy for strengthening.  His blood pressure meds were titrated and he is now on isosorbide 10 mg 3 times daily, metoprolol twice daily, with all meds to be held prior to dialysis due to blood pressure drops during his runs.  He has dialysis on Tuesday, Thursday and Saturdays.  For his anemia, will be starting Epogen at hemodialysis.  He is seen today lying in bed after therapy, he has been having persistent nausea since arriving at the TCU.  He does have a small amount of dark, formed stool in the ileostomy bag.  His wound is unable to be viewed as it is underneath the tape to the ileostomy bag.  Nursing has already changed it today.  His pain is well controlled.    Today: Seen today for review of multiple conditions, INR monitoring, follow-up to persistent nausea.  INR today 1.9, will increase Coumadin 3 mg for 2 days a week and 2.5 all other days.  Recheck Friday.  He is reporting great improvement in nausea and has been able to eat and drink without issue.  He is willing to trial reduction in Zofran which is currently 3 times a day before meals, will go to twice daily and the one dose as needed.  Otherwise he is doing quite well, tolerating dialysis, he did see his surgeon who was happy  with the progress of his ileostomy.  His abdominal wound is healing well.    Past Medical History:   Diagnosis Date     Abscess of tongue      Chronic kidney disease     CKD stage 4,dialysis      Diabetes (H)     type 2     DVT (deep venous thrombosis) (H)      End stage renal disease (H)      Hernia, umbilical      History of transfusion      Hyperlipidemia      Hypertension      CATHLEEN (obstructive sleep apnea)     uses CPAP     PVD (peripheral vascular disease) (H)      Renal insufficiency      Stroke (H)     minor left sided weakness             Family History   Problem Relation Age of Onset     Hypertension Mother      Hypertension Father      Diabetes Father      Heart attack Father      Social History     Socioeconomic History     Marital status:      Spouse name: Not on file     Number of children: 3     Years of education: Not on file     Highest education level: Not on file   Occupational History     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 [employer]   Social Needs     Financial resource strain: Not on file     Food insecurity     Worry: Not on file     Inability: Not on file     Transportation needs     Medical: Not on file     Non-medical: Not on file   Tobacco Use     Smoking status: Former Smoker     Packs/day: 0.00     Quit date: 1995     Years since quittin.4     Smokeless tobacco: Never Used   Substance and Sexual Activity     Alcohol use: No     Drug use: No     Sexual activity: Not on file   Lifestyle     Physical activity     Days per week: Not on file     Minutes per session: Not on file     Stress: Not on file   Relationships     Social connections     Talks on phone: Not on file     Gets together: Not on file     Attends Evangelical service: Not on file     Active member of club or organization: Not on file     Attends meetings of clubs or organizations: Not on file     Relationship status: Not on file     Intimate partner violence     Fear of current or ex partner: Not on file  "    Emotionally abused: Not on file     Physically abused: Not on file     Forced sexual activity: Not on file   Other Topics Concern     Not on file   Social History Narrative    3/20/21: \"Yair\" is here in the ED with his wife today.         Review of Systems   Constitutional: Negative.    HENT: Negative.    Respiratory: Negative.    Cardiovascular: Negative.    Gastrointestinal: Negative for abdominal distention, abdominal pain and nausea.   Genitourinary: Negative.    Musculoskeletal: Negative.    Skin: Positive for wound.   Neurological: Positive for weakness.       Vitals:    05/17/21 1002   BP: 137/72   Pulse: 66   Resp: 18   Temp: 98.2  F (36.8  C)   SpO2: 100%   Weight: 197 lb 6.4 oz (89.5 kg)   Height: 6' 1\" (1.854 m)       Physical Exam  Constitutional:       Appearance: Normal appearance. He is normal weight. He is ill-appearing.   HENT:      Head: Atraumatic.      Mouth/Throat:      Mouth: Mucous membranes are moist.   Eyes:      General: No scleral icterus.     Extraocular Movements: Extraocular movements intact.   Cardiovascular:      Rate and Rhythm: Normal rate and regular rhythm.   Pulmonary:      Effort: Pulmonary effort is normal. No respiratory distress.      Breath sounds: No wheezing.      Comments: Diminished bilaterally   Abdominal:      General: Abdomen is flat. There is no distension.      Palpations: Abdomen is soft.      Tenderness: There is no abdominal tenderness.      Comments: Iliostomy    Musculoskeletal: Normal range of motion.      Right lower leg: No edema.      Left lower leg: No edema.      Comments: AV fistula to RUE.      Skin:     General: Skin is warm and dry.   Neurological:      General: No focal deficit present.   Psychiatric:         Mood and Affect: Mood normal.         Behavior: Behavior normal.           LABS:   Reviewed.     ASSESSMENT:      ICD-10-CM    1. ESRD (end stage renal disease) on dialysis (H)  N18.6     Z99.2    2. Status post ileostomy (H)  Z93.2    3. " Nausea  R11.0        PLAN:      Nausea: post op. VS stable, afebrile. Good output in illeostomy bag. Nausea greatly improved with zofran.   -Change Zofran to 4 mg p.o. twice daily before meals, and 4 mg p.o. daily as needed.    Status post ileostomy: dark stool in bag.   -Pantoprazole 40 mg daily.  -Reduce oxycodone to every 6 hours as needed.  -Tylenol 500 mg every 4 hours as needed.    End-stage renal disease  Anemia of chronic disease  -On Epogen at dialysis.  -Has dialysis Tuesday, Thursday, Saturday.  -Liberalize diet to general diet due to poor po intake.  Dialysis monitoring.  -Renal caps 1 mg daily.    CAD  A. Fib  History of CVA  -On warfarin, INR 1.9, increase Coumadin 3 mg Mondays and Thursdays, 2.5 all other days.  -Recheck INR Friday.  -Atorvastatin 80 mg at bedtime.  -Aspirin 81 daily.  -Clopidogrel 75 daily.  -Isosorbide 10 mg 3 times a day.    Allergic rhinitis:  -Flonase 2 sprays daily.    Disposition: Likely going to return to his home where he lives with his wife.      Electronically signed by: Candie Mcrae, CNP

## 2021-06-17 NOTE — PROGRESS NOTES
[facility]    Facility:   [facility] [[id number]]   Code Status: POLST AVAILABLE      CHIEF COMPLAINT/REASON FOR VISIT:  Chief Complaint   Patient presents with     Problem Visit     Illeostomy , 4.9 INR       HISTORY:      HPI: Alexei is a 80 y.o. male with pmh of ESRD on dialysis three times weekly, CVA with residual left sided weakness, htn, who presented to the ED with acute onset of abdominal pain during dialysis.  He had ischemic changes of the right colon and was brought to the OR for colonic resection and ileostomy on 331.  He also had an ileostomy revision on 4/21.  He is in the TCU for medical management, wound management for the abdominal wound secondary to repair, and therapy for strengthening.  His blood pressure meds were titrated and he is now on isosorbide 10 mg 3 times daily, metoprolol twice daily, with all meds to be held prior to dialysis due to blood pressure drops during his runs.  He has dialysis on Tuesday, Thursday and Saturdays.  For his anemia, will be starting Epogen at hemodialysis.  He is seen today lying in bed after therapy, he has been having persistent nausea since arriving at the TCU.  He does have a small amount of dark, formed stool in the ileostomy bag.  His wound is unable to be viewed as it is underneath the tape to the ileostomy bag.  Nursing has already changed it today.  His pain is well controlled.    Today he is laying in bed after feeling tired from dialysis yesterday. He was able to ambulate in and out of the car to go to dialysis. He is minimally conversational today. He reports continued nausea since his discharge from the hospital. He's willing to trial scheduled zofran over the weekend. VS are stable and his illeostomy has dark stool output in bag.     Past Medical History:   Diagnosis Date     Abscess of tongue      Chronic kidney disease     CKD stage 4,dialysis [facility]     Diabetes (H)     type 2     DVT (deep venous  "thrombosis) (H)      End stage renal disease (H)      Hernia, umbilical      History of transfusion      Hyperlipidemia      Hypertension      CATHLEEN (obstructive sleep apnea)     uses CPAP     PVD (peripheral vascular disease) (H)      Renal insufficiency      Stroke (H) 2003    minor left sided weakness             Family History   Problem Relation Age of Onset     Hypertension Mother      Hypertension Father      Diabetes Father      Heart attack Father      Social History     Socioeconomic History     Marital status:      Spouse name: Not on file     Number of children: 3     Years of education: Not on file     Highest education level: Not on file   Occupational History     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 [employer]   Social Needs     Financial resource strain: Not on file     Food insecurity     Worry: Not on file     Inability: Not on file     Transportation needs     Medical: Not on file     Non-medical: Not on file   Tobacco Use     Smoking status: Former Smoker     Packs/day: 0.00     Quit date: 1995     Years since quittin.3     Smokeless tobacco: Never Used   Substance and Sexual Activity     Alcohol use: No     Drug use: No     Sexual activity: Not on file   Lifestyle     Physical activity     Days per week: Not on file     Minutes per session: Not on file     Stress: Not on file   Relationships     Social connections     Talks on phone: Not on file     Gets together: Not on file     Attends Mandaen service: Not on file     Active member of club or organization: Not on file     Attends meetings of clubs or organizations: Not on file     Relationship status: Not on file     Intimate partner violence     Fear of current or ex partner: Not on file     Emotionally abused: Not on file     Physically abused: Not on file     Forced sexual activity: Not on file   Other Topics Concern     Not on file   Social History Narrative    3/20/21: \"Bill\" is here in the ED with his wife today. " "        Review of Systems   Constitutional: Negative.    HENT: Negative.    Respiratory: Negative.    Cardiovascular: Negative.    Gastrointestinal: Positive for nausea. Negative for abdominal distention and abdominal pain.   Genitourinary: Negative.    Musculoskeletal: Negative.    Skin: Positive for wound.   Neurological: Positive for weakness.       Vitals:    04/30/21 1350   BP: 135/69   Pulse: 68   Resp: 16   Temp: 98  F (36.7  C)   SpO2: 95%   Weight: 211 lb 9.6 oz (96 kg)   Height: 6' 1\" (1.854 m)       Physical Exam  Constitutional:       Appearance: Normal appearance. He is normal weight. He is ill-appearing.   HENT:      Head: Atraumatic.      Mouth/Throat:      Mouth: Mucous membranes are moist.   Eyes:      General: No scleral icterus.     Extraocular Movements: Extraocular movements intact.   Cardiovascular:      Rate and Rhythm: Normal rate and regular rhythm.   Pulmonary:      Effort: Pulmonary effort is normal. No respiratory distress.      Breath sounds: No wheezing.      Comments: Diminished bilaterally   Abdominal:      General: Abdomen is flat. There is no distension.      Palpations: Abdomen is soft.      Tenderness: There is no abdominal tenderness.      Comments: Iliostomy    Musculoskeletal: Normal range of motion.      Right lower leg: No edema.      Left lower leg: No edema.      Comments: AV fistula to RUE.      Skin:     General: Skin is warm and dry.   Neurological:      General: No focal deficit present.   Psychiatric:         Mood and Affect: Mood normal.         Behavior: Behavior normal.           LABS:   Reviewed.     ASSESSMENT:      ICD-10-CM    1. Status post ileostomy (H)  Z93.2    2. ESRD (end stage renal disease) on dialysis (H)  N18.6     Z99.2    3. Paroxysmal atrial fibrillation (H)  I48.0        PLAN:      Nausea: post op. VS stable, afebrile. Good output in illeostomy bag.   -Start zofran 4mg po three times a day with meals x 72 hours. Follow up Monday. (qtc WNL per most " recent ekg).     Status post ileostomy: dark stool in bag.   -Pantoprazole 40 mg daily.  -Reduce oxycodone to every 6 hours as needed.  -Tylenol 500 mg every 4 hours as needed.    End-stage renal disease  Anemia of chronic disease  -On Epogen at dialysis.  -Check CBC on Friday.  -Has dialysis Tuesday, Thursday, Saturday.  -Renal diet.  -Renal caps 1 mg daily.    CAD  A. Fib  History of CVA  -On warfarin, currently 4.90, recheck Monday.   -Atorvastatin 80 mg at bedtime.  -Aspirin 81 daily.  -Clopidogrel 75 daily.  -Isosorbide 10 mg 3 times a day.    Allergic rhinitis:  -Flonase 2 sprays daily.    Disposition: Likely going to return to his home where he lives with his wife.      Electronically signed by: Candie Mcrae CNP

## 2021-06-18 NOTE — PATIENT INSTRUCTIONS - HE
Patient Instructions by Ai Armstrong NP at 3/13/2020  2:20 PM     Author: Ai Armstrong NP Service: -- Author Type: Nurse Practitioner    Filed: 3/13/2020  3:11 PM Encounter Date: 3/13/2020 Status: Addendum    : Ai Armstrong NP (Nurse Practitioner)    Related Notes: Original Note by Ai Armstrong NP (Nurse Practitioner) filed at 3/13/2020  3:01 PM       We will get you scheduled for ZHAO and venous insufficiency testing    We took a culture today this will take 5 days to get results we will call you    We will run insurance for graft product called epifix    We will get you a velcro wrap for the left leg to be used once healed      Wound Care Instructions    every 3 days Cleanse your left shin wound(s) with Dilute hibiclens 30cc in 500cc NS    Pat Dry with non-sterile gauze    Apply Lotion to the intact skin surrounding your wound and other dry skin locations. Some good lotions include: Remedy Skin Repair Cream, Sarna, Vanicream or Cetaphil    Apply silvercel into/onto the wounds    Cover with gauze    Secure with non-sterile roll gauze and tape as needed; avoid adhesive directly on the skin    Compression: tubular compression and short stretch    It is not ok to get your wound wet in the bath or shower    SEEK MEDICAL CARE IF:    You have an increase in swelling, pain, or redness around the wound.    You have an increase in the amount of pus coming from the wound.    There is a bad smell coming from the wound.    The wound appears to be worsening/enlarging    You have a fever greater than 101.5 F        It is ok to continue current wound care treatment/products for the next 2-3 days until new wound care supplies are ordered and arrive. If longer than this please contact our office at [phone].      Ai Armstrong DNP, RN, CNP, [facility]  [facility]  [phone]                        DO NOT STRAP VELCRO TO !  1.2.        3.4.      It is recommended that you do not  get your ulcer wet when showering.  Listed below are several ways of keeping it dry when you shower.     1. Wrap it with [employer] plastic wrap.  It can be found in the stores where the plastic wraps or tin foil is kept.    2.  Some people take a bath and hang their leg/foot out of the tub.    3  Put your leg in a plastic bag and tape it on.         4. You can purchase a shower cover for casts at some pharmacies and through the Internet.

## 2021-06-18 NOTE — PROGRESS NOTES
Dear Dr. Jesus Medrano MD  [address] [place] [address],    Thank you for the opportunity to participate in the care of Alexei Blake.     He is a 78 y.o. y/o male patient who comes to the sleep medicine clinic for follow up.  This is the patient's first clinical visit since starting CPAP therapy on his new machine.  He states that his sleep quality and energy level have both improved.  He likes this newer CPAP machine compared to his old one.  The patient otherwise has no complaints.    Compliance Download data for 30 days:  Pressure setting: 10-14 CWP  Residual AHI: 5.2 events per hour  Leak: Minimal  Compliance: 100%  Mask Tolerance: Good  Skin irritation: None      Past Medical History:   Diagnosis Date     Abscess of tongue      Chronic kidney disease     CKD stage 4,dialysis [facility]     Diabetes     type 2     DVT (deep venous thrombosis)      Hernia, umbilical      History of transfusion      Hyperlipidemia      Hypertension      CATHLEEN (obstructive sleep apnea)     uses CPAP     PVD (peripheral vascular disease)      Renal insufficiency      Stroke 2003    minor left sided weakness       Past Surgical History:   Procedure Laterality Date     ARTERIAL BYPASS SURGERY Bilateral     lower extremities, Dr. Cottrell     TOE AMPUTATION Bilateral     multiple     UMBILICAL HERNIA REPAIR N/A 4/29/2016    Procedure: OPEN UMBILICAL REPAIR WITH MESH;  Surgeon: Jevon Rivas MD;  Location: [facility];  Service:        Social History     Social History     Marital status:      Spouse name: N/A     Number of children: N/A     Years of education: N/A     Occupational History     Not on file.     Social History Main Topics     Smoking status: Former Smoker     Quit date: 1/1/1995     Smokeless tobacco: Never Used     Alcohol use No     Drug use: No     Sexual activity: Not on file     Other Topics Concern     Not on file     Social History Narrative       Current Outpatient Prescriptions  "  Medication Sig Dispense Refill     ACCU-CHEK KYLE PLUS TEST STRP strips USE ONE STRIP TO CHECK GLUCOSE TWICE DAILY AT  6AM  AND  4PM  (TEST  TWO  TO  THREE  TIMES  DAILY  AS  DIRECTED) 300 strip 3     acetaminophen (TYLENOL) 500 MG tablet Take 1,000 mg by mouth bedtime as needed for pain (or sleep).        BD INSULIN SYRINGE ULTRA-FINE 1/2 mL 31 gauge x 15/64\" Syrg USE ONE SYRINGE TWICE DAILY 200 Syringe 3     bumetanide (BUMEX) 1 MG tablet Take 1 tablet (1 mg total) by mouth 2 (two) times a day at 9am and 6pm. On dialysis days only dose in the evening 180 tablet 3     calcium, as carbonate, (OS-AVNI) 500 mg calcium (1,250 mg) tablet Take 1 tablet by mouth daily.       clopidogrel (PLAVIX) 75 mg tablet Take 1 tablet (75 mg total) by mouth daily. 90 tablet 3     hydrALAZINE (APRESOLINE) 25 MG tablet TAKE 1 TABLET TWICE DAILY 180 tablet 2     insulin NPH (NOVOLIN N NPH U-100 INSULIN) 100 unit/mL injection Give 18 units subcutaneously every am and 6 units subcutaneously every pm E11.9 10 mL 3     insulin NPH (NOVOLIN) 100 unit/mL injection Inject 5-6 Units under the skin 2 (two) times a day. (based on blood sugars)       lisinopril (PRINIVIL,ZESTRIL) 30 MG tablet Take 1 tablet (30 mg total) by mouth every evening. 90 tablet 3     lovastatin (MEVACOR) 40 MG tablet Take 1 tablet (40 mg total) by mouth at bedtime. 90 tablet 3     metoprolol succinate (TOPROL-XL) 25 MG Take 1 tablet (25 mg total) by mouth every evening. 90 tablet 3     mirtazapine (REMERON) 7.5 MG tablet Take 1 tablet (7.5 mg total) by mouth at bedtime. 60 tablet 0     multivitamin therapeutic tablet Take 1 tablet by mouth daily.       NOVOLIN R 100 unit/mL injection 1 unit for every 25>150 10 mL PRN     warfarin (COUMADIN) 4 MG tablet TAKE 1 TABLET (4 MG) ON TUESDAY AND SATURDAY AND TAKE 1 AND 1/2 TABLETS (6 MG) ALL OTHER DAYS 124 tablet 3     No current facility-administered medications for this visit.        Allergies   Allergen Reactions     " "Calcitriol      Fatigue      Ciprofloxacin Rash       Physical Exam:  /70  Pulse (!) 53  Ht 6' 1\" (1.854 m)  Wt (!) 225 lb 1.6 oz (102.1 kg)  SpO2 97%  BMI 29.7 kg/m2  BMI:Body mass index is 29.7 kg/(m^2).   GEN: NAD, obese  Psych: normal mood, normal affect    Labs/Studies:     I reviewed the efficacy and compliance report from his device. Data summarized on the HPI and the CPAP compliance flow sheet.     Lab Results   Component Value Date    WBC 6.5 09/09/2016    HGB 13.3 (L) 01/27/2017    HCT 35.5 (L) 09/09/2016     09/09/2016     01/27/2017         Chemistry        Component Value Date/Time     09/09/2016 1457    K 4.3 01/27/2017 1020     09/09/2016 1457    CO2 27 09/09/2016 1457    BUN 41 (H) 09/09/2016 1457    CREATININE 4.52 (H) 09/09/2016 1457     (H) 09/09/2016 1457        Component Value Date/Time    CALCIUM 9.2 09/09/2016 1457    ALKPHOS 88 09/09/2016 1457    AST 36 09/09/2016 1457    ALT 41 09/09/2016 1457    BILITOT 0.4 09/09/2016 1457            Lab Results   Component Value Date    FERRITIN 113 12/24/2015            Assessment and Plan:  In summary Alexei Blake is a 78 y.o. year old male who is here for compliance download.    1.  Obstructive sleep apnea on CPAP  I congratulated the patient on his excellent CPAP usage.  I will keep him on same pressure range for now.  2.  Other sleep disturbance  3.Elevated blood pressure  I will have the patient's blood pressure readings repeated during this clinic visit. I strongly advised the patient to follow up with PCP in one week.       Patient verbalized understanding of these issues, agrees with the plan and all questions were answered today. Patient was given an opportuntity to voice any other symptoms or concerns not listed above. Patient did not have any other symptoms or concerns.      Patient told to return in 12 months. Patient instructed to stop at  to schedule appointment before leaving " today.    John Benavidez DO  Board Certified in Internal Medicine and Sleep Medicine  [facility].    I spent a total of 10 minutes of face-to-face encounter and more than 50% of the encounter was used for counseling or coordination of care.    (Note created with Dragon voice recognition and unintended spelling errors and word substitutions may occur)

## 2021-06-18 NOTE — PATIENT INSTRUCTIONS - HE
Patient Instructions by Cherri Restrepo NP at 2/4/2021  2:10 PM     Author: Cherri Restrepo NP Service: -- Author Type: Nurse Practitioner    Filed: 2/4/2021  2:49 PM Encounter Date: 2/4/2021 Status: Addendum    : Cherri Restrepo NP (Nurse Practitioner)    Related Notes: Original Note by Cherri Restrepo NP (Nurse Practitioner) filed at 2/4/2021  2:48 PM       Alexei Blake,    It was a pleasure to see you today at the [facility].     My recommendations after this visit include:    -Increase lisinopril from 2.5 mg to 5 mg daily. Take 2.5 mg 2 tablets every morning.  Please call if you develop lightheadedness, dizziness or any new side effects.    -Take your metoprolol succinate at bedtime to see if this helps with your tiredness    - You may take your Atorvastatin at bed time    - We will follow up with you once your lab result is back with further recommendations    - Please seek medical attention if you develop recurrent  shortness of breath or new onset of chest pain    - Follow up with Dr. Chatman as scheduled on 2/24/21    - Please call PARUL Bunn on [phone] (Mondays & Tuesdays) or PARUL Rain [phone] (Wednesdays, Thursday,s and Fridays) if you have any questions or concerns    Cherri Restrepo CNP      Medication     o Take all your medications as prescribed  o Do not stop any medications without talking with a healthcare provider    Exercise      o Physical activity is important for overall health  o Set a goal of 150 minutes of exercise each week  o For example, 30 minutes of exercise 5 days each week.    o These 30 minutes can be broken into shorter periods of 15 minutes twice daily or 10 minutes three times daily  o Start any exercise program slowly and work towards the goal of 150 minutes each week  o For example, you may start with 10 minutes and plan to add a few minutes each week as you get stronger   o Examples of exercise include walking, swimming, or  biking  o Remember to stretch and stay hydrated with exercise    Diet     o A heart healthy diet includes:  o A variety of fruits and vegetables  o Whole grains  o Low-fat dairy (fat-free, 1% fat, and low-fat)  o Lean meats and poultry without skin   o Fish (eat fish 2 times each week)  o Nuts  o Limit saturated fat to about 13 grams each day (based on a 2000 calorie diet)  o Limit red meat  o Limit sugars (sweets and sugary beverages)  o Limit your portion sizes  o Do not add salt to your food when cooking or at the table  o Limit alcohol intake (no more than 1 drink each day for women or 2 drinks each day for men)    Weight Loss     o Work on losing weight with diet and exercise  o You BMI (body mass index) should be between 18.5-24.9  o This is a calculation of your weight and height  o Please ask your healthcare provider for your BMI    Manage Other Chronic Health Conditions     o Control cholesterol  o Eat a diet low in saturated fat  o Exercise   o Take a statin medication as prescribed  o Manage blood pressure  o Eat a diet low in sodium  o Exercise  o Reduce stress  o Lose weight   o Take blood pressure medications as prescribed  o Control blood sugars if diabetic  o Monitor sugars and carbohydrates in your diet  o Lose weight   o Take diabetes medications as prescribed  o Follow-up with your primary care provider to make sure your blood sugars are well controlled    Stress Reduction     o Find time each day to relax  o Reading, listening to music, yoga, meditation, exercise, spending time with friends and family, volunteering   o Get 6-8 hours of sleep each night    Smoking Cessation     o Smoking causes numerous health problems including coronary artery disease  o It is never too late to quit  o Set realistic goals for quitting  o Decrease the number of cigarettes used each week  o Use nicotine gum or patches to help you quit    Information from the American Heart Association.  Please visit their website  at www.heart.org    Patient Education     Eating Heart-Healthy Foods  Eating has a big impact on your heart health. In fact, eating healthier can improve several of your heart risks at once. For instance, it helps you manage weight, cholesterol, and blood pressure. Here are ideas to help you make heart-healthy changes without giving up all the foods and flavors you love.  Getting started    Talk with your healthcare provider about eating plans, such as the DASH or Mediterranean diet. You may also be referred to a dietitian.    Change a few things at a time. Give yourself time to get used to a few eating changes before adding more.    Work to create a tasty, healthy eating plan that you can stick to for the rest of your life.    Goals for healthy eating  Below are some tips to improve your eating habits:    Limit saturated fats and trans fats. Saturated fats raise your levels of cholesterol, so keep these fats to a minimum. They are found in foods such as fatty meats, whole milk, cheese, and palm and coconut oils. Avoid trans fats because they lower good cholesterol as well as raise bad cholesterol. Trans fats are most often found in processed foods.    Reduce sodium (salt) intake. Eating too much salt may increase your blood pressure. Limit your sodium intake to 2,300 milligrams (mg) per day (the amount in 1 teaspoon of salt), or less if your healthcare provider recommends it. Dining out less often and eating fewer processed foods are two great ways to decrease the amount of salt you consume.    Managing calories. A calorie is a unit of energy. Your body burns calories for fuel, but if you eat more calories than your body burns, the extras are stored as fat. Your healthcare provider can help you create a diet plan to manage your calories. This will likely include eating healthier foods as well as exercising regularly. To help you track your progress, keep a diary to record what you eat and how often you  exercise.  Choose the right foods  Aim to make these foods staples of your diet. If you have diabetes, you may have different recommendations than what is listed here:    Fruits and vegetables provide plenty of nutrients without a lot of calories. At meals, fill half your plate with these foods. Split the other half of your plate between whole grains and lean protein.    Whole grains are high in fiber and rich in vitamins and nutrients. Good choices include whole-wheat bread, pasta, and brown rice.    Lean proteins give you nutrition with less fat. Good choices include fish, skinless chicken, and beans.    Low-fat or nonfat dairy provides nutrients without a lot of fat. Try low-fat or nonfat milk, cheese, or yogurt.    Healthy fats can be good for you in small amounts. These are unsaturated fats, such as olive oil, nuts, and fish. Try to have at least 2 servings per week of fatty fish, such as salmon, sardines, mackerel, rainbow trout, and albacore tuna. These contain omega-3 fatty acids, which are good for your heart. Flaxseed is another source of a heart-healthy fat.  More on heart-healthy eating  Read food labels  Healthy eating starts at the grocery store. Be sure to pay attention to food labels on packaged foods. Look for products that are high in fiber and protein, and low in saturated fat, cholesterol, and sodium. Avoid products that contain trans fat. And pay close attention to serving size. For instance, if you plan to eat two servings, double all the numbers on the label.  Prepare food right  A key part of healthy cooking is cutting down on added fat and salt. Look on the internet for lower-fat, lower-sodium recipes. Also, try these tips:    Remove fat from meat and skin from poultry before cooking.    Skim fat from the surface of soups and sauces.    Broil, boil, bake, steam, grill, and microwave food without added fats.    Choose ingredients that spice up your food without adding calories, fat, or  sodium. Try these items: horseradish, hot sauce, lemon, mustard, nonfat salad dressings, and vinegar. For salt-free herbs and spices, try basil, cilantro, cinnamon, pepper, and rosemary.  Date Last Reviewed: 10/1/2017    0529-2992 [employer]. [address] [place] [address]. All rights reserved. This information is not intended as a substitute for professional medical care. Always follow your healthcare professional's instructions.

## 2021-06-18 NOTE — PATIENT INSTRUCTIONS - HE
Patient Instructions by Ana Arango MD at 12/3/2020  2:00 PM     Author: Ana Arango MD Service: -- Author Type: Physician    Filed: 12/3/2020  1:36 PM Encounter Date: 12/3/2020 Status: Signed    : Ana Arango MD (Physician)         Patient Education     Your Health Risk Assessment indicates you feel you are not in good physical health.    A healthy lifestyle helps keep the body fit and the mind alert. It helps protect you from disease, helps you fight disease, and helps prevent chronic disease (disease that doesn't go away) from getting worse. This is important as you get older and begin to notice twinges in muscles and joints and a decline in the strength and stamina you once took for granted. A healthy lifestyle includes good healthcare, good nutrition, weight control, recreation, and regular exercise. Avoid harmful substances and do what you can to keep safe. Another part of a healthy lifestyle is stay mentally active and socially involved.    Good healthcare     Have a wellness visit every year.     If you have new symptoms, let us know right away. Don't wait until the next checkup.     Take medicines exactly as prescribed and keep your medicines in a safe place. Tell us if your medicine causes problems.   Healthy diet and weight control     Eat 3 or 4 small, nutritious, low-fat, high-fiber meals a day. Include a variety of fruits, vegetables, and whole-grain foods.     Make sure you get enough calcium in your diet. Calcium, vitamin D, and exercise help prevent osteoporosis (bone thinning).     If you live alone, try eating with others when you can. That way you get a good meal and have company while you eat it.     Try to keep a healthy weight. If you eat more calories than your body uses for energy, it will be stored as fat and you will gain weight.     Recreation   Recreation is not limited to sports and team events. It includes any activity that provides relaxation, interest,  enjoyment, and exercise. Recreation provides an outlet for physical, mental, and social energy. It can give a sense of worth and achievement. It can help you stay healthy.       Patient Education     Exercise for a Healthier Heart  You may wonder how you can improve the health of your heart. If youre thinking about exercise, youre on the right track. You dont need to become an athlete, but you do need a certain amount of brisk exercise to help strengthen your heart. If you have been diagnosed with a heart condition, your doctor may recommend exercise to help stabilize your condition. To help make exercise a habit, choose safe, fun activities.       Be sure to check with your health care provider before starting an exercise program.    Why exercise?  Exercising regularly offers many healthy rewards. It can help you do all of the following:    Improve your blood cholesterol levels to help prevent further heart trouble    Lower your blood pressure to help prevent a stroke or heart attack    Control diabetes, or reduce your risk of getting this disease    Improve your heart and lung function    Reach and maintain a healthy weight    Make your muscles stronger and more limber so you can stay active    Prevent falls and fractures by slowing the loss of bone mass (osteoporosis)    Manage stress better  Exercise tips  Ease into your routine. Set small goals. Then build on them.  Exercise on most days. Aim for a total of 150 or more minutes of moderate to  vigorous intensity activity each week. Consider 40 minutes, 3 to 4 times a week. For best results, activity should last for 40 minutes on average. It is OK to work up to the 40 minute period over time. Examples of moderate-intensity activity is walking one mile in 15 minutes or 30 to 45 minutes of yard work.  Step up your daily activity level. Along with your exercise program, try being more active throughout the day. Walk instead of drive. Do more household tasks or yard  work.  Choose one or more activities you enjoy. Walking is one of the easiest things you can do. You can also try swimming, riding a bike, or taking an exercise class.  Stop exercising and call your doctor if you:    Have chest pain or feel dizzy or lightheaded    Feel burning, tightness, pressure, or heaviness in your chest, neck, shoulders, back, or arms    Have unusual shortness of breath    Have increased joint or muscle pain    Have palpitations or an irregular heartbeat      5039-1998 "[employer]". [address] [place] [address]. All rights reserved. This information is not intended as a substitute for professional medical care. Always follow your healthcare professional's instructions.         Patient Education   Urinary Incontinence (Male)    Urinary incontinence means not being able to control the release of urine from the bladder.  Causes  Common causes of urinary incontinence in men include:    Infection    Certain medicines    Aging    Poor pelvic muscle tone    Bladder spasms    Obesity    Urinary retention  Nervous system diseases, diabetes, sleep apnea, urinary tract infections, prostate surgery, and pelvic trauma can also cause incontinence. Constipation and smoking have also been identified as risk factors.  Symptoms    Urge incontinence (also called overactive bladder) is a sudden urge to urinate even though there may not be much urine in the bladder. The need to urinate often during the night is common. It is due to bladder spasms.    Stress incontinence is involuntary urine leakage that can occur with sneezing, coughing, and other actions that put stress on the bladder.  Treatment  Treatment of urinary incontinence depends on the cause. Infections of the bladder are treated with antibiotics. Urinary retention is treated with a bladder catheter.  Home care  Follow these guidelines when caring for yourself at home:    Don't consume foods and drinks that may irritate the  bladder. These include drinks containing alcohol, caffeinate, or carbonation; chocolate; and acidic fruits and juices.    Limit fluid intake to 6 to 8 cups a day.    Lose weight if you are overweight. This will reduce your symptoms.    If needed, wear absorbent pads to catch urine. Change pads frequently to maintain hygiene and prevent skin and bladder infections.    Bathe daily to maintain good hygiene.    If an antibiotic was prescribed to treat a bladder infection, be sure to take it until finished, even if you are feeling better before then. This is to make sure your infection has cleared.    If a catheter was left in place, it is important to keep bacteria from getting into the collection bag. Don't disconnect the catheter from the collection bag.    Use a leg band to secure the catheter drainage tube, so it does not pull on the catheter. Drain the collection bag when it becomes full using the drain spout at the bottom of the bag. Don't disconnect the bag from the catheter.    Don't pull on or try to remove a catheter. The catheter must be removed by a healthcare provider.  Follow-up care  Follow up with your healthcare provider, or as advised.  When to seek medical advice  Call your healthcare provider right away if any of these occur:    Fever over 100.4 F (38 C), or as directed by your healthcare provider    Bladder pain or fullness    Abdominal swelling, nausea, or vomiting    Back pain    Weakness, dizziness, or fainting    If a catheter was left in place, return if:  ? Catheter falls out  ? Catheter stops draining for 6 hours  Date Last Reviewed: 10/1/2017    9772-7884 [employer]. [address] [place] [address], [place] [address]. All rights reserved. This information is not intended as a substitute for professional medical care. Always follow your healthcare professional's instructions.       Advance Directive  Patients advance directive was discussed and I am comfortable with the patients  wishes.  Patient Education   Personalized Prevention Plan  You are due for the preventive services outlined below.  Your care team is available to assist you in scheduling these services.  If you have already completed any of these items, please share that information with your care team to update in your medical record.  Health Maintenance   Topic Date Due   ? DEPRESSION ACTION PLAN  1940   ? DIABETIC EYE EXAM  1940   ? ZOSTER VACCINES (1 of 2) 05/09/1990   ? A1C  04/12/2021   ? LIPID  07/20/2021   ? BMP  10/12/2021   ? MEDICARE ANNUAL WELLNESS VISIT  12/03/2021   ? DIABETIC FOOT EXAM  12/03/2021   ? FALL RISK ASSESSMENT  12/03/2021   ? ADVANCE CARE PLANNING  10/02/2024   ? TD 18+ HE  07/09/2026   ? Pneumococcal Vaccine: Pediatrics (0 to 5 Years) and At-Risk Patients (6 to 64 Years)  Completed   ? Pneumococcal Vaccine: 65+ Years  Completed   ? INFLUENZA VACCINE RULE BASED  Completed   ? MICROALBUMIN  Discontinued

## 2021-06-18 NOTE — PATIENT INSTRUCTIONS - HE
Patient Instructions by Ai Armstrong NP at 4/6/2020  2:40 PM     Author: Ai Armstrong NP Service: -- Author Type: Nurse Practitioner    Filed: 4/6/2020  2:49 PM Encounter Date: 4/6/2020 Status: Signed    : Ai Armstrong NP (Nurse Practitioner)       Wound is healed    No need for dressings    Lotion legs 1-2 times per day; can use Cetaphil, CeraVe, Vanicream    Wear velcro wrap to the left leg every day                  DO NOT STRAP VELCRO TO !  1.2.        3.4.    Continue to wear your compression stockings or velcro wraps every day; put them on first thing in the morning and remove at bedtime    Replace your compression stockings every 3-4 months; these garments will lose their elasticity and become ineffective    Replace velcro wraps every 1-2 years    Elevate your legs periodically throughout the day, 30-60 minutes 1-3 times per day    Apply lotion to your legs 1-2 times per day; some good name brands are Cetaphil, Sarna, Aveeno, VaniCream, CeraVe    Continue to walk and exercise    If you are taking a diuretic continue to do so at the direction of your primary care provider    Make an appointment at the vascular clinic again if you have worsening swelling, need a prescription for new compression garments; and/or develop new wounds

## 2021-06-19 NOTE — LETTER
Letter by Mary Page MD at      Author: Mary Page MD Service: -- Author Type: --    Filed:  Encounter Date: 10/3/2019 Status: Signed         Patient: Alexei Blake   MR Number: [id number]   YOB: 1940   Date of Visit: 10/3/2019     [facility]    Facility:   [facility] [[id number]]   Code Status: POLST AVAILABLE    Admission evaluation to TCU 79-year-old male. History is taken from accompanying hospital notes and from patient who gives an adequate history. End-stage renal disease on hemodialysis, hyperlipidemia, hypertension, insulin-dependent diabetes mellitus, coronary artery disease, history of CVA, paroxysmal atrial fibrillation on Coumadin,  sick sinus syndrome, presented to hospital with weakness and fever, CT scan evidence of questionable right lower lobe infiltrate and effusion, emphysematous changes, bibasilar atelectasis, normal white count, negative blood cultures and urine culture, treated with ceftriaxone and vancomyosin, initial fever resolved, changed to oral Omnicef and doxycycline, lovastatin dose decreased in view of end-stage renal disease, stabilized and transferred to TCU for rehabilitation and management of medical problems.    Past medical history, current medical problem list, drug allergies, current medication list, social history, family history code status reviewed in epic.    Review of systems: continued generalized fatigue. Denies fever sweats or chills. Sense of dyspnea with activity, no exertional chest discomfort. Tolerating dialysis. Peripheral neuropathic symptoms mild, previous amputation of toes, gait abnormality per his report. Mild cough, no sputum production. Unaware of palpitations. No excessive bruising or bleeding. No current urinary symptoms. Remainder of 12 point review of systems obtained negative.    Exam: lying supine in bed without evidence of distress. Vital signs obtained at dialysis. Pleasant and  cooperative, oriented, minimally dysarthric speech. No pharyngeal erythema, crowded oral pharynx. No cervical adenopathy or HJR are. S1 and S2 irregular with systolic murmur. Pulmonary exam with trace delay in inspiratory flow, no rhonchi, minimal dry crackles right greater than left lung base, no rales. Abdomen with midline hernia, no hepatosplenomegaly. Periphery with venous stasis changes, decreased pulsations, knees without acute inflammatory change. Right olecranon with minimal tenderness, pressure over ulnar nerve reproduces pain described right hand. No atrophy. Absent radial pulse.    Impression and plan:   insulin-dependent diabetes mellitus, revision of sliding-scale insulin for use by nursing staff, currently written  one unit of insulin per 25 g elevation of glucose greater than 150, continues novolin five units b.i.d., Accu checks Q ID.   Community acquired pneumonia, potential infiltrate on CT scan of chest, continues Omnicef and doxycycline, completed ceftriaxone and vancomycin in hospital, remains afebrile, minimal abnormalities on pulmonary exam exam.   End-stage renal disease on hemodialysis to be continued.   Recent hypoxia resolved.   PAF on Coumadin, INR 2.9, last INR 3.08 on 4 mg coumadin, begin 2 mg Coumadin with recheck INR five days.   Hypertension with adequate control of blood pressure.   Coronary artery disease without current evidence of angina.   Previous history of CVA, no recent focal neurologic deficits.   Peripheral vascular disease, previous toe amputations, no evidence of ischemia bilateral feet.   Paresthesias right arm/hand, decreased radial pulse, monitored by vascular surgery.   Multiple complex medical issues are reviewed, review of outside medical records, review of medications and INR, differential discussion regarding olecranon and hand pain.      Electronically signed by: Mary Page MD

## 2021-06-19 NOTE — LETTER
Letter by Mary Page MD at      Author: Mary Page MD Service: -- Author Type: --    Filed:  Encounter Date: 10/8/2019 Status: Signed         Patient: Alexei Blake   MR Number: [id number]   YOB: 1940   Date of Visit: 10/8/2019     [facility]    Facility:   [facility] [[id number]]   Code Status: POLST AVAILABLE    79-year-old male is seen for follow up evaluation and discharge planning from TCU. Admitted to hospital with fever and weakness, CT scan evidence of questionable right lower lobe infiltrate, received IV antibiotic in hospital  with Omnicef and doxycycline on transfer to TCU. History of end-stage renal disease on dialysis, hypertension, coronary artery disease, insulin-dependent diabetes mellitus, CVA, paroxysmal atrial fibrillation, PVD. Patient is completing course in TCU. Has remained afebrile throughout stay. No cough or sputum production. Generalized weakness has continued, gradually improving. No focal neurologic deficits of new onset during TCU stay. No cardiac or pulmonary symptoms present. Has remained anticoagulated. Anticipated discharge will be in 48 hours to home setting.    Review of systems: denies fever sweats or chills. Generalized fatigue continues. No focal neurologic deficits of new onset. No G.I. or  symptomatology. No excessive bruising or bleeding. Remainder of 12 point review of systems obtained negative.    Exam: vital signs reviewed over past 48 hours including afebrile status. Alert and oriented, lying supine in bed in no apparent distress. No pharyngeal erythema. No facial asymmetry. No HJR. S1 and S2 regular with systolic murmur. Pulmonary exam without rales rhonchi or wheezes. Abdomen without masses tenderness organomegaly. Periphery with decreased sensation, trace nonpitting peripheral edema and venous stasis changes present, no skin breakdown. No calf tenderness or swelling. INR 2.1, last INR 2.6 on 2  "milligram Coumadin, previously with INR 3.08 on 4 mg Coumadin, continue 2 milligram Coumadin with recheck INR 48 hours.    Impression and plan:   recent low-grade fever and generalized weakness, presumptive diagnosis of right lower lobe pneumonia community acquired, has completed Omnicef and doxycycline, remains afebrile, normal white count in hospital. Per hospital notes patient was to have follow-up chest x-ray, as original chest x-ray was unremarkable and findings of pneumonia were with CT scan suggest follow-up chest x-ray should patient develop recurrent fever and or cough.   Anticoagulation with history of DVT and paroxysmal atrial fibrillation, INR 2.1 on 2 milligram with recheck prior to discharge in 48 hours.   End-stage renal disease continuing hemodialysis.   Generalized fatigue multifactorial.   Hypertension with adequate control of blood pressure.   Coronary artery disease without current angina.   Insulin-dependent diabetes mellitus, sliding scale altered in TCU, will resume home sliding-scale on discharge.   Discharge medications are as follows:  Current Outpatient Medications:   ?  ACCU-CHEK KYLE PLUS TEST STRP strips, USE  STRIP TO CHECK GLUCOSE 2 TO 3 TIMES DAILY AS DIRECTED AT  6AM  AND  4PM, Disp: 300 strip, Rfl: 3  ?  acetaminophen (TYLENOL) 500 MG tablet, Take 1,000 mg by mouth bedtime as needed for pain (or sleep). , Disp: , Rfl:   ?  BD VEO INSULIN SYRINGE UF 1/2 mL 31 gauge x 15/64\" Syrg, USE ONE SYRINGE TWICE DAILY, Disp: 200 Syringe, Rfl: 3  ?  bumetanide (BUMEX) 1 MG tablet, TAKE 1 TABLET TWICE DAILY AT 9AM AND 6PM. ON DIALYSIS DAYS, ONLY DOSE IN THE EVENING., Disp: 180 tablet, Rfl: 3  ?  calcium, as carbonate, (OS-AVNI) 500 mg calcium (1,250 mg) tablet, Take 1 tablet by mouth daily., Disp: , Rfl:   ?  clopidogrel (PLAVIX) 75 mg tablet, TAKE 1 TABLET (75 MG TOTAL) BY MOUTH DAILY., Disp: 90 tablet, Rfl: 3  ?  hydrALAZINE (APRESOLINE) 25 MG tablet, TAKE 1 TABLET TWICE DAILY, Disp: 180 " tablet, Rfl: 3  ?  insulin NPH (NOVOLIN) 100 unit/mL injection, Inject 5 Units under the skin 2 (two) times a day.    , Disp: , Rfl:   ?  insulin regular (NOVOLIN R REGULAR U-100 INSULN) 100 unit/mL injection, Check blood glucose three times daily prior to meal. Correction with 1 unit for every 25mg/dL>150mg/dL blood glucose., Disp: 10 mL, Rfl: 0  ?  lisinopril (PRINIVIL,ZESTRIL) 40 MG tablet, Take 40 mg by mouth every evening.    , Disp: , Rfl:   ?  lovastatin (MEVACOR) 40 MG tablet, Take 0.5 tablets (20 mg total) by mouth at bedtime., Disp: 90 tablet, Rfl: 2  ?  metoprolol succinate (TOPROL-XL) 25 MG, TAKE 1 TABLET (25 MG TOTAL) BY MOUTH EVERY EVENING., Disp: 90 tablet, Rfl: 3  ?  multivitamin therapeutic tablet, Take 1 tablet by mouth daily., Disp: , Rfl:   ?  warfarin (COUMADIN/JANTOVEN) 4 MG tablet, Take 2-4 mg by mouth See Admin Instructions. TAKE 2MG ON Friday AND 4MG REST OF THE WEK., Disp: , Rfl:  change in medications Coumadin 2 mg Q day with INR pending in 48 hours. Follow up will be with regular physician, patient will not be homebound and will not require home services. Total time of discharge evaluation greater than 30 minutes, greater than 50% of time spent in coordination of care and counseling.      Electronically signed by: Mary Page MD

## 2021-06-19 NOTE — PROGRESS NOTES
[facility] Follow Up Note    Alexei Blake   78 y.o. male    Date of Visit: 7/16/2018    Chief Complaint   Patient presents with     Follow-up     Subjective  This is a 78-year-old man with multiple medical issues that include type 2 diabetes, hypertension and end-stage renal disease.  He comes in today for routine follow-up.  His blood sugars remain under reasonably good control at home.  He continues on his usual medication.  He is due for an A1c.  He continues to go to dialysis and is doing fairly well.  He does report a couple of episodes at dialysis where his blood pressure and pulse rate fell.  Whether this was related to the dialysis or not is unclear.  The last such episode was a couple of weeks ago.  There was some question about dehydration and he has been making an attempt to drink more fluids since that time.  No increased shortness of breath no chest pain with any of these episodes.  His blood pressure has always been somewhat difficult to control but has been fairly stable of late except for these episodes.  He offers no other new complaints today.  No other changes in his medical status since I last saw him.    ROS A comprehensive review of systems was performed and was otherwise negative    Medications, allergies, and problem list were reviewed and updated    Exam  General Appearance:   On examination his blood pressure is 138/60.  Weight is 229 pounds and height is 73 inches.  BMI is 30.21.    Lungs are clear.    Heart is in a sinus rhythm with a rate of 52 and no ectopy.    No new edema.    The patient is alert and oriented ×3.          Assessment/Plan  1. Type 2 diabetes mellitus with complication, with long-term current use of insulin  Glycosylated Hemoglobin A1c   2. Essential hypertension with goal blood pressure less than 130/85     3. ESRD (end stage renal disease) (H)       Diabetes.  Blood sugars have been fairly stable.  We will check an A1c  "today.    Hypertension.  Stable today.  Continue current medication.  We did discuss the episodes at dialysis and for now will just continue to follow.    End-stage renal disease.  Continue dialysis.    I would like to see him back in 2 months for follow-up.  Total time of this office visit was 25 minutes with greater than 50% of the time spent in care coordination of patient counseling.  The following high BMI interventions were performed this visit: weight monitoring    Jesus Medrano MD      Current Outpatient Prescriptions on File Prior to Visit   Medication Sig     ACCU-CHEK KYLE PLUS TEST STRP strips USE ONE STRIP TO CHECK GLUCOSE TWICE DAILY AT  6AM  AND  4PM  (TEST  TWO  TO  THREE  TIMES  DAILY  AS  DIRECTED)     acetaminophen (TYLENOL) 500 MG tablet Take 1,000 mg by mouth bedtime as needed for pain (or sleep).      BD INSULIN SYRINGE ULTRA-FINE 1/2 mL 31 gauge x 15/64\" Syrg USE ONE SYRINGE TWICE DAILY     bumetanide (BUMEX) 1 MG tablet Take 1 tablet (1 mg total) by mouth 2 (two) times a day at 9am and 6pm. On dialysis days only dose in the evening     calcium, as carbonate, (OS-AVNI) 500 mg calcium (1,250 mg) tablet Take 1 tablet by mouth daily.     clopidogrel (PLAVIX) 75 mg tablet Take 1 tablet (75 mg total) by mouth daily.     hydrALAZINE (APRESOLINE) 25 MG tablet TAKE 1 TABLET TWICE DAILY     insulin NPH (NOVOLIN N NPH U-100 INSULIN) 100 unit/mL injection Give 18 units subcutaneously every am and 6 units subcutaneously every pm E11.9     insulin NPH (NOVOLIN) 100 unit/mL injection Inject 5-6 Units under the skin 2 (two) times a day. (based on blood sugars)     lisinopril (PRINIVIL,ZESTRIL) 30 MG tablet Take 1 tablet (30 mg total) by mouth every evening.     lovastatin (MEVACOR) 40 MG tablet Take 1 tablet (40 mg total) by mouth at bedtime.     metoprolol succinate (TOPROL-XL) 25 MG Take 1 tablet (25 mg total) by mouth every evening.     mirtazapine (REMERON) 7.5 MG tablet Take 1 tablet (7.5 mg total) " by mouth at bedtime.     multivitamin therapeutic tablet Take 1 tablet by mouth daily.     NOVOLIN R 100 unit/mL injection 1 unit for every 25>150     warfarin (COUMADIN) 4 MG tablet TAKE 1 TABLET (4 MG) ON TUESDAY AND SATURDAY AND TAKE 1 AND 1/2 TABLETS (6 MG) ALL OTHER DAYS     No current facility-administered medications on file prior to visit.      Allergies   Allergen Reactions     Calcitriol      Fatigue      Ciprofloxacin Rash     Social History   Substance Use Topics     Smoking status: Former Smoker     Quit date: 1/1/1995     Smokeless tobacco: Never Used     Alcohol use No

## 2021-06-19 NOTE — LETTER
Letter by Candie Mcrae CNP at      Author: Candie Mcrae CNP Service: -- Author Type: --    Filed:  Encounter Date: 10/7/2019 Status: Signed         Patient: Alexei Blake   MR Number: [id number]   YOB: 1940   Date of Visit: 10/7/2019     [facility]    Facility:   [facility] [[id number]]   Code Status: POLST AVAILABLE      CHIEF COMPLAINT/REASON FOR VISIT:  Chief Complaint   Patient presents with   ? Review Of Multiple Medical Conditions       HISTORY:      HPI: Alexei is a 79 y.o. male with pmh of ESRD on dialysis three times weekly, CVA with residual left sided weakness, htn, who was admitted to [facility] with fever and fatigue. He was found to have pleural effusion and possible RLL pneumonia that was treated empirically and he did improve. He continues on oral abx cefdinir and doxycycline. He is in TCU for occupational and physical therapy.      Today he is wondering how his progress looks and if he can discharge soon. He reports that he lives at home with his wife and is relatively independent there. He continues to perform all ADLs on his own at baseline. In TCU he is ambulating with PT >200ft. He is using cane to ambulate to the bathroom. Lungs with diminished RLL. He is denying fever, shortness of breath, chest pain, chills. He appears well.       Past Medical History:   Diagnosis Date   ? Abscess of tongue    ? Chronic kidney disease     CKD stage 4,dialysis [facility]   ? Diabetes (H)     type 2   ? DVT (deep venous thrombosis) (H)    ? End stage renal disease (H)    ? Hernia, umbilical    ? History of transfusion    ? Hyperlipidemia    ? Hypertension    ? CATHLEEN (obstructive sleep apnea)     uses CPAP   ? PVD (peripheral vascular disease) (H)    ? Renal insufficiency    ? Stroke (H) 2003    minor left sided weakness             Family History   Problem Relation Age of Onset   ? Hypertension Mother    ? Hypertension Father    ? Diabetes  Father    ? Heart attack Father      Social History     Socioeconomic History   ? Marital status:      Spouse name: Not on file   ? Number of children: Not on file   ? Years of education: Not on file   ? Highest education level: Not on file   Occupational History   ? Not on file   Social Needs   ? Financial resource strain: Not on file   ? Food insecurity:     Worry: Not on file     Inability: Not on file   ? Transportation needs:     Medical: Not on file     Non-medical: Not on file   Tobacco Use   ? Smoking status: Former Smoker     Packs/day: 0.00     Last attempt to quit: 1995     Years since quittin.7   ? Smokeless tobacco: Never Used   Substance and Sexual Activity   ? Alcohol use: No   ? Drug use: No   ? Sexual activity: Not on file   Lifestyle   ? Physical activity:     Days per week: Not on file     Minutes per session: Not on file   ? Stress: Not on file   Relationships   ? Social connections:     Talks on phone: Not on file     Gets together: Not on file     Attends [employer] service: Not on file     Active member of club or organization: Not on file     Attends meetings of clubs or organizations: Not on file     Relationship status: Not on file   ? Intimate partner violence:     Fear of current or ex partner: Not on file     Emotionally abused: Not on file     Physically abused: Not on file     Forced sexual activity: Not on file   Other Topics Concern   ? Not on file   Social History Narrative   ? Not on file         Review of Systems   Constitutional: Negative.    HENT: Negative.    Respiratory: Negative.    Cardiovascular: Negative.    Gastrointestinal: Negative.    Genitourinary: Negative.    Musculoskeletal: Negative.    Skin: Negative.    Neurological: Negative.        Vitals:    10/09/19 1409   BP: 134/69   Pulse: 63   Temp: 97.4  F (36.3  C)   SpO2: 100%   Weight: 219 lb (99.3 kg)       Physical Exam  Constitutional:       Appearance: Normal appearance.   HENT:      Head:  Atraumatic.      Mouth/Throat:      Mouth: Mucous membranes are moist.   Eyes:      Extraocular Movements: Extraocular movements intact.   Cardiovascular:      Rate and Rhythm: Normal rate and regular rhythm.   Pulmonary:      Effort: Pulmonary effort is normal. No respiratory distress.      Breath sounds: No wheezing.      Comments: Diminished RLL  Abdominal:      General: Abdomen is flat.      Palpations: Abdomen is soft.   Skin:     General: Skin is warm and dry.   Neurological:      General: No focal deficit present.   Psychiatric:         Mood and Affect: Mood normal.         Behavior: Behavior normal.         Thought Content: Thought content normal.         Judgement: Judgment normal.           LABS:   Reviewed.     ASSESSMENT:      ICD-10-CM    1. Community acquired pneumonia due to Chlamydia species J16.0    2. Essential hypertension with goal blood pressure less than 130/85 I10    3. Type 2 diabetes mellitus with diabetic chronic kidney disease, unspecified CKD stage, unspecified whether long term insulin use (H) E11.22    4. ESRD (end stage renal disease) (H) N18.6        PLAN:    Patient appears well from a medical perspective. He is no longer febrile, no fatigue. He is a/o to baseline.   Working well with PT. I encouraged him to discuss with PT and SW his discharge plans as this is his main focus. I see no current barriers to discharge. He has adequate support at home.       Electronically signed by: Candie Mcrae CNP

## 2021-06-19 NOTE — LETTER
Letter by Jesus Medrano MD at      Author: Jesus Medrano MD Service: -- Author Type: --    Filed:  Encounter Date: 11/15/2019 Status: Signed         Alexei JENSEN Lou  [address] [place] [address]             November 15, 2019         Dear Mr. Blake,    Below are the results from your recent visit:    Resulted Orders   Glycosylated Hemoglobin A1c   Result Value Ref Range    Hemoglobin A1c 6.0 3.5 - 6.0 %       Your A1c test is very good.    Please call with questions or contact us using [employer].    Sincerely,        Electronically signed by Jesus Medrano MD

## 2021-06-20 NOTE — PROGRESS NOTES
[facility] Follow Up Note    Alexei Blake   78 y.o. male    Date of Visit: 9/17/2018    Chief Complaint   Patient presents with     Follow-up     Flu Vaccine     Subjective  This is a 78-year-old man with multiple medical issues that include hypertension, type 2 diabetes, hyperlipidemia, depression, and end-stage renal disease.  He comes in for routine follow-up today.  For the most part he has been doing well.  No chest pains or shortness of breath.  No headaches or dizziness.  He does not like dialysis but he does tolerate it and it is doing good for him.  There have been no recent changes in medication.  He tells me that his sugars have been under reasonably good control with no changes from 2 or 3 months ago.  His activity is somewhat limited by his multiple medical issues but he continues to go about his normal activities.  His depression is been pretty well controlled and his PHQ 9 score today is 4.  His wife is with him today and confirms all of this information.  He reports no other changes in his medical condition since I last saw him.    ROS A comprehensive review of systems was performed and was otherwise negative    Medications, allergies, and problem list were reviewed and updated    Exam  General Appearance:   On examination his blood pressure is 142/66.  Weight is 228 pounds and height is 73 inches.  BMI is 30.08.    Lungs are clear.    Heart is in a sinus rhythm with a rate of 56 and no ectopy.    No new edema.    The patient is alert and oriented ×3.  Mood and affect are appropriate.      Assessment/Plan    Hypertension.  His blood pressure is always been somewhat difficult to control and is a little borderline today.  I would continue his current medication and continue to follow.    Diabetes.  Stable.  His last A1c 2 months ago was 6.0.  Continue current medication.    Hyperlipidemia.  No change in medication.    Depression.  Stable.  Good PHQ 9 score.  No change in  "medication.    End-stage renal disease.  Continue dialysis.    I would like to see him back in 4 months for follow-up.  Total time of this office visit was 25 minutes with greater than 50% of the time spent in care coordination of patient counseling.  The following high BMI interventions were performed this visit: weight monitoring    Jesus Medrano MD      Current Outpatient Prescriptions on File Prior to Visit   Medication Sig     ACCU-CHEK KYLE PLUS TEST STRP strips USE ONE STRIP TO CHECK GLUCOSE TWICE DAILY AT  6AM  AND  4PM  (TEST  TWO  TO  THREE  TIMES  DAILY  AS  DIRECTED)     acetaminophen (TYLENOL) 500 MG tablet Take 1,000 mg by mouth bedtime as needed for pain (or sleep).      BD INSULIN SYRINGE ULTRA-FINE 1/2 mL 31 gauge x 15/64\" Syrg USE ONE SYRINGE TWICE DAILY     bumetanide (BUMEX) 1 MG tablet Take 1 tablet (1 mg total) by mouth 2 (two) times a day at 9am and 6pm. On dialysis days only dose in the evening     calcium, as carbonate, (OS-AVNI) 500 mg calcium (1,250 mg) tablet Take 1 tablet by mouth daily.     clopidogrel (PLAVIX) 75 mg tablet Take 1 tablet (75 mg total) by mouth daily.     hydrALAZINE (APRESOLINE) 25 MG tablet TAKE 1 TABLET TWICE DAILY     insulin NPH (NOVOLIN N NPH U-100 INSULIN) 100 unit/mL injection Give 18 units subcutaneously every am and 6 units subcutaneously every pm E11.9     insulin NPH (NOVOLIN) 100 unit/mL injection Inject 5-6 Units under the skin 2 (two) times a day. (based on blood sugars)     lovastatin (MEVACOR) 40 MG tablet TAKE 1 TABLET (40 MG TOTAL) BY MOUTH AT BEDTIME.     metoprolol succinate (TOPROL-XL) 25 MG Take 1 tablet (25 mg total) by mouth every evening.     multivitamin therapeutic tablet Take 1 tablet by mouth daily.     NOVOLIN R 100 unit/mL injection 1 unit for every 25>150     warfarin (COUMADIN) 4 MG tablet TAKE 1 TABLET (4 MG) ON TUESDAY AND SATURDAY AND TAKE 1 AND 1/2 TABLETS (6 MG) ALL OTHER DAYS     No current facility-administered medications on " file prior to visit.      Allergies   Allergen Reactions     Calcitriol      Fatigue      Ciprofloxacin Rash     Social History   Substance Use Topics     Smoking status: Former Smoker     Quit date: 1/1/1995     Smokeless tobacco: Never Used     Alcohol use No

## 2021-06-20 NOTE — LETTER
Letter by Jesus Medrano MD at      Author: Jesus Medrano MD Service: -- Author Type: --    Filed:  Encounter Date: 5/15/2020 Status: (Other)         Alexei Blake  [address] [place] [address]             May 15, 2020         Dear Mr. Blake,    Below are the results from your recent visit:    Resulted Orders   Glycosylated Hemoglobin A1c   Result Value Ref Range    Hemoglobin A1c 5.8 3.5 - 6.0 %       Your A1c looks good.    Please call with questions or contact us using [employer].    Sincerely,        Electronically signed by Jesus Medrano MD

## 2021-06-20 NOTE — LETTER
Letter by Ai Armstrong NP at      Author: Ai Armstrong NP Service: -- Author Type: --    Filed:  Encounter Date: 3/13/2020 Status: (Other)       3/13/2020      [facility]  Fax: [phone]   Customer Service: [phone] Wound Dressing Rx and Order Form   Order Status: New Order   Verbal: Destini          Patient Info:  Name: Alexei Blake  : 1940  Address:    [address] [place] [address]  Phone: [phone]      Insurance Info:  Primary: Payor: MEDICARE / Plan: MEDICARE A AND B / Product Type: Medicare /    Secondary: MEDICA MEDICA  [id number] - (Medicare)    Physician Info:   Name:  Ai Armstrong NP   Dept Address/Phones:   [address] [facility], [address] [address] [phone]  Fax: [phone]    Lymphedema circumferential measurements (in cm):  Right just above MTP: 25    Right Ankle: 28.8    Right Widest Calf: 41.6    No data recorded  Left - just above MTP: 25.2    Left Ankle: 27.7    Left Widest Calf: 42.3    No data recorded    Wound info:  Encounter Diagnoses   Name Primary?   ? Venous hypertension of both lower extremities Yes   ? PAD (peripheral artery disease) (H)    ? Venous stasis ulcer of left lower leg with edema of left lower leg (H)    ? Hemosiderosis    ? Venous insufficiency of both lower extremities      VASC Wound 20 Lt medial leg (Active)   Pre Size Length 0.4 20 1400   Pre Size Width 0.4 20 1400   Pre Size Depth 0.1 20 1400   Pre Total Sq cm 0.16 20 1400     Drainage: Moderate  Thickness:  Full  Duration of Need: 30  Days Supply: 30  Start Date: 3/13/2020  Starter Kit: Ancillary Kit (saline, gloves, gauze)  Qualifying wound/Debridement Yes      Dressing Type Brand Size Days Supply  15/30 Quantity  changes/week   Primary Silvercel   4.25''x4.25'' 2 sheets  3 times a week   Secondary  Square gauze   4''x4'' 2 loafs  3 times a week    sof form roll gauze   4''x75'' 12 3 times a  week    Latex free Dermafit tubular compression bandage  Size F  1 box  3 times a week    Comprilan   4'' 2 boxes  3 times a week   Tape Paper  1'' 2 rolls  3 times a week     Note: If total out of pocket is more than $50.00 please contact the patient before processing order.     OK to forward to covered supplier.    Electronically Signed Physician: Ai Armstrong NP Date: 3/13/2020

## 2021-06-20 NOTE — LETTER
Letter by Jesus Medrano MD at      Author: Jesus Medrano MD Service: -- Author Type: --    Filed:  Encounter Date: 7/20/2020 Status: (Other)         Alexei Blake  [address] [place] [address]             July 20, 2020         Dear Mr. Blake,    Below are the results from your recent visit:    Resulted Orders   Lipid Cascade   Result Value Ref Range    Cholesterol 97 <=199 mg/dL    Triglycerides 58 <=149 mg/dL    HDL Cholesterol 40 >=40 mg/dL    LDL Calculated 45 <=129 mg/dL    Patient Fasting > 8hrs? Yes    Microalbumin, Random Urine   Result Value Ref Range    Microalbumin, Random Urine 23.09 (H) 0.00 - 1.99 mg/dL    Creatinine, Urine 102.8 mg/dL    Microalbumin/Creatinine Ratio Random Urine 224.6 (H) <=19.9 mg/g    Narrative    Microalbumin, Random Urine  <2.0 mg/dL . . . . . . . . Normal  3.0-30.0 mg/dL . . . . . . Microalbuminuria  >30.0 mg/dL . . . . . .  . Clinical Proteinuria    Microalbumin/Creatinine Ratio, Random Urine  <20 mg/g . . . . .. . . . Normal   mg/g . . . . . . . Microalbuminuria  >300 mg/g . . . . . . . . Clinical Proteinuria           Your cholesterol numbers look good.    Please call with questions or contact us using [employer].    Sincerely,        Electronically signed by Jesus Medrano MD

## 2021-06-20 NOTE — LETTER
Letter by Bonnie Hernandez at      Author: Bonnie Hernandez Service: -- Author Type: --    Filed:  Encounter Date: 6/16/2020 Status: (Other)          June 16, 2020      Alexei Blake  [address] [place] [address]      Dear Alexei Blake,    We have processed your request for proxy access to [facility]. If you did not make a request to kristen proxy access to an individual, please contact us immediately at [phone].    Through proxy access, your family member or other individual you approve, will be provided secure online access to information regarding your health. Through [employer], they will be able to review instructions from your health care provider, send a secure message to your provider, view test results, manage your appointments and more.    Again, thank you for registering for [employer]. Our team looks forward to partnering with you in managing your medical care and supporting healthy behaviors.     Thank you for choosing  [facility].    Sincerely,    [facility]    If you have any further questions, please contact our [employer] Support Team by phone [phone] or email, [email or web address].

## 2021-06-21 NOTE — LETTER
Letter by Candie Mcrae CNP at      Author: Candie Mcrae CNP Service: -- Author Type: --    Filed:  Encounter Date: 5/3/2021 Status: (Other)         [facility]  [address] [facility] [address] [address]                                  May 13, 2021    Patient: Alexei Blake   MR Number: [id number]   YOB: 1940   Date of Visit: 5/3/2021     Dear Dr. Norris:    Thank you for referring Alexei Blake to me for evaluation. Below are the relevant portions of my assessment and plan of care.    If you have questions, please do not hesitate to call me. I look forward to following Alexei along with you.    Sincerely,        Candie Mcrae CNP          CC  No Recipients  Candie Mcrae CNP  5/13/2021  3:08 PM  Sign when Signing Visit  [facility]    Facility:   [facility] [[id number]]   Code Status: POLST AVAILABLE      CHIEF COMPLAINT/REASON FOR VISIT:  Chief Complaint   Patient presents with   ? Problem Visit     INR, poor PO intake, diet changes       HISTORY:      HPI: Alexei is a 81 y.o. male with pmh of ESRD on dialysis three times weekly, CVA with residual left sided weakness, htn, who presented to the ED with acute onset of abdominal pain during dialysis.  He had ischemic changes of the right colon and was brought to the OR for colonic resection and ileostomy on 331.  He also had an ileostomy revision on 4/21.  He is in the TCU for medical management, wound management for the abdominal wound secondary to repair, and therapy for strengthening.  His blood pressure meds were titrated and he is now on isosorbide 10 mg 3 times daily, metoprolol twice daily, with all meds to be held prior to dialysis due to blood pressure drops during his runs.  He has dialysis on Tuesday, Thursday and Saturdays.  For his anemia, will be starting Epogen at hemodialysis.  He is seen today lying in bed after therapy, he has been having persistent  nausea since arriving at the TCU.  He does have a small amount of dark, formed stool in the ileostomy bag.  His wound is unable to be viewed as it is underneath the tape to the ileostomy bag.  Nursing has already changed it today.  His pain is well controlled.    Today: Discussed elevated INR, possibly secondary to poor PO intake. INR 4.1 today and coumadin has been on hold x 5 days. No bleeding, hematochezia, hematuria. Denies pain today. Wound monitored by nursing. Has had poor PO intake and dialysis did suggest liberalizing diet to general diet from renal. Also, discontinue fluid restriction and monitor weights. He reports zofran as very helpful to his nausea.     Past Medical History:   Diagnosis Date   ? Abscess of tongue    ? Chronic kidney disease     CKD stage 4,dialysis    ? Diabetes (H)     type 2   ? DVT (deep venous thrombosis) (H)    ? End stage renal disease (H)    ? Hernia, umbilical    ? History of transfusion    ? Hyperlipidemia    ? Hypertension    ? CATHLEEN (obstructive sleep apnea)     uses CPAP   ? PVD (peripheral vascular disease) (H)    ? Renal insufficiency    ? Stroke (H)     minor left sided weakness             Family History   Problem Relation Age of Onset   ? Hypertension Mother    ? Hypertension Father    ? Diabetes Father    ? Heart attack Father      Social History     Socioeconomic History   ? Marital status:      Spouse name: Not on file   ? Number of children: 3   ? Years of education: Not on file   ? Highest education level: Not on file   Occupational History   ? 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 [email or web address]   Social Needs   ? Financial resource strain: Not on file   ? Food insecurity     Worry: Not on file     Inability: Not on file   ? Transportation needs     Medical: Not on file     Non-medical: Not on file   Tobacco Use   ? Smoking status: Former Smoker     Packs/day: 0.00     Quit date: 1995     Years since quittin.3   ? Smokeless tobacco: Never Used  "  Substance and Sexual Activity   ? Alcohol use: No   ? Drug use: No   ? Sexual activity: Not on file   Lifestyle   ? Physical activity     Days per week: Not on file     Minutes per session: Not on file   ? Stress: Not on file   Relationships   ? Social connections     Talks on phone: Not on file     Gets together: Not on file     Attends Restorationist service: Not on file     Active member of club or organization: Not on file     Attends meetings of clubs or organizations: Not on file     Relationship status: Not on file   ? Intimate partner violence     Fear of current or ex partner: Not on file     Emotionally abused: Not on file     Physically abused: Not on file     Forced sexual activity: Not on file   Other Topics Concern   ? Not on file   Social History Narrative    3/20/21: \"Bill\" is here in the ED with his wife today.         Review of Systems   Constitutional: Negative.    HENT: Negative.    Respiratory: Negative.    Cardiovascular: Negative.    Gastrointestinal: Positive for nausea. Negative for abdominal distention and abdominal pain.   Genitourinary: Negative.    Musculoskeletal: Negative.    Skin: Positive for wound.   Neurological: Positive for weakness.       Vitals:    05/03/21 1307   BP: 165/79   Pulse: 75   Resp: 18   Temp: 97.8  F (36.6  C)   SpO2: 97%   Weight: 210 lb 1.6 oz (95.3 kg)   Height: 6' 1\" (1.854 m)       Physical Exam  Constitutional:       Appearance: Normal appearance. He is normal weight. He is ill-appearing.   HENT:      Head: Atraumatic.      Mouth/Throat:      Mouth: Mucous membranes are moist.   Eyes:      General: No scleral icterus.     Extraocular Movements: Extraocular movements intact.   Cardiovascular:      Rate and Rhythm: Normal rate and regular rhythm.   Pulmonary:      Effort: Pulmonary effort is normal. No respiratory distress.      Breath sounds: No wheezing.      Comments: Diminished bilaterally   Abdominal:      General: Abdomen is flat. There is no distension.     "  Palpations: Abdomen is soft.      Tenderness: There is no abdominal tenderness.      Comments: Iliostomy    Musculoskeletal: Normal range of motion.      Right lower leg: No edema.      Left lower leg: No edema.      Comments: AV fistula to RUE.      Skin:     General: Skin is warm and dry.   Neurological:      General: No focal deficit present.   Psychiatric:         Mood and Affect: Mood normal.         Behavior: Behavior normal.           LABS:   Reviewed.     ASSESSMENT:      ICD-10-CM    1. ESRD (end stage renal disease) on dialysis (H)  N18.6     Z99.2    2. Status post ileostomy (H)  Z93.2    3. Adjustment disorder with depressed mood  F43.21    4. Paroxysmal atrial fibrillation (H)  I48.0        PLAN:      Nausea: post op. VS stable, afebrile. Good output in illeostomy bag. Nausea greatly improved with zofran.   -Continue zofran 4mg po three times a day with meals.     Status post ileostomy: dark stool in bag.   -Pantoprazole 40 mg daily.  -Reduce oxycodone to every 6 hours as needed.  -Tylenol 500 mg every 4 hours as needed.    End-stage renal disease  Anemia of chronic disease  Poor PO intake.  -Discontinue fluid restriction.   -On Epogen at dialysis.  -Check CBC on Friday.  -Has dialysis Tuesday, Thursday, Saturday.  -Liberalize diet to general diet due to poor po intake.   -Renal caps 1 mg daily.    CAD  A. Fib  History of CVA  -On warfarin, currently 4.10, recheck Wednesday.   -Atorvastatin 80 mg at bedtime.  -Aspirin 81 daily.  -Clopidogrel 75 daily.  -Isosorbide 10 mg 3 times a day.    Allergic rhinitis:  -Flonase 2 sprays daily.    Disposition: Likely going to return to his home where he lives with his wife.      Electronically signed by: Candie Mcrae, CNP

## 2021-06-21 NOTE — LETTER
Letter by Candie Mcrae CNP at      Author: Candie Mcrae CNP Service: -- Author Type: --    Filed:  Encounter Date: 5/17/2021 Status: (Other)         [facility]  [address] [address]                                  May 20, 2021    Patient: Alexei Blake   MR Number: [id number]   YOB: 1940   Date of Visit: 5/17/2021     Dear Dr. Norris:    Thank you for referring Alexei Blake to me for evaluation. Below are the relevant portions of my assessment and plan of care.    If you have questions, please do not hesitate to call me. I look forward to following Alexei along with you.    Sincerely,        Candie Mcrae CNP          CC  No Recipients  Candie Mcrae CNP  5/20/2021  8:57 PM  Sign when Signing Visit  [facility]    Facility:   [facility] [[id number]]   Code Status: POLST AVAILABLE      CHIEF COMPLAINT/REASON FOR VISIT:  Chief Complaint   Patient presents with   ? Problem Visit     Nausea, warfarin monitoring.       HISTORY:      HPI: Alexei is a 81 y.o. male with pmh of ESRD on dialysis three times weekly, CVA with residual left sided weakness, htn, who presented to the ED with acute onset of abdominal pain during dialysis.  He had ischemic changes of the right colon and was brought to the OR for colonic resection and ileostomy on 331.  He also had an ileostomy revision on 4/21.  He is in the TCU for medical management, wound management for the abdominal wound secondary to repair, and therapy for strengthening.  His blood pressure meds were titrated and he is now on isosorbide 10 mg 3 times daily, metoprolol twice daily, with all meds to be held prior to dialysis due to blood pressure drops during his runs.  He has dialysis on Tuesday, Thursday and Saturdays.  For his anemia, will be starting Epogen at hemodialysis.  He is seen today lying in bed after therapy, he has been having persistent  nausea since arriving at the TCU.  He does have a small amount of dark, formed stool in the ileostomy bag.  His wound is unable to be viewed as it is underneath the tape to the ileostomy bag.  Nursing has already changed it today.  His pain is well controlled.    Today: Seen today for review of multiple conditions, INR monitoring, follow-up to persistent nausea.  INR today 1.9, will increase Coumadin 3 mg for 2 days a week and 2.5 all other days.  Recheck Friday.  He is reporting great improvement in nausea and has been able to eat and drink without issue.  He is willing to trial reduction in Zofran which is currently 3 times a day before meals, will go to twice daily and the one dose as needed.  Otherwise he is doing quite well, tolerating dialysis, he did see his surgeon who was happy with the progress of his ileostomy.  His abdominal wound is healing well.    Past Medical History:   Diagnosis Date   ? Abscess of tongue    ? Chronic kidney disease     CKD stage 4,dialysis [facility]   ? Diabetes (H)     type 2   ? DVT (deep venous thrombosis) (H)    ? End stage renal disease (H)    ? Hernia, umbilical    ? History of transfusion    ? Hyperlipidemia    ? Hypertension    ? CATHLEEN (obstructive sleep apnea)     uses CPAP   ? PVD (peripheral vascular disease) (H)    ? Renal insufficiency    ? Stroke (H) 2003    minor left sided weakness             Family History   Problem Relation Age of Onset   ? Hypertension Mother    ? Hypertension Father    ? Diabetes Father    ? Heart attack Father      Social History     Socioeconomic History   ? Marital status:      Spouse name: Not on file   ? Number of children: 3   ? Years of education: Not on file   ? Highest education level: Not on file   Occupational History   ? 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 [employer]   Social Needs   ? Financial resource strain: Not on file   ? Food insecurity     Worry: Not on file     Inability: Not on file   ? Transportation needs     Medical:  "Not on file     Non-medical: Not on file   Tobacco Use   ? Smoking status: Former Smoker     Packs/day: 0.00     Quit date: 1995     Years since quittin.4   ? Smokeless tobacco: Never Used   Substance and Sexual Activity   ? Alcohol use: No   ? Drug use: No   ? Sexual activity: Not on file   Lifestyle   ? Physical activity     Days per week: Not on file     Minutes per session: Not on file   ? Stress: Not on file   Relationships   ? Social connections     Talks on phone: Not on file     Gets together: Not on file     Attends Bahai service: Not on file     Active member of club or organization: Not on file     Attends meetings of clubs or organizations: Not on file     Relationship status: Not on file   ? Intimate partner violence     Fear of current or ex partner: Not on file     Emotionally abused: Not on file     Physically abused: Not on file     Forced sexual activity: Not on file   Other Topics Concern   ? Not on file   Social History Narrative    3/20/21: \"Bill\" is here in the ED with his wife today.         Review of Systems   Constitutional: Negative.    HENT: Negative.    Respiratory: Negative.    Cardiovascular: Negative.    Gastrointestinal: Negative for abdominal distention, abdominal pain and nausea.   Genitourinary: Negative.    Musculoskeletal: Negative.    Skin: Positive for wound.   Neurological: Positive for weakness.       Vitals:    21 1002   BP: 137/72   Pulse: 66   Resp: 18   Temp: 98.2  F (36.8  C)   SpO2: 100%   Weight: 197 lb 6.4 oz (89.5 kg)   Height: 6' 1\" (1.854 m)       Physical Exam  Constitutional:       Appearance: Normal appearance. He is normal weight. He is ill-appearing.   HENT:      Head: Atraumatic.      Mouth/Throat:      Mouth: Mucous membranes are moist.   Eyes:      General: No scleral icterus.     Extraocular Movements: Extraocular movements intact.   Cardiovascular:      Rate and Rhythm: Normal rate and regular rhythm.   Pulmonary:      Effort: Pulmonary " effort is normal. No respiratory distress.      Breath sounds: No wheezing.      Comments: Diminished bilaterally   Abdominal:      General: Abdomen is flat. There is no distension.      Palpations: Abdomen is soft.      Tenderness: There is no abdominal tenderness.      Comments: Iliostomy    Musculoskeletal: Normal range of motion.      Right lower leg: No edema.      Left lower leg: No edema.      Comments: AV fistula to RUE.      Skin:     General: Skin is warm and dry.   Neurological:      General: No focal deficit present.   Psychiatric:         Mood and Affect: Mood normal.         Behavior: Behavior normal.           LABS:   Reviewed.     ASSESSMENT:      ICD-10-CM    1. ESRD (end stage renal disease) on dialysis (H)  N18.6     Z99.2    2. Status post ileostomy (H)  Z93.2    3. Nausea  R11.0        PLAN:      Nausea: post op. VS stable, afebrile. Good output in illeostomy bag. Nausea greatly improved with zofran.   -Change Zofran to 4 mg p.o. twice daily before meals, and 4 mg p.o. daily as needed.    Status post ileostomy: dark stool in bag.   -Pantoprazole 40 mg daily.  -Reduce oxycodone to every 6 hours as needed.  -Tylenol 500 mg every 4 hours as needed.    End-stage renal disease  Anemia of chronic disease  -On Epogen at dialysis.  -Has dialysis Tuesday, Thursday, Saturday.  -Liberalize diet to general diet due to poor po intake.  Dialysis monitoring.  -Renal caps 1 mg daily.    CAD  A. Fib  History of CVA  -On warfarin, INR 1.9, increase Coumadin 3 mg Mondays and Thursdays, 2.5 all other days.  -Recheck INR Friday.  -Atorvastatin 80 mg at bedtime.  -Aspirin 81 daily.  -Clopidogrel 75 daily.  -Isosorbide 10 mg 3 times a day.    Allergic rhinitis:  -Flonase 2 sprays daily.    Disposition: Likely going to return to his home where he lives with his wife.      Electronically signed by: Candie Mcrae, MARK

## 2021-06-21 NOTE — LETTER
Letter by Candie Mcrae CNP at      Author: Candie Mcrae CNP Service: -- Author Type: --    Filed:  Encounter Date: 4/30/2021 Status: (Other)         [facility]CU  [address] [address]                                  May 8, 2021    Patient: Alexei Blake   MR Number: [id number]   YOB: 1940   Date of Visit: 4/30/2021     Dear Dr. Norris:    Thank you for referring Alexei Blake to me for evaluation. Below are the relevant portions of my assessment and plan of care.    If you have questions, please do not hesitate to call me. I look forward to following Alexei along with you.    Sincerely,        Candie Mcrae CNP          CC  No Recipients  Candie Mcrae CNP  5/8/2021  2:30 PM  Sign when Signing Visit  [facility]    Facility:   [facility] [[id number]]   Code Status: POLST AVAILABLE      CHIEF COMPLAINT/REASON FOR VISIT:  Chief Complaint   Patient presents with   ? Problem Visit     Illeostomy , 4.9 INR       HISTORY:      HPI: Alexei is a 80 y.o. male with pmh of ESRD on dialysis three times weekly, CVA with residual left sided weakness, htn, who presented to the ED with acute onset of abdominal pain during dialysis.  He had ischemic changes of the right colon and was brought to the OR for colonic resection and ileostomy on 331.  He also had an ileostomy revision on 4/21.  He is in the TCU for medical management, wound management for the abdominal wound secondary to repair, and therapy for strengthening.  His blood pressure meds were titrated and he is now on isosorbide 10 mg 3 times daily, metoprolol twice daily, with all meds to be held prior to dialysis due to blood pressure drops during his runs.  He has dialysis on Tuesday, Thursday and Saturdays.  For his anemia, will be starting Epogen at hemodialysis.  He is seen today lying in bed after therapy, he has been having persistent nausea since  arriving at the TCU.  He does have a small amount of dark, formed stool in the ileostomy bag.  His wound is unable to be viewed as it is underneath the tape to the ileostomy bag.  Nursing has already changed it today.  His pain is well controlled.    Today he is laying in bed after feeling tired from dialysis yesterday. He was able to ambulate in and out of the car to go to dialysis. He is minimally conversational today. He reports continued nausea since his discharge from the hospital. He's willing to trial scheduled zofran over the weekend. VS are stable and his illeostomy has dark stool output in bag.     Past Medical History:   Diagnosis Date   ? Abscess of tongue    ? Chronic kidney disease     CKD stage 4,dialysis    ? Diabetes (H)     type 2   ? DVT (deep venous thrombosis) (H)    ? End stage renal disease (H)    ? Hernia, umbilical    ? History of transfusion    ? Hyperlipidemia    ? Hypertension    ? CATHLEEN (obstructive sleep apnea)     uses CPAP   ? PVD (peripheral vascular disease) (H)    ? Renal insufficiency    ? Stroke (H)     minor left sided weakness             Family History   Problem Relation Age of Onset   ? Hypertension Mother    ? Hypertension Father    ? Diabetes Father    ? Heart attack Father      Social History     Socioeconomic History   ? Marital status:      Spouse name: Not on file   ? Number of children: 3   ? Years of education: Not on file   ? Highest education level: Not on file   Occupational History   ? 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 [employer]   Social Needs   ? Financial resource strain: Not on file   ? Food insecurity     Worry: Not on file     Inability: Not on file   ? Transportation needs     Medical: Not on file     Non-medical: Not on file   Tobacco Use   ? Smoking status: Former Smoker     Packs/day: 0.00     Quit date: 1995     Years since quittin.3   ? Smokeless tobacco: Never Used   Substance and Sexual Activity   ? Alcohol use: No   ? Drug  "use: No   ? Sexual activity: Not on file   Lifestyle   ? Physical activity     Days per week: Not on file     Minutes per session: Not on file   ? Stress: Not on file   Relationships   ? Social connections     Talks on phone: Not on file     Gets together: Not on file     Attends [employer] service: Not on file     Active member of club or organization: Not on file     Attends meetings of clubs or organizations: Not on file     Relationship status: Not on file   ? Intimate partner violence     Fear of current or ex partner: Not on file     Emotionally abused: Not on file     Physically abused: Not on file     Forced sexual activity: Not on file   Other Topics Concern   ? Not on file   Social History Narrative    3/20/21: \"Bill\" is here in the ED with his wife today.         Review of Systems   Constitutional: Negative.    HENT: Negative.    Respiratory: Negative.    Cardiovascular: Negative.    Gastrointestinal: Positive for nausea. Negative for abdominal distention and abdominal pain.   Genitourinary: Negative.    Musculoskeletal: Negative.    Skin: Positive for wound.   Neurological: Positive for weakness.       Vitals:    04/30/21 1350   BP: 135/69   Pulse: 68   Resp: 16   Temp: 98  F (36.7  C)   SpO2: 95%   Weight: 211 lb 9.6 oz (96 kg)   Height: 6' 1\" (1.854 m)       Physical Exam  Constitutional:       Appearance: Normal appearance. He is normal weight. He is ill-appearing.   HENT:      Head: Atraumatic.      Mouth/Throat:      Mouth: Mucous membranes are moist.   Eyes:      General: No scleral icterus.     Extraocular Movements: Extraocular movements intact.   Cardiovascular:      Rate and Rhythm: Normal rate and regular rhythm.   Pulmonary:      Effort: Pulmonary effort is normal. No respiratory distress.      Breath sounds: No wheezing.      Comments: Diminished bilaterally   Abdominal:      General: Abdomen is flat. There is no distension.      Palpations: Abdomen is soft.      Tenderness: There is no " abdominal tenderness.      Comments: Iliostomy    Musculoskeletal: Normal range of motion.      Right lower leg: No edema.      Left lower leg: No edema.      Comments: AV fistula to RUE.      Skin:     General: Skin is warm and dry.   Neurological:      General: No focal deficit present.   Psychiatric:         Mood and Affect: Mood normal.         Behavior: Behavior normal.           LABS:   Reviewed.     ASSESSMENT:      ICD-10-CM    1. Status post ileostomy (H)  Z93.2    2. ESRD (end stage renal disease) on dialysis (H)  N18.6     Z99.2    3. Paroxysmal atrial fibrillation (H)  I48.0        PLAN:      Nausea: post op. VS stable, afebrile. Good output in illeostomy bag.   -Start zofran 4mg po three times a day with meals x 72 hours. Follow up Monday. (qtc WNL per most recent ekg).     Status post ileostomy: dark stool in bag.   -Pantoprazole 40 mg daily.  -Reduce oxycodone to every 6 hours as needed.  -Tylenol 500 mg every 4 hours as needed.    End-stage renal disease  Anemia of chronic disease  -On Epogen at dialysis.  -Check CBC on Friday.  -Has dialysis Tuesday, Thursday, Saturday.  -Renal diet.  -Renal caps 1 mg daily.    CAD  A. Fib  History of CVA  -On warfarin, currently 4.90, recheck Monday.   -Atorvastatin 80 mg at bedtime.  -Aspirin 81 daily.  -Clopidogrel 75 daily.  -Isosorbide 10 mg 3 times a day.    Allergic rhinitis:  -Flonase 2 sprays daily.    Disposition: Likely going to return to his home where he lives with his wife.      Electronically signed by: Candie Mcrae CNP

## 2021-06-21 NOTE — LETTER
Letter by Candie Mcrae CNP at      Author: Candie Mcrae CNP Service: -- Author Type: --    Filed:  Encounter Date: 5/5/2021 Status: (Other)         [facility]  [address] [address]                                  May 14, 2021    Patient: Alexei Blake   MR Number: [id number]   YOB: 1940   Date of Visit: 5/5/2021     Dear Dr. Norris:    Thank you for referring Alexei Blake to me for evaluation. Below are the relevant portions of my assessment and plan of care.    If you have questions, please do not hesitate to call me. I look forward to following Alexei along with you.    Sincerely,        Candie Mcrae CNP          CC  No Recipients  Candie Mcrae CNP  5/14/2021 12:16 PM  Sign when Signing Visit  [facility]    Facility:   [facility] [[id number]]   Code Status: POLST AVAILABLE      CHIEF COMPLAINT/REASON FOR VISIT:  Chief Complaint   Patient presents with   ? Problem Visit     zofran, hold coumadin, cbc       HISTORY:      HPI: Alexei is a 81 y.o. male with pmh of ESRD on dialysis three times weekly, CVA with residual left sided weakness, htn, who presented to the ED with acute onset of abdominal pain during dialysis.  He had ischemic changes of the right colon and was brought to the OR for colonic resection and ileostomy on 331.  He also had an ileostomy revision on 4/21.  He is in the TCU for medical management, wound management for the abdominal wound secondary to repair, and therapy for strengthening.  His blood pressure meds were titrated and he is now on isosorbide 10 mg 3 times daily, metoprolol twice daily, with all meds to be held prior to dialysis due to blood pressure drops during his runs.  He has dialysis on Tuesday, Thursday and Saturdays.  For his anemia, will be starting Epogen at hemodialysis.  He is seen today lying in bed after therapy, he has been having persistent nausea  since arriving at the TCU.  He does have a small amount of dark, formed stool in the ileostomy bag.  His wound is unable to be viewed as it is underneath the tape to the ileostomy bag.  Nursing has already changed it today.  His pain is well controlled.    Today: Continues to have supratherapuetic INR; continuing to hold coumadin. Recheck due Friday. Nausea greatly improved. Eating and drinking better with improved stool output into ostomy bag. Wound followed by nursing closely. Dialysis on ,Sat. CBC due tomorrow. He is doing well and more conversational today. Reports feeling better. Wife is visiting a few hours a day each day.     Past Medical History:   Diagnosis Date   ? Abscess of tongue    ? Chronic kidney disease     CKD stage 4,dialysis    ? Diabetes (H)     type 2   ? DVT (deep venous thrombosis) (H)    ? End stage renal disease (H)    ? Hernia, umbilical    ? History of transfusion    ? Hyperlipidemia    ? Hypertension    ? CATHLEEN (obstructive sleep apnea)     uses CPAP   ? PVD (peripheral vascular disease) (H)    ? Renal insufficiency    ? Stroke (H)     minor left sided weakness             Family History   Problem Relation Age of Onset   ? Hypertension Mother    ? Hypertension Father    ? Diabetes Father    ? Heart attack Father      Social History     Socioeconomic History   ? Marital status:      Spouse name: Not on file   ? Number of children: 3   ? Years of education: Not on file   ? Highest education level: Not on file   Occupational History   ? 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 Financial resource strain: Not on file   ? Food insecurity     Worry: Not on file     Inability: Not on file   ? Transportation needs     Medical: Not on file     Non-medical: Not on file   Tobacco Use   ? Smoking status: Former Smoker     Packs/day: 0.00     Quit date: 1995     Years since quittin.3   ? Smokeless tobacco: Never Used   Substance and Sexual Activity  "  ? Alcohol use: No   ? Drug use: No   ? Sexual activity: Not on file   Lifestyle   ? Physical activity     Days per week: Not on file     Minutes per session: Not on file   ? Stress: Not on file   Relationships   ? Social connections     Talks on phone: Not on file     Gets together: Not on file     Attends Muslim service: Not on file     Active member of club or organization: Not on file     Attends meetings of clubs or organizations: Not on file     Relationship status: Not on file   ? Intimate partner violence     Fear of current or ex partner: Not on file     Emotionally abused: Not on file     Physically abused: Not on file     Forced sexual activity: Not on file   Other Topics Concern   ? Not on file   Social History Narrative    3/20/21: \"Bill\" is here in the ED with his wife today.         Review of Systems   Constitutional: Negative.    HENT: Negative.    Respiratory: Negative.    Cardiovascular: Negative.    Gastrointestinal: Positive for nausea. Negative for abdominal distention and abdominal pain.   Genitourinary: Negative.    Musculoskeletal: Negative.    Skin: Positive for wound.   Neurological: Positive for weakness.       Vitals:    05/05/21 1412   BP: 165/79   Pulse: 75   Resp: 18   Temp: 97.8  F (36.6  C)   SpO2: 97%   Weight: 208 lb 14.4 oz (94.8 kg)   Height: 6' 1\" (1.854 m)       Physical Exam  Constitutional:       Appearance: Normal appearance. He is normal weight. He is ill-appearing.   HENT:      Head: Atraumatic.      Mouth/Throat:      Mouth: Mucous membranes are moist.   Eyes:      General: No scleral icterus.     Extraocular Movements: Extraocular movements intact.   Cardiovascular:      Rate and Rhythm: Normal rate and regular rhythm.   Pulmonary:      Effort: Pulmonary effort is normal. No respiratory distress.      Breath sounds: No wheezing.      Comments: Diminished bilaterally   Abdominal:      General: Abdomen is flat. There is no distension.      Palpations: Abdomen is soft. "      Tenderness: There is no abdominal tenderness.      Comments: Iliostomy    Musculoskeletal: Normal range of motion.      Right lower leg: No edema.      Left lower leg: No edema.      Comments: AV fistula to RUE.      Skin:     General: Skin is warm and dry.   Neurological:      General: No focal deficit present.   Psychiatric:         Mood and Affect: Mood normal.         Behavior: Behavior normal.           LABS:   Reviewed.     ASSESSMENT:      ICD-10-CM    1. Status post ileostomy (H)  Z93.2    2. ESRD (end stage renal disease) on dialysis (H)  N18.6     Z99.2    3. Paroxysmal atrial fibrillation (H)  I48.0        PLAN:      Nausea: post op. VS stable, afebrile. Good output in illeostomy bag. Nausea greatly improved with zofran.   -Continue zofran 4mg po three times a day with meals.     Status post ileostomy: dark stool in bag.   -Pantoprazole 40 mg daily.  -Reduce oxycodone to every 6 hours as needed.  -Tylenol 500 mg every 4 hours as needed.    End-stage renal disease  Anemia of chronic disease  Poor PO intake.  -On Epogen at dialysis.  -Check CBC on Friday.  -Has dialysis Tuesday, Thursday, Saturday.  -Liberalize diet to general diet due to poor po intake.   -Renal caps 1 mg daily.    CAD  A. Fib  History of CVA  -On warfarin, currently 3.7, Hold dose and recheck Friday.  -Atorvastatin 80 mg at bedtime.  -Aspirin 81 daily.  -Clopidogrel 75 daily.  -Isosorbide 10 mg 3 times a day.    Allergic rhinitis:  -Flonase 2 sprays daily.    Disposition: Likely going to return to his home where he lives with his wife.      Electronically signed by: Candie Mcrae, CNP

## 2021-06-21 NOTE — LETTER
Letter by Candie Mcrae CNP at      Author: Candie Mcrae CNP Service: -- Author Type: --    Filed:  Encounter Date: 4/28/2021 Status: (Other)         [facility]  [address] [address]                                  May 4, 2021    Patient: Alexei Blake   MR Number: [id number]   YOB: 1940   Date of Visit: 4/28/2021     Dear Dr. Norris:    Thank you for referring Alexei Blake to me for evaluation. Below are the relevant portions of my assessment and plan of care.    If you have questions, please do not hesitate to call me. I look forward to following Alexei along with you.    Sincerely,        Candie Mcrae CNP          CC  No Recipients  Candie Mcrae CNP  5/4/2021  4:11 PM  Sign when Signing Visit  [facility]    Facility:   [facility] [[id number]]   Code Status: POLST AVAILABLE      CHIEF COMPLAINT/REASON FOR VISIT:  Chief Complaint   Patient presents with   ? Hospital Visit Follow Up     ESRD, hemicolectomy       HISTORY:      HPI: Alexei is a 80 y.o. male with pmh of ESRD on dialysis three times weekly, CVA with residual left sided weakness, htn, who presented to the ED with acute onset of abdominal pain during dialysis.  He had ischemic changes of the right colon and was brought to the OR for colonic resection and ileostomy on 331.  He also had an ileostomy revision on 4/21.  He is in the TCU for medical management, wound management for the abdominal wound secondary to repair, and therapy for strengthening.  His blood pressure meds were titrated and he is now on isosorbide 10 mg 3 times daily, metoprolol twice daily, with all meds to be held prior to dialysis due to blood pressure drops during his runs.  He has dialysis on Tuesday, Thursday and Saturdays.  For his anemia, will be starting Epogen at hemodialysis.  He is seen today lying in bed after therapy, he has been having persistent  nausea since arriving at the TCU.  He does have a small amount of dark, formed stool in the ileostomy bag.  His wound is unable to be viewed as it is underneath the tape to the ileostomy bag.  Nursing has already changed it today.  His pain is well controlled.  He is frustrated with the whole procedure and is appropriately agitated.  Nursing concern for his ability to ambulate in and out of a vehicle when going to dialysis.  Prior to hospitalization, he was driving himself to dialysis.  Therapy will be working on him getting in and out of a car that he is able to attend dialysis tomorrow.  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  He also likes to talk about his house and where he grew up.    Past Medical History:   Diagnosis Date   ? Abscess of tongue    ? Chronic kidney disease     CKD stage 4,dialysis [facility]   ? Diabetes (H)     type 2   ? DVT (deep venous thrombosis) (H)    ? End stage renal disease (H)    ? Hernia, umbilical    ? History of transfusion    ? Hyperlipidemia    ? Hypertension    ? CATHLEEN (obstructive sleep apnea)     uses CPAP   ? PVD (peripheral vascular disease) (H)    ? Renal insufficiency    ? Stroke (H) 2003    minor left sided weakness             Family History   Problem Relation Age of Onset   ? Hypertension Mother    ? Hypertension Father    ? Diabetes Father    ? Heart attack Father      Social History     Socioeconomic History   ? Marital status:      Spouse name: Not on file   ? Number of children: 3   ? Years of education: Not on file   ? Highest education level: Not on file   Occupational History   ? 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 [email or web address]   Social Needs   ? Financial resource strain: Not on file   ? Food insecurity     Worry: Not on file     Inability: Not on file   ? Transportation needs     Medical: Not on file     Non-medical: Not on file   Tobacco Use  "  ? Smoking status: Former Smoker     Packs/day: 0.00     Quit date: 1995     Years since quittin.3   ? Smokeless tobacco: Never Used   Substance and Sexual Activity   ? Alcohol use: No   ? Drug use: No   ? Sexual activity: Not on file   Lifestyle   ? Physical activity     Days per week: Not on file     Minutes per session: Not on file   ? Stress: Not on file   Relationships   ? Social connections     Talks on phone: Not on file     Gets together: Not on file     Attends [employer] service: Not on file     Active member of club or organization: Not on file     Attends meetings of clubs or organizations: Not on file     Relationship status: Not on file   ? Intimate partner violence     Fear of current or ex partner: Not on file     Emotionally abused: Not on file     Physically abused: Not on file     Forced sexual activity: Not on file   Other Topics Concern   ? Not on file   Social History Narrative    3/20/21: \"Bill\" is here in the ED with his wife today.         Review of Systems   Constitutional: Negative.    HENT: Negative.    Respiratory: Negative.    Cardiovascular: Negative.    Gastrointestinal: Negative.  Negative for abdominal distention and abdominal pain.   Genitourinary: Negative.    Musculoskeletal: Negative.    Skin: Positive for wound.   Neurological: Positive for weakness.       Vitals:    21 1332   BP: (!) 181/77   Pulse: 75   Resp: 20   Temp: 98.4  F (36.9  C)   SpO2: 96%   Weight: 219 lb 12.8 oz (99.7 kg)   Height: 6' 1\" (1.854 m)       Physical Exam  Constitutional:       Appearance: Normal appearance. He is normal weight. He is ill-appearing.   HENT:      Head: Atraumatic.      Mouth/Throat:      Mouth: Mucous membranes are moist.   Eyes:      General: No scleral icterus.     Extraocular Movements: Extraocular movements intact.   Cardiovascular:      Rate and Rhythm: Normal rate and regular rhythm.   Pulmonary:      Effort: Pulmonary effort is normal. No respiratory distress.      " Breath sounds: No wheezing.      Comments: Diminished bilaterally   Abdominal:      General: Abdomen is flat. There is no distension.      Palpations: Abdomen is soft.      Tenderness: There is no abdominal tenderness.      Comments: Iliostomy    Musculoskeletal: Normal range of motion.      Right lower leg: No edema.      Left lower leg: No edema.      Comments: AV fistula to RUE.      Skin:     General: Skin is warm and dry.   Neurological:      General: No focal deficit present.   Psychiatric:         Mood and Affect: Mood normal.         Behavior: Behavior normal.           LABS:   Reviewed.     ASSESSMENT:      ICD-10-CM    1. ESRD (end stage renal disease) on dialysis (H)  N18.6     Z99.2    2. Adjustment disorder with depressed mood  F43.21    3. Status post ileostomy (H)  Z93.2        PLAN:      Status post ileostomy  -Pantoprazole 40 mg daily.  -Reduce oxycodone to every 6 hours as needed.  -Tylenol 500 mg every 4 hours as needed.    End-stage renal disease  Anemia of chronic disease  -On Epogen at dialysis.  -Check CBC on Friday.  -Has dialysis Tuesday, Thursday, Saturday.  -Renal diet.  -Renal caps 1 mg daily.    CAD  A. Fib  History of CVA  -On warfarin, currently supratherapeutic, recheck Friday.  -Atorvastatin 80 mg at bedtime.  -Aspirin 81 daily.  -Clopidogrel 75 daily.  -Isosorbide 10 mg 3 times a day.    Allergic rhinitis:  -Flonase 2 sprays daily.    Disposition: Likely going to return to his home where he lives with his wife.    Greater than 35 minutes with greater than 50% spent face-to-face with patient and nursing discussing new ileostomy, wound care, disposition and TCU plan.    Electronically signed by: Candie Mcare CNP

## 2021-06-22 NOTE — TELEPHONE ENCOUNTER
Who is calling:  Patient `  Reason for Call:  Returning call  Date of last appointment with primary care:   Has the patient been recently seen:    Okay to leave a detailed message: Yes

## 2021-06-22 NOTE — PROGRESS NOTES
[facility] Clinic Follow Up Note    Alexei Blake   78 y.o. male    Date of Visit: 11/30/2018    Chief Complaint   Patient presents with     Follow-up     needs new script for shoe     Subjective  This is a 78-year-old man with multiple medical problems that include hypertension, type 2 diabetes with peripheral vascular disease and end-stage renal disease.  He is on long-term dialysis.  One reason for coming in today is to follow-up in an emergency room visit made about 3 weeks ago.  He tripped over a leaf blower in his garage and landed on his side.  I have reviewed the emergency room notes.  Workup was unremarkable and there was no indication of any serious injury.  The patient denies any residual problems from that fall.  Renal status has been stable.  He continues in dialysis regularly.  Sugars have been under fairly good control.  No headaches or dizziness and no chest pain or shortness of breath.  Due to his diabetic neuropathy and peripheral vascular issues he has worn diabetic shoes with inserts.  He will be needing new shoes at this time.  Feet have been good with no open wounds and no drainage.  He has had previous surgery on the feet relative to the vascular damage.  Again, things are stable with the diabetic shoes.  No other new complaints at this time.    ROS A comprehensive review of systems was performed and was otherwise negative    Medications, allergies, and problem list were reviewed and updated    Exam  General Appearance:   On examination his blood pressure is 110/56.  Weight is 223 pounds and height is 74 inches.  BMI is 28.63.    Lungs are clear.    Heart is in a sinus rhythm with a rate of 55 and no ectopy.    Feet look fine with no open lesions and no current edema.    The patient is alert and oriented x3.      Assessment/Plan  1. Essential hypertension with goal blood pressure less than 130/85     2. Type 2 diabetes mellitus with complication, with long-term current use  "of insulin (H)     3. ESRD (end stage renal disease) (H)       Hypertension.  Stable.  Continue current medication.    Diabetes.  Continue current medication.  Because of his neuropathy and previous circulatory issues he should continue to wear the diabetic shoes with inserts.  I will complete the forms when they arrive.    End-stage renal disease.  Continue dialysis.    I will plan to follow-up in a few months.  The following high BMI interventions were performed this visit: weight monitoring    Jesus Medrano MD      Current Outpatient Medications on File Prior to Visit   Medication Sig     ACCU-CHEK KYLE PLUS TEST STRP strips USE ONE STRIP TO CHECK GLUCOSE TWICE DAILY AT  6AM  AND  4PM  (TEST  TWO  TO  THREE  TIMES  DAILY  AS  DIRECTED)     acetaminophen (TYLENOL) 500 MG tablet Take 1,000 mg by mouth bedtime as needed for pain (or sleep).      BD VEO INSULIN SYRINGE UF 1/2 mL 31 gauge x 15/64\" Syrg USE ONE SYRINGE TWICE DAILY     bumetanide (BUMEX) 1 MG tablet Take 1 tablet (1 mg total) by mouth 2 (two) times a day at 9am and 6pm. On dialysis days only dose in the evening     calcium, as carbonate, (OS-AVNI) 500 mg calcium (1,250 mg) tablet Take 1 tablet by mouth daily.     clopidogrel (PLAVIX) 75 mg tablet TAKE 1 TABLET (75 MG TOTAL) BY MOUTH DAILY.     hydrALAZINE (APRESOLINE) 25 MG tablet TAKE 1 TABLET TWICE DAILY     insulin NPH (NOVOLIN N NPH U-100 INSULIN) 100 unit/mL injection Give 18 units subcutaneously every am and 6 units subcutaneously every pm E11.9     insulin NPH (NOVOLIN) 100 unit/mL injection Inject 5-6 Units under the skin 2 (two) times a day. (based on blood sugars)     lisinopril (PRINIVIL,ZESTRIL) 40 MG tablet Take 40 mg by mouth daily.     lovastatin (MEVACOR) 40 MG tablet TAKE 1 TABLET (40 MG TOTAL) BY MOUTH AT BEDTIME.     metoprolol succinate (TOPROL-XL) 25 MG TAKE 1 TABLET (25 MG TOTAL) BY MOUTH EVERY EVENING.     multivitamin therapeutic tablet Take 1 tablet by mouth daily.     " NOVOLIN R 100 unit/mL injection 1 unit for every 25>150     warfarin (COUMADIN) 4 MG tablet TAKE 1 TABLET (4 MG) ON TUESDAY AND SATURDAY AND TAKE 1 AND 1/2 TABLETS (6 MG) ALL OTHER DAYS     No current facility-administered medications on file prior to visit.      Allergies   Allergen Reactions     Blood-Group Specific Substance      Anti-K present. Expect delays in blood for transfusion.  Draw 2 lavender top tubes for type and screen orders.        Calcitriol      Fatigue      Ciprofloxacin Rash     Social History     Tobacco Use     Smoking status: Former Smoker     Last attempt to quit: 1995     Years since quittin.9     Smokeless tobacco: Never Used   Substance Use Topics     Alcohol use: No     Drug use: No

## 2021-06-22 NOTE — TELEPHONE ENCOUNTER
ANTICOAGULATION  MANAGEMENT    Assessment     Today's INR result of 2.10 is Therapeutic (goal INR of 2.0-3.0)        Warfarin taken as previously instructed    No new diet changes affecting INR    No new medication/supplements affecting INR    Continues to tolerate warfarin with no reported s/s of bleeding or thromboembolism     Previous INR was Subtherapeutic at 1.80 on 12/26/18.    Plan:     Spoke with Yair regarding INR result and instructed:     Warfarin Dosing Instructions:    - Continue current warfarin dose 4 mg daily.    Instructed patient to follow up no later than:  2 wks.   - scheduled on 1/30/19 @ [facility].    Education provided: importance of consistent vitamin K intake, target INR goal and significance of current INR result, importance of notifying clinic for changes in medications and monitoring for bleeding signs and symptoms    Bill verbalizes understanding and agrees to warfarin dosing plan.    Instructed to call the [facility] for any changes, questions or concerns. (#[phone])   ?   Yamila Luna RN    Subjective/Objective:      Alexei MIKEY Blake, a 78 y.o. male is on warfarin.     Alexei reports:     Home warfarin dose: verbally confirmed home dose with Yair and updated on anticoagulation calendar     Missed doses: No     Medication changes:  No     S/S of bleeding or thromboembolism:  No     New Injury or illness:  No     Changes in diet or alcohol consumption:  No     Upcoming surgery, procedure or cardioversion:  Yes: Kidney Dialysis 3x/wk on Tues/Thurs/Sat.       Anticoagulation Episode Summary     Current INR goal:   2.0-3.0   TTR:   73.3 % (4 y)   Next INR check:   1/23/2019   INR from last check:   2.10 (1/9/2019)   Weekly max warfarin dose:      Target end date:      INR check location:      Preferred lab:      Send INR reminders to:   ANTICOAGULATION POOL C (DTN,VAD,CGR,GAV)    Indications    PVD (peripheral vascular disease) (H) [I73.9]  Stroke (H) [I63.9]           Comments:             Anticoagulation Care Providers     Provider Role Specialty Phone number    Jesus Medrano MD Referring Internal Medicine [phone]

## 2021-06-22 NOTE — PROGRESS NOTES
"S: Pt. seen in [facility]. Male. 6'2\", 220 lbs. Dr. Lee. RX: DM shoes and DM FO. DX: DM with peripheral vascular disease and neuropathy.  O: Pt. Presently wearing DM shoes and DM FO that he has had for 2 years. Pt. has had DM for many years. Amputations on bilateral toes 1 and 2. HDS bilaterally all toes. No ulcerations.  A: Pain 3/10. Neuropathy in fore feet. Slight edema in ankles. No ulcerations. Pes Cavus feet.  P: Today I C/M with bio foam for DM [place] FO 3 pr.. FO to provide total contact with plantar surface. Reduce sheer forces when ambulating. support medial long arches and a Met pad to offload MTH's due to reduced fat pad. Pt. choose Dr. Garcia shoes 6740 size 10 Wide. Pt. to be seen for F/D.   [id number]    Cristhian Salgado CO,LO  "

## 2021-06-23 NOTE — TELEPHONE ENCOUNTER
Anticoagulation Annual Referral Renewal Review    Alexei Blake's chart reviewed for annual renewal of referral to anticoagulation monitoring.        Criteria for anticoagulation nurse and/or pharmacist renewal met   Warfarin indication: Stroke and PVD Yes, per indication   Current with INR monitoring/compliant Yes Yes   Date of last office visit 01/14/2019 Yes, had office visit within last year   Time in Therapeutic Range (TTR) 91.34 % Yes, TTR > 60%       Alexei Blake met all criteria for anticoagulation management program initiated renewal.  New INR standing orders and anticoagulation referral renewal placed.      Yamila Luna RN  1:10 PM

## 2021-06-23 NOTE — TELEPHONE ENCOUNTER
"Wife (Isabel) calls to report double vision upon waking today.  Went to dialysis appointment this morning (with double vision persisting).  Pt's BP was deemed acceptable for dialysis per staff.  Pt is currently in dialysis -> will be finished in approx 1.5 hours.  Wife states \"I'm surprised they took him in, because he was kind of stumbling around with the double vision -> couldn't see right.\"    Plan of action as follows:  - If double vision persists after dialysis, take pt to [place]'s ED immediately.  - if not, call back for phone re-assessment of pt's status    Wife verbalizes understanding.  Agrees to plan.  Will f/u as needed.     Eryn Shaw RN BSBA  [facility] RN Triage     Reason for Disposition    Double vision    Protocols used: VISION LOSS OR CHANGE-A-OH      "

## 2021-06-23 NOTE — PROGRESS NOTES
Dear Jesus Draper MD  [address] [place] [address],    Thank you for the opportunity to participate in the care of Alexei Blake.     He is a 78 y.o. y/o male patient who comes to the sleep medicine clinic for follow up.  The patient states that since his last clinical visit with me he has been doing well.  He likes his current pressure settings away they are and has no complaints.     Compliance Download data for 30 days:  Pressure setting: APAP 10-14 CWP  Residual AHI: 1.8 events per hour  Leak: Minimal  Compliance: 100%  Mask Tolerance: Good  Skin irritation: None    Past Medical History:   Diagnosis Date     Abscess of tongue      Chronic kidney disease     CKD stage 4,dialysis [facility]     Diabetes (H)     type 2     DVT (deep venous thrombosis) (H)      Hernia, umbilical      History of transfusion      Hyperlipidemia      Hypertension      CATHLEEN (obstructive sleep apnea)     uses CPAP     PVD (peripheral vascular disease) (H)      Renal insufficiency      Stroke (H) 2003    minor left sided weakness       Past Surgical History:   Procedure Laterality Date     ARTERIAL BYPASS SURGERY Bilateral     lower extremities, Dr. Cottrell     TOE AMPUTATION Bilateral     multiple     UMBILICAL HERNIA REPAIR N/A 4/29/2016    Procedure: OPEN UMBILICAL REPAIR WITH MESH;  Surgeon: Jevon Rivas MD;  Location: [facility];  Service:        Social History     Socioeconomic History     Marital status:      Spouse name: Not on file     Number of children: Not on file     Years of education: Not on file     Highest education level: Not on file   Social Needs     Financial resource strain: Not on file     Food insecurity - worry: Not on file     Food insecurity - inability: Not on file     Transportation needs - medical: Not on file     Transportation needs - non-medical: Not on file   Occupational History     Not on file   Tobacco Use     Smoking status: Former Smoker     Last attempt  "to quit: 1995     Years since quittin.1     Smokeless tobacco: Never Used   Substance and Sexual Activity     Alcohol use: No     Drug use: No     Sexual activity: Not on file   Other Topics Concern     Not on file   Social History Narrative     Not on file       Current Outpatient Medications   Medication Sig Dispense Refill     ACCU-CHEK KYLE PLUS TEST STRP strips USE  STRIP TO CHECK GLUCOSE 2 TO 3 TIMES DAILY AS DIRECTED AT  6AM  AND  4PM 300 strip 3     acetaminophen (TYLENOL) 500 MG tablet Take 1,000 mg by mouth bedtime as needed for pain (or sleep).        BD VEO INSULIN SYRINGE UF 1/2 mL 31 gauge x 15\" Syrg USE ONE SYRINGE TWICE DAILY 200 Syringe 3     bumetanide (BUMEX) 1 MG tablet TAKE 1 TABLET TWICE DAILY AT 9AM AND 6PM. ON DIALYSIS DAYS, ONLY DOSE IN THE EVENING. 180 tablet 3     calcium, as carbonate, (OS-AVNI) 500 mg calcium (1,250 mg) tablet Take 1 tablet by mouth daily.       clopidogrel (PLAVIX) 75 mg tablet TAKE 1 TABLET (75 MG TOTAL) BY MOUTH DAILY. 90 tablet 3     hydrALAZINE (APRESOLINE) 25 MG tablet TAKE 1 TABLET TWICE DAILY 180 tablet 3     insulin NPH (NOVOLIN N NPH U-100 INSULIN) 100 unit/mL injection Give 18 units subcutaneously every am and 6 units subcutaneously every pm E11.9 10 mL 3     insulin NPH (NOVOLIN) 100 unit/mL injection Inject 5-6 Units under the skin 2 (two) times a day. (based on blood sugars)       lisinopril (PRINIVIL,ZESTRIL) 40 MG tablet Take 40 mg by mouth daily.       lovastatin (MEVACOR) 40 MG tablet TAKE 1 TABLET (40 MG TOTAL) BY MOUTH AT BEDTIME. 90 tablet 3     metoprolol succinate (TOPROL-XL) 25 MG TAKE 1 TABLET (25 MG TOTAL) BY MOUTH EVERY EVENING. 90 tablet 3     multivitamin therapeutic tablet Take 1 tablet by mouth daily.       NOVOLIN N NPH U-100 INSULIN 100 unit/mL injection INJECT 18 UNITS SUBCUTANEOUSLY ONCE DAILY IN THE MORNING AND 6 IN THE EVENING 30 mL 1     NOVOLIN R 100 unit/mL injection 1 unit for every 25>150 10 mL PRN     warfarin " "(COUMADIN/JANTOVEN) 4 MG tablet Take 1 tablet (4 mg total) by mouth daily. Adjust dose per INR results as instructed. 110 tablet 1     No current facility-administered medications for this visit.        Allergies   Allergen Reactions     Blood-Group Specific Substance      Anti-K present. Expect delays in blood for transfusion.  Draw 2 lavender top tubes for type and screen orders.        Calcitriol      Fatigue      Ciprofloxacin Rash       Epworths Sleepiness Scale 4/9/2018 4/9/2018 6/11/2018 2/6/2019   Sitting and reading (No Data) 3 0 2   Watching TV (No Data) 2 1 2   Sitting, inactive in a public place (e.g. a theatre or a meeting) (No Data) 0 0 0   As a passenger in a car for an hour without a break (No Data) 0 0 0   Lying down to rest in the afternoon when circumstances permit (No Data) 0 3 0   Sitting and talking to someone (No Data) 0 0 0   Sitting quietly after a lunch without alcohol (No Data) 0 0 0   In a car, while stopped for a few minutes in traffic (No Data) 0 0 0   Total score - 5 4 4       Physical Exam:  /48   Pulse 71   Ht 6' 1\" (1.854 m)   Wt (!) 223 lb (101.2 kg)   SpO2 97%   BMI 29.42 kg/m    BMI:Body mass index is 29.42 kg/m .   GEN: NAD,   Psych: normal mood, normal affect    Labs/Studies:     I reviewed the efficacy and compliance report from his device. Data summarized on the HPI and the CPAP compliance flow sheet.     Lab Results   Component Value Date    WBC 5.1 01/31/2019    HGB 12.6 (L) 01/31/2019    HCT 36.6 (L) 01/31/2019     (H) 01/31/2019     01/31/2019         Chemistry        Component Value Date/Time     01/31/2019 1040    K 3.5 01/31/2019 1040     01/31/2019 1040    CO2 27 01/31/2019 1040    BUN 15 01/31/2019 1040    CREATININE 2.64 (H) 01/31/2019 1040     01/31/2019 1040        Component Value Date/Time    CALCIUM 9.1 01/31/2019 1040    ALKPHOS 88 09/09/2016 1457    AST 36 09/09/2016 1457    ALT 41 09/09/2016 1457    BILITOT 0.4 " 09/09/2016 1457            Lab Results   Component Value Date    FERRITIN 113 12/24/2015            Assessment and Plan:  In summary Alexei Blake is a 78 y.o. year old male who is here for review of his compliance download data.    1.  Obstructive sleep apnea on CPAP  I congratulated patient on his excellent CPAP usage.  I will keep him at the same pressure settings as per his request.  I welcomed the patient follow-up with me every 2 years.  2.  Other sleep disturbance     Patient verbalized understanding of these issues, agrees with the plan and all questions were answered today. Patient was given an opportuntity to voice any other symptoms or concerns not listed above. Patient did not have any other symptoms or concerns.      Patient told to return in 24 months. Patient instructed to stop at  to schedule appointment before leaving today.    oJhn Benavidez DO  Board Certified in Internal Medicine and Sleep Medicine  [facility].    I spent a total of 5 minutes of face-to-face encounter and more than 50% of the encounter was used for counseling or coordination of care.    (Note created with Dragon voice recognition and unintended spelling errors and word substitutions may occur)

## 2021-06-23 NOTE — TELEPHONE ENCOUNTER
ANTICOAGULATION  MANAGEMENT: Discharge Continuity of Care Review    Emergency room visit on  1/31/19 for double vision - Diplopia (when looking down).   - was fine last night.  Reported waking up this morning with onset of double vision with slight headache.   - scheduled for MI of brain 1/31/19.      Discharge disposition: Home    INR Results:       Recent labs: (last 7 days)     01/30/19  0750   INR 2.40*       Warfarin inpatient management: Home regimen continued    Warfarin discharge instructions: home regimen continued     Medication Changes Affecting Anticoagulation: No    Additional Factors Affecting Anticoagulation: No    Plan     No adjustment to anticoagulation plan needed    Recommended follow up is scheduled    Anticoagulation calendar updated    Yamila Luna RN

## 2021-06-23 NOTE — TELEPHONE ENCOUNTER
ANTICOAGULATION  MANAGEMENT    Assessment     Today's INR result of 2.40 is Therapeutic (goal INR of 2.0-3.0)        Warfarin taken as previously instructed    No new diet changes affecting INR    No new medication/supplements affecting INR    Continues to tolerate warfarin with no reported s/s of bleeding or thromboembolism     Previous INR was Therapeutic at 2.10 on 1/9/19.    Plan:     Spoke with Yair regarding INR result and instructed:     Warfarin Dosing Instructions:    - Continue current warfarin dose 4 mg daily.    Instructed patient to follow up no later than:  4 wks.   - scheduled on 2/20/19 @ [facility].    Education provided: importance of consistent vitamin K intake and target INR goal and significance of current INR result    Yair verbalizes understanding and agrees to warfarin dosing plan.    Instructed to call the [facility] for any changes, questions or concerns. (#[phone])   ?   Yamila Luna RN    Subjective/Objective:      Alexei Blake, a 78 y.o. male is on warfarin.     Alexei reports:     Home warfarin dose: verbally confirmed home dose with Yair and updated on anticoagulation calendar     Missed doses: No     Medication changes:  No     S/S of bleeding or thromboembolism:  No     New Injury or illness:  No     Changes in diet or alcohol consumption:  No     Upcoming surgery, procedure or cardioversion:  No.  Continues with Kidney Dialysis 3x/wk on Tues/Thurs/Sat    Anticoagulation Episode Summary     Current INR goal:   2.0-3.0   TTR:   73.7 % (4.1 y)   Next INR check:   2/27/2019   INR from last check:   2.40 (1/30/2019)   Weekly max warfarin dose:      Target end date:      INR check location:      Preferred lab:      Send INR reminders to:   ANTICOAGULATION POOL C (DTN,VAD,CGR,GAV)    Indications    PVD (peripheral vascular disease) (H) [I73.9]  Stroke (H) [I63.9]           Comments:            Anticoagulation Care Providers     Provider Role Specialty Phone number    Marcela  Jesus HERMAN MD [facility] Internal Medicine [phone]

## 2021-06-23 NOTE — PROGRESS NOTES
[facility] Clinic Follow Up Note    Alexei Blake   78 y.o. male    Date of Visit: 2/6/2019    Chief Complaint   Patient presents with     Follow-up     Subjective  This is a 78-year-old man with multiple medical problems that include among other things type 2 diabetes, hypertension and end-stage renal disease.  He is on chronic renal dialysis.  He was in the emergency room last week following dialysis for an episode of double vision.  He comes in today to follow-up on that episode.  I have had an opportunity to review the emergency room report and lab and x-ray studies.  Everything was essentially negative and the patient was back to baseline by the time he left the emergency room.  He has had no recurrence of the symptoms since then.  He also saw his ophthalmologist earlier this week who did not find anything unusual.  This area was that it might of been related to electrolyte imbalance which was corrected by the dialysis.    ROS A comprehensive review of systems was performed and was otherwise negative    Medications, allergies, and problem list were reviewed and updated    Exam  General Appearance:   On examination today his blood pressure is 108/48.  It is 223 pounds and height is 73 inches.  BMI is 29.42.    Heart is in a stable rhythm with a rate of 72 and no ectopy.    Good movement and strength in all 4 extremities.    Cranial nerves appear to be intact.    The patient is alert and oriented x3.      Assessment/Plan  1. Type 2 diabetes mellitus with complication, with long-term current use of insulin (H)     2. Essential hypertension with goal blood pressure less than 130/85     3. ESRD (end stage renal disease) (H)       Chronic medical issues appear to be stable at this time.  He continues with dialysis.  I have no solid explanation for his symptoms of last week.  There has not been a recurrence so I would not pursue additional workup at this time.  We will follow-up as needed.  Body  "Mass Index was not assessed due to Patient was in for emergency room follow-up..    Jesus eMdrano MD      Current Outpatient Medications on File Prior to Visit   Medication Sig     ACCU-CHEK KYLE PLUS TEST STRP strips USE  STRIP TO CHECK GLUCOSE 2 TO 3 TIMES DAILY AS DIRECTED AT  6AM  AND  4PM     acetaminophen (TYLENOL) 500 MG tablet Take 1,000 mg by mouth bedtime as needed for pain (or sleep).      BD VEO INSULIN SYRINGE UF 1/2 mL 31 gauge x 15/64\" Syrg USE ONE SYRINGE TWICE DAILY     bumetanide (BUMEX) 1 MG tablet TAKE 1 TABLET TWICE DAILY AT 9AM AND 6PM. ON DIALYSIS DAYS, ONLY DOSE IN THE EVENING.     calcium, as carbonate, (OS-AVNI) 500 mg calcium (1,250 mg) tablet Take 1 tablet by mouth daily.     clopidogrel (PLAVIX) 75 mg tablet TAKE 1 TABLET (75 MG TOTAL) BY MOUTH DAILY.     hydrALAZINE (APRESOLINE) 25 MG tablet TAKE 1 TABLET TWICE DAILY     insulin NPH (NOVOLIN N NPH U-100 INSULIN) 100 unit/mL injection Give 18 units subcutaneously every am and 6 units subcutaneously every pm E11.9     insulin NPH (NOVOLIN) 100 unit/mL injection Inject 5-6 Units under the skin 2 (two) times a day. (based on blood sugars)     lisinopril (PRINIVIL,ZESTRIL) 40 MG tablet Take 40 mg by mouth daily.     lovastatin (MEVACOR) 40 MG tablet TAKE 1 TABLET (40 MG TOTAL) BY MOUTH AT BEDTIME.     metoprolol succinate (TOPROL-XL) 25 MG TAKE 1 TABLET (25 MG TOTAL) BY MOUTH EVERY EVENING.     multivitamin therapeutic tablet Take 1 tablet by mouth daily.     NOVOLIN N NPH U-100 INSULIN 100 unit/mL injection INJECT 18 UNITS SUBCUTANEOUSLY ONCE DAILY IN THE MORNING AND 6 IN THE EVENING     NOVOLIN R 100 unit/mL injection 1 unit for every 25>150     warfarin (COUMADIN/JANTOVEN) 4 MG tablet Take 1 tablet (4 mg total) by mouth daily. Adjust dose per INR results as instructed.     No current facility-administered medications on file prior to visit.      Allergies   Allergen Reactions     Blood-Group Specific Substance      Anti-K present. " Expect delays in blood for transfusion.  Draw 2 lavender top tubes for type and screen orders.        Calcitriol      Fatigue      Ciprofloxacin Rash     Social History     Tobacco Use     Smoking status: Former Smoker     Last attempt to quit: 1995     Years since quittin.1     Smokeless tobacco: Never Used   Substance Use Topics     Alcohol use: No     Drug use: No

## 2021-06-23 NOTE — PROGRESS NOTES
[facility] Clinic Follow Up Note    Alexei Blake   78 y.o. male    Date of Visit: 1/14/2019    Chief Complaint   Patient presents with     Follow-up     got a letter about some blood work with anti-k     Subjective  This is a 78-year-old man with multiple medical issues that include type 2 diabetes, hypertension and end-stage renal disease on dialysis.  For the most part these problems have been stable.  No headaches or dizziness and no chest pain or shortness of breath.  Dialysis continues to go well.  The majority of his blood sugars are ranging between 80 and 120.  He reports that he feels about the same as he did on his last visit and has no new complaints.  His primary reason for coming in today is that he received a letter from the [facility] pathology department informing him that he has an anti-K protein in his blood which could have a minor effect on future transfusions if necessary.  He did not fully understand the letter and wanted me to try and explain this to him.    ROS A comprehensive review of systems was performed and was otherwise negative    Medications, allergies, and problem list were reviewed and updated    Exam  General Appearance:   On examination his blood pressure is 122/60.  Weight is 226 pounds and height is 74 inches.    Heart is in a stable rhythm with a rate of 66 and no ectopy.    No new edema.    The patient is alert and oriented x3.      Assessment/Plan  1. Type 2 diabetes mellitus with complication, with long-term current use of insulin (H)     2. Essential hypertension with goal blood pressure less than 130/85     3. ESRD (end stage renal disease) (H)       Type 2 diabetes.  Stable.  Continue current medication.    Hypertension.  Under control.  Continue current medication.    End-stage renal disease.  Continue dialysis and follow-up with nephrology.    I did read the letter that he received and went over it with him and explained that it is not of any great  "significance to him.  He was sent a card to carry in his wallet and I reaffirmed with him the need to show this to anyone should he require transfusion.  The following high BMI interventions were performed this visit: weight monitoring    Jesus Medrano MD      Current Outpatient Medications on File Prior to Visit   Medication Sig     ACCU-CHEK KYLE PLUS TEST STRP strips USE  STRIP TO CHECK GLUCOSE 2 TO 3 TIMES DAILY AS DIRECTED AT  6AM  AND  4PM     acetaminophen (TYLENOL) 500 MG tablet Take 1,000 mg by mouth bedtime as needed for pain (or sleep).      BD VEO INSULIN SYRINGE UF 1/2 mL 31 gauge x 15/64\" Syrg USE ONE SYRINGE TWICE DAILY     bumetanide (BUMEX) 1 MG tablet TAKE 1 TABLET TWICE DAILY AT 9AM AND 6PM. ON DIALYSIS DAYS, ONLY DOSE IN THE EVENING.     calcium, as carbonate, (OS-AVNI) 500 mg calcium (1,250 mg) tablet Take 1 tablet by mouth daily.     clopidogrel (PLAVIX) 75 mg tablet TAKE 1 TABLET (75 MG TOTAL) BY MOUTH DAILY.     hydrALAZINE (APRESOLINE) 25 MG tablet TAKE 1 TABLET TWICE DAILY     insulin NPH (NOVOLIN N NPH U-100 INSULIN) 100 unit/mL injection Give 18 units subcutaneously every am and 6 units subcutaneously every pm E11.9     insulin NPH (NOVOLIN) 100 unit/mL injection Inject 5-6 Units under the skin 2 (two) times a day. (based on blood sugars)     lisinopril (PRINIVIL,ZESTRIL) 40 MG tablet Take 40 mg by mouth daily.     lovastatin (MEVACOR) 40 MG tablet TAKE 1 TABLET (40 MG TOTAL) BY MOUTH AT BEDTIME.     metoprolol succinate (TOPROL-XL) 25 MG TAKE 1 TABLET (25 MG TOTAL) BY MOUTH EVERY EVENING.     multivitamin therapeutic tablet Take 1 tablet by mouth daily.     NOVOLIN N NPH U-100 INSULIN 100 unit/mL injection INJECT 18 UNITS SUBCUTANEOUSLY ONCE DAILY IN THE MORNING AND 6 IN THE EVENING     NOVOLIN R 100 unit/mL injection 1 unit for every 25>150     warfarin (COUMADIN/JANTOVEN) 4 MG tablet Take 1 tablet (4 mg total) by mouth daily. Adjust dose per INR results as instructed.     No current " facility-administered medications on file prior to visit.      Allergies   Allergen Reactions     Blood-Group Specific Substance      Anti-K present. Expect delays in blood for transfusion.  Draw 2 lavender top tubes for type and screen orders.        Calcitriol      Fatigue      Ciprofloxacin Rash     Social History     Tobacco Use     Smoking status: Former Smoker     Last attempt to quit: 1995     Years since quittin.0     Smokeless tobacco: Never Used   Substance Use Topics     Alcohol use: No     Drug use: No

## 2021-06-24 NOTE — TELEPHONE ENCOUNTER
ANTICOAGULATION  MANAGEMENT    Assessment     Today's INR result of 3.00 is Therapeutic (goal INR of 2.0-3.0)        Warfarin taken as previously instructed    No new diet changes affecting INR    No new medication/supplements affecting INR    Continues to tolerate warfarin with no reported s/s of bleeding or thromboembolism     Previous INR was Therapeutic at 2.60 on 2/15/19.    Yair reported being at the [facility] a lot these days, d/t son having a heart transplant last week.    Plan:     Spoke with Yair Hall regarding INR result and instructed:    1.  Advised to increase his usual amount of Vitamin-K foods (salads / vegetables) for the next 2 days.  To ensure INR stability.    Warfarin Dosing Instructions:   (has 4mg tabs)   - Continue current warfarin dose 4 mg daily.    Instructed patient to follow up no later than: 2-3 wks.   - scheduled on 3/22/19 @ [facility].    Education provided: importance of consistent vitamin K intake, impact of vitamin K foods on INR and target INR goal and significance of current INR result    Yair Hall verbalizes understanding and agrees to warfarin dosing plan.    Instructed to call the [facility] for any changes, questions or concerns. (#[phone])   ?   Yamila Luna RN    Subjective/Objective:      Alexei MIKEY Lou, a 78 y.o. male is on warfarin.     Alexei reports:     Home warfarin dose: verbally confirmed home dose with Yair and updated on anticoagulation calendar     Missed doses: No     Medication changes:  No     S/S of bleeding or thromboembolism:  No     New Injury or illness:  No     Changes in diet or alcohol consumption:  No     Upcoming surgery, procedure or cardioversion:  No.  Continues with [facility] 3x/wk on Tues/Thurs/Sat.    Anticoagulation Episode Summary     Current INR goal:   2.0-3.0   TTR:   74.3 % (4.2 y)   Next INR check:   3/29/2019   INR from last check:   3.00 (3/8/2019)   Weekly max warfarin dose:      Target end date:      INR check  location:      Preferred lab:      Send INR reminders to:   ANTICOAGULATION POOL C (DTN,VAD,CGR,GAV)    Indications    PVD (peripheral vascular disease) (H) [I73.9]  Stroke (H) [I63.9]           Comments:            Anticoagulation Care Providers     Provider Role Specialty Phone number    Jesus Medrano MD Referring Internal Medicine [phone]

## 2021-06-24 NOTE — TELEPHONE ENCOUNTER
ANTICOAGULATION  MANAGEMENT    Assessment     Today's INR result of 2.60 is Therapeutic (goal INR of 2.0-3.0)        Warfarin taken as previously instructed    No new diet changes affecting INR    No new medication/supplements affecting INR    Continues to tolerate warfarin with no reported s/s of bleeding or thromboembolism     Previous INR was Therapeutic at 2.40 on 1/30/19.    Plan:     Spoke with Yair regarding INR result and instructed:     Warfarin Dosing Instructions:    - Continue current warfarin dose 4 mg daily.    Instructed patient to follow up no later than: 3-4 wks.   - scheduled on 3/8/19 @ [facility]    Education provided: target INR goal and significance of current INR result    Bill verbalizes understanding and agrees to warfarin dosing plan.    Instructed to call the [facility] for any changes, questions or concerns. (#[phone])   ?   Yamila Luna RN    Subjective/Objective:      Alexei Blake, a 78 y.o. male is on warfarin.     Alexei reports:     Home warfarin dose: verbally confirmed home dose with Yair and updated on anticoagulation calendar     Missed doses: No     Medication changes:  No     S/S of bleeding or thromboembolism:  No     New Injury or illness:  No     Changes in diet or alcohol consumption:  No     Upcoming surgery, procedure or cardioversion:  No.  Continues with [facility] 3x/wk on Tues/Thurs/Sat.    Anticoagulation Episode Summary     Current INR goal:   2.0-3.0   TTR:   74.0 % (4.1 y)   Next INR check:   3/15/2019   INR from last check:   2.60 (2/15/2019)   Weekly max warfarin dose:      Target end date:      INR check location:      Preferred lab:      Send INR reminders to:   ANTICOAGULATION POOL C (DTN,VAD,CGR,GAV)    Indications    PVD (peripheral vascular disease) (H) [I73.9]  Stroke (H) [I63.9]           Comments:            Anticoagulation Care Providers     Provider Role Specialty Phone number    Jesus Medrano MD Referring Internal Medicine  [phone]

## 2021-06-25 NOTE — PROGRESS NOTES
Clinic Care Coordination Contact    Situation: Patient chart reviewed by care coordinator.    Background: patient being followed for discharge from TCU for possible care coordination enrollment.     Assessment: patient continues to receive TCU care.     Plan/Recommendations: NATALIIA QUIROS do do chart review in 4 weeks.  Available to call if notified of discharge.     ALFREDITO Wharton  Clinic Care Coordinator  [phone]

## 2021-06-26 NOTE — PROGRESS NOTES
ANTICOAGULATION  MANAGEMENT: Discharge Review    Alexei Blake chart reviewed for anticoagulation continuity of care    Hospital admission on  6/2 to 4/2021 for shortness of breath.   - r/t pneumonia and left pleural effusion.   - CT no PE but with complete LLL collapse, mod-large left pleural effusion and signs of bronchitis    Discharge disposition: TCU  ([facility])    INR Results:       Recent labs: (last 7 days)     06/02/21  0921 06/03/21  0641 06/04/21  0635   INR 1.73* 1.92* 2.11*       Warfarin inpatient management: home regimen continued    Warfarin discharge instructions: home regimen continued     Medication Changes Affecting Anticoagulation: Yes: Boost Breeze one can daily with lunch.     Additional Factors Affecting Anticoagulation: No    Plan     No adjustment to anticoagulation plan needed      ACM will resume monitoring upon discharge from TCU    Anticoagulation calendar updated    Yamila Luna RN

## 2021-06-26 NOTE — TELEPHONE ENCOUNTER
"Pts spouse called stating pt is under the impression at the time of the 7/9/21 angio, pt will have an \"amputation.\" Please call spouse at the home number first, then cell number to further discuss. Isabel will be at the home for the rest of the morning.  "

## 2021-06-26 NOTE — PROGRESS NOTES
Clinic Care Coordination Contact  Care Coordination Transition Communication         Clinical Data: Patient was hospitalized at [facility]  Admission Date: 6/6/2021    Discharge Provider: Jaguar Arevalo Discharge Date: 6/12/2021   Diet: renal diet    Code Status: Full Code   Activity: activity as tolerated           Condition at Discharge: Good      REASON FOR PRESENTATION(See Admission Note for Details)   Abdominal pain     PRINCIPAL & ACTIVE DISCHARGE DIAGNOSES      Principal Problem:    Cholecystitis  Active Problems:    NSTEMI (non-ST elevated myocardial infarction) (H)    Demand ischemia (H)    Ulcer of toe of left foot, unspecified ulcer stage (H)       Transition to Facility: Returned to               Facility Name: [facility]              Contact name and phone number/fax: [phone]    Plan: RN/SW Care Coordinator will await notification from facility staff informing RN/SW Care Coordinator of patient's discharge plans/needs. RN/SW Care Coordinator will review chart and outreach to facility staff every 4 weeks and as needed.     ALFREDITO Wharton  Clinic Care Coordinator  [phone]

## 2021-06-26 NOTE — PROGRESS NOTES
ANTICOAGULATION  MANAGEMENT: Discharge Review    Alexei Blake chart reviewed for anticoagulation continuity of care    Hospital admission on 6/6-17/21 for abdominal pains   - due to Cholecystitis.    Discharge disposition: TCU    INR Results:       Recent labs: (last 7 days)     06/11/21  1616 06/12/21  0513 06/13/21  1512 06/15/21  0445   INR 1.37* 1.16* 1.76* 2.10*       Warfarin inpatient management: home regimen continued    Warfarin discharge instructions:     Warfarin dosing: home regimen continued  Bridging: No  INR goal Change: No     Medication Changes Affecting Anticoagulation: Yes:     Additional Factors Affecting Anticoagulation: No    Plan     No adjustment to anticoagulation plan needed      Patient not contacted and ACM will resume monitoring upon discharge from TCU    Anticoagulation Calendar updated    Yamila uLna RN

## 2021-06-26 NOTE — PROGRESS NOTES
[facility]    Facility:   [facility] [[id number]]   Code Status: POLST AVAILABLE      CHIEF COMPLAINT/REASON FOR VISIT:  Chief Complaint   Patient presents with     Problem Visit       HISTORY:      HPI: Alexei is a 81 y.o. male with pmh of ESRD on dialysis three times weekly, CVA with residual left sided weakness, htn, who presented to the ED with acute onset of abdominal pain during dialysis.  He had ischemic changes of the right colon and was brought to the OR for colonic resection and ileostomy on 331.  He also had an ileostomy revision on 4/21.  He is in the TCU for medical management, wound management for the abdominal wound secondary to repair, and therapy for strengthening.  His blood pressure meds were titrated and he is now on isosorbide 10 mg 3 times daily, metoprolol twice daily, with all meds to be held prior to dialysis due to blood pressure drops during his runs.  He has dialysis on Tuesday, Thursday and Saturdays.  For his anemia, will be starting Epogen at hemodialysis.  He is seen today lying in bed after therapy, he has been having persistent nausea since arriving at the TCU.  He does have a small amount of dark, formed stool in the ileostomy bag.  His wound is unable to be viewed as it is underneath the tape to the ileostomy bag.  Nursing has already changed it today.  His pain is well controlled.    Today: Seen today for review of multiple conditions.  Daily weights have been stable with some mild weight loss however he is at dialysis as well and they monitor this there.  Discontinue daily weights.  INR subtherapeutic today, will increase Coumadin and recheck Monday.  Ostomy bag with dark liquid stool.  Some green formed stool around the edges.  We will check a CBC on Monday as well.  Overall he is doing well and participating in therapies.  No concerns from nursing.    Past Medical History:   Diagnosis Date     Abscess of tongue      Chronic kidney disease      CKD stage 4,dialysis      Diabetes (H)     type 2     DVT (deep venous thrombosis) (H)      End stage renal disease (H)      Hernia, umbilical      History of transfusion      Hyperlipidemia      Hypertension      CATHLEEN (obstructive sleep apnea)     uses CPAP     PVD (peripheral vascular disease) (H)      Renal insufficiency      Stroke (H)     minor left sided weakness             Family History   Problem Relation Age of Onset     Hypertension Mother      Hypertension Father      Diabetes Father      Heart attack Father      Social History     Socioeconomic History     Marital status:      Spouse name: Not on file     Number of children: 3     Years of education: Not on file     Highest education level: Not on file   Occupational History     Occupation: Retired - [employer]   Social Needs     Financial resource strain: Not on file     Food insecurity     Worry: Not on file     Inability: Not on file     Transportation needs     Medical: Not on file     Non-medical: Not on file   Tobacco Use     Smoking status: Former Smoker     Packs/day: 4.00     Quit date: 1995     Years since quittin.4     Smokeless tobacco: Never Used   Substance and Sexual Activity     Alcohol use: No     Drug use: No     Sexual activity: Not on file   Lifestyle     Physical activity     Days per week: Not on file     Minutes per session: Not on file     Stress: Not on file   Relationships     Social connections     Talks on phone: Not on file     Gets together: Not on file     Attends Buddhist service: Not on file     Active member of club or organization: Not on file     Attends meetings of clubs or organizations: Not on file     Relationship status: Not on file     Intimate partner violence     Fear of current or ex partner: Not on file     Emotionally abused: Not on file     Physically abused: Not on file     Forced sexual activity: Not on file   Other Topics Concern     Not on file   Social History  "Narrative    3/20/21: \"Bill\" is here in the ED with his wife today.         Review of Systems   Constitutional: Negative.    HENT: Negative.    Respiratory: Negative.    Cardiovascular: Negative.    Gastrointestinal: Negative for abdominal distention, abdominal pain and nausea.   Genitourinary: Negative.    Musculoskeletal: Negative.    Skin: Positive for wound.   Neurological: Positive for weakness.       Vitals:    05/28/21 1213   BP: 143/66   Pulse: 74   Resp: 18   Temp: 98  F (36.7  C)   SpO2: 96%   Weight: 190 lb (86.2 kg)   Height: 6' 1\" (1.854 m)       Physical Exam  Constitutional:       Appearance: Normal appearance. He is normal weight. He is ill-appearing.   HENT:      Head: Atraumatic.      Mouth/Throat:      Mouth: Mucous membranes are moist.   Eyes:      General: No scleral icterus.     Extraocular Movements: Extraocular movements intact.   Cardiovascular:      Rate and Rhythm: Normal rate and regular rhythm.   Pulmonary:      Effort: Pulmonary effort is normal. No respiratory distress.      Breath sounds: No wheezing.      Comments: Diminished bilaterally   Abdominal:      General: Abdomen is flat. There is no distension.      Palpations: Abdomen is soft.      Tenderness: There is no abdominal tenderness.      Comments: Iliostomy    Musculoskeletal: Normal range of motion.      Right lower leg: No edema.      Left lower leg: No edema.      Comments: AV fistula to RUE.      Skin:     General: Skin is warm and dry.   Neurological:      General: No focal deficit present.   Psychiatric:         Mood and Affect: Mood normal.         Behavior: Behavior normal.           LABS:   Reviewed.     ASSESSMENT:      ICD-10-CM    1. Paroxysmal atrial fibrillation (H)  I48.0    2. Nausea  R11.0    3. History of coronary artery disease  Z86.79    4. ESRD (end stage renal disease) on dialysis (H)  N18.6     Z99.2        PLAN:      Nausea: post op. VS stable, afebrile.  Colostomy with dark liquid.  -Discontinue " Zofran.    Status post ileostomy: dark stool in bag.   -Pantoprazole 40 mg daily.  -Oxycodone to every 6 hours as needed.  -Tylenol 500 mg every 4 hours as needed.    End-stage renal disease  Anemia of chronic disease  -On Epogen at dialysis.  -Has dialysis Tuesday, Thursday, Saturday.  -Liberalize diet to general diet due to poor po intake.  Dialysis monitoring.  -Renal caps 1 mg daily.  Discontinue daily weights.    CAD  A. Fib  History of CVA  -On warfarin, INR 1.7; increase Coumadin.  -Recheck INR Monday.  -Atorvastatin 80 mg at bedtime.  -Aspirin 81 daily.  -Clopidogrel 75 daily.  -Isosorbide 10 mg 3 times a day.    Allergic rhinitis:  -Flonase 2 sprays daily.    Disposition: Likely going to return to his home where he lives with his wife.      Electronically signed by: Candie Mcrae, CNP

## 2021-06-26 NOTE — TELEPHONE ENCOUNTER
Writer spoke with Isabel and explained that the angiogram is scheduled before the amputation to assure there is sufficient amount of blood flow to the foot for healing. Then pt will f/u with Dr. Stewart on 7/28 to discuss next steps. She agreed with this plan.

## 2021-06-26 NOTE — TELEPHONE ENCOUNTER
[facility] Nurse Triage Anticoagulation Note      Provider: SETH Carty  Facility: [facility]    Facility Type: TCU    Caller: Maria Isabel  Call Back Number:  [phone]    Reason for call: INR    Today s INR: 1.7  Previous INR: 6/15/21 INR 2.10  (Was on 3mg daily in hospital 6/13, 14,15,16, 17)    Diagnosis/Goal: AFIB    FYI Dialysis pt T, Th, Sat  Heparin/Lovenox: No   Currently on ABX: Yes BAactrim DS 1 daily X 30days for Osteomyelitis    Other interacting Medications:  ASA 81mg daily    Missed or refused doses: Yes held in hospital, resumed 6/13    Verbal Order/Direction given by Provider: Coumadin 3.5mg daily. Next INR 6/20.    Provider giving order: Deisy Mancera NP    Verbal order given to: Maria Isabel Kowalski RN

## 2021-06-28 NOTE — PROGRESS NOTES
"Progress Notes by Ai Armstrong NP at 4/6/2020  2:40 PM     Author: Ai Armstrong NP Service: -- Author Type: Nurse Practitioner    Filed: 4/6/2020  3:04 PM Encounter Date: 4/6/2020 Status: Signed    : Ai Armstrong NP (Nurse Practitioner)       Alexei Blake is a 79 y.o. male who is being evaluated via a billable telephone visit.      The patient has been notified of following:     \"This telephone visit will be conducted via a call between you and your physician/provider due to Covid-19. We have found that certain health care needs can be provided without the need for a physical exam.  This service lets us provide the care you need with a short phone conversation.  If a prescription is necessary we can send it directly to your pharmacy.  If lab work is needed we can place an order for that and you can then stop by our lab to have the test done at a later time.    If during the course of the call the physician/provider feels a telephone visit is not appropriate, you will not be charged for this service.\"     Patient has given verbal consent to a Telephone visit? Yes    Alexei Blake complains of  LLE ulcer and edema    I have reviewed and updated the patient's Past Medical History, Social History, Family History and Medication List.    ALLERGIES  Blood-group specific substance; Calcitriol; and Ciprofloxacin    An after visit summary containing patient education will be sent to the patients home through the mail.     Additional provider notes:   Follow up Vascular Visit       Date of Service:4/6/2020    Date Last Seen: 3/13/2020; 4/6/2020    Chief Complaint: LLE ulcer and edema        Pt returns to [facility] with regards to their LLE ulcer and edema; this visit is taking place over the telephone due to the covid-19 outbreak.  They are on the phone today with wife. They are currently using silvercel to the wounds. This is being done by wife every 3 days. They are using short " "stretch for compression. They are feeling well today. Denies fevers, chills. No shortness of breath. They deny pain in the wound. I obtained a culture at our initial visit last month and this was benign; with minimal staph no oral antibiotic was prescribed. I had ordered gill and venous studies to be done; however this did not get done before clinics were shut down due to the Covid-19. He denies pain in the left leg. We had provided him a velcro wrap for the left leg; they have this at home. FS running 80-90.     Allergies: Blood-group specific substance; Calcitriol; and Ciprofloxacin    Medications:   Current Outpatient Medications:   ?  ACCU-CHEK KYLE PLUS TEST STRP strips, USE  STRIP TO CHECK GLUCOSE 2 TO 3 TIMES DAILY AS DIRECTED AT  6AM  AMD  4PM, Disp: 300 strip, Rfl: 1  ?  acetaminophen (TYLENOL) 500 MG tablet, Take 1,000 mg by mouth bedtime as needed for pain (or sleep). , Disp: , Rfl:   ?  bumetanide (BUMEX) 1 MG tablet, TAKE 1 TABLET TWICE DAILY AT 9AM AND 6PM. ON DIALYSIS DAYS, ONLY DOSE IN THE EVENING., Disp: 180 tablet, Rfl: 3  ?  calcium, as carbonate, (OS-AVNI) 500 mg calcium (1,250 mg) tablet, Take 1 tablet by mouth daily., Disp: , Rfl:   ?  clopidogreL (PLAVIX) 75 mg tablet, Take 1 tablet (75 mg total) by mouth daily., Disp: 90 tablet, Rfl: 1  ?  hydrALAZINE (APRESOLINE) 25 MG tablet, Take 1 tablet (25 mg total) by mouth 2 (two) times a day., Disp: 180 tablet, Rfl: 3  ?  insulin NPH (NOVOLIN) 100 unit/mL injection, Inject 5 Units under the skin 2 (two) times a day.    , Disp: , Rfl:   ?  insulin regular (NOVOLIN R REGULAR U-100 INSULN) 100 unit/mL injection, Check blood glucose three times daily prior to meal. Correction with 1 unit for every 25mg/dL>150mg/dL blood glucose., Disp: 10 mL, Rfl: 0  ?  insulin syringe-needle U-100 1/2 mL 31 gauge x 15/64\" Syrg, USE 1  SYRINGE TWICE DAILY, Disp: 180 Syringe, Rfl: 3  ?  lisinopril (PRINIVIL,ZESTRIL) 40 MG tablet, Take 1 tablet (40 mg total) by mouth every " evening., Disp: 90 tablet, Rfl: 0  ?  lovastatin (MEVACOR) 20 MG tablet, Take 20 mg by mouth at bedtime., Disp: , Rfl: 2  ?  metoprolol succinate (TOPROL-XL) 25 MG, TAKE 1 TABLET (25 MG TOTAL) BY MOUTH EVERY EVENING., Disp: 90 tablet, Rfl: 2  ?  multivitamin therapeutic tablet, Take 1 tablet by mouth daily., Disp: , Rfl:   ?  warfarin ANTICOAGULANT (COUMADIN/JANTOVEN) 4 MG tablet, Take 1/2 tab (2mg) on Fridays and take 1 tab (4mg) all other days by mouth daily, as directed.  Adjust dose based on INR results., Disp: 100 tablet, Rfl: 1    History:   Past Medical History:   Diagnosis Date   ? Abscess of tongue    ? Chronic kidney disease     CKD stage 4,dialysis [facility]   ? Diabetes (H)     type 2   ? DVT (deep venous thrombosis) (H)    ? End stage renal disease (H)    ? Hernia, umbilical    ? History of transfusion    ? Hyperlipidemia    ? Hypertension    ? CATHLEEN (obstructive sleep apnea)     uses CPAP   ? PVD (peripheral vascular disease) (H)    ? Renal insufficiency    ? Stroke (H) 2003    minor left sided weakness       Physical Exam:    There were no vitals taken for this visit.    General:  Patient in no apparent distress.  Psychiatric:  Alert and oriented x3.   Respiratory: unlabored breathing; no cough    Wound #1 Location: left shin  Size: 0L x 0W x 0depth.  No sinus tract present, Wound base: scar tissue per photo review  No undermining present. Wound is closed. There is no drainage. Periwound: no denudement, erythema, induration, maceration or warmth.      Circumferential volume measures:    Vasc Edema 3/13/2020   Right just above MTP 25   Right Ankle 28.8   Right Widest Calf 41.6   Left - just above MTP 25.2   Left Ankle 27.7   Left Widest Calf 42.3       Ulceration(s)/Wound(s):     VASC Wound 03/13/20 Lt medial leg (Active)   Pre Size Length 0.4 03/13/20 1400   Pre Size Width 0.4 03/13/20 1400   Pre Size Depth 0.1 03/13/20 1400   Pre Total Sq cm 0.16 03/13/20 1400       Wound 01/27/17 (Active)        Lab  Values    Lab Results   Component Value Date    SEDRATE 16 (H) 09/09/2016     Lab Results   Component Value Date    CREATININE 6.01 (HH) 09/28/2019     Lab Results   Component Value Date    HGBA1C 6.0 11/15/2019     Lab Results   Component Value Date    BUN 37 (H) 09/28/2019     Lab Results   Component Value Date    ALBUMIN 3.7 09/09/2016     No results found for: [id number]          Impression:  1. Venous stasis ulcer of left lower leg with edema of left lower leg (H)     2. Venous hypertension of both lower extremities     3. PAD (peripheral artery disease) (H)     4. Hemosiderosis     5. Venous insufficiency of both lower extremities       4/6/2020 left shin       Are any of these wounds new today: no     Assessment/Plan:          1. Debridement: na     2.  Wound treatment: wound treatment will include irrigation and dressings to promote autolytic debridement which will include:wound is healed; lotion daily Improved            3. Edema: will stop the short stretch and transition into velcro; elevation; walking program; recommend he still have venous and arterial studies once our clinic reopens they were instructed to call the clinic when we reopen.Stable swelling           4. Nutrition: diabetic and renal           5. Offloading: na     Patient will follow up with me PRN for reevaluation. They were instructed to call the clinic sooner with any signs or symptoms of infection or any further questions/concerns. Answered all questions.          Ai Armstrong DNP, RN, CNP, CWOCN, CFCN, CLT  [facility]   [phone]        This note was electronically signed by Ai Armstrong    Phone call duration: 10 minutes

## 2021-06-28 NOTE — PROGRESS NOTES
"Progress Notes by Ai Armstrong NP at 3/13/2020  2:20 PM     Author: Ai Armstrong NP Service: -- Author Type: Nurse Practitioner    Filed: 3/13/2020  5:33 PM Encounter Date: 3/13/2020 Status: Signed    : Ai Armstrong NP (Nurse Practitioner)       [facility] Consult Note    Date of Service:  3/13/2020    Requesting Provider: self-referral     Chief Complaint: left shin wound    History of Present Illness: Alexei Blake is being seen at [facility] today regarding left shin wound. They arrive to the clinic today with wife. He has previously been seen at the  by Fiorella Flanagan CNP for leg ulcers.  The patient reports that the wound started on the left shin 3-4 weeks ago; this was nearly resolved and then he devloped blisters. He has history of DVT is on coumadin. Was currently/previously using neosporin and bandaids daily. Reports pain of 0/10; currently using nothing for pain. Has used nothing as compression in the past, is currently using nothing for compression; they are unable to get these on. Denies any fevers, chills, or generalized ill feeling. Denies history of cancer. Sleeps in a bed with legs elevated has elham wears cpap. Has history of bilateral toe amputations due to toe ulcers. Has history of diabetes; checks fs two times a day and running . ESRD on HD T, R, and Saturday.  Denies history of joint replacement and cellulitis. Positive history of DVT and vein procedures.I personally reviewed outside imaging, lab work, and progress noted through Care Everywhere and outside records. He keeps repeating that Dr. Lawson did a vein procedure to both legs and the right one \"took\" but the left one did not \"take\" and was removed. I can find no documentation in epic. Per Fiorella Flanagan's note he has had \"multiple bypasses in the legs\".        Review of Systems:   Constitutional:  negative  for fever, chills or night sweats  EENTM: positive for glasses;  positive " [place]  GI:  negative for nausea/vomiting;  negative for constipation  negative diarrhea;  negative for fecal incontinence negative weight loss  :  negative dysuria, negative incontinence  MS: patient is ambulatory;  does use assistive devices  Cardiac:  negative   Respiratory:  negative SOB  Endocrine:  positive diabetes  Psych:  negative depression/anxiety    Past Medical History:    Past Medical History:   Diagnosis Date   ? Abscess of tongue    ? Chronic kidney disease     CKD stage 4,dialysis [facility]   ? Diabetes (H)     type 2   ? DVT (deep venous thrombosis) (H)    ? End stage renal disease (H)    ? Hernia, umbilical    ? History of transfusion    ? Hyperlipidemia    ? Hypertension    ? CATHLEEN (obstructive sleep apnea)     uses CPAP   ? PVD (peripheral vascular disease) (H)    ? Renal insufficiency    ? Stroke (H) 2003    minor left sided weakness        Surgical History:   Past Surgical History:   Procedure Laterality Date   ? ARTERIAL BYPASS SURGERY Bilateral     lower extremities, Dr. Cottrell   ? TOE AMPUTATION Bilateral     multiple   ? UMBILICAL HERNIA REPAIR N/A 4/29/2016    Procedure: OPEN UMBILICAL REPAIR WITH MESH;  Surgeon: Jevon Rivas MD;  Location: [facility];  Service:         Medications:    Current Outpatient Medications:   ?  ACCU-CHEK KYLE PLUS TEST STRP strips, USE  STRIP TO CHECK GLUCOSE 2 TO 3 TIMES DAILY AS DIRECTED AT  6AM  AND  4PM, Disp: 300 strip, Rfl: 3  ?  acetaminophen (TYLENOL) 500 MG tablet, Take 1,000 mg by mouth bedtime as needed for pain (or sleep). , Disp: , Rfl:   ?  bumetanide (BUMEX) 1 MG tablet, TAKE 1 TABLET TWICE DAILY AT 9AM AND 6PM. ON DIALYSIS DAYS, ONLY DOSE IN THE EVENING., Disp: 180 tablet, Rfl: 3  ?  calcium, as carbonate, (OS-AVNI) 500 mg calcium (1,250 mg) tablet, Take 1 tablet by mouth daily., Disp: , Rfl:   ?  clopidogreL (PLAVIX) 75 mg tablet, Take 1 tablet (75 mg total) by mouth daily., Disp: 90 tablet, Rfl: 1  ?  hydrALAZINE (APRESOLINE) 25 MG  "tablet, Take 1 tablet (25 mg total) by mouth 2 (two) times a day., Disp: 180 tablet, Rfl: 3  ?  insulin NPH (NOVOLIN) 100 unit/mL injection, Inject 5 Units under the skin 2 (two) times a day.    , Disp: , Rfl:   ?  insulin regular (NOVOLIN R REGULAR U-100 INSULN) 100 unit/mL injection, Check blood glucose three times daily prior to meal. Correction with 1 unit for every 25mg/dL>150mg/dL blood glucose., Disp: 10 mL, Rfl: 0  ?  insulin syringe-needle U-100 1/2 mL 31 gauge x 15/64\" Syrg, USE 1  SYRINGE TWICE DAILY, Disp: 180 Syringe, Rfl: 3  ?  lisinopril (PRINIVIL,ZESTRIL) 40 MG tablet, Take 1 tablet (40 mg total) by mouth every evening., Disp: 90 tablet, Rfl: 0  ?  lovastatin (MEVACOR) 20 MG tablet, Take 20 mg by mouth at bedtime., Disp: , Rfl: 2  ?  metoprolol succinate (TOPROL-XL) 25 MG, TAKE 1 TABLET (25 MG TOTAL) BY MOUTH EVERY EVENING., Disp: 90 tablet, Rfl: 2  ?  multivitamin therapeutic tablet, Take 1 tablet by mouth daily., Disp: , Rfl:   ?  warfarin ANTICOAGULANT (COUMADIN/JANTOVEN) 4 MG tablet, Take 1/2 tab (2mg) on Fridays and take 1 tab (4mg) all other days by mouth daily, as directed.  Adjust dose based on INR results., Disp: 100 tablet, Rfl: 1    Allergies:   Allergies   Allergen Reactions   ? Blood-Group Specific Substance      Anti-K present. Expect delays in blood for transfusion.  Draw 2 lavender top tubes for type and screen orders.      ? Calcitriol      Fatigue    ? Ciprofloxacin Rash       Family history:   Family History   Problem Relation Age of Onset   ? Hypertension Mother    ? Hypertension Father    ? Diabetes Father    ? Heart attack Father         Social History:   Social History     Socioeconomic History   ? Marital status:      Spouse name: Not on file   ? Number of children: Not on file   ? Years of education: Not on file   ? Highest education level: Not on file   Occupational History   ? Not on file   Social Needs   ? Financial resource strain: Not on file   ? Food insecurity "     Worry: Not on file     Inability: Not on file   ? Transportation needs     Medical: Not on file     Non-medical: Not on file   Tobacco Use   ? Smoking status: Former Smoker     Packs/day: 0.00     Last attempt to quit: 1995     Years since quittin.2   ? Smokeless tobacco: Never Used   Substance and Sexual Activity   ? Alcohol use: No   ? Drug use: No   ? Sexual activity: Not on file   Lifestyle   ? Physical activity     Days per week: Not on file     Minutes per session: Not on file   ? Stress: Not on file   Relationships   ? Social connections     Talks on phone: Not on file     Gets together: Not on file     Attends Taoist service: Not on file     Active member of club or organization: Not on file     Attends meetings of clubs or organizations: Not on file     Relationship status: Not on file   ? Intimate partner violence     Fear of current or ex partner: Not on file     Emotionally abused: Not on file     Physically abused: Not on file     Forced sexual activity: Not on file   Other Topics Concern   ? Not on file   Social History Narrative   ? Not on file        Physical Exam  Vitals: Blood pressure 140/76, pulse 64, temperature 97.8  F (36.6  C), resp. rate 18.  General: This is a 79 y.o. male who appears their reported age, not in acute distress  Head: normocephalic, Atraumatic; wearing glasses; non-icteric sclera; no exudate; mild hearing loss  Respiratory: Clear throughout all lung fields; unlabored breathing; no cough   Cardiac: Regular, Rate and Rhythm, + murmurs appreciated   Skin: Uniformly warm and dry  Psych: Alert and oriented x4; calm and cooperative throughout exam  Abdomen: Normal bowel sounds. Soft, symmetric, no guarding or rigidity, nontender with palpation.  No organomegaly or masses palpated.   Extremities: Right leg with several scars; hemosiderosis; fibrosis to the tissues; there is a long scar running the left medial leg; near the distal end of this old incision there is a  full thickness ulcer which measures 0.4x0.4x0.1cm; this is slough covered with moderate drainage; there is a ring of erythema surrounding the wound which extends 2 cm; there are 2 amputated toes; the remaining toes are very deformed; strength testing revealed 3/4 to BLEs.    Sensation: Decreased to pinprick and light touch in a stocking distribution bilaterally     Peripheral Vascular: normal dorsalis pedis (could not find on the foot but anterior ankle region), posterior tibial pulses to left foot , using a handheld doppler these were strong easily found and biphasic in nature.  Good capillary refill. No unusual venous distention. Positive for spider veins, telangiectasias, hemosiderin deposition or hyperpigmentation and fibrosis or scarring     Circumferential volume measures:    Vasc Edema 3/13/2020   Right just above MTP 25   Right Ankle 28.8   Right Widest Calf 41.6   Left - just above MTP 25.2   Left Ankle 27.7   Left Widest Calf 42.3       Ulceration(s)/Wound(s):     VASC Wound 03/13/20 Lt medial leg (Active)   Pre Size Length 0.4 03/13/20 1400   Pre Size Width 0.4 03/13/20 1400   Pre Size Depth 0.1 03/13/20 1400   Pre Total Sq cm 0.16 03/13/20 1400       Wound 01/27/17 (Active)       Laboratory studies:   Lab Results   Component Value Date    SEDRATE 16 (H) 09/09/2016     Lab Results   Component Value Date    CREATININE 6.01 (HH) 09/28/2019     Lab Results   Component Value Date    HGBA1C 6.0 11/15/2019     Lab Results   Component Value Date    BUN 37 (H) 09/28/2019     Lab Results   Component Value Date    ALBUMIN 3.7 09/09/2016     No results found for: [id number]          Impression:   1. Venous hypertension of both lower extremities  US Ankle Brachial Indices    Culture/Gram Stain: Wound    Culture, Anaerobic    Compression stockings    US Venous Insufficency Leg Left    Skin Graft Procedure   2. PAD (peripheral artery disease) (H)  US Ankle Brachial Indices    Culture/Gram Stain: Wound    Culture,  Anaerobic    Compression stockings    US Venous Insufficency Leg Left    Skin Graft Procedure   3. Venous stasis ulcer of left lower leg with edema of left lower leg (H)  US Ankle Brachial Indices    Culture/Gram Stain: Wound    Culture, Anaerobic    Compression stockings    US Venous Insufficency Leg Left    Skin Graft Procedure   4. Hemosiderosis  US Ankle Brachial Indices    Culture/Gram Stain: Wound    Culture, Anaerobic    Compression stockings    US Venous Insufficency Leg Left    Skin Graft Procedure   5. Venous insufficiency of both lower extremities  US Ankle Brachial Indices    Culture/Gram Stain: Wound    Culture, Anaerobic    Compression stockings    US Venous Insufficency Leg Left    Skin Graft Procedure         Assessment/Plan:  1. Debridement: After discussion of risk factors and verbal consent was obtained 2% Lidocaine HCL jelly was applied, under clean conditions, the left shin ulceration(s) were debrided using currette. Devitalized and nonviable tissue, along with any fibrin and slough, was removed to improve granulation tissue formation, stimulate wound healing, decrease overall bacteria load, disrupt biofilm formation and decrease edge senescence.  Total excisional debridement was 0.16 sq cm from the epidermis/dermis area and into the subcutaneous tissue with a depth of 0.1 cm.   Ulcers were improved afterwards and .  Measures were unchanged after debridement.    2. Treatment: wound treatment will include irrigation and dressings to promote autolytic debridement which will include: will stop the neosporin; concern for sensitivity developing; will obtain culture; hold off on prescribing antibiotics until C&S are back; will obtain ZHAO to ensure adequate blood flow; will obtain venous insufficiency to check for incompetence; will go to silvercel; gauze; rolled gauze; wife to change every 3 days; will run for epifix I suspect with the patient past vascular issues; co-morbidities including  diabetes and ESRD he is going to have issues healing this wound; we will get him a velcro wrap today for the left leg; he will have to pay oop for the right leg; will not put him in the velcro wrap until healed.     3. Edema. Will wrap with tubular compression and short stretch; elevation; u/s    4. Offloading: na    5. Nutrition: renal; diabetic    Patient to return to clinic in 4 week(s) for re-evaluation. They were instructed to call the clinic sooner with any further questions or concerns. Answered all questions.              Ai Armstrong DNP, RN, CNP, CWOCN  [facility]  [phone]      This note was electronically signed by Ai Armstrong

## 2021-06-30 NOTE — PROGRESS NOTES
Progress Notes by Paula Chatman MD at 2/24/2021  9:30 AM     Author: Paula Chatman MD Service: -- Author Type: Physician    Filed: 2/24/2021 11:56 AM Encounter Date: 2/24/2021 Status: Signed    : Paula Chatman MD (Physician)           Thank you, Dr. Arango, for asking us to see Alexei Blake at the [facility].      Assessment/Recommendations   Assessment:    1.  Non-ST elevation myocardial infarction: Status post PCI of LAD 1/21/2021.  Now being scheduled for PCI of circumflex/OM.  2.  End-stage renal disease on hemodialysis  3.  Ischemic cardiomyopathy with left ventricular ejection fraction 25% now improved to 44% on recent echocardiogram post PCI of LAD  4.  Hypertension  5.  Peripheral arterial disease  6.  History of paroxysmal atrial fibrillation maintained on Coumadin    Plan:  1. Continue dual antiplatelet therapy.  Continue cardiac rehab.   2. PCI of circumflex to be scheduled around his dialysis  3. Continue lisinopril and metoprolol  May follow up with me in 1-2 months       History of Present Illness    Mr. Alexei Blake is a 80 y.o. male  with a significant past medical history of stroke, peripheral vascular disease, dyslipidemia, paroxysmal atrial fibrillation, end-stage renal disease on dialysis, coronary artery disease, and ischemic cardiomyopathy being seen in follow up.   He was hospitalized in January with acute systolic heart failure and NSTEMI.  He underwent coronary angiogram that showed multivessel disease.  He was not a candidate for cabg and underwent PCI of LAD.   He has been doing well and his wife has noted improvement in his energy level and breathing since then.  He has been participating in cardiac rehab.  He is to be scheduled for PCI of circumflex/OM with Dr Arreguin.       Limited echo 2/19/21    Left ventricle ejection fraction is moderately decreased. The calculated left ventricular ejection fraction is  44%.    Normal right ventricular size and systolic function.    The following segments are akinetic: apical septal and apex. The following segments are hypokinetic: mid inferoseptal.    When compared to the previous study dated 1/18/2021, there has been improvement in left ventricular ejection fraction and improvement in function of several wall segments.    Coronary angiogram with PCI of lad    Planned PCI of LAD after vein mapping showed poor quality veins for bypass grafting.    Successful complex PCI of mid LAD using rotational atherectomy and successful stenting with a Synergy MARIA ELENA with good angiographic results.    Blood loss < 20 cc.      Recommendations    ? Recommended follow up with Dr. Chatman in 4-6 weeks.  ? Continuation of dual antiplatelet therapy for 12 month(s).  ? Continue high dose statin therapy indefinitely.  ? Risk factor management for atherosclerosis.  ? Will stage PCI for treatment of LCX stenosis.  ? Will arrange for staged PCI of the circumflex and OM3 using rotational atherectomy in 2-4 weeks, allowing time for possible improvement in his cardiac function from today's intervention.  ? Follow up visit with Nurse Practitioner in 1-2 weeks.  ? Cardiac rehabilitation.            Physical Examination Review of Systems   Vitals:    02/24/21 0911   BP: 118/58   Pulse: 64   Resp: 14     Body mass index is 25.94 kg/m .  Wt Readings from Last 3 Encounters:   02/24/21 202 lb (91.6 kg)   02/19/21 205 lb (93 kg)   02/10/21 205 lb (93 kg)       General Appearance:   alert, no apparent distress   HEENT:  no scleral icterus; the mucous membranes are pink and moist                                  Neck: No jvd   Chest: the spine is straight and the chest is symmetric   Lungs:   respirations unlabored; the lungs are clear to auscultation   Cardiovascular:   regular rhythm with normal first and second heart sounds   Abdomen:  no organomegaly, masses, bruits, or tenderness; bowel sounds are present    Extremities: mild edema   Skin: no xanthelasma                                              Medical History  Surgical History Family History Social History   Past Medical History:   Diagnosis Date   ? Abscess of tongue    ? Chronic kidney disease     CKD stage 4,dialysis [facility]   ? Diabetes (H)     type 2   ? DVT (deep venous thrombosis) (H)    ? End stage renal disease (H)    ? Hernia, umbilical    ? History of transfusion    ? Hyperlipidemia    ? Hypertension    ? CATHLEEN (obstructive sleep apnea)     uses CPAP   ? PVD (peripheral vascular disease) (H)    ? Renal insufficiency    ? Stroke (H) 2003    minor left sided weakness    Past Surgical History:   Procedure Laterality Date   ? ARTERIAL BYPASS SURGERY Bilateral     lower extremities, Dr. Cottrell   ? CV CORONARY ANGIOGRAM N/A 1/20/2021    Procedure: Coronary Angiogram;  Surgeon: Tuyet Arreguin MD;  Location: [facility];  Service: Cardiology   ? CV LEFT HEART CATHETERIZATION WO LEFT VETRICULOGRAM Left 1/20/2021    Procedure: Left Heart Catheterization Without Left Ventriculogram;  Surgeon: Tuyet Arreguin MD;  Location: [facility];  Service: Cardiology   ? TOE AMPUTATION Bilateral     multiple   ? UMBILICAL HERNIA REPAIR N/A 4/29/2016    Procedure: OPEN UMBILICAL REPAIR WITH MESH;  Surgeon: Jevon Rivas MD;  Location: [facility] OR;  Service:     Family History   Problem Relation Age of Onset   ? Hypertension Mother    ? Hypertension Father    ? Diabetes Father    ? Heart attack Father     Social History     Socioeconomic History   ? Marital status:      Spouse name: Not on file   ? Number of children: 3   ? Years of education: Not on file   ? Highest education level: Not on file   Occupational History   ? 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 [employer]   Social Needs   ? Financial resource strain: Not on file   ? Food insecurity     Worry: Not on file     Inability: Not on file   ? Transportation needs     Medical:  Not on file     Non-medical: Not on file   Tobacco Use   ? Smoking status: Former Smoker     Packs/day: 0.00     Quit date: 1995     Years since quittin.1   ? Smokeless tobacco: Never Used   Substance and Sexual Activity   ? Alcohol use: No   ? Drug use: No   ? Sexual activity: Not on file   Lifestyle   ? Physical activity     Days per week: Not on file     Minutes per session: Not on file   ? Stress: Not on file   Relationships   ? Social connections     Talks on phone: Not on file     Gets together: Not on file     Attends Spiritism service: Not on file     Active member of club or organization: Not on file     Attends meetings of clubs or organizations: Not on file     Relationship status: Not on file   ? Intimate partner violence     Fear of current or ex partner: Not on file     Emotionally abused: Not on file     Physically abused: Not on file     Forced sexual activity: Not on file   Other Topics Concern   ? Not on file   Social History Narrative   ? Not on file          Medications  Allergies   Current Outpatient Medications   Medication Sig Dispense Refill   ? ACCU-CHEK KYLE PLUS TEST STRP strips USE  STRIP TO CHECK GLUCOSE 2 TO 3 TIMES DAILY AS DIRECTED AT  6AM  AMD  4PM 300 strip 1   ? acetaminophen (TYLENOL) 500 MG tablet Take 1,000 mg by mouth at bedtime.     ? aspirin 81 MG EC tablet Take 1 tablet (81 mg total) by mouth daily.  0   ? atorvastatin (LIPITOR) 80 MG tablet Take 1 tablet (80 mg total) by mouth at bedtime. 90 tablet 3   ? calcium, as carbonate, (TUMS) 200 mg calcium (500 mg) chewable tablet Chew 1 tablet 2 (two) times a day.     ? clopidogreL (PLAVIX) 75 mg tablet Take 1 tablet by mouth once daily 90 tablet 2   ? fluticasone propionate (FLONASE) 50 mcg/actuation nasal spray 2 sprays into each nostril at bedtime. Indications: inflammation of the nose due to an allergy     ? folic acid/vit B complex and C (DIALYVITE ORAL) Take 1 tablet by mouth daily.     ? lisinopriL  (PRINIVIL,ZESTRIL) 2.5 MG tablet Take 2 tablets (5 mg total) by mouth every morning. 60 tablet 3   ? methoxy peg-epoetin beta (MIRCERA INJ) 50 mcg by intravenous push route.     ? metoprolol succinate (TOPROL-XL) 25 MG Take 1 tablet (25 mg total) by mouth at bedtime. 30 tablet 3   ? nitroglycerin (NITROSTAT) 0.4 MG SL tablet Place 1 tablet (0.4 mg total) under the tongue every 5 (five) minutes as needed for chest pain. 1 Bottle 0   ? warfarin ANTICOAGULANT (COUMADIN/JANTOVEN) 3 MG tablet Take 1 tablet (3 mg total) by mouth daily. 30 tablet 1     No current facility-administered medications for this visit.       Allergies   Allergen Reactions   ? Blood-Group Specific Substance      Anti-K present. Expect delays in blood for transfusion.  Draw 2 lavender top tubes for type and screen orders.      ? Calcitriol      Fatigue    ? Ciprofloxacin Rash         Lab Results    Chemistry/lipid CBC Cardiac Enzymes/BNP/TSH/INR   Lab Results   Component Value Date    CHOL 97 07/20/2020    HDL 40 07/20/2020    LDLCALC 45 07/20/2020    TRIG 58 07/20/2020    CREATININE 4.76 (H) 01/22/2021    BUN 39 (H) 01/22/2021    K 3.9 01/22/2021     01/22/2021     01/22/2021    CO2 29 01/22/2021    Lab Results   Component Value Date    WBC 6.3 01/23/2021    HGB 10.1 (L) 01/23/2021    HCT 30.6 (L) 01/23/2021     (H) 01/23/2021     01/23/2021    Lab Results   Component Value Date    CKTOTAL 40 10/02/2019    TROPONINI 12.45 (HH) 01/18/2021    BNP >5,000 (H) 01/18/2021    TSH 1.68 01/12/2021    INR 2.30 (!) 02/24/2021

## 2021-06-30 NOTE — PROGRESS NOTES
Progress Notes by Cherri Restrepo NP at 2/4/2021  2:10 PM     Author: Cherri Restrepo NP Service: -- Author Type: Nurse Practitioner    Filed: 2/4/2021  3:17 PM Encounter Date: 2/4/2021 Status: Signed    : Cherri Restrepo NP (Nurse Practitioner)             Assessment/Recommendations   1.  Coronary artery disease: Recent hospitalization with non-STEMI.  Initial coronary angiogram showed 99% stenosis in mid LAD, 70% lesion in ostial to proximal circumflex, 99% lesion in OM 3 and 80% lesion distal circumflex.  Patient had a complex yet successful PCI with rotational atherectomy and drug-eluting stent placement to lesion in in mid LAD on 1/21/2021.  He was recommended to have staged PCI to circumflex and OM 3 in 2 to 4 weeks.  We discussed the importance of antiplatelet therapy and talking with his cardiologist prior to stopping these medications for any reason.    Patient denies any chest pain or angina.  Right femoral site is intact.    Risk factor modification and lifestyle management topics were discussed including managing comorbidities, weight loss, heart healthy diet, exercise and stress reduction.  Cardiac rehab has been ordered.     2.  Dyslipidemia with LDL goal less than 70: Alexei Blake is on high intensity statin therapy with atorvastatin 80 mg daily. Most recent LDL is 45 in July 2020.  Most recent AST is 149 and ALT is 223 on 1/19/2021.  He is asymptomatic.  We discussed a diet low in saturated fat, weight loss, and exercise along with medication for better control of cholesterol.  He was switched from lovastatin to atorvastatin.  He is tolerating with no adverse effect.  He has chronic back pain at baseline.    3. Ischemic cardiomyopathy with systolic dysfunction/LBBB, NYHA class II:Most recent echocardiogram on 1/18/2021 showed severely decline in LVEF to 25% compared to echo in September 2019 showed LVEF of 60%.  He is well compensated with no evidence of fluid retention  assessment.    Patient's admission weight 217 pounds  Discharge weight was 202 pounds.  Home weight has been stable between  198 to 202 pound.  He is following low-sodium diet less than 2 g/day    Bumex was discontinued in the hospital due to hypotension.  Patient is on dialysis.    We discussed and reviewed about heart failure, medication management, and lifestyle management including low sodium diet <2 g/day, daily weight, and staying physically active as tolerated.  Follow good heart failure education and also provided heart failure materials and resources.  Patient and his family was offered to meet with nurse clinician for heart failure education in the near future if prefers.    Heart failure medications:  - Beta blocker therapy with metoprolol succinate 25 mg daily  - ACEI therapy with lisinopril 2.5 mg daily    4.  Hypertension: His blood pressure is 110/58.  Home blood pressure staying mostly in 110s to 110 and high 140s.    Hydralazine was discontinued in the hospital due to hypotension.  Lisinopril was reduced from 40 mg to 2.5 mg daily.    5.  Diabetes: Most recent A1C is 6.0 on 1/21/2021.  We discussed the importance of tightly controlled blood sugars to minimize risk of worsening coronary artery disease. Defer to PCP for diabetes managment.    6.  Paroxysmal atrial fibrillation: Heart rate controlled in 70s.On warfarin therapy for stroke prevention.  INR supratherapeutic at 3.1.  Denies bleeding complications.    Plan:  1.  Continue aspirin 81 mg daily and clopidogrel 75 mg daily. Given patient is on warfarin therapy, will discuss with Dr. Arreguin  if patient needs to stay on triple therapy until staged PCI although this is not scheduled yet.    2.  Continue statin therapy.  May take atorvastatin at bedtime.  Discussed about heart healthy diet.  Information provided.  Encouraged to participate in  nutrition class in cardiac rehab.  Instructed to take metoprolol succinate at bedtime to see if this  helps with fatigue.  AST/ALT pending    3.  Initiate cardiac rehab when able.  Warfarin dosing per INR clinic.    4.  We discussed about increasing his lisinopril to improve his systolic dysfunction which he agreed.  Instructed to increase lisinopril from 2.5 mg to 5 mg daily.  Encouraged to call if experiences lightheadedness, dizziness or any side effects.    5.  Plan echocardiogram and 2 to 3 months.  Renewed his prescription for atorvastatin, metoprolol, and lisinopril and was [facility]    Follow-up with Dr. Chatman as scheduled on 2/24/2021     History of Present Illness/Subjective    Alexei Blake is 80 y.o. with a significant past medical history of stroke, peripheral vascular disease, dyslipidemia, paroxysmal atrial fibrillation, end-stage renal disease on dialysis, coronary artery disease, and ischemic cardiomyopathy with systolic dysfunction/left bundle branch block who is seen at [facility] for post coronary intervention follow up.  Patient was recently hospitalized from January 18 through January 23 2021 shortness of breath and hypoxic respiratory failure secondary to acute systolic heart failure and non-STEMI.  Coronary angiogram showed severe multivessel disease.  Patient was thought to be not a good candidate for CABG.  He had repeat coronary angiogram on 1/21/2021 and underwent complex he had successful PCI stent to mid LAD.  He was recommended to return for staged PCI to circumflex and OM 3 in 2 to 4 weeks.     Bill reports significant improvement in his shortness of breath since recent stent placement. He denies lightheadedness, shortness of breath, dyspnea on exertion, orthopnea, PND, palpitations, chest pain, abdominal fullness/bloating and lower extremity edema.He reports fatigue although this has improved since recent stent placement.  He denies any bleeding complications.  He goes for dialysis 3 times a week.  He is currently undergoing home physical  therapy.  He is planning to transition to cardiac rehab once completion of home physical therapy.    Coronary Angiogram- reviewed:  Results for orders placed during the hospital encounter of 01/18/21   Cardiac Catheterization [CATH01] 01/21/2021    Narrative   Planned PCI of LAD after vein mapping showed poor quality veins for   bypass grafting.    Successful complex PCI of mid LAD using rotational atherectomy and   successful stenting with a Synergy MARIA ELENA with good angiographic results.    Blood loss < 20 cc.      ECHO-Reviewed:   Results for orders placed during the hospital encounter of 01/18/21   Echo Complete [ECH10] 01/18/2021    Narrative   Normal left ventricular size. The estimated left ventricular ejection   fraction is 25%. The wall motion abnormality is consistent with stress   cardiomyopathy.    Right ventricle is not well visualized.    No hemodynamically significant valvular heart abnormalities.    Mild pulmonary hypertension is present.    Left atrial volume is mildly increased.    Technically difficult study with suboptimal image quality.    When compared to the previous study dated 9/28/2019, there are changes   noted.No significant valvular abnormalities are noted on screening Doppler   exam.        Physical Examination Review of Systems   Vitals:    02/04/21 1350   BP: 110/58   Pulse: 72   Resp: 16   SpO2: 90%     Body mass index is 25.94 kg/m .  Wt Readings from Last 3 Encounters:   02/04/21 202 lb (91.6 kg)   02/01/21 202 lb (91.6 kg)   01/22/21 202 lb 12.8 oz (92 kg)       General Appearance:   no distress, normal body habitus   ENT/Mouth: membranes moist, no oral lesions or bleeding gums.      EYES:  no scleral icterus, normal conjunctivae   Neck: no carotid bruits or thyromegaly   Chest/Lungs:   lungs are clear to auscultation, no rales or wheezing,  equal chest wall expansion    Cardiovascular:   Irregularly irregular . Normal first and second heart sounds with no murmurs, rubs, or gallops;  the carotid, radial and posterior tibial pulses are intact, Jugular venous pressure flat, no edema bilaterally, right groin intact   Abdomen:  no organomegaly, masses, bruits, or tenderness; bowel sounds are present   Extremities: no cyanosis or clubbing   Skin: no xanthelasma, warm.    Neurologic: normal  bilateral, no tremors     Psychiatric: alert and oriented x3, calm     General: WNL  Eyes: WNL  Ears/Nose/Throat: WNL  Lungs: Cough  Heart: WNL  Stomach: WNL  Bladder: WNL  Muscle/Joints: WNL  Skin: WNL  Nervous System: WNL  Mental Health: WNL     Blood: WNL     Medical History  Surgical History Family History Social History   Past Medical History:   Diagnosis Date   ? Abscess of tongue    ? Chronic kidney disease     CKD stage 4,dialysis [facility]   ? Diabetes (H)     type 2   ? DVT (deep venous thrombosis) (H)    ? End stage renal disease (H)    ? Hernia, umbilical    ? History of transfusion    ? Hyperlipidemia    ? Hypertension    ? CATHLEEN (obstructive sleep apnea)     uses CPAP   ? PVD (peripheral vascular disease) (H)    ? Renal insufficiency    ? Stroke (H) 2003    minor left sided weakness    Past Surgical History:   Procedure Laterality Date   ? ARTERIAL BYPASS SURGERY Bilateral     lower extremities, Dr. Cottrell   ? CV CORONARY ANGIOGRAM N/A 1/20/2021    Procedure: Coronary Angiogram;  Surgeon: Tuyet Arreguin MD;  Location: [facility];  Service: Cardiology   ? CV LEFT HEART CATHETERIZATION WO LEFT VETRICULOGRAM Left 1/20/2021    Procedure: Left Heart Catheterization Without Left Ventriculogram;  Surgeon: Tuyet Arreguin MD;  Location: [facility];  Service: Cardiology   ? TOE AMPUTATION Bilateral     multiple   ? UMBILICAL HERNIA REPAIR N/A 4/29/2016    Procedure: OPEN UMBILICAL REPAIR WITH MESH;  Surgeon: Jevon Rivas MD;  Location: [facility] OR;  Service:     Family History   Problem Relation Age of Onset   ? Hypertension Mother    ? Hypertension Father    ?  Diabetes Father    ? Heart attack Father     Social History     Socioeconomic History   ? Marital status:      Spouse name: Not on file   ? Number of children: 3   ? Years of education: Not on file   ? Highest education level: Not on file   Occupational History   ? 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 [employer]   Social Needs   ? Financial resource strain: Not on file   ? Food insecurity     Worry: Not on file     Inability: Not on file   ? Transportation needs     Medical: Not on file     Non-medical: Not on file   Tobacco Use   ? Smoking status: Former Smoker     Packs/day: 0.00     Quit date: 1995     Years since quittin.1   ? Smokeless tobacco: Never Used   Substance and Sexual Activity   ? Alcohol use: No   ? Drug use: No   ? Sexual activity: Not on file   Lifestyle   ? Physical activity     Days per week: Not on file     Minutes per session: Not on file   ? Stress: Not on file   Relationships   ? Social connections     Talks on phone: Not on file     Gets together: Not on file     Attends Anabaptism service: Not on file     Active member of club or organization: Not on file     Attends meetings of clubs or organizations: Not on file     Relationship status: Not on file   ? Intimate partner violence     Fear of current or ex partner: Not on file     Emotionally abused: Not on file     Physically abused: Not on file     Forced sexual activity: Not on file   Other Topics Concern   ? Not on file   Social History Narrative   ? Not on file          Medications  Allergies   Current Outpatient Medications   Medication Sig Dispense Refill   ? ACCU-CHEK KYLE PLUS TEST STRP strips USE  STRIP TO CHECK GLUCOSE 2 TO 3 TIMES DAILY AS DIRECTED AT  6AM  AMD  4PM 300 strip 1   ? acetaminophen (TYLENOL) 500 MG tablet Take 1,000 mg by mouth at bedtime.     ? aspirin 81 MG EC tablet Take 1 tablet (81 mg total) by mouth daily.  0   ? atorvastatin (LIPITOR) 80 MG tablet Take 1 tablet (80 mg total) by mouth at bedtime.  90 tablet 3   ? calcium, as carbonate, (TUMS) 200 mg calcium (500 mg) chewable tablet Chew 1 tablet 2 (two) times a day.     ? clopidogreL (PLAVIX) 75 mg tablet Take 1 tablet by mouth once daily 90 tablet 2   ? fluticasone propionate (FLONASE) 50 mcg/actuation nasal spray 2 sprays into each nostril at bedtime. Indications: inflammation of the nose due to an allergy     ? folic acid/vit B complex and C (DIALYVITE ORAL) Take 1 tablet by mouth daily.     ? lisinopriL (PRINIVIL,ZESTRIL) 2.5 MG tablet Take 2 tablets (5 mg total) by mouth every morning. 60 tablet 3   ? metoprolol succinate (TOPROL-XL) 25 MG Take 1 tablet (25 mg total) by mouth at bedtime. 30 tablet 3   ? warfarin ANTICOAGULANT (COUMADIN/JANTOVEN) 3 MG tablet Take 1 tablet (3 mg total) by mouth daily. 30 tablet 1   ? methoxy peg-epoetin beta (MIRCERA INJ) 50 mcg by intravenous push route.     ? nitroglycerin (NITROSTAT) 0.4 MG SL tablet Place 1 tablet (0.4 mg total) under the tongue every 5 (five) minutes as needed for chest pain. 1 Bottle 0     No current facility-administered medications for this visit.     Allergies   Allergen Reactions   ? Blood-Group Specific Substance      Anti-K present. Expect delays in blood for transfusion.  Draw 2 lavender top tubes for type and screen orders.      ? Calcitriol      Fatigue    ? Ciprofloxacin Rash         Lab Results    Chemistry/lipid CBC Cardiac Enzymes/BNP/TSH/INR   Lab Results   Component Value Date    CHOL 97 07/20/2020    HDL 40 07/20/2020    LDLCALC 45 07/20/2020    TRIG 58 07/20/2020    CREATININE 4.76 (H) 01/22/2021    BUN 39 (H) 01/22/2021    K 3.9 01/22/2021     01/22/2021     01/22/2021    CO2 29 01/22/2021    Lab Results   Component Value Date    WBC 6.3 01/23/2021    HGB 10.1 (L) 01/23/2021    HCT 30.6 (L) 01/23/2021     (H) 01/23/2021     01/23/2021    Lab Results   Component Value Date    CKTOTAL 40 10/02/2019    TROPONINI 12.45 (HH) 01/18/2021    BNP >5,000 (H)  01/18/2021    TSH 1.68 01/12/2021    INR 3.20 (!) 02/04/2021      45 minutes spent on the date of encounter doing chart review, review of test results, interpretation with above tests, patient visit, documentation, discussion with other provider and discussion with family.        This note has been dictated using voice recognition software. Any grammatical, typographical, or context distortions are unintentional and inherent to the software

## 2021-07-03 NOTE — ADDENDUM NOTE
Addendum Note by Ai Sheridan NP at 4/6/2020  2:40 PM     Author: Ai Sheridan NP Service: -- Author Type: Nurse Practitioner    Filed: 4/7/2020 12:13 PM Encounter Date: 4/6/2020 Status: Signed    : Ai Sheridan NP (Nurse Practitioner)    Addended by: AI SHERIDAN on: 4/7/2020 12:13 PM        Modules accepted: Level of Service

## 2021-07-04 NOTE — LETTER
Letter by Candie Mcrae CNP at      Author: Candie Mcrae CNP Service: -- Author Type: --    Filed:  Encounter Date: 5/28/2021 Status: (Other)         [facility]  [address] [address]                                  June 4, 2021    Patient: Alexei Blake   MR Number: [id number]   YOB: 1940   Date of Visit: 5/28/2021     Dear Dr. Norris:    Thank you for referring Alexei Blake to me for evaluation. Below are the relevant portions of my assessment and plan of care.    If you have questions, please do not hesitate to call me. I look forward to following Alexei along with you.    Sincerely,        Candie Mcrae CNP          CC  No Recipients  Candie Mcrae CNP  6/4/2021 12:49 PM  Sign when Signing Visit  [facility]    Facility:   [facility] [[id number]]   Code Status: POLST AVAILABLE      CHIEF COMPLAINT/REASON FOR VISIT:  Chief Complaint   Patient presents with   ? Problem Visit       HISTORY:      HPI: Alexei is a 81 y.o. male with pmh of ESRD on dialysis three times weekly, CVA with residual left sided weakness, htn, who presented to the ED with acute onset of abdominal pain during dialysis.  He had ischemic changes of the right colon and was brought to the OR for colonic resection and ileostomy on 331.  He also had an ileostomy revision on 4/21.  He is in the TCU for medical management, wound management for the abdominal wound secondary to repair, and therapy for strengthening.  His blood pressure meds were titrated and he is now on isosorbide 10 mg 3 times daily, metoprolol twice daily, with all meds to be held prior to dialysis due to blood pressure drops during his runs.  He has dialysis on Tuesday, Thursday and Saturdays.  For his anemia, will be starting Epogen at hemodialysis.  He is seen today lying in bed after therapy, he has been having persistent nausea since arriving at the TCU.  He  does have a small amount of dark, formed stool in the ileostomy bag.  His wound is unable to be viewed as it is underneath the tape to the ileostomy bag.  Nursing has already changed it today.  His pain is well controlled.    Today: Seen today for review of multiple conditions.  Daily weights have been stable with some mild weight loss however he is at dialysis as well and they monitor this there.  Discontinue daily weights.  INR subtherapeutic today, will increase Coumadin and recheck Monday.  Ostomy bag with dark liquid stool.  Some green formed stool around the edges.  We will check a CBC on Monday as well.  Overall he is doing well and participating in therapies.  No concerns from nursing.    Past Medical History:   Diagnosis Date   ? Abscess of tongue    ? Chronic kidney disease     CKD stage 4,dialysis    ? Diabetes (H)     type 2   ? DVT (deep venous thrombosis) (H)    ? End stage renal disease (H)    ? Hernia, umbilical    ? History of transfusion    ? Hyperlipidemia    ? Hypertension    ? CATHLEEN (obstructive sleep apnea)     uses CPAP   ? PVD (peripheral vascular disease) (H)    ? Renal insufficiency    ? Stroke (H)     minor left sided weakness             Family History   Problem Relation Age of Onset   ? Hypertension Mother    ? Hypertension Father    ? Diabetes Father    ? Heart attack Father      Social History     Socioeconomic History   ? Marital status:      Spouse name: Not on file   ? Number of children: 3   ? Years of education: Not on file   ? Highest education level: Not on file   Occupational History   ? 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 [employer]   Social Needs   ? Financial resource strain: Not on file   ? Food insecurity     Worry: Not on file     Inability: Not on file   ? Transportation needs     Medical: Not on file     Non-medical: Not on file   Tobacco Use   ? Smoking status: Former Smoker     Packs/day: 4.00     Quit date: 1995     Years since quittin.4   ?  "Smokeless tobacco: Never Used   Substance and Sexual Activity   ? Alcohol use: No   ? Drug use: No   ? Sexual activity: Not on file   Lifestyle   ? Physical activity     Days per week: Not on file     Minutes per session: Not on file   ? Stress: Not on file   Relationships   ? Social connections     Talks on phone: Not on file     Gets together: Not on file     Attends [employer]an service: Not on file     Active member of club or organization: Not on file     Attends meetings of clubs or organizations: Not on file     Relationship status: Not on file   ? Intimate partner violence     Fear of current or ex partner: Not on file     Emotionally abused: Not on file     Physically abused: Not on file     Forced sexual activity: Not on file   Other Topics Concern   ? Not on file   Social History Narrative    3/20/21: \"Yair\" is here in the ED with his wife today.         Review of Systems   Constitutional: Negative.    HENT: Negative.    Respiratory: Negative.    Cardiovascular: Negative.    Gastrointestinal: Negative for abdominal distention, abdominal pain and nausea.   Genitourinary: Negative.    Musculoskeletal: Negative.    Skin: Positive for wound.   Neurological: Positive for weakness.       Vitals:    05/28/21 1213   BP: 143/66   Pulse: 74   Resp: 18   Temp: 98  F (36.7  C)   SpO2: 96%   Weight: 190 lb (86.2 kg)   Height: 6' 1\" (1.854 m)       Physical Exam  Constitutional:       Appearance: Normal appearance. He is normal weight. He is ill-appearing.   HENT:      Head: Atraumatic.      Mouth/Throat:      Mouth: Mucous membranes are moist.   Eyes:      General: No scleral icterus.     Extraocular Movements: Extraocular movements intact.   Cardiovascular:      Rate and Rhythm: Normal rate and regular rhythm.   Pulmonary:      Effort: Pulmonary effort is normal. No respiratory distress.      Breath sounds: No wheezing.      Comments: Diminished bilaterally   Abdominal:      General: Abdomen is flat. There is no " distension.      Palpations: Abdomen is soft.      Tenderness: There is no abdominal tenderness.      Comments: Iliostomy    Musculoskeletal: Normal range of motion.      Right lower leg: No edema.      Left lower leg: No edema.      Comments: AV fistula to RUE.      Skin:     General: Skin is warm and dry.   Neurological:      General: No focal deficit present.   Psychiatric:         Mood and Affect: Mood normal.         Behavior: Behavior normal.           LABS:   Reviewed.     ASSESSMENT:      ICD-10-CM    1. Paroxysmal atrial fibrillation (H)  I48.0    2. Nausea  R11.0    3. History of coronary artery disease  Z86.79    4. ESRD (end stage renal disease) on dialysis (H)  N18.6     Z99.2        PLAN:      Nausea: post op. VS stable, afebrile.  Colostomy with dark liquid.  -Discontinue Zofran.    Status post ileostomy: dark stool in bag.   -Pantoprazole 40 mg daily.  -Oxycodone to every 6 hours as needed.  -Tylenol 500 mg every 4 hours as needed.    End-stage renal disease  Anemia of chronic disease  -On Epogen at dialysis.  -Has dialysis Tuesday, Thursday, Saturday.  -Liberalize diet to general diet due to poor po intake.  Dialysis monitoring.  -Renal caps 1 mg daily.  Discontinue daily weights.    CAD  A. Fib  History of CVA  -On warfarin, INR 1.7; increase Coumadin.  -Recheck INR Monday.  -Atorvastatin 80 mg at bedtime.  -Aspirin 81 daily.  -Clopidogrel 75 daily.  -Isosorbide 10 mg 3 times a day.    Allergic rhinitis:  -Flonase 2 sprays daily.    Disposition: Likely going to return to his home where he lives with his wife.      Electronically signed by: Candie Mcrae, CNP

## 2021-07-04 NOTE — ADDENDUM NOTE
Addendum Note by Cary Handy RN at 6/16/2021  2:42 PM     Author: Cary Handy RN Service: -- Author Type: Registered Nurse    Filed: 6/16/2021  3:05 PM Encounter Date: 6/16/2021 Status: Signed    : Cary Handy RN (Registered Nurse)    Addended by: CARY HANDY on: 6/16/2021 03:05 PM        Modules accepted: Orders

## 2021-07-07 NOTE — LETTER
Letter by Candie Mcrae CNP at      Author: Candie Mcrae CNP Service: -- Author Type: --    Filed:  Encounter Date: 6/21/2021 Status: (Other)         [facility]  [address] [address]                                  June 27, 2021    Patient: Alexei Blake   MR Number: [id number]   YOB: 1940   Date of Visit: 6/21/2021     Dear  Tcu:    Thank you for referring Alexei Blake to me for evaluation. Below are the relevant portions of my assessment and plan of care.    If you have questions, please do not hesitate to call me. I look forward to following Alexei along with you.    Sincerely,        Candie Mcrae CNP          CC  No Recipients  Candie Mcrae CNP  6/27/2021  9:20 PM  Sign when Signing Visit  [facility]    Facility:   [facility] [[id number]]   Code Status: POLST AVAILABLE      CHIEF COMPLAINT/REASON FOR VISIT:  Chief Complaint   Patient presents with   ? Problem Visit     aure, abx proph, osteo, INR       HISTORY:      HPI: Alexei is a 81 y.o. male with pmh of ESRD on dialysis three times weekly, CVA with residual left sided weakness, htn, who presented to the ED with acute onset of abdominal pain during dialysis.  He had ischemic changes of the right colon and was brought to the OR for colonic resection and ileostomy on 331.  He also had an ileostomy revision on 4/21.  He is in the TCU for medical management, wound management for the abdominal wound secondary to repair, and therapy for strengthening.    He has dialysis on Tuesday, Thursday and Saturdays.  For his anemia, will be starting Epogen at hemodialysis.    Patient was hospitalized from 6/2 - 6/4 for pneumonia with focal left lobe atelectasis with  moderate to large left pleural effusion. During hospitalization patient as treated with IV  Zosyn and developed full body itching before medication was stopped.  Patient returned  to the TCU on 6/4 and was sent back to [facility] on 6/6  because of abdominal pain. Abdominal CT revealed three 1.2 cm walled pancreatic head  cysts. Patients lipase was elevated at 134 and WBC 13.1. He was treated for Acute  cholecystitis with a cholecystostomy tube performed by IR. It was discovered during his  second hospitalization the left pleural effusion from previous hospitalization 6/2-6/4 had  increased and required a left lung thoracentesis on 6/7, resulting in 750 ml of serous fluid  being pulled off, but no growth in culture present per hospital discharge note.  Recommended to have repeat chest x-ray 2 weeks post-discharge.  During hospitalization MRI was performed on his left foot due to ulcers on the 4 th and 5 th  toes and heel - imaging was suggestive of osteomyelitis. Per notes vascular surgery  recommending angiogram before surgery, which will be performed outpatient. Patient  discharged on Bactrim once daily x 30 days- this was apparently switched to Augementin per second discharge note date 6/17.   Per hospital notes patient was originally supposed to be discharged on 6/12 but while  patient was sitting up in his wheelchair to be discharge, he experienced a syncopal  episode requiring rapid respond. All work-up, including MRI and carotid ultrasound, were  inconclusive in explaining syncopal event. Recommendation by cardiology is to follow-  up outpatient.    Today:   Patient seen today to follow-up regarding his progression with TCU. During visit patient  verbalized he was disappointed and upset about his recent hospitalizations, stating he is much  weaker and needing more help than before. Patient reports of having no pain in his left foot and  is aware of the osteomyelitis present - states he is unsure if surgery will be performed or not.  Has been tolerating Augmentin well, denies any upset stomach or rashes. Reports of his  appetite being good but states he doesnt eat much. Denies  having nausea since returning  from the hospital.  Continuing to attend dialysis on Tues, Thurs, and Saturday. Weights have been stable.  Currently receiving Coumadin 4 mg is to have a INR performed on . Denies experiencing  headaches, increase dyspnea, cough, chest pain, palpations, N/V, or increase malaise.    We discussed mutliple appointments coming up including angiogram, possible I/D versus amputation of diabetic toe osteomyelitis, possible cholectomy in 3-4 months.       Past Medical History:   Diagnosis Date   ? Abscess of tongue    ? Chronic kidney disease     CKD stage 4,dialysis    ? Diabetes (H)     type 2   ? DVT (deep venous thrombosis) (H)    ? End stage renal disease (H)    ? Hernia, umbilical    ? History of transfusion    ? Hyperlipidemia    ? Hypertension    ? CATHLEEN (obstructive sleep apnea)     uses CPAP   ? PVD (peripheral vascular disease) (H)    ? Renal insufficiency    ? Stroke (H) 2003    minor left sided weakness             Family History   Problem Relation Age of Onset   ? Hypertension Mother    ? Hypertension Father    ? Diabetes Father    ? Heart attack Father      Social History     Socioeconomic History   ? Marital status:      Spouse name: Not on file   ? Number of children: 3   ? Years of education: Not on file   ? Highest education level: Not on file   Occupational History   ? 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 [employer]   Social Needs   ? Financial resource strain: Not on file   ? Food insecurity     Worry: Not on file     Inability: Not on file   ? Transportation needs     Medical: Not on file     Non-medical: Not on file   Tobacco Use   ? Smoking status: Former Smoker     Packs/day: 4.00     Quit date: 1995     Years since quittin.5   ? Smokeless tobacco: Never Used   Substance and Sexual Activity   ? Alcohol use: No   ? Drug use: No   ? Sexual activity: Not on file   Lifestyle   ? Physical activity     Days per week: Not on file     Minutes per session:  "Not on file   ? Stress: Not on file   Relationships   ? Social connections     Talks on phone: Not on file     Gets together: Not on file     Attends Restorationism service: Not on file     Active member of club or organization: Not on file     Attends meetings of clubs or organizations: Not on file     Relationship status: Not on file   ? Intimate partner violence     Fear of current or ex partner: Not on file     Emotionally abused: Not on file     Physically abused: Not on file     Forced sexual activity: Not on file   Other Topics Concern   ? Not on file   Social History Narrative    3/20/21: \"Bill\" is here in the ED with his wife today.         Review of Systems   Constitutional: Negative.    HENT: Negative.    Respiratory: Negative.    Cardiovascular: Negative.    Gastrointestinal: Negative for abdominal distention, abdominal pain and nausea.        Mild tenderness where melissa drain is placed.    Genitourinary: Negative.    Musculoskeletal: Negative.    Skin: Positive for wound.   Neurological: Positive for weakness.       Vitals:    06/21/21 1253   BP: 132/77   Pulse: 83   Resp: 16   Temp: 98.2  F (36.8  C)   SpO2: 96%   Weight: 187 lb 3.2 oz (84.9 kg)   Height: 6' 1\" (1.854 m)       Physical Exam  Constitutional:       Appearance: Normal appearance. He is normal weight. He is ill-appearing.   HENT:      Head: Atraumatic.      Mouth/Throat:      Mouth: Mucous membranes are moist.   Eyes:      General: No scleral icterus.     Extraocular Movements: Extraocular movements intact.   Cardiovascular:      Rate and Rhythm: Normal rate and regular rhythm.   Pulmonary:      Effort: Pulmonary effort is normal. No respiratory distress.      Breath sounds: No wheezing.      Comments: Diminished bilaterally   Abdominal:      General: Abdomen is flat. There is no distension.      Palpations: Abdomen is soft.      Tenderness: There is no abdominal tenderness.      Comments: Iliostomy, melissa tube R side.    Musculoskeletal: " Normal range of motion.      Right lower leg: No edema.      Left lower leg: No edema.      Comments: AV fistula to RUE.      Skin:     General: Skin is warm and dry.   Neurological:      General: No focal deficit present.   Psychiatric:         Mood and Affect: Mood normal.         Behavior: Behavior normal.           LABS:   Reviewed.     ASSESSMENT:      ICD-10-CM    1. Osteomyelitis of ankle and foot (H)  M86.9    2. Pressure injury of left heel, stage 3 (H)  L89.623    3. Cholecystitis  K81.9    4. Status post ileostomy (H)  Z93.2    5. ESRD (end stage renal disease) on dialysis (H)  N18.6     Z99.2        PLAN:      Acute cholecystitis: Discovered upon re-hospitalization on 6/6 when patient began to  experience right sided abdominal pain. Cholecystostomy tube placed on 6/7 by IR.  -Cholecystostomy tube - nursing continues to monitor output and tube site.  -To have a repeat cholangiogram in 4-6 weeks to determine if cholecystostomy  need.  -To follow-up with surgery clinic in 3-4 months to discuss potential  cholecystectomy surgery.    Left pleural effusion: Thoracentesis was performed on 6/7 resulting in 750 ml of fluid  being pulled off. Cultures showed no bacteria growth. To have a chest x-ray follow-up 2  weeks post-discharge.  - Order for repeat chest x-ray next week to re-evaluate for re-accumulation of fluid.    Osteomyelitis - 4 th and 5 th left toes and left heel:  - Will need angiogram prior to surgery, nursing is setting up appointment date in  early July with vascular surgery for consult.  - Currently receiving Augmentin once daily x 30 days d/t osteomyelitis  suppression  - Continue current wound care tx. To left foot.    Syncopal episode during hospitalization: Has had no further syncope episodes since  returning to TCU. B/P and pulse stable.  - To have follow-up with cardiology appointment with nursing    Status post ileostomy: dark stool in bag.  -Pantoprazole 40 mg daily.  -Oxycodone to every 6  hours as needed.  -Tylenol 500 mg every 4 hours as needed.     End-stage renal disease  Anemia of chronic disease  -On Epogen at dialysis.    -Has dialysis Tuesday, Thursday, Saturday.  -Liberalize diet to general diet due to poor po intake.  Dialysis monitoring.  -Renal caps 1 mg daily.     CAD  A. Fib  History of CVA  -Atorvastatin 80 mg at bedtime.  -Aspirin 81 daily.  -Clopidogrel 75 daily.  -Isosorbide 10 mg 3 times a day.  -Nitroglycerin SL 0.4 mg PRN  -Current Coumadin 4 mg, recheck INR 6/23    Type II DM: A1c 7.1, Blood sugars ranging between 140 - 250.  - Lantus 5 units at HS  - Novolog sliding scale  - BG checks TID     Allergic rhinitis:  -Flonase 2 sprays daily.     Disposition: Likely going to return to his home where he lives with his wife.    Electronically signed by: Candie Mcrae, MARK

## 2021-07-07 NOTE — PROGRESS NOTES
[facility]    Facility:   [facility] [[id number]]   Code Status: POLST AVAILABLE      CHIEF COMPLAINT/REASON FOR VISIT:  Chief Complaint   Patient presents with     Problem Visit     deshawn looney proph, osteo, INR       HISTORY:      HPI: Alexei is a 81 y.o. male with pmh of ESRD on dialysis three times weekly, CVA with residual left sided weakness, htn, who presented to the ED with acute onset of abdominal pain during dialysis.  He had ischemic changes of the right colon and was brought to the OR for colonic resection and ileostomy on 331.  He also had an ileostomy revision on 4/21.  He is in the TCU for medical management, wound management for the abdominal wound secondary to repair, and therapy for strengthening.    He has dialysis on Tuesday, Thursday and Saturdays.  For his anemia, will be starting Epogen at hemodialysis.    Patient was hospitalized from 6/2 - 6/4 for pneumonia with focal left lobe atelectasis with  moderate to large left pleural effusion. During hospitalization patient as treated with IV  Zosyn and developed full body itching before medication was stopped.  Patient returned to the TCU on 6/4 and was sent back to [facility] on 6/6  because of abdominal pain. Abdominal CT revealed three 1.2 cm walled pancreatic head  cysts. Patient s lipase was elevated at 134 and WBC 13.1. He was treated for Acute  cholecystitis with a cholecystostomy tube performed by IR. It was discovered during his  second hospitalization the left pleural effusion from previous hospitalization 6/2-6/4 had  increased and required a left lung thoracentesis on 6/7, resulting in 750 ml of serous fluid  being pulled off, but no growth in culture present per hospital discharge note.  Recommended to have repeat chest x-ray 2 weeks post-discharge.  During hospitalization MRI was performed on his left foot due to ulcers on the 4 th and 5 th  toes and heel - imaging was suggestive of  osteomyelitis. Per notes vascular surgery  recommending angiogram before surgery, which will be performed outpatient. Patient  discharged on Bactrim once daily x 30 days- this was apparently switched to Augementin per second discharge note date 6/17.   Per hospital notes patient was originally supposed to be discharged on 6/12 but while  patient was sitting up in his wheelchair to be discharge, he experienced a syncopal  episode requiring rapid respond. All work-up, including MRI and carotid ultrasound, were  inconclusive in explaining syncopal event. Recommendation by cardiology is to follow-  up outpatient.    Today:   Patient seen today to follow-up regarding his progression with TCU. During visit patient  verbalized he was disappointed and upset about his recent hospitalizations, stating he is much  weaker and needing more help than before. Patient reports of having no pain in his left foot and  is aware of the osteomyelitis present - states he is unsure if surgery will be performed or not.  Has been tolerating Augmentin well, denies any upset stomach or rashes. Reports of his  appetite being good but states he  doesn t eat much . Denies having nausea since returning  from the hospital.  Continuing to attend dialysis on Tues, Thurs, and Saturday. Weights have been stable.  Currently receiving Coumadin 4 mg is to have a INR performed on 6/23. Denies experiencing  headaches, increase dyspnea, cough, chest pain, palpations, N/V, or increase malaise.    We discussed mutliple appointments coming up including angiogram, possible I/D versus amputation of diabetic toe osteomyelitis, possible cholectomy in 3-4 months.       Past Medical History:   Diagnosis Date     Abscess of tongue      Chronic kidney disease     CKD stage 4,dialysis Tu-Th-Sa     Diabetes (H)     type 2     DVT (deep venous thrombosis) (H)      End stage renal disease (H)      Hernia, umbilical      History of transfusion      Hyperlipidemia       "Hypertension      CATHLEEN (obstructive sleep apnea)     uses CPAP     PVD (peripheral vascular disease) (H)      Renal insufficiency      Stroke (H) 2003    minor left sided weakness             Family History   Problem Relation Age of Onset     Hypertension Mother      Hypertension Father      Diabetes Father      Heart attack Father      Social History     Socioeconomic History     Marital status:      Spouse name: Not on file     Number of children: 3     Years of education: Not on file     Highest education level: Not on file   Occupational History     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 [email or web address]   Social Needs     Financial resource strain: Not on file     Food insecurity     Worry: Not on file     Inability: Not on file     Transportation needs     Medical: Not on file     Non-medical: Not on file   Tobacco Use     Smoking status: Former Smoker     Packs/day: 4.00     Quit date: 1995     Years since quittin.5     Smokeless tobacco: Never Used   Substance and Sexual Activity     Alcohol use: No     Drug use: No     Sexual activity: Not on file   Lifestyle     Physical activity     Days per week: Not on file     Minutes per session: Not on file     Stress: Not on file   Relationships     Social connections     Talks on phone: Not on file     Gets together: Not on file     Attends Yazdanism service: Not on file     Active member of club or organization: Not on file     Attends meetings of clubs or organizations: Not on file     Relationship status: Not on file     Intimate partner violence     Fear of current or ex partner: Not on file     Emotionally abused: Not on file     Physically abused: Not on file     Forced sexual activity: Not on file   Other Topics Concern     Not on file   Social History Narrative    3/20/21: \"Bill\" is here in the ED with his wife today.         Review of Systems   Constitutional: Negative.    HENT: Negative.    Respiratory: Negative.    Cardiovascular: Negative.  " "  Gastrointestinal: Negative for abdominal distention, abdominal pain and nausea.        Mild tenderness where melissa drain is placed.    Genitourinary: Negative.    Musculoskeletal: Negative.    Skin: Positive for wound.   Neurological: Positive for weakness.       Vitals:    06/21/21 1253   BP: 132/77   Pulse: 83   Resp: 16   Temp: 98.2  F (36.8  C)   SpO2: 96%   Weight: 187 lb 3.2 oz (84.9 kg)   Height: 6' 1\" (1.854 m)       Physical Exam  Constitutional:       Appearance: Normal appearance. He is normal weight. He is ill-appearing.   HENT:      Head: Atraumatic.      Mouth/Throat:      Mouth: Mucous membranes are moist.   Eyes:      General: No scleral icterus.     Extraocular Movements: Extraocular movements intact.   Cardiovascular:      Rate and Rhythm: Normal rate and regular rhythm.   Pulmonary:      Effort: Pulmonary effort is normal. No respiratory distress.      Breath sounds: No wheezing.      Comments: Diminished bilaterally   Abdominal:      General: Abdomen is flat. There is no distension.      Palpations: Abdomen is soft.      Tenderness: There is no abdominal tenderness.      Comments: Iliostomy, melissa tube R side.    Musculoskeletal: Normal range of motion.      Right lower leg: No edema.      Left lower leg: No edema.      Comments: AV fistula to RUE.      Skin:     General: Skin is warm and dry.   Neurological:      General: No focal deficit present.   Psychiatric:         Mood and Affect: Mood normal.         Behavior: Behavior normal.           LABS:   Reviewed.     ASSESSMENT:      ICD-10-CM    1. Osteomyelitis of ankle and foot (H)  M86.9    2. Pressure injury of left heel, stage 3 (H)  L89.623    3. Cholecystitis  K81.9    4. Status post ileostomy (H)  Z93.2    5. ESRD (end stage renal disease) on dialysis (H)  N18.6     Z99.2        PLAN:      Acute cholecystitis: Discovered upon re-hospitalization on 6/6 when patient began to  experience right sided abdominal pain. Cholecystostomy tube " placed on 6/7 by IR.  -Cholecystostomy tube - nursing continues to monitor output and tube site.  -To have a repeat cholangiogram in 4-6 weeks to determine if cholecystostomy  need.  -To follow-up with surgery clinic in 3-4 months to discuss potential  cholecystectomy surgery.    Left pleural effusion: Thoracentesis was performed on 6/7 resulting in 750 ml of fluid  being pulled off. Cultures showed no bacteria growth. To have a chest x-ray follow-up 2  weeks post-discharge.  - Order for repeat chest x-ray next week to re-evaluate for re-accumulation of fluid.    Osteomyelitis - 4 th and 5 th left toes and left heel:  - Will need angiogram prior to surgery, nursing is setting up appointment date in  early July with vascular surgery for consult.  - Currently receiving Augmentin once daily x 30 days d/t osteomyelitis  suppression  - Continue current wound care tx. To left foot.    Syncopal episode during hospitalization: Has had no further syncope episodes since  returning to TCU. B/P and pulse stable.  - To have follow-up with cardiology appointment with nursing    Status post ileostomy: dark stool in bag.  -Pantoprazole 40 mg daily.  -Oxycodone to every 6 hours as needed.  -Tylenol 500 mg every 4 hours as needed.     End-stage renal disease  Anemia of chronic disease  -On Epogen at dialysis.    -Has dialysis Tuesday, Thursday, Saturday.  -Liberalize diet to general diet due to poor po intake.  Dialysis monitoring.  -Renal caps 1 mg daily.     CAD  A. Fib  History of CVA  -Atorvastatin 80 mg at bedtime.  -Aspirin 81 daily.  -Clopidogrel 75 daily.  -Isosorbide 10 mg 3 times a day.  -Nitroglycerin SL 0.4 mg PRN  -Current Coumadin 4 mg, recheck INR 6/23    Type II DM: A1c 7.1, Blood sugars ranging between 140 - 250.  - Lantus 5 units at HS  - Novolog sliding scale  - BG checks TID     Allergic rhinitis:  -Flonase 2 sprays daily.     Disposition: Likely going to return to his home where he lives with his  wife.    Electronically signed by: Candie Mcrae, CNP

## 2021-07-09 NOTE — PRE-PROCEDURE
Pre-Procedure by Sotero Mccray MD at 7/9/2021 10:00 AM     Author: Sotero Mccray MD Service: -- Author Type: Resident    Filed: 7/9/2021  9:25 AM Date of Service: 7/9/2021 10:00 AM Status: Signed    : Sotero Mccray MD (Resident)         Procedure Name: Left lower extremity angiogram with possible intervention, with monitoring and sedation.  Date/Time: 7/9/2021 9:25 AM    Verbal consent obtained?: Yes  Written consent obtained?: Yes  Risks and benefits: Risks, benefits and alternatives were discussed  Discharge plan: Appropriate discharge home plan in place for patients who are going home after procedure   Consent given by: patient  Expected level of sedation: moderate  ASA Class: Class 3- Severe systemic disease, definite functional limitations  Mallampati: Grade 3- soft palate visible, posterior pharyngeal wall not visible  Patient states understanding of procedure being performed: Yes  Patient's understanding of procedure matches consent: Yes  Procedure consent matches procedure scheduled: Yes  Appropriately NPO: yes  Lungs: lungs clear with good breath sounds bilaterally  Heart: normal heart sounds and rate  History & Physical reviewed: History and physical reviewed and no updates needed  Statement of review: I have reviewed the lab findings, diagnostic data, medications, and the plan for sedation

## 2021-07-09 NOTE — TELEPHONE ENCOUNTER
Telephone Encounter by Lula Aaron RN at 7/9/2021  2:56 PM     Author: Lula Aaron RN Service: -- Author Type: Registered Nurse    Filed: 7/9/2021  2:56 PM Encounter Date: 7/9/2021 Status: Signed    : Lula Aaron RN (Registered Nurse)       Currently managed by [facility]

## 2021-07-09 NOTE — INTERVAL H&P NOTE
Interval H&P Note by Sotero Mccray MD at 7/9/2021 10:00 AM     Author: Sotero Mccray MD Service: -- Author Type: Resident    Filed: 7/9/2021  9:24 AM Date of Service: 7/9/2021 10:00 AM Status: Signed    : Sotero Mccray MD (Resident)       I have performed an assessment and examined the patient, as necessary, to update the patient's current status that may have changed since the prior History and Physical.  No new updates to the H&P.  Plan for left LE angiogram with Dr. Salvador.  Consent signed.  All questions answered.     Sotero Mccray MD, [facility]  Vascular Surgery Fellow     Source Note     Author: Sotero Mccray MD Service: -- Author Type: Resident    Filed: 6/16/2021  3:59 PM Date of Service: 6/16/2021  3:47 PM Status: Signed    : Sotero Mccray MD (Resident)       VASCULAR SURGERY HOSPITAL PATIENT CONSULTATION NOTE  Consulted by: Podiatry  Reason for consultation: Dry gangrene to left 4th toe, possible underlying osteomyelitis    HPI:  Alexie Blake is a 81 y.o. year old male who has a PMH significant for CAD, ESRD on dialysis and diabetes with HgA1c of 7.1 who has had several weeks of dry gangrene to the left 4th toe and a very superficial heel abrasion.  He recently had acute cholecystitis and required a perc melissa tube given his close proximity to his heart catheterization.  In the last several months also had ischemic colitis requiring surgical resection with ostomy creation.  He has prior arterial duplex with multiphasic waveforms down from the left iliac into the popliteal and then monophasic flow into the tibials.  He likely would benefit from an angiogram prior to a toe amputation.  This is scheduled for 7/9/21 with Dr. Salvador.  There is not ascending cellulitis.  The wounds look very dry.  Due to scheduling this is the earliest we can do it now unless he develops signs of a more acute infectious process that make the case more urgent.  Able to wiggle the toes to command.  Of  note, has had prior arterial bypass surgery bilaterally though native vessels are open on US done around 6 months ago.  New arterial duplex ordered.     Review Of Systems:  General: Denies F/C  Respiratory: Denies SOB  Cardio: Denies CP  Gastrointestinal: Denies N/V  Genitourinary: ESRD, on dialysis, does not make much urine  Musculoskeletal: See HPI  Neurologic: Denies HA  Psychiatric: Denies confusion  Hematology/immunology: no unexpected bruising    PAST MEDICAL HISTORY:  Past Medical History:   Diagnosis Date   ? Abscess of tongue    ? Chronic kidney disease     CKD stage 4,dialysis Tu-Th-Sa   ? Diabetes (H)     type 2   ? DVT (deep venous thrombosis) (H)    ? End stage renal disease (H)    ? Hernia, umbilical    ? History of transfusion    ? Hyperlipidemia    ? Hypertension    ? CATHLEEN (obstructive sleep apnea)     uses CPAP   ? PVD (peripheral vascular disease) (H)    ? Renal insufficiency    ? Stroke (H) 2003    minor left sided weakness       PAST SURGICAL HISTORY:  Past Surgical History:   Procedure Laterality Date   ? ARTERIAL BYPASS SURGERY Bilateral     lower extremities, Dr. Cottrell   ? CV CORONARY ANGIOGRAM N/A 1/20/2021    Procedure: Coronary Angiogram;  Surgeon: Tuyet Arreguin MD;  Location: [facility] Cardiac Cath Lab;  Service: Cardiology   ? CV LEFT HEART CATHETERIZATION WO LEFT VETRICULOGRAM Left 1/20/2021    Procedure: Left Heart Catheterization Without Left Ventriculogram;  Surgeon: Tuyet Arreguin MD;  Location: [facility] Cardiac Cath Lab;  Service: Cardiology   ? IR CHOLECYSTOSTOMY  6/7/2021   ? TOE AMPUTATION Bilateral     multiple   ? UMBILICAL HERNIA REPAIR N/A 4/29/2016    Procedure: OPEN UMBILICAL REPAIR WITH MESH;  Surgeon: Jevon Rivas MD;  Location: [facility] OR;  Service:    ? US THORACENTESIS  6/7/2021       FAMILY HISTORY:  Family History   Problem Relation Age of Onset   ? Hypertension Mother    ? Hypertension Father    ? Diabetes Father    ? Heart attack Father         SOCIAL HISTORY:   Social History     Tobacco Use   ? Smoking status: Former Smoker     Packs/day: 4.00     Quit date: 1995     Years since quittin.4   ? Smokeless tobacco: Never Used   Substance Use Topics   ? Alcohol use: No       TOBACCO USE:  Former smoker    HOME MEDICATIONS:  Prior to Admission medications    Medication Sig Start Date End Date Taking? Authorizing Provider   acetaminophen (TYLENOL) 500 MG tablet Take 500 mg by mouth every 4 (four) hours as needed (or sleep).    Yes PROVIDER, HISTORICAL   amino acids-protein hydrolys (PROSOURCE NO CARB) 15-60 gram-kcal/30 mL LiPk Take 1 packet by mouth 2 (two) times a day.   Yes PROVIDER, HISTORICAL   aspirin 81 MG EC tablet Take 1 tablet (81 mg total) by mouth daily. 21  Yes Jose Carpio MD   atorvastatin (LIPITOR) 80 MG tablet Take 1 tablet (80 mg total) by mouth at bedtime. 21  Yes Cherri Restrepo NP   B complex with C 20-folic acid (RENAL CAPS) 1 mg cap capsule Take 1 capsule by mouth daily.   Yes PROVIDER, HISTORICAL   calcium, as carbonate, (TUMS) 200 mg calcium (500 mg) chewable tablet Chew 500 mg 2 (two) times a day.   Yes PROVIDER, HISTORICAL   clopidogreL (PLAVIX) 75 mg tablet Take 75 mg by mouth daily.   Yes PROVIDER, HISTORICAL   fluticasone propionate (FLONASE ALLERGY RELIEF) 50 mcg/actuation nasal spray 2 sprays into each nostril daily. 21  Yes PROVIDER, HISTORICAL   isosorbide dinitrate (ISORDIL) 5 MG tablet Take 5 mg by mouth 3 (three) times a day.   Yes PROVIDER, HISTORICAL   lactose-reduced food (BOOST BREEZE NUTRITIONAL ORAL) Take by mouth daily with lunch.   Yes PROVIDER, HISTORICAL   metoprolol tartrate (LOPRESSOR) 25 MG tablet Take 25 mg by mouth 2 (two) times a day. Give 25 mg by mouth two times a day every Mon, Wed, Fri, Sun for A fib/CAD   Yes PROVIDER, HISTORICAL   nitroglycerin (NITROSTAT) 0.4 MG SL tablet Place 1 tablet (0.4 mg total) under the tongue every 5 (five) minutes as needed for chest pain. May  "repeat every 5 minutes for a maximum of 3 doses in 15 minutes. 3/19/21  Yes Tuyet Arreguin MD   ondansetron (ZOFRAN) 4 MG tablet Take 4 mg by mouth 2 (two) times a day before meals. And prn q day.    Yes PROVIDER, HISTORICAL   oxyCODONE (ROXICODONE) 5 MG immediate release tablet Take 5 mg by mouth every 6 (six) hours as needed. 4/26/21  Yes PROVIDER, HISTORICAL   pantoprazole (PROTONIX) 40 MG tablet Take 40 mg by mouth daily. 4/26/21  Yes PROVIDER, HISTORICAL   warfarin ANTICOAGULANT (COUMADIN/JANTOVEN) 3 MG tablet Take 3 mg by mouth See Admin Instructions. 3 mg by mouth daily   Yes PROVIDER, HISTORICAL   ACCU-CHEK KYLE PLUS TEST STRP strips USE  STRIP TO CHECK GLUCOSE 2 TO 3 TIMES DAILY AS DIRECTED AT  6AM  AMD  4PM 3/24/20   Jesus Medrano MD   cefdinir (OMNICEF) 300 MG capsule Give 1 tab on Monday. This completes his course. 6/13/21   Jaguar Arevalo MBBS   NOVOLOG FLEXPEN U-100 INSULIN 100 unit/mL (3 mL) injection pen Give subcutaneous 3x daily before meals according to attached sliding scale. 6/10/21   Padmini Vásquez MD   ondansetron (ZOFRAN) 4 MG tablet Take 1 tablet (4 mg total) by mouth every 8 (eight) hours as needed for nausea. 6/10/21   Padmini Vásquez MD       VITAL SIGNS:  /60 (Patient Position: Lying)   Pulse 74   Temp 96.7  F (35.9  C) (Axillary)   Resp 18   Ht 6' 2\" (1.88 m)   Wt 186 lb 9.6 oz (84.6 kg)   SpO2 97%   BMI 23.96 kg/m      Intake/Output Summary (Last 24 hours) at 6/16/2021 1547  Last data filed at 6/16/2021 1455  Gross per 24 hour   Intake 1200 ml   Output 1450 ml   Net -250 ml       Labs:  HgA1c of 7.1    PHYSICAL EXAM:  Constitutional: alert, no acute distress and cooperative   Cardiovascular: RRR  Respiratory: CTAB anteriorly, breathing unlabored without secondary muscle use  Psychiatric: mentation appears normal and affect normal/bright  Neck: no asymmetry  GI/Abdomen: Abdomen soft, non-tender. Ostomy pink and viable appearing.  MSK: able to wiggle toes to " command.  Extremities: dry gangrene to left 4th toe and superficial abrasion to left heel  Hematology: no bruising on visible skin    IMAGING:  New Arterial duplex pending.    He has prior arterial duplex with multiphasic waveforms down from the left iliac into the popliteal and then monophasic flow into the tibials.  He likely would benefit from an angiogram prior to a toe amputation.     Patient Active Problem List   Diagnosis   ? Dyslipidemia, goal LDL below 70   ? Type 2 diabetes mellitus (H)   ? Essential hypertension with goal blood pressure less than 130/85   ? PVD (peripheral vascular disease) (H)   ? Stroke (H)   ? Adjustment disorder with depressed mood   ? ESRD (end stage renal disease) on dialysis (H)   ? Paroxysmal atrial fibrillation (H)   ? Pneumonia   ? NSTEMI (non-ST elevated myocardial infarction) (H)   ? Ischemic cardiomyopathy   ? LBBB (left bundle branch block)   ? CAD (coronary artery disease)   ? Status post ileostomy (H)   ? Nausea   ? Acute chest pain   ? Pleural effusion   ? History of coronary artery disease   ? Shortness of breath   ? Cholecystitis   ? Demand ischemia (H)   ? Diabetic ulcer of toe of left foot associated with type 2 diabetes mellitus, limited to breakdown of skin (H)   ? Dizziness   ? Syncope, unspecified syncope type   ? Pressure injury of left heel, stage 3 (H)       ASSESSMENT:  Dry gangrene to left 4th toe, possible underlying osteomyelitis.  MRI not entirely clear.  Not septic currently.  No spreading redness.  Takes place in the context of likely poor arterial flow.     He has prior arterial duplex with multiphasic waveforms down from the left iliac into the popliteal and then monophasic flow into the tibials.  He likely would benefit from an angiogram prior to a toe amputation.     PLAN:  He likely would benefit from an angiogram prior to a toe amputation.  This is scheduled for 7/9/21 with Dr. Salvador.  There is not ascending cellulitis.  The wounds look very  dry.  Due to scheduling this is the earliest we can do it now unless he develops signs of a more acute infectious process that make the case more urgent.  Able to wiggle the toes to command.  Of note, has had prior arterial bypass surgery bilaterally though native vessels are open on US done around 6 months ago.  New arterial duplex ordered.  Would place on abx suppression with bactrim or Augmentin at discharge until we can get the angiogram done and podiatry can then do an amputation.    Sotero Mccray MD, [facility]  Vascular Surgery Fellow

## 2021-07-09 NOTE — H&P (VIEW-ONLY)
H&P (View-Only) by Sotero Mccray MD at 6/16/2021  3:47 PM     Author: Sotero Mccray MD Service: -- Author Type: Resident    Filed: 6/16/2021  3:59 PM Date of Service: 6/16/2021  3:47 PM Status: Signed    : Sotero Mccray MD (Resident)       [facility] PATIENT CONSULTATION NOTE  Consulted by: Podiatry  Reason for consultation: Dry gangrene to left 4th toe, possible underlying osteomyelitis    HPI:  Alexei Blake is a 81 y.o. year old male who has a PMH significant for CAD, ESRD on dialysis and diabetes with HgA1c of 7.1 who has had several weeks of dry gangrene to the left 4th toe and a very superficial heel abrasion.  He recently had acute cholecystitis and required a perc melissa tube given his close proximity to his heart catheterization.  In the last several months also had ischemic colitis requiring surgical resection with ostomy creation.  He has prior arterial duplex with multiphasic waveforms down from the left iliac into the popliteal and then monophasic flow into the tibials.  He likely would benefit from an angiogram prior to a toe amputation.  This is scheduled for 7/9/21 with Dr. Salvador.  There is not ascending cellulitis.  The wounds look very dry.  Due to scheduling this is the earliest we can do it now unless he develops signs of a more acute infectious process that make the case more urgent.  Able to wiggle the toes to command.  Of note, has had prior arterial bypass surgery bilaterally though native vessels are open on US done around 6 months ago.  New arterial duplex ordered.     Review Of Systems:  General: Denies F/C  Respiratory: Denies SOB  Cardio: Denies CP  Gastrointestinal: Denies N/V  Genitourinary: ESRD, on dialysis, does not make much urine  Musculoskeletal: See HPI  Neurologic: Denies HA  Psychiatric: Denies confusion  Hematology/immunology: no unexpected bruising    PAST MEDICAL HISTORY:  Past Medical History:   Diagnosis Date   ? Abscess of tongue    ?  Chronic kidney disease     CKD stage 4,dialysis    ? Diabetes (H)     type 2   ? DVT (deep venous thrombosis) (H)    ? End stage renal disease (H)    ? Hernia, umbilical    ? History of transfusion    ? Hyperlipidemia    ? Hypertension    ? CATHLEEN (obstructive sleep apnea)     uses CPAP   ? PVD (peripheral vascular disease) (H)    ? Renal insufficiency    ? Stroke (H)     minor left sided weakness       PAST SURGICAL HISTORY:  Past Surgical History:   Procedure Laterality Date   ? ARTERIAL BYPASS SURGERY Bilateral     lower extremities, Dr. Cottrell   ? CV CORONARY ANGIOGRAM N/A 2021    Procedure: Coronary Angiogram;  Surgeon: Tuyet Arreguin MD;  Location: [facility] Cardiac Cath Lab;  Service: Cardiology   ? CV LEFT HEART CATHETERIZATION WO LEFT VETRICULOGRAM Left 2021    Procedure: Left Heart Catheterization Without Left Ventriculogram;  Surgeon: Tuyet Arreguin MD;  Location: [facility] Cardiac Cath Lab;  Service: Cardiology   ? IR CHOLECYSTOSTOMY  2021   ? TOE AMPUTATION Bilateral     multiple   ? UMBILICAL HERNIA REPAIR N/A 2016    Procedure: OPEN UMBILICAL REPAIR WITH MESH;  Surgeon: Jevon Rivas MD;  Location: [facility] Main OR;  Service:    ? US THORACENTESIS  2021       FAMILY HISTORY:  Family History   Problem Relation Age of Onset   ? Hypertension Mother    ? Hypertension Father    ? Diabetes Father    ? Heart attack Father        SOCIAL HISTORY:   Social History     Tobacco Use   ? Smoking status: Former Smoker     Packs/day: 4.00     Quit date: 1995     Years since quittin.4   ? Smokeless tobacco: Never Used   Substance Use Topics   ? Alcohol use: No       TOBACCO USE:  Former smoker    HOME MEDICATIONS:  Prior to Admission medications    Medication Sig Start Date End Date Taking? Authorizing Provider   acetaminophen (TYLENOL) 500 MG tablet Take 500 mg by mouth every 4 (four) hours as needed (or sleep).    Yes PROVIDER, HISTORICAL   amino acids-protein  hydrolys (PROSOURCE NO CARB) 15-60 gram-kcal/30 mL LiPk Take 1 packet by mouth 2 (two) times a day.   Yes PROVIDER, HISTORICAL   aspirin 81 MG EC tablet Take 1 tablet (81 mg total) by mouth daily. 1/23/21  Yes Jose Carpio MD   atorvastatin (LIPITOR) 80 MG tablet Take 1 tablet (80 mg total) by mouth at bedtime. 2/4/21  Yes Cherri Restrepo NP   B complex with C 20-folic acid (RENAL CAPS) 1 mg cap capsule Take 1 capsule by mouth daily.   Yes PROVIDER, HISTORICAL   calcium, as carbonate, (TUMS) 200 mg calcium (500 mg) chewable tablet Chew 500 mg 2 (two) times a day.   Yes PROVIDER, HISTORICAL   clopidogreL (PLAVIX) 75 mg tablet Take 75 mg by mouth daily.   Yes PROVIDER, HISTORICAL   fluticasone propionate (FLONASE ALLERGY RELIEF) 50 mcg/actuation nasal spray 2 sprays into each nostril daily. 4/26/21  Yes PROVIDER, HISTORICAL   isosorbide dinitrate (ISORDIL) 5 MG tablet Take 5 mg by mouth 3 (three) times a day.   Yes PROVIDER, HISTORICAL   lactose-reduced food (BOOST BREEZE NUTRITIONAL ORAL) Take by mouth daily with lunch.   Yes PROVIDER, HISTORICAL   metoprolol tartrate (LOPRESSOR) 25 MG tablet Take 25 mg by mouth 2 (two) times a day. Give 25 mg by mouth two times a day every Mon, Wed, Fri, Sun for A fib/CAD   Yes PROVIDER, HISTORICAL   nitroglycerin (NITROSTAT) 0.4 MG SL tablet Place 1 tablet (0.4 mg total) under the tongue every 5 (five) minutes as needed for chest pain. May repeat every 5 minutes for a maximum of 3 doses in 15 minutes. 3/19/21  Yes Tuyet Arreguin MD   ondansetron (ZOFRAN) 4 MG tablet Take 4 mg by mouth 2 (two) times a day before meals. And prn q day.    Yes PROVIDER, HISTORICAL   oxyCODONE (ROXICODONE) 5 MG immediate release tablet Take 5 mg by mouth every 6 (six) hours as needed. 4/26/21  Yes PROVIDER, HISTORICAL   pantoprazole (PROTONIX) 40 MG tablet Take 40 mg by mouth daily. 4/26/21  Yes PROVIDER, HISTORICAL   warfarin ANTICOAGULANT (COUMADIN/JANTOVEN) 3 MG tablet Take 3 mg by mouth See  "Admin Instructions. 3 mg by mouth daily   Yes PROVIDER, HISTORICAL   ACCU-CHEK KYLE PLUS TEST STRP strips USE  STRIP TO CHECK GLUCOSE 2 TO 3 TIMES DAILY AS DIRECTED AT  6AM  AMD  4PM 3/24/20   Jesus Medrano MD   cefdinir (OMNICEF) 300 MG capsule Give 1 tab on Monday. This completes his course. 6/13/21   Jaguar Arevalo MBBS   NOVOLOG FLEXPEN U-100 INSULIN 100 unit/mL (3 mL) injection pen Give subcutaneous 3x daily before meals according to attached sliding scale. 6/10/21   Padmini Vásquez MD   ondansetron (ZOFRAN) 4 MG tablet Take 1 tablet (4 mg total) by mouth every 8 (eight) hours as needed for nausea. 6/10/21   Padmini Vásquez MD       VITAL SIGNS:  /60 (Patient Position: Lying)   Pulse 74   Temp 96.7  F (35.9  C) (Axillary)   Resp 18   Ht 6' 2\" (1.88 m)   Wt 186 lb 9.6 oz (84.6 kg)   SpO2 97%   BMI 23.96 kg/m      Intake/Output Summary (Last 24 hours) at 6/16/2021 1547  Last data filed at 6/16/2021 1455  Gross per 24 hour   Intake 1200 ml   Output 1450 ml   Net -250 ml       Labs:  HgA1c of 7.1    PHYSICAL EXAM:  Constitutional: alert, no acute distress and cooperative   Cardiovascular: RRR  Respiratory: CTAB anteriorly, breathing unlabored without secondary muscle use  Psychiatric: mentation appears normal and affect normal/bright  Neck: no asymmetry  GI/Abdomen: Abdomen soft, non-tender. Ostomy pink and viable appearing.  MSK: able to wiggle toes to command.  Extremities: dry gangrene to left 4th toe and superficial abrasion to left heel  Hematology: no bruising on visible skin    IMAGING:  New Arterial duplex pending.    He has prior arterial duplex with multiphasic waveforms down from the left iliac into the popliteal and then monophasic flow into the tibials.  He likely would benefit from an angiogram prior to a toe amputation.     Patient Active Problem List   Diagnosis   ? Dyslipidemia, goal LDL below 70   ? Type 2 diabetes mellitus (H)   ? Essential hypertension with goal blood " pressure less than 130/85   ? PVD (peripheral vascular disease) (H)   ? Stroke (H)   ? Adjustment disorder with depressed mood   ? ESRD (end stage renal disease) on dialysis (H)   ? Paroxysmal atrial fibrillation (H)   ? Pneumonia   ? NSTEMI (non-ST elevated myocardial infarction) (H)   ? Ischemic cardiomyopathy   ? LBBB (left bundle branch block)   ? CAD (coronary artery disease)   ? Status post ileostomy (H)   ? Nausea   ? Acute chest pain   ? Pleural effusion   ? History of coronary artery disease   ? Shortness of breath   ? Cholecystitis   ? Demand ischemia (H)   ? Diabetic ulcer of toe of left foot associated with type 2 diabetes mellitus, limited to breakdown of skin (H)   ? Dizziness   ? Syncope, unspecified syncope type   ? Pressure injury of left heel, stage 3 (H)       ASSESSMENT:  Dry gangrene to left 4th toe, possible underlying osteomyelitis.  MRI not entirely clear.  Not septic currently.  No spreading redness.  Takes place in the context of likely poor arterial flow.     He has prior arterial duplex with multiphasic waveforms down from the left iliac into the popliteal and then monophasic flow into the tibials.  He likely would benefit from an angiogram prior to a toe amputation.     PLAN:  He likely would benefit from an angiogram prior to a toe amputation.  This is scheduled for 7/9/21 with Dr. Salvador.  There is not ascending cellulitis.  The wounds look very dry.  Due to scheduling this is the earliest we can do it now unless he develops signs of a more acute infectious process that make the case more urgent.  Able to wiggle the toes to command.  Of note, has had prior arterial bypass surgery bilaterally though native vessels are open on US done around 6 months ago.  New arterial duplex ordered.  Would place on abx suppression with bactrim or Augmentin at discharge until we can get the angiogram done and podiatry can then do an amputation.    Sotero Mccray MD, [facility]  Vascular Surgery  Fellow

## 2021-07-11 PROBLEM — I73.9 PVD (PERIPHERAL VASCULAR DISEASE) (H): Status: ACTIVE | Noted: 2021-01-01

## 2021-07-11 PROBLEM — I63.9 STROKE (H): Status: ACTIVE | Noted: 2021-01-01

## 2021-07-12 PROBLEM — E11.9 DIABETES MELLITUS, TYPE 2 (H): Status: ACTIVE | Noted: 2021-01-01

## 2021-07-12 PROBLEM — N18.5 CKD (CHRONIC KIDNEY DISEASE) STAGE 5, GFR LESS THAN 15 ML/MIN (H): Status: ACTIVE | Noted: 2021-01-01

## 2021-07-12 NOTE — LETTER
7/12/2021        RE: Alexei Blake  [address] [place] [address]        [facility]  [facility] Medical Record Number:  [id number]  Place of Service where encounter took place:  [facility] (TCU) [[id number]]  Chief Complaint   Patient presents with     RECHECK   CC: hypotension, angiogram follow up, ulcer, therapy progress.     HPI:  Alexei is a 81 y.o. male with pmh of ESRD on dialysis three times weekly, CVA with residual left sided weakness, htn, who presented to the ED with acute onset of abdominal pain during dialysis.  He had ischemic changes of the right colon and was brought to the OR for colonic resection and ileostomy on 331.  He also had an ileostomy revision on 4/21.  He is in the TCU for medical management, wound management for the abdominal wound secondary to repair, and therapy for strengthening.    He has dialysis on Tuesday, Thursday and Saturdays.  For his anemia, will be starting Epogen at hemodialysis.     Patient was hospitalized from 6/2 - 6/4 for pneumonia with focal left lobe atelectasis with  moderate to large left pleural effusion. During hospitalization patient as treated with IV  Zosyn and developed full body itching before medication was stopped.  Patient returned to the TCU on 6/4 and was sent back to [facility] on 6/6  because of abdominal pain. Abdominal CT revealed three 1.2 cm walled pancreatic head  cysts. Patient's lipase was elevated at 134 and WBC 13.1. He was treated for Acute  cholecystitis with a cholecystostomy tube performed by IR. It was discovered during his  second hospitalization the left pleural effusion from previous hospitalization 6/2-6/4 had  increased and required a left lung thoracentesis on 6/7, resulting in 750 ml of serous fluid  being pulled off, but no growth in culture present per hospital discharge note.  Recommended to have repeat chest x-ray 2 weeks post-discharge.  During hospitalization MRI was performed  on his left foot due to ulcers on the 4 th and 5 th  toes and heel - imaging was suggestive of osteomyelitis. Per notes vascular surgery  recommending angiogram before surgery, which will be performed outpatient. Patient  discharged on Bactrim once daily x 30 days- this was apparently switched to Augementin per second discharge note date 6/17.   Per hospital notes patient was originally supposed to be discharged on 6/12 but while  patient was sitting up in his wheelchair to be discharge, he experienced a syncopal  episode requiring rapid respond. All work-up, including MRI and carotid ultrasound, were  inconclusive in explaining syncopal event. Recommendation by cardiology is to follow-  up outpatient.   We discussed mutliple appointments coming up including angiogram, possible I/D versus amputation of diabetic toe osteomyelitis, possible cholectomy in 3-4 months.     Alexei Blake  is a 81 year old (1940), who is being seen today for an episodic care visit.  HPI information obtained from: facility staff and patient report.     Today's concern is: Hypotension, diabetes type 2, follow-up angiogram.  Blood pressures have improved to 120s to 130s over 70s.  He is reporting improvement in the dizziness and weakness.  Is able to's participate in therapy.  Is wearing a protective boot to the left foot for so he is able to transfer on the foot.  He has another appointment on 14th for IR to view percutaneous Mahi tube which is draining yellow fluid today.  His weight is back to baseline around 190 pounds.    Past Medical and Surgical History reviewed in Epic today.    MEDICATIONS:    Current Outpatient Medications   Medication Sig Dispense Refill     acetaminophen (TYLENOL) 500 MG tablet Take 500 mg by mouth every 4 hours as needed for mild pain       aspirin 81 MG EC tablet Take 81 mg by mouth daily       atorvastatin (LIPITOR) 80 MG tablet Take 80 mg by mouth every evening       calcium carbonate (TUMS) 500 MG  "chewable tablet Take 1 chew tab by mouth 2 times daily       clopidogrel (PLAVIX) 75 MG tablet Take 75 mg by mouth daily       fluticasone (FLONASE) 50 MCG/ACT nasal spray Spray 2 sprays into both nostrils daily       isosorbide dinitrate (ISORDIL) 5 MG tablet Take 1 tablet (5 mg) by mouth 3 times daily       metoprolol tartrate (LOPRESSOR) 25 MG tablet Take 1 tablet (25 mg) by mouth 2 times daily       multivitamin RENAL (NEPHROCAPS/TRIPHROCAPS) 1 MG capsule Take 1 capsule by mouth daily       nitroGLYcerin (NITROSTAT) 0.4 MG sublingual tablet Place 0.4 mg under the tongue every 5 minutes as needed for chest pain For chest pain place 1 tablet under the tongue every 5 minutes for 3 doses. If symptoms persist 5 minutes after 1st dose call 911.       oxyCODONE (ROXICODONE) 5 MG tablet Take 1 tablet (5 mg) by mouth every 6 hours as needed for moderate to severe pain 30 tablet 0     pantoprazole (PROTONIX) 40 MG EC tablet Take 1 tablet (40 mg) by mouth every morning (before breakfast)       warfarin ANTICOAGULANT (COUMADIN) 5 MG tablet Take 0.5-1 tablets (2.5-5 mg) by mouth daily  0       REVIEW OF SYSTEMS:  4 point ROS including Respiratory, CV, GI and , other than that noted in the HPI,  is negative    Objective:  BP (!) 154/69   Pulse 66   Temp 96.3  F (35.7  C)   Resp 20   Ht 1.854 m (6' 1\")   Wt 85.8 kg (189 lb 3.2 oz)   SpO2 92%   BMI 24.96 kg/m    Exam:  Physical Exam   General appearance: alert, appears stated age and cooperative.   Head: Normocephalic, without obvious abnormality, atraumatic, Eyes: sclera anicteric.  Lungs: respirations without effort.   ABDOMEN: Globular and soft, non tender.  Ileostomy bag draining loose brown stool.  Mahi ostomy draining yellow fluid.   Extremities: extremities normal, atraumatic, no cyanosis or edema.   Skin: Skin color, texture, turgor normal. No rashes or lesions  Neurologic:oriented. No focal deficits.   Psych: interacts well with caregivers, exhibits logical " thought processes and connections, pleasant    Labs:   Labs done in SNF are in [place] EPIC. Please refer to them using [facility] Everywhere.    ASSESSMENT/PLAN:    Acute cholecystitis: Discovered upon re-hospitalization on 6/6 when patient began to  experience right sided abdominal pain. Cholecystostomy tube placed on 6/7 by IR.  -Cholecystostomy tube - nursing continues to monitor output and tube site.  -To have a repeat cholangiogram on July 14.   -To follow-up with surgery clinic in 3-4 months to discuss potential  cholecystectomy surgery.     Left pleural effusion: Thoracentesis was performed on 6/7 resulting in 750 ml of fluid  being pulled off. Cultures showed no bacteria growth.  Follow-up chest x-ray with resolving infiltrate, no pleural fluid.  Lung sounds clear.      Osteomyelitis - 4 th and 5 th left toes and left heel: Has had improvement with participation in therpes.  Has been up to the chair in his room.  -Completed angiogram on 7/9.   -Currently receiving Augmentin once daily x 30 days d/t osteomyelitis  suppression  -Continue current wound care tx. To left foot.     Dialysis related hypotension  Syncopal episode during hospitalization: Unstable.  Improvement with pressures with systolics in the 130s.  Feeling improvement in weakness.  -Continue to encourage fluids continue.  -Follow-up with outpatient cardiology.     Status post ileostomy: brown, yellow stool in bag.  -Pantoprazole 40 mg daily.  -Tylenol 500 mg every 4 hours as needed.     End-stage renal disease  Anemia of chronic disease  -On Epogen at dialysis.  -Has dialysis Tuesday, Thursday, Saturday.  -Liberalize diet to general diet due to poor po intake.  Dialysis monitoring.  -Renal caps 1 mg daily.     CAD  A. Fib  History of CVA  -Atorvastatin 80 mg at bedtime.  -Aspirin 81 daily.  On hold for procedure on 7/14.  -Clopidogrel 75 daily.  On hold for procedure on 7/14.  -Isosorbide 5 mg 3 times a day.  -Nitroglycerin SL 0.4 mg PRN    -Current Coumadin 4 mg. ON HOLD due to procedure on 7/14     Type II DM: A1c 7.1, unstable. blood sugars ranging between 140 - 300.  - Lantus 5 units at HS  - Novolog sliding scale  - BG checks TID     Allergic rhinitis:  -Flonase 2 sprays daily.     Disposition: Likely going to return to his home where he lives with his wife.     Electronically signed by: Candie Mcrae CNP                  Sincerely,        Candie Mcrae CNP

## 2021-07-12 NOTE — TELEPHONE ENCOUNTER
Discussed with wife about scheduling stress test and appt with Dr. Salvador. She was ok for writer to arrange and leave  on details.     Writer left  that stress test is set up for 7/16/21 @11:45 [facility]. Patient will then see Dr. Salvador next week to discuss results and surgery.

## 2021-07-12 NOTE — PROGRESS NOTES
[facility]  [facility] Record Number:  [id number]  Place of Service where encounter took place:  [facility] (TCU) [[id number]]  Chief Complaint   Patient presents with     RECHECK     Pre-Op Exam   CC: hypotension, angiogram follow up, ulcer, therapy progress. Pre op for cholangiogram.     HPI:  Alexei is a 81 y.o. male with pmh of ESRD on dialysis three times weekly, CVA with residual left sided weakness, htn, who presented to the ED with acute onset of abdominal pain during dialysis.  He had ischemic changes of the right colon and was brought to the OR for colonic resection and ileostomy on 331.  He also had an ileostomy revision on 4/21.  He is in the TCU for medical management, wound management for the abdominal wound secondary to repair, and therapy for strengthening.    He has dialysis on Tuesday, Thursday and Saturdays.  For his anemia, will be starting Epogen at hemodialysis.     Patient was hospitalized from 6/2 - 6/4 for pneumonia with focal left lobe atelectasis with  moderate to large left pleural effusion. During hospitalization patient as treated with IV  Zosyn and developed full body itching before medication was stopped.  Patient returned to the TCU on 6/4 and was sent back to [facility] on 6/6  because of abdominal pain. Abdominal CT revealed three 1.2 cm walled pancreatic head  cysts. Patient's lipase was elevated at 134 and WBC 13.1. He was treated for Acute  cholecystitis with a cholecystostomy tube performed by IR. It was discovered during his  second hospitalization the left pleural effusion from previous hospitalization 6/2-6/4 had  increased and required a left lung thoracentesis on 6/7, resulting in 750 ml of serous fluid  being pulled off, but no growth in culture present per hospital discharge note.  Recommended to have repeat chest x-ray 2 weeks post-discharge.  During hospitalization MRI was performed on his left foot due to ulcers on the 4 th and 5  th  toes and heel - imaging was suggestive of osteomyelitis. Per notes vascular surgery  recommending angiogram before surgery, which will be performed outpatient. Patient  discharged on Bactrim once daily x 30 days- this was apparently switched to Augementin per second discharge note date 6/17.   Per hospital notes patient was originally supposed to be discharged on 6/12 but while  patient was sitting up in his wheelchair to be discharge, he experienced a syncopal  episode requiring rapid respond. All work-up, including MRI and carotid ultrasound, were  inconclusive in explaining syncopal event. Recommendation by cardiology is to follow-  up outpatient.   We discussed mutliple appointments coming up including angiogram, possible I/D versus amputation of diabetic toe osteomyelitis, possible cholectomy in 3-4 months.     Alexei Blake  is a 81 year old (1940), who is being seen today for an episodic care visit.  HPI information obtained from: facility staff and patient report.     Today's concern is: Hypotension, diabetes type 2, follow-up angiogram.  Blood pressures have improved to 120s to 130s over 70s.  He is reporting improvement in the dizziness and weakness.  Is able to's participate in therapy.  Is wearing a protective boot to the left foot for so he is able to transfer on the foot.  He has another appointment on 14th for Cholangiogram; bag with yellow fluid draining.   INR <2; coumadin, asa, plavix currently on hold per request of surgeon.   Hold lantus on 7/13. Hold sliding scale insulin morning of 7/14.  His weight is back to baseline around 190 pounds.    Past Medical and Surgical History reviewed in Epic today.    MEDICATIONS:    Current Outpatient Medications   Medication Sig Dispense Refill     acetaminophen (TYLENOL) 500 MG tablet Take 500 mg by mouth every 4 hours as needed for mild pain       aspirin 81 MG EC tablet Take 81 mg by mouth daily       atorvastatin (LIPITOR) 80 MG tablet Take 80  "mg by mouth every evening       calcium carbonate (TUMS) 500 MG chewable tablet Take 1 chew tab by mouth 2 times daily       clopidogrel (PLAVIX) 75 MG tablet Take 75 mg by mouth daily       fluticasone (FLONASE) 50 MCG/ACT nasal spray Spray 2 sprays into both nostrils daily       isosorbide dinitrate (ISORDIL) 5 MG tablet Take 1 tablet (5 mg) by mouth 3 times daily       metoprolol tartrate (LOPRESSOR) 25 MG tablet Take 1 tablet (25 mg) by mouth 2 times daily       multivitamin RENAL (NEPHROCAPS/TRIPHROCAPS) 1 MG capsule Take 1 capsule by mouth daily       nitroGLYcerin (NITROSTAT) 0.4 MG sublingual tablet Place 0.4 mg under the tongue every 5 minutes as needed for chest pain For chest pain place 1 tablet under the tongue every 5 minutes for 3 doses. If symptoms persist 5 minutes after 1st dose call 911.       oxyCODONE (ROXICODONE) 5 MG tablet Take 1 tablet (5 mg) by mouth every 6 hours as needed for moderate to severe pain 30 tablet 0     pantoprazole (PROTONIX) 40 MG EC tablet Take 1 tablet (40 mg) by mouth every morning (before breakfast)       warfarin ANTICOAGULANT (COUMADIN) 5 MG tablet Take 0.5-1 tablets (2.5-5 mg) by mouth daily  0       REVIEW OF SYSTEMS:  4 point ROS including Respiratory, CV, GI and , other than that noted in the HPI,  is negative    Objective:  BP (!) 154/69   Pulse 66   Temp 96.3  F (35.7  C)   Resp 20   Ht 1.854 m (6' 1\")   Wt 85.8 kg (189 lb 3.2 oz)   SpO2 92%   BMI 24.96 kg/m    Exam:  Physical Exam   General appearance: alert, appears stated age and cooperative.   Head: Normocephalic, without obvious abnormality, atraumatic, Eyes: sclera anicteric.  Lungs: respirations without effort.   ABDOMEN: Globular and soft, non tender.  Ileostomy bag draining loose brown stool.  Mahi ostomy draining yellow fluid.   Extremities: extremities normal, atraumatic, no cyanosis or edema.   Skin: Skin color, texture, turgor normal. No rashes or lesions  Neurologic:oriented. No focal " deficits.   Psych: interacts well with caregivers, exhibits logical thought processes and connections, pleasant    Labs:   Labs done in SNF are in [place] EPIC. Please refer to them using [employer]/Care Everywhere.    ASSESSMENT/PLAN:    Acute cholecystitis: Discovered upon re-hospitalization on 6/6 when patient began to  experience right sided abdominal pain. Cholecystostomy tube placed on 6/7 by IR.  -Cholecystostomy tube - nursing continues to monitor output and tube site.  -To have a repeat cholangiogram on July 14.   -HOLD coumadin, plavix and asa July 10-14.   -Hold evening lantus on 7/13.   -Do not give sliding scale novolog morning dose on 7/14, pre procedure.  -NPO after MN on 7/14.   -To follow-up with surgery clinic in 3-4 months to discuss potential  cholecystectomy surgery.     Left pleural effusion: Thoracentesis was performed on 6/7 resulting in 750 ml of fluid  being pulled off. Cultures showed no bacteria growth.  Follow-up chest x-ray with resolving infiltrate, no pleural fluid.  Lung sounds clear.      Osteomyelitis - 4 th and 5 th left toes and left heel: Has had improvement with participation in [employer].  Has been up to the chair in his room.  -Completed angiogram on 7/9.   -Currently receiving Augmentin once daily x 30 days d/t osteomyelitis  suppression  -Continue current wound care tx. To left foot.     Dialysis related hypotension  Syncopal episode during hospitalization: Unstable.  Improvement with pressures with systolics in the 130s.  Feeling improvement in weakness.  -Continue to encourage fluids continue.  -Follow-up with outpatient cardiology.     Status post ileostomy: brown, yellow stool in bag.  -Pantoprazole 40 mg daily.  -Tylenol 500 mg every 4 hours as needed.     End-stage renal disease  Anemia of chronic disease  -On Epogen at dialysis.  -Has dialysis Tuesday, Thursday, Saturday.  -Liberalize diet to general diet due to poor po intake.  Dialysis monitoring.  -Renal caps 1 mg  daily.     CAD  A. Fib  History of CVA  -Atorvastatin 80 mg at bedtime.  -Aspirin 81 daily.  On hold for procedure on 7/14.  -Clopidogrel 75 daily.  On hold for procedure on 7/14.  -Isosorbide 5 mg 3 times a day.  -Nitroglycerin SL 0.4 mg PRN   -Current Coumadin 4 mg. ON HOLD due to procedure on 7/14     Type II DM: A1c 7.1, unstable. blood sugars ranging between 140 - 300.  - Lantus 5 units at HS  - Novolog sliding scale  - BG checks TID     Allergic rhinitis:  -Flonase 2 sprays daily.     Disposition: Likely going to return to his home where he lives with his wife.     Electronically signed by: Candie Mcrae CNP

## 2021-07-12 NOTE — TELEPHONE ENCOUNTER
Wife called back stating that pt is residing at [facility]. Appointments and prep also needs to be relayed to them. Called and spoke to Petra at [facility] ([phone]). Relayed info to her and also faxed over the appointment sheet.

## 2021-07-14 PROBLEM — I50.31 ACUTE DIASTOLIC CHF (CONGESTIVE HEART FAILURE) (H): Status: RESOLVED | Noted: 2021-01-01 | Resolved: 2021-01-01

## 2021-07-14 PROBLEM — I16.0 HYPERTENSIVE URGENCY: Status: RESOLVED | Noted: 2020-01-01 | Resolved: 2020-01-01

## 2021-07-14 PROBLEM — I50.20 HEART FAILURE WITH REDUCED EJECTION FRACTION, NYHA CLASS II (H): Status: RESOLVED | Noted: 2021-01-01 | Resolved: 2021-01-01

## 2021-07-14 NOTE — PRE-PROCEDURE
GENERAL PRE-PROCEDURE:   Procedure:  Cholangiogram  Date/Time:  7/14/2021 8:28 AM    Written consent obtained?: Yes    Risks and benefits: Risks, benefits and alternatives were discussed    Consent given by:  Patient  Patient states understanding of procedure being performed: Yes    Patient's understanding of procedure matches consent: Yes    Procedure consent matches procedure scheduled: Yes    Expected level of sedation:  Moderate  Appropriately NPO:  Yes  ASA Class:  Class 3- Severe systemic disease, definite functional limitations  Mallampati  :  Grade 2- soft palate, base of uvula, tonsillar pillars, and portion of posterior pharyngeal wall visible  Lungs:  Lungs clear with good breath sounds bilaterally  Heart:  Normal heart sounds and rate  History & Physical reviewed:  History and physical reviewed and no updates needed  Statement of review:  I have reviewed the lab findings, diagnostic data, medications, and the plan for sedation

## 2021-07-14 NOTE — PROVIDER NOTIFICATION
Patient was escorted to car via wheelchair. Spouse transport to [facility]. Patient verbalized understanding of discharge instruction.

## 2021-07-14 NOTE — LETTER
7/14/2021        RE: Alexei Blake  [address] [place] [address]        Progress Notes by Candie Mcrae CNP at 6/30/2021 12:25 PM     Author: Candie Mcrae CNP Service: -- Author Type: Nurse Practitioner    Filed: 7/8/2021 10:28 AM Encounter Date: 6/30/2021 Status: Signed    : Cadnie Mcrae CNP (Nurse Practitioner)       [facility]    Facility:   [facility] [[id number]]   Code Status: POLST AVAILABLE      CHIEF COMPLAINT/REASON FOR VISIT:  Chief Complaint   Patient presents with     Problem Visit       HISTORY:      HPI: Alexei is a 81 y.o. male with pmh of ESRD on dialysis three times weekly, CVA with residual left sided weakness, htn, who presented to the ED with acute onset of abdominal pain during dialysis.  He had ischemic changes of the right colon and was brought to the OR for colonic resection and ileostomy on 331.  He also had an ileostomy revision on 4/21.  He is in the TCU for medical management, wound management for the abdominal wound secondary to repair, and therapy for strengthening.    He has dialysis on Tuesday, Thursday and Saturdays.  For his anemia, will be starting Epogen at hemodialysis.    Patient was hospitalized from 6/2 - 6/4 for pneumonia with focal left lobe atelectasis with  moderate to large left pleural effusion. During hospitalization patient as treated with IV  Zosyn and developed full body itching before medication was stopped.  Patient returned to the TCU on 6/4 and was sent back to [facility] on 6/6  because of abdominal pain. Abdominal CT revealed three 1.2 cm walled pancreatic head  cysts. Patient s lipase was elevated at 134 and WBC 13.1. He was treated for Acute  cholecystitis with a cholecystostomy tube performed by IR. It was discovered during his  second hospitalization the left pleural effusion from previous hospitalization 6/2-6/4 had  increased and required a left lung  thoracentesis on 6/7, resulting in 750 ml of serous fluid  being pulled off, but no growth in culture present per hospital discharge note.  Recommended to have repeat chest x-ray 2 weeks post-discharge.  During hospitalization MRI was performed on his left foot due to ulcers on the 4 th and 5 th  toes and heel - imaging was suggestive of osteomyelitis. Per notes vascular surgery  recommending angiogram before surgery, which will be performed outpatient. Patient  discharged on Bactrim once daily x 30 days- this was apparently switched to Augementin per second discharge note date 6/17.   Per hospital notes patient was originally supposed to be discharged on 6/12 but while  patient was sitting up in his wheelchair to be discharge, he experienced a syncopal  episode requiring rapid respond. All work-up, including MRI and carotid ultrasound, were  inconclusive in explaining syncopal event. Recommendation by cardiology is to follow-  up outpatient.    Today: Seen today to follow-up regarding multiple appointments.  He had a cholangiogram this morning and reported that it went well, they readjusted the tube and he will be seen again in another 4 weeks.  He had a left leg angiogram for future surgery for osteomyelitis.  Per nurse manager, entire procedure was unable to be performed so he is scheduled for another follow-up.  We will continue his Augmentin until he is seen by podiatry surgeon at the end of the month.  He is otherwise doing well, he is participating out a bit more in therapies.  Does have some obvious forgetfulness and continually needs reminding on who I am and what are we have talked about despite frequently seeing him.    Past Medical History:   Diagnosis Date     Abscess of tongue      Chronic kidney disease     CKD stage 4,dialysis [facility]     Diabetes (H)     type 2     DVT (deep venous thrombosis) (H)      End stage renal disease (H)      Hernia, umbilical      History of transfusion       "Hyperlipidemia      Hypertension      CATHLEEN (obstructive sleep apnea)     uses CPAP     PVD (peripheral vascular disease) (H)      Renal insufficiency      Stroke (H) 2003    minor left sided weakness             Family History   Problem Relation Age of Onset     Hypertension Mother      Hypertension Father      Diabetes Father      Heart attack Father      Social History     Socioeconomic History     Marital status:      Spouse name: Not on file     Number of children: 3     Years of education: Not on file     Highest education level: Not on file   Occupational History     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 [employer]   Social Needs     Financial resource strain: Not on file     Food insecurity     Worry: Not on file     Inability: Not on file     Transportation needs     Medical: Not on file     Non-medical: Not on file   Tobacco Use     Smoking status: Former Smoker     Packs/day: 4.00     Quit date: 1995     Years since quittin.5     Smokeless tobacco: Never Used   Substance and Sexual Activity     Alcohol use: No     Drug use: No     Sexual activity: Not on file   Lifestyle     Physical activity     Days per week: Not on file     Minutes per session: Not on file     Stress: Not on file   Relationships     Social connections     Talks on phone: Not on file     Gets together: Not on file     Attends Orthodox service: Not on file     Active member of club or organization: Not on file     Attends meetings of clubs or organizations: Not on file     Relationship status: Not on file     Intimate partner violence     Fear of current or ex partner: Not on file     Emotionally abused: Not on file     Physically abused: Not on file     Forced sexual activity: Not on file   Other Topics Concern     Not on file   Social History Narrative    3/20/21: \"Bill\" is here in the ED with his wife today.         Review of Systems   Constitutional: Negative.    HENT: Negative.    Respiratory: Negative.    Cardiovascular: " "Negative.    Gastrointestinal: Negative for abdominal distention, abdominal pain and nausea.        Mild tenderness where melissa drain is placed.    Genitourinary: Negative.    Musculoskeletal: Negative.    Skin: Positive for wound.   Neurological: Positive for weakness.       Vitals:    06/30/21 1225   BP: 119/66   Pulse: 69   Resp: 18   Temp: 97.8  F (36.6  C)   SpO2: 97%   Weight: 176 lb 8 oz (80.1 kg)   Height: 6' 1\" (1.854 m)       Physical Exam  Constitutional:       Appearance: Normal appearance. He is normal weight. He is ill-appearing.   HENT:      Head: Atraumatic.      Mouth/Throat:      Mouth: Mucous membranes are moist.   Eyes:      General: No scleral icterus.     Extraocular Movements: Extraocular movements intact.   Cardiovascular:      Rate and Rhythm: Normal rate and regular rhythm.   Pulmonary:      Effort: Pulmonary effort is normal. No respiratory distress.      Breath sounds: No wheezing.      Comments: Diminished bilaterally   Abdominal:      General: Abdomen is flat. There is no distension.      Palpations: Abdomen is soft.      Tenderness: There is no abdominal tenderness.      Comments: Iliostomy, melissa tube R side.    Musculoskeletal: Normal range of motion.      Right lower leg: No edema.      Left lower leg: No edema.      Comments: AV fistula to RUE.      Skin:     General: Skin is warm and dry.   Neurological:      General: No focal deficit present.   Psychiatric:         Mood and Affect: Mood normal.         Behavior: Behavior normal.           LABS:   Reviewed.     ASSESSMENT:      ICD-10-CM    1. Type 2 diabetes mellitus with diabetic chronic kidney disease, unspecified CKD stage, unspecified whether long term insulin use (H)  E11.22    2. ESRD (end stage renal disease) on dialysis (H)  N18.6     Z99.2    3. Osteomyelitis of ankle and foot (H)  M86.9        PLAN:      Acute cholecystitis: Discovered upon re-hospitalization on 6/6 when patient began to  experience right sided " abdominal pain. Cholecystostomy tube placed on 6/7 by IR.  -Cholecystostomy tube - nursing continues to monitor output and tube site.  -Completed repeat Choleangiogram this morning; follows up again in 6 weeks.  -To follow-up with surgery clinic in 3-4 months to discuss potential  cholecystectomy surgery.    Left pleural effusion: Thoracentesis was performed on 6/7 resulting in 750 ml of fluid  being pulled off. Cultures showed no bacteria growth.  Follow-up chest x-ray with resolving infiltrate, no pleural fluid.    Osteomyelitis - 4 th and 5 th left toes and left heel:    -Completed angiogram however apparently the full procedure was unable to be performed.  Will have follow-up for further recommendations.  - Currently receiving Augmentin; continue until follows up with podiatry/surgeon.  - Continue current wound care tx. To left foot.    Dialysis related hypotension  Syncopal episode during hospitalization: Unstable.  Mild improvement with pressures with systolics in the 130s.  Feeling improvement in weakness.  Stress test on Friday 7/16.  -Continue to encourage fluids continue.  - To have follow-up with cardiology appointment.     Status post ileostomy: dark stool in bag.  -Pantoprazole 40 mg daily.  -Oxycodone to every 6 hours as needed.  -Tylenol 500 mg every 4 hours as needed.     End-stage renal disease  Anemia of chronic disease  -On Epogen at dialysis.    -Has dialysis Tuesday, Thursday, Saturday.  -Liberalize diet to general diet due to poor po intake.  Dialysis monitoring.  -Renal caps 1 mg daily.     CAD  A. Fib  History of CVA: Has stress test on Friday, requesting evaluation for whether or not he should take beta-blocker prior to this.  Per Society recommendations and up-to-date, no need to hold beta-blocker prior to stress test.  -Continue metoprolol twice daily on nondialysis days.  -Atorvastatin 80 mg at bedtime.  -Aspirin 81 daily.  -Clopidogrel 75 daily.  -Isosorbide 5 mg 3 times a  day.  -Nitroglycerin SL 0.4 mg PRN   -Current Coumadin 4 mg.     Type II DM: A1c 7.1, unstable blood sugars ranging between 140 - 300.  Elevated blood sugars more often in the afternoon.  Mild improvement.  - Lantus 5 units at HS  - Novolog sliding scale  - BG checks TID     Allergic rhinitis:  -Flonase 2 sprays daily.     Disposition: Likely going to return to his home where he lives with his wife.      Electronically signed by: Candie Mcrea CNP                 Sincerely,        Candie Mcrae, CNP

## 2021-07-14 NOTE — H&P
Interventional Radiology - Pre-Procedure Note:  7/14/2021    Procedure Requested: Cholangiogram    Requested by: Donte Sanchez MD    History and Physical Reviewed: H&P documented within 30 days (by Tran Lemon, CNP on 6/17/2021 ).  I have personally reviewed the patient's medical history and have updated the medical record as necessary.    Brief HPI: Alexei Blake is a 81 year old male with history of A fib (on Warfarin), CAD s/p recent stenting, ischemic colitis s/p hemicolectomy and ileostomy, HD dependent ESRD, CVA with hemiparesis, HTN, PAD admitted 6/6/21 with abdominal pain. Labs notable for WBC of 13.1, troponin elevation (0.8->2.1->2.6). CT with gallbladder distention, gallstones and wall thickening, pericholecystic fluid consistent with acute cholecystitis. Surgery consulted, due to recent cardiac stenting, antiplatelet and anticoagulation, cholecystostomy tube placement requested. Discussed Troponin elevation with Dr. Fink - cardiology suspects some demand ischemia due to infection. Sharda tube placed 6/7. Recently had cardiac stents placed, in anticoagulants, not a surgical candidate at present.           IMAGING:  [facility]  LOCATION: [facility]  DATE: 6/7/2021     PROCEDURE:  1. IMAGE GUIDED PERCUTANEOUS CHOLECYSTOSTOMY TUBE PLACEMENT  2. MODERATE SEDATION     INTERVENTIONAL RADIOLOGIST: Donte Sanchez MD     INDICATION: Patient is an 81-year-old male the history of acute cholecystitis in need of percutaneous cholecystostomy.     CONSENT: The risks, benefits and alternatives of percutaneous cholecystostomy were discussed with the patient  in detail. All questions were answered. Informed consent was given to proceed with the procedure.     MODERATE SEDATION: Versed 2 mg IV; Fentanyl 100 mcg IV. During the time out, immediately prior to the administration of medications, the patient was reassessed for adequacy to receive conscious sedation.  Under  physician supervision, Versed and fentanyl      were administered for moderate sedation. Pulse oximetry, heart rate and blood pressure were continuously monitored by an independent trained observer. The physician spent 15 minutes of face-to-face sedation time with the patient.     CONTRAST: 10 cc of Omnipaque  ANTIBIOTICS: None.  ADDITIONAL MEDICATIONS: None.     FLUOROSCOPIC TIME: 1.3 minutes.  RADIATION DOSE: Air Kerma: 21 mGy.     COMPLICATIONS: No immediate complications.     STERILE BARRIER TECHNIQUE: Maximum sterile barrier technique was used. Cutaneous antisepsis was performed at the operative site with application of 2% chlorhexidine and large sterile drape. Prior to the procedure, the  and assistant performed   hand hygiene and wore hat, mask, sterile gown, and sterile gloves during the entire procedure.     PROCEDURE:  Informed consent was obtained from the patient the patient's family. A limited preliminary ultrasound was performed. The patient's right upper abdomen was sterilely prepped and draped. A timeout was performed. After giving local anesthesia, a 22 gauge   Chiba needle  was advanced from a lateral low intercostal approach toward the gallbladder across a small portion of the liver, under ultrasound guidance, with a recorded ultrasound image obtained. The gallbladder was successfully entered on the first   pass. Thick  plaque bile was aspirated, with a sample sent for culture. A small contrast injection confirmed positioning within the gallbladder.     A 0.018 inch wire was advanced through the needle into the gallbladder lumen and the needle exchanged for an AccuStick dilator system. This was then exchanged over a 0.035 inch wire  for a 10 Estonian locking loop drainage catheter. Additional bile was   aspirated, for a total of approximately 40 ml. Approximately 10 ml of dilute contrast was injected. Further contrast was not injected at this time so as to not overly distend the  gallbladder.     The catheter was secured to the skin at the exit site with a 2-0 nylon stitch and a sterile dressing applied. The tube was left connected to a bag for external gravity drainage. The patient appeared to tolerate the procedure well.     FINDINGS:  Limited preliminary ultrasound showed a distended gallbladder with pericholecystic fluid and wall thickening. A permanent ultrasound image of the gallbladder was obtained. After the tube was placed, a cholecystogram demonstrated appropriate positioning   of the cholecystostomy tube within the gallbladder.       IMPRESSION:  Uncomplicated image guided placement of an 10 Kazakh percutaneous cholecystostomy drainage catheter. Catheter was attached to a gravity bag for drainage. Patient should return to interventional radiology in 4 to 6 weeks for cholangiogram.    NPO: MN  ANTICOAGULANTS: Aspirin, Plavix  ANTIBIOTICS: Rocephin during IR procedure if tube exchanged    ALLERGIES  Allergies   Allergen Reactions     Blood Transfusion Related (Informational Only) Other (See Comments)     Patient has a history of a clinically significant antibody against RBC antigens.  A delay in compatible RBCs may occur.     Blood-Group Specific Substance      Anti-K present. Expect delays in blood for transfusion.  Draw 2 lavender top tubes for type and screen orders.      Calcitriol Rash     Ciprofloxacin Rash         LABS:  INR   Date Value Ref Range Status   06/03/2021 1.92 (H) 0.90 - 1.10 Final   04/26/2021 1.79 (H) 0.86 - 1.14 Final      Hemoglobin   Date Value Ref Range Status   06/03/2021 11.1 (L) 14.0 - 18.0 g/dL Final   04/21/2021 8.5 (L) 13.3 - 17.7 g/dL Final   ]  Platelet Count   Date Value Ref Range Status   06/03/2021 194 140 - 440 thou/uL Final   04/25/2021 168 150 - 450 10e9/L Final     Creatinine   Date Value Ref Range Status   07/09/2021 4.45 (H) 0.70 - 1.30 mg/dL Final   04/26/2021 5.59 (H) 0.66 - 1.25 mg/dL Final     Potassium   Date Value Ref Range Status  "  06/03/2021 5.5 (H) 3.5 - 5.0 mmol/L Final   04/26/2021 4.3 3.4 - 5.3 mmol/L Final         EXAM:  /59 (BP Location: Left arm)   Pulse 71   Temp 97.7  F (36.5  C) (Oral)   Resp 16   Ht 1.88 m (6' 2\")   Wt 84.8 kg (187 lb)   SpO2 99%   BMI 24.01 kg/m    General:  Stable.  In no acute distress.    Neuro:  A&O x 3. Moves all extremities equally.  Resp:  Lungs clear to auscultation bilaterally.  Cardio:  S1S2 and reg, without murmur, clicks or rubs  Skin:  Without excoriations, ecchymosis, erythema, lesions or open sores on abdomen  .    Pre-Sedation Assessment:  Mallampati Airway Classification:  II - Faucial pillars and soft palate may be seen, but uvula is masked by the base of the tongue  Previous reaction to anesthesia/sedation:  No  Sedation plan based on assessment: Moderate (conscious) sedation  ASA Classification: Class 3 - SEVERE SYSTEMIC DISEASE, DEFINITE FUNCTIONAL LIMITATIONS.   Code Status: Full Code intra procedure, per discussion with patient.         ASSESSMENT/PLAN:   Alexei Blkae is a 81 year old male with history of CT with gallbladder distention, gallstones and wall thickening, pericholecystic fluid consistent with acute cholecystitis. Sharda tube placed 6/7. Recently had cardiac stents placed, in anticoagulants, not a surgical candidate at present.        Proceed with cholangiogram    Procedure, risks/benefits, details, alternatives, and sedation reviewed with patient and he verbalized understanding. All questions answered. OK to proceed with above radiology procedure.     Skylar Varela, CNP  Interventional Radiology  "

## 2021-07-16 PROBLEM — M86.69: Status: ACTIVE | Noted: 2021-01-01

## 2021-07-16 NOTE — PROGRESS NOTES
Progress Notes by Candie Mcrae CNP at 6/30/2021 12:25 PM     Author: Candie Mcrae CNP Service: -- Author Type: Nurse Practitioner    Filed: 7/8/2021 10:28 AM Encounter Date: 6/30/2021 Status: Signed    : Candie Mcrae CNP (Nurse Practitioner)       [facility]    Facility:   [facility] [[id number]]   Code Status: POLST AVAILABLE      CHIEF COMPLAINT/REASON FOR VISIT:  Chief Complaint   Patient presents with     Problem Visit       HISTORY:      HPI: Alexei is a 81 y.o. male with pmh of ESRD on dialysis three times weekly, CVA with residual left sided weakness, htn, who presented to the ED with acute onset of abdominal pain during dialysis.  He had ischemic changes of the right colon and was brought to the OR for colonic resection and ileostomy on 331.  He also had an ileostomy revision on 4/21.  He is in the TCU for medical management, wound management for the abdominal wound secondary to repair, and therapy for strengthening.    He has dialysis on Tuesday, Thursday and Saturdays.  For his anemia, will be starting Epogen at hemodialysis.    Patient was hospitalized from 6/2 - 6/4 for pneumonia with focal left lobe atelectasis with  moderate to large left pleural effusion. During hospitalization patient as treated with IV  Zosyn and developed full body itching before medication was stopped.  Patient returned to the TCU on 6/4 and was sent back to [facility] on 6/6  because of abdominal pain. Abdominal CT revealed three 1.2 cm walled pancreatic head  cysts. Patient s lipase was elevated at 134 and WBC 13.1. He was treated for Acute  cholecystitis with a cholecystostomy tube performed by IR. It was discovered during his  second hospitalization the left pleural effusion from previous hospitalization 6/2-6/4 had  increased and required a left lung thoracentesis on 6/7, resulting in 750 ml of serous fluid  being pulled off, but no growth in culture  present per hospital discharge note.  Recommended to have repeat chest x-ray 2 weeks post-discharge.  During hospitalization MRI was performed on his left foot due to ulcers on the 4 th and 5 th  toes and heel - imaging was suggestive of osteomyelitis. Per notes vascular surgery  recommending angiogram before surgery, which will be performed outpatient. Patient  discharged on Bactrim once daily x 30 days- this was apparently switched to Augementin per second discharge note date 6/17.   Per hospital notes patient was originally supposed to be discharged on 6/12 but while  patient was sitting up in his wheelchair to be discharge, he experienced a syncopal  episode requiring rapid respond. All work-up, including MRI and carotid ultrasound, were  inconclusive in explaining syncopal event. Recommendation by cardiology is to follow-  up outpatient.    Today: Seen today to follow-up regarding multiple appointments.  He had a cholangiogram this morning and reported that it went well, they readjusted the tube and he will be seen again in another 4 weeks.  He had a left leg angiogram for future surgery for osteomyelitis.  Per nurse manager, entire procedure was unable to be performed so he is scheduled for another follow-up.  We will continue his Augmentin until he is seen by podiatry surgeon at the end of the month.  He is otherwise doing well, he is participating out a bit more in therapies.  Does have some obvious forgetfulness and continually needs reminding on who I am and what are we have talked about despite frequently seeing him.    Past Medical History:   Diagnosis Date     Abscess of tongue      Chronic kidney disease     CKD stage 4,dialysis [facility]     Diabetes (H)     type 2     DVT (deep venous thrombosis) (H)      End stage renal disease (H)      Hernia, umbilical      History of transfusion      Hyperlipidemia      Hypertension      CATHLEEN (obstructive sleep apnea)     uses CPAP     PVD (peripheral vascular  "disease) (H)      Renal insufficiency      Stroke (H)     minor left sided weakness             Family History   Problem Relation Age of Onset     Hypertension Mother      Hypertension Father      Diabetes Father      Heart attack Father      Social History     Socioeconomic History     Marital status:      Spouse name: Not on file     Number of children: 3     Years of education: Not on file     Highest education level: Not on file   Occupational History     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 [employer]   Social Needs     Financial resource strain: Not on file     Food insecurity     Worry: Not on file     Inability: Not on file     Transportation needs     Medical: Not on file     Non-medical: Not on file   Tobacco Use     Smoking status: Former Smoker     Packs/day: 4.00     Quit date: 1995     Years since quittin.5     Smokeless tobacco: Never Used   Substance and Sexual Activity     Alcohol use: No     Drug use: No     Sexual activity: Not on file   Lifestyle     Physical activity     Days per week: Not on file     Minutes per session: Not on file     Stress: Not on file   Relationships     Social connections     Talks on phone: Not on file     Gets together: Not on file     Attends Zoroastrianism service: Not on file     Active member of club or organization: Not on file     Attends meetings of clubs or organizations: Not on file     Relationship status: Not on file     Intimate partner violence     Fear of current or ex partner: Not on file     Emotionally abused: Not on file     Physically abused: Not on file     Forced sexual activity: Not on file   Other Topics Concern     Not on file   Social History Narrative    3/20/21: \"Bill\" is here in the ED with his wife today.         Review of Systems   Constitutional: Negative.    HENT: Negative.    Respiratory: Negative.    Cardiovascular: Negative.    Gastrointestinal: Negative for abdominal distention, abdominal pain and nausea.        Mild " "tenderness where melissa drain is placed.    Genitourinary: Negative.    Musculoskeletal: Negative.    Skin: Positive for wound.   Neurological: Positive for weakness.       Vitals:    06/30/21 1225   BP: 119/66   Pulse: 69   Resp: 18   Temp: 97.8  F (36.6  C)   SpO2: 97%   Weight: 176 lb 8 oz (80.1 kg)   Height: 6' 1\" (1.854 m)       Physical Exam  Constitutional:       Appearance: Normal appearance. He is normal weight. He is ill-appearing.   HENT:      Head: Atraumatic.      Mouth/Throat:      Mouth: Mucous membranes are moist.   Eyes:      General: No scleral icterus.     Extraocular Movements: Extraocular movements intact.   Cardiovascular:      Rate and Rhythm: Normal rate and regular rhythm.   Pulmonary:      Effort: Pulmonary effort is normal. No respiratory distress.      Breath sounds: No wheezing.      Comments: Diminished bilaterally   Abdominal:      General: Abdomen is flat. There is no distension.      Palpations: Abdomen is soft.      Tenderness: There is no abdominal tenderness.      Comments: Iliostomy, melissa tube R side.    Musculoskeletal: Normal range of motion.      Right lower leg: No edema.      Left lower leg: No edema.      Comments: AV fistula to RUE.      Skin:     General: Skin is warm and dry.   Neurological:      General: No focal deficit present.   Psychiatric:         Mood and Affect: Mood normal.         Behavior: Behavior normal.           LABS:   Reviewed.     ASSESSMENT:      ICD-10-CM    1. Type 2 diabetes mellitus with diabetic chronic kidney disease, unspecified CKD stage, unspecified whether long term insulin use (H)  E11.22    2. ESRD (end stage renal disease) on dialysis (H)  N18.6     Z99.2    3. Osteomyelitis of ankle and foot (H)  M86.9        PLAN:      Acute cholecystitis: Discovered upon re-hospitalization on 6/6 when patient began to  experience right sided abdominal pain. Cholecystostomy tube placed on 6/7 by IR.  -Cholecystostomy tube - nursing continues to monitor " output and tube site.  -Completed repeat Choleangiogram this morning; follows up again in 6 weeks.  -To follow-up with surgery clinic in 3-4 months to discuss potential  cholecystectomy surgery.    Left pleural effusion: Thoracentesis was performed on 6/7 resulting in 750 ml of fluid  being pulled off. Cultures showed no bacteria growth.  Follow-up chest x-ray with resolving infiltrate, no pleural fluid.    Osteomyelitis - 4 th and 5 th left toes and left heel:    -Completed angiogram however apparently the full procedure was unable to be performed.  Will have follow-up for further recommendations.  - Currently receiving Augmentin; continue until follows up with podiatry/surgeon.  - Continue current wound care tx. To left foot.    Dialysis related hypotension  Syncopal episode during hospitalization: Unstable.  Mild improvement with pressures with systolics in the 130s.  Feeling improvement in weakness.  Stress test on Friday 7/16.  -Continue to encourage fluids continue.  - To have follow-up with cardiology appointment.     Status post ileostomy: dark stool in bag.  -Pantoprazole 40 mg daily.  -Oxycodone to every 6 hours as needed.  -Tylenol 500 mg every 4 hours as needed.     End-stage renal disease  Anemia of chronic disease  -On Epogen at dialysis.    -Has dialysis Tuesday, Thursday, Saturday.  -Liberalize diet to general diet due to poor po intake.  Dialysis monitoring.  -Renal caps 1 mg daily.     CAD  A. Fib  History of CVA: Has stress test on Friday, requesting evaluation for whether or not he should take beta-blocker prior to this.  Per Society recommendations and up-to-date, no need to hold beta-blocker prior to stress test.  -Continue metoprolol twice daily on nondialysis days.  -Atorvastatin 80 mg at bedtime.  -Aspirin 81 daily.  -Clopidogrel 75 daily.  -Isosorbide 5 mg 3 times a day.  -Nitroglycerin SL 0.4 mg PRN   -Current Coumadin 4 mg.     Type II DM: A1c 7.1, unstable blood sugars ranging between  140 - 300.  Elevated blood sugars more often in the afternoon.  Mild improvement.  - Lantus 5 units at HS  - Novolog sliding scale  - BG checks TID     Allergic rhinitis:  -Flonase 2 sprays daily.     Disposition: Likely going to return to his home where he lives with his wife.      Electronically signed by: Candie Mcrae CNP

## 2021-07-19 NOTE — Clinical Note
Natalia. Dr. Salvador needs to do a left pop to peroneal artery bypass on this patient. His stress test is abnormal. Looks like he is seeing you next week. Can you see if he can be cleared from a cardiac standpoint? I did put in an order for rapid access just in case. He's got a left heel wound that does not look good.

## 2021-07-19 NOTE — PROGRESS NOTES
VASCULAR SURGERY PROGRESS NOTE    VASCULAR SURGEON: Meghana Salvador MD, RPVI     LOCATION:  [facility]    Alexei Blake   Medical Record #:  [id number]  YOB: 1940  Age:  81 year old     Date of Service: 7/19/2021    PRIMARY CARE PROVIDER: Nolberto Sanz      Reason for visit follow-up  IMPRESSION: : 81-year-old male with a history of diabetes, end-stage renal disease comes for evaluation of left foot nonhealing wound.  Patient underwent left lower extremity angiogram which did show significant peripheral arterial disease with occlusion of the TP trunk, posterior tibial artery, and anterior tibial artery.  There was a constitution of the level of proximal peroneal artery.  There is also a lot of microvascular disease in the foot.  Unfortunately given the outflow this patient is not a candidate for endovascular intervention.  The only option would be to proceed with a short popliteal artery to peroneal artery bypass versus endarterectomy with a patch.  I explained to the patient that the chances of success are relatively low given the quality of tibial arteries as well as popliteal artery.  Patient would like to proceed with operation.  Patient does have a history of bilateral femoral distal bypasses with great saphenous vein.    RECOMMENDATION/RISKS:  We will proceed with left popliteal to peroneal artery bypass versus endarterectomy.  Patient has been cleared by cardiology.  We will obtain vein mapping to look for small saphenous veins.      HPI:  Alexei Blake is a 81 year old male who was seen today for follow-up.  Patient is doing overall well and denies any complaints.    REVIEW OF SYSTEMS:    A 12 point ROS was reviewed and is negative except for left foot nonhealing wound    PHH:    Past Medical History:   Diagnosis Date     Abscess of tongue      Chronic kidney disease     CKD stage 4,dialysis [facility]     Diabetes (H)     type 2     DVT (deep venous thrombosis) (H)      End  stage renal disease (H)      Hernia, umbilical      History of transfusion      Hyperlipidemia      Hypertension      CATHLEEN (obstructive sleep apnea)     uses CPAP     PVD (peripheral vascular disease) (H)      Renal insufficiency      Stroke (H) 2003    minor left sided weakness          Past Surgical History:   Procedure Laterality Date     AMPUTATE TOE(S) Bilateral     multiple     ARTERIAL BYPASS SURGERY Bilateral     lower extremities, Dr. Cottrell     CV CORONARY ANGIOGRAM N/A 1/20/2021    Procedure: Coronary Angiogram;  Surgeon: Tuyet Arreguin MD;  Location: [facility] Cardiac Cath Lab;  Service: Cardiology     CV LEFT HEART CATHETERIZATION WITHOUT LEFT VENTRICULOGRAM Left 1/20/2021    Procedure: Left Heart Catheterization Without Left Ventriculogram;  Surgeon: Tuyet Arreguin MD;  Location: [facility] Cardiac Cath Lab;  Service: Cardiology     HERNIA REPAIR, UMBILICAL N/A 4/29/2016    Procedure: OPEN UMBILICAL REPAIR WITH MESH;  Surgeon: Jevon Rivas MD;  Location: [facility] Main OR;  Service:      ILEOSTOMY N/A 4/21/2021    Procedure: Open ileostomy revision;  Surgeon: Ty Walker DO;  Location: [facility] OR     IR AV FISTULAGRAM  1/27/2017     IR CHOLECYSTOSTOMY  6/7/2021     IR CVC TUNNEL PLACEMENT > 5 YRS OF AGE  12/30/2015     IR EXTREMITY ANGIOGRAM LEFT  7/9/2021     IR GALLBLADDER DRAIN PLACEMENT  6/7/2021     IR LOWER EXTREMITY ANGIOGRAM LEFT  7/9/2021     LAPAROTOMY EXPLORATORY N/A 3/31/2021    Procedure: LAPAROTOMY, RIGHT HEMICOLECTOMY, ILEOSTOMY CREATION;  Surgeon: Harsh Santos MD;  Location: [facility] OR     US THORACENTESIS  6/7/2021       ALLERGIES:  Blood transfusion related (informational only), Blood-group specific substance, Calcitriol, Ciprofloxacin, and Zosyn    MEDS:    Current Outpatient Medications:      acetaminophen (TYLENOL) 500 MG tablet, Take 500 mg by mouth every 4 hours as needed for mild pain, Disp: , Rfl:      aspirin 81 MG EC tablet, Take 81 mg by mouth daily,  Disp: , Rfl:      atorvastatin (LIPITOR) 80 MG tablet, Take 80 mg by mouth every evening, Disp: , Rfl:      calcium carbonate (TUMS) 500 MG chewable tablet, Take 1 chew tab by mouth 2 times daily, Disp: , Rfl:      clopidogrel (PLAVIX) 75 MG tablet, Take 75 mg by mouth daily, Disp: , Rfl:      fluticasone (FLONASE) 50 MCG/ACT nasal spray, Spray 2 sprays into both nostrils daily, Disp: , Rfl:      isosorbide dinitrate (ISORDIL) 5 MG tablet, Take 1 tablet (5 mg) by mouth 3 times daily, Disp:  , Rfl:      metoprolol tartrate (LOPRESSOR) 25 MG tablet, Take 1 tablet (25 mg) by mouth 2 times daily, Disp: , Rfl:      multivitamin RENAL (NEPHROCAPS/TRIPHROCAPS) 1 MG capsule, Take 1 capsule by mouth daily, Disp: , Rfl:      nitroGLYcerin (NITROSTAT) 0.4 MG sublingual tablet, Place 0.4 mg under the tongue every 5 minutes as needed for chest pain For chest pain place 1 tablet under the tongue every 5 minutes for 3 doses. If symptoms persist 5 minutes after 1st dose call 911., Disp: , Rfl:      oxyCODONE (ROXICODONE) 5 MG tablet, Take 1 tablet (5 mg) by mouth every 6 hours as needed for moderate to severe pain, Disp: 30 tablet, Rfl: 0     pantoprazole (PROTONIX) 40 MG EC tablet, Take 1 tablet (40 mg) by mouth every morning (before breakfast), Disp:  , Rfl:      warfarin ANTICOAGULANT (COUMADIN) 5 MG tablet, Take 0.5-1 tablets (2.5-5 mg) by mouth daily, Disp: , Rfl: 0    SOCIAL HABITS:    History   Smoking Status     Former Smoker     Packs/day: 4.00     Quit date: 1/1/1995   Smokeless Tobacco     Never Used     Social History    Substance and Sexual Activity      Alcohol use: No      History   Drug Use No       FAMILY HISTORY:    Family History   Problem Relation Age of Onset     Diabetes Father      Hypertension Father      Heart Disease Father      Hypertension Mother      Coronary Artery Disease Father        PE:  /56   Pulse 72   Temp 97.7  F (36.5  C)   Wt 87.1 kg (192 lb)   BMI 25.33 kg/m    Wt Readings from  Last 1 Encounters:   07/19/21 87.1 kg (192 lb)     Body mass index is 25.33 kg/m .    EXAM:  GENERAL: This is a well-developed 81 year old male who appears his stated age  EYES: Grossly normal.  MOUTH: Buccal mucosa normal   CARDIAC: Normal   CHEST/LUNG: Clear to auscultation bilaterally  GASTROINTESINAL soft nontender nondistended  MUSCULOSKELETAL: Grossly normal and both lower extremities are intact.  HEME/LYMPH: No lymphedema  NEUROLOGIC: Focally intact, Alert and oriented x 3.   PSYCH: appropriate affect  INTEGUMENT: No open lesions or ulcers  Pulse Exam: Very faint signals present on the left.          DIAGNOSTIC STUDIES:     Images:  IR Lower Extremity Angiogram Left    Result Date: 7/9/2021  VASCULAR SURGERY ANGIOGRAM REPORT  [facility] DATE: July 9, 2021 PROCEDURES PERFORMED: 1.  Ultrasound-guided access of the right common femoral artery 2.  Placement of the catheter into the aorta and aortogram 3.  Selective cannulation of the left common iliac artery, external iliac artery, and common femoral artery with nonselective left lower extremity angiogram 4.  Interpretation of radiologic findings 5.  Moderate sedation PROVIDER: Meghana Salvador INDICATION: 81-year-old male who was evaluated for nonhealing wounds involving left foot.  Most recent noninvasive vascular studies did show uninterpretable toe pressures and ABIs due to significant calcifications.  The duplex did show relatively patent flow through the common femoral artery, SFA, and popliteal artery.  Flow below the knee becomes very monophasic and sluggish in tibial arteries.  We decided to proceed with left extremity angiogram with possible intervention.  Risk and benefits of this procedure were explained and patient agreed to proceed. SEDATION: The procedure was performed with administration of intravenous conscious sedation with appropriate preoperative, intraoperative, and postoperative evaluation. 30 minutes of supervised face to  face intraservice time was provided by a radiology nurse under my direct supervision.  Five 1 mg of Versed and 50 mcg of fentanyl were administered. ANTIBIOTICS: None FLUOROSCOPIC TIME: 2.9 minutes RADIATION DOSE: 112 mGy CONTRAST: 50 cc  PROCEDURE: Ultrasound was used to evaluate the right common femoral artery. The selected vessel was patent. Ultrasound was then used for real-time ultrasound guided needle entry of the right common femoral artery. Permanent images were recorded and saved to the patient's medical record.  Once the micropuncture sheath was inserted the glide advantage wire was advanced into the aorta.  5 [place] sheath was placed.  Omni Flush catheter was advanced over the wire into the aorta and positioned just below bilateral renal arteries.  Aortogram was performed.  Then using up and over maneuver the glide advantage wire was advanced into the left common femoral artery.  The Omni Flush catheter was advanced over the wire and positioned in the left common femoral artery.  Nonselective left lower extremity angiogram was performed.  The procedure was concluded.  The catheter was removed.  Over the wire we proceeded with a closure of the right common femoral artery using Angio-Seal device which was deployed without any difficulties.  Hemostasis was achieved, and patient was transferred to recovery area in stable condition. FINDINGS Infrarenal aorta is very calcified but without significant stenosis.  Left common iliac artery, external iliac artery, and common femoral artery are very calcified but without significant stenosis.  Left profunda femoris is patent without significant stenosis.  Left SFA is very calcified but without significant stenosis.  Left popliteal artery is patent without significant stenosis but very calcified.  Left anterior tibial artery is patent proximally only but occludes 2 cm distal to the takeoff without any reconstitution distally.  Left posterior tibial artery is occluded  without reconstitution distally.  Left TP trunk is occluded with reconstitution of the proximal peroneal artery which is the only vessel runoff.  Unfortunate there is no inline flow to the foot because peroneal artery branches out to very small collaterals. IMPRESSION No endovascular options are available at this point.  Even retrograde approach would be at high risk due to severe calcifications of the peroneal artery.  At this point the only option would be a surgical bypass operation which also has high risk of failure.  Would recommend cardiovascular work-up for this patient.  Patient also will need to be seen by interventional radiology for cholecystostomy tube removal.  We will see this patient in the clinic within next 2 to 3 weeks to discuss surgery in details. Meghana Salvador MD, [facility] VASCULAR SURGERY     NM Lexiscan stress test    Result Date: 7/16/2021     The nuclear stress test is abnormal.    Nuclear images demonstrate a small area of transmural infarction involving the distal inferior wall although specificity reduced given significant splanchnic attenuation.  No definite ischemia.    The left ventricular ejection fraction at stress is 65%.    A prior study was conducted on 12/28/2015.  No significant change noted.    The patient is at a low risk of future cardiac ischemic events.     IR Cholangiogram (Gallbladder Tube Check)    Result Date: 7/14/2021  LOCATION: [facility] DATE: 7/14/2021 PROCEDURE: CHOLANGIOGRAM THROUGH EXISTING TUBE. INTERVENTIONAL RADIOLOGIST: Maximino Rhodes MD INDICATION: Acute cholecystitis. Recent coronary artery intervention. Cholecystostomy tube placed on 6/7/2021. Routine follow-up. Patient does not report problems with the cholecystostomy tube.. CONSENT: The procedure, risks and benefits of a cholangiogram through existing catheter with possible intervention were discussed with the patient  in detail. All questions were answered. Informed consent was  given to proceed with the procedure. MODERATE SEDATION: None. CONTRAST: Omnipaque 350: 15 mL. ANTIBIOTICS: None. ADDITIONAL MEDICATIONS: None. FLUOROSCOPIC TIME: 1.3 minutes RADIATION DOSE: Air Kerma: 25 mGy COMPLICATIONS: No immediate complications. PROCEDURE:  images were obtained. The existing 10 Latvian cholecystostomy tube was injected with contrast, and multiple images were obtained. Tube was reconnected to gravity drainage. FINDINGS: Adequately positioned cholecystostomy tube within the gallbladder. Small filling defects within the gallbladder consistent with small gallstones. Small, nonobstructing gallstones within the cystic duct. Small filling defects within the common bile duct likely representing small calculi versus sludge. Normal caliber opacified biliary ducts. Slow flow of contrast into the duodenum.     IMPRESSION: 1.  Adequately positioned cholecystostomy tube. Cholelithiasis. Small gallstones within the cystic duct. Small common bile duct stones. PLAN: Gravity drainage. Cholangiogram with tube exchange in one month..           LABS:      Sodium   Date Value Ref Range Status   06/12/2021 130 (L) 136 - 145 mmol/L Final   06/10/2021 131 (L) 136 - 145 mmol/L Final   06/08/2021 134 (L) 136 - 145 mmol/L Final   04/26/2021 132 (L) 133 - 144 mmol/L Final   04/25/2021 133 133 - 144 mmol/L Final   04/24/2021 134 133 - 144 mmol/L Final     Urea Nitrogen   Date Value Ref Range Status   06/12/2021 32 (H) 8 - 28 mg/dL Final   06/10/2021 45 (H) 8 - 28 mg/dL Final   06/08/2021 51 (H) 8 - 28 mg/dL Final   04/26/2021 93 (H) 7 - 30 mg/dL Final   04/25/2021 73 (H) 7 - 30 mg/dL Final   04/24/2021 56 (H) 7 - 30 mg/dL Final     Hemoglobin   Date Value Ref Range Status   07/09/2021 11.1 (L) 14.0 - 18.0 g/dL Final   06/11/2021 11.4 (L) 14.0 - 18.0 g/dL Final   06/10/2021 10.3 (L) 14.0 - 18.0 g/dL Final   04/21/2021 8.5 (L) 13.3 - 17.7 g/dL Final   04/18/2021 8.2 (L) 13.3 - 17.7 g/dL Final   04/14/2021 9.4 (L) 13.3 -  17.7 g/dL Final     Platelet Count   Date Value Ref Range Status   07/09/2021 224 140 - 440 thou/uL Final   06/11/2021 176 140 - 440 thou/uL Final   06/10/2021 177 140 - 440 thou/uL Final   04/25/2021 168 150 - 450 10e9/L Final   04/21/2021 183 150 - 450 10e9/L Final   04/18/2021 185 150 - 450 10e9/L Final     BNP   Date Value Ref Range Status   06/02/2021 1,281 (H) 0 - 88 pg/mL Final   01/18/2021 >5,000 (H) 0 - 86 pg/mL Final   09/28/2019 1,345 (H) 0 - 84 pg/mL Final     INR   Date Value Ref Range Status   07/09/2021 1.72 (H) 0.90 - 1.10 Final   06/15/2021 2.10 (H) 0.90 - 1.10 Final   06/13/2021 1.76 (H) 0.90 - 1.10 Final   04/26/2021 1.79 (H) 0.86 - 1.14 Final   04/25/2021 1.41 (H) 0.86 - 1.14 Final   04/24/2021 1.35 (H) 0.86 - 1.14 Final       Total time spent 30minutes face to face with patient with more than 50% time spent in counseling and coordination of care.    Meghana Salvador MD, MD, [facility]  VASCULAR SURGERY

## 2021-07-19 NOTE — Clinical Note
[facility] patient. Patient needs left pop to peroneal bypass. Stress test abnormal. Order placed for rapid access for cardiac clearance. Still needs US vein mapping done.     ??Dialysis [facility] Tue-Thur- Sat??  Currently residing at [facility] [phone]

## 2021-07-19 NOTE — NURSING NOTE
[facility]        Patient is here for a  follow up  to discuss surgery    Pt is currently taking Aspirin, Statin, Plavix and Warfarin.    Patient's condition is stable.    There were no vitals taken for this visit.    The provider has been notified that the patient has no concerns.     Questions patient would like addressed today are: N/A.    Refills are needed: No    Has homecare services and agency name:  Yes: [facility]     At the end of the visit the patient was provided with education both verbally and on their AVS regarding surgery.        Laquita Cifuentes

## 2021-07-19 NOTE — PATIENT INSTRUCTIONS
Your stress test was abnormal. Please see a cardiologist due to see if you can be cleared to proceed with surgery. Once we get clearance, we will call to set up surgery.     Patient Education     Peripheral Artery Bypass Surgery  Surgery to bypass a blocked leg artery can ease your symptoms. The bypass is done with a special tube (graft) that re-directs blood around the blockage.  Attaching the graft  Peripheral bypass grafts carry blood from the femoral artery in your thigh to an artery further down your leg. During the surgery, a graft is stitched into the artery above and below your blockage. This creates a new passage for blood flow. The blocked section of your artery is not removed. After the graft is in place, your doctor closes the cuts (incisions) in your skin with stitches or staples.  Types of grafts    A vein from your own legs or arms can be used as a blood vessel graft. Blood vessel grafts often come from your own leg. Vein grafts work better in long leg blockages that start from your groin and extend below your knee.    Man made (synthetic) grafts are materials that your body easily accepts. These grafts work best on arteries at or above the knee.    Types of peripheral bypasses  The type of bypass depends on where your artery is blocked.    Risks    Bleeding or blood clots    Urgent need for surgery again if graft is blocked by clot or debris    Graft blockage    Heart attack or stroke    Breathing problems    Infection    Need for second bypass or surgery to remove dead tissue (amputation)    Nerve damage and numbness    Complications from anesthesia    Elio last reviewed this educational content on 11/1/2019 2000-2021 [employer]. All rights reserved. This information is not intended as a substitute for professional medical care. Always follow your healthcare professional's instructions.

## 2021-07-19 NOTE — LETTER
7/19/2021        RE: Alexei Blake  [address] [place] [address]        Progress Notes by Candie Mcrae CNP at 6/30/2021 12:25 PM     Author: Candie Mcrae CNP Service: -- Author Type: Nurse Practitioner    Filed: 7/8/2021 10:28 AM Encounter Date: 6/30/2021 Status: Signed    : Candie Mcrae CNP (Nurse Practitioner)       [facility]    Facility:   [facility] [[id number]]   Code Status: POLST AVAILABLE      CHIEF COMPLAINT/REASON FOR VISIT:  Chief Complaint   Patient presents with     Problem Visit       HISTORY:      HPI: Alexei is a 81 y.o. male with pmh of ESRD on dialysis three times weekly, CVA with residual left sided weakness, htn, who presented to the ED with acute onset of abdominal pain during dialysis.  He had ischemic changes of the right colon and was brought to the OR for colonic resection and ileostomy on 331.  He also had an ileostomy revision on 4/21.  He is in the TCU for medical management, wound management for the abdominal wound secondary to repair, and therapy for strengthening.    He has dialysis on Tuesday, Thursday and Saturdays.  For his anemia, will be starting Epogen at hemodialysis.    Patient was hospitalized from 6/2 - 6/4 for pneumonia with focal left lobe atelectasis with  moderate to large left pleural effusion. During hospitalization patient as treated with IV  Zosyn and developed full body itching before medication was stopped.  Patient returned to the TCU on 6/4 and was sent back to [facility] on 6/6  because of abdominal pain. Abdominal CT revealed three 1.2 cm walled pancreatic head  cysts. Patient s lipase was elevated at 134 and WBC 13.1. He was treated for Acute  cholecystitis with a cholecystostomy tube performed by IR. It was discovered during his  second hospitalization the left pleural effusion from previous hospitalization 6/2-6/4 had  increased and required a left lung  thoracentesis on 6/7, resulting in 750 ml of serous fluid  being pulled off, but no growth in culture present per hospital discharge note.  Recommended to have repeat chest x-ray 2 weeks post-discharge.  During hospitalization MRI was performed on his left foot due to ulcers on the 4 th and 5 th  toes and heel - imaging was suggestive of osteomyelitis. Per notes vascular surgery  recommending angiogram before surgery, which will be performed outpatient. Patient  discharged on Bactrim once daily x 30 days- this was apparently switched to Augementin per second discharge note date 6/17.   Per hospital notes patient was originally supposed to be discharged on 6/12 but while  patient was sitting up in his wheelchair to be discharge, he experienced a syncopal  episode requiring rapid respond. All work-up, including MRI and carotid ultrasound, were  inconclusive in explaining syncopal event. Recommendation by cardiology is to follow-  up outpatient.    Today: Seen today to follow-up regarding multiple appointments and vascular procedure. Had appointment with vascular this morning; received notice that he'll need to be cleared by cardiology prior to proceeding with L popliteal to peroneal artery bypass, however per Dr. York's note on 7/19, he has already been cleared. Patient is unable to give me specific details but reports that he will go ahead with the procedure.   We also discussed dispositoin. He would like to discharge home as soon as possible, however he continues to require assist with ADLs and transfers, and spend majority of the time here in bed. Encouraged him to continue to work with PT in order to be strong enough to safely discharge.     Past Medical History:   Diagnosis Date     Abscess of tongue      Chronic kidney disease     CKD stage 4,dialysis Tu-Th-Sa     Diabetes (H)     type 2     DVT (deep venous thrombosis) (H)      End stage renal disease (H)      Hernia, umbilical      History of transfusion       "Hyperlipidemia      Hypertension      CATHLEEN (obstructive sleep apnea)     uses CPAP     PVD (peripheral vascular disease) (H)      Renal insufficiency      Stroke (H) 2003    minor left sided weakness             Family History   Problem Relation Age of Onset     Hypertension Mother      Hypertension Father      Diabetes Father      Heart attack Father      Social History     Socioeconomic History     Marital status:      Spouse name: Not on file     Number of children: 3     Years of education: Not on file     Highest education level: Not on file   Occupational History     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 [employer]   Social Needs     Financial resource strain: Not on file     Food insecurity     Worry: Not on file     Inability: Not on file     Transportation needs     Medical: Not on file     Non-medical: Not on file   Tobacco Use     Smoking status: Former Smoker     Packs/day: 4.00     Quit date: 1995     Years since quittin.5     Smokeless tobacco: Never Used   Substance and Sexual Activity     Alcohol use: No     Drug use: No     Sexual activity: Not on file   Lifestyle     Physical activity     Days per week: Not on file     Minutes per session: Not on file     Stress: Not on file   Relationships     Social connections     Talks on phone: Not on file     Gets together: Not on file     Attends Hindu service: Not on file     Active member of club or organization: Not on file     Attends meetings of clubs or organizations: Not on file     Relationship status: Not on file     Intimate partner violence     Fear of current or ex partner: Not on file     Emotionally abused: Not on file     Physically abused: Not on file     Forced sexual activity: Not on file   Other Topics Concern     Not on file   Social History Narrative    3/20/21: \"Bill\" is here in the ED with his wife today.         Review of Systems   Constitutional: Negative.    HENT: Negative.    Respiratory: Negative.    Cardiovascular: " "Negative.    Gastrointestinal: Negative for abdominal distention, abdominal pain and nausea.        Mild tenderness where melissa drain is placed.    Genitourinary: Negative.    Musculoskeletal: Negative.    Skin: Positive for wound.   Neurological: Positive for weakness.       Vitals:    06/30/21 1225   BP: 119/66   Pulse: 69   Resp: 18   Temp: 97.8  F (36.6  C)   SpO2: 97%   Weight: 176 lb 8 oz (80.1 kg)   Height: 6' 1\" (1.854 m)       Physical Exam  Constitutional:       Appearance: Normal appearance. He is normal weight. He is ill-appearing.   HENT:      Head: Atraumatic.      Mouth/Throat:      Mouth: Mucous membranes are moist.   Eyes:      General: No scleral icterus.     Extraocular Movements: Extraocular movements intact.   Cardiovascular:      Rate and Rhythm: Normal rate and regular rhythm.   Pulmonary:      Effort: Pulmonary effort is normal. No respiratory distress.      Breath sounds: No wheezing.      Comments: Diminished bilaterally   Abdominal:      General: Abdomen is flat. There is no distension.      Palpations: Abdomen is soft.      Tenderness: There is no abdominal tenderness.      Comments: Iliostomy, melissa tube R side.    Musculoskeletal: Normal range of motion.      Right lower leg: No edema.      Left lower leg: No edema.      Comments: AV fistula to RUE.      Skin:     General: Skin is warm and dry.   Neurological:      General: No focal deficit present.   Psychiatric:         Mood and Affect: Mood normal.         Behavior: Behavior normal.           LABS:   Reviewed.     ASSESSMENT:      ICD-10-CM    1. Type 2 diabetes mellitus with diabetic chronic kidney disease, unspecified CKD stage, unspecified whether long term insulin use (H)  E11.22    2. ESRD (end stage renal disease) on dialysis (H)  N18.6     Z99.2    3. Osteomyelitis of ankle and foot (H)  M86.9        PLAN:      Acute cholecystitis: Discovered upon re-hospitalization on 6/6 when patient began to  experience right sided " "abdominal pain. Cholecystostomy tube placed on 6/7 by IR.  -Cholecystostomy tube - nursing continues to monitor output and tube site.  -Completed repeat Choleangiogram last week; follows up again in 4 weeks.   -To follow-up with surgery clinic in 3-4 months to discuss potential  cholecystectomy surgery.    Left pleural effusion: Thoracentesis was performed on 6/7 resulting in 750 ml of fluid  being pulled off. Cultures showed no bacteria growth.  Follow-up chest x-ray with resolving infiltrate, no pleural fluid.    Osteomyelitis - 4 th and 5 th left toes and left heel:    -Completed angiogram; will have left popliteal to peroneal artery bypass per Vascular appointment today.   - Currently receiving Augmentin; continue until follows up with podiatry/surgeon.  - Continue current wound care tx. To left foot.    Dialysis related hypotension  Syncopal episode during hospitalization: Unstable.  Had stress test on 7/16; per facility note the stress test was \"abnomral\" however per Vascular surgeon note 7/19, he has \"been cleared by cardiology\".   -Continue to encourage fluids continue.    Status post ileostomy: dark stool in bag.  -Pantoprazole 40 mg daily.  -Oxycodone to every 6 hours as needed.  -Tylenol 500 mg every 4 hours as needed.     End-stage renal disease  Anemia of chronic disease  -On Epogen at dialysis.    -Has dialysis Tuesday, Thursday, Saturday.  -Liberalize diet to general diet due to poor po intake.  Dialysis monitoring.  -Renal caps 1 mg daily.     CAD  A. Fib  History of CVA:   -Continue metoprolol twice daily on nondialysis days.  -Atorvastatin 80 mg at bedtime.  -Aspirin 81 daily.  -Clopidogrel 75 daily.  -Isosorbide 5 mg 3 times a day.  -Nitroglycerin SL 0.4 mg PRN   -Current Coumadin 4 mg.     Type II DM: A1c 7.1, unstable blood sugars ranging between 140 - 300.  Elevated blood sugars more often in the afternoon.  Mild improvement.  - Lantus 5 units at HS  - Novolog sliding scale  - BG checks " TID     Allergic rhinitis:  -Flonase 2 sprays daily.     Disposition: Discussed timeline today. Likely going to return to his home where he lives with his wife.      Electronically signed by: Candie Mcrae CNP                 Sincerely,        Candie Mcrae CNP

## 2021-07-21 NOTE — PROGRESS NOTES
Contact   Chart Review     Situation: Patient chart reviewed by .    Background: CC SW following while in TCU.     Assessment:   Patient is still in TCU. If patient is in TCU in one month, will remove from panel. Patient indicates he wants to discharge to home, but needs assistance with ADL's.     Plan/Recommendations: If patient is discharged, SW CC will call to assess needs. If in TCU in one month, close to CC.    Allison Rebollar,   [facility]  [phone]

## 2021-07-21 NOTE — PROGRESS NOTES
Progress Notes by Candie Mcrae CNP at 6/30/2021 12:25 PM     Author: Candie Mcrae CNP Service: -- Author Type: Nurse Practitioner    Filed: 7/8/2021 10:28 AM Encounter Date: 6/30/2021 Status: Signed    : Candie Mcrae CNP (Nurse Practitioner)       [facility]    Facility:   [facility] [[id number]]   Code Status: POLST AVAILABLE      CHIEF COMPLAINT/REASON FOR VISIT:  Chief Complaint   Patient presents with     Problem Visit       HISTORY:      HPI: Alexei is a 81 y.o. male with pmh of ESRD on dialysis three times weekly, CVA with residual left sided weakness, htn, who presented to the ED with acute onset of abdominal pain during dialysis.  He had ischemic changes of the right colon and was brought to the OR for colonic resection and ileostomy on 331.  He also had an ileostomy revision on 4/21.  He is in the TCU for medical management, wound management for the abdominal wound secondary to repair, and therapy for strengthening.    He has dialysis on Tuesday, Thursday and Saturdays.  For his anemia, will be starting Epogen at hemodialysis.    Patient was hospitalized from 6/2 - 6/4 for pneumonia with focal left lobe atelectasis with  moderate to large left pleural effusion. During hospitalization patient as treated with IV  Zosyn and developed full body itching before medication was stopped.  Patient returned to the TCU on 6/4 and was sent back to [facility] on 6/6  because of abdominal pain. Abdominal CT revealed three 1.2 cm walled pancreatic head  cysts. Patient s lipase was elevated at 134 and WBC 13.1. He was treated for Acute  cholecystitis with a cholecystostomy tube performed by IR. It was discovered during his  second hospitalization the left pleural effusion from previous hospitalization 6/2-6/4 had  increased and required a left lung thoracentesis on 6/7, resulting in 750 ml of serous fluid  being pulled off, but no growth in culture  present per hospital discharge note.  Recommended to have repeat chest x-ray 2 weeks post-discharge.  During hospitalization MRI was performed on his left foot due to ulcers on the 4 th and 5 th  toes and heel - imaging was suggestive of osteomyelitis. Per notes vascular surgery  recommending angiogram before surgery, which will be performed outpatient. Patient  discharged on Bactrim once daily x 30 days- this was apparently switched to Augementin per second discharge note date 6/17.   Per hospital notes patient was originally supposed to be discharged on 6/12 but while  patient was sitting up in his wheelchair to be discharge, he experienced a syncopal  episode requiring rapid respond. All work-up, including MRI and carotid ultrasound, were  inconclusive in explaining syncopal event. Recommendation by cardiology is to follow-  up outpatient.    Today: Seen today to follow-up regarding multiple appointments and vascular procedure. Had appointment with vascular this morning; received notice that he'll need to be cleared by cardiology prior to proceeding with L popliteal to peroneal artery bypass, however per Dr. York's note on 7/19, he has already been cleared. Patient is unable to give me specific details but reports that he will go ahead with the procedure.   We also discussed dispositoin. He would like to discharge home as soon as possible, however he continues to require assist with ADLs and transfers, and spend majority of the time here in bed. Encouraged him to continue to work with PT in order to be strong enough to safely discharge.     Past Medical History:   Diagnosis Date     Abscess of tongue      Chronic kidney disease     CKD stage 4,dialysis Tu-Th-Sa     Diabetes (H)     type 2     DVT (deep venous thrombosis) (H)      End stage renal disease (H)      Hernia, umbilical      History of transfusion      Hyperlipidemia      Hypertension      CATHLEEN (obstructive sleep apnea)     uses CPAP     PVD (peripheral  "vascular disease) (H)      Renal insufficiency      Stroke (H)     minor left sided weakness             Family History   Problem Relation Age of Onset     Hypertension Mother      Hypertension Father      Diabetes Father      Heart attack Father      Social History     Socioeconomic History     Marital status:      Spouse name: Not on file     Number of children: 3     Years of education: Not on file     Highest education level: Not on file   Occupational History     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 [employer]   Social Needs     Financial resource strain: Not on file     Food insecurity     Worry: Not on file     Inability: Not on file     Transportation needs     Medical: Not on file     Non-medical: Not on file   Tobacco Use     Smoking status: Former Smoker     Packs/day: 4.00     Quit date: 1995     Years since quittin.5     Smokeless tobacco: Never Used   Substance and Sexual Activity     Alcohol use: No     Drug use: No     Sexual activity: Not on file   Lifestyle     Physical activity     Days per week: Not on file     Minutes per session: Not on file     Stress: Not on file   Relationships     Social connections     Talks on phone: Not on file     Gets together: Not on file     Attends Buddhism service: Not on file     Active member of club or organization: Not on file     Attends meetings of clubs or organizations: Not on file     Relationship status: Not on file     Intimate partner violence     Fear of current or ex partner: Not on file     Emotionally abused: Not on file     Physically abused: Not on file     Forced sexual activity: Not on file   Other Topics Concern     Not on file   Social History Narrative    3/20/21: \"Bill\" is here in the ED with his wife today.         Review of Systems   Constitutional: Negative.    HENT: Negative.    Respiratory: Negative.    Cardiovascular: Negative.    Gastrointestinal: Negative for abdominal distention, abdominal pain and nausea.        " "Mild tenderness where melissa drain is placed.    Genitourinary: Negative.    Musculoskeletal: Negative.    Skin: Positive for wound.   Neurological: Positive for weakness.       Vitals:    06/30/21 1225   BP: 119/66   Pulse: 69   Resp: 18   Temp: 97.8  F (36.6  C)   SpO2: 97%   Weight: 176 lb 8 oz (80.1 kg)   Height: 6' 1\" (1.854 m)       Physical Exam  Constitutional:       Appearance: Normal appearance. He is normal weight. He is ill-appearing.   HENT:      Head: Atraumatic.      Mouth/Throat:      Mouth: Mucous membranes are moist.   Eyes:      General: No scleral icterus.     Extraocular Movements: Extraocular movements intact.   Cardiovascular:      Rate and Rhythm: Normal rate and regular rhythm.   Pulmonary:      Effort: Pulmonary effort is normal. No respiratory distress.      Breath sounds: No wheezing.      Comments: Diminished bilaterally   Abdominal:      General: Abdomen is flat. There is no distension.      Palpations: Abdomen is soft.      Tenderness: There is no abdominal tenderness.      Comments: Iliostomy, melissa tube R side.    Musculoskeletal: Normal range of motion.      Right lower leg: No edema.      Left lower leg: No edema.      Comments: AV fistula to RUE.      Skin:     General: Skin is warm and dry.   Neurological:      General: No focal deficit present.   Psychiatric:         Mood and Affect: Mood normal.         Behavior: Behavior normal.           LABS:   Reviewed.     ASSESSMENT:      ICD-10-CM    1. Type 2 diabetes mellitus with diabetic chronic kidney disease, unspecified CKD stage, unspecified whether long term insulin use (H)  E11.22    2. ESRD (end stage renal disease) on dialysis (H)  N18.6     Z99.2    3. Osteomyelitis of ankle and foot (H)  M86.9        PLAN:      Acute cholecystitis: Discovered upon re-hospitalization on 6/6 when patient began to  experience right sided abdominal pain. Cholecystostomy tube placed on 6/7 by IR.  -Cholecystostomy tube - nursing continues to " "monitor output and tube site.  -Completed repeat Choleangiogram last week; follows up again in 4 weeks.   -To follow-up with surgery clinic in 3-4 months to discuss potential  cholecystectomy surgery.    Left pleural effusion: Thoracentesis was performed on 6/7 resulting in 750 ml of fluid  being pulled off. Cultures showed no bacteria growth.  Follow-up chest x-ray with resolving infiltrate, no pleural fluid.    Osteomyelitis - 4 th and 5 th left toes and left heel:    -Completed angiogram; will have left popliteal to peroneal artery bypass per Vascular appointment today.   - Currently receiving Augmentin; continue until follows up with podiatry/surgeon.  - Continue current wound care tx. To left foot.    Dialysis related hypotension  Syncopal episode during hospitalization: Unstable.  Had stress test on 7/16; per facility note the stress test was \"abnomral\" however per Vascular surgeon note 7/19, he has \"been cleared by cardiology\".   -Continue to encourage fluids continue.    Status post ileostomy: dark stool in bag.  -Pantoprazole 40 mg daily.  -Oxycodone to every 6 hours as needed.  -Tylenol 500 mg every 4 hours as needed.     End-stage renal disease  Anemia of chronic disease  -On Epogen at dialysis.    -Has dialysis Tuesday, Thursday, Saturday.  -Liberalize diet to general diet due to poor po intake.  Dialysis monitoring.  -Renal caps 1 mg daily.     CAD  A. Fib  History of CVA:   -Continue metoprolol twice daily on nondialysis days.  -Atorvastatin 80 mg at bedtime.  -Aspirin 81 daily.  -Clopidogrel 75 daily.  -Isosorbide 5 mg 3 times a day.  -Nitroglycerin SL 0.4 mg PRN   -Current Coumadin 4 mg.     Type II DM: A1c 7.1, unstable blood sugars ranging between 140 - 300.  Elevated blood sugars more often in the afternoon.  Mild improvement.  - Lantus 5 units at HS  - Novolog sliding scale  - BG checks TID     Allergic rhinitis:  -Flonase 2 sprays daily.     Disposition: Discussed timeline today. Likely going to " return to his home where he lives with his wife.      Electronically signed by: Candie Mcrae CNP

## 2021-07-22 NOTE — PROGRESS NOTES
Clinic Care Coordination Contact  [facility]       Clinical Data: Care Coordinator Outreach  Outreach attempted x 1.  No answer or vm option   Care Coordinator will try to reach patient again in 1-2 business days.

## 2021-07-28 PROBLEM — M86.9 OSTEOMYELITIS OF ANKLE AND FOOT (H): Status: ACTIVE | Noted: 2021-01-01

## 2021-07-28 PROBLEM — L89.623 PRESSURE INJURY OF LEFT HEEL, STAGE 3 (H): Status: ACTIVE | Noted: 2021-01-01

## 2021-07-28 NOTE — H&P
Interventional Radiology - Pre-Procedure Note:  7/28/2021    Procedure Requested: Cholangiogram  Requested by: MIKEY Rhodes MD    Brief HPI: Alexei Blake is a 81 year old male Alexei Blake is a 81 year old male with history of A fib (on Warfarin), CAD s/p recent stenting, ischemic colitis s/p hemicolectomy and ileostomy, HD dependent ESRD, CVA with hemiparesis, HTN, PAD admitted 6/6/21 with acute cholecystitis. Surgery consulted, due to recent cardiac stenting, antiplatelet and anticoagulation, cholecystostomy tube placement requested. Recently had cardiac stents placed, in anticoagulants, not a surgical candidate at present.      IMAGING:  LOCATION: [facility]  DATE: 7/14/2021     PROCEDURE: CHOLANGIOGRAM THROUGH EXISTING TUBE.     INTERVENTIONAL RADIOLOGIST: Maximino Rhodes MD     INDICATION: Acute cholecystitis. Recent coronary artery intervention. Cholecystostomy tube placed on 6/7/2021. Routine follow-up. Patient does not report problems with the cholecystostomy tube..     CONSENT: The procedure, risks and benefits of a cholangiogram through existing catheter with possible intervention were discussed with the patient  in detail. All questions were answered. Informed consent was given to proceed with the procedure.     MODERATE SEDATION: None.     CONTRAST: Omnipaque 350: 15 mL.  ANTIBIOTICS: None.  ADDITIONAL MEDICATIONS: None.     PROCEDURE:  images were obtained. The existing 10 [place] cholecystostomy tube was injected with contrast, and multiple images were obtained. Tube was reconnected to gravity drainage.     FINDINGS: Adequately positioned cholecystostomy tube within the gallbladder. Small filling defects within the gallbladder consistent with small gallstones. Small, nonobstructing gallstones within the cystic duct. Small filling defects within the common   bile duct likely representing small calculi versus sludge. Normal caliber opacified biliary ducts. Slow flow of contrast into  the duodenum.                                                                      IMPRESSION:  1.  Adequately positioned cholecystostomy tube. Cholelithiasis. Small gallstones within the cystic duct. Small common bile duct stones.     PLAN: [place] drainage. Cholangiogram with tube exchange in one month..    NPO: 7 AM  ANTICOAGULANTS: ASA, Plavix  ANTIBIOTICS: None  ALLERGIES  Allergies   Allergen Reactions     Blood Transfusion Related (Informational Only) Other (See Comments)     Patient has a history of a clinically significant antibody against RBC antigens.  A delay in compatible RBCs may occur.     Blood-Group Specific Substance      Anti-K present. Expect delays in blood for transfusion.  Draw 2 lavender top tubes for type and screen orders.      Calcitriol Rash     Ciprofloxacin Rash     Zosyn Rash         LABS:  INR   Date Value Ref Range Status   07/09/2021 1.72 (H) 0.90 - 1.10 Final   04/26/2021 1.79 (H) 0.86 - 1.14 Final      Hemoglobin   Date Value Ref Range Status   07/09/2021 11.1 (L) 14.0 - 18.0 g/dL Final   04/21/2021 8.5 (L) 13.3 - 17.7 g/dL Final   ]  Platelet Count   Date Value Ref Range Status   07/09/2021 224 140 - 440 thou/uL Final   04/25/2021 168 150 - 450 10e9/L Final     Creatinine   Date Value Ref Range Status   07/09/2021 4.45 (H) 0.70 - 1.30 mg/dL Final   04/26/2021 5.59 (H) 0.66 - 1.25 mg/dL Final     Potassium   Date Value Ref Range Status   07/09/2021 3.7 3.5 - 5.0 mmol/L Final   04/26/2021 4.3 3.4 - 5.3 mmol/L Final         EXAM:  There were no vitals taken for this visit.  General:  Stable.  In no acute distress.    Neuro:  A&O x 3. Moves all extremities equally.  Resp:  Lungs clear to auscultation bilaterally.  Cardio:  S1S2 and reg, without murmur, clicks or rubs  Abdomen:  Soft, non-distended, non-tender, positive bowel sounds.  Skin:  Without excoriations, ecchymosis, erythema, lesions or open sores    ASSESSMENT/PLAN:   Alexei Blake is a 81 year old male with history of CT  with gallbladder distention, gallstones and wall thickening, pericholecystic fluid consistent with acute cholecystitis. Sharda tube placed 6/7. Recently had cardiac stents placed, in anticoagulants, not a surgical candidate at present. here for cholangiogram, possible tube exchange        Proceed with cholangiogram, possible tube exchange with no sedation    Procedure, risks/benefits, details, alternatives, and sedation reviewed with patient and he verbalized understanding. All questions answered. OK to proceed with above radiology procedure.     Skylar Varela, CNP  Interventional Radiology

## 2021-07-28 NOTE — PROGRESS NOTES
FOOT AND ANKLE SURGERY/PODIATRY Progress Note      ASSESSMENT:   Osteomyelitis 4th digit left foot   Diabetic Ulceration 4th digit left foot  Pressure Ulceration left heel  PAD      TREATMENT:  -The 4th digit on the left foot is stable. Soft eschar along the lateral left heel wound centrally with fibrotic/granular tissue at peripheral wound. We discussed that a sore will develop after the eschar is removed, will continue to monitor.    -Will delay amputation of the 4th toe until after bypass procedure with Dr. Salvador has been completed.     -After discussion of risk factors and consent obtained 2% Lidocaine HCL jelly was applied, under clean conditions, the left and foot ulceration(s) were debrided using .into the subcutaneous tissue.  Devitalized and nonviable tissue, along with any fibrin and slough, was removed to improve granulation tissue formation, stimulate wound healing, decrease overall bacteria load, disrupt biofilm formation and decrease edge senescence. Wound drainage was scant No. Total excisional debridement was 15.5 sq cm into the subcutaneous tissue with a depth of 0.2 cm.   Ulcers were improved afterwards and .  Measures were as noted on the flow sheet. A gauze dressing was applied. He will continue to apply a gauze dressing qday. Recommend use of a prafo boot with kick stand when non-weight bearing. Surgical shoe with weight bearing.    -He will follow-up with me in 2-3 weeks.    Rufino Stewart DPM  [facility]      HPI: Alexei Blake was seen again today for osteomyelitis of the 4th digit left foot and a left heel ulceration. Angiogram with Dr. Salvador was unsuccessful, and bypass procedure has been recommended. [facility] at [facility]      Past Medical History:   Diagnosis Date     Abscess of tongue      Chronic kidney disease     CKD stage 4,dialysis [facility]     Diabetes (H)     type 2     DVT (deep venous thrombosis) (H)      End stage renal  disease (H)      Hernia, umbilical      History of transfusion      Hyperlipidemia      Hypertension      CATHLEEN (obstructive sleep apnea)     uses CPAP     PVD (peripheral vascular disease) (H)      Renal insufficiency      Stroke (H) 2003    minor left sided weakness       Past Surgical History:   Procedure Laterality Date     AMPUTATE TOE(S) Bilateral     multiple     ARTERIAL BYPASS SURGERY Bilateral     lower extremities, Dr. Cottrell     CV CORONARY ANGIOGRAM N/A 1/20/2021    Procedure: Coronary Angiogram;  Surgeon: Tuyet Arreguin MD;  Location: [facility] Cardiac Cath Lab;  Service: Cardiology     CV LEFT HEART CATHETERIZATION WITHOUT LEFT VENTRICULOGRAM Left 1/20/2021    Procedure: Left Heart Catheterization Without Left Ventriculogram;  Surgeon: Tuyet Arreguin MD;  Location: [facility] Cardiac Cath Lab;  Service: Cardiology     HERNIA REPAIR, UMBILICAL N/A 4/29/2016    Procedure: OPEN UMBILICAL REPAIR WITH MESH;  Surgeon: Jevon Rivas MD;  Location: [facility] Main OR;  Service:      ILEOSTOMY N/A 4/21/2021    Procedure: Open ileostomy revision;  Surgeon: Ty Walker DO;  Location: [facility]     IR AV FISTULAGRAM  1/27/2017     IR CHOLECYSTOSTOMY  6/7/2021     IR CVC TUNNEL PLACEMENT > 5 YRS OF AGE  12/30/2015     IR EXTREMITY ANGIOGRAM LEFT  7/9/2021     IR GALLBLADDER DRAIN PLACEMENT  6/7/2021     IR LOWER EXTREMITY ANGIOGRAM LEFT  7/9/2021     LAPAROTOMY EXPLORATORY N/A 3/31/2021    Procedure: LAPAROTOMY, RIGHT HEMICOLECTOMY, ILEOSTOMY CREATION;  Surgeon: Harsh Santos MD;  Location: [facility] OR     US THORACENTESIS  6/7/2021       Allergies   Allergen Reactions     Blood Transfusion Related (Informational Only) Other (See Comments)     Patient has a history of a clinically significant antibody against RBC antigens.  A delay in compatible RBCs may occur.     Blood-Group Specific Substance      Anti-K present. Expect delays in blood for transfusion.  Draw 2 lavender top tubes for type  and screen orders.      Calcitriol Rash     Ciprofloxacin Rash     Zosyn Rash         Current Outpatient Medications:      acetaminophen (TYLENOL) 500 MG tablet, Take 500 mg by mouth every 4 hours as needed for mild pain, Disp: , Rfl:      aspirin 81 MG EC tablet, Take 81 mg by mouth daily, Disp: , Rfl:      atorvastatin (LIPITOR) 80 MG tablet, Take 80 mg by mouth every evening, Disp: , Rfl:      calcium carbonate (TUMS) 500 MG chewable tablet, Take 1 chew tab by mouth 2 times daily, Disp: , Rfl:      clopidogrel (PLAVIX) 75 MG tablet, Take 75 mg by mouth daily, Disp: , Rfl:      fluticasone (FLONASE) 50 MCG/ACT nasal spray, Spray 2 sprays into both nostrils daily, Disp: , Rfl:      isosorbide dinitrate (ISORDIL) 5 MG tablet, Take 1 tablet (5 mg) by mouth 3 times daily, Disp:  , Rfl:      metoprolol tartrate (LOPRESSOR) 25 MG tablet, Take 1 tablet (25 mg) by mouth 2 times daily, Disp: , Rfl:      multivitamin RENAL (NEPHROCAPS/TRIPHROCAPS) 1 MG capsule, Take 1 capsule by mouth daily, Disp: , Rfl:      nitroGLYcerin (NITROSTAT) 0.4 MG sublingual tablet, Place 0.4 mg under the tongue every 5 minutes as needed for chest pain For chest pain place 1 tablet under the tongue every 5 minutes for 3 doses. If symptoms persist 5 minutes after 1st dose call 911., Disp: , Rfl:      oxyCODONE (ROXICODONE) 5 MG tablet, Take 1 tablet (5 mg) by mouth every 6 hours as needed for moderate to severe pain, Disp: 30 tablet, Rfl: 0     pantoprazole (PROTONIX) 40 MG EC tablet, Take 1 tablet (40 mg) by mouth every morning (before breakfast), Disp:  , Rfl:      warfarin ANTICOAGULANT (COUMADIN) 5 MG tablet, Take 0.5-1 tablets (2.5-5 mg) by mouth daily, Disp: , Rfl: 0  No current facility-administered medications for this visit.    Facility-Administered Medications Ordered in Other Visits:      fentaNYL (PF) (SUBLIMAZE) injection 25-50 mcg, 25-50 mcg, Intravenous, Q5 Min PRN, Skylar Varela CNP     lidocaine 1 % 1-30 mL, 1-30 mL,  Intradermal, Once PRN, Skylar Varela, CNP     naloxone (NARCAN) injection 0.2 mg, 0.2 mg, Intravenous, Q2 Min PRN **OR** naloxone (NARCAN) injection 0.4 mg, 0.4 mg, Intravenous, Q2 Min PRN **OR** naloxone (NARCAN) injection 0.2 mg, 0.2 mg, Intramuscular, Q2 Min PRN **OR** naloxone (NARCAN) injection 0.4 mg, 0.4 mg, Intramuscular, Q2 Min PRN, Skylar Varela, CNP     sodium chloride 0.9% 1000 mL TABLE SOLN, 1 Bag, TABLE SOLN, Continuous PRN, Estrellita Bhardwaj PA-C    Review of Systems - 10 point Review of Systems is negative except for left heel ulcer which is noted in HPI.      OBJECTIVE:  BP (!) 140/62   Pulse 80   General appearance: Patient is alert and fully cooperative with history & exam.  No sign of distress is noted during the visit.    Vascular: Dorsalis pedis non-palpableLeft.  Dermatologic:    Ileostomy (Active)       Wound Coccyx Pressure injury suspected hospital acquired (Active)       Wound Nose Pressure injury suspected hospital acquired (Active)       VASC Wound L heel wound (Active)   Pre Size Length 5 07/28/21 1100   Pre Size Width 3.1 07/28/21 1100   Pre Size Depth 0.2 07/28/21 1100   Pre Total Sq cm 15.5 07/28/21 1100       Incision/Surgical Site 03/31/21 Medial Abdomen (Active)       Incision/Surgical Site 04/21/21 Right Abdomen (Active)   Stable eschar along plantar 4th digit left foot. Soft, stable eschar along posterior lateral left heel with ulceration at peripheral wound with fibrotic and granular tissue. No erythema left foot.   Neurologic: Diminished to light touch Left.  Musculoskeletal: Contracted digits noted Left.    Imaging:     IR Lower Extremity Angiogram Left    Result Date: 7/9/2021  VASCULAR SURGERY ANGIOGRAM REPORT  [facility] DATE: July 9, 2021 PROCEDURES PERFORMED: 1.  Ultrasound-guided access of the right common femoral artery 2.  Placement of the catheter into the aorta and aortogram 3.  Selective cannulation of the left common  iliac artery, external iliac artery, and common femoral artery with nonselective left lower extremity angiogram 4.  Interpretation of radiologic findings 5.  Moderate sedation PROVIDER: Meghana Salvador INDICATION: 81-year-old male who was evaluated for nonhealing wounds involving left foot.  Most recent noninvasive vascular studies did show uninterpretable toe pressures and ABIs due to significant calcifications.  The duplex did show relatively patent flow through the common femoral artery, SFA, and popliteal artery.  Flow below the knee becomes very monophasic and sluggish in tibial arteries.  We decided to proceed with left extremity angiogram with possible intervention.  Risk and benefits of this procedure were explained and patient agreed to proceed. SEDATION: The procedure was performed with administration of intravenous conscious sedation with appropriate preoperative, intraoperative, and postoperative evaluation. 30 minutes of supervised face to face intraservice time was provided by a radiology nurse under my direct supervision.  Five 1 mg of Versed and 50 mcg of fentanyl were administered. ANTIBIOTICS: None FLUOROSCOPIC TIME: 2.9 minutes RADIATION DOSE: 112 mGy CONTRAST: 50 cc  PROCEDURE: Ultrasound was used to evaluate the right common femoral artery. The selected vessel was patent. Ultrasound was then used for real-time ultrasound guided needle entry of the right common femoral artery. Permanent images were recorded and saved to the patient's medical record.  Once the micropuncture sheath was inserted the glide advantage wire was advanced into the aorta.  5 Amharic sheath was placed.  Omni Flush catheter was advanced over the wire into the aorta and positioned just below bilateral renal arteries.  Aortogram was performed.  Then using up and over maneuver the glide advantage wire was advanced into the left common femoral artery.  The Omni Flush catheter was advanced over the wire and positioned in  the left common femoral artery.  Nonselective left lower extremity angiogram was performed.  The procedure was concluded.  The catheter was removed.  Over the wire we proceeded with a closure of the right common femoral artery using Angio-Seal device which was deployed without any difficulties.  Hemostasis was achieved, and patient was transferred to recovery area in stable condition. FINDINGS Infrarenal aorta is very calcified but without significant stenosis.  Left common iliac artery, external iliac artery, and common femoral artery are very calcified but without significant stenosis.  Left profunda femoris is patent without significant stenosis.  Left SFA is very calcified but without significant stenosis.  Left popliteal artery is patent without significant stenosis but very calcified.  Left anterior tibial artery is patent proximally only but occludes 2 cm distal to the takeoff without any reconstitution distally.  Left posterior tibial artery is occluded without reconstitution distally.  Left TP trunk is occluded with reconstitution of the proximal peroneal artery which is the only vessel runoff.  Unfortunate there is no inline flow to the foot because peroneal artery branches out to very small collaterals. IMPRESSION No endovascular options are available at this point.  Even retrograde approach would be at high risk due to severe calcifications of the peroneal artery.  At this point the only option would be a surgical bypass operation which also has high risk of failure.  Would recommend cardiovascular work-up for this patient.  Patient also will need to be seen by interventional radiology for cholecystostomy tube removal.  We will see this patient in the clinic within next 2 to 3 weeks to discuss surgery in details. Meghana Salvador MD, [facility] VASCULAR SURGERY     US Lower Extremity Venous Mapping Bilateral    Result Date: 7/21/2021  BILATERAL Vein Mapping Ultrasound (Date: 07/21/21) BILATERAL Lower  Extremity Saphenous Veins Indication: Map bilateral legs for revascularization/history of vein treatment bilaterally and right leg bypass graft  Technique: Ultrasound of the Superficial Veins with Compression Maneuvers. Duplex Imaging of CFV is performed utilizing gray-scale, Two-dimensional images, color-flow imaging, Doppler waveform analysis, and Spectral doppler imaging done with provacative maneuvers. 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    Nuclear images demonstrate a small area of transmural infarction involving the distal inferior wall although specificity reduced given significant splanchnic attenuation.  No definite ischemia.    The left ventricular ejection fraction at stress is 65%.    A prior study was conducted on 12/28/2015.  No significant change noted.    The patient is at a low risk of future cardiac ischemic events.     IR Cholangiogram (Gallbladder Tube Check)    Result Date: 7/14/2021  LOCATION: [facility] DATE: 7/14/2021 PROCEDURE: CHOLANGIOGRAM THROUGH EXISTING TUBE. INTERVENTIONAL RADIOLOGIST: Maximino Rhodes MD INDICATION: Acute cholecystitis. Recent coronary artery intervention. Cholecystostomy tube placed on 6/7/2021. Routine follow-up. Patient does not report problems with the cholecystostomy tube.. CONSENT: The procedure, risks and benefits of a cholangiogram through existing catheter with possible intervention were discussed with the patient  in detail. All questions were answered. Informed consent was given to proceed with  the procedure. MODERATE SEDATION: None. CONTRAST: Omnipaque 350: 15 mL. ANTIBIOTICS: None. ADDITIONAL MEDICATIONS: None. FLUOROSCOPIC TIME: 1.3 minutes RADIATION DOSE: Air Kerma: 25 mGy COMPLICATIONS: No immediate complications. PROCEDURE:  images were obtained. The existing 10 [place] cholecystostomy tube was injected with contrast, and multiple images were obtained. Tube was reconnected to gravity drainage. FINDINGS: Adequately positioned cholecystostomy tube within the gallbladder. Small filling defects within the gallbladder consistent with small gallstones. Small, nonobstructing gallstones within the cystic duct. Small filling defects within the common bile duct likely representing small calculi versus sludge. Normal caliber opacified biliary ducts. Slow flow of contrast into the duodenum.     IMPRESSION: 1.  Adequately positioned cholecystostomy tube. Cholelithiasis. Small gallstones within the cystic duct. Small common bile duct stones. PLAN: Gravity drainage. Cholangiogram with tube exchange in one month..          Picture:

## 2021-07-28 NOTE — PROGRESS NOTES
Pt educated on tube assessments. Pt had no further questions. Wheel chaired to front with Brody TERAN. Wife to drive.

## 2021-07-28 NOTE — PATIENT INSTRUCTIONS
Wear prafo boot at all times   Wound Care Instructions    Daily:    Cleanse your left foot wound(s) with Normal Saline or Wound Cleanser    Pat dry with non-sterile gauze    Apply Lotion to the intact skin surrounding your wound and other dry skin locations. Some good lotions include: Remedy Skin Repair Cream or Cetaphil    Apply gauze into/onto the wounds    Cover with rolled gauze    It is not ok to get your wound wet in the bath or shower      SEEK MEDICAL CARE IF:    You have an increase in swelling, pain, or redness around the wound.    You have an increase in the amount of pus coming from the wound.    There is a bad smell coming from the wound.    The wound appears to be worsening/enlarging  You have a fever greater than 101.5 F          Alexei    Your visit to [facility] for your surgery or procedure is coming soon and we look forward to seeing you! This friendly reminder and pre-procedure checklist will help to ensure your procedure/surgery goes smoothly and meets your expectations. At [facility], our goal is to provide you with a great patient experience and to deliver genuine, professional care to every patient.     Please complete all the steps in advance of your visit. If you have any questions about the items listed below, please give our office a call. We can be reached at [phone] or visit our website at [email or web address]  for more information.       Procedure Date :  8/12    Procedure Time :  9AM- SUBJECT TO CHANGE    Arrival Time: 7 AM    Covid Test: FACILITY TO PERFORM 3-4 DAYS BEFORE SURGERY    Post Operative Appointment: 9/3 AT 8 AM Moore    Admission Type: Inpatient    Surgeon: MD Meghana Salvador    Procedure: BYPASS LEFT LEG    Procedure Location: [facility]:  [address] [address] (Phone: [phone], Fax: [phone])    If you take blood thinners: SEE SPECIFIC  "INSTRUCTIONS BELOW    PLEASE DO NOT STOP YOUR ASPIRIN OR PLAVIX UNLESS SPECIFICALLY DIRECTED BY THE VASCULAR SURGEON TO STOP!  - In most cases Vascular surgeons want you to continue these. This is different from most NON vascular surgeries and may not be well known by your Primary Care Provider      Coumadin, This will need to be held 5 days prior to surgery. We may need to have you do blood thinner injections during this time. This is called Bridging. Please start Enoxaparin injections the day you stop your Coumadin. You will need to do this twice a day- 12 hours apart until the day before the procedure. The day before we would like you to do the AM injection BUT HOLD THE BEDTIME DOSE. IF YOUR PRIMARY DETERMINES YOU DO NOT NEED BRIDGING FOLLOW THEIR GUIDELINES    IF YOU NEED TO RESCHEDULE OR CANCEL YOUR PROCEDURE FOR ANY REASON PLEASE CALL THE CLINIC AS SOON AS POSSIBLE [phone].    Complete the following checklist before your procedure/surgery    [] Contact your insurance regarding coverage    If you would like a Good Anuja Estimate for your upcoming service/procedure at [facility] Location contact Cost of Care Estimates at [phone], advocates are available Monday through Friday 8am - 5pm.   You may also submit a request online at [email or web address]   - complete the secure online form found under \"Services and Procedure Pricing\"     If your procedure is at [facility] please contact the numbers below for Cost of Care Estimates.   Facility Charge: [phone]    Anesthesiology charge:  [phone]        []  You will need a preop physical within 30 days before surgery with your primary care provider. This exam is required by the anesthesiologist to ensure a safe surgical experience.    Failure  to obtain your pre-op physical will lead to cancellation of your surgery  Please contact your primary to schedule. Please ensure your Preoperative Physical is faxed to the " [facility] (numbers listed above)    [] Preoperative Medication Instructions    Stop Ibuprofen 1 week prior to surgery.      Contact your prescribing provider for instructions before discontinuing any medications including anticoagulants. (Examples: Coumadin, Heparin, Xarelto, Eliquis, Pradaxa, Effiient, Briliant) We would like you stop them five days before surgery HOWEVER, please follow the advice of your primary care provider. Most surgeons will have you remain on your aspirin and Plavix.    Hold Herbal Supplements and Vitamin E 7 days before    Stop Cialis, Levitra and Viagra 2-3 days before surgery    [] Fasting Requirements    Do not eat anything after 11:00 pm     You may have clear liquids up to two hours before your arrival time (coffee, tea, water, or Gatorade. No cream or milk)    If your surgery is scheduled later in the day, fasting instructions may be modified.    If your primary care provider has instructed you to continue oral medications, you may take them on the morning of surgery with a small sip of water.      No alcohol or smoking after 12:00am the day of surgery    Day Before Surgery    [] A surgery nurse will call you 2 -3 days prior to your surgery to review your health history and provide detailed instructions. You will be given an arrival time based on your      scheduled surgery.    [] STOP Smoking and Drinking: It is important to stop smoking and drinking at least 24 hours before surgery. Smoking and drinking may cause complications in your recovery from anesthesia and may lengthen the healing process.    [] Pack for your hospital stay: If it will be required for you to stay at the hospital after surgery, bring personal items such as a robe, slippers, pajamas, additional clothing and toiletries. Don't forget:    List of medication    Eyeglass case or contact case with solution. You cannot wear contacts during surgery    Copies of your physical exam , lab results and EKG    Copy of Health  Care Directives, Living Will or Power of     Insurance Cards    Photo ID    CPAP machine    [] NOTHING BY MOUTH 8 HOURS BEFORE. This includes gum, hard candy, water and mints. The only exception is if you have been instructed by your doctor to take your medications with sips of water. You may rinse your mouth or brush your teeth, but do not swallow water.        Day of Surgery    [] Medications- Take as indicated with sips of water     [] Wear comfortable loose fitting clothes. Wear your glasses-Not contacts. Do not wear jewelry and remove body piercing's. Surgery may be cancelled if they are not removed.     [] If same day surgery-Have a someone come with you to surgery that can help you understand the surgeon's instructions, drive you home and stay with you overnight the first night.   A nurse will call you at home within 72 hours following surgery to see how you are doing. Our nurses and doctors will discuss recovery with you.    [] DO NOT BRING FMLA WITH TO SURGERY.  These should be sent to the provider's office by fax to [phone]        Notify our office right away, if you have any changes in your health status, or if you develop a cold, flu, diarrhea, infection, fever or sore throat before your scheduled surgery date. We can be reached at [phone] Monday-Friday 8 am-4:30 pm if you have any questions.   Thank you for choosing [facility]  If you have any questions or need to reschedule your appointment, please call [phone]           Instructions for Patients Scheduled for Vascular or Podiatry Procedures During the COVID 19 Pandemic    Your Provider has determined that your condition warrants going ahead with your procedure at this time.  You will need to be tested for COVID-19 within 96 hours of your procedure.  If we do not receive the results in time, your procedure may be postponed or canceled.  Please make sure your test is collected 3-days prior to your procedure date.   The results will need to get faxed to [phone].    After testing, you will need to remain in self-quarantine until your procedure.  If you are notified of a positive COVID-19 result; you will need to call your provider for further recommendations.  Please call [phone] and press option 2 to speak to a nurse.  If the test is positive; DO NOT PRESENT FOR YOUR PROCEDURE until you have been given further instructions.  You will not be called with negative results so arrive as instructed unless otherwise notified.    Don't:    Don't invite visitors or friends into your home.    Don't leave your home unless absolutely necessary.    Don't share utensils and other household items with others in the home.  Do:    Wash your hands regularly with soap and water and use hand  with at least 60% alcohol if you don't have easy access to soap and water.     Disinfect surface areas daily, including doorknobs, electronics - especially phones, laptops and other devices.     Wash utensils and other items thoroughly.     Separate yourself from others in the household as best you can, including pets.    Keep your hands away from your face.     Practice all other prevention tips the CDC recommends, including covering your coughs and sneezes with a tissue or your sleeve and immediately throwing the tissue into the trash and washing your hands.    Call your hospital if you develop the following signs before your surgery:    Fever.    Cough.    Shortness of breath.    Sore throat.    Runny or stuffy nose.    Muscle or body aches.    Headaches.    Fatigue.    Vomiting and diarrhea.    These steps will help keep you & your family, other patients, and hospital staff safe from COVID-19.

## 2021-07-28 NOTE — PROGRESS NOTES
Surgery Scheduled      Surgery/Procedure: left bypass arterial to pop to peroneal with possible endart.     Special Equipment: vladimir    Location:[facility]    Date:8/25    Time: 1pm    Surgeon: Dr. Salvador    OR Confirmed/ :  Yes with Mandy on 7/28    Orders In:  Yes    Entered on [employer] Calendar:  Yes    Post Op: 9/10 with zohaib Flower Scheduled: at facility    Blood Thinners Addressed: yes hold coumadin 5 days and bridge- discussed with [facility].

## 2021-07-30 NOTE — PROGRESS NOTES
Assessment/Recommendations   Assessment:    1. Preoperative cardiovascular examination prior to elective surgical left lower extremity revascularization on 8/25/2021. He is at elevated, but not prohibitive, risk of perioperative major adverse events due to his extensive co-morbidities. He recently had multivessel coronary artery revascularization, he denies anginal symptoms, and his most recent stress test showed no ischemia.  2. Coronary artery disease status post atherectomy and drug-eluting stenting to the mid left anterior descending artery on 1/21/2021 and atherectomy and drug-eluting stenting x3 to the ostial left circumflex artery, distal left circumflex artery, and the third obtuse marginal artery on 3/19/2021. No anginal symptoms and recent negative nuclear stress test.  3. Ischemic cardiomyopathy with recovery of left ventricular ejection fraction following revascularization. No signs or symptoms of heart failure.  4. Permanent atrial fibrillation. [id number] score is at least 7.  5. Peripheral arterial disease with nonhealing wound of the left lower extremity.  6. History of cerebrovascular accident with residual left-sided weakness.  7. Insulin-dependent diabetes mellitus type 2. Last HbA1c 7.1%.  8. Benign essential hypertension.  9. Hyperlipidemia.    Plan:  1. Despite his somewhat elevated risk of perioperative major adverse cardiac events, this risk is not prohibitive and he is actually well-compensated from a cardiovascular standpoint with recent revascularization, recovery of left ventricular ejection fraction, and stress test showing no ischemia. From a cardiac standpoint he may proceed with his planned surgery.  2. As long as he is still taking clopidogrel 75 mg daily (which he needs to take until 3/19/2022) and is on therapeutic anticoagulation, it is okay for him to be off aspirin.  3. Minimize interruption of anticoagulation given his high [id number] score.  4. Atorvastatin 80 mg  daily.  5. Continue metoprolol and isosorbide dinitrate.  6. Follow-up with cardiology in 6 months. It appears he has seen both Dr. Chatman and Dr. Power in the past.        A total time of 40 minutes was spent on the date of this encounter.    History of Present Illness   Mr. Alexei Blake is a 81 year old male with a significant past history of ESRD on HD via RUE AVF, CAD s/p atherectomy and MARIA ELENA to the mid LAD on 1/21/2021 and atherectomy and MARIA ELENA x 3 to the ostial LCX, distal LCx, and OM3 on 3/19/2021, ischemic cardiomyopathy with recovery of LVEF following revascularization, severe PAD with nonhealing wounds on the left foot, permanent AF, and history of CVA with residual left-sided hemiparesis presenting for preoperative evaluation prior to planned surgical left lower extremity revascularization on 8/25/2021.     He has had many medical issues this past year, including NSTEMI, multivessel revascularization of CAD, ischemic colitis with right hemicolectomy, cholecystitis with cholecystotomy tube placement, and nonhealing left lower extremity vein. During a June 2021 hospitalization, he has a mild troponin elevation felt to be demand-mediated as he recently had fairly complete revascularization of his CAD. Nuclear stress test on 7/9/2021 showed a possible small area of infarction but no definite ischemia.    He has not walked in several months due to his nonhealing wounds. He bruises easily but no major bleeding. No recent problems with hypotension during dialysis. He denies any chest pain/pressure/tightness, shortness of breath at rest or with exertion, light headedness/dizziness, pre-syncope, syncope, lower extremity swelling, palpitations, paroxysmal nocturnal dyspnea (PND), or orthopnea.     Cardiac Problems and Cardiac Diagnostics     Most Recent Cardiac testing:  ECG dated 6/12/2021 (personaly reviewed and interpreted): atrial fibrillation, ventricular rate 82 bpm, IVCD with QRS duration 122 ms    ECHO  6/3/2021 (report reviewed):     Technical Quality: poor visualization    Normal left ventricular size.The estimated left ventricular ejection fraction is 55%. This represents a normal ejection fraction.    The following segments are hypokinetic: apical septal and apex. All other segments are normal.    No significant valvular heart abnormalities    Stress test 7/9/2021 (report reviewed):      The nuclear stress test is abnormal.     Nuclear images demonstrate a small area of transmural infarction involving the distal inferior wall although specificity reduced given significant splanchnic attenuation.  No definite ischemia.     The left ventricular ejection fraction at stress is 65%.     A prior study was conducted on 12/28/2015.  No significant change noted.     The patient is at a low risk of future cardiac ischemic events.     Cardiac cath 3/19/2021 (report reviewed):     Staged PCI for ischemic cardiomyopathy. EF improved from 25% to 44% with PCI of the LAD in January.    Severe ostial dominant circumflex, critical proximal OM-3 and severe distal circumflex lesions all treated with rotational atherectomy and a Synergy MARIA ELENA with good angiographic results. IVUS used for the ostial circumflex lesion.    Blood loss < 20 cc.    Continuation of dual antiplatelet therapy for 12 month(s).    Continue high dose statin therapy indefinitely.    Risk factor management for atherosclerosis.    Discontinue aspirin in 4 weeks, but resume in 12 months when P2Y12 therapy completed.    Resume antithrombin therapy with Warfarin or NOAC immediately.    Consult placed to nephrology for dialysis tomorrow.    Follow up visit with Nurse Practitioner in 1-2 weeks.    Cardiac rehabilitation.        Medications  Allergies   Current Outpatient Medications   Medication Sig Dispense Refill     acetaminophen (TYLENOL) 500 MG tablet Take 500 mg by mouth every 4 hours as needed for mild pain       AMOXICILLIN-POT CLAVULANATE PO Take 1 tablet by  mouth daily [place]       aspirin 81 MG EC tablet Take 81 mg by mouth daily       atorvastatin (LIPITOR) 80 MG tablet Take 80 mg by mouth every evening       calcium carbonate (TUMS) 500 MG chewable tablet Take 1 chew tab by mouth 2 times daily       clopidogrel (PLAVIX) 75 MG tablet Take 75 mg by mouth daily       fluticasone (FLONASE) 50 MCG/ACT nasal spray Spray 2 sprays into both nostrils daily       isosorbide dinitrate (ISORDIL) 5 MG tablet Take 1 tablet (5 mg) by mouth 3 times daily       metoprolol tartrate (LOPRESSOR) 25 MG tablet Take 1 tablet (25 mg) by mouth 2 times daily       multivitamin RENAL (NEPHROCAPS/TRIPHROCAPS) 1 MG capsule Take 1 capsule by mouth daily       nitroGLYcerin (NITROSTAT) 0.4 MG sublingual tablet Place 0.4 mg under the tongue every 5 minutes as needed for chest pain For chest pain place 1 tablet under the tongue every 5 minutes for 3 doses. If symptoms persist 5 minutes after 1st dose call 911.       ondansetron 4 MG FILM Take 4 mg by mouth daily as needed       pantoprazole (PROTONIX) 40 MG EC tablet Take 1 tablet (40 mg) by mouth every morning (before breakfast)       warfarin ANTICOAGULANT (COUMADIN) 5 MG tablet Take 0.5-1 tablets (2.5-5 mg) by mouth daily  0     oxyCODONE (ROXICODONE) 5 MG tablet Take 1 tablet (5 mg) by mouth every 6 hours as needed for moderate to severe pain (Patient not taking: Reported on 7/30/2021) 30 tablet 0      Allergies   Allergen Reactions     Blood Transfusion Related (Informational Only) Other (See Comments)     Patient has a history of a clinically significant antibody against RBC antigens.  A delay in compatible RBCs may occur.     Blood-Group Specific Substance      Anti-K present. Expect delays in blood for transfusion.  Draw 2 lavender top tubes for type and screen orders.      Calcitriol Rash     Ciprofloxacin Rash     Zosyn Rash        Physical Examination Review of Systems   BP 90/54 (BP Location: Left arm, Patient Position: Sitting, Cuff  "Size: Adult Regular)   Pulse 76   Resp 16   Ht 1.854 m (6' 1\")   Wt 85.7 kg (189 lb)   BMI 24.94 kg/m    Body mass index is 24.94 kg/m .  Wt Readings from Last 3 Encounters:   07/30/21 85.7 kg (189 lb)   07/19/21 86.8 kg (191 lb 6.4 oz)   07/19/21 87.1 kg (192 lb)       General Appearance:   Pleasant  male, appears  stated age. no acute distress, frail  body habitus   ENT/Mouth: membranes moist, no apparent gingival bleeding.      EYES:  no scleral icterus, normal conjunctivae   Neck: no carotid bruits. No anterior cervical lymphadenopaty   Respiratory:   lungs are clear to auscultation, no rales or wheezing, equal chest wall expansion    Cardiovascular:   Irregularly irregular. Normal first and second heart sounds with no murmurs, rubs, or gallops; the carotid, radial and posterior tibial pulses are intact, Jugular venous pressure normal, no edema bilaterally    Abdomen/GI:  no organomegaly, masses, bruits, or tenderness; bowel sounds are present   Extremities: no cyanosis or clubbing   Skin: no xanthelasma, warm.    Heme/lymph/ Immunology No apparent bleeding noted.   Neurologic: Alert and oriented. normal gait, no tremors     Psychiatric: Pleasant, calm, appropriate affect.    A complete 10 system review of systems was performed and is negative except as mentioned in the HPI/subjective.         Past History   Past Medical History:   Past Medical History:   Diagnosis Date     Abscess of tongue      Chronic kidney disease     CKD stage 4,dialysis [facility]     Diabetes (H)     type 2     DVT (deep venous thrombosis) (H)      End stage renal disease (H)      Hernia, umbilical      History of transfusion      Hyperlipidemia      Hypertension      CATHLEEN (obstructive sleep apnea)     uses CPAP     PVD (peripheral vascular disease) (H)      Renal insufficiency      Stroke (H) 2003    minor left sided weakness       Past Surgical History:   Past Surgical History:   Procedure Laterality Date     AMPUTATE TOE(S) " Bilateral     multiple     ARTERIAL BYPASS SURGERY Bilateral     lower extremities, Dr. Cottrell     CV CORONARY ANGIOGRAM N/A 2021    Procedure: Coronary Angiogram;  Surgeon: Tuyet Arreguin MD;  Location: [facility] Cardiac Cath Lab;  Service: Cardiology     CV LEFT HEART CATHETERIZATION WITHOUT LEFT VENTRICULOGRAM Left 2021    Procedure: Left Heart Catheterization Without Left Ventriculogram;  Surgeon: Tuyet Arreguin MD;  Location: [facility] Cardiac Cath Lab;  Service: Cardiology     HERNIA REPAIR, UMBILICAL N/A 2016    Procedure: OPEN UMBILICAL REPAIR WITH MESH;  Surgeon: Jevon Rivas MD;  Location: [facility] OR;  Service:      ILEOSTOMY N/A 2021    Procedure: Open ileostomy revision;  Surgeon: Ty Walker DO;  Location: [facility] OR     IR AV FISTULAGRAM  2017     IR CHOLECYSTOSTOMY  2021     IR CVC TUNNEL PLACEMENT > 5 YRS OF AGE  2015     IR EXTREMITY ANGIOGRAM LEFT  2021     IR GALLBLADDER DRAIN PLACEMENT  2021     IR LOWER EXTREMITY ANGIOGRAM LEFT  2021     LAPAROTOMY EXPLORATORY N/A 3/31/2021    Procedure: LAPAROTOMY, RIGHT HEMICOLECTOMY, ILEOSTOMY CREATION;  Surgeon: Harsh Santos MD;  Location: [facility] OR     US THORACENTESIS  2021       Family History:   Family History   Problem Relation Age of Onset     Diabetes Father      Hypertension Father      Heart Disease Father      Hypertension Mother      Coronary Artery Disease Father        Social History:   Social History     Socioeconomic History     Marital status:      Spouse name: Not on file     Number of children: 3     Years of education: Not on file     Highest education level: Not on file   Occupational History     Not on file   Tobacco Use     Smoking status: Former Smoker     Packs/day: 4.00     Quit date: 1995     Years since quittin.5     Smokeless tobacco: Never Used   Substance and Sexual Activity     Alcohol use: No     Drug use: No     Sexual  activity: Not on file   Other Topics Concern     Not on file   Social History Narrative     Not on file     Social Determinants of Health     Financial Resource Strain:      Difficulty of Paying Living Expenses:    Food Insecurity:      Worried About Running Out of Food in the Last Year:      Ran Out of Food in the Last Year:    Transportation Needs:      Lack of Transportation (Medical):      Lack of Transportation (Non-Medical):    Physical Activity:      Days of Exercise per Week:      Minutes of Exercise per Session:    Stress:      Feeling of Stress :    Social Connections:      Frequency of Communication with Friends and Family:      Frequency of Social Gatherings with Friends and Family:      Attends [employer] Services:      Active Member of Clubs or Organizations:      Attends Club or Organization Meetings:      Marital Status:    Intimate Partner Violence:      Fear of Current or Ex-Partner:      Emotionally Abused:      Physically Abused:      Sexually Abused:               Lab Results    Chemistry/lipid CBC Cardiac Enzymes/BNP/TSH/INR   Lab Results   Component Value Date    CHOL 69 03/19/2021    HDL 36 (L) 03/19/2021    TRIG 56 04/26/2021    BUN 32 (H) 06/12/2021     (L) 06/12/2021    CO2 21 (L) 06/12/2021       Lab Results   Component Value Date    LDL 23 03/19/2021    Lab Results   Component Value Date    WBC 5.1 06/11/2021    HGB 11.1 (L) 07/09/2021    HCT 34.0 (L) 06/11/2021     (H) 06/11/2021     07/09/2021     Lab Results   Component Value Date    A1C 7.1 (H) 06/07/2021   ] Lab Results   Component Value Date    TROPONINI 0.93 (HH) 06/13/2021    BNP 1,281 (H) 06/02/2021    TSH 1.68 01/12/2021    INR 1.72 (H) 07/09/2021          Mihai Daniel MD [facility]  Non-Invasive Cardiologist  [facility]  Pager [phone]

## 2021-07-30 NOTE — LETTER
7/30/2021    LELIA MOCTEZUMA MD  [address] [place] [address]    RE: Alexei Blake       Dear Colleague,    I had the pleasure of seeing Alexei Blake in the [facility].          Assessment/Recommendations   Assessment:    1. Preoperative cardiovascular examination prior to elective surgical left lower extremity revascularization on 8/25/2021. He is at elevated, but not prohibitive, risk of perioperative major adverse events due to his extensive co-morbidities. He recently had multivessel coronary artery revascularization, he denies anginal symptoms, and his most recent stress test showed no ischemia.  2. Coronary artery disease status post atherectomy and drug-eluting stenting to the mid left anterior descending artery on 1/21/2021 and atherectomy and drug-eluting stenting x3 to the ostial left circumflex artery, distal left circumflex artery, and the third obtuse marginal artery on 3/19/2021. No anginal symptoms and recent negative nuclear stress test.  3. Ischemic cardiomyopathy with recovery of left ventricular ejection fraction following revascularization. No signs or symptoms of heart failure.  4. Permanent atrial fibrillation. [id number] score is at least 7.  5. Peripheral arterial disease with nonhealing wound of the left lower extremity.  6. History of cerebrovascular accident with residual left-sided weakness.  7. Insulin-dependent diabetes mellitus type 2. Last HbA1c 7.1%.  8. Benign essential hypertension.  9. Hyperlipidemia.    Plan:  1. Despite his somewhat elevated risk of perioperative major adverse cardiac events, this risk is not prohibitive and he is actually well-compensated from a cardiovascular standpoint with recent revascularization, recovery of left ventricular ejection fraction, and stress test showing no ischemia. From a cardiac standpoint he may proceed with his planned surgery.  2. As long as he is still taking  clopidogrel 75 mg daily (which he needs to take until 3/19/2022) and is on therapeutic anticoagulation, it is okay for him to be off aspirin.  3. Minimize interruption of anticoagulation given his high [id number] score.  4. Atorvastatin 80 mg daily.  5. Continue metoprolol and isosorbide dinitrate.  6. Follow-up with cardiology in 6 months. It appears he has seen both Dr. Chatman and Dr. Power in the past.        A total time of 40 minutes was spent on the date of this encounter.    History of Present Illness   Mr. Alexei Blake is a 81 year old male with a significant past history of ESRD on HD via RUE AVF, CAD s/p atherectomy and MARIA ELENA to the mid LAD on 1/21/2021 and atherectomy and MARIA ELENA x 3 to the ostial LCX, distal LCx, and OM3 on 3/19/2021, ischemic cardiomyopathy with recovery of LVEF following revascularization, severe PAD with nonhealing wounds on the left foot, permanent AF, and history of CVA with residual left-sided hemiparesis presenting for preoperative evaluation prior to planned surgical left lower extremity revascularization on 8/25/2021.     He has had many medical issues this past year, including NSTEMI, multivessel revascularization of CAD, ischemic colitis with right hemicolectomy, cholecystitis with cholecystotomy tube placement, and nonhealing left lower extremity vein. During a June 2021 hospitalization, he has a mild troponin elevation felt to be demand-mediated as he recently had fairly complete revascularization of his CAD. Nuclear stress test on 7/9/2021 showed a possible small area of infarction but no definite ischemia.    He has not walked in several months due to his nonhealing wounds. He bruises easily but no major bleeding. No recent problems with hypotension during dialysis. He denies any chest pain/pressure/tightness, shortness of breath at rest or with exertion, light headedness/dizziness, pre-syncope, syncope, lower extremity swelling, palpitations, paroxysmal nocturnal  dyspnea (PND), or orthopnea.     Cardiac Problems and Cardiac Diagnostics     Most Recent Cardiac testing:  ECG dated 6/12/2021 (personaly reviewed and interpreted): atrial fibrillation, ventricular rate 82 bpm, IVCD with QRS duration 122 ms    ECHO 6/3/2021 (report reviewed):     Technical Quality: poor visualization    Normal left ventricular size.The estimated left ventricular ejection fraction is 55%. This represents a normal ejection fraction.    The following segments are hypokinetic: apical septal and apex. All other segments are normal.    No significant valvular heart abnormalities    Stress test 7/9/2021 (report reviewed):      The nuclear stress test is abnormal.     Nuclear images demonstrate a small area of transmural infarction involving the distal inferior wall although specificity reduced given significant splanchnic attenuation.  No definite ischemia.     The left ventricular ejection fraction at stress is 65%.     A prior study was conducted on 12/28/2015.  No significant change noted.     The patient is at a low risk of future cardiac ischemic events.     Cardiac cath 3/19/2021 (report reviewed):     Staged PCI for ischemic cardiomyopathy. EF improved from 25% to 44% with PCI of the LAD in January.    Severe ostial dominant circumflex, critical proximal OM-3 and severe distal circumflex lesions all treated with rotational atherectomy and a Synergy MARIA ELENA with good angiographic results. IVUS used for the ostial circumflex lesion.    Blood loss < 20 cc.    Continuation of dual antiplatelet therapy for 12 month(s).    Continue high dose statin therapy indefinitely.    Risk factor management for atherosclerosis.    Discontinue aspirin in 4 weeks, but resume in 12 months when P2Y12 therapy completed.    Resume antithrombin therapy with Warfarin or NOAC immediately.    Consult placed to nephrology for dialysis tomorrow.    Follow up visit with Nurse Practitioner in 1-2 weeks.    Cardiac rehabilitation.         Medications  Allergies   Current Outpatient Medications   Medication Sig Dispense Refill     acetaminophen (TYLENOL) 500 MG tablet Take 500 mg by mouth every 4 hours as needed for mild pain       AMOXICILLIN-POT CLAVULANATE PO Take 1 tablet by mouth daily Mor[place]       aspirin 81 MG EC tablet Take 81 mg by mouth daily       atorvastatin (LIPITOR) 80 MG tablet Take 80 mg by mouth every evening       calcium carbonate (TUMS) 500 MG chewable tablet Take 1 chew tab by mouth 2 times daily       clopidogrel (PLAVIX) 75 MG tablet Take 75 mg by mouth daily       fluticasone (FLONASE) 50 MCG/ACT nasal spray Spray 2 sprays into both nostrils daily       isosorbide dinitrate (ISORDIL) 5 MG tablet Take 1 tablet (5 mg) by mouth 3 times daily       metoprolol tartrate (LOPRESSOR) 25 MG tablet Take 1 tablet (25 mg) by mouth 2 times daily       multivitamin RENAL (NEPHROCAPS/TRIPHROCAPS) 1 MG capsule Take 1 capsule by mouth daily       nitroGLYcerin (NITROSTAT) 0.4 MG sublingual tablet Place 0.4 mg under the tongue every 5 minutes as needed for chest pain For chest pain place 1 tablet under the tongue every 5 minutes for 3 doses. If symptoms persist 5 minutes after 1st dose call 911.       ondansetron 4 MG FILM Take 4 mg by mouth daily as needed       pantoprazole (PROTONIX) 40 MG EC tablet Take 1 tablet (40 mg) by mouth every morning (before breakfast)       warfarin ANTICOAGULANT (COUMADIN) 5 MG tablet Take 0.5-1 tablets (2.5-5 mg) by mouth daily  0     oxyCODONE (ROXICODONE) 5 MG tablet Take 1 tablet (5 mg) by mouth every 6 hours as needed for moderate to severe pain (Patient not taking: Reported on 7/30/2021) 30 tablet 0      Allergies   Allergen Reactions     Blood Transfusion Related (Informational Only) Other (See Comments)     Patient has a history of a clinically significant antibody against RBC antigens.  A delay in compatible RBCs may occur.     Blood-Group Specific Substance      Anti-K present. Expect delays in  "blood for transfusion.  Draw 2 lavender top tubes for type and screen orders.      Calcitriol Rash     Ciprofloxacin Rash     Zosyn Rash        Physical Examination Review of Systems   BP 90/54 (BP Location: Left arm, Patient Position: Sitting, Cuff Size: Adult Regular)   Pulse 76   Resp 16   Ht 1.854 m (6' 1\")   Wt 85.7 kg (189 lb)   BMI 24.94 kg/m    Body mass index is 24.94 kg/m .  Wt Readings from Last 3 Encounters:   07/30/21 85.7 kg (189 lb)   07/19/21 86.8 kg (191 lb 6.4 oz)   07/19/21 87.1 kg (192 lb)       General Appearance:   Pleasant  male, appears  stated age. no acute distress, frail  body habitus   ENT/Mouth: membranes moist, no apparent gingival bleeding.      EYES:  no scleral icterus, normal conjunctivae   Neck: no carotid bruits. No anterior cervical lymphadenopaty   Respiratory:   lungs are clear to auscultation, no rales or wheezing, equal chest wall expansion    Cardiovascular:   Irregularly irregular. Normal first and second heart sounds with no murmurs, rubs, or gallops; the carotid, radial and posterior tibial pulses are intact, Jugular venous pressure normal, no edema bilaterally    Abdomen/GI:  no organomegaly, masses, bruits, or tenderness; bowel sounds are present   Extremities: no cyanosis or clubbing   Skin: no xanthelasma, warm.    Heme/lymph/ Immunology No apparent bleeding noted.   Neurologic: Alert and oriented. normal gait, no tremors     Psychiatric: Pleasant, calm, appropriate affect.    A complete 10 system review of systems was performed and is negative except as mentioned in the HPI/subjective.         Past History   Past Medical History:   Past Medical History:   Diagnosis Date     Abscess of tongue      Chronic kidney disease     CKD stage 4,dialysis [facility]     Diabetes (H)     type 2     DVT (deep venous thrombosis) (H)      End stage renal disease (H)      Hernia, umbilical      History of transfusion      Hyperlipidemia      Hypertension      CATHLEEN (obstructive " sleep apnea)     uses CPAP     PVD (peripheral vascular disease) (H)      Renal insufficiency      Stroke (H) 2003    minor left sided weakness       Past Surgical History:   Past Surgical History:   Procedure Laterality Date     AMPUTATE TOE(S) Bilateral     multiple     ARTERIAL BYPASS SURGERY Bilateral     lower extremities, Dr. Cottrell     CV CORONARY ANGIOGRAM N/A 1/20/2021    Procedure: Coronary Angiogram;  Surgeon: Tuyet Arreguin MD;  Location: [facility];  Service: Cardiology     CV LEFT HEART CATHETERIZATION WITHOUT LEFT VENTRICULOGRAM Left 1/20/2021    Procedure: Left Heart Catheterization Without Left Ventriculogram;  Surgeon: Tuyet Arreguin MD;  Location: [facility];  Service: Cardiology     HERNIA REPAIR, UMBILICAL N/A 4/29/2016    Procedure: OPEN UMBILICAL REPAIR WITH MESH;  Surgeon: Jevon Rivas MD;  Location: [facility];  Service:      ILEOSTOMY N/A 4/21/2021    Procedure: Open ileostomy revision;  Surgeon: Ty Walker DO;  Location: [facility] OR     IR AV FISTULAGRAM  1/27/2017     IR CHOLECYSTOSTOMY  6/7/2021     IR CVC TUNNEL PLACEMENT > 5 YRS OF AGE  12/30/2015     IR EXTREMITY ANGIOGRAM LEFT  7/9/2021     IR GALLBLADDER DRAIN PLACEMENT  6/7/2021     IR LOWER EXTREMITY ANGIOGRAM LEFT  7/9/2021     LAPAROTOMY EXPLORATORY N/A 3/31/2021    Procedure: LAPAROTOMY, RIGHT HEMICOLECTOMY, ILEOSTOMY CREATION;  Surgeon: Harsh Santos MD;  Location: [facility] OR     US THORACENTESIS  6/7/2021       Family History:   Family History   Problem Relation Age of Onset     Diabetes Father      Hypertension Father      Heart Disease Father      Hypertension Mother      Coronary Artery Disease Father        Social History:   Social History     Socioeconomic History     Marital status:      Spouse name: Not on file     Number of children: 3     Years of education: Not on file     Highest education level: Not on file   Occupational History     Not on  file   Tobacco Use     Smoking status: Former Smoker     Packs/day: 4.00     Quit date: 1995     Years since quittin.5     Smokeless tobacco: Never Used   Substance and Sexual Activity     Alcohol use: No     Drug use: No     Sexual activity: Not on file   Other Topics Concern     Not on file   Social History Narrative     Not on file     Social Determinants of Health     Financial Resource Strain:      Difficulty of Paying Living Expenses:    Food Insecurity:      Worried About Running Out of Food in the Last Year:      Ran Out of Food in the Last Year:    Transportation Needs:      Lack of Transportation (Medical):      Lack of Transportation (Non-Medical):    Physical Activity:      Days of Exercise per Week:      Minutes of Exercise per Session:    Stress:      Feeling of Stress :    Social Connections:      Frequency of Communication with Friends and Family:      Frequency of Social Gatherings with Friends and Family:      Attends [employer] Services:      Active Member of Clubs or Organizations:      Attends Club or Organization Meetings:      Marital Status:    Intimate Partner Violence:      Fear of Current or Ex-Partner:      Emotionally Abused:      Physically Abused:      Sexually Abused:               Lab Results    Chemistry/lipid CBC Cardiac Enzymes/BNP/TSH/INR   Lab Results   Component Value Date    CHOL 69 2021    HDL 36 (L) 2021    TRIG 56 2021    BUN 32 (H) 2021     (L) 2021    CO2 21 (L) 2021       Lab Results   Component Value Date    LDL 23 2021    Lab Results   Component Value Date    WBC 5.1 2021    HGB 11.1 (L) 2021    HCT 34.0 (L) 2021     (H) 2021     2021     Lab Results   Component Value Date    A1C 7.1 (H) 2021   ] Lab Results   Component Value Date    TROPONINI 0.93 (HH) 2021    BNP 1,281 (H) 2021    TSH 1.68 2021    INR 1.72 (H) 2021          Mihai Daniel,  MD [facility]  Non-Invasive Cardiologist  [facility]  Pager [phone]        Thank you for allowing me to participate in the care of your patient.      Sincerely,     Mihai Daniel MD     [facility]  cc:   No referring provider defined for this encounter.

## 2021-08-02 NOTE — LETTER
8/2/2021        RE: Alexei Blake  [address] [place] [address]        Progress Notes by Candie Mcrae CNP at 6/30/2021 12:25 PM     Author: Candie Mcrae CNP Service: -- Author Type: Nurse Practitioner    Filed: 7/8/2021 10:28 AM Encounter Date: 6/30/2021 Status: Signed    : Candie Mcrae CNP (Nurse Practitioner)       [facility]    Facility:   [facility] [[id number]]   Code Status: POLST AVAILABLE      CHIEF COMPLAINT/REASON FOR VISIT:  Chief Complaint   Patient presents with     Problem Visit       HISTORY:      HPI: Alexei is a 81 y.o. male with pmh of ESRD on dialysis three times weekly, CVA with residual left sided weakness, htn, who presented to the ED with acute onset of abdominal pain during dialysis.  He had ischemic changes of the right colon and was brought to the OR for colonic resection and ileostomy on 331.  He also had an ileostomy revision on 4/21.  He is in the TCU for medical management, wound management for the abdominal wound secondary to repair, and therapy for strengthening.    He has dialysis on Tuesday, Thursday and Saturdays.  For his anemia, will be starting Epogen at hemodialysis.    Patient was hospitalized from 6/2 - 6/4 for pneumonia with focal left lobe atelectasis with  moderate to large left pleural effusion. During hospitalization patient as treated with IV  Zosyn and developed full body itching before medication was stopped.  Patient returned to the TCU on 6/4 and was sent back to [facility] on 6/6  because of abdominal pain. Abdominal CT revealed three 1.2 cm walled pancreatic head  cysts. Patient s lipase was elevated at 134 and WBC 13.1. He was treated for Acute  cholecystitis with a cholecystostomy tube performed by IR. It was discovered during his  second hospitalization the left pleural effusion from previous hospitalization 6/2-6/4 had  increased and required a left lung  thoracentesis on 6/7, resulting in 750 ml of serous fluid  being pulled off, but no growth in culture present per hospital discharge note.  Recommended to have repeat chest x-ray 2 weeks post-discharge.  During hospitalization MRI was performed on his left foot due to ulcers on the 4 th and 5 th  toes and heel - imaging was suggestive of osteomyelitis. Per notes vascular surgery  recommending angiogram before surgery, which will be performed outpatient. Patient  discharged on Bactrim once daily x 30 days- this was apparently switched to Augementin per second discharge note date 6/17.   Per hospital notes patient was originally supposed to be discharged on 6/12 but while  patient was sitting up in his wheelchair to be discharge, he experienced a syncopal  episode requiring rapid respond. All work-up, including MRI and carotid ultrasound, were  inconclusive in explaining syncopal event. Recommendation by cardiology is to follow-  up outpatient.    Today: Seen today at request of nursing for generally feeling unwell and inspiratory chest pain.  Yair says he began feeling unwell 3 AM and he was up out of his sleep like this.  Complains of malaise, mild chills.  He is afebrile.  Has an infrequent, nonproductive cough.  Any no pleuritic type chest pain and does have some grimacing during exam.  Lung sounds with diminished bases however I do hear air movement throughout the left lobe which had previously had an effusion.  He is satting at 97% on room air, and heart rate is 72.  Recent history of stent placement in March as well as left pleural effusion in June.  Hemodynamically stable.  Will order EKG, stat chest x-ray and labs for tomorrow.   Chest x-ray returned while I was still in the building, showed increasing development of left lower lobe infiltrate.  Will treat with doxycycline today.    Past Medical History:   Diagnosis Date     Abscess of tongue      Chronic kidney disease     CKD stage 4,dialysis [facility]      "Diabetes (H)     type 2     DVT (deep venous thrombosis) (H)      End stage renal disease (H)      Hernia, umbilical      History of transfusion      Hyperlipidemia      Hypertension      CATHLEEN (obstructive sleep apnea)     uses CPAP     PVD (peripheral vascular disease) (H)      Renal insufficiency      Stroke (H) 2003    minor left sided weakness             Family History   Problem Relation Age of Onset     Hypertension Mother      Hypertension Father      Diabetes Father      Heart attack Father      Social History     Socioeconomic History     Marital status:      Spouse name: Not on file     Number of children: 3     Years of education: Not on file     Highest education level: Not on file   Occupational History     Occupation: Retired - [employer]   Social Needs     Financial resource strain: Not on file     Food insecurity     Worry: Not on file     Inability: Not on file     Transportation needs     Medical: Not on file     Non-medical: Not on file   Tobacco Use     Smoking status: Former Smoker     Packs/day: 4.00     Quit date: 1995     Years since quittin.5     Smokeless tobacco: Never Used   Substance and Sexual Activity     Alcohol use: No     Drug use: No     Sexual activity: Not on file   Lifestyle     Physical activity     Days per week: Not on file     Minutes per session: Not on file     Stress: Not on file   Relationships     Social connections     Talks on phone: Not on file     Gets together: Not on file     Attends Mu-ism service: Not on file     Active member of club or organization: Not on file     Attends meetings of clubs or organizations: Not on file     Relationship status: Not on file     Intimate partner violence     Fear of current or ex partner: Not on file     Emotionally abused: Not on file     Physically abused: Not on file     Forced sexual activity: Not on file   Other Topics Concern     Not on file   Social History Narrative    3/20/21: \"Bill\" is here " "in the ED with his wife today.         Review of Systems   Constitutional: Negative.    HENT: Negative.    Respiratory: Positive for cough, inspiratory chest pain.  Cardiovascular: Negative.    Gastrointestinal: Negative for abdominal distention, abdominal pain and nausea.        Mild tenderness where melissa drain is placed.    Genitourinary: Negative.    Musculoskeletal: Negative.    Skin: Positive for wound.   Neurological: Positive for weakness.       Vitals:    06/30/21 1225   BP: 119/66   Pulse: 69   Resp: 18   Temp: 97.8  F (36.6  C)   SpO2: 97%   Weight: 176 lb 8 oz (80.1 kg)   Height: 6' 1\" (1.854 m)       Physical Exam  Constitutional:       Appearance: Normal appearance. He is normal weight. He is ill-appearing.   HENT:      Head: Atraumatic.      Mouth/Throat:      Mouth: Mucous membranes are moist.   Eyes:      General: No scleral icterus.     Extraocular Movements: Extraocular movements intact.   Cardiovascular:      Rate and Rhythm: Normal rate, irregular rhythm.  Pulmonary:      Effort: Pulmonary effort is normal. No respiratory distress.      Breath sounds: No wheezing.      Comments: Diminished bilaterally, wheezing.  Abdominal:      General: Abdomen is flat. There is no distension.      Palpations: Abdomen is soft.      Tenderness: There is no abdominal tenderness.      Comments: Iliostomy, melissa tube R side.    Musculoskeletal: Normal range of motion.      Right lower leg: No edema.      Left lower leg: No edema.      Comments: AV fistula to RUE.      Skin:     General: Skin is warm and dry.   Neurological:      General: No focal deficit present.   Psychiatric:         Mood and Affect: Mood normal.         Behavior: Behavior normal.         LABS:   Reviewed.     ASSESSMENT/ PLAN:     (E11.621,  L97.509,  Z79.4) Type 2 diabetes mellitus with foot ulcer, with long-term current use of insulin (H)    Plan: Reviewed blood sugars today.  - Lantus 5 units at HS  - Novolog sliding scale  - BG checks " TID       (J18.9) Community acquired pneumonia of left lower lobe of lung (primary encounter diagnosis).  Unstable.  Stat chest x-ray today revealed increasing left lower lobe infiltrate.  Increased frequency of nonproductive cough.  Mild chills and malaise.  Will add doxycycline, patient is already on Augmentin for osteomyelitis.  Sats are 97% on room air.  Respiratory rate is 22.  He does continue to have some inspiratory chest pain however recent INR in therapeutic range and without tachycardia or hypoxia low suspicion for PE.  Instructed patient to monitor symptoms and if continuing to have inspiratory chest pain, or decrease that or fever, to go to ER.  Also discussed with nursing staff.  Stat EKG was unchanged from prior.  Plan:   -Start doxycycline 100 mg p.o. twice daily x5 days.  -CBC, procalcitonin, d dimer tomorrow morning.    (N18.5) CKD (chronic kidney disease) stage 5, GFR less than 15 ml/min (H)  Plan: Anemia of chronic disease  -On Epogen at dialysis.  -Has dialysis Tuesday, Thursday, Saturday.  -Liberalize diet to general diet due to poor po intake.  Dialysis monitoring.  -Renal caps 1 mg daily.    Acute cholecystitis: Discovered upon re-hospitalization on 6/6 when patient began to  experience right sided abdominal pain. Cholecystostomy tube placed on 6/7 by IR.  -Cholecystostomy tube - nursing continues to monitor output and tube site.  Repeat cholangiogram soon.  -To follow-up with surgery clinic in 3-4 months to discuss potential  cholecystectomy surgery.    Left pleural effusion: Thoracentesis was performed on 6/7 resulting in 750 ml of fluid  being pulled off. Cultures showed no bacteria growth.  Follow-up chest x-ray with resolving infiltrate, no pleural fluid.    Osteomyelitis - 4 th and 5 th left toes and left heel:    -Completed angiogram; will have left popliteal to peroneal artery bypass per Vascular.  - Currently receiving Augmentin; continue until follows up with podiatry/surgeon.  -  "Continue current wound care tx. To left foot.    Dialysis related hypotension  Syncopal episode during hospitalization: Unstable.  Had stress test on 7/16; per facility note the stress test was \"abnomral\" however per Vascular surgeon note 7/19, he has \"been cleared by cardiology\".   -Continue to encourage fluids continue.    Status post ileostomy: dark stool in bag.  -Pantoprazole 40 mg daily.  -Oxycodone to every 6 hours as needed.  -Tylenol 500 mg every 4 hours as needed.     CAD  A. Fib  History of CVA:   -Continue metoprolol twice daily on nondialysis days.  -Atorvastatin 80 mg at bedtime.  -Aspirin 81 daily.  -Clopidogrel 75 daily.  -Isosorbide 5 mg 3 times a day.  -Nitroglycerin SL 0.4 mg PRN   -Current Coumadin 4 mg.     Allergic rhinitis:  -Flonase 2 sprays daily.     Disposition: Discussed timeline today. Likely going to return to his home where he lives with his wife.      Electronically signed by: Candie Mcrae CNP                 Sincerely,        Candie Mcrae CNP      "

## 2021-08-02 NOTE — PROGRESS NOTES
Patient is overdue for INR.   Updating check date to today so patient shows on our reminder list.      Patient has been in a TCU- Follow up needed to know if he is still there    Overdue INR reminder updated

## 2021-08-03 PROBLEM — J18.9 COMMUNITY ACQUIRED PNEUMONIA OF LEFT LOWER LOBE OF LUNG: Status: ACTIVE | Noted: 2021-01-01

## 2021-08-03 NOTE — PROGRESS NOTES
Progress Notes by Candie Mcrae CNP at 6/30/2021 12:25 PM     Author: Candie Mcrae CNP Service: -- Author Type: Nurse Practitioner    Filed: 7/8/2021 10:28 AM Encounter Date: 6/30/2021 Status: Signed    : Candie Mcrae CNP (Nurse Practitioner)       [facility]    Facility:   [facility] [[id number]]   Code Status: POLST AVAILABLE      CHIEF COMPLAINT/REASON FOR VISIT:  Chief Complaint   Patient presents with     Problem Visit       HISTORY:      HPI: Alexei is a 81 y.o. male with pmh of ESRD on dialysis three times weekly, CVA with residual left sided weakness, htn, who presented to the ED with acute onset of abdominal pain during dialysis.  He had ischemic changes of the right colon and was brought to the OR for colonic resection and ileostomy on 331.  He also had an ileostomy revision on 4/21.  He is in the TCU for medical management, wound management for the abdominal wound secondary to repair, and therapy for strengthening.    He has dialysis on Tuesday, Thursday and Saturdays.  For his anemia, will be starting Epogen at hemodialysis.    Patient was hospitalized from 6/2 - 6/4 for pneumonia with focal left lobe atelectasis with  moderate to large left pleural effusion. During hospitalization patient as treated with IV  Zosyn and developed full body itching before medication was stopped.  Patient returned to the TCU on 6/4 and was sent back to [facility] on 6/6  because of abdominal pain. Abdominal CT revealed three 1.2 cm walled pancreatic head  cysts. Patient s lipase was elevated at 134 and WBC 13.1. He was treated for Acute  cholecystitis with a cholecystostomy tube performed by IR. It was discovered during his  second hospitalization the left pleural effusion from previous hospitalization 6/2-6/4 had  increased and required a left lung thoracentesis on 6/7, resulting in 750 ml of serous fluid  being pulled off, but no growth in culture  present per hospital discharge note.  Recommended to have repeat chest x-ray 2 weeks post-discharge.  During hospitalization MRI was performed on his left foot due to ulcers on the 4 th and 5 th  toes and heel - imaging was suggestive of osteomyelitis. Per notes vascular surgery  recommending angiogram before surgery, which will be performed outpatient. Patient  discharged on Bactrim once daily x 30 days- this was apparently switched to Augementin per second discharge note date 6/17.   Per hospital notes patient was originally supposed to be discharged on 6/12 but while  patient was sitting up in his wheelchair to be discharge, he experienced a syncopal  episode requiring rapid respond. All work-up, including MRI and carotid ultrasound, were  inconclusive in explaining syncopal event. Recommendation by cardiology is to follow-  up outpatient.    Today: Seen today at request of nursing for generally feeling unwell and inspiratory chest pain.  Yair says he began feeling unwell 3 AM and he was up out of his sleep like this.  Complains of malaise, mild chills.  He is afebrile.  Has an infrequent, nonproductive cough.  Any no pleuritic type chest pain and does have some grimacing during exam.  Lung sounds with diminished bases however I do hear air movement throughout the left lobe which had previously had an effusion.  He is satting at 97% on room air, and heart rate is 72.  Recent history of stent placement in March as well as left pleural effusion in June.  Hemodynamically stable.  Will order EKG, stat chest x-ray and labs for tomorrow.   Chest x-ray returned while I was still in the building, showed increasing development of left lower lobe infiltrate.  Will treat with doxycycline today.    Past Medical History:   Diagnosis Date     Abscess of tongue      Chronic kidney disease     CKD stage 4,dialysis Tu-Th-Sa     Diabetes (H)     type 2     DVT (deep venous thrombosis) (H)      End stage renal disease (H)       "Hernia, umbilical      History of transfusion      Hyperlipidemia      Hypertension      CATHLEEN (obstructive sleep apnea)     uses CPAP     PVD (peripheral vascular disease) (H)      Renal insufficiency      Stroke (H) 2003    minor left sided weakness             Family History   Problem Relation Age of Onset     Hypertension Mother      Hypertension Father      Diabetes Father      Heart attack Father      Social History     Socioeconomic History     Marital status:      Spouse name: Not on file     Number of children: 3     Years of education: Not on file     Highest education level: Not on file   Occupational History     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   Social Needs     Financial resource strain: Not on file     Food insecurity     Worry: Not on file     Inability: Not on file     Transportation needs     Medical: Not on file     Non-medical: Not on file   Tobacco Use     Smoking status: Former Smoker     Packs/day: 4.00     Quit date: 1995     Years since quittin.5     Smokeless tobacco: Never Used   Substance and Sexual Activity     Alcohol use: No     Drug use: No     Sexual activity: Not on file   Lifestyle     Physical activity     Days per week: Not on file     Minutes per session: Not on file     Stress: Not on file   Relationships     Social connections     Talks on phone: Not on file     Gets together: Not on file     Attends Voodoo service: Not on file     Active member of club or organization: Not on file     Attends meetings of clubs or organizations: Not on file     Relationship status: Not on file     Intimate partner violence     Fear of current or ex partner: Not on file     Emotionally abused: Not on file     Physically abused: Not on file     Forced sexual activity: Not on file   Other Topics Concern     Not on file   Social History Narrative    3/20/21: \"Bill\" is here in the ED with his wife today.         Review of Systems   Constitutional: Negative.    HENT: " "Negative.    Respiratory: Positive for cough, inspiratory chest pain.  Cardiovascular: Negative.    Gastrointestinal: Negative for abdominal distention, abdominal pain and nausea.        Mild tenderness where melissa drain is placed.    Genitourinary: Negative.    Musculoskeletal: Negative.    Skin: Positive for wound.   Neurological: Positive for weakness.       Vitals:    06/30/21 1225   BP: 119/66   Pulse: 69   Resp: 18   Temp: 97.8  F (36.6  C)   SpO2: 97%   Weight: 176 lb 8 oz (80.1 kg)   Height: 6' 1\" (1.854 m)       Physical Exam  Constitutional:       Appearance: Normal appearance. He is normal weight. He is ill-appearing.   HENT:      Head: Atraumatic.      Mouth/Throat:      Mouth: Mucous membranes are moist.   Eyes:      General: No scleral icterus.     Extraocular Movements: Extraocular movements intact.   Cardiovascular:      Rate and Rhythm: Normal rate, irregular rhythm.  Pulmonary:      Effort: Pulmonary effort is normal. No respiratory distress.      Breath sounds: No wheezing.      Comments: Diminished bilaterally, wheezing.  Abdominal:      General: Abdomen is flat. There is no distension.      Palpations: Abdomen is soft.      Tenderness: There is no abdominal tenderness.      Comments: Iliostomy, melissa tube R side.    Musculoskeletal: Normal range of motion.      Right lower leg: No edema.      Left lower leg: No edema.      Comments: AV fistula to RUE.      Skin:     General: Skin is warm and dry.   Neurological:      General: No focal deficit present.   Psychiatric:         Mood and Affect: Mood normal.         Behavior: Behavior normal.         LABS:   Reviewed.     ASSESSMENT/ PLAN:     (E11.621,  L97.509,  Z79.4) Type 2 diabetes mellitus with foot ulcer, with long-term current use of insulin (H)    Plan: Reviewed blood sugars today.  - Lantus 5 units at HS  - Novolog sliding scale  - BG checks TID       (J18.9) Community acquired pneumonia of left lower lobe of lung (primary encounter " diagnosis).  Unstable.  Stat chest x-ray today revealed increasing left lower lobe infiltrate.  Increased frequency of nonproductive cough.  Mild chills and malaise.  Will add doxycycline, patient is already on Augmentin for osteomyelitis.  Sats are 97% on room air.  Respiratory rate is 22.  He does continue to have some inspiratory chest pain however recent INR in therapeutic range and without tachycardia or hypoxia low suspicion for PE.  Instructed patient to monitor symptoms and if continuing to have inspiratory chest pain, or decrease that or fever, to go to ER.  Also discussed with nursing staff.  Stat EKG was unchanged from prior.  Plan:   -Start doxycycline 100 mg p.o. twice daily x5 days.  -CBC, procalcitonin, d dimer tomorrow morning.    (N18.5) CKD (chronic kidney disease) stage 5, GFR less than 15 ml/min (H)  Plan: Anemia of chronic disease  -On Epogen at dialysis.  -Has dialysis Tuesday, Thursday, Saturday.  -Liberalize diet to general diet due to poor po intake.  Dialysis monitoring.  -Renal caps 1 mg daily.    Acute cholecystitis: Discovered upon re-hospitalization on 6/6 when patient began to  experience right sided abdominal pain. Cholecystostomy tube placed on 6/7 by IR.  -Cholecystostomy tube - nursing continues to monitor output and tube site.  Repeat cholangiogram soon.  -To follow-up with surgery clinic in 3-4 months to discuss potential  cholecystectomy surgery.    Left pleural effusion: Thoracentesis was performed on 6/7 resulting in 750 ml of fluid  being pulled off. Cultures showed no bacteria growth.  Follow-up chest x-ray with resolving infiltrate, no pleural fluid.    Osteomyelitis - 4 th and 5 th left toes and left heel:    -Completed angiogram; will have left popliteal to peroneal artery bypass per Vascular.  - Currently receiving Augmentin; continue until follows up with podiatry/surgeon.  - Continue current wound care tx. To left foot.    Dialysis related hypotension  Syncopal episode  "during hospitalization: Unstable.  Had stress test on 7/16; per facility note the stress test was \"abnomral\" however per Vascular surgeon note 7/19, he has \"been cleared by cardiology\".   -Continue to encourage fluids continue.    Status post ileostomy: dark stool in bag.  -Pantoprazole 40 mg daily.  -Oxycodone to every 6 hours as needed.  -Tylenol 500 mg every 4 hours as needed.     CAD  A. Fib  History of CVA:   -Continue metoprolol twice daily on nondialysis days.  -Atorvastatin 80 mg at bedtime.  -Aspirin 81 daily.  -Clopidogrel 75 daily.  -Isosorbide 5 mg 3 times a day.  -Nitroglycerin SL 0.4 mg PRN   -Current Coumadin 4 mg.     Allergic rhinitis:  -Flonase 2 sprays daily.     Disposition: Discussed timeline today. Likely going to return to his home where he lives with his wife.      Electronically signed by: Candie Mcrae CNP           "

## 2021-08-03 NOTE — TELEPHONE ENCOUNTER
[facility] INR Nurse Triage    Provider: SETH Carty  Facility: [facility]  Facility Type:  TCU    Caller: Dora  Call Back Number: [phone]  Reason for call: INR  Diagnosis/Goal: A. Fib    Todays INR: 3.1  Last INR   7/30 2.3 4mg every day  7/23 1.8 4mg every day     Heparin/Lovenox:  No  Currently on ABX?: Yes; please explain: Doxycycline 100mg BID until 8/6 for pneumonia  Other interacting medication:  None  Missed or refused doses: No     Other scheduled labs missed today and rescheduled for tomorrow 8/4 d/t pt at \[facility]\""    Verbal Order/Direction given by Provider: Coumadin 3mg today then 4mg on 8/4 and recheck INR on 8/5    Provider Giving Order:  SETH Carty    Verbal Order given to: Fernanda Pandya RN

## 2021-08-04 NOTE — LETTER
8/4/2021        RE: Alexei Blake  [address] [place] [address]        Progress Notes by Candie Mcrae CNP at 6/30/2021 12:25 PM     Author: Candie Mcrae CNP Service: -- Author Type: Nurse Practitioner    Filed: 7/8/2021 10:28 AM Encounter Date: 6/30/2021 Status: Signed    : Candie Mcrae CNP (Nurse Practitioner)       [facility]    Facility:   [facility] [[id number]]   Code Status: POLST AVAILABLE      CHIEF COMPLAINT/REASON FOR VISIT:  Chief Complaint   Patient presents with     Problem Visit       HISTORY:      HPI: Alexei is a 81 y.o. male with pmh of ESRD on dialysis three times weekly, CVA with residual left sided weakness, htn, who presented to the ED with acute onset of abdominal pain during dialysis.  He had ischemic changes of the right colon and was brought to the OR for colonic resection and ileostomy on 331.  He also had an ileostomy revision on 4/21.  He is in the TCU for medical management, wound management for the abdominal wound secondary to repair, and therapy for strengthening.    He has dialysis on Tuesday, Thursday and Saturdays.  For his anemia, will be starting Epogen at hemodialysis.    Patient was hospitalized from 6/2 - 6/4 for pneumonia with focal left lobe atelectasis with  moderate to large left pleural effusion. During hospitalization patient as treated with IV  Zosyn and developed full body itching before medication was stopped.  Patient returned to the TCU on 6/4 and was sent back to [facility] on 6/6  because of abdominal pain. Abdominal CT revealed three 1.2 cm walled pancreatic head  cysts. Patient s lipase was elevated at 134 and WBC 13.1. He was treated for Acute  cholecystitis with a cholecystostomy tube performed by IR. It was discovered during his  second hospitalization the left pleural effusion from previous hospitalization 6/2-6/4 had  increased and required a left lung  thoracentesis on 6/7, resulting in 750 ml of serous fluid  being pulled off, but no growth in culture present per hospital discharge note.  Recommended to have repeat chest x-ray 2 weeks post-discharge.  During hospitalization MRI was performed on his left foot due to ulcers on the 4 th and 5 th  toes and heel - imaging was suggestive of osteomyelitis. Per notes vascular surgery  recommending angiogram before surgery, which will be performed outpatient. Patient  discharged on Bactrim once daily x 30 days- this was apparently switched to Augementin per second discharge note date 6/17.   Per hospital notes patient was originally supposed to be discharged on 6/12 but while  patient was sitting up in his wheelchair to be discharge, he experienced a syncopal  episode requiring rapid respond. All work-up, including MRI and carotid ultrasound, were  inconclusive in explaining syncopal event. Recommendation by cardiology is to follow-  up outpatient.    Today: Yair is seen to follow-up on recent diagnosis of evolving left lower lobe infiltrate, malaise and pleuritic chest pain.  He is reporting improvement in inspiratory chest pain and has not had any of the symptoms for the last 24 hours.  Is vital signs remained stable, no fever and O2 sats 97 to 99% on room air.  Labs were unable to be drawn so will be drawn tomorrow.  He is eating and drinking well.  He is alert and oriented to his baseline.  Discussed with wife who is also in the room, and has had a concern and will be transitioning down to long-term care in the meantime.  He is no longer approved for therapy as he has completed his Medicare days.    Past Medical History:   Diagnosis Date     Abscess of tongue      Chronic kidney disease     CKD stage 4,dialysis Tu-Th-Sa     Diabetes (H)     type 2     DVT (deep venous thrombosis) (H)      End stage renal disease (H)      Hernia, umbilical      History of transfusion      Hyperlipidemia      Hypertension      CATHLEEN  "(obstructive sleep apnea)     uses CPAP     PVD (peripheral vascular disease) (H)      Renal insufficiency      Stroke (H) 2003    minor left sided weakness             Family History   Problem Relation Age of Onset     Hypertension Mother      Hypertension Father      Diabetes Father      Heart attack Father      Social History     Socioeconomic History     Marital status:      Spouse name: Not on file     Number of children: 3     Years of education: Not on file     Highest education level: Not on file   Occupational History     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 [employer]   Social Needs     Financial resource strain: Not on file     Food insecurity     Worry: Not on file     Inability: Not on file     Transportation needs     Medical: Not on file     Non-medical: Not on file   Tobacco Use     Smoking status: Former Smoker     Packs/day: 4.00     Quit date: 1995     Years since quittin.5     Smokeless tobacco: Never Used   Substance and Sexual Activity     Alcohol use: No     Drug use: No     Sexual activity: Not on file   Lifestyle     Physical activity     Days per week: Not on file     Minutes per session: Not on file     Stress: Not on file   Relationships     Social connections     Talks on phone: Not on file     Gets together: Not on file     Attends Mandaen service: Not on file     Active member of club or organization: Not on file     Attends meetings of clubs or organizations: Not on file     Relationship status: Not on file     Intimate partner violence     Fear of current or ex partner: Not on file     Emotionally abused: Not on file     Physically abused: Not on file     Forced sexual activity: Not on file   Other Topics Concern     Not on file   Social History Narrative    3/20/21: \"Bill\" is here in the ED with his wife today.         Review of Systems   Constitutional: Negative.    HENT: Negative.    Respiratory: Positive for cough, inspiratory chest pain.  Cardiovascular: Negative.  " "  Gastrointestinal: Negative for abdominal distention, abdominal pain and nausea.        Mild tenderness where melissa drain is placed.    Genitourinary: Negative.    Musculoskeletal: Negative.    Skin: Positive for wound.   Neurological: Positive for weakness.       Vitals:    06/30/21 1225   BP: 119/66   Pulse: 69   Resp: 18   Temp: 97.8  F (36.6  C)   SpO2: 97%   Weight: 176 lb 8 oz (80.1 kg)   Height: 6' 1\" (1.854 m)       Physical Exam  Constitutional:       Appearance: Normal appearance. He is normal weight. He is ill-appearing.   HENT:      Head: Atraumatic.      Mouth/Throat:      Mouth: Mucous membranes are moist.   Eyes:      General: No scleral icterus.     Extraocular Movements: Extraocular movements intact.   Cardiovascular:      Rate and Rhythm: Normal rate, irregular rhythm.  Pulmonary:      Effort: Pulmonary effort is normal. No respiratory distress.      Breath sounds: No wheezing.      Comments: Diminished bilaterally, wheezing.  Abdominal:      General: Abdomen is flat. There is no distension.      Palpations: Abdomen is soft.      Tenderness: There is no abdominal tenderness.      Comments: Iliostomy, melissa tube R side.    Musculoskeletal: Normal range of motion.      Right lower leg: No edema.      Left lower leg: No edema.      Comments: AV fistula to RUE.      Skin:     General: Skin is warm and dry.   Neurological:      General: No focal deficit present.   Psychiatric:         Mood and Affect: Mood normal.         Behavior: Behavior normal.         LABS:   Reviewed.     ASSESSMENT/ PLAN:     (E11.621,  L97.509,  Z79.4) Type 2 diabetes mellitus with foot ulcer, with long-term current use of insulin (H)    Plan: Reviewed blood sugars today.  - Lantus 5 units at HS  - Novolog sliding scale  - BG checks TID     (J18.9) Community acquired pneumonia of left lower lobe of lung (primary encounter diagnosis).  Unstable.  Reports improvement in cough, body aches.  Vital signs remained stable.  Afebrile.  " "Reports improvement in pleuritic chest pain has not had any for the last day.  Discussed with wife also in the room.  Stat chest x-ray today revealed increasing left lower lobe infiltrate. Stat EKG was unchanged from prior.  Plan:   -Continue doxycycline 100 mg p.o. twice daily x5 days.  -CBC, procalcitonin, d dimer tomorrow morning.    (N18.5) CKD (chronic kidney disease) stage 5, GFR less than 15 ml/min (H)  Plan: Anemia of chronic disease  -On Epogen at dialysis.  -Has dialysis Tuesday, Thursday, Saturday.  -Liberalize diet to general diet due to poor po intake.  Dialysis monitoring.  -Renal caps 1 mg daily.    Acute cholecystitis: Discovered upon re-hospitalization on 6/6 when patient began to  experience right sided abdominal pain. Cholecystostomy tube placed on 6/7 by IR.  -Cholecystostomy tube - nursing continues to monitor output and tube site.  Repeat cholangiogram on 7/28.   -To follow-up with surgery clinic in 3-4 months to discuss potential  cholecystectomy surgery.    Left pleural effusion: Thoracentesis was performed on 6/7 resulting in 750 ml of fluid  being pulled off. Cultures showed no bacteria growth.  Follow-up chest x-ray with resolving infiltrate, no pleural fluid.    Osteomyelitis - 4 th and 5 th left toes and left heel:  Surgery scheduled for 8/25. Preop will be completed next week.   -Completed angiogram; will have left popliteal to peroneal artery bypass per Vascular.  - Currently receiving Augmentin; continue until follows up with podiatry/surgeon.  - Continue current wound care tx. To left foot.    Dialysis related hypotension  Syncopal episode during hospitalization:  Had stress test on 7/16; per facility note the stress test was \"abnomral\" however per Vascular surgeon note 7/19, he has \"been cleared by cardiology\".   -Continue to encourage fluids continue.    Status post ileostomy: dark stool in bag.  -Pantoprazole 40 mg daily.  -Oxycodone to every 6 hours as needed.  -Tylenol 500 mg " every 4 hours as needed.     CAD  A. Fib  History of CVA:   -Continue metoprolol twice daily on nondialysis days.  -Atorvastatin 80 mg at bedtime.  -Aspirin 81 daily.  -Clopidogrel 75 daily.  -Isosorbide 5 mg 3 times a day.  -Nitroglycerin SL 0.4 mg PRN   -Current Coumadin 4 mg.     Allergic rhinitis:  -Flonase 2 sprays daily.     Disposition: Discussed with nursing, patient will likely require long-term care based on surgeries have been completed.  No longer in therapy.      Electronically signed by: Candie Mcrae CNP                 Sincerely,        Candie Mcrae, CNP

## 2021-08-05 PROBLEM — J44.9 COPD (CHRONIC OBSTRUCTIVE PULMONARY DISEASE) (H): Status: ACTIVE | Noted: 2021-01-01

## 2021-08-06 NOTE — PROGRESS NOTES
Progress Notes by Candie Mcrae CNP at 6/30/2021 12:25 PM     Author: Candie Mcrae CNP Service: -- Author Type: Nurse Practitioner    Filed: 7/8/2021 10:28 AM Encounter Date: 6/30/2021 Status: Signed    : Candie Mcrae CNP (Nurse Practitioner)       [facility]    Facility:   [facility] [[id number]]   Code Status: POLST AVAILABLE      CHIEF COMPLAINT/REASON FOR VISIT:  Chief Complaint   Patient presents with     Problem Visit       HISTORY:      HPI: Alexei is a 81 y.o. male with pmh of ESRD on dialysis three times weekly, CVA with residual left sided weakness, htn, who presented to the ED with acute onset of abdominal pain during dialysis.  He had ischemic changes of the right colon and was brought to the OR for colonic resection and ileostomy on 331.  He also had an ileostomy revision on 4/21.  He is in the TCU for medical management, wound management for the abdominal wound secondary to repair, and therapy for strengthening.    He has dialysis on Tuesday, Thursday and Saturdays.  For his anemia, will be starting Epogen at hemodialysis.    Patient was hospitalized from 6/2 - 6/4 for pneumonia with focal left lobe atelectasis with  moderate to large left pleural effusion. During hospitalization patient as treated with IV  Zosyn and developed full body itching before medication was stopped.  Patient returned to the TCU on 6/4 and was sent back to [facility] on 6/6  because of abdominal pain. Abdominal CT revealed three 1.2 cm walled pancreatic head  cysts. Patient s lipase was elevated at 134 and WBC 13.1. He was treated for Acute  cholecystitis with a cholecystostomy tube performed by IR. It was discovered during his  second hospitalization the left pleural effusion from previous hospitalization 6/2-6/4 had  increased and required a left lung thoracentesis on 6/7, resulting in 750 ml of serous fluid  being pulled off, but no growth in culture  present per hospital discharge note.  Recommended to have repeat chest x-ray 2 weeks post-discharge.  During hospitalization MRI was performed on his left foot due to ulcers on the 4 th and 5 th  toes and heel - imaging was suggestive of osteomyelitis. Per notes vascular surgery  recommending angiogram before surgery, which will be performed outpatient. Patient  discharged on Bactrim once daily x 30 days- this was apparently switched to Augementin per second discharge note date 6/17.   Per hospital notes patient was originally supposed to be discharged on 6/12 but while  patient was sitting up in his wheelchair to be discharge, he experienced a syncopal  episode requiring rapid respond. All work-up, including MRI and carotid ultrasound, were  inconclusive in explaining syncopal event. Recommendation by cardiology is to follow-  up outpatient.    Today: Yair is seen to follow-up on recent diagnosis of evolving left lower lobe infiltrate, malaise and pleuritic chest pain.  He is reporting improvement in inspiratory chest pain and has not had any of the symptoms for the last 24 hours.  Is vital signs remained stable, no fever and O2 sats 97 to 99% on room air.  Labs were unable to be drawn so will be drawn tomorrow.  He is eating and drinking well.  He is alert and oriented to his baseline.  Discussed with wife who is also in the room, and has had a concern and will be transitioning down to long-term care in the meantime.  He is no longer approved for therapy as he has completed his Medicare days.    Past Medical History:   Diagnosis Date     Abscess of tongue      Chronic kidney disease     CKD stage 4,dialysis Tu-Th-Sa     Diabetes (H)     type 2     DVT (deep venous thrombosis) (H)      End stage renal disease (H)      Hernia, umbilical      History of transfusion      Hyperlipidemia      Hypertension      CATHLEEN (obstructive sleep apnea)     uses CPAP     PVD (peripheral vascular disease) (H)      Renal insufficiency   "    Stroke (H)     minor left sided weakness             Family History   Problem Relation Age of Onset     Hypertension Mother      Hypertension Father      Diabetes Father      Heart attack Father      Social History     Socioeconomic History     Marital status:      Spouse name: Not on file     Number of children: 3     Years of education: Not on file     Highest education level: Not on file   Occupational History     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 [employer]   Social Needs     Financial resource strain: Not on file     Food insecurity     Worry: Not on file     Inability: Not on file     Transportation needs     Medical: Not on file     Non-medical: Not on file   Tobacco Use     Smoking status: Former Smoker     Packs/day: 4.00     Quit date: 1995     Years since quittin.5     Smokeless tobacco: Never Used   Substance and Sexual Activity     Alcohol use: No     Drug use: No     Sexual activity: Not on file   Lifestyle     Physical activity     Days per week: Not on file     Minutes per session: Not on file     Stress: Not on file   Relationships     Social connections     Talks on phone: Not on file     Gets together: Not on file     Attends Caodaism service: Not on file     Active member of club or organization: Not on file     Attends meetings of clubs or organizations: Not on file     Relationship status: Not on file     Intimate partner violence     Fear of current or ex partner: Not on file     Emotionally abused: Not on file     Physically abused: Not on file     Forced sexual activity: Not on file   Other Topics Concern     Not on file   Social History Narrative    3/20/21: \"Bill\" is here in the ED with his wife today.         Review of Systems   Constitutional: Negative.    HENT: Negative.    Respiratory: Positive for cough, inspiratory chest pain.  Cardiovascular: Negative.    Gastrointestinal: Negative for abdominal distention, abdominal pain and nausea.        Mild tenderness " "where melissa drain is placed.    Genitourinary: Negative.    Musculoskeletal: Negative.    Skin: Positive for wound.   Neurological: Positive for weakness.       Vitals:    06/30/21 1225   BP: 119/66   Pulse: 69   Resp: 18   Temp: 97.8  F (36.6  C)   SpO2: 97%   Weight: 176 lb 8 oz (80.1 kg)   Height: 6' 1\" (1.854 m)       Physical Exam  Constitutional:       Appearance: Normal appearance. He is normal weight. He is ill-appearing.   HENT:      Head: Atraumatic.      Mouth/Throat:      Mouth: Mucous membranes are moist.   Eyes:      General: No scleral icterus.     Extraocular Movements: Extraocular movements intact.   Cardiovascular:      Rate and Rhythm: Normal rate, irregular rhythm.  Pulmonary:      Effort: Pulmonary effort is normal. No respiratory distress.      Breath sounds: No wheezing.      Comments: Diminished bilaterally, wheezing.  Abdominal:      General: Abdomen is flat. There is no distension.      Palpations: Abdomen is soft.      Tenderness: There is no abdominal tenderness.      Comments: Iliostomy, melissa tube R side.    Musculoskeletal: Normal range of motion.      Right lower leg: No edema.      Left lower leg: No edema.      Comments: AV fistula to RUE.      Skin:     General: Skin is warm and dry.   Neurological:      General: No focal deficit present.   Psychiatric:         Mood and Affect: Mood normal.         Behavior: Behavior normal.         LABS:   Reviewed.     ASSESSMENT/ PLAN:     (E11.621,  L97.509,  Z79.4) Type 2 diabetes mellitus with foot ulcer, with long-term current use of insulin (H)    Plan: Reviewed blood sugars today.  - Lantus 5 units at HS  - Novolog sliding scale  - BG checks TID     (J18.9) Community acquired pneumonia of left lower lobe of lung (primary encounter diagnosis).  Unstable.  Reports improvement in cough, body aches.  Vital signs remained stable.  Afebrile.  Reports improvement in pleuritic chest pain has not had any for the last day.  Discussed with wife also " "in the room.  Stat chest x-ray today revealed increasing left lower lobe infiltrate. Stat EKG was unchanged from prior.  Plan:   -Continue doxycycline 100 mg p.o. twice daily x5 days.  -CBC, procalcitonin, d dimer tomorrow morning.    (N18.5) CKD (chronic kidney disease) stage 5, GFR less than 15 ml/min (H)  Plan: Anemia of chronic disease  -On Epogen at dialysis.  -Has dialysis Tuesday, Thursday, Saturday.  -Liberalize diet to general diet due to poor po intake.  Dialysis monitoring.  -Renal caps 1 mg daily.    Acute cholecystitis: Discovered upon re-hospitalization on 6/6 when patient began to  experience right sided abdominal pain. Cholecystostomy tube placed on 6/7 by IR.  -Cholecystostomy tube - nursing continues to monitor output and tube site.  Repeat cholangiogram on 7/28.   -To follow-up with surgery clinic in 3-4 months to discuss potential  cholecystectomy surgery.    Left pleural effusion: Thoracentesis was performed on 6/7 resulting in 750 ml of fluid  being pulled off. Cultures showed no bacteria growth.  Follow-up chest x-ray with resolving infiltrate, no pleural fluid.    Osteomyelitis - 4 th and 5 th left toes and left heel:  Surgery scheduled for 8/25. Preop will be completed next week.   -Completed angiogram; will have left popliteal to peroneal artery bypass per Vascular.  - Currently receiving Augmentin; continue until follows up with podiatry/surgeon.  - Continue current wound care tx. To left foot.    Dialysis related hypotension  Syncopal episode during hospitalization:  Had stress test on 7/16; per facility note the stress test was \"abnomral\" however per Vascular surgeon note 7/19, he has \"been cleared by cardiology\".   -Continue to encourage fluids continue.    Status post ileostomy: dark stool in bag.  -Pantoprazole 40 mg daily.  -Oxycodone to every 6 hours as needed.  -Tylenol 500 mg every 4 hours as needed.     CAD  A. Fib  History of CVA:   -Continue metoprolol twice daily on " nondialysis days.  -Atorvastatin 80 mg at bedtime.  -Aspirin 81 daily.  -Clopidogrel 75 daily.  -Isosorbide 5 mg 3 times a day.  -Nitroglycerin SL 0.4 mg PRN   -Current Coumadin 4 mg.     Allergic rhinitis:  -Flonase 2 sprays daily.     Disposition: Discussed with nursing, patient will likely require long-term care based on surgeries have been completed.  No longer in therapy.      Electronically signed by: Candie Mcrae CNP

## 2021-08-06 NOTE — LETTER
8/6/2021        RE: Alexei Blake  [address] [place] [address]        Progress Notes by Candie Mcrae CNP at 6/30/2021 12:25 PM     Author: Candie Mcrae CNP Service: -- Author Type: Nurse Practitioner    Filed: 7/8/2021 10:28 AM Encounter Date: 6/30/2021 Status: Signed    : Candie Mcrae CNP (Nurse Practitioner)       [facility]    Facility:   [facility] [[id number]]   Code Status: POLST AVAILABLE      CHIEF COMPLAINT/REASON FOR VISIT:  Chief Complaint   Patient presents with     Problem Visit       HISTORY:      HPI: Alexei is a 81 y.o. male with pmh of ESRD on dialysis three times weekly, CVA with residual left sided weakness, htn, who presented to the ED with acute onset of abdominal pain during dialysis.  He had ischemic changes of the right colon and was brought to the OR for colonic resection and ileostomy on 331.  He also had an ileostomy revision on 4/21.  He is in the TCU for medical management, wound management for the abdominal wound secondary to repair, and therapy for strengthening.    He has dialysis on Tuesday, Thursday and Saturdays.  For his anemia, will be starting Epogen at hemodialysis.    Patient was hospitalized from 6/2 - 6/4 for pneumonia with focal left lobe atelectasis with  moderate to large left pleural effusion. During hospitalization patient as treated with IV  Zosyn and developed full body itching before medication was stopped.  Patient returned to the TCU on 6/4 and was sent back to [facility] on 6/6  because of abdominal pain. Abdominal CT revealed three 1.2 cm walled pancreatic head  cysts. Patient s lipase was elevated at 134 and WBC 13.1. He was treated for Acute  cholecystitis with a cholecystostomy tube performed by IR. It was discovered during his  second hospitalization the left pleural effusion from previous hospitalization 6/2-6/4 had  increased and required a left lung  thoracentesis on 6/7, resulting in 750 ml of serous fluid  being pulled off, but no growth in culture present per hospital discharge note.  Recommended to have repeat chest x-ray 2 weeks post-discharge.  During hospitalization MRI was performed on his left foot due to ulcers on the 4 th and 5 th  toes and heel - imaging was suggestive of osteomyelitis. Per notes vascular surgery  recommending angiogram before surgery, which will be performed outpatient. Patient  discharged on Bactrim once daily x 30 days- this was apparently switched to Augementin per second discharge note date 6/17.   Per hospital notes patient was originally supposed to be discharged on 6/12 but while  patient was sitting up in his wheelchair to be discharge, he experienced a syncopal  episode requiring rapid respond. All work-up, including MRI and carotid ultrasound, were  inconclusive in explaining syncopal event. Recommendation by cardiology is to follow-  up outpatient.    Today: Yair is seen today for follow-up to recent pneumonia diagnosis. Feeling better. Alert today with no c/o of chest pain or sob. Reports no more episodes of pleuritic chest pain. No chills or fevers. Sats remain 97-99% on RA. Has been well enough to go to dialysis this week. Labs reassuring. Elevated D-dimer which is chronic. On coumadin with recent INR 3.0.     Past Medical History:   Diagnosis Date     Abscess of tongue      Chronic kidney disease     CKD stage 4,dialysis [facility]     Diabetes (H)     type 2     DVT (deep venous thrombosis) (H)      End stage renal disease (H)      Hernia, umbilical      History of transfusion      Hyperlipidemia      Hypertension      CATHLEEN (obstructive sleep apnea)     uses CPAP     PVD (peripheral vascular disease) (H)      Renal insufficiency      Stroke (H) 2003    minor left sided weakness             Family History   Problem Relation Age of Onset     Hypertension Mother      Hypertension Father      Diabetes Father      Heart  "attack Father      Social History     Socioeconomic History     Marital status:      Spouse name: Not on file     Number of children: 3     Years of education: Not on file     Highest education level: Not on file   Occupational History     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 [employer]   Social Needs     Financial resource strain: Not on file     Food insecurity     Worry: Not on file     Inability: Not on file     Transportation needs     Medical: Not on file     Non-medical: Not on file   Tobacco Use     Smoking status: Former Smoker     Packs/day: 4.00     Quit date: 1995     Years since quittin.5     Smokeless tobacco: Never Used   Substance and Sexual Activity     Alcohol use: No     Drug use: No     Sexual activity: Not on file   Lifestyle     Physical activity     Days per week: Not on file     Minutes per session: Not on file     Stress: Not on file   Relationships     Social connections     Talks on phone: Not on file     Gets together: Not on file     Attends Oriental orthodox service: Not on file     Active member of club or organization: Not on file     Attends meetings of clubs or organizations: Not on file     Relationship status: Not on file     Intimate partner violence     Fear of current or ex partner: Not on file     Emotionally abused: Not on file     Physically abused: Not on file     Forced sexual activity: Not on file   Other Topics Concern     Not on file   Social History Narrative    3/20/21: \"Bill\" is here in the ED with his wife today.         Review of Systems   Constitutional: Negative.    HENT: Negative.    Respiratory: Positive for cough, inspiratory chest pain.  Cardiovascular: Negative.    Gastrointestinal: Negative for abdominal distention, abdominal pain and nausea.        Mild tenderness where melissa drain is placed.    Genitourinary: Negative.    Musculoskeletal: Negative.    Skin: Positive for wound.   Neurological: Positive for weakness.       Vitals:    21 1225   BP: " "119/66   Pulse: 69   Resp: 18   Temp: 97.8  F (36.6  C)   SpO2: 97%   Weight: 176 lb 8 oz (80.1 kg)   Height: 6' 1\" (1.854 m)       Physical Exam  Constitutional:       Appearance: Normal appearance. He is normal weight. He is ill-appearing.   HENT:      Head: Atraumatic.      Mouth/Throat:      Mouth: Mucous membranes are moist.   Eyes:      General: No scleral icterus.     Extraocular Movements: Extraocular movements intact.   Cardiovascular:      Rate and Rhythm: Normal rate, irregular rhythm.  Pulmonary:      Effort: Pulmonary effort is normal. No respiratory distress.      Breath sounds: No wheezing.      Comments: Diminished bilaterally, wheezing.  Abdominal:      General: Abdomen is flat. There is no distension.      Palpations: Abdomen is soft.      Tenderness: There is no abdominal tenderness.      Comments: Iliostomy, melissa tube R side.    Musculoskeletal: Normal range of motion.      Right lower leg: No edema.      Left lower leg: No edema.      Comments: AV fistula to RUE.      Skin:     General: Skin is warm and dry.   Neurological:      General: No focal deficit present.   Psychiatric:         Mood and Affect: Mood normal.         Behavior: Behavior normal.         LABS:   Reviewed.     ASSESSMENT/ PLAN:     (E11.621,  L97.509,  Z79.4) Type 2 diabetes mellitus with foot ulcer, with long-term current use of insulin (H)    Plan: Reviewed blood sugars today.  - Lantus 5 units at HS  - Novolog sliding scale  - BG checks TID     (J18.9) Community acquired pneumonia of left lower lobe of lung (primary encounter diagnosis).  Reports inspiratory chest pain improving, has not had any more episodes.  Infrequent coughing, no further malaise or body aches or chills.  Is more alert today.  Lung sounds remain clear throughout with mildly diminished lower lobes.  Stat chest x-ray today revealed increasing left lower lobe infiltrate. Stat EKG was unchanged from prior.  Plan:   -Continue doxycycline 100 mg p.o. twice " "daily x5 days.    End-stage renal disease: Felt well enough to go to dialysis this week.  Plan: Anemia of chronic disease  -On Epogen at dialysis.  -Has dialysis Tuesday, Thursday, Saturday.  -Liberalize diet to general diet due to poor po intake.  Dialysis monitoring.  -Renal caps 1 mg daily.    Acute cholecystitis: Discovered upon re-hospitalization on 6/6 when patient began to  experience right sided abdominal pain. Cholecystostomy tube placed on 6/7 by IR.  -Cholecystostomy tube - nursing continues to monitor output and tube site.  Repeat cholangiogram on 7/28.   -To follow-up with surgery clinic in 3-4 months to discuss potential  cholecystectomy surgery.    Left pleural effusion: Thoracentesis was performed on 6/7 resulting in 750 ml of fluid  being pulled off. Cultures showed no bacteria growth.  Follow-up chest x-ray with resolving infiltrate, no pleural fluid.    Osteomyelitis - 4 th and 5 th left toes and left heel:  Surgery scheduled for 8/25. Preop will be completed next week.   -Completed angiogram; will have left popliteal to peroneal artery bypass per Vascular.  - Currently receiving Augmentin; continue until follows up with podiatry/surgeon.  - Continue current wound care tx. To left foot.    Dialysis related hypotension  Syncopal episode during hospitalization:  Had stress test on 7/16; per facility note the stress test was \"abnomral\" however per Vascular surgeon note 7/19, he has \"been cleared by cardiology\".   -Continue to encourage fluids.    Status post ileostomy: dark stool in bag.  -Pantoprazole 40 mg daily.  -Oxycodone to every 6 hours as needed.  -Tylenol 500 mg every 4 hours as needed.     CAD  A. Fib  History of CVA:   -Continue metoprolol twice daily on nondialysis days.  -Atorvastatin 80 mg at bedtime.  -Aspirin 81 daily.  -Clopidogrel 75 daily.  -Isosorbide 5 mg 3 times a day.  -Nitroglycerin SL 0.4 mg PRN   -Current Coumadin 4 mg.     Allergic rhinitis:  -Flonase 2 sprays " daily.     Disposition: Discussed with nursing, patient will likely require long-term care based on surgeries have been completed.  No longer in therapy.      Electronically signed by: Candie Mcrae CNP                 Sincerely,        Candie Mcrae CNP

## 2021-08-06 NOTE — LETTER
8/6/2021        RE: Alexei Blake  [address] [place] [address]        Progress Notes by Candie Mcrae CNP at 6/30/2021 12:25 PM     Author: Candie Mcrae CNP Service: -- Author Type: Nurse Practitioner    Filed: 7/8/2021 10:28 AM Encounter Date: 6/30/2021 Status: Signed    : Candie Mcrae CNP (Nurse Practitioner)       [facility]    Facility:   [facility] [[id number]]   Code Status: POLST AVAILABLE      CHIEF COMPLAINT/REASON FOR VISIT:  Chief Complaint   Patient presents with     Problem Visit       HISTORY:      HPI: Alexei is a 81 y.o. male with pmh of ESRD on dialysis three times weekly, CVA with residual left sided weakness, htn, who presented to the ED with acute onset of abdominal pain during dialysis.  He had ischemic changes of the right colon and was brought to the OR for colonic resection and ileostomy on 331.  He also had an ileostomy revision on 4/21.  He is in the TCU for medical management, wound management for the abdominal wound secondary to repair, and therapy for strengthening.    He has dialysis on Tuesday, Thursday and Saturdays.  For his anemia, will be starting Epogen at hemodialysis.    Patient was hospitalized from 6/2 - 6/4 for pneumonia with focal left lobe atelectasis with  moderate to large left pleural effusion. During hospitalization patient as treated with IV  Zosyn and developed full body itching before medication was stopped.  Patient returned to the TCU on 6/4 and was sent back to [facility] on 6/6  because of abdominal pain. Abdominal CT revealed three 1.2 cm walled pancreatic head  cysts. Patient s lipase was elevated at 134 and WBC 13.1. He was treated for Acute  cholecystitis with a cholecystostomy tube performed by IR. It was discovered during his  second hospitalization the left pleural effusion from previous hospitalization 6/2-6/4 had  increased and required a left lung  thoracentesis on 6/7, resulting in 750 ml of serous fluid  being pulled off, but no growth in culture present per hospital discharge note.  Recommended to have repeat chest x-ray 2 weeks post-discharge.  During hospitalization MRI was performed on his left foot due to ulcers on the 4 th and 5 th  toes and heel - imaging was suggestive of osteomyelitis. Per notes vascular surgery  recommending angiogram before surgery, which will be performed outpatient. Patient  discharged on Bactrim once daily x 30 days- this was apparently switched to Augementin per second discharge note date 6/17.   Per hospital notes patient was originally supposed to be discharged on 6/12 but while  patient was sitting up in his wheelchair to be discharge, he experienced a syncopal  episode requiring rapid respond. All work-up, including MRI and carotid ultrasound, were  inconclusive in explaining syncopal event. Recommendation by cardiology is to follow-  up outpatient.    Today: Yair is seen today for follow-up to recent pneumonia diagnosis. Feeling better. Alert today with no c/o of chest pain or sob. Reports no more episodes of pleuritic chest pain. No chills or fevers. Sats remain 97-99% on RA. Has been well enough to go to dialysis this week. Labs reassuring. Elevated D-dimer which is chronic. On coumadin with recent INR 3.0.     Past Medical History:   Diagnosis Date     Abscess of tongue      Chronic kidney disease     CKD stage 4,dialysis [facility]     Diabetes (H)     type 2     DVT (deep venous thrombosis) (H)      End stage renal disease (H)      Hernia, umbilical      History of transfusion      Hyperlipidemia      Hypertension      CATHLEEN (obstructive sleep apnea)     uses CPAP     PVD (peripheral vascular disease) (H)      Renal insufficiency      Stroke (H) 2003    minor left sided weakness             Family History   Problem Relation Age of Onset     Hypertension Mother      Hypertension Father      Diabetes Father      Heart  "attack Father      Social History     Socioeconomic History     Marital status:      Spouse name: Not on file     Number of children: 3     Years of education: Not on file     Highest education level: Not on file   Occupational History     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 [employer]   Social Needs     Financial resource strain: Not on file     Food insecurity     Worry: Not on file     Inability: Not on file     Transportation needs     Medical: Not on file     Non-medical: Not on file   Tobacco Use     Smoking status: Former Smoker     Packs/day: 4.00     Quit date: 1995     Years since quittin.5     Smokeless tobacco: Never Used   Substance and Sexual Activity     Alcohol use: No     Drug use: No     Sexual activity: Not on file   Lifestyle     Physical activity     Days per week: Not on file     Minutes per session: Not on file     Stress: Not on file   Relationships     Social connections     Talks on phone: Not on file     Gets together: Not on file     Attends Adventism service: Not on file     Active member of club or organization: Not on file     Attends meetings of clubs or organizations: Not on file     Relationship status: Not on file     Intimate partner violence     Fear of current or ex partner: Not on file     Emotionally abused: Not on file     Physically abused: Not on file     Forced sexual activity: Not on file   Other Topics Concern     Not on file   Social History Narrative    3/20/21: \"Bill\" is here in the ED with his wife today.         Review of Systems   Constitutional: Negative.    HENT: Negative.    Respiratory: Positive for cough, inspiratory chest pain.  Cardiovascular: Negative.    Gastrointestinal: Negative for abdominal distention, abdominal pain and nausea.        Mild tenderness where melissa drain is placed.    Genitourinary: Negative.    Musculoskeletal: Negative.    Skin: Positive for wound.   Neurological: Positive for weakness.       Vitals:    21 1225   BP: " "119/66   Pulse: 69   Resp: 18   Temp: 97.8  F (36.6  C)   SpO2: 97%   Weight: 176 lb 8 oz (80.1 kg)   Height: 6' 1\" (1.854 m)       Physical Exam  Constitutional:       Appearance: Normal appearance. He is normal weight. He is ill-appearing.   HENT:      Head: Atraumatic.      Mouth/Throat:      Mouth: Mucous membranes are moist.   Eyes:      General: No scleral icterus.     Extraocular Movements: Extraocular movements intact.   Cardiovascular:      Rate and Rhythm: Normal rate, irregular rhythm.  Pulmonary:      Effort: Pulmonary effort is normal. No respiratory distress.      Breath sounds: No wheezing.      Comments: Diminished bilaterally, wheezing.  Abdominal:      General: Abdomen is flat. There is no distension.      Palpations: Abdomen is soft.      Tenderness: There is no abdominal tenderness.      Comments: Iliostomy, melissa tube R side.    Musculoskeletal: Normal range of motion.      Right lower leg: No edema.      Left lower leg: No edema.      Comments: AV fistula to RUE.      Skin:     General: Skin is warm and dry.   Neurological:      General: No focal deficit present.   Psychiatric:         Mood and Affect: Mood normal.         Behavior: Behavior normal.         LABS:   Reviewed.     ASSESSMENT/ PLAN:     (E11.621,  L97.509,  Z79.4) Type 2 diabetes mellitus with foot ulcer, with long-term current use of insulin (H)    Plan: Reviewed blood sugars today.  - Lantus 5 units at HS  - Novolog sliding scale  - BG checks TID     (J18.9) Community acquired pneumonia of left lower lobe of lung (primary encounter diagnosis).  Reports inspiratory chest pain improving, has not had any more episodes.  Infrequent coughing, no further malaise or body aches or chills.  Is more alert today.  Lung sounds remain clear throughout with mildly diminished lower lobes.  Stat chest x-ray today revealed increasing left lower lobe infiltrate. Stat EKG was unchanged from prior.  Plan:   -Continue doxycycline 100 mg p.o. twice " "daily x5 days.    End-stage renal disease: Felt well enough to go to dialysis this week.  Plan: Anemia of chronic disease  -On Epogen at dialysis.  -Has dialysis Tuesday, Thursday, Saturday.  -Liberalize diet to general diet due to poor po intake.  Dialysis monitoring.  -Renal caps 1 mg daily.    Acute cholecystitis: Discovered upon re-hospitalization on 6/6 when patient began to  experience right sided abdominal pain. Cholecystostomy tube placed on 6/7 by IR.  -Cholecystostomy tube - nursing continues to monitor output and tube site.  Repeat cholangiogram on 7/28.   -To follow-up with surgery clinic in 3-4 months to discuss potential  cholecystectomy surgery.    Left pleural effusion: Thoracentesis was performed on 6/7 resulting in 750 ml of fluid  being pulled off. Cultures showed no bacteria growth.  Follow-up chest x-ray with resolving infiltrate, no pleural fluid.    Osteomyelitis - 4 th and 5 th left toes and left heel:  Surgery scheduled for 8/25. Preop will be completed next week.   -Completed angiogram; will have left popliteal to peroneal artery bypass per Vascular.  - Currently receiving Augmentin; continue until follows up with podiatry/surgeon.  - Continue current wound care tx. To left foot.    Dialysis related hypotension  Syncopal episode during hospitalization:  Had stress test on 7/16; per facility note the stress test was \"abnomral\" however per Vascular surgeon note 7/19, he has \"been cleared by cardiology\".   -Continue to encourage fluids.    Status post ileostomy: dark stool in bag.  -Pantoprazole 40 mg daily.  -Oxycodone to every 6 hours as needed.  -Tylenol 500 mg every 4 hours as needed.     CAD  A. Fib  History of CVA:   -Continue metoprolol twice daily on nondialysis days.  -Atorvastatin 80 mg at bedtime.  -Aspirin 81 daily.  -Clopidogrel 75 daily.  -Isosorbide 5 mg 3 times a day.  -Nitroglycerin SL 0.4 mg PRN   -Current Coumadin 4 mg.     Allergic rhinitis:  -Flonase 2 sprays " daily.     Disposition: Discussed with nursing, patient will likely require long-term care based on surgeries have been completed.  No longer in therapy.      Electronically signed by: Candie Mcrae CNP                 Sincerely,        Cnadie Mcrae CNP

## 2021-08-08 PROBLEM — N18.6 ESRD (END STAGE RENAL DISEASE) ON DIALYSIS (H): Status: ACTIVE | Noted: 2021-01-01

## 2021-08-08 PROBLEM — Z99.2 ESRD (END STAGE RENAL DISEASE) ON DIALYSIS (H): Status: ACTIVE | Noted: 2021-01-01

## 2021-08-09 NOTE — PROGRESS NOTES
Progress Notes by Candie Mcrae CNP at 6/30/2021 12:25 PM     Author: Candie Mcrae CNP Service: -- Author Type: Nurse Practitioner    Filed: 7/8/2021 10:28 AM Encounter Date: 6/30/2021 Status: Signed    : Candie Mcrae CNP (Nurse Practitioner)       [facility]    Facility:   [facility] [[id number]]   Code Status: POLST AVAILABLE      CHIEF COMPLAINT/REASON FOR VISIT:  Chief Complaint   Patient presents with     Problem Visit       HISTORY:      HPI: Alexei is a 81 y.o. male with pmh of ESRD on dialysis three times weekly, CVA with residual left sided weakness, htn, who presented to the ED with acute onset of abdominal pain during dialysis.  He had ischemic changes of the right colon and was brought to the OR for colonic resection and ileostomy on 331.  He also had an ileostomy revision on 4/21.  He is in the TCU for medical management, wound management for the abdominal wound secondary to repair, and therapy for strengthening.    He has dialysis on Tuesday, Thursday and Saturdays.  For his anemia, will be starting Epogen at hemodialysis.    Patient was hospitalized from 6/2 - 6/4 for pneumonia with focal left lobe atelectasis with  moderate to large left pleural effusion. During hospitalization patient as treated with IV  Zosyn and developed full body itching before medication was stopped.  Patient returned to the TCU on 6/4 and was sent back to [facility] on 6/6  because of abdominal pain. Abdominal CT revealed three 1.2 cm walled pancreatic head  cysts. Patient s lipase was elevated at 134 and WBC 13.1. He was treated for Acute  cholecystitis with a cholecystostomy tube performed by IR. It was discovered during his  second hospitalization the left pleural effusion from previous hospitalization 6/2-6/4 had  increased and required a left lung thoracentesis on 6/7, resulting in 750 ml of serous fluid  being pulled off, but no growth in culture  present per hospital discharge note.  Recommended to have repeat chest x-ray 2 weeks post-discharge.  During hospitalization MRI was performed on his left foot due to ulcers on the 4 th and 5 th  toes and heel - imaging was suggestive of osteomyelitis. Per notes vascular surgery  recommending angiogram before surgery, which will be performed outpatient. Patient  discharged on Bactrim once daily x 30 days- this was apparently switched to Augementin per second discharge note date 6/17.   Per hospital notes patient was originally supposed to be discharged on 6/12 but while  patient was sitting up in his wheelchair to be discharge, he experienced a syncopal  episode requiring rapid respond. All work-up, including MRI and carotid ultrasound, were  inconclusive in explaining syncopal event. Recommendation by cardiology is to follow-  up outpatient.    Today: Yair is seen today for follow-up to recent pneumonia diagnosis. Feeling better. Alert today with no c/o of chest pain or sob. Reports no more episodes of pleuritic chest pain. No chills or fevers. Sats remain 97-99% on RA. Has been well enough to go to dialysis this week. Labs reassuring. Elevated D-dimer which is chronic. On coumadin with recent INR 3.0.     Past Medical History:   Diagnosis Date     Abscess of tongue      Chronic kidney disease     CKD stage 4,dialysis [facility]     Diabetes (H)     type 2     DVT (deep venous thrombosis) (H)      End stage renal disease (H)      Hernia, umbilical      History of transfusion      Hyperlipidemia      Hypertension      CATHLEEN (obstructive sleep apnea)     uses CPAP     PVD (peripheral vascular disease) (H)      Renal insufficiency      Stroke (H) 2003    minor left sided weakness             Family History   Problem Relation Age of Onset     Hypertension Mother      Hypertension Father      Diabetes Father      Heart attack Father      Social History     Socioeconomic History     Marital status:      Spouse name:  "Not on file     Number of children: 3     Years of education: Not on file     Highest education level: Not on file   Occupational History     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 [employer]   Social Needs     Financial resource strain: Not on file     Food insecurity     Worry: Not on file     Inability: Not on file     Transportation needs     Medical: Not on file     Non-medical: Not on file   Tobacco Use     Smoking status: Former Smoker     Packs/day: 4.00     Quit date: 1995     Years since quittin.5     Smokeless tobacco: Never Used   Substance and Sexual Activity     Alcohol use: No     Drug use: No     Sexual activity: Not on file   Lifestyle     Physical activity     Days per week: Not on file     Minutes per session: Not on file     Stress: Not on file   Relationships     Social connections     Talks on phone: Not on file     Gets together: Not on file     Attends Mu-ism service: Not on file     Active member of club or organization: Not on file     Attends meetings of clubs or organizations: Not on file     Relationship status: Not on file     Intimate partner violence     Fear of current or ex partner: Not on file     Emotionally abused: Not on file     Physically abused: Not on file     Forced sexual activity: Not on file   Other Topics Concern     Not on file   Social History Narrative    3/20/21: \"Bill\" is here in the ED with his wife today.         Review of Systems   Constitutional: Negative.    HENT: Negative.    Respiratory: Positive for cough, inspiratory chest pain.  Cardiovascular: Negative.    Gastrointestinal: Negative for abdominal distention, abdominal pain and nausea.        Mild tenderness where melissa drain is placed.    Genitourinary: Negative.    Musculoskeletal: Negative.    Skin: Positive for wound.   Neurological: Positive for weakness.       Vitals:    21 1225   BP: 119/66   Pulse: 69   Resp: 18   Temp: 97.8  F (36.6  C)   SpO2: 97%   Weight: 176 lb 8 oz (80.1 kg) " "  Height: 6' 1\" (1.854 m)       Physical Exam  Constitutional:       Appearance: Normal appearance. He is normal weight. He is ill-appearing.   HENT:      Head: Atraumatic.      Mouth/Throat:      Mouth: Mucous membranes are moist.   Eyes:      General: No scleral icterus.     Extraocular Movements: Extraocular movements intact.   Cardiovascular:      Rate and Rhythm: Normal rate, irregular rhythm.  Pulmonary:      Effort: Pulmonary effort is normal. No respiratory distress.      Breath sounds: No wheezing.      Comments: Diminished bilaterally, wheezing.  Abdominal:      General: Abdomen is flat. There is no distension.      Palpations: Abdomen is soft.      Tenderness: There is no abdominal tenderness.      Comments: Iliostomy, melissa tube R side.    Musculoskeletal: Normal range of motion.      Right lower leg: No edema.      Left lower leg: No edema.      Comments: AV fistula to RUE.      Skin:     General: Skin is warm and dry.   Neurological:      General: No focal deficit present.   Psychiatric:         Mood and Affect: Mood normal.         Behavior: Behavior normal.         LABS:   Reviewed.     ASSESSMENT/ PLAN:     (E11.621,  L97.509,  Z79.4) Type 2 diabetes mellitus with foot ulcer, with long-term current use of insulin (H)    Plan: Reviewed blood sugars today.  - Lantus 5 units at HS  - Novolog sliding scale  - BG checks TID     (J18.9) Community acquired pneumonia of left lower lobe of lung (primary encounter diagnosis).  Reports inspiratory chest pain improving, has not had any more episodes.  Infrequent coughing, no further malaise or body aches or chills.  Is more alert today.  Lung sounds remain clear throughout with mildly diminished lower lobes.  Stat chest x-ray today revealed increasing left lower lobe infiltrate. Stat EKG was unchanged from prior.  Plan:   -Continue doxycycline 100 mg p.o. twice daily x5 days.    End-stage renal disease: Felt well enough to go to dialysis this week.  Plan: " "Anemia of chronic disease  -On Epogen at dialysis.  -Has dialysis Tuesday, Thursday, Saturday.  -Liberalize diet to general diet due to poor po intake.  Dialysis monitoring.  -Renal caps 1 mg daily.    Acute cholecystitis: Discovered upon re-hospitalization on 6/6 when patient began to  experience right sided abdominal pain. Cholecystostomy tube placed on 6/7 by IR.  -Cholecystostomy tube - nursing continues to monitor output and tube site.  Repeat cholangiogram on 7/28.   -To follow-up with surgery clinic in 3-4 months to discuss potential  cholecystectomy surgery.    Left pleural effusion: Thoracentesis was performed on 6/7 resulting in 750 ml of fluid  being pulled off. Cultures showed no bacteria growth.  Follow-up chest x-ray with resolving infiltrate, no pleural fluid.    Osteomyelitis - 4 th and 5 th left toes and left heel:  Surgery scheduled for 8/25. Preop will be completed next week.   -Completed angiogram; will have left popliteal to peroneal artery bypass per Vascular.  - Currently receiving Augmentin; continue until follows up with podiatry/surgeon.  - Continue current wound care tx. To left foot.    Dialysis related hypotension  Syncopal episode during hospitalization:  Had stress test on 7/16; per facility note the stress test was \"abnomral\" however per Vascular surgeon note 7/19, he has \"been cleared by cardiology\".   -Continue to encourage fluids.    Status post ileostomy: dark stool in bag.  -Pantoprazole 40 mg daily.  -Oxycodone to every 6 hours as needed.  -Tylenol 500 mg every 4 hours as needed.     CAD  A. Fib  History of CVA:   -Continue metoprolol twice daily on nondialysis days.  -Atorvastatin 80 mg at bedtime.  -Aspirin 81 daily.  -Clopidogrel 75 daily.  -Isosorbide 5 mg 3 times a day.  -Nitroglycerin SL 0.4 mg PRN   -Current Coumadin 4 mg.     Allergic rhinitis:  -Flonase 2 sprays daily.     Disposition: Discussed with nursing, patient will likely require long-term care based on surgeries " have been completed.  No longer in therapy.      Electronically signed by: Candie Mcrae CNP

## 2021-08-09 NOTE — LETTER
8/9/2021        RE: Alexei Blake  [address]  [place] [address]        [facility]  [address] [place] [address]  Phone: [phone]  Fax: [phone]  Primary Provider: Nolberto Sanz  Pre-op Performing Provider: ILDA BECKER    PREOPERATIVE EVALUATION:  Today's date: 8/9/2021    Alexei Blake is a 81 year old male who presents for a preoperative evaluation.    Surgical Information:  Surgery/Procedure: Left arterial bypass, popliteal artery to peroneal.   Surgery Location: [facility]  Surgeon: Madeleine  Surgery Date: 8/25/21  Time of Surgery: TBD  Where patient plans to recover: At a nursing home  Fax number for surgical facility: Note does not need to be faxed, will be available electronically in Epic.    Type of Anesthesia Anticipated: to be determined    Assessment & Plan     The proposed surgical procedure is considered HIGH risk.    Simple chronic bronchitis (H)  Recent left pleural effusion, resolving.  Treatment for acute pneumonia of the left lower lobe 8/3 through 8/8.  Improved.  Satting greater than 95% on room air.    PVD (peripheral vascular disease) (H)    Other chronic osteomyelitis, multiple sites (H)    ESRD (end stage renal disease) on dialysis (H)  -Dialysis Tuesday, Thursday, Saturday.      Possible Sleep Apnea: yes   No flowsheet data found.     Implanted Device: NONE    Risks and Recommendations:  The patient has the following additional risks and recommendations for perioperative complications:  Cardiovascular:   - Cardiology consulted and cleared per Vascular notes.  Diabetes:  - Patient is on insulin therapy; diabetic NPO guidelines provided and discussed.  Pulmonary:    - Incentive spirometry post-op   - Recently treated pulmonary infection  Anemia/Bleeding/Clotting:    - History of DVT or PE, consider DVT prevention postoperatively   - Anemia and does not require treatment prior to surgery. Monitor hemoglobin  postoperatively    Medication Instructions:   - clopidrogel (Plavix), prasugrel (Effient), ticagrelor (Brilinta): Patient has a cardiac stent. Medication will NOT be stopped until cleared by cardiology.    - warfarin: Bridging therapy will be coordinated by TCU primary, Cardiology.    - Beta Blockers: Continue taking on the day of surgery.   - Statins: Continue taking on the day of surgery.    - Long acting insulin (e.g. glargine, detemir): Take 80% of the usual evening or morning dose before surgery (4 units).    RECOMMENDATION:  APPROVAL GIVEN to proceed with proposed procedure, without further diagnostic evaluation. PER cardiology who has already approved patient. SEE NOTE 7/30 from Dr. Daniel.     Review of external notes as documented above   Independent interpretation of a test performed by another physician/other qualified health care professional (not separately reported) - EKG completed 8/3/21.     Subjective     HPI related to upcoming procedure: Extensive PVD with non healing DM ulcer and osteomyelitis of the LLE.       No flowsheet data found.    Health Care Directive:  Patient has a Health Care Directive on file      Preoperative Review of :   reviewed - Oxycodone has been discontinue.       Status of Chronic Conditions:  A-FIB - Patient has a longstanding history of chronic A-fib currently on rate control. Current treatment regimen includes Warfarin for stroke prevention and denies significant symptoms of lightheadedness, palpitations or dyspnea.     ANEMIA - Patient has a recent history of moderate-severe anemia, which has not been symptomatic.     CAD - Patient has a longstanding history of moderate-severe CAD. Patient denies recent chest pain or NTG use, denies exercise induced dyspnea or PND. Last Stress test 7/9/21, EKG 8/3/21.     CHF - Patient has a longstanding history of moderate-severe CHF. Exacerbating conditions include ischemic heart disease, hypertension, COPD and atrial fibrilation.  Currently the patient's condition is same. Current treatment regimen includes long acting nitrate, beta blocker and ASA. The patient denies chest pain, edema, orthopnea, SOB or recent weight gain.    COPD - Patient has a longstanding history of moderate-severe COPD . Patient has been doing well overall noting NO SYMPTOMS and continues on medication regimen consisting of combivent qid without adverse reactions or side effects.    DIABETES - Patient has a longstanding history of DiabetesType Type II . Patient is being treated with diet and insulin injections and denies significant side effects. Control has been good. Complicating factors include but are not limited to: hypertension and chronic kidney disease.     HYPERLIPIDEMIA - Patient has a long history of significant Hyperlipidemia requiring medication for treatment with recent good control. Patient reports no problems or side effects with the medication.     HYPERTENSION - Patient has longstanding history of HTN , currently denies any symptoms referable to elevated blood pressure. Specifically denies chest pain, palpitations, dyspnea, orthopnea, PND or peripheral edema. Blood pressure readings have not been in normal range. Current medication regimen is as listed below. Patient denies any side effects of medication.     RENAL INSUFFICIENCY - Patient has a longstanding history of moderate-severe chronic renal insufficiency. Last Cr ESRD.       Review of Systems  Constitutional, neuro, ENT, endocrine, pulmonary, cardiac, gastrointestinal, genitourinary, musculoskeletal, integument and psychiatric systems are negative, except as otherwise noted.    Patient Active Problem List    Diagnosis Date Noted     COPD (chronic obstructive pulmonary disease) (H) 08/05/2021     Priority: Medium     Community acquired pneumonia of left lower lobe of lung 08/03/2021     Priority: Medium     Osteomyelitis of ankle and foot (H) 07/28/2021     Priority: Medium     Pressure injury of  left heel, stage 3 (H) 07/28/2021     Priority: Medium     Other chronic osteomyelitis, multiple sites (H) 07/16/2021     Priority: Medium     Diabetes mellitus, type 2 (H) 07/12/2021     Priority: Medium     ESRD (end stage renal disease) on dialysis (H) 07/12/2021     Priority: Medium     PVD (peripheral vascular disease) (H) 07/11/2021     Priority: Medium     Stroke (H) 07/11/2021     Priority: Medium     Colonic ischemia (H) 03/30/2021     Priority: Medium     Status post ileostomy (H) 03/30/2021     Priority: Medium     Added automatically from request for surgery [id number]        Past Medical History:   Diagnosis Date     Abscess of tongue      Chronic kidney disease     CKD stage 4,dialysis [facility]     Diabetes (H)     type 2     DVT (deep venous thrombosis) (H)      End stage renal disease (H)      Hernia, umbilical      History of transfusion      Hyperlipidemia      Hypertension      CATHLEEN (obstructive sleep apnea)     uses CPAP     PVD (peripheral vascular disease) (H)      Renal insufficiency      Stroke (H) 2003    minor left sided weakness     Past Surgical History:   Procedure Laterality Date     AMPUTATE TOE(S) Bilateral     multiple     ARTERIAL BYPASS SURGERY Bilateral     lower extremities, Dr. Cottrell     CV CORONARY ANGIOGRAM N/A 1/20/2021    Procedure: Coronary Angiogram;  Surgeon: Tuyet Arreguin MD;  Location: [facility] Cardiac Cath Lab;  Service: Cardiology     CV LEFT HEART CATHETERIZATION WITHOUT LEFT VENTRICULOGRAM Left 1/20/2021    Procedure: Left Heart Catheterization Without Left Ventriculogram;  Surgeon: Tuyet Arreguin MD;  Location: [facility] Cardiac Cath Lab;  Service: Cardiology     HERNIA REPAIR, UMBILICAL N/A 4/29/2016    Procedure: OPEN UMBILICAL REPAIR WITH MESH;  Surgeon: Jevon Rivas MD;  Location: [facility] OR;  Service:      ILEOSTOMY N/A 4/21/2021    Procedure: Open ileostomy revision;  Surgeon: Ty Walker DO;  Location:  OR      AV  FISTULAGRAM  1/27/2017     IR CHOLECYSTOSTOMY  6/7/2021     IR CVC TUNNEL PLACEMENT > 5 YRS OF AGE  12/30/2015     IR EXTREMITY ANGIOGRAM LEFT  7/9/2021     IR GALLBLADDER DRAIN PLACEMENT  6/7/2021     IR LOWER EXTREMITY ANGIOGRAM LEFT  7/9/2021     LAPAROTOMY EXPLORATORY N/A 3/31/2021    Procedure: LAPAROTOMY, RIGHT HEMICOLECTOMY, ILEOSTOMY CREATION;  Surgeon: Harsh Santos MD;  Location: [facility] OR     US THORACENTESIS  6/7/2021     Current Outpatient Medications   Medication Sig Dispense Refill     acetaminophen (TYLENOL) 500 MG tablet Take 500 mg by mouth every 4 hours as needed for mild pain       AMOXICILLIN-POT CLAVULANATE PO Take 1 tablet by mouth daily [place]       aspirin 81 MG EC tablet Take 81 mg by mouth daily       atorvastatin (LIPITOR) 80 MG tablet Take 80 mg by mouth every evening       calcium carbonate (TUMS) 500 MG chewable tablet Take 1 chew tab by mouth 2 times daily       clopidogrel (PLAVIX) 75 MG tablet Take 75 mg by mouth daily       fluticasone (FLONASE) 50 MCG/ACT nasal spray Spray 2 sprays into both nostrils daily       isosorbide dinitrate (ISORDIL) 5 MG tablet Take 1 tablet (5 mg) by mouth 3 times daily       metoprolol tartrate (LOPRESSOR) 25 MG tablet Take 1 tablet (25 mg) by mouth 2 times daily       multivitamin RENAL (NEPHROCAPS/TRIPHROCAPS) 1 MG capsule Take 1 capsule by mouth daily       nitroGLYcerin (NITROSTAT) 0.4 MG sublingual tablet Place 0.4 mg under the tongue every 5 minutes as needed for chest pain For chest pain place 1 tablet under the tongue every 5 minutes for 3 doses. If symptoms persist 5 minutes after 1st dose call 911.       ondansetron 4 MG FILM Take 4 mg by mouth daily as needed       pantoprazole (PROTONIX) 40 MG EC tablet Take 1 tablet (40 mg) by mouth every morning (before breakfast)       warfarin ANTICOAGULANT (COUMADIN) 5 MG tablet Take 0.5-1 tablets (2.5-5 mg) by mouth daily  0       Allergies   Allergen Reactions     Blood Transfusion Related  "(Informational Only) Other (See Comments)     Patient has a history of a clinically significant antibody against RBC antigens.  A delay in compatible RBCs may occur.     Blood-Group Specific Substance      Anti-K present. Expect delays in blood for transfusion.  Draw 2 lavender top tubes for type and screen orders.      Calcitriol Rash     Ciprofloxacin Rash     Zosyn Rash        Social History     Tobacco Use     Smoking status: Former Smoker     Packs/day: 4.00     Quit date: 1995     Years since quittin.6     Smokeless tobacco: Never Used   Substance Use Topics     Alcohol use: No     Family History   Problem Relation Age of Onset     Diabetes Father      Hypertension Father      Heart Disease Father      Hypertension Mother      Coronary Artery Disease Father      History   Drug Use No         Objective     BP (!) 143/88   Pulse 80   Temp 98.6  F (37  C)   Resp 18   Ht 1.854 m (6' 1\")   Wt 82.3 kg (181 lb 6.4 oz)   SpO2 98%   BMI 23.93 kg/m      Physical Exam  Constitutional:       Appearance: Normal appearance. He is normal weight. He is ill-appearing.   HENT:      Head: Atraumatic.      Mouth/Throat:      Mouth: Mucous membranes are moist.   Eyes:      General: No scleral icterus.     Extraocular Movements: Extraocular movements intact.   Cardiovascular:      Rate and Rhythm: Normal rate, irregular rhythm.  Pulmonary:      Effort: Pulmonary effort is normal. No respiratory distress.      Breath sounds: No wheezing.      Comments: Diminished bilaterally, wheezing.  Abdominal:      General: Abdomen is flat. There is no distension.      Palpations: Abdomen is soft.      Tenderness: There is no abdominal tenderness.      Comments: Iliostomy, melissa tube R side.    Musculoskeletal: Normal range of motion.      Right lower leg: No edema.      Left lower leg: No edema.      Comments: AV fistula to RUE.  Skin:     General: Boot LLE s/s non healing ulcers.   Neurological:      General: No focal deficit " present.   Psychiatric:         Mood and Affect: Mood normal.         Behavior: Behavior normal.     Recent Labs   Lab Test 07/09/21  0915 06/15/21  0445 06/12/21  0513 06/11/21  0515 06/10/21  0519 06/10/21  0519 06/07/21  1101 06/07/21  0501 01/18/21  0204 01/12/21  1750   HGB 11.1*  --   --  11.4*  --  10.3*   < > 11.3*   < > 10.5*     --   --  176  --  177   < > 168   < > 202   INR 1.72* 2.10* 1.16*  --    < > 1.27*  --  2.22*   < >  --    NA  --   --  130*  --   --  131*   < > 132*   < > 141   POTASSIUM 3.7  --  4.8  --   --  4.2   < > 4.9   < > 4.5   CR 4.45*  --  5.36*  --   --  6.07*   < > 5.67*   < > 3.46*   A1C  --   --   --   --   --   --   --  7.1*  --  6.0*    < > = values in this interval not displayed.        Diagnostics:  Recent Results (from the past 720 hour(s))   NM Lexiscan stress test    Collection Time: 07/16/21  4:59 PM   Result Value Ref Range    Pharmacologic Protocol Lexiscan     Test Type Pharmacological     Baseline HR 78     Baseline Systolic      Baseline Diastolic BP 64     Last Stress HR 84     Last Stress Systolic      Last Stress Diastolic BP 62     Target      PERCENT HR 85%     ST Deviation Elevation V2 0.8mm     Deviation Time III -1.0mm     ST Elevation Amount V2 2.0mm     ST Deviation Amount he III -2.3mm     Final Resting /56     Final Resting HR 85     Max Treadmill Speed 0.0     Max Treadmill Grade 0.0     Peak Systolic /58     Peak Diastolic /62     Max      Stress Phase Resting     Stress Resting Pt Position MANUAL EVENT     Current HR 77     Current /56     Stress Phase Stress     Stage Minute EXE 00:00     Exercise Stage STAGE 2     Current HR 77     Current /64     Stress Phase Stress     Stage Minute EXE 01:00     Exercise Stage STAGE 3     Current HR 81     Current /64     Stress Phase Stress     Stage Minute EXE 02:00     Exercise Stage STAGE 4     Current HR 86     Current /64     Stress Phase  Stress     Stage Minute EXE 02:08     Exercise Stage STAGE 4     Current HR 85     Current /58     Stress Phase Stress     Stage Minute EXE 02:56     Exercise Stage STAGE 4     Current HR 85     Current /62     Stress Phase Stress     Stage Minute EXE 03:00     Exercise Stage STAGE 5     Current HR 84     Current /62     Stress Phase Stress     Stage Minute EXE 04:00     Exercise Stage STAGE 6     Current HR 84     Current /62     Stress Phase Stress     Stage Minute EXE 04:00     Exercise Stage STAGE 6     Current HR 84     Current /62     Stress Phase Recovery     Stage Minute REC 00:58     Exercise Stage Recovery     Current HR 77     Current /57     Stress Phase Recovery     Stage Minute REC 00:59     Exercise Stage Recovery     Current HR 81     Current /57     Stress Phase Recovery     Stage Minute REC 01:59     Exercise Stage Recovery     Current HR 82     Current /57     Stress Phase Recovery     Stage Minute REC 02:59     Exercise Stage Recovery     Current HR 72     Current /57     Stress Phase Recovery     Stage Minute REC 03:11     Exercise Stage Recovery     Current HR 61     Current /56     Stress Phase Recovery     Stage Minute REC 03:38     Exercise Stage Recovery     Current HR 77     Current /56     Stress Phase Recovery     Stage Minute REC 03:59     Exercise Stage Recovery     Current HR 78     Current /56     Stress Phase Recovery     Stage Minute REC 04:59     Exercise Stage Recovery     Current HR 93     Current /56     Stress Phase Recovery     Stage Minute REC 05:59     Exercise Stage Recovery     Current HR 96     Current /56     Stress Phase Recovery     Stage Minute REC 06:59     Exercise Stage Recovery     Current HR 88     Current /56     Stress Phase Recovery     Stage Minute REC 07:59     Exercise Stage Recovery     Current HR 88     Current /56     Stress Phase Recovery     Stage Minute  REC 08:24     Exercise Stage Recovery     Current HR 85     Current /56     Calculated Percent HR 78 %    Rate Pressure Product 13,176.0     Left Ventricular EF 65 %      EKG: atrial fibrillation, rate 70, left axis deviation, Left Bundle Branch Block, unchanged from previous tracings    Revised Cardiac Risk Index (RCRI):  The patient has the following serious cardiovascular risks for perioperative complications:   - High risk surgery (>5% cardiac complication risk) = 1 point   - Coronary Artery Disease (MI, positive stress test, angina, Qs on EKG) = 1 point   - Congestive Heart Failure (pulmonary edema, PND, s3 trish, CXR with pulmonary congestion, basilar rales) = 1 point   - Cerebrovascular Disease (TIA or CVA) = 1 point   - Diabetes Mellitus (on Insulin) = 1 point   - Serum Creatinine >2.0 mg/dl = 1 point     RCRI Interpretation: 3 points: Class IV (high risk - >11% complication rate)    Estimated Functional Capacity: Cannot evaluate. Stress test on 7/9/21 in Epic.            Signed Electronically by: Candie Mcrae CNP  Copy of this evaluation report is provided to requesting physician.            Sincerely,        Candie Mcrae CNP

## 2021-08-10 NOTE — PROGRESS NOTES
[facility] GERIATRICS  [address] [place] [address]  Phone: [phone]  Fax: [phone]  Primary Provider: Nolberto Sanz  Pre-op Performing Provider: ILDA BECKER    PREOPERATIVE EVALUATION:  Today's date: 8/9/2021    Alexei Blake is a 81 year old male who presents for a preoperative evaluation.    Surgical Information:  Surgery/Procedure: Left arterial bypass, popliteal artery to peroneal.   Surgery Location: [facility]  Surgeon: Madeleine  Surgery Date: 8/25/21  Time of Surgery: TBD  Where patient plans to recover: At a nursing home  Fax number for surgical facility: Note does not need to be faxed, will be available electronically in Epic.    Type of Anesthesia Anticipated: to be determined    Assessment & Plan     The proposed surgical procedure is considered HIGH risk.    Simple chronic bronchitis (H)  Recent left pleural effusion, resolving.  Treatment for acute pneumonia of the left lower lobe 8/3 through 8/8.  Improved.  Satting greater than 95% on room air.    PVD (peripheral vascular disease) (H)    Other chronic osteomyelitis, multiple sites (H)    ESRD (end stage renal disease) on dialysis (H)  -Dialysis Tuesday, Thursday, Saturday.      Possible Sleep Apnea: yes   No flowsheet data found.     Implanted Device: NONE    Risks and Recommendations:  The patient has the following additional risks and recommendations for perioperative complications:  Cardiovascular:   - Cardiology consulted and cleared per Vascular notes.  Diabetes:  - Patient is on insulin therapy; diabetic NPO guidelines provided and discussed.  Pulmonary:    - Incentive spirometry post-op   - Recently treated pulmonary infection  Anemia/Bleeding/Clotting:    - History of DVT or PE, consider DVT prevention postoperatively   - Anemia and does not require treatment prior to surgery. Monitor hemoglobin postoperatively    Medication Instructions:   - clopidrogel (Plavix), prasugrel (Effient),  ticagrelor (Brilinta): Patient has a cardiac stent. Medication will NOT be stopped until cleared by cardiology.    - warfarin: Bridging therapy will be coordinated by TCU primary, Cardiology.    - Beta Blockers: Continue taking on the day of surgery.   - Statins: Continue taking on the day of surgery.    - Long acting insulin (e.g. glargine, detemir): Take 80% of the usual evening or morning dose before surgery (4 units).    RECOMMENDATION:  APPROVAL GIVEN to proceed with proposed procedure, without further diagnostic evaluation. PER cardiology who has already approved patient. SEE NOTE 7/30 from Dr. Daniel.     Review of external notes as documented above   Independent interpretation of a test performed by another physician/other qualified health care professional (not separately reported) - EKG completed 8/3/21.     Subjective     HPI related to upcoming procedure: Extensive PVD with non healing DM ulcer and osteomyelitis of the LLE.       No flowsheet data found.    Health Care Directive:  Patient has a Health Care Directive on file      Preoperative Review of :   reviewed - Oxycodone has been discontinue.       Status of Chronic Conditions:  A-FIB - Patient has a longstanding history of chronic A-fib currently on rate control. Current treatment regimen includes Warfarin for stroke prevention and denies significant symptoms of lightheadedness, palpitations or dyspnea.     ANEMIA - Patient has a recent history of moderate-severe anemia, which has not been symptomatic.     CAD - Patient has a longstanding history of moderate-severe CAD. Patient denies recent chest pain or NTG use, denies exercise induced dyspnea or PND. Last Stress test 7/9/21, EKG 8/3/21.     CHF - Patient has a longstanding history of moderate-severe CHF. Exacerbating conditions include ischemic heart disease, hypertension, COPD and atrial fibrilation. Currently the patient's condition is same. Current treatment regimen includes long acting  nitrate, beta blocker and ASA. The patient denies chest pain, edema, orthopnea, SOB or recent weight gain.    COPD - Patient has a longstanding history of moderate-severe COPD . Patient has been doing well overall noting NO SYMPTOMS and continues on medication regimen consisting of combivent qid without adverse reactions or side effects.    DIABETES - Patient has a longstanding history of DiabetesType Type II . Patient is being treated with diet and insulin injections and denies significant side effects. Control has been good. Complicating factors include but are not limited to: hypertension and chronic kidney disease.     HYPERLIPIDEMIA - Patient has a long history of significant Hyperlipidemia requiring medication for treatment with recent good control. Patient reports no problems or side effects with the medication.     HYPERTENSION - Patient has longstanding history of HTN , currently denies any symptoms referable to elevated blood pressure. Specifically denies chest pain, palpitations, dyspnea, orthopnea, PND or peripheral edema. Blood pressure readings have not been in normal range. Current medication regimen is as listed below. Patient denies any side effects of medication.     RENAL INSUFFICIENCY - Patient has a longstanding history of moderate-severe chronic renal insufficiency. Last Cr ESRD.       Review of Systems  Constitutional, neuro, ENT, endocrine, pulmonary, cardiac, gastrointestinal, genitourinary, musculoskeletal, integument and psychiatric systems are negative, except as otherwise noted.    Patient Active Problem List    Diagnosis Date Noted     COPD (chronic obstructive pulmonary disease) (H) 08/05/2021     Priority: Medium     Community acquired pneumonia of left lower lobe of lung 08/03/2021     Priority: Medium     Osteomyelitis of ankle and foot (H) 07/28/2021     Priority: Medium     Pressure injury of left heel, stage 3 (H) 07/28/2021     Priority: Medium     Other chronic osteomyelitis,  multiple sites (H) 07/16/2021     Priority: Medium     Diabetes mellitus, type 2 (H) 07/12/2021     Priority: Medium     ESRD (end stage renal disease) on dialysis (H) 07/12/2021     Priority: Medium     PVD (peripheral vascular disease) (H) 07/11/2021     Priority: Medium     Stroke (H) 07/11/2021     Priority: Medium     Colonic ischemia (H) 03/30/2021     Priority: Medium     Status post ileostomy (H) 03/30/2021     Priority: Medium     Added automatically from request for surgery [id number]        Past Medical History:   Diagnosis Date     Abscess of tongue      Chronic kidney disease     CKD stage 4,dialysis [facility]     Diabetes (H)     type 2     DVT (deep venous thrombosis) (H)      End stage renal disease (H)      Hernia, umbilical      History of transfusion      Hyperlipidemia      Hypertension      CATHLEEN (obstructive sleep apnea)     uses CPAP     PVD (peripheral vascular disease) (H)      Renal insufficiency      Stroke (H) 2003    minor left sided weakness     Past Surgical History:   Procedure Laterality Date     AMPUTATE TOE(S) Bilateral     multiple     ARTERIAL BYPASS SURGERY Bilateral     lower extremities, Dr. Cottrell     CV CORONARY ANGIOGRAM N/A 1/20/2021    Procedure: Coronary Angiogram;  Surgeon: Tuyet Arreguin MD;  Location: [facility] Cardiac Cath Lab;  Service: Cardiology     CV LEFT HEART CATHETERIZATION WITHOUT LEFT VENTRICULOGRAM Left 1/20/2021    Procedure: Left Heart Catheterization Without Left Ventriculogram;  Surgeon: Tuyet Arreguin MD;  Location: [facility] Cardiac Cath Lab;  Service: Cardiology     HERNIA REPAIR, UMBILICAL N/A 4/29/2016    Procedure: OPEN UMBILICAL REPAIR WITH MESH;  Surgeon: Jevon Rivas MD;  Location: [facility] OR;  Service:      ILEOSTOMY N/A 4/21/2021    Procedure: Open ileostomy revision;  Surgeon: Ty Walker DO;  Location: [facility] OR     IR AV FISTULAGRAM  1/27/2017     IR CHOLECYSTOSTOMY  6/7/2021     IR CVC TUNNEL PLACEMENT > 5 YRS OF  AGE  12/30/2015     IR EXTREMITY ANGIOGRAM LEFT  7/9/2021     IR GALLBLADDER DRAIN PLACEMENT  6/7/2021     IR LOWER EXTREMITY ANGIOGRAM LEFT  7/9/2021     LAPAROTOMY EXPLORATORY N/A 3/31/2021    Procedure: LAPAROTOMY, RIGHT HEMICOLECTOMY, ILEOSTOMY CREATION;  Surgeon: Harsh Santos MD;  Location: [facility] OR     US THORACENTESIS  6/7/2021     Current Outpatient Medications   Medication Sig Dispense Refill     acetaminophen (TYLENOL) 500 MG tablet Take 500 mg by mouth every 4 hours as needed for mild pain       AMOXICILLIN-POT CLAVULANATE PO Take 1 tablet by mouth daily [place]       aspirin 81 MG EC tablet Take 81 mg by mouth daily       atorvastatin (LIPITOR) 80 MG tablet Take 80 mg by mouth every evening       calcium carbonate (TUMS) 500 MG chewable tablet Take 1 chew tab by mouth 2 times daily       clopidogrel (PLAVIX) 75 MG tablet Take 75 mg by mouth daily       fluticasone (FLONASE) 50 MCG/ACT nasal spray Spray 2 sprays into both nostrils daily       isosorbide dinitrate (ISORDIL) 5 MG tablet Take 1 tablet (5 mg) by mouth 3 times daily       metoprolol tartrate (LOPRESSOR) 25 MG tablet Take 1 tablet (25 mg) by mouth 2 times daily       multivitamin RENAL (NEPHROCAPS/TRIPHROCAPS) 1 MG capsule Take 1 capsule by mouth daily       nitroGLYcerin (NITROSTAT) 0.4 MG sublingual tablet Place 0.4 mg under the tongue every 5 minutes as needed for chest pain For chest pain place 1 tablet under the tongue every 5 minutes for 3 doses. If symptoms persist 5 minutes after 1st dose call 911.       ondansetron 4 MG FILM Take 4 mg by mouth daily as needed       pantoprazole (PROTONIX) 40 MG EC tablet Take 1 tablet (40 mg) by mouth every morning (before breakfast)       warfarin ANTICOAGULANT (COUMADIN) 5 MG tablet Take 0.5-1 tablets (2.5-5 mg) by mouth daily  0       Allergies   Allergen Reactions     Blood Transfusion Related (Informational Only) Other (See Comments)     Patient has a history of a clinically significant  "antibody against RBC antigens.  A delay in compatible RBCs may occur.     Blood-Group Specific Substance      Anti-K present. Expect delays in blood for transfusion.  Draw 2 lavender top tubes for type and screen orders.      Calcitriol Rash     Ciprofloxacin Rash     Zosyn Rash        Social History     Tobacco Use     Smoking status: Former Smoker     Packs/day: 4.00     Quit date: 1995     Years since quittin.6     Smokeless tobacco: Never Used   Substance Use Topics     Alcohol use: No     Family History   Problem Relation Age of Onset     Diabetes Father      Hypertension Father      Heart Disease Father      Hypertension Mother      Coronary Artery Disease Father      History   Drug Use No         Objective     BP (!) 143/88   Pulse 80   Temp 98.6  F (37  C)   Resp 18   Ht 1.854 m (6' 1\")   Wt 82.3 kg (181 lb 6.4 oz)   SpO2 98%   BMI 23.93 kg/m      Physical Exam  Constitutional:       Appearance: Normal appearance. He is normal weight. He is ill-appearing.   HENT:      Head: Atraumatic.      Mouth/Throat:      Mouth: Mucous membranes are moist.   Eyes:      General: No scleral icterus.     Extraocular Movements: Extraocular movements intact.   Cardiovascular:      Rate and Rhythm: Normal rate, irregular rhythm.  Pulmonary:      Effort: Pulmonary effort is normal. No respiratory distress.      Breath sounds: No wheezing.      Comments: Diminished bilaterally, wheezing.  Abdominal:      General: Abdomen is flat. There is no distension.      Palpations: Abdomen is soft.      Tenderness: There is no abdominal tenderness.      Comments: Iliostomy, melissa tube R side.    Musculoskeletal: Normal range of motion.      Right lower leg: No edema.      Left lower leg: No edema.      Comments: AV fistula to RUE.  Skin:     General: Boot LLE s/s non healing ulcers.   Neurological:      General: No focal deficit present.   Psychiatric:         Mood and Affect: Mood normal.         Behavior: Behavior normal. "     Recent Labs   Lab Test 07/09/21  0915 06/15/21  0445 06/12/21  0513 06/11/21  0515 06/10/21  0519 06/10/21  0519 06/07/21  1101 06/07/21  0501 01/18/21  0204 01/12/21  1750   HGB 11.1*  --   --  11.4*  --  10.3*   < > 11.3*   < > 10.5*     --   --  176  --  177   < > 168   < > 202   INR 1.72* 2.10* 1.16*  --    < > 1.27*  --  2.22*   < >  --    NA  --   --  130*  --   --  131*   < > 132*   < > 141   POTASSIUM 3.7  --  4.8  --   --  4.2   < > 4.9   < > 4.5   CR 4.45*  --  5.36*  --   --  6.07*   < > 5.67*   < > 3.46*   A1C  --   --   --   --   --   --   --  7.1*  --  6.0*    < > = values in this interval not displayed.        Diagnostics:  Recent Results (from the past 720 hour(s))   NM Lexiscan stress test    Collection Time: 07/16/21  4:59 PM   Result Value Ref Range    Pharmacologic Protocol Lexiscan     Test Type Pharmacological     Baseline HR 78     Baseline Systolic      Baseline Diastolic BP 64     Last Stress HR 84     Last Stress Systolic      Last Stress Diastolic BP 62     Target      PERCENT HR 85%     ST Deviation Elevation V2 0.8mm     Deviation Time III -1.0mm     ST Elevation Amount V2 2.0mm     ST Deviation Amount he III -2.3mm     Final Resting /56     Final Resting HR 85     Max Treadmill Speed 0.0     Max Treadmill Grade 0.0     Peak Systolic /58     Peak Diastolic /62     Max      Stress Phase Resting     Stress Resting Pt Position MANUAL EVENT     Current HR 77     Current /56     Stress Phase Stress     Stage Minute EXE 00:00     Exercise Stage STAGE 2     Current HR 77     Current /64     Stress Phase Stress     Stage Minute EXE 01:00     Exercise Stage STAGE 3     Current HR 81     Current /64     Stress Phase Stress     Stage Minute EXE 02:00     Exercise Stage STAGE 4     Current HR 86     Current /64     Stress Phase Stress     Stage Minute EXE 02:08     Exercise Stage STAGE 4     Current HR 85     Current BP  125/58     Stress Phase Stress     Stage Minute EXE 02:56     Exercise Stage STAGE 4     Current HR 85     Current /62     Stress Phase Stress     Stage Minute EXE 03:00     Exercise Stage STAGE 5     Current HR 84     Current /62     Stress Phase Stress     Stage Minute EXE 04:00     Exercise Stage STAGE 6     Current HR 84     Current /62     Stress Phase Stress     Stage Minute EXE 04:00     Exercise Stage STAGE 6     Current HR 84     Current /62     Stress Phase Recovery     Stage Minute REC 00:58     Exercise Stage Recovery     Current HR 77     Current /57     Stress Phase Recovery     Stage Minute REC 00:59     Exercise Stage Recovery     Current HR 81     Current /57     Stress Phase Recovery     Stage Minute REC 01:59     Exercise Stage Recovery     Current HR 82     Current /57     Stress Phase Recovery     Stage Minute REC 02:59     Exercise Stage Recovery     Current HR 72     Current /57     Stress Phase Recovery     Stage Minute REC 03:11     Exercise Stage Recovery     Current HR 61     Current /56     Stress Phase Recovery     Stage Minute REC 03:38     Exercise Stage Recovery     Current HR 77     Current /56     Stress Phase Recovery     Stage Minute REC 03:59     Exercise Stage Recovery     Current HR 78     Current /56     Stress Phase Recovery     Stage Minute REC 04:59     Exercise Stage Recovery     Current HR 93     Current /56     Stress Phase Recovery     Stage Minute REC 05:59     Exercise Stage Recovery     Current HR 96     Current /56     Stress Phase Recovery     Stage Minute REC 06:59     Exercise Stage Recovery     Current HR 88     Current /56     Stress Phase Recovery     Stage Minute REC 07:59     Exercise Stage Recovery     Current HR 88     Current /56     Stress Phase Recovery     Stage Minute REC 08:24     Exercise Stage Recovery     Current HR 85     Current /56     Calculated  Percent HR 78 %    Rate Pressure Product 13,176.0     Left Ventricular EF 65 %      EKG: atrial fibrillation, rate 70, left axis deviation, Left Bundle Branch Block, unchanged from previous tracings    Revised Cardiac Risk Index (RCRI):  The patient has the following serious cardiovascular risks for perioperative complications:   - High risk surgery (>5% cardiac complication risk) = 1 point   - Coronary Artery Disease (MI, positive stress test, angina, Qs on EKG) = 1 point   - Congestive Heart Failure (pulmonary edema, PND, s3 trish, CXR with pulmonary congestion, basilar rales) = 1 point   - Cerebrovascular Disease (TIA or CVA) = 1 point   - Diabetes Mellitus (on Insulin) = 1 point   - Serum Creatinine >2.0 mg/dl = 1 point     RCRI Interpretation: 3 points: Class IV (high risk - >11% complication rate)    Estimated Functional Capacity: Cannot evaluate. Stress test on 7/9/21 in Epic.            Signed Electronically by: Candie Mcrae CNP  Copy of this evaluation report is provided to requesting physician.

## 2021-08-17 NOTE — TELEPHONE ENCOUNTER
Patient calling from care facility; he would like to cancel surgery with Dr. Salvador as he does not think it is necessary. He left his home # for call back but he will not be there: [phone]

## 2021-08-17 NOTE — PROGRESS NOTES
[facility]  [place] Medical Record Number:  [id number]  Place of Service where encounter took place:  [facility] [[id number]]  Chief Complaint   Patient presents with     RECHECK       HPI:    Alexei Blake  is a 81 year old (1940), who is being seen today for an episodic care visit.  HPI information obtained from: facility chart records, facility staff, patient report and [facility] chart review. Today's concern is:    1. PVD (peripheral vascular disease) (H)    2. Simple chronic bronchitis (H)    3. Colonic ischemia (H)    4. ESRD (end stage renal disease) on dialysis (H)    5. Excessive oral secretions      This visiting NP was in facility when staff started to talk that they received a call from Dialysis that Yair called them and he is stopping his Dialysis.  They went to speak with him and he confirmed this decision that he no longer wants to go through Dialysis nor the upcoming surgery.    Was asked to see him as he is a complicated gentleman and if so, then things need to be changed.    Went in to see Yair and had just missed his spouse as she needed to go eat her supper as well.  Yair was finishing up with his meal which he at probably 60%.  Spent 36 minutes with him talking about his decision, what the next step is, and answered questions about end of life.      Yair stated as he pointed to his wheelchair that he has no strength to do anything and just lay in bed.  Today they had to use the 4 point lift at dialysis to get him out of the chair and that mentally put him into a sad state.  He shed a few tears during the visit but kept coming back to his debilities and he is ready to pass on.    He discussed this with his spouse and then made his calls to cancel the upcoming surgery as well as tell dialysis he will not be returning.      Confirmed with him of the new POLST form he signed for the nurses that he wants to be DNR/DNI, comfort care.    Talked about his  "medications due to at first wanting to thin some out for him and work with hospice to end of life care but he declined hospice.  He asked what was it going to do for him and time frame of life.  Not sure how quickly he may go and would he benefit from it but most of all his spouse may receive benefit from the service.    Went through talking about hospice a couple of times but he just stated he wants to be comfortable and declined the service.    From that point decision made to discontinue his medications and start him on comfort medications.  Explained why comfort medications and that this NP can order them despite not using Hospice.  Main item as well is that if something happens during the night, then staff have the medication available.        Past Medical and Surgical History reviewed in Epic today.    MEDICATIONS:    Current Outpatient Medications   Medication Sig Dispense Refill     atropine 1 % ophthalmic solution 2 drops on tongue every 2 hours as needed for excess secretions 15 mL 1     LORazepam (ATIVAN) 2 MG/ML (HIGH CONC) solution Place 0.25 mLs (0.5 mg) under the tongue every 4 hours as needed for anxiety 30 mL 0     morphine sulfate, high concentrate, (ROXANOL-CONCENTRATED) 20 MG/ML concentrated solution Take 0.5 mLs (10 mg) by mouth every 6 hours. May also take 0.5 mLs (10 mg) every hour as needed for shortness of breath / dyspnea or breakthrough pain. 30 mL 0       REVIEW OF SYSTEMS:  10 point ROS of systems including Constitutional, Eyes, Respiratory, Cardiovascular, Gastroenterology, Genitourinary, Integumentary, Musculoskeletal, Psychiatric were all negative except for pertinent positives noted in my HPI.    Objective:  /70   Pulse 88   Temp (!) 96.6  F (35.9  C)   Resp 20   Ht 1.854 m (6' 1\")   Wt 82 kg (180 lb 12.8 oz)   SpO2 96%   BMI 23.85 kg/m    Exam:  Yair was laying on his bed and the head was elevated and appeared comfortable.  Finished dinner and then pushed the side table " out of his way.    Alert and oriented x3.   Bruising seen on his extremities.  Can see he has a Ostomy and a cholecystectomy bag.  Dialysis access in right arm.  Records show he has foot wounds as well and seen by Podiatrist Surgeon - Terry  No use of oxygen.  Lungs are clear.  Speech is clear.  Heart rate regular and strong.  Labs:     Most Recent 3 CBC's:Recent Labs   Lab Test 07/09/21  0915 06/11/21  0515 06/10/21  0519 06/09/21  0510   WBC  --  5.1 6.2 9.1   HGB 11.1* 11.4* 10.3* 10.8*   MCV  --  103* 103* 104*    176 177 160     Most Recent 3 BMP's:Recent Labs   Lab Test 07/09/21  0915 06/17/21  1249 06/17/21  1150 06/17/21  0820 06/12/21  0513 06/10/21  0519 06/08/21  0527   NA  --   --   --   --  130* 131* 134*   POTASSIUM 3.7  --   --   --  4.8 4.2 5.6*   CHLORIDE  --   --   --   --  98 97* 102   CO2  --   --   --   --  21* 21* 20*   BUN  --   --   --   --  32* 45* 51*   CR 4.45*  --   --   --  5.36* 6.07* 7.21*   ANIONGAP  --   --   --   --  11 13 12   AVNI  --   --   --   --  9.7 9.0 9.2   GLC  --  174* 219* 135 104 132* 146*       ASSESSMENT/PLAN:  1. PVD (peripheral vascular disease) (H)    2. Simple chronic bronchitis (H)    3. Colonic ischemia (H)    4. ESRD (end stage renal disease) on dialysis (H)    5. Excessive oral secretions      Yair's PVD and wounds on feet have limited his abilities.  He has done dialysis for 6 years now.  He does not like the fact he barely has any mobility left.    Allowed him to express his frustrations and show tears.  Answered his question.  Discussed hospice but for now declined.  Can change his mind.  Right now he knew he needed to meet with a provider to proceed to go into a comfort care part of his life.    The following orders were written to be processed.  Yair is aware that the Roxanol will be scheduled for comfort but he can ask for more for breakthrough or staff can anticipate his needs.    Explained to him about the Atropine drops that if he sounds like  there are excess secretions in his throat then the drops to the tongue will help control the amount of secretions.  To be safe, will order PRN Lorazepam 0.5mg every 4 hours as needed in case he becomes agitated and has terminal agitation.  Roxanol should meet most of his needs.    Staff will fax Dialysis the orders written for stopping the service.  He called surgery himself to cancel.    Staff asked for orders to stop all labs and blood sugar checks.    Orders written by NP:  1.  Change POLST to DNR/DNI, comfort care   2.  Stop Dialysis - he already called them  3.  Surgery cancelled - he did this himself   4.  Discontinue all labs and blood sugar checks  5.  Discontinue all medications.   6.  Start Roxanol 20mg/ml 10mg po/SL every 6 hours and every 1 hour prn for SOB or pain  7.  Lorazepam 2mg/ml 0.5mg (0.25ml) SL every 4 hours prn for anxiety or terminal agitation  8.  Atropine 1% solution 2 gtts to tongue every 2 hours prn for excess secretions.    Total time spent with patient visit at the skilled nursing facility was 36 min including patient visit, review of past records and writing up new orders . Greater than 50% of total time spent with counseling and coordinating care due to his desire for end of life care, discussion of medications and what to expect..     Electronically signed by:  MOISES Sainz CNP

## 2021-08-17 NOTE — LETTER
8/17/2021        RE: Alexei Blake  [address] [place] [address]        [facility]  [facility] Record Number:  [id number]  Place of Service where encounter took place:  [facility] [[id number]]  Chief Complaint   Patient presents with     RECHECK       HPI:    Alexei Blake  is a 81 year old (1940), who is being seen today for an episodic care visit.  HPI information obtained from: facility chart records, facility staff, patient report and [facility] chart review. Today's concern is:    1. PVD (peripheral vascular disease) (H)    2. Simple chronic bronchitis (H)    3. Colonic ischemia (H)    4. ESRD (end stage renal disease) on dialysis (H)    5. Excessive oral secretions      This visiting NP was in facility when staff started to talk that they received a call from Dialysis that Yair called them and he is stopping his Dialysis.  They went to speak with him and he confirmed this decision that he no longer wants to go through Dialysis nor the upcoming surgery.    Was asked to see him as he is a complicated gentleman and if so, then things need to be changed.    Went in to see Yair and had just missed his spouse as she needed to go eat her supper as well.  Yair was finishing up with his meal which he at probably 60%.  Spent 36 minutes with him talking about his decision, what the next step is, and answered questions about end of life.      Yair stated as he pointed to his wheelchair that he has no strength to do anything and just lay in bed.  Today they had to use the 4 point lift at dialysis to get him out of the chair and that mentally put him into a sad state.  He shed a few tears during the visit but kept coming back to his debilities and he is ready to pass on.    He discussed this with his spouse and then made his calls to cancel the upcoming surgery as well as tell dialysis he will not be returning.      Confirmed with him of the new POLST form he  "signed for the nurses that he wants to be DNR/DNI, comfort care.    Talked about his medications due to at first wanting to thin some out for him and work with hospice to end of life care but he declined hospice.  He asked what was it going to do for him and time frame of life.  Not sure how quickly he may go and would he benefit from it but most of all his spouse may receive benefit from the service.    Went through talking about hospice a couple of times but he just stated he wants to be comfortable and declined the service.    From that point decision made to discontinue his medications and start him on comfort medications.  Explained why comfort medications and that this NP can order them despite not using Hospice.  Main item as well is that if something happens during the night, then staff have the medication available.        Past Medical and Surgical History reviewed in Epic today.    MEDICATIONS:    Current Outpatient Medications   Medication Sig Dispense Refill     atropine 1 % ophthalmic solution 2 drops on tongue every 2 hours as needed for excess secretions 15 mL 1     LORazepam (ATIVAN) 2 MG/ML (HIGH CONC) solution Place 0.25 mLs (0.5 mg) under the tongue every 4 hours as needed for anxiety 30 mL 0     morphine sulfate, high concentrate, (ROXANOL-CONCENTRATED) 20 MG/ML concentrated solution Take 0.5 mLs (10 mg) by mouth every 6 hours. May also take 0.5 mLs (10 mg) every hour as needed for shortness of breath / dyspnea or breakthrough pain. 30 mL 0       REVIEW OF SYSTEMS:  10 point ROS of systems including Constitutional, Eyes, Respiratory, Cardiovascular, Gastroenterology, Genitourinary, Integumentary, Musculoskeletal, Psychiatric were all negative except for pertinent positives noted in my HPI.    Objective:  /70   Pulse 88   Temp (!) 96.6  F (35.9  C)   Resp 20   Ht 1.854 m (6' 1\")   Wt 82 kg (180 lb 12.8 oz)   SpO2 96%   BMI 23.85 kg/m    Exam:  Yair was laying on his bed and the head " was elevated and appeared comfortable.  Finished dinner and then pushed the side table out of his way.    Alert and oriented x3.   Bruising seen on his extremities.  Can see he has a Ostomy and a cholecystectomy bag.  Dialysis access in right arm.  Records show he has foot wounds as well and seen by Podiatrist Surgeon - Terry  No use of oxygen.  Lungs are clear.  Speech is clear.  Heart rate regular and strong.  Labs:     Most Recent 3 CBC's:Recent Labs   Lab Test 07/09/21  0915 06/11/21  0515 06/10/21  0519 06/09/21  0510   WBC  --  5.1 6.2 9.1   HGB 11.1* 11.4* 10.3* 10.8*   MCV  --  103* 103* 104*    176 177 160     Most Recent 3 BMP's:Recent Labs   Lab Test 07/09/21  0915 06/17/21  1249 06/17/21  1150 06/17/21  0820 06/12/21  0513 06/10/21  0519 06/08/21  0527   NA  --   --   --   --  130* 131* 134*   POTASSIUM 3.7  --   --   --  4.8 4.2 5.6*   CHLORIDE  --   --   --   --  98 97* 102   CO2  --   --   --   --  21* 21* 20*   BUN  --   --   --   --  32* 45* 51*   CR 4.45*  --   --   --  5.36* 6.07* 7.21*   ANIONGAP  --   --   --   --  11 13 12   AVNI  --   --   --   --  9.7 9.0 9.2   GLC  --  174* 219* 135 104 132* 146*       ASSESSMENT/PLAN:  1. PVD (peripheral vascular disease) (H)    2. Simple chronic bronchitis (H)    3. Colonic ischemia (H)    4. ESRD (end stage renal disease) on dialysis (H)    5. Excessive oral secretions      Yair's PVD and wounds on feet have limited his abilities.  He has done dialysis for 6 years now.  He does not like the fact he barely has any mobility left.    Allowed him to express his frustrations and show tears.  Answered his question.  Discussed hospice but for now declined.  Can change his mind.  Right now he knew he needed to meet with a provider to proceed to go into a comfort care part of his life.    The following orders were written to be processed.  Yair is aware that the Roxanol will be scheduled for comfort but he can ask for more for breakthrough or staff can  anticipate his needs.    Explained to him about the Atropine drops that if he sounds like there are excess secretions in his throat then the drops to the tongue will help control the amount of secretions.  To be safe, will order PRN Lorazepam 0.5mg every 4 hours as needed in case he becomes agitated and has terminal agitation.  Roxanol should meet most of his needs.    Staff will fax Dialysis the orders written for stopping the service.  He called surgery himself to cancel.    Staff asked for orders to stop all labs and blood sugar checks.    Orders written by NP:  1.  Change POLST to DNR/DNI, comfort care   2.  Stop Dialysis - he already called them  3.  Surgery cancelled - he did this himself   4.  Discontinue all labs and blood sugar checks  5.  Discontinue all medications.   6.  Start Roxanol 20mg/ml 10mg po/SL every 6 hours and every 1 hour prn for SOB or pain  7.  Lorazepam 2mg/ml 0.5mg (0.25ml) SL every 4 hours prn for anxiety or terminal agitation  8.  Atropine 1% solution 2 gtts to tongue every 2 hours prn for excess secretions.    Total time spent with patient visit at the skilled nursing facility was 36 min including patient visit, review of past records and writing up new orders . Greater than 50% of total time spent with counseling and coordinating care due to his desire for end of life care, discussion of medications and what to expect..     Electronically signed by:  MOISES Sainz CNP                 Sincerely,        MOISES Sainz CNP

## 2021-08-17 NOTE — TELEPHONE ENCOUNTER
Called spouse back. Notified her Dr. Salvador is doing the left leg bypass. She questioned if he was using a cadaver vein or not. Based on surgery order, it appears he is using patient's. Advised her that Dr. Salvador will be explaining the procedure in better detail the day of surgery. She will be going with him to hospital and writer told her it is ok for her to be with him during consents in [place]. She understands. She also then stated that patient went to [facility] last week because [facility] was full. He just went back to his care facility. Writer asked if they were aware of his surgery and the need for pre-op and covid test. Spouse unsure. Writer called  and spoke to GEMA Lambert. They are aware of surgery information, pre-op, covid test, and holding warfarin prior.

## 2021-08-17 NOTE — TELEPHONE ENCOUNTER
Wife called back and notified writer that they have decided that they do not want to proceed with surgery and plan to be placed on hospice. Cancelling surgery and follow up appointments. Notified wife ok to follow up PRN.     Updated OR scheduling and removed from Dr. Salvador's calendar.

## 2021-08-17 NOTE — TELEPHONE ENCOUNTER
"Patient's wife would like a call back to discuss patient's upcoming surgery with Dr. Salvador. Wife states that she and patient never got a \"clear answer\" about what kind of surgery the patient is having and what for.   Wife will be leaving to  patient from dialysis and bring him back to his care facility at 9AM; OK to WESLEY on home phone: [phone]  "

## 2021-08-18 NOTE — TELEPHONE ENCOUNTER
[facility] Nurse Triage Telephone Note    Provider: SETH Carty  Facility: [facility] Facility Type:  TCU    Caller: Fernanda   Call Back Number: [phone]    Allergies:    Allergies   Allergen Reactions     Blood Transfusion Related (Informational Only) Other (See Comments)     Patient has a history of a clinically significant antibody against RBC antigens.  A delay in compatible RBCs may occur.     Blood-Group Specific Substance      Anti-K present. Expect delays in blood for transfusion.  Draw 2 lavender top tubes for type and screen orders.      Calcitriol Rash     Ciprofloxacin Rash     Zosyn Rash        Reason for call: Pt has been declining and the NP Tricia had seen the pt and placed him on comfort cares and started comfort medications and the pt's wife is requesting a Hospice consult.     Verbal Order/Direction given by Provider: Pt had refused hospice yesterday with the NP, if the pt would like hospice the pt can have a hospice consult.     Provider giving Order:  MOISES Bowen CNP    Verbal Order given to: Fernanda Delong RN

## 2021-08-20 NOTE — PROGRESS NOTES
Contact   Chart Review     Situation: Patient chart reviewed by .    Background: SW CC following while in TCU for potential support when discharged.     Assessment: Patient remains in care center and they are consulting with hospice Patient has been in care center for 2 months.     Plan/Recommendations: No further outreach will be scheduled per protocol and potentially involving hospice.   Allison Rebollar,   [facility]  [phone]

## 2021-08-23 ENCOUNTER — DOCUMENTATION ONLY (OUTPATIENT)
Dept: GERIATRICS | Facility: CLINIC | Age: 81
End: 2021-08-23

## 2022-01-18 VITALS
OXYGEN SATURATION: 97 % | HEIGHT: 74 IN | DIASTOLIC BLOOD PRESSURE: 62 MMHG | HEART RATE: 62 BPM | BODY MASS INDEX: 28.25 KG/M2 | SYSTOLIC BLOOD PRESSURE: 148 MMHG

## 2022-01-18 VITALS
HEART RATE: 68 BPM | SYSTOLIC BLOOD PRESSURE: 135 MMHG | WEIGHT: 211.6 LBS | HEIGHT: 73 IN | RESPIRATION RATE: 16 BRPM | BODY MASS INDEX: 28.04 KG/M2 | DIASTOLIC BLOOD PRESSURE: 69 MMHG | OXYGEN SATURATION: 95 % | TEMPERATURE: 98 F

## 2022-01-18 VITALS
HEART RATE: 75 BPM | DIASTOLIC BLOOD PRESSURE: 77 MMHG | SYSTOLIC BLOOD PRESSURE: 181 MMHG | BODY MASS INDEX: 29.13 KG/M2 | OXYGEN SATURATION: 96 % | TEMPERATURE: 98.4 F | WEIGHT: 219.8 LBS | RESPIRATION RATE: 20 BRPM | HEIGHT: 73 IN

## 2022-01-18 VITALS
RESPIRATION RATE: 18 BRPM | BODY MASS INDEX: 25.18 KG/M2 | TEMPERATURE: 98 F | WEIGHT: 190 LBS | HEIGHT: 73 IN | SYSTOLIC BLOOD PRESSURE: 143 MMHG | DIASTOLIC BLOOD PRESSURE: 66 MMHG | OXYGEN SATURATION: 96 % | HEART RATE: 74 BPM

## 2022-01-18 VITALS
OXYGEN SATURATION: 98 % | WEIGHT: 224 LBS | HEART RATE: 53 BPM | BODY MASS INDEX: 28.76 KG/M2 | DIASTOLIC BLOOD PRESSURE: 60 MMHG | SYSTOLIC BLOOD PRESSURE: 172 MMHG

## 2022-01-18 VITALS
DIASTOLIC BLOOD PRESSURE: 77 MMHG | HEIGHT: 73 IN | HEART RATE: 83 BPM | SYSTOLIC BLOOD PRESSURE: 132 MMHG | WEIGHT: 187.2 LBS | RESPIRATION RATE: 16 BRPM | TEMPERATURE: 98.2 F | OXYGEN SATURATION: 96 % | BODY MASS INDEX: 24.81 KG/M2

## 2022-01-18 VITALS
SYSTOLIC BLOOD PRESSURE: 162 MMHG | HEART RATE: 54 BPM | WEIGHT: 224 LBS | HEIGHT: 74 IN | OXYGEN SATURATION: 97 % | BODY MASS INDEX: 28.75 KG/M2 | DIASTOLIC BLOOD PRESSURE: 64 MMHG

## 2022-01-18 VITALS
BODY MASS INDEX: 25.93 KG/M2 | HEART RATE: 64 BPM | RESPIRATION RATE: 14 BRPM | DIASTOLIC BLOOD PRESSURE: 58 MMHG | HEIGHT: 74 IN | SYSTOLIC BLOOD PRESSURE: 118 MMHG | WEIGHT: 202 LBS

## 2022-01-18 VITALS
WEIGHT: 202 LBS | HEIGHT: 74 IN | HEART RATE: 72 BPM | BODY MASS INDEX: 25.93 KG/M2 | SYSTOLIC BLOOD PRESSURE: 110 MMHG | DIASTOLIC BLOOD PRESSURE: 58 MMHG | RESPIRATION RATE: 16 BRPM | OXYGEN SATURATION: 90 %

## 2022-01-18 VITALS
TEMPERATURE: 97.7 F | BODY MASS INDEX: 26.03 KG/M2 | HEART RATE: 66 BPM | HEIGHT: 73 IN | DIASTOLIC BLOOD PRESSURE: 72 MMHG | TEMPERATURE: 98.2 F | WEIGHT: 197.4 LBS | SYSTOLIC BLOOD PRESSURE: 137 MMHG | BODY MASS INDEX: 26.16 KG/M2 | SYSTOLIC BLOOD PRESSURE: 137 MMHG | HEIGHT: 73 IN | HEART RATE: 66 BPM | OXYGEN SATURATION: 98 % | DIASTOLIC BLOOD PRESSURE: 72 MMHG | OXYGEN SATURATION: 100 % | RESPIRATION RATE: 18 BRPM | WEIGHT: 196.4 LBS | RESPIRATION RATE: 18 BRPM

## 2022-01-18 VITALS — HEART RATE: 74 BPM | SYSTOLIC BLOOD PRESSURE: 113 MMHG | OXYGEN SATURATION: 98 % | DIASTOLIC BLOOD PRESSURE: 57 MMHG

## 2022-01-18 VITALS
WEIGHT: 208.9 LBS | OXYGEN SATURATION: 97 % | RESPIRATION RATE: 18 BRPM | TEMPERATURE: 97.8 F | DIASTOLIC BLOOD PRESSURE: 79 MMHG | SYSTOLIC BLOOD PRESSURE: 165 MMHG | BODY MASS INDEX: 27.69 KG/M2 | HEIGHT: 73 IN | HEART RATE: 75 BPM

## 2022-01-18 VITALS
SYSTOLIC BLOOD PRESSURE: 104 MMHG | HEART RATE: 76 BPM | DIASTOLIC BLOOD PRESSURE: 62 MMHG | RESPIRATION RATE: 21 BRPM | OXYGEN SATURATION: 95 % | TEMPERATURE: 97.9 F

## 2022-01-18 VITALS
HEART RATE: 75 BPM | OXYGEN SATURATION: 97 % | BODY MASS INDEX: 27.85 KG/M2 | DIASTOLIC BLOOD PRESSURE: 79 MMHG | SYSTOLIC BLOOD PRESSURE: 165 MMHG | HEIGHT: 73 IN | TEMPERATURE: 97.8 F | WEIGHT: 210.1 LBS | RESPIRATION RATE: 18 BRPM

## 2022-01-18 VITALS
TEMPERATURE: 98 F | WEIGHT: 203 LBS | OXYGEN SATURATION: 95 % | SYSTOLIC BLOOD PRESSURE: 142 MMHG | DIASTOLIC BLOOD PRESSURE: 68 MMHG | SYSTOLIC BLOOD PRESSURE: 110 MMHG | BODY MASS INDEX: 26.05 KG/M2 | DIASTOLIC BLOOD PRESSURE: 60 MMHG | HEART RATE: 70 BPM | HEIGHT: 74 IN | TEMPERATURE: 98.2 F | HEART RATE: 72 BPM

## 2022-01-18 VITALS — BODY MASS INDEX: 26.32 KG/M2 | WEIGHT: 205 LBS

## 2022-01-18 VITALS
TEMPERATURE: 97.1 F | OXYGEN SATURATION: 100 % | WEIGHT: 222 LBS | HEIGHT: 71 IN | SYSTOLIC BLOOD PRESSURE: 150 MMHG | BODY MASS INDEX: 31.08 KG/M2 | HEART RATE: 59 BPM | DIASTOLIC BLOOD PRESSURE: 60 MMHG

## 2022-01-18 VITALS
WEIGHT: 221 LBS | SYSTOLIC BLOOD PRESSURE: 132 MMHG | BODY MASS INDEX: 28.36 KG/M2 | DIASTOLIC BLOOD PRESSURE: 58 MMHG | OXYGEN SATURATION: 99 % | HEIGHT: 74 IN | HEART RATE: 53 BPM | TEMPERATURE: 97.3 F

## 2022-01-18 VITALS — OXYGEN SATURATION: 99 % | SYSTOLIC BLOOD PRESSURE: 115 MMHG | DIASTOLIC BLOOD PRESSURE: 59 MMHG | HEART RATE: 70 BPM

## 2022-01-18 VITALS — HEART RATE: 61 BPM | SYSTOLIC BLOOD PRESSURE: 122 MMHG | DIASTOLIC BLOOD PRESSURE: 57 MMHG

## 2022-01-18 VITALS
TEMPERATURE: 97.8 F | SYSTOLIC BLOOD PRESSURE: 140 MMHG | RESPIRATION RATE: 18 BRPM | DIASTOLIC BLOOD PRESSURE: 76 MMHG | HEART RATE: 64 BPM

## 2022-01-18 VITALS
HEIGHT: 71 IN | WEIGHT: 222 LBS | DIASTOLIC BLOOD PRESSURE: 62 MMHG | OXYGEN SATURATION: 96 % | BODY MASS INDEX: 31.08 KG/M2 | SYSTOLIC BLOOD PRESSURE: 120 MMHG | HEART RATE: 47 BPM | TEMPERATURE: 97 F

## 2022-01-18 VITALS — HEART RATE: 66 BPM | SYSTOLIC BLOOD PRESSURE: 100 MMHG | DIASTOLIC BLOOD PRESSURE: 60 MMHG

## 2022-01-18 VITALS — WEIGHT: 196.58 LBS | HEIGHT: 74 IN | BODY MASS INDEX: 25.23 KG/M2

## 2022-01-18 VITALS
HEART RATE: 52 BPM | SYSTOLIC BLOOD PRESSURE: 148 MMHG | DIASTOLIC BLOOD PRESSURE: 60 MMHG | BODY MASS INDEX: 29.15 KG/M2 | WEIGHT: 227 LBS | OXYGEN SATURATION: 98 %

## 2022-01-18 VITALS
TEMPERATURE: 98 F | DIASTOLIC BLOOD PRESSURE: 58 MMHG | BODY MASS INDEX: 25.94 KG/M2 | OXYGEN SATURATION: 96 % | SYSTOLIC BLOOD PRESSURE: 111 MMHG | RESPIRATION RATE: 19 BRPM | HEART RATE: 61 BPM | WEIGHT: 202 LBS

## 2022-01-18 VITALS
RESPIRATION RATE: 21 BRPM | HEART RATE: 69 BPM | TEMPERATURE: 97.5 F | DIASTOLIC BLOOD PRESSURE: 78 MMHG | OXYGEN SATURATION: 94 % | SYSTOLIC BLOOD PRESSURE: 124 MMHG

## 2022-01-18 VITALS
HEIGHT: 74 IN | BODY MASS INDEX: 26.03 KG/M2 | WEIGHT: 202.8 LBS | WEIGHT: 202.8 LBS | BODY MASS INDEX: 26.03 KG/M2 | HEIGHT: 74 IN

## 2022-01-18 ASSESSMENT — PATIENT HEALTH QUESTIONNAIRE - PHQ9
SUM OF ALL RESPONSES TO PHQ QUESTIONS 1-9: 1
SUM OF ALL RESPONSES TO PHQ QUESTIONS 1-9: 0

## 2022-06-30 ENCOUNTER — DOCUMENTATION ONLY (OUTPATIENT)
Dept: ANTICOAGULATION | Facility: CLINIC | Age: 82
End: 2022-06-30

## 2022-06-30 DIAGNOSIS — I63.9 STROKE (H): ICD-10-CM

## 2022-06-30 DIAGNOSIS — I73.9 PVD (PERIPHERAL VASCULAR DISEASE) (H): Primary | ICD-10-CM

## 2023-07-07 NOTE — OR NURSING
14 fr red miles in stoma to assist drain in to iliostomy bag.  
Dr. Donte Keane, with anesthesia, will enter post op signout when time allows. Pt transferred back to . [facility].  
Notified Dr. Swift by phone of 1200 BGL of 249.  No orders for insulin coverage at this time.  Patient will have sliding scale insulin coverage continued on the floor as per our conversation.  
Paged Surgical resident  for verification of orders for continuous cardiac monitoring up on the floor.  Resident/ Dr. Ness verified that the patient does not need continuous tele monitoring and may return to 7C. Verified that he would put the order in and also contact the patient's wife for postoperative update.  
Detail Level: Detailed
General Sunscreen Counseling: I recommended a broad spectrum sunscreen with a SPF of 30 or higher.  I explained that SPF 30 sunscreens block approximately 97 percent of the sun's harmful rays.  Sunscreens should be applied at least 15 minutes prior to expected sun exposure and then every 2 hours after that as long as sun exposure continues. If swimming or exercising sunscreen should be reapplied every 45 minutes to an hour after getting wet or sweating.  One ounce, or the equivalent of a shot glass full of sunscreen, is adequate to protect the skin not covered by a bathing suit. I also recommended a lip balm with a sunscreen as well. Sun protective clothing can be used in lieu of sunscreen but must be worn the entire time you are exposed to the sun's rays.

## 2024-12-20 NOTE — ANESTHESIA CARE TRANSFER NOTE
Patient: Alexei Blake    Procedure(s):  LAPAROTOMY, RIGHT HEMICOLECTOMY, ILEOSTOMY CREATION    Diagnosis: Ischemia, bowel (H) [K55.9]  Diagnosis Additional Information: No value filed.    Anesthesia Type:   General     Note:    Oropharynx: spontaneously breathing  Level of Consciousness: awake  Oxygen Supplementation: face mask    Independent Airway: airway patency satisfactory and stable        Patient transferred to: PACU    Handoff Report: Identifed the Patient, Identified the Reponsible Provider, Reviewed the pertinent medical history, Discussed the surgical course, Reviewed Intra-OP anesthesia mangement and issues during anesthesia, Set expectations for post-procedure period and Allowed opportunity for questions and acknowledgement of understanding      Vitals: (Last set prior to Anesthesia Care Transfer)  CRNA VITALS  3/31/2021 0316 - 3/31/2021 0355      3/31/2021             ART BP:  146/46    Ht Rate:  72        Electronically Signed By: Matthieu Crawford MD  March 31, 2021  3:55 AM  
show

## 2025-03-07 NOTE — LETTER
Letter by Bonnie Hernandez at      Author: Bonnie Hernandez Service: -- Author Type: --    Filed:  Encounter Date: 6/16/2020 Status: (Other)         June 16, 2020       Alexei Blake  [address] [place] [address]    Dear Alexei Blake:    We are pleased to provide you with secure, online access to medical information for you and your family within [facility]. Per your request, we have expanded your account to allow access to the records of the following family members:              Isabel Blake (privilege ends on 6/16/2025.)     How Do I Log In?  1. In your Internet browser, go to [email or web address]  2. Log into [facility] using your [facility] Username and Password.  3. Click Sign In.        How Do I Access a Family Member's Account?  4. Select the account you want to access by clicking the [place] with the appropriate patient's name at the top of your screen.   5. You will see a disclaimer page letting you know that you will be viewing a family member's record. Review the disclaimer and then click Accept Proxy Access Disclaimer to proceed.  6. Once you switch to viewing a family member's record, you can navigate to My[facility] pages the same way you would for yourself. You can return to your own account by clicking the [place] at the top of the screen with your name on it.    7. To customize the colors and names of the linked accounts, you can select Personalize from the Profile dropdown menu at the top of the screen, then click the Edit button to make changes.     Additional Information  If you have questions, visit [email or web address], e-mail [email or web address] or call [phone] to talk to our [facility] staff. Remember, My[facility] is NOT to be used for urgent needs. For medical emergencies, dial 911.              0 (no pain/absence of nonverbal indicators of pain)

## (undated) DEVICE — GLOVE PROTEXIS W/NEU-THERA 7.0  2D73TE70

## (undated) DEVICE — SU SILK 2-0 SH 30" K833H

## (undated) DEVICE — Device

## (undated) DEVICE — LINEN GOWN XLG 5407

## (undated) DEVICE — SOL NACL 0.9% IRRIG 1000ML BOTTLE 2F7124

## (undated) DEVICE — GLOVE PROTEXIS POWDER FREE SMT 7.5  2D72PT75X

## (undated) DEVICE — SU ETHILON 3-0 PS-1 18" 1663H

## (undated) DEVICE — PACK AB HYST II

## (undated) DEVICE — SU VICRYL 3-0 SH CR 8X18" J774

## (undated) DEVICE — DRSG STERI STRIP 1/2X4" R1547

## (undated) DEVICE — DRAPE STERI TOWEL LG 1010

## (undated) DEVICE — SOL WATER IRRIG 1000ML BOTTLE 2F7114

## (undated) DEVICE — STPL RELOAD LINEAR CUT 75MM TCR75

## (undated) DEVICE — SU PROLENE 2-0 SHDA 36" 8523H

## (undated) DEVICE — SU SILK 4-0 TIE 12X30" A303H

## (undated) DEVICE — SU SILK 3-0 SH CR 8X18" C013D

## (undated) DEVICE — DRAPE SHEET REV FOLD 3/4 9349

## (undated) DEVICE — SU SILK 0 TIE 6X30" A306H

## (undated) DEVICE — SURGICEL FIBRILLAR HEMOSTAT 4"X4" 1963

## (undated) DEVICE — SU PDS II 0 TP-1 60" Z991G

## (undated) DEVICE — PREP CHLORAPREP 26ML TINTED ORANGE  260815

## (undated) DEVICE — GLOVE PROTEXIS BLUE W/NEU-THERA 7.5  2D73EB75

## (undated) DEVICE — SU SILK 2-0 TIE 12X30" A305H

## (undated) DEVICE — SU SILK 3-0 TIE 12X30" A304H

## (undated) DEVICE — SUCTION TIP YANKAUER VIAGUARD BULBOUS HANDLE SMK200

## (undated) DEVICE — ESU LIGASURE IMPACT OPEN SEALER/DVDR CVD LG JAW LF4418

## (undated) DEVICE — LINEN TOWEL PACK X30 5481

## (undated) DEVICE — SUCTION MANIFOLD NEPTUNE 2 SYS 4 PORT 0702-020-000

## (undated) DEVICE — WIPES FOLEY CARE SURESTEP PROVON DFC100

## (undated) DEVICE — STPL LINEAR CUT 75MM TLC75

## (undated) DEVICE — KIT NEW IMAGE COLOSTOMY/ILEOSTOMY 2 3/4" LF 19354

## (undated) DEVICE — LINEN TOWEL PACK X6 WHITE 5487

## (undated) RX ORDER — FENTANYL CITRATE 50 UG/ML
INJECTION, SOLUTION INTRAMUSCULAR; INTRAVENOUS
Status: DISPENSED
Start: 2021-01-01

## (undated) RX ORDER — PROPOFOL 10 MG/ML
INJECTION, EMULSION INTRAVENOUS
Status: DISPENSED
Start: 2021-01-01

## (undated) RX ORDER — ESMOLOL HYDROCHLORIDE 10 MG/ML
INJECTION INTRAVENOUS
Status: DISPENSED
Start: 2021-01-01

## (undated) RX ORDER — ACETAMINOPHEN 325 MG/1
TABLET ORAL
Status: DISPENSED
Start: 2021-01-01

## (undated) RX ORDER — CEFAZOLIN SODIUM 2 G/100ML
INJECTION, SOLUTION INTRAVENOUS
Status: DISPENSED
Start: 2021-01-01

## (undated) RX ORDER — HYDROMORPHONE HYDROCHLORIDE 1 MG/ML
INJECTION, SOLUTION INTRAMUSCULAR; INTRAVENOUS; SUBCUTANEOUS
Status: DISPENSED
Start: 2021-01-01

## (undated) RX ORDER — ADENOSINE 3 MG/ML
INJECTION, SOLUTION INTRAVENOUS
Status: DISPENSED
Start: 2021-01-01

## (undated) RX ORDER — LIDOCAINE HYDROCHLORIDE 20 MG/ML
INJECTION, SOLUTION EPIDURAL; INFILTRATION; INTRACAUDAL; PERINEURAL
Status: DISPENSED
Start: 2021-01-01

## (undated) RX ORDER — EPHEDRINE SULFATE 50 MG/ML
INJECTION, SOLUTION INTRAMUSCULAR; INTRAVENOUS; SUBCUTANEOUS
Status: DISPENSED
Start: 2021-01-01

## (undated) RX ORDER — GLYCOPYRROLATE 0.2 MG/ML
INJECTION, SOLUTION INTRAMUSCULAR; INTRAVENOUS
Status: DISPENSED
Start: 2021-01-01

## (undated) RX ORDER — DEXAMETHASONE SODIUM PHOSPHATE 4 MG/ML
INJECTION, SOLUTION INTRA-ARTICULAR; INTRALESIONAL; INTRAMUSCULAR; INTRAVENOUS; SOFT TISSUE
Status: DISPENSED
Start: 2021-01-01

## (undated) RX ORDER — ONDANSETRON 2 MG/ML
INJECTION INTRAMUSCULAR; INTRAVENOUS
Status: DISPENSED
Start: 2021-01-01